# Patient Record
Sex: MALE | Race: ASIAN | NOT HISPANIC OR LATINO | Employment: OTHER | URBAN - METROPOLITAN AREA
[De-identification: names, ages, dates, MRNs, and addresses within clinical notes are randomized per-mention and may not be internally consistent; named-entity substitution may affect disease eponyms.]

---

## 2022-01-01 ENCOUNTER — APPOINTMENT (INPATIENT)
Dept: RADIOLOGY | Facility: HOSPITAL | Age: 87
DRG: 907 | End: 2022-01-01
Payer: MEDICARE

## 2022-01-01 ENCOUNTER — HOSPITAL ENCOUNTER (OUTPATIENT)
Dept: GASTROENTEROLOGY | Facility: HOSPITAL | Age: 87
Setting detail: OUTPATIENT SURGERY
Discharge: HOME | End: 2022-05-11
Attending: INTERNAL MEDICINE
Payer: MEDICARE

## 2022-01-01 ENCOUNTER — TELEPHONE (OUTPATIENT)
Dept: GASTROENTEROLOGY | Facility: CLINIC | Age: 87
End: 2022-01-01

## 2022-01-01 ENCOUNTER — APPOINTMENT (INPATIENT)
Dept: DIALYSIS | Facility: HOSPITAL | Age: 87
DRG: 907 | End: 2022-01-01
Payer: MEDICARE

## 2022-01-01 ENCOUNTER — APPOINTMENT (INPATIENT)
Dept: PERIOP | Facility: HOSPITAL | Age: 87
DRG: 907 | End: 2022-01-01
Payer: MEDICARE

## 2022-01-01 ENCOUNTER — APPOINTMENT (INPATIENT)
Dept: NON INVASIVE DIAGNOSTICS | Facility: HOSPITAL | Age: 87
DRG: 907 | End: 2022-01-01
Attending: RADIOLOGY
Payer: MEDICARE

## 2022-01-01 ENCOUNTER — APPOINTMENT (INPATIENT)
Dept: NON INVASIVE DIAGNOSTICS | Facility: HOSPITAL | Age: 87
DRG: 907 | End: 2022-01-01
Payer: MEDICARE

## 2022-01-01 ENCOUNTER — APPOINTMENT (OUTPATIENT)
Dept: LAB | Facility: CLINIC | Age: 87
End: 2022-01-01
Payer: MEDICARE

## 2022-01-01 ENCOUNTER — ANESTHESIA (INPATIENT)
Dept: PERIOP | Facility: HOSPITAL | Age: 87
DRG: 907 | End: 2022-01-01
Payer: MEDICARE

## 2022-01-01 ENCOUNTER — OFFICE VISIT (OUTPATIENT)
Dept: GASTROENTEROLOGY | Facility: CLINIC | Age: 87
End: 2022-01-01
Payer: MEDICARE

## 2022-01-01 ENCOUNTER — HOME HEALTH ADMISSION (OUTPATIENT)
Dept: HOME HEALTH SERVICES | Facility: HOME HEALTHCARE | Age: 87
End: 2022-01-01

## 2022-01-01 ENCOUNTER — APPOINTMENT (INPATIENT)
Dept: NON INVASIVE DIAGNOSTICS | Facility: HOSPITAL | Age: 87
DRG: 907 | End: 2022-01-01
Attending: ANESTHESIOLOGY
Payer: MEDICARE

## 2022-01-01 ENCOUNTER — APPOINTMENT (INPATIENT)
Dept: RADIOLOGY | Facility: HOSPITAL | Age: 87
DRG: 907 | End: 2022-01-01
Attending: RADIOLOGY
Payer: MEDICARE

## 2022-01-01 ENCOUNTER — HOSPITAL ENCOUNTER (OUTPATIENT)
Dept: CT IMAGING | Facility: HOSPITAL | Age: 87
Discharge: HOME | End: 2022-05-11
Payer: MEDICARE

## 2022-01-01 ENCOUNTER — HOSPITAL ENCOUNTER (INPATIENT)
Facility: HOSPITAL | Age: 87
LOS: 37 days | DRG: 907 | End: 2022-06-17
Attending: EMERGENCY MEDICINE | Admitting: INTERNAL MEDICINE
Payer: MEDICARE

## 2022-01-01 ENCOUNTER — ANESTHESIA EVENT (OUTPATIENT)
Dept: GASTROENTEROLOGY | Facility: HOSPITAL | Age: 87
End: 2022-01-01

## 2022-01-01 ENCOUNTER — ANESTHESIA EVENT (INPATIENT)
Dept: PERIOP | Facility: HOSPITAL | Age: 87
DRG: 907 | End: 2022-01-01
Payer: MEDICARE

## 2022-01-01 ENCOUNTER — OFFICE VISIT (OUTPATIENT)
Dept: FAMILY MEDICINE CLINIC | Facility: CLINIC | Age: 87
End: 2022-01-01
Payer: MEDICARE

## 2022-01-01 ENCOUNTER — ANESTHESIA (OUTPATIENT)
Dept: GASTROENTEROLOGY | Facility: HOSPITAL | Age: 87
End: 2022-01-01

## 2022-01-01 VITALS
HEIGHT: 64 IN | OXYGEN SATURATION: 98 % | SYSTOLIC BLOOD PRESSURE: 115 MMHG | WEIGHT: 176.81 LBS | RESPIRATION RATE: 29 BRPM | TEMPERATURE: 98.1 F | DIASTOLIC BLOOD PRESSURE: 57 MMHG | BODY MASS INDEX: 30.19 KG/M2 | HEART RATE: 78 BPM

## 2022-01-01 VITALS
WEIGHT: 135 LBS | HEART RATE: 89 BPM | HEIGHT: 64 IN | BODY MASS INDEX: 23.05 KG/M2 | SYSTOLIC BLOOD PRESSURE: 152 MMHG | DIASTOLIC BLOOD PRESSURE: 76 MMHG

## 2022-01-01 VITALS
SYSTOLIC BLOOD PRESSURE: 142 MMHG | RESPIRATION RATE: 16 BRPM | DIASTOLIC BLOOD PRESSURE: 70 MMHG | OXYGEN SATURATION: 96 % | HEIGHT: 64 IN | BODY MASS INDEX: 22.94 KG/M2 | TEMPERATURE: 97.9 F | HEART RATE: 77 BPM | WEIGHT: 134.38 LBS

## 2022-01-01 VITALS
SYSTOLIC BLOOD PRESSURE: 141 MMHG | HEIGHT: 64 IN | RESPIRATION RATE: 13 BRPM | DIASTOLIC BLOOD PRESSURE: 69 MMHG | HEART RATE: 73 BPM | BODY MASS INDEX: 23.37 KG/M2 | TEMPERATURE: 97.1 F | WEIGHT: 136.9 LBS | OXYGEN SATURATION: 100 %

## 2022-01-01 DIAGNOSIS — D89.89 AUTOIMMUNE DISORDER (HCC): ICD-10-CM

## 2022-01-01 DIAGNOSIS — R10.32 LEFT LOWER QUADRANT PAIN: Primary | ICD-10-CM

## 2022-01-01 DIAGNOSIS — K57.90 DIVERTICULOSIS: ICD-10-CM

## 2022-01-01 DIAGNOSIS — I10 HYPERTENSION, UNSPECIFIED TYPE: ICD-10-CM

## 2022-01-01 DIAGNOSIS — J96.01 ACUTE RESPIRATORY FAILURE WITH HYPOXIA (HCC): ICD-10-CM

## 2022-01-01 DIAGNOSIS — G92.8 TOXIC METABOLIC ENCEPHALOPATHY: Primary | ICD-10-CM

## 2022-01-01 DIAGNOSIS — N17.9 AKI (ACUTE KIDNEY INJURY) (HCC): ICD-10-CM

## 2022-01-01 DIAGNOSIS — E03.9 HYPOTHYROIDISM, UNSPECIFIED TYPE: ICD-10-CM

## 2022-01-01 DIAGNOSIS — Z76.89 ENCOUNTER TO ESTABLISH CARE WITH NEW DOCTOR: Primary | ICD-10-CM

## 2022-01-01 DIAGNOSIS — K56.1 COLONIC INTUSSUSCEPTION (HCC): ICD-10-CM

## 2022-01-01 DIAGNOSIS — R18.8 PELVIC FLUID COLLECTION: ICD-10-CM

## 2022-01-01 DIAGNOSIS — S32.019A L1 VERTEBRAL FRACTURE (HCC): ICD-10-CM

## 2022-01-01 DIAGNOSIS — Z80.0 FAMILY HISTORY OF STOMACH CANCER: ICD-10-CM

## 2022-01-01 DIAGNOSIS — M51.37 DEGENERATIVE DISC DISEASE AT L5-S1 LEVEL: ICD-10-CM

## 2022-01-01 DIAGNOSIS — R54 FRAIL ELDERLY: ICD-10-CM

## 2022-01-01 DIAGNOSIS — K63.1 BOWEL PERFORATION (HCC): ICD-10-CM

## 2022-01-01 DIAGNOSIS — J18.9 PNEUMONIA OF BOTH LUNGS DUE TO INFECTIOUS ORGANISM, UNSPECIFIED PART OF LUNG: ICD-10-CM

## 2022-01-01 DIAGNOSIS — I46.9 CARDIAC ARREST (HCC): ICD-10-CM

## 2022-01-01 DIAGNOSIS — M48.07 SPINAL STENOSIS OF LUMBOSACRAL REGION: ICD-10-CM

## 2022-01-01 DIAGNOSIS — R10.9 ABDOMINAL PAIN: ICD-10-CM

## 2022-01-01 DIAGNOSIS — D12.6 ADENOMATOUS POLYP OF COLON, UNSPECIFIED PART OF COLON: ICD-10-CM

## 2022-01-01 DIAGNOSIS — R35.1 NOCTURIA: ICD-10-CM

## 2022-01-01 DIAGNOSIS — Z80.0 FAMILY HISTORY OF COLON CANCER: ICD-10-CM

## 2022-01-01 DIAGNOSIS — D53.1 MEGALOBLASTIC ANEMIA: Primary | ICD-10-CM

## 2022-01-01 DIAGNOSIS — E55.9 VITAMIN D DEFICIENCY: ICD-10-CM

## 2022-01-01 DIAGNOSIS — Z92.29 COVID-19 VACCINE SERIES COMPLETED: ICD-10-CM

## 2022-01-01 DIAGNOSIS — R11.0 NAUSEA: ICD-10-CM

## 2022-01-01 DIAGNOSIS — R09.02 HYPOXIA: ICD-10-CM

## 2022-01-01 DIAGNOSIS — E87.6 HYPOKALEMIA: ICD-10-CM

## 2022-01-01 DIAGNOSIS — A41.9 SEVERE SEPSIS (HCC): ICD-10-CM

## 2022-01-01 DIAGNOSIS — R10.32 LEFT LOWER QUADRANT PAIN: ICD-10-CM

## 2022-01-01 DIAGNOSIS — R65.20 SEVERE SEPSIS (HCC): ICD-10-CM

## 2022-01-01 LAB
25(OH)D3 SERPL-MCNC: 31.4 NG/ML (ref 30–100)
2HR DELTA HS TROPONIN: -1 NG/L
2HR DELTA HS TROPONIN: -36 NG/L
2HR DELTA HS TROPONIN: 22 NG/L
4HR DELTA HS TROPONIN: -4 NG/L
4HR DELTA HS TROPONIN: 87 NG/L
ABO GROUP BLD BPU: NORMAL
ABO GROUP BLD: NORMAL
ACANTHOCYTES BLD QL SMEAR: PRESENT
ACTH PLAS-MCNC: 11.8 PG/ML (ref 7.2–63.3)
ALBUMIN SERPL BCP-MCNC: 1.5 G/DL (ref 3.5–5)
ALBUMIN SERPL BCP-MCNC: 1.6 G/DL (ref 3.5–5)
ALBUMIN SERPL BCP-MCNC: 1.8 G/DL (ref 3.5–5)
ALBUMIN SERPL BCP-MCNC: 1.9 G/DL (ref 3.5–5)
ALBUMIN SERPL BCP-MCNC: 2 G/DL (ref 3.5–5)
ALBUMIN SERPL BCP-MCNC: 2.2 G/DL (ref 3.5–5)
ALBUMIN SERPL BCP-MCNC: 2.5 G/DL (ref 3.5–5)
ALBUMIN SERPL BCP-MCNC: 2.7 G/DL (ref 3.5–5)
ALBUMIN SERPL BCP-MCNC: 2.9 G/DL (ref 3.5–5)
ALBUMIN SERPL BCP-MCNC: 3.2 G/DL (ref 3.5–5)
ALBUMIN SERPL BCP-MCNC: 3.7 G/DL (ref 3.5–5)
ALP SERPL-CCNC: 100 U/L (ref 46–116)
ALP SERPL-CCNC: 156 U/L (ref 46–116)
ALP SERPL-CCNC: 191 U/L (ref 46–116)
ALP SERPL-CCNC: 260 U/L (ref 46–116)
ALP SERPL-CCNC: 38 U/L (ref 46–116)
ALP SERPL-CCNC: 40 U/L (ref 46–116)
ALP SERPL-CCNC: 41 U/L (ref 46–116)
ALP SERPL-CCNC: 43 U/L (ref 46–116)
ALP SERPL-CCNC: 43 U/L (ref 46–116)
ALP SERPL-CCNC: 445 U/L (ref 46–116)
ALP SERPL-CCNC: 47 U/L (ref 46–116)
ALP SERPL-CCNC: 53 U/L (ref 46–116)
ALP SERPL-CCNC: 53 U/L (ref 46–116)
ALP SERPL-CCNC: 54 U/L (ref 46–116)
ALP SERPL-CCNC: 58 U/L (ref 46–116)
ALT SERPL W P-5'-P-CCNC: 11 U/L (ref 12–78)
ALT SERPL W P-5'-P-CCNC: 13 U/L (ref 12–78)
ALT SERPL W P-5'-P-CCNC: 13 U/L (ref 12–78)
ALT SERPL W P-5'-P-CCNC: 14 U/L (ref 12–78)
ALT SERPL W P-5'-P-CCNC: 15 U/L (ref 12–78)
ALT SERPL W P-5'-P-CCNC: 18 U/L (ref 12–78)
ALT SERPL W P-5'-P-CCNC: 19 U/L (ref 12–78)
ALT SERPL W P-5'-P-CCNC: 20 U/L (ref 12–78)
ALT SERPL W P-5'-P-CCNC: 22 U/L (ref 12–78)
ALT SERPL W P-5'-P-CCNC: 27 U/L (ref 12–78)
ANION GAP SERPL CALCULATED.3IONS-SCNC: 10 MMOL/L (ref 4–13)
ANION GAP SERPL CALCULATED.3IONS-SCNC: 11 MMOL/L (ref 4–13)
ANION GAP SERPL CALCULATED.3IONS-SCNC: 12 MMOL/L (ref 4–13)
ANION GAP SERPL CALCULATED.3IONS-SCNC: 13 MMOL/L (ref 4–13)
ANION GAP SERPL CALCULATED.3IONS-SCNC: 13 MMOL/L (ref 4–13)
ANION GAP SERPL CALCULATED.3IONS-SCNC: 14 MMOL/L (ref 4–13)
ANION GAP SERPL CALCULATED.3IONS-SCNC: 15 MMOL/L (ref 4–13)
ANION GAP SERPL CALCULATED.3IONS-SCNC: 16 MMOL/L (ref 4–13)
ANION GAP SERPL CALCULATED.3IONS-SCNC: 4 MMOL/L (ref 4–13)
ANION GAP SERPL CALCULATED.3IONS-SCNC: 5 MMOL/L (ref 4–13)
ANION GAP SERPL CALCULATED.3IONS-SCNC: 6 MMOL/L (ref 4–13)
ANION GAP SERPL CALCULATED.3IONS-SCNC: 7 MMOL/L (ref 4–13)
ANION GAP SERPL CALCULATED.3IONS-SCNC: 8 MMOL/L (ref 4–13)
ANION GAP SERPL CALCULATED.3IONS-SCNC: 9 MMOL/L (ref 4–13)
ANISOCYTOSIS BLD QL SMEAR: PRESENT
AORTIC ROOT: 3.5 CM
AORTIC ROOT: 3.7 CM
AORTIC ROOT: 4.3 CM
APICAL FOUR CHAMBER EJECTION FRACTION: 46 %
APICAL FOUR CHAMBER EJECTION FRACTION: 54 %
APICAL FOUR CHAMBER EJECTION FRACTION: 57 %
APPEARANCE FLD: CLEAR
APTT PPP: 102 SECONDS (ref 23–37)
APTT PPP: 105 SECONDS (ref 23–37)
APTT PPP: 112 SECONDS (ref 23–37)
APTT PPP: 143 SECONDS (ref 23–37)
APTT PPP: 201 SECONDS (ref 23–37)
APTT PPP: 210 SECONDS (ref 23–37)
APTT PPP: 48 SECONDS (ref 23–37)
APTT PPP: 53 SECONDS (ref 23–37)
APTT PPP: 61 SECONDS (ref 23–37)
APTT PPP: 74 SECONDS (ref 23–37)
APTT PPP: 85 SECONDS (ref 23–37)
APTT PPP: 93 SECONDS (ref 23–37)
APTT PPP: 93 SECONDS (ref 23–37)
APTT PPP: >210 SECONDS (ref 23–37)
APTT PPP: >210 SECONDS (ref 23–37)
ARTERIAL PATENCY WRIST A: NO
ARTERIAL PATENCY WRIST A: YES
AST SERPL W P-5'-P-CCNC: 12 U/L (ref 5–45)
AST SERPL W P-5'-P-CCNC: 12 U/L (ref 5–45)
AST SERPL W P-5'-P-CCNC: 15 U/L (ref 5–45)
AST SERPL W P-5'-P-CCNC: 16 U/L (ref 5–45)
AST SERPL W P-5'-P-CCNC: 18 U/L (ref 5–45)
AST SERPL W P-5'-P-CCNC: 19 U/L (ref 5–45)
AST SERPL W P-5'-P-CCNC: 19 U/L (ref 5–45)
AST SERPL W P-5'-P-CCNC: 20 U/L (ref 5–45)
AST SERPL W P-5'-P-CCNC: 26 U/L (ref 5–45)
AST SERPL W P-5'-P-CCNC: 29 U/L (ref 5–45)
AST SERPL W P-5'-P-CCNC: 29 U/L (ref 5–45)
AST SERPL W P-5'-P-CCNC: 31 U/L (ref 5–45)
AST SERPL W P-5'-P-CCNC: 55 U/L (ref 5–45)
ATRIAL RATE: 101 BPM
ATRIAL RATE: 102 BPM
ATRIAL RATE: 111 BPM
ATRIAL RATE: 73 BPM
ATRIAL RATE: 76 BPM
ATRIAL RATE: 80 BPM
ATRIAL RATE: 90 BPM
ATRIAL RATE: 90 BPM
ATRIAL RATE: 93 BPM
ATRIAL RATE: 96 BPM
ATRIAL RATE: 98 BPM
AV LVOT PEAK GRADIENT: 4 MMHG
AV LVOT PEAK GRADIENT: 5 MMHG
AV PEAK GRADIENT: 25 MMHG
AV PEAK GRADIENT: 27 MMHG
BACTERIA BLD CULT: NORMAL
BACTERIA SPEC ANAEROBE CULT: ABNORMAL
BACTERIA SPEC BFLD CULT: ABNORMAL
BACTERIA SPEC BFLD CULT: NO GROWTH
BACTERIA SPT RESP CULT: ABNORMAL
BACTERIA SPT RESP CULT: ABNORMAL
BACTERIA UR QL AUTO: ABNORMAL /HPF
BACTERIA WND AEROBE CULT: ABNORMAL
BASE EX.OXY STD BLDV CALC-SCNC: 69 % (ref 60–80)
BASE EX.OXY STD BLDV CALC-SCNC: 76.3 % (ref 60–80)
BASE EX.OXY STD BLDV CALC-SCNC: 81.5 % (ref 60–80)
BASE EXCESS BLDA CALC-SCNC: -0.7 MMOL/L
BASE EXCESS BLDA CALC-SCNC: -1 MMOL/L
BASE EXCESS BLDA CALC-SCNC: -1.4 MMOL/L
BASE EXCESS BLDA CALC-SCNC: -1.9 MMOL/L
BASE EXCESS BLDA CALC-SCNC: -2.4 MMOL/L
BASE EXCESS BLDA CALC-SCNC: -2.4 MMOL/L
BASE EXCESS BLDA CALC-SCNC: -3.1 MMOL/L
BASE EXCESS BLDA CALC-SCNC: -3.5 MMOL/L
BASE EXCESS BLDA CALC-SCNC: -4 MMOL/L
BASE EXCESS BLDA CALC-SCNC: -4.1 MMOL/L
BASE EXCESS BLDA CALC-SCNC: -4.8 MMOL/L
BASE EXCESS BLDA CALC-SCNC: -5 MMOL/L
BASE EXCESS BLDA CALC-SCNC: -5 MMOL/L
BASE EXCESS BLDA CALC-SCNC: -5.2 MMOL/L
BASE EXCESS BLDA CALC-SCNC: -6.1 MMOL/L
BASE EXCESS BLDA CALC-SCNC: -6.5 MMOL/L
BASE EXCESS BLDA CALC-SCNC: -6.7 MMOL/L
BASE EXCESS BLDA CALC-SCNC: -7.3 MMOL/L
BASE EXCESS BLDA CALC-SCNC: -8.9 MMOL/L
BASE EXCESS BLDA CALC-SCNC: 0.7 MMOL/L
BASE EXCESS BLDV CALC-SCNC: -10.3 MMOL/L
BASE EXCESS BLDV CALC-SCNC: -7.1 MMOL/L
BASE EXCESS BLDV CALC-SCNC: -9.4 MMOL/L
BASO STIPL BLD QL SMEAR: PRESENT
BASOPHILS # BLD AUTO: 0.05 THOUSANDS/ΜL (ref 0–0.1)
BASOPHILS # BLD AUTO: 0.1 THOUSANDS/ΜL (ref 0–0.1)
BASOPHILS # BLD AUTO: 0.16 THOUSANDS/ΜL (ref 0–0.1)
BASOPHILS # BLD MANUAL: 0 THOUSAND/UL (ref 0–0.1)
BASOPHILS # BLD MANUAL: 0.15 THOUSAND/UL (ref 0–0.1)
BASOPHILS # BLD MANUAL: 0.17 THOUSAND/UL (ref 0–0.1)
BASOPHILS NFR BLD AUTO: 1 % (ref 0–1)
BASOPHILS NFR MAR MANUAL: 0 % (ref 0–1)
BASOPHILS NFR MAR MANUAL: 1 % (ref 0–1)
BASOPHILS NFR MAR MANUAL: 1 % (ref 0–1)
BILIRUB DIRECT SERPL-MCNC: 0.5 MG/DL (ref 0–0.2)
BILIRUB DIRECT SERPL-MCNC: 1.02 MG/DL (ref 0–0.2)
BILIRUB DIRECT SERPL-MCNC: 1.12 MG/DL (ref 0–0.2)
BILIRUB DIRECT SERPL-MCNC: 1.19 MG/DL (ref 0–0.2)
BILIRUB SERPL-MCNC: 0.48 MG/DL (ref 0.2–1)
BILIRUB SERPL-MCNC: 0.63 MG/DL (ref 0.2–1)
BILIRUB SERPL-MCNC: 0.68 MG/DL (ref 0.2–1)
BILIRUB SERPL-MCNC: 0.71 MG/DL (ref 0.2–1)
BILIRUB SERPL-MCNC: 0.75 MG/DL (ref 0.2–1)
BILIRUB SERPL-MCNC: 0.88 MG/DL (ref 0.2–1)
BILIRUB SERPL-MCNC: 0.96 MG/DL (ref 0.2–1)
BILIRUB SERPL-MCNC: 1.05 MG/DL (ref 0.2–1)
BILIRUB SERPL-MCNC: 1.06 MG/DL (ref 0.2–1)
BILIRUB SERPL-MCNC: 1.11 MG/DL (ref 0.2–1)
BILIRUB SERPL-MCNC: 1.33 MG/DL (ref 0.2–1)
BILIRUB SERPL-MCNC: 1.55 MG/DL (ref 0.2–1)
BILIRUB SERPL-MCNC: 1.73 MG/DL (ref 0.2–1)
BILIRUB SERPL-MCNC: 1.77 MG/DL (ref 0.2–1)
BILIRUB SERPL-MCNC: 1.83 MG/DL (ref 0.2–1)
BILIRUB UR QL STRIP: NEGATIVE
BLASTS NFR BLD MANUAL: 1 %
BLD GP AB SCN SERPL QL: NEGATIVE
BLD SMEAR INTERP: NORMAL
BODY TEMPERATURE: 100.4 DEGREES FEHRENHEIT
BODY TEMPERATURE: 101.1 DEGREES FEHRENHEIT
BODY TEMPERATURE: 95.2 DEGREES FEHRENHEIT
BODY TEMPERATURE: 95.4 DEGREES FEHRENHEIT
BODY TEMPERATURE: 96.3 DEGREES FEHRENHEIT
BODY TEMPERATURE: 96.3 DEGREES FEHRENHEIT
BODY TEMPERATURE: 96.7 DEGREES FEHRENHEIT
BODY TEMPERATURE: 96.8 DEGREES FEHRENHEIT
BODY TEMPERATURE: 97 DEGREES FEHRENHEIT
BODY TEMPERATURE: 97.3 DEGREES FEHRENHEIT
BODY TEMPERATURE: 97.3 DEGREES FEHRENHEIT
BODY TEMPERATURE: 97.4 DEGREES FEHRENHEIT
BODY TEMPERATURE: 97.7 DEGREES FEHRENHEIT
BODY TEMPERATURE: 97.7 DEGREES FEHRENHEIT
BODY TEMPERATURE: 97.9 DEGREES FEHRENHEIT
BODY TEMPERATURE: 98.1 DEGREES FEHRENHEIT
BODY TEMPERATURE: 98.4 DEGREES FEHRENHEIT
BODY TEMPERATURE: 98.8 DEGREES FEHRENHEIT
BODY TEMPERATURE: 99 DEGREES FEHRENHEIT
BPU ID: NORMAL
BUN SERPL-MCNC: 105 MG/DL (ref 5–25)
BUN SERPL-MCNC: 106 MG/DL (ref 5–25)
BUN SERPL-MCNC: 106 MG/DL (ref 5–25)
BUN SERPL-MCNC: 107 MG/DL (ref 5–25)
BUN SERPL-MCNC: 107 MG/DL (ref 5–25)
BUN SERPL-MCNC: 12 MG/DL (ref 5–25)
BUN SERPL-MCNC: 13 MG/DL (ref 5–25)
BUN SERPL-MCNC: 14 MG/DL (ref 5–25)
BUN SERPL-MCNC: 15 MG/DL (ref 5–25)
BUN SERPL-MCNC: 15 MG/DL (ref 5–25)
BUN SERPL-MCNC: 16 MG/DL (ref 5–25)
BUN SERPL-MCNC: 17 MG/DL (ref 5–25)
BUN SERPL-MCNC: 18 MG/DL (ref 5–25)
BUN SERPL-MCNC: 19 MG/DL (ref 5–25)
BUN SERPL-MCNC: 20 MG/DL (ref 5–25)
BUN SERPL-MCNC: 21 MG/DL (ref 5–25)
BUN SERPL-MCNC: 22 MG/DL (ref 5–25)
BUN SERPL-MCNC: 22 MG/DL (ref 5–25)
BUN SERPL-MCNC: 23 MG/DL (ref 5–25)
BUN SERPL-MCNC: 24 MG/DL (ref 5–25)
BUN SERPL-MCNC: 24 MG/DL (ref 5–25)
BUN SERPL-MCNC: 25 MG/DL (ref 5–25)
BUN SERPL-MCNC: 25 MG/DL (ref 5–25)
BUN SERPL-MCNC: 26 MG/DL (ref 5–25)
BUN SERPL-MCNC: 26 MG/DL (ref 5–25)
BUN SERPL-MCNC: 27 MG/DL (ref 5–25)
BUN SERPL-MCNC: 28 MG/DL (ref 5–25)
BUN SERPL-MCNC: 29 MG/DL (ref 5–25)
BUN SERPL-MCNC: 29 MG/DL (ref 5–25)
BUN SERPL-MCNC: 30 MG/DL (ref 5–25)
BUN SERPL-MCNC: 31 MG/DL (ref 5–25)
BUN SERPL-MCNC: 33 MG/DL (ref 5–25)
BUN SERPL-MCNC: 33 MG/DL (ref 5–25)
BUN SERPL-MCNC: 34 MG/DL (ref 5–25)
BUN SERPL-MCNC: 36 MG/DL (ref 5–25)
BUN SERPL-MCNC: 37 MG/DL (ref 5–25)
BUN SERPL-MCNC: 38 MG/DL (ref 5–25)
BUN SERPL-MCNC: 38 MG/DL (ref 5–25)
BUN SERPL-MCNC: 39 MG/DL (ref 5–25)
BUN SERPL-MCNC: 41 MG/DL (ref 5–25)
BUN SERPL-MCNC: 42 MG/DL (ref 5–25)
BUN SERPL-MCNC: 43 MG/DL (ref 5–25)
BUN SERPL-MCNC: 45 MG/DL (ref 5–25)
BUN SERPL-MCNC: 48 MG/DL (ref 5–25)
BUN SERPL-MCNC: 50 MG/DL (ref 5–25)
BUN SERPL-MCNC: 53 MG/DL (ref 5–25)
BUN SERPL-MCNC: 54 MG/DL (ref 5–25)
BUN SERPL-MCNC: 57 MG/DL (ref 5–25)
BUN SERPL-MCNC: 62 MG/DL (ref 5–25)
BUN SERPL-MCNC: 66 MG/DL (ref 5–25)
BUN SERPL-MCNC: 69 MG/DL (ref 5–25)
BUN SERPL-MCNC: 69 MG/DL (ref 5–25)
BUN SERPL-MCNC: 73 MG/DL (ref 5–25)
BUN SERPL-MCNC: 81 MG/DL (ref 5–25)
BUN SERPL-MCNC: 87 MG/DL (ref 5–25)
BUN SERPL-MCNC: 92 MG/DL (ref 5–25)
BUN SERPL-MCNC: 93 MG/DL (ref 5–25)
BUN SERPL-MCNC: 99 MG/DL (ref 5–25)
CA-I BLD-SCNC: 1.01 MMOL/L (ref 1.12–1.32)
CA-I BLD-SCNC: 1.01 MMOL/L (ref 1.12–1.32)
CA-I BLD-SCNC: 1.03 MMOL/L (ref 1.12–1.32)
CA-I BLD-SCNC: 1.03 MMOL/L (ref 1.12–1.32)
CA-I BLD-SCNC: 1.06 MMOL/L (ref 1.12–1.32)
CA-I BLD-SCNC: 1.08 MMOL/L (ref 1.12–1.32)
CA-I BLD-SCNC: 1.1 MMOL/L (ref 1.12–1.32)
CA-I BLD-SCNC: 1.11 MMOL/L (ref 1.12–1.32)
CA-I BLD-SCNC: 1.12 MMOL/L (ref 1.12–1.32)
CA-I BLD-SCNC: 1.13 MMOL/L (ref 1.12–1.32)
CA-I BLD-SCNC: 1.14 MMOL/L (ref 1.12–1.32)
CA-I BLD-SCNC: 1.15 MMOL/L (ref 1.12–1.32)
CA-I BLD-SCNC: 1.16 MMOL/L (ref 1.12–1.32)
CA-I BLD-SCNC: 1.16 MMOL/L (ref 1.12–1.32)
CA-I BLD-SCNC: 1.17 MMOL/L (ref 1.12–1.32)
CA-I BLD-SCNC: 1.18 MMOL/L (ref 1.12–1.32)
CA-I BLD-SCNC: 1.19 MMOL/L (ref 1.12–1.32)
CA-I BLD-SCNC: 1.2 MMOL/L (ref 1.12–1.32)
CA-I BLD-SCNC: 1.21 MMOL/L (ref 1.12–1.32)
CA-I BLD-SCNC: 1.21 MMOL/L (ref 1.12–1.32)
CA-I BLD-SCNC: 1.22 MMOL/L (ref 1.12–1.32)
CALCIUM ALBUM COR SERPL-MCNC: 7.7 MG/DL (ref 8.3–10.1)
CALCIUM ALBUM COR SERPL-MCNC: 8 MG/DL (ref 8.3–10.1)
CALCIUM ALBUM COR SERPL-MCNC: 8.8 MG/DL (ref 8.3–10.1)
CALCIUM ALBUM COR SERPL-MCNC: 8.9 MG/DL (ref 8.3–10.1)
CALCIUM ALBUM COR SERPL-MCNC: 9 MG/DL (ref 8.3–10.1)
CALCIUM ALBUM COR SERPL-MCNC: 9.1 MG/DL (ref 8.3–10.1)
CALCIUM ALBUM COR SERPL-MCNC: 9.2 MG/DL (ref 8.3–10.1)
CALCIUM ALBUM COR SERPL-MCNC: 9.9 MG/DL (ref 8.3–10.1)
CALCIUM SERPL-MCNC: 5.7 MG/DL (ref 8.3–10.1)
CALCIUM SERPL-MCNC: 7 MG/DL (ref 8.3–10.1)
CALCIUM SERPL-MCNC: 7.1 MG/DL (ref 8.3–10.1)
CALCIUM SERPL-MCNC: 7.2 MG/DL (ref 8.3–10.1)
CALCIUM SERPL-MCNC: 7.3 MG/DL (ref 8.3–10.1)
CALCIUM SERPL-MCNC: 7.3 MG/DL (ref 8.3–10.1)
CALCIUM SERPL-MCNC: 7.4 MG/DL (ref 8.3–10.1)
CALCIUM SERPL-MCNC: 7.5 MG/DL (ref 8.3–10.1)
CALCIUM SERPL-MCNC: 7.6 MG/DL (ref 8.3–10.1)
CALCIUM SERPL-MCNC: 7.7 MG/DL (ref 8.3–10.1)
CALCIUM SERPL-MCNC: 7.8 MG/DL (ref 8.3–10.1)
CALCIUM SERPL-MCNC: 7.9 MG/DL (ref 8.3–10.1)
CALCIUM SERPL-MCNC: 8 MG/DL (ref 8.3–10.1)
CALCIUM SERPL-MCNC: 8.1 MG/DL (ref 8.3–10.1)
CALCIUM SERPL-MCNC: 8.2 MG/DL (ref 8.3–10.1)
CALCIUM SERPL-MCNC: 8.3 MG/DL (ref 8.3–10.1)
CALCIUM SERPL-MCNC: 8.4 MG/DL (ref 8.3–10.1)
CALCIUM SERPL-MCNC: 8.5 MG/DL (ref 8.3–10.1)
CALCIUM SERPL-MCNC: 8.6 MG/DL (ref 8.3–10.1)
CALCIUM SERPL-MCNC: 8.6 MG/DL (ref 8.3–10.1)
CALCIUM SERPL-MCNC: 8.7 MG/DL (ref 8.3–10.1)
CALCIUM SERPL-MCNC: 8.8 MG/DL (ref 8.3–10.1)
CALCIUM SERPL-MCNC: 8.8 MG/DL (ref 8.3–10.1)
CALCIUM SERPL-MCNC: 8.9 MG/DL (ref 8.3–10.1)
CALCIUM SERPL-MCNC: 9 MG/DL (ref 8.3–10.1)
CALCIUM SERPL-MCNC: 9.2 MG/DL (ref 8.3–10.1)
CARDIAC TROPONIN I PNL SERPL HS: 109 NG/L
CARDIAC TROPONIN I PNL SERPL HS: 174 NG/L
CARDIAC TROPONIN I PNL SERPL HS: 214 NG/L
CARDIAC TROPONIN I PNL SERPL HS: 250 NG/L
CARDIAC TROPONIN I PNL SERPL HS: 75 NG/L
CARDIAC TROPONIN I PNL SERPL HS: 78 NG/L
CARDIAC TROPONIN I PNL SERPL HS: 79 NG/L
CARDIAC TROPONIN I PNL SERPL HS: 87 NG/L
CHLORIDE SERPL-SCNC: 100 MMOL/L (ref 100–108)
CHLORIDE SERPL-SCNC: 100 MMOL/L (ref 100–108)
CHLORIDE SERPL-SCNC: 101 MMOL/L (ref 100–108)
CHLORIDE SERPL-SCNC: 101 MMOL/L (ref 100–108)
CHLORIDE SERPL-SCNC: 102 MMOL/L (ref 100–108)
CHLORIDE SERPL-SCNC: 103 MMOL/L (ref 100–108)
CHLORIDE SERPL-SCNC: 104 MMOL/L (ref 100–108)
CHLORIDE SERPL-SCNC: 105 MMOL/L (ref 100–108)
CHLORIDE SERPL-SCNC: 106 MMOL/L (ref 100–108)
CHLORIDE SERPL-SCNC: 107 MMOL/L (ref 100–108)
CHLORIDE SERPL-SCNC: 108 MMOL/L (ref 100–108)
CHLORIDE SERPL-SCNC: 108 MMOL/L (ref 100–108)
CHLORIDE SERPL-SCNC: 109 MMOL/L (ref 100–108)
CHLORIDE SERPL-SCNC: 110 MMOL/L (ref 100–108)
CHLORIDE SERPL-SCNC: 111 MMOL/L (ref 100–108)
CHLORIDE SERPL-SCNC: 112 MMOL/L (ref 100–108)
CHLORIDE SERPL-SCNC: 113 MMOL/L (ref 100–108)
CHLORIDE SERPL-SCNC: 114 MMOL/L (ref 100–108)
CHLORIDE SERPL-SCNC: 115 MMOL/L (ref 100–108)
CHLORIDE SERPL-SCNC: 116 MMOL/L (ref 100–108)
CHLORIDE SERPL-SCNC: 117 MMOL/L (ref 100–108)
CHLORIDE SERPL-SCNC: 118 MMOL/L (ref 100–108)
CHLORIDE SERPL-SCNC: 119 MMOL/L (ref 100–108)
CHLORIDE SERPL-SCNC: 94 MMOL/L (ref 100–108)
CHLORIDE SERPL-SCNC: 97 MMOL/L (ref 100–108)
CHLORIDE SERPL-SCNC: 97 MMOL/L (ref 100–108)
CHLORIDE SERPL-SCNC: 98 MMOL/L (ref 100–108)
CHLORIDE SERPL-SCNC: 99 MMOL/L (ref 100–108)
CHLORIDE SERPL-SCNC: 99 MMOL/L (ref 100–108)
CHOLEST SERPL-MCNC: 137 MG/DL
CLARITY UR: ABNORMAL
CO2 SERPL-SCNC: 17 MMOL/L (ref 21–32)
CO2 SERPL-SCNC: 18 MMOL/L (ref 21–32)
CO2 SERPL-SCNC: 19 MMOL/L (ref 21–32)
CO2 SERPL-SCNC: 20 MMOL/L (ref 21–32)
CO2 SERPL-SCNC: 20 MMOL/L (ref 21–32)
CO2 SERPL-SCNC: 21 MMOL/L (ref 21–32)
CO2 SERPL-SCNC: 22 MMOL/L (ref 21–32)
CO2 SERPL-SCNC: 23 MMOL/L (ref 21–32)
CO2 SERPL-SCNC: 24 MMOL/L (ref 21–32)
CO2 SERPL-SCNC: 25 MMOL/L (ref 21–32)
CO2 SERPL-SCNC: 26 MMOL/L (ref 21–32)
CO2 SERPL-SCNC: 27 MMOL/L (ref 21–32)
CO2 SERPL-SCNC: 28 MMOL/L (ref 21–32)
CO2 SERPL-SCNC: 29 MMOL/L (ref 21–32)
CO2 SERPL-SCNC: 29 MMOL/L (ref 21–32)
COLOR FLD: YELLOW
COLOR UR: YELLOW
CORTIS SERPL-MCNC: 17.9 UG/DL
CORTIS SERPL-MCNC: 53.6 UG/DL
CORTIS SERPL-MCNC: 63.6 UG/DL
CREAT SERPL-MCNC: 0.78 MG/DL (ref 0.6–1.3)
CREAT SERPL-MCNC: 0.78 MG/DL (ref 0.6–1.3)
CREAT SERPL-MCNC: 0.79 MG/DL (ref 0.6–1.3)
CREAT SERPL-MCNC: 0.8 MG/DL (ref 0.6–1.3)
CREAT SERPL-MCNC: 0.8 MG/DL (ref 0.6–1.3)
CREAT SERPL-MCNC: 0.83 MG/DL (ref 0.6–1.3)
CREAT SERPL-MCNC: 0.85 MG/DL (ref 0.6–1.3)
CREAT SERPL-MCNC: 0.86 MG/DL (ref 0.6–1.3)
CREAT SERPL-MCNC: 0.87 MG/DL (ref 0.6–1.3)
CREAT SERPL-MCNC: 0.88 MG/DL (ref 0.6–1.3)
CREAT SERPL-MCNC: 0.89 MG/DL (ref 0.6–1.3)
CREAT SERPL-MCNC: 0.9 MG/DL (ref 0.6–1.3)
CREAT SERPL-MCNC: 0.9 MG/DL (ref 0.6–1.3)
CREAT SERPL-MCNC: 0.91 MG/DL (ref 0.6–1.3)
CREAT SERPL-MCNC: 0.92 MG/DL (ref 0.6–1.3)
CREAT SERPL-MCNC: 0.93 MG/DL (ref 0.6–1.3)
CREAT SERPL-MCNC: 0.93 MG/DL (ref 0.6–1.3)
CREAT SERPL-MCNC: 0.94 MG/DL (ref 0.6–1.3)
CREAT SERPL-MCNC: 0.95 MG/DL (ref 0.6–1.3)
CREAT SERPL-MCNC: 0.96 MG/DL (ref 0.6–1.3)
CREAT SERPL-MCNC: 0.98 MG/DL (ref 0.6–1.3)
CREAT SERPL-MCNC: 0.99 MG/DL (ref 0.6–1.3)
CREAT SERPL-MCNC: 1.01 MG/DL (ref 0.6–1.3)
CREAT SERPL-MCNC: 1.02 MG/DL (ref 0.6–1.3)
CREAT SERPL-MCNC: 1.03 MG/DL (ref 0.6–1.3)
CREAT SERPL-MCNC: 1.03 MG/DL (ref 0.6–1.3)
CREAT SERPL-MCNC: 1.04 MG/DL (ref 0.6–1.3)
CREAT SERPL-MCNC: 1.05 MG/DL (ref 0.6–1.3)
CREAT SERPL-MCNC: 1.08 MG/DL (ref 0.6–1.3)
CREAT SERPL-MCNC: 1.09 MG/DL (ref 0.6–1.3)
CREAT SERPL-MCNC: 1.1 MG/DL (ref 0.6–1.3)
CREAT SERPL-MCNC: 1.12 MG/DL (ref 0.6–1.3)
CREAT SERPL-MCNC: 1.13 MG/DL (ref 0.6–1.3)
CREAT SERPL-MCNC: 1.13 MG/DL (ref 0.6–1.3)
CREAT SERPL-MCNC: 1.14 MG/DL (ref 0.6–1.3)
CREAT SERPL-MCNC: 1.15 MG/DL (ref 0.6–1.3)
CREAT SERPL-MCNC: 1.17 MG/DL (ref 0.6–1.3)
CREAT SERPL-MCNC: 1.18 MG/DL (ref 0.6–1.3)
CREAT SERPL-MCNC: 1.18 MG/DL (ref 0.6–1.3)
CREAT SERPL-MCNC: 1.19 MG/DL (ref 0.6–1.3)
CREAT SERPL-MCNC: 1.2 MG/DL (ref 0.6–1.3)
CREAT SERPL-MCNC: 1.26 MG/DL (ref 0.6–1.3)
CREAT SERPL-MCNC: 1.29 MG/DL (ref 0.6–1.3)
CREAT SERPL-MCNC: 1.33 MG/DL (ref 0.6–1.3)
CREAT SERPL-MCNC: 1.33 MG/DL (ref 0.6–1.3)
CREAT SERPL-MCNC: 1.35 MG/DL (ref 0.6–1.3)
CREAT SERPL-MCNC: 1.35 MG/DL (ref 0.6–1.3)
CREAT SERPL-MCNC: 1.36 MG/DL (ref 0.6–1.3)
CREAT SERPL-MCNC: 1.37 MG/DL (ref 0.6–1.3)
CREAT SERPL-MCNC: 1.4 MG/DL (ref 0.6–1.3)
CREAT SERPL-MCNC: 1.44 MG/DL (ref 0.6–1.3)
CREAT SERPL-MCNC: 1.47 MG/DL (ref 0.6–1.3)
CREAT SERPL-MCNC: 1.5 MG/DL (ref 0.6–1.3)
CREAT SERPL-MCNC: 1.51 MG/DL (ref 0.6–1.3)
CREAT SERPL-MCNC: 1.54 MG/DL (ref 0.6–1.3)
CREAT SERPL-MCNC: 1.56 MG/DL (ref 0.6–1.3)
CREAT SERPL-MCNC: 1.56 MG/DL (ref 0.6–1.3)
CREAT SERPL-MCNC: 1.57 MG/DL (ref 0.6–1.3)
CREAT SERPL-MCNC: 1.64 MG/DL (ref 0.6–1.3)
CREAT SERPL-MCNC: 1.65 MG/DL (ref 0.6–1.3)
CREAT SERPL-MCNC: 1.65 MG/DL (ref 0.6–1.3)
CREAT SERPL-MCNC: 1.72 MG/DL (ref 0.6–1.3)
CREAT SERPL-MCNC: 1.77 MG/DL (ref 0.6–1.3)
CREAT SERPL-MCNC: 1.84 MG/DL (ref 0.6–1.3)
CREAT SERPL-MCNC: 1.88 MG/DL (ref 0.6–1.3)
CREAT SERPL-MCNC: 1.99 MG/DL (ref 0.6–1.3)
CREAT SERPL-MCNC: 1.99 MG/DL (ref 0.6–1.3)
CREAT SERPL-MCNC: 2.1 MG/DL (ref 0.6–1.3)
CREAT SERPL-MCNC: 2.11 MG/DL (ref 0.6–1.3)
CREAT SERPL-MCNC: 2.22 MG/DL (ref 0.6–1.3)
CREAT SERPL-MCNC: 2.35 MG/DL (ref 0.6–1.3)
CREAT SERPL-MCNC: 2.41 MG/DL (ref 0.6–1.3)
CREAT SERPL-MCNC: 2.48 MG/DL (ref 0.6–1.3)
CREAT SERPL-MCNC: 2.55 MG/DL (ref 0.6–1.3)
CREAT SERPL-MCNC: 2.79 MG/DL (ref 0.6–1.3)
CREAT SERPL-MCNC: 2.93 MG/DL (ref 0.6–1.3)
CREAT SERPL-MCNC: 3 MG/DL (ref 0.6–1.3)
CREAT SERPL-MCNC: 3.07 MG/DL (ref 0.6–1.3)
CREAT SERPL-MCNC: 3.07 MG/DL (ref 0.6–1.3)
CROSSMATCH: NORMAL
CRP SERPL QL: 5.1 MG/L
DOP CALC LVOT AREA: 3.14 CM2
DOP CALC LVOT AREA: 3.14 CM2
DOP CALC LVOT DIAMETER: 2 CM
DOP CALC LVOT DIAMETER: 2 CM
E WAVE DECELERATION TIME: 308 MS
EOSINOPHIL # BLD AUTO: 0.16 THOUSAND/ΜL (ref 0–0.61)
EOSINOPHIL # BLD AUTO: 0.3 THOUSAND/ΜL (ref 0–0.61)
EOSINOPHIL # BLD AUTO: 0.41 THOUSAND/ΜL (ref 0–0.61)
EOSINOPHIL # BLD MANUAL: 0 THOUSAND/UL (ref 0–0.4)
EOSINOPHIL # BLD MANUAL: 0.3 THOUSAND/UL (ref 0–0.4)
EOSINOPHIL # BLD MANUAL: 0.31 THOUSAND/UL (ref 0–0.4)
EOSINOPHIL # BLD MANUAL: 0.34 THOUSAND/UL (ref 0–0.4)
EOSINOPHIL # BLD MANUAL: 0.34 THOUSAND/UL (ref 0–0.4)
EOSINOPHIL # BLD MANUAL: 0.46 THOUSAND/UL (ref 0–0.4)
EOSINOPHIL # BLD MANUAL: 0.47 THOUSAND/UL (ref 0–0.4)
EOSINOPHIL # BLD MANUAL: 0.65 THOUSAND/UL (ref 0–0.4)
EOSINOPHIL NFR BLD AUTO: 2 % (ref 0–6)
EOSINOPHIL NFR BLD AUTO: 2 % (ref 0–6)
EOSINOPHIL NFR BLD AUTO: 3 % (ref 0–6)
EOSINOPHIL NFR BLD MANUAL: 0 % (ref 0–6)
EOSINOPHIL NFR BLD MANUAL: 2 % (ref 0–6)
EOSINOPHIL NFR BLD MANUAL: 3 % (ref 0–6)
EOSINOPHIL NFR BLD MANUAL: 3 % (ref 0–6)
ERYTHROCYTE [DISTWIDTH] IN BLOOD BY AUTOMATED COUNT: 15.7 % (ref 11.6–15.1)
ERYTHROCYTE [DISTWIDTH] IN BLOOD BY AUTOMATED COUNT: 15.8 % (ref 11.6–15.1)
ERYTHROCYTE [DISTWIDTH] IN BLOOD BY AUTOMATED COUNT: 16.1 % (ref 11.6–15.1)
ERYTHROCYTE [DISTWIDTH] IN BLOOD BY AUTOMATED COUNT: 16.2 % (ref 11.6–15.1)
ERYTHROCYTE [DISTWIDTH] IN BLOOD BY AUTOMATED COUNT: 16.4 % (ref 11.6–15.1)
ERYTHROCYTE [DISTWIDTH] IN BLOOD BY AUTOMATED COUNT: 16.7 % (ref 11.6–15.1)
ERYTHROCYTE [DISTWIDTH] IN BLOOD BY AUTOMATED COUNT: 16.8 % (ref 11.6–15.1)
ERYTHROCYTE [DISTWIDTH] IN BLOOD BY AUTOMATED COUNT: 17 % (ref 11.6–15.1)
ERYTHROCYTE [DISTWIDTH] IN BLOOD BY AUTOMATED COUNT: 17.1 % (ref 11.6–15.1)
ERYTHROCYTE [DISTWIDTH] IN BLOOD BY AUTOMATED COUNT: 17.2 % (ref 11.6–15.1)
ERYTHROCYTE [DISTWIDTH] IN BLOOD BY AUTOMATED COUNT: 17.2 % (ref 11.6–15.1)
ERYTHROCYTE [DISTWIDTH] IN BLOOD BY AUTOMATED COUNT: 17.3 % (ref 11.6–15.1)
ERYTHROCYTE [DISTWIDTH] IN BLOOD BY AUTOMATED COUNT: 17.4 % (ref 11.6–15.1)
ERYTHROCYTE [DISTWIDTH] IN BLOOD BY AUTOMATED COUNT: 17.4 % (ref 11.6–15.1)
ERYTHROCYTE [DISTWIDTH] IN BLOOD BY AUTOMATED COUNT: 17.5 % (ref 11.6–15.1)
ERYTHROCYTE [DISTWIDTH] IN BLOOD BY AUTOMATED COUNT: 17.6 % (ref 11.6–15.1)
ERYTHROCYTE [DISTWIDTH] IN BLOOD BY AUTOMATED COUNT: 17.7 % (ref 11.6–15.1)
ERYTHROCYTE [DISTWIDTH] IN BLOOD BY AUTOMATED COUNT: 17.8 % (ref 11.6–15.1)
ERYTHROCYTE [DISTWIDTH] IN BLOOD BY AUTOMATED COUNT: 17.9 % (ref 11.6–15.1)
ERYTHROCYTE [DISTWIDTH] IN BLOOD BY AUTOMATED COUNT: 17.9 % (ref 11.6–15.1)
ERYTHROCYTE [DISTWIDTH] IN BLOOD BY AUTOMATED COUNT: 18 % (ref 11.6–15.1)
ERYTHROCYTE [DISTWIDTH] IN BLOOD BY AUTOMATED COUNT: 18.1 % (ref 11.6–15.1)
ERYTHROCYTE [DISTWIDTH] IN BLOOD BY AUTOMATED COUNT: 18.3 % (ref 11.6–15.1)
ERYTHROCYTE [DISTWIDTH] IN BLOOD BY AUTOMATED COUNT: 18.3 % (ref 11.6–15.1)
ERYTHROCYTE [DISTWIDTH] IN BLOOD BY AUTOMATED COUNT: 18.4 % (ref 11.6–15.1)
ERYTHROCYTE [DISTWIDTH] IN BLOOD BY AUTOMATED COUNT: 18.4 % (ref 11.6–15.1)
ERYTHROCYTE [DISTWIDTH] IN BLOOD BY AUTOMATED COUNT: 18.5 % (ref 11.6–15.1)
ERYTHROCYTE [DISTWIDTH] IN BLOOD BY AUTOMATED COUNT: 18.5 % (ref 11.6–15.1)
ERYTHROCYTE [DISTWIDTH] IN BLOOD BY AUTOMATED COUNT: 18.7 % (ref 11.6–15.1)
ERYTHROCYTE [DISTWIDTH] IN BLOOD BY AUTOMATED COUNT: 18.9 % (ref 11.6–15.1)
ERYTHROCYTE [DISTWIDTH] IN BLOOD BY AUTOMATED COUNT: 19.2 % (ref 11.6–15.1)
ERYTHROCYTE [DISTWIDTH] IN BLOOD BY AUTOMATED COUNT: 19.5 % (ref 11.6–15.1)
ERYTHROCYTE [DISTWIDTH] IN BLOOD BY AUTOMATED COUNT: 19.5 % (ref 11.6–15.1)
ERYTHROCYTE [DISTWIDTH] IN BLOOD BY AUTOMATED COUNT: 19.9 % (ref 11.6–15.1)
ERYTHROCYTE [DISTWIDTH] IN BLOOD BY AUTOMATED COUNT: 20.2 % (ref 11.6–15.1)
ERYTHROCYTE [DISTWIDTH] IN BLOOD BY AUTOMATED COUNT: 20.4 % (ref 11.6–15.1)
ERYTHROCYTE [DISTWIDTH] IN BLOOD BY AUTOMATED COUNT: 20.7 % (ref 11.6–15.1)
ERYTHROCYTE [DISTWIDTH] IN BLOOD BY AUTOMATED COUNT: 20.8 % (ref 11.6–15.1)
ERYTHROCYTE [SEDIMENTATION RATE] IN BLOOD: 15 MM/HOUR (ref 0–19)
EST. AVERAGE GLUCOSE BLD GHB EST-MCNC: 105 MG/DL
FIBRINOGEN PPP-MCNC: 146 MG/DL (ref 227–495)
FLUAV RNA RESP QL NAA+PROBE: NEGATIVE
FLUBV RNA RESP QL NAA+PROBE: NEGATIVE
FRACTIONAL SHORTENING: 27 % (ref 28–44)
FRACTIONAL SHORTENING: 28 % (ref 28–44)
FRACTIONAL SHORTENING: 40 (ref 28–44)
GFR SERPL CREATININE-BSD FRML MDRD: 16 ML/MIN/1.73SQ M
GFR SERPL CREATININE-BSD FRML MDRD: 16 ML/MIN/1.73SQ M
GFR SERPL CREATININE-BSD FRML MDRD: 17 ML/MIN/1.73SQ M
GFR SERPL CREATININE-BSD FRML MDRD: 17 ML/MIN/1.73SQ M
GFR SERPL CREATININE-BSD FRML MDRD: 18 ML/MIN/1.73SQ M
GFR SERPL CREATININE-BSD FRML MDRD: 21 ML/MIN/1.73SQ M
GFR SERPL CREATININE-BSD FRML MDRD: 21 ML/MIN/1.73SQ M
GFR SERPL CREATININE-BSD FRML MDRD: 22 ML/MIN/1.73SQ M
GFR SERPL CREATININE-BSD FRML MDRD: 23 ML/MIN/1.73SQ M
GFR SERPL CREATININE-BSD FRML MDRD: 24 ML/MIN/1.73SQ M
GFR SERPL CREATININE-BSD FRML MDRD: 26 ML/MIN/1.73SQ M
GFR SERPL CREATININE-BSD FRML MDRD: 26 ML/MIN/1.73SQ M
GFR SERPL CREATININE-BSD FRML MDRD: 28 ML/MIN/1.73SQ M
GFR SERPL CREATININE-BSD FRML MDRD: 28 ML/MIN/1.73SQ M
GFR SERPL CREATININE-BSD FRML MDRD: 30 ML/MIN/1.73SQ M
GFR SERPL CREATININE-BSD FRML MDRD: 31 ML/MIN/1.73SQ M
GFR SERPL CREATININE-BSD FRML MDRD: 32 ML/MIN/1.73SQ M
GFR SERPL CREATININE-BSD FRML MDRD: 34 ML/MIN/1.73SQ M
GFR SERPL CREATININE-BSD FRML MDRD: 35 ML/MIN/1.73SQ M
GFR SERPL CREATININE-BSD FRML MDRD: 35 ML/MIN/1.73SQ M
GFR SERPL CREATININE-BSD FRML MDRD: 36 ML/MIN/1.73SQ M
GFR SERPL CREATININE-BSD FRML MDRD: 37 ML/MIN/1.73SQ M
GFR SERPL CREATININE-BSD FRML MDRD: 38 ML/MIN/1.73SQ M
GFR SERPL CREATININE-BSD FRML MDRD: 39 ML/MIN/1.73SQ M
GFR SERPL CREATININE-BSD FRML MDRD: 40 ML/MIN/1.73SQ M
GFR SERPL CREATININE-BSD FRML MDRD: 41 ML/MIN/1.73SQ M
GFR SERPL CREATININE-BSD FRML MDRD: 42 ML/MIN/1.73SQ M
GFR SERPL CREATININE-BSD FRML MDRD: 43 ML/MIN/1.73SQ M
GFR SERPL CREATININE-BSD FRML MDRD: 44 ML/MIN/1.73SQ M
GFR SERPL CREATININE-BSD FRML MDRD: 45 ML/MIN/1.73SQ M
GFR SERPL CREATININE-BSD FRML MDRD: 46 ML/MIN/1.73SQ M
GFR SERPL CREATININE-BSD FRML MDRD: 46 ML/MIN/1.73SQ M
GFR SERPL CREATININE-BSD FRML MDRD: 48 ML/MIN/1.73SQ M
GFR SERPL CREATININE-BSD FRML MDRD: 49 ML/MIN/1.73SQ M
GFR SERPL CREATININE-BSD FRML MDRD: 52 ML/MIN/1.73SQ M
GFR SERPL CREATININE-BSD FRML MDRD: 53 ML/MIN/1.73SQ M
GFR SERPL CREATININE-BSD FRML MDRD: 54 ML/MIN/1.73SQ M
GFR SERPL CREATININE-BSD FRML MDRD: 55 ML/MIN/1.73SQ M
GFR SERPL CREATININE-BSD FRML MDRD: 55 ML/MIN/1.73SQ M
GFR SERPL CREATININE-BSD FRML MDRD: 56 ML/MIN/1.73SQ M
GFR SERPL CREATININE-BSD FRML MDRD: 56 ML/MIN/1.73SQ M
GFR SERPL CREATININE-BSD FRML MDRD: 57 ML/MIN/1.73SQ M
GFR SERPL CREATININE-BSD FRML MDRD: 58 ML/MIN/1.73SQ M
GFR SERPL CREATININE-BSD FRML MDRD: 59 ML/MIN/1.73SQ M
GFR SERPL CREATININE-BSD FRML MDRD: 59 ML/MIN/1.73SQ M
GFR SERPL CREATININE-BSD FRML MDRD: 61 ML/MIN/1.73SQ M
GFR SERPL CREATININE-BSD FRML MDRD: 62 ML/MIN/1.73SQ M
GFR SERPL CREATININE-BSD FRML MDRD: 63 ML/MIN/1.73SQ M
GFR SERPL CREATININE-BSD FRML MDRD: 64 ML/MIN/1.73SQ M
GFR SERPL CREATININE-BSD FRML MDRD: 66 ML/MIN/1.73SQ M
GFR SERPL CREATININE-BSD FRML MDRD: 67 ML/MIN/1.73SQ M
GFR SERPL CREATININE-BSD FRML MDRD: 68 ML/MIN/1.73SQ M
GFR SERPL CREATININE-BSD FRML MDRD: 69 ML/MIN/1.73SQ M
GFR SERPL CREATININE-BSD FRML MDRD: 70 ML/MIN/1.73SQ M
GFR SERPL CREATININE-BSD FRML MDRD: 71 ML/MIN/1.73SQ M
GFR SERPL CREATININE-BSD FRML MDRD: 71 ML/MIN/1.73SQ M
GFR SERPL CREATININE-BSD FRML MDRD: 72 ML/MIN/1.73SQ M
GFR SERPL CREATININE-BSD FRML MDRD: 73 ML/MIN/1.73SQ M
GFR SERPL CREATININE-BSD FRML MDRD: 74 ML/MIN/1.73SQ M
GFR SERPL CREATININE-BSD FRML MDRD: 75 ML/MIN/1.73SQ M
GFR SERPL CREATININE-BSD FRML MDRD: 76 ML/MIN/1.73SQ M
GFR SERPL CREATININE-BSD FRML MDRD: 76 ML/MIN/1.73SQ M
GFR SERPL CREATININE-BSD FRML MDRD: 78 ML/MIN/1.73SQ M
GFR SERPL CREATININE-BSD FRML MDRD: 79 ML/MIN/1.73SQ M
GIANT PLATELETS BLD QL SMEAR: PRESENT
GLUCOSE P FAST SERPL-MCNC: 101 MG/DL (ref 65–99)
GLUCOSE SERPL-MCNC: 100 MG/DL (ref 65–140)
GLUCOSE SERPL-MCNC: 101 MG/DL (ref 65–140)
GLUCOSE SERPL-MCNC: 102 MG/DL (ref 65–140)
GLUCOSE SERPL-MCNC: 102 MG/DL (ref 65–140)
GLUCOSE SERPL-MCNC: 103 MG/DL (ref 65–140)
GLUCOSE SERPL-MCNC: 104 MG/DL (ref 65–140)
GLUCOSE SERPL-MCNC: 105 MG/DL (ref 65–140)
GLUCOSE SERPL-MCNC: 106 MG/DL (ref 65–140)
GLUCOSE SERPL-MCNC: 106 MG/DL (ref 65–140)
GLUCOSE SERPL-MCNC: 107 MG/DL (ref 65–140)
GLUCOSE SERPL-MCNC: 107 MG/DL (ref 65–140)
GLUCOSE SERPL-MCNC: 108 MG/DL (ref 65–140)
GLUCOSE SERPL-MCNC: 108 MG/DL (ref 65–140)
GLUCOSE SERPL-MCNC: 109 MG/DL (ref 65–140)
GLUCOSE SERPL-MCNC: 110 MG/DL (ref 65–140)
GLUCOSE SERPL-MCNC: 111 MG/DL (ref 65–140)
GLUCOSE SERPL-MCNC: 112 MG/DL (ref 65–140)
GLUCOSE SERPL-MCNC: 113 MG/DL (ref 65–140)
GLUCOSE SERPL-MCNC: 114 MG/DL (ref 65–140)
GLUCOSE SERPL-MCNC: 115 MG/DL (ref 65–140)
GLUCOSE SERPL-MCNC: 115 MG/DL (ref 65–140)
GLUCOSE SERPL-MCNC: 116 MG/DL (ref 65–140)
GLUCOSE SERPL-MCNC: 116 MG/DL (ref 65–140)
GLUCOSE SERPL-MCNC: 117 MG/DL (ref 65–140)
GLUCOSE SERPL-MCNC: 118 MG/DL (ref 65–140)
GLUCOSE SERPL-MCNC: 120 MG/DL (ref 65–140)
GLUCOSE SERPL-MCNC: 123 MG/DL (ref 65–140)
GLUCOSE SERPL-MCNC: 125 MG/DL (ref 65–140)
GLUCOSE SERPL-MCNC: 128 MG/DL (ref 65–140)
GLUCOSE SERPL-MCNC: 130 MG/DL (ref 65–140)
GLUCOSE SERPL-MCNC: 131 MG/DL (ref 65–140)
GLUCOSE SERPL-MCNC: 132 MG/DL (ref 65–140)
GLUCOSE SERPL-MCNC: 134 MG/DL (ref 65–140)
GLUCOSE SERPL-MCNC: 134 MG/DL (ref 65–140)
GLUCOSE SERPL-MCNC: 135 MG/DL (ref 65–140)
GLUCOSE SERPL-MCNC: 136 MG/DL (ref 65–140)
GLUCOSE SERPL-MCNC: 137 MG/DL (ref 65–140)
GLUCOSE SERPL-MCNC: 137 MG/DL (ref 65–140)
GLUCOSE SERPL-MCNC: 138 MG/DL (ref 65–140)
GLUCOSE SERPL-MCNC: 139 MG/DL (ref 65–140)
GLUCOSE SERPL-MCNC: 141 MG/DL (ref 65–140)
GLUCOSE SERPL-MCNC: 142 MG/DL (ref 65–140)
GLUCOSE SERPL-MCNC: 142 MG/DL (ref 65–140)
GLUCOSE SERPL-MCNC: 143 MG/DL (ref 65–140)
GLUCOSE SERPL-MCNC: 144 MG/DL (ref 65–140)
GLUCOSE SERPL-MCNC: 145 MG/DL (ref 65–140)
GLUCOSE SERPL-MCNC: 146 MG/DL (ref 65–140)
GLUCOSE SERPL-MCNC: 146 MG/DL (ref 65–140)
GLUCOSE SERPL-MCNC: 147 MG/DL (ref 65–140)
GLUCOSE SERPL-MCNC: 148 MG/DL (ref 65–140)
GLUCOSE SERPL-MCNC: 148 MG/DL (ref 65–140)
GLUCOSE SERPL-MCNC: 149 MG/DL (ref 65–140)
GLUCOSE SERPL-MCNC: 149 MG/DL (ref 65–140)
GLUCOSE SERPL-MCNC: 150 MG/DL (ref 65–140)
GLUCOSE SERPL-MCNC: 151 MG/DL (ref 65–140)
GLUCOSE SERPL-MCNC: 152 MG/DL (ref 65–140)
GLUCOSE SERPL-MCNC: 152 MG/DL (ref 65–140)
GLUCOSE SERPL-MCNC: 154 MG/DL (ref 65–140)
GLUCOSE SERPL-MCNC: 155 MG/DL (ref 65–140)
GLUCOSE SERPL-MCNC: 156 MG/DL (ref 65–140)
GLUCOSE SERPL-MCNC: 156 MG/DL (ref 65–140)
GLUCOSE SERPL-MCNC: 157 MG/DL (ref 65–140)
GLUCOSE SERPL-MCNC: 158 MG/DL (ref 65–140)
GLUCOSE SERPL-MCNC: 160 MG/DL (ref 65–140)
GLUCOSE SERPL-MCNC: 160 MG/DL (ref 65–140)
GLUCOSE SERPL-MCNC: 161 MG/DL (ref 65–140)
GLUCOSE SERPL-MCNC: 162 MG/DL (ref 65–140)
GLUCOSE SERPL-MCNC: 163 MG/DL (ref 65–140)
GLUCOSE SERPL-MCNC: 163 MG/DL (ref 65–140)
GLUCOSE SERPL-MCNC: 164 MG/DL (ref 65–140)
GLUCOSE SERPL-MCNC: 165 MG/DL (ref 65–140)
GLUCOSE SERPL-MCNC: 165 MG/DL (ref 65–140)
GLUCOSE SERPL-MCNC: 166 MG/DL (ref 65–140)
GLUCOSE SERPL-MCNC: 167 MG/DL (ref 65–140)
GLUCOSE SERPL-MCNC: 167 MG/DL (ref 65–140)
GLUCOSE SERPL-MCNC: 168 MG/DL (ref 65–140)
GLUCOSE SERPL-MCNC: 169 MG/DL (ref 65–140)
GLUCOSE SERPL-MCNC: 170 MG/DL (ref 65–140)
GLUCOSE SERPL-MCNC: 171 MG/DL (ref 65–140)
GLUCOSE SERPL-MCNC: 171 MG/DL (ref 65–140)
GLUCOSE SERPL-MCNC: 172 MG/DL (ref 65–140)
GLUCOSE SERPL-MCNC: 174 MG/DL (ref 65–140)
GLUCOSE SERPL-MCNC: 176 MG/DL (ref 65–140)
GLUCOSE SERPL-MCNC: 176 MG/DL (ref 65–140)
GLUCOSE SERPL-MCNC: 177 MG/DL (ref 65–140)
GLUCOSE SERPL-MCNC: 177 MG/DL (ref 65–140)
GLUCOSE SERPL-MCNC: 179 MG/DL (ref 65–140)
GLUCOSE SERPL-MCNC: 180 MG/DL (ref 65–140)
GLUCOSE SERPL-MCNC: 181 MG/DL (ref 65–140)
GLUCOSE SERPL-MCNC: 183 MG/DL (ref 65–140)
GLUCOSE SERPL-MCNC: 184 MG/DL (ref 65–140)
GLUCOSE SERPL-MCNC: 185 MG/DL (ref 65–140)
GLUCOSE SERPL-MCNC: 185 MG/DL (ref 65–140)
GLUCOSE SERPL-MCNC: 188 MG/DL (ref 65–140)
GLUCOSE SERPL-MCNC: 188 MG/DL (ref 65–140)
GLUCOSE SERPL-MCNC: 191 MG/DL (ref 65–140)
GLUCOSE SERPL-MCNC: 191 MG/DL (ref 65–140)
GLUCOSE SERPL-MCNC: 192 MG/DL (ref 65–140)
GLUCOSE SERPL-MCNC: 193 MG/DL (ref 65–140)
GLUCOSE SERPL-MCNC: 194 MG/DL (ref 65–140)
GLUCOSE SERPL-MCNC: 195 MG/DL (ref 65–140)
GLUCOSE SERPL-MCNC: 198 MG/DL (ref 65–140)
GLUCOSE SERPL-MCNC: 202 MG/DL (ref 65–140)
GLUCOSE SERPL-MCNC: 204 MG/DL (ref 65–140)
GLUCOSE SERPL-MCNC: 206 MG/DL (ref 65–140)
GLUCOSE SERPL-MCNC: 209 MG/DL (ref 65–140)
GLUCOSE SERPL-MCNC: 211 MG/DL (ref 65–140)
GLUCOSE SERPL-MCNC: 212 MG/DL (ref 65–140)
GLUCOSE SERPL-MCNC: 213 MG/DL (ref 65–140)
GLUCOSE SERPL-MCNC: 215 MG/DL (ref 65–140)
GLUCOSE SERPL-MCNC: 217 MG/DL (ref 65–140)
GLUCOSE SERPL-MCNC: 223 MG/DL (ref 65–140)
GLUCOSE SERPL-MCNC: 224 MG/DL (ref 65–140)
GLUCOSE SERPL-MCNC: 227 MG/DL (ref 65–140)
GLUCOSE SERPL-MCNC: 232 MG/DL (ref 65–140)
GLUCOSE SERPL-MCNC: 233 MG/DL (ref 65–140)
GLUCOSE SERPL-MCNC: 234 MG/DL (ref 65–140)
GLUCOSE SERPL-MCNC: 240 MG/DL (ref 65–140)
GLUCOSE SERPL-MCNC: 246 MG/DL (ref 65–140)
GLUCOSE SERPL-MCNC: 249 MG/DL (ref 65–140)
GLUCOSE SERPL-MCNC: 250 MG/DL (ref 65–140)
GLUCOSE SERPL-MCNC: 255 MG/DL (ref 65–140)
GLUCOSE SERPL-MCNC: 264 MG/DL (ref 65–140)
GLUCOSE SERPL-MCNC: 276 MG/DL (ref 65–140)
GLUCOSE SERPL-MCNC: 281 MG/DL (ref 65–140)
GLUCOSE SERPL-MCNC: 287 MG/DL (ref 65–140)
GLUCOSE SERPL-MCNC: 308 MG/DL (ref 65–140)
GLUCOSE SERPL-MCNC: 326 MG/DL (ref 65–140)
GLUCOSE SERPL-MCNC: 339 MG/DL (ref 65–140)
GLUCOSE SERPL-MCNC: 368 MG/DL (ref 65–140)
GLUCOSE SERPL-MCNC: 41 MG/DL (ref 65–140)
GLUCOSE SERPL-MCNC: 50 MG/DL (ref 65–140)
GLUCOSE SERPL-MCNC: 62 MG/DL (ref 65–140)
GLUCOSE SERPL-MCNC: 64 MG/DL (ref 65–140)
GLUCOSE SERPL-MCNC: 71 MG/DL (ref 65–140)
GLUCOSE SERPL-MCNC: 74 MG/DL (ref 65–140)
GLUCOSE SERPL-MCNC: 77 MG/DL (ref 65–140)
GLUCOSE SERPL-MCNC: 79 MG/DL (ref 65–140)
GLUCOSE SERPL-MCNC: 82 MG/DL (ref 65–140)
GLUCOSE SERPL-MCNC: 84 MG/DL (ref 65–140)
GLUCOSE SERPL-MCNC: 85 MG/DL (ref 65–140)
GLUCOSE SERPL-MCNC: 87 MG/DL (ref 65–140)
GLUCOSE SERPL-MCNC: 88 MG/DL (ref 65–140)
GLUCOSE SERPL-MCNC: 89 MG/DL (ref 65–140)
GLUCOSE SERPL-MCNC: 90 MG/DL (ref 65–140)
GLUCOSE SERPL-MCNC: 93 MG/DL (ref 65–140)
GLUCOSE SERPL-MCNC: 94 MG/DL (ref 65–140)
GLUCOSE SERPL-MCNC: 96 MG/DL (ref 65–140)
GLUCOSE SERPL-MCNC: 96 MG/DL (ref 65–140)
GLUCOSE SERPL-MCNC: 97 MG/DL (ref 65–140)
GLUCOSE SERPL-MCNC: 98 MG/DL (ref 65–140)
GLUCOSE SERPL-MCNC: 98 MG/DL (ref 65–140)
GLUCOSE SERPL-MCNC: 99 MG/DL (ref 65–140)
GLUCOSE SERPL-MCNC: 99 MG/DL (ref 65–140)
GLUCOSE SERPL-MCNC: >500 MG/DL (ref 65–140)
GLUCOSE UR STRIP-MCNC: NEGATIVE MG/DL
GRAM STN SPEC: ABNORMAL
GRAM STN SPEC: NORMAL
HBA1C MFR BLD: 5.3 %
HBV CORE AB SER QL: NORMAL
HBV CORE IGM SER QL: NORMAL
HBV SURFACE AB SER-ACNC: 131.07 MIU/ML
HBV SURFACE AG SER QL: NORMAL
HCO3 BLDA-SCNC: 17.5 MMOL/L (ref 22–28)
HCO3 BLDA-SCNC: 17.9 MMOL/L (ref 22–28)
HCO3 BLDA-SCNC: 18.4 MMOL/L (ref 22–28)
HCO3 BLDA-SCNC: 18.6 MMOL/L (ref 22–28)
HCO3 BLDA-SCNC: 19.5 MMOL/L (ref 22–28)
HCO3 BLDA-SCNC: 19.8 MMOL/L (ref 22–28)
HCO3 BLDA-SCNC: 20.4 MMOL/L (ref 22–28)
HCO3 BLDA-SCNC: 21.8 MMOL/L (ref 22–28)
HCO3 BLDA-SCNC: 22.4 MMOL/L (ref 22–28)
HCO3 BLDA-SCNC: 22.6 MMOL/L (ref 22–28)
HCO3 BLDA-SCNC: 22.8 MMOL/L (ref 22–28)
HCO3 BLDA-SCNC: 23 MMOL/L (ref 22–28)
HCO3 BLDA-SCNC: 23.2 MMOL/L (ref 22–28)
HCO3 BLDA-SCNC: 23.2 MMOL/L (ref 22–28)
HCO3 BLDA-SCNC: 24 MMOL/L (ref 22–28)
HCO3 BLDA-SCNC: 24.4 MMOL/L (ref 22–28)
HCO3 BLDA-SCNC: 24.7 MMOL/L (ref 22–28)
HCO3 BLDA-SCNC: 25.7 MMOL/L (ref 22–28)
HCO3 BLDA-SCNC: 26.4 MMOL/L (ref 22–28)
HCO3 BLDA-SCNC: 27.3 MMOL/L (ref 22–28)
HCO3 BLDV-SCNC: 15.6 MMOL/L (ref 24–30)
HCO3 BLDV-SCNC: 15.7 MMOL/L (ref 24–30)
HCO3 BLDV-SCNC: 17.2 MMOL/L (ref 24–30)
HCT VFR BLD AUTO: 18.3 % (ref 36.5–49.3)
HCT VFR BLD AUTO: 19.6 % (ref 36.5–49.3)
HCT VFR BLD AUTO: 19.6 % (ref 36.5–49.3)
HCT VFR BLD AUTO: 20.4 % (ref 36.5–49.3)
HCT VFR BLD AUTO: 20.5 % (ref 36.5–49.3)
HCT VFR BLD AUTO: 20.5 % (ref 36.5–49.3)
HCT VFR BLD AUTO: 20.6 % (ref 36.5–49.3)
HCT VFR BLD AUTO: 20.7 % (ref 36.5–49.3)
HCT VFR BLD AUTO: 21 % (ref 36.5–49.3)
HCT VFR BLD AUTO: 21.6 % (ref 36.5–49.3)
HCT VFR BLD AUTO: 21.8 % (ref 36.5–49.3)
HCT VFR BLD AUTO: 21.9 % (ref 36.5–49.3)
HCT VFR BLD AUTO: 21.9 % (ref 36.5–49.3)
HCT VFR BLD AUTO: 22.2 % (ref 36.5–49.3)
HCT VFR BLD AUTO: 22.3 % (ref 36.5–49.3)
HCT VFR BLD AUTO: 22.5 % (ref 36.5–49.3)
HCT VFR BLD AUTO: 22.9 % (ref 36.5–49.3)
HCT VFR BLD AUTO: 23.2 % (ref 36.5–49.3)
HCT VFR BLD AUTO: 23.4 % (ref 36.5–49.3)
HCT VFR BLD AUTO: 23.6 % (ref 36.5–49.3)
HCT VFR BLD AUTO: 23.7 % (ref 36.5–49.3)
HCT VFR BLD AUTO: 24.1 % (ref 36.5–49.3)
HCT VFR BLD AUTO: 24.2 % (ref 36.5–49.3)
HCT VFR BLD AUTO: 24.2 % (ref 36.5–49.3)
HCT VFR BLD AUTO: 24.4 % (ref 36.5–49.3)
HCT VFR BLD AUTO: 24.5 % (ref 36.5–49.3)
HCT VFR BLD AUTO: 24.7 % (ref 36.5–49.3)
HCT VFR BLD AUTO: 24.7 % (ref 36.5–49.3)
HCT VFR BLD AUTO: 24.9 % (ref 36.5–49.3)
HCT VFR BLD AUTO: 25.2 % (ref 36.5–49.3)
HCT VFR BLD AUTO: 25.3 % (ref 36.5–49.3)
HCT VFR BLD AUTO: 25.4 % (ref 36.5–49.3)
HCT VFR BLD AUTO: 25.5 % (ref 36.5–49.3)
HCT VFR BLD AUTO: 25.8 % (ref 36.5–49.3)
HCT VFR BLD AUTO: 25.9 % (ref 36.5–49.3)
HCT VFR BLD AUTO: 25.9 % (ref 36.5–49.3)
HCT VFR BLD AUTO: 26.8 % (ref 36.5–49.3)
HCT VFR BLD AUTO: 27.7 % (ref 36.5–49.3)
HCT VFR BLD AUTO: 30.2 % (ref 36.5–49.3)
HCV AB SER QL: NORMAL
HDLC SERPL-MCNC: 92 MG/DL
HELMET CELLS BLD QL SMEAR: PRESENT
HELMET CELLS BLD QL SMEAR: PRESENT
HGB BLD-MCNC: 10 G/DL (ref 12–17)
HGB BLD-MCNC: 10.3 G/DL (ref 12–17)
HGB BLD-MCNC: 6.2 G/DL (ref 12–17)
HGB BLD-MCNC: 6.5 G/DL (ref 12–17)
HGB BLD-MCNC: 6.6 G/DL (ref 12–17)
HGB BLD-MCNC: 6.7 G/DL (ref 12–17)
HGB BLD-MCNC: 6.8 G/DL (ref 12–17)
HGB BLD-MCNC: 6.9 G/DL (ref 12–17)
HGB BLD-MCNC: 7 G/DL (ref 12–17)
HGB BLD-MCNC: 7.1 G/DL (ref 12–17)
HGB BLD-MCNC: 7.2 G/DL (ref 12–17)
HGB BLD-MCNC: 7.3 G/DL (ref 12–17)
HGB BLD-MCNC: 7.4 G/DL (ref 12–17)
HGB BLD-MCNC: 7.5 G/DL (ref 12–17)
HGB BLD-MCNC: 7.7 G/DL (ref 12–17)
HGB BLD-MCNC: 7.8 G/DL (ref 12–17)
HGB BLD-MCNC: 7.9 G/DL (ref 12–17)
HGB BLD-MCNC: 8 G/DL (ref 12–17)
HGB BLD-MCNC: 8.1 G/DL (ref 12–17)
HGB BLD-MCNC: 8.2 G/DL (ref 12–17)
HGB BLD-MCNC: 8.3 G/DL (ref 12–17)
HGB BLD-MCNC: 8.3 G/DL (ref 12–17)
HGB BLD-MCNC: 8.4 G/DL (ref 12–17)
HGB BLD-MCNC: 8.4 G/DL (ref 12–17)
HGB BLD-MCNC: 8.5 G/DL (ref 12–17)
HGB BLD-MCNC: 8.8 G/DL (ref 12–17)
HGB BLD-MCNC: 8.9 G/DL (ref 12–17)
HGB BLD-MCNC: 8.9 G/DL (ref 12–17)
HGB BLD-MCNC: 9.7 G/DL (ref 12–17)
HGB UR QL STRIP.AUTO: ABNORMAL
HISTIOCYTES NFR FLD: 30 %
HOROWITZ INDEX BLDA+IHG-RTO: 100 MM[HG]
HOROWITZ INDEX BLDA+IHG-RTO: 45 MM[HG]
HOROWITZ INDEX BLDA+IHG-RTO: 45 MM[HG]
HOROWITZ INDEX BLDA+IHG-RTO: 50 MM[HG]
HOROWITZ INDEX BLDA+IHG-RTO: 55 MM[HG]
HOROWITZ INDEX BLDA+IHG-RTO: 55 MM[HG]
HOROWITZ INDEX BLDA+IHG-RTO: 60 MM[HG]
HOROWITZ INDEX BLDA+IHG-RTO: 60 MM[HG]
HOROWITZ INDEX BLDA+IHG-RTO: 65 MM[HG]
HOROWITZ INDEX BLDA+IHG-RTO: 65 MM[HG]
HOROWITZ INDEX BLDA+IHG-RTO: 70 MM[HG]
HOROWITZ INDEX BLDA+IHG-RTO: 70 MM[HG]
HOROWITZ INDEX BLDA+IHG-RTO: 80 MM[HG]
HOROWITZ INDEX BLDA+IHG-RTO: 80 MM[HG]
HOROWITZ INDEX BLDA+IHG-RTO: 90 MM[HG]
HOROWITZ INDEX BLDA+IHG-RTO: 90 MM[HG]
HYPERCHROMIA BLD QL SMEAR: PRESENT
I-TIME: 0.5
I-TIME: 0.5
I-TIME: 0.6
I-TIME: 0.7
I-TIME: 0.92
IMM GRANULOCYTES # BLD AUTO: 0.04 THOUSAND/UL (ref 0–0.2)
IMM GRANULOCYTES # BLD AUTO: >0.5 THOUSAND/UL (ref 0–0.2)
IMM GRANULOCYTES # BLD AUTO: >0.5 THOUSAND/UL (ref 0–0.2)
IMM GRANULOCYTES NFR BLD AUTO: 1 % (ref 0–2)
IMM GRANULOCYTES NFR BLD AUTO: 20 % (ref 0–2)
IMM GRANULOCYTES NFR BLD AUTO: 6 % (ref 0–2)
INR PPP: 1.28 (ref 0.84–1.19)
INR PPP: 1.28 (ref 0.84–1.19)
INR PPP: 1.52 (ref 0.84–1.19)
INR PPP: 1.58 (ref 0.84–1.19)
INR PPP: 1.87 (ref 0.84–1.19)
INR PPP: 2.12 (ref 0.84–1.19)
INTERVENTRICULAR SEPTUM IN DIASTOLE (PARASTERNAL SHORT AXIS VIEW): 1.3 CM
INTERVENTRICULAR SEPTUM IN DIASTOLE (PARASTERNAL SHORT AXIS VIEW): 1.3 CM
INTERVENTRICULAR SEPTUM IN DIASTOLE (PARASTERNAL SHORT AXIS VIEW): 1.4 CM
INTERVENTRICULAR SEPTUM: 1.3 CM (ref 0.6–1.1)
INTERVENTRICULAR SEPTUM: 1.3 CM (ref 0.6–1.1)
INTERVENTRICULAR SEPTUM: 1.4 CM (ref 0.6–1.1)
KETONES UR STRIP-MCNC: NEGATIVE MG/DL
L PNEUMO1 AG UR QL IA.RAPID: NEGATIVE
LAAS-AP2: 18.5 CM2
LAAS-AP2: 18.6 CM2
LAAS-AP4: 11.5 CM2
LAAS-AP4: 19.7 CM2
LACTATE SERPL-SCNC: 0.8 MMOL/L (ref 0.5–2)
LACTATE SERPL-SCNC: 0.8 MMOL/L (ref 0.5–2)
LACTATE SERPL-SCNC: 0.9 MMOL/L (ref 0.5–2)
LACTATE SERPL-SCNC: 1.1 MMOL/L (ref 0.5–2)
LACTATE SERPL-SCNC: 1.6 MMOL/L (ref 0.5–2)
LACTATE SERPL-SCNC: 2.5 MMOL/L (ref 0.5–2)
LACTATE SERPL-SCNC: 3.5 MMOL/L (ref 0.5–2)
LACTATE SERPL-SCNC: 3.5 MMOL/L (ref 0.5–2)
LACTATE SERPL-SCNC: 3.6 MMOL/L (ref 0.5–2)
LDH FLD L TO P-CCNC: 81 U/L
LDH SERPL-CCNC: 232 U/L (ref 81–234)
LDH SERPL-CCNC: 240 U/L (ref 81–234)
LDLC SERPL CALC-MCNC: 30 MG/DL (ref 0–100)
LEFT ATRIUM AREA SYSTOLE SINGLE PLANE A4C: 16.6 CM2
LEFT ATRIUM AREA SYSTOLE SINGLE PLANE A4C: 19.7 CM2
LEFT ATRIUM SIZE: 3 CM
LEFT ATRIUM SIZE: 3.1 CM
LEFT ATRIUM SIZE: 4.1 CM
LEFT INTERNAL DIMENSION IN SYSTOLE: 2.2 CM (ref 2.1–4)
LEFT INTERNAL DIMENSION IN SYSTOLE: 2.4 CM (ref 2.1–4)
LEFT INTERNAL DIMENSION IN SYSTOLE: 2.6 CM (ref 2.1–4)
LEFT VENTRICULAR INTERNAL DIMENSION IN DIASTOLE: 3 CM (ref 3.5–6)
LEFT VENTRICULAR INTERNAL DIMENSION IN DIASTOLE: 3.6 CM (ref 3.5–6)
LEFT VENTRICULAR INTERNAL DIMENSION IN DIASTOLE: 4 CM (ref 3.5–6)
LEFT VENTRICULAR POSTERIOR WALL IN END DIASTOLE: 1.3 CM
LEFT VENTRICULAR POSTERIOR WALL IN END DIASTOLE: 1.3 CM
LEFT VENTRICULAR POSTERIOR WALL IN END DIASTOLE: 1.4 CM
LEFT VENTRICULAR STROKE VOLUME: 19 ML
LEFT VENTRICULAR STROKE VOLUME: 29 ML
LEFT VENTRICULAR STROKE VOLUME: 49 ML
LEUKOCYTE ESTERASE UR QL STRIP: ABNORMAL
LG PLATELETS BLD QL SMEAR: PRESENT
LVSV (TEICH): 19 ML
LVSV (TEICH): 29 ML
LVSV (TEICH): 49 ML
LYMPHOCYTES # BLD AUTO: 0.09 THOUSAND/UL (ref 0.6–4.47)
LYMPHOCYTES # BLD AUTO: 0.44 THOUSAND/UL (ref 0.6–4.47)
LYMPHOCYTES # BLD AUTO: 0.45 THOUSAND/UL (ref 0.6–4.47)
LYMPHOCYTES # BLD AUTO: 0.7 THOUSAND/UL (ref 0.6–4.47)
LYMPHOCYTES # BLD AUTO: 0.76 THOUSAND/UL (ref 0.6–4.47)
LYMPHOCYTES # BLD AUTO: 0.86 THOUSAND/UL (ref 0.6–4.47)
LYMPHOCYTES # BLD AUTO: 0.92 THOUSAND/UL (ref 0.6–4.47)
LYMPHOCYTES # BLD AUTO: 0.93 THOUSANDS/ΜL (ref 0.6–4.47)
LYMPHOCYTES # BLD AUTO: 1 % (ref 14–44)
LYMPHOCYTES # BLD AUTO: 1.07 THOUSAND/UL (ref 0.6–4.47)
LYMPHOCYTES # BLD AUTO: 1.08 THOUSAND/UL (ref 0.6–4.47)
LYMPHOCYTES # BLD AUTO: 1.38 THOUSANDS/ΜL (ref 0.6–4.47)
LYMPHOCYTES # BLD AUTO: 1.51 THOUSAND/UL (ref 0.6–4.47)
LYMPHOCYTES # BLD AUTO: 1.52 THOUSAND/UL (ref 0.6–4.47)
LYMPHOCYTES # BLD AUTO: 1.55 THOUSAND/UL (ref 0.6–4.47)
LYMPHOCYTES # BLD AUTO: 1.64 THOUSAND/UL (ref 0.6–4.47)
LYMPHOCYTES # BLD AUTO: 1.96 THOUSANDS/ΜL (ref 0.6–4.47)
LYMPHOCYTES # BLD AUTO: 10 % (ref 14–44)
LYMPHOCYTES # BLD AUTO: 17 % (ref 14–44)
LYMPHOCYTES # BLD AUTO: 2 % (ref 14–44)
LYMPHOCYTES # BLD AUTO: 4 % (ref 14–44)
LYMPHOCYTES # BLD AUTO: 4.01 THOUSAND/UL (ref 0.6–4.47)
LYMPHOCYTES # BLD AUTO: 5 % (ref 14–44)
LYMPHOCYTES # BLD AUTO: 6 % (ref 14–44)
LYMPHOCYTES # BLD AUTO: 6 % (ref 14–44)
LYMPHOCYTES # BLD AUTO: 7 % (ref 14–44)
LYMPHOCYTES # BLD AUTO: 9 % (ref 14–44)
LYMPHOCYTES NFR BLD AUTO: 10 % (ref 14–44)
LYMPHOCYTES NFR BLD AUTO: 18 %
LYMPHOCYTES NFR BLD AUTO: 24 % (ref 14–44)
LYMPHOCYTES NFR BLD AUTO: 5 % (ref 14–44)
MACROCYTES BLD QL AUTO: PRESENT
MAGNESIUM SERPL-MCNC: 1.3 MG/DL (ref 1.6–2.6)
MAGNESIUM SERPL-MCNC: 1.7 MG/DL (ref 1.6–2.6)
MAGNESIUM SERPL-MCNC: 1.7 MG/DL (ref 1.6–2.6)
MAGNESIUM SERPL-MCNC: 1.8 MG/DL (ref 1.6–2.6)
MAGNESIUM SERPL-MCNC: 1.9 MG/DL (ref 1.6–2.6)
MAGNESIUM SERPL-MCNC: 2 MG/DL (ref 1.6–2.6)
MAGNESIUM SERPL-MCNC: 2.1 MG/DL (ref 1.6–2.6)
MAGNESIUM SERPL-MCNC: 2.2 MG/DL (ref 1.6–2.6)
MAGNESIUM SERPL-MCNC: 2.3 MG/DL (ref 1.6–2.6)
MAGNESIUM SERPL-MCNC: 2.4 MG/DL (ref 1.6–2.6)
MAGNESIUM SERPL-MCNC: 2.4 MG/DL (ref 1.6–2.6)
MAGNESIUM SERPL-MCNC: 2.6 MG/DL (ref 1.6–2.6)
MAGNESIUM SERPL-MCNC: 2.7 MG/DL (ref 1.6–2.6)
MAGNESIUM SERPL-MCNC: 2.7 MG/DL (ref 1.6–2.6)
MAGNESIUM SERPL-MCNC: 2.8 MG/DL (ref 1.6–2.6)
MAGNESIUM SERPL-MCNC: 2.9 MG/DL (ref 1.6–2.6)
MCH RBC QN AUTO: 31.6 PG (ref 26.8–34.3)
MCH RBC QN AUTO: 31.7 PG (ref 26.8–34.3)
MCH RBC QN AUTO: 31.7 PG (ref 26.8–34.3)
MCH RBC QN AUTO: 31.8 PG (ref 26.8–34.3)
MCH RBC QN AUTO: 31.9 PG (ref 26.8–34.3)
MCH RBC QN AUTO: 31.9 PG (ref 26.8–34.3)
MCH RBC QN AUTO: 32 PG (ref 26.8–34.3)
MCH RBC QN AUTO: 32.3 PG (ref 26.8–34.3)
MCH RBC QN AUTO: 32.3 PG (ref 26.8–34.3)
MCH RBC QN AUTO: 32.4 PG (ref 26.8–34.3)
MCH RBC QN AUTO: 32.6 PG (ref 26.8–34.3)
MCH RBC QN AUTO: 32.6 PG (ref 26.8–34.3)
MCH RBC QN AUTO: 32.9 PG (ref 26.8–34.3)
MCH RBC QN AUTO: 33 PG (ref 26.8–34.3)
MCH RBC QN AUTO: 33 PG (ref 26.8–34.3)
MCH RBC QN AUTO: 33.1 PG (ref 26.8–34.3)
MCH RBC QN AUTO: 33.2 PG (ref 26.8–34.3)
MCH RBC QN AUTO: 33.2 PG (ref 26.8–34.3)
MCH RBC QN AUTO: 33.3 PG (ref 26.8–34.3)
MCH RBC QN AUTO: 33.7 PG (ref 26.8–34.3)
MCH RBC QN AUTO: 33.7 PG (ref 26.8–34.3)
MCH RBC QN AUTO: 34.1 PG (ref 26.8–34.3)
MCH RBC QN AUTO: 34.3 PG (ref 26.8–34.3)
MCH RBC QN AUTO: 34.4 PG (ref 26.8–34.3)
MCH RBC QN AUTO: 35 PG (ref 26.8–34.3)
MCH RBC QN AUTO: 35 PG (ref 26.8–34.3)
MCH RBC QN AUTO: 35.2 PG (ref 26.8–34.3)
MCH RBC QN AUTO: 35.3 PG (ref 26.8–34.3)
MCH RBC QN AUTO: 35.5 PG (ref 26.8–34.3)
MCH RBC QN AUTO: 35.7 PG (ref 26.8–34.3)
MCH RBC QN AUTO: 35.7 PG (ref 26.8–34.3)
MCH RBC QN AUTO: 35.9 PG (ref 26.8–34.3)
MCH RBC QN AUTO: 36 PG (ref 26.8–34.3)
MCHC RBC AUTO-ENTMCNC: 30.1 G/DL (ref 31.4–37.4)
MCHC RBC AUTO-ENTMCNC: 30.4 G/DL (ref 31.4–37.4)
MCHC RBC AUTO-ENTMCNC: 30.7 G/DL (ref 31.4–37.4)
MCHC RBC AUTO-ENTMCNC: 31 G/DL (ref 31.4–37.4)
MCHC RBC AUTO-ENTMCNC: 31.2 G/DL (ref 31.4–37.4)
MCHC RBC AUTO-ENTMCNC: 31.3 G/DL (ref 31.4–37.4)
MCHC RBC AUTO-ENTMCNC: 31.5 G/DL (ref 31.4–37.4)
MCHC RBC AUTO-ENTMCNC: 31.7 G/DL (ref 31.4–37.4)
MCHC RBC AUTO-ENTMCNC: 32.2 G/DL (ref 31.4–37.4)
MCHC RBC AUTO-ENTMCNC: 32.2 G/DL (ref 31.4–37.4)
MCHC RBC AUTO-ENTMCNC: 32.4 G/DL (ref 31.4–37.4)
MCHC RBC AUTO-ENTMCNC: 32.5 G/DL (ref 31.4–37.4)
MCHC RBC AUTO-ENTMCNC: 32.7 G/DL (ref 31.4–37.4)
MCHC RBC AUTO-ENTMCNC: 32.8 G/DL (ref 31.4–37.4)
MCHC RBC AUTO-ENTMCNC: 32.8 G/DL (ref 31.4–37.4)
MCHC RBC AUTO-ENTMCNC: 32.9 G/DL (ref 31.4–37.4)
MCHC RBC AUTO-ENTMCNC: 33.2 G/DL (ref 31.4–37.4)
MCHC RBC AUTO-ENTMCNC: 33.3 G/DL (ref 31.4–37.4)
MCHC RBC AUTO-ENTMCNC: 33.5 G/DL (ref 31.4–37.4)
MCHC RBC AUTO-ENTMCNC: 33.5 G/DL (ref 31.4–37.4)
MCHC RBC AUTO-ENTMCNC: 33.6 G/DL (ref 31.4–37.4)
MCHC RBC AUTO-ENTMCNC: 33.8 G/DL (ref 31.4–37.4)
MCHC RBC AUTO-ENTMCNC: 33.9 G/DL (ref 31.4–37.4)
MCHC RBC AUTO-ENTMCNC: 33.9 G/DL (ref 31.4–37.4)
MCHC RBC AUTO-ENTMCNC: 34.1 G/DL (ref 31.4–37.4)
MCHC RBC AUTO-ENTMCNC: 34.1 G/DL (ref 31.4–37.4)
MCHC RBC AUTO-ENTMCNC: 34.2 G/DL (ref 31.4–37.4)
MCHC RBC AUTO-ENTMCNC: 34.4 G/DL (ref 31.4–37.4)
MCHC RBC AUTO-ENTMCNC: 34.5 G/DL (ref 31.4–37.4)
MCHC RBC AUTO-ENTMCNC: 34.6 G/DL (ref 31.4–37.4)
MCHC RBC AUTO-ENTMCNC: 34.7 G/DL (ref 31.4–37.4)
MCHC RBC AUTO-ENTMCNC: 34.8 G/DL (ref 31.4–37.4)
MCHC RBC AUTO-ENTMCNC: 35.3 G/DL (ref 31.4–37.4)
MCHC RBC AUTO-ENTMCNC: 35.4 G/DL (ref 31.4–37.4)
MCHC RBC AUTO-ENTMCNC: 35.6 G/DL (ref 31.4–37.4)
MCHC RBC AUTO-ENTMCNC: 36.1 G/DL (ref 31.4–37.4)
MCV RBC AUTO: 100 FL (ref 82–98)
MCV RBC AUTO: 101 FL (ref 82–98)
MCV RBC AUTO: 102 FL (ref 82–98)
MCV RBC AUTO: 102 FL (ref 82–98)
MCV RBC AUTO: 103 FL (ref 82–98)
MCV RBC AUTO: 104 FL (ref 82–98)
MCV RBC AUTO: 105 FL (ref 82–98)
MCV RBC AUTO: 106 FL (ref 82–98)
MCV RBC AUTO: 106 FL (ref 82–98)
MCV RBC AUTO: 107 FL (ref 82–98)
MCV RBC AUTO: 108 FL (ref 82–98)
MCV RBC AUTO: 111 FL (ref 82–98)
MCV RBC AUTO: 92 FL (ref 82–98)
MCV RBC AUTO: 92 FL (ref 82–98)
MCV RBC AUTO: 94 FL (ref 82–98)
MCV RBC AUTO: 94 FL (ref 82–98)
MCV RBC AUTO: 95 FL (ref 82–98)
MCV RBC AUTO: 96 FL (ref 82–98)
MCV RBC AUTO: 97 FL (ref 82–98)
MCV RBC AUTO: 98 FL (ref 82–98)
MCV RBC AUTO: 98 FL (ref 82–98)
MCV RBC AUTO: 99 FL (ref 82–98)
METAMYELOCYTES NFR BLD MANUAL: 1 % (ref 0–1)
METAMYELOCYTES NFR BLD MANUAL: 2 % (ref 0–1)
METAMYELOCYTES NFR BLD MANUAL: 2 % (ref 0–1)
METAMYELOCYTES NFR BLD MANUAL: 3 % (ref 0–1)
METAMYELOCYTES NFR BLD MANUAL: 4 % (ref 0–1)
METAMYELOCYTES NFR BLD MANUAL: 5 % (ref 0–1)
METAMYELOCYTES NFR BLD MANUAL: 5 % (ref 0–1)
METAMYELOCYTES NFR BLD MANUAL: 6 % (ref 0–1)
METAMYELOCYTES NFR BLD MANUAL: 8 % (ref 0–1)
METAMYELOCYTES NFR BLD MANUAL: 9 % (ref 0–1)
MICROCYTES BLD QL AUTO: PRESENT
MONO+MESO NFR FLD MANUAL: 12 %
MONOCYTES # BLD AUTO: 0.09 THOUSAND/UL (ref 0–1.22)
MONOCYTES # BLD AUTO: 0.22 THOUSAND/UL (ref 0–1.22)
MONOCYTES # BLD AUTO: 0.34 THOUSAND/UL (ref 0–1.22)
MONOCYTES # BLD AUTO: 0.46 THOUSAND/UL (ref 0–1.22)
MONOCYTES # BLD AUTO: 0.46 THOUSAND/UL (ref 0–1.22)
MONOCYTES # BLD AUTO: 0.52 THOUSAND/UL (ref 0–1.22)
MONOCYTES # BLD AUTO: 0.53 THOUSAND/UL (ref 0–1.22)
MONOCYTES # BLD AUTO: 0.71 THOUSAND/ΜL (ref 0.17–1.22)
MONOCYTES # BLD AUTO: 0.91 THOUSAND/UL (ref 0–1.22)
MONOCYTES # BLD AUTO: 0.94 THOUSAND/UL (ref 0–1.22)
MONOCYTES # BLD AUTO: 1 THOUSAND/ΜL (ref 0.17–1.22)
MONOCYTES # BLD AUTO: 1.07 THOUSAND/UL (ref 0–1.22)
MONOCYTES # BLD AUTO: 1.13 THOUSAND/UL (ref 0–1.22)
MONOCYTES # BLD AUTO: 1.22 THOUSAND/ΜL (ref 0.17–1.22)
MONOCYTES # BLD AUTO: 1.29 THOUSAND/UL (ref 0–1.22)
MONOCYTES # BLD AUTO: 1.43 THOUSAND/UL (ref 0–1.22)
MONOCYTES # BLD AUTO: 1.51 THOUSAND/UL (ref 0–1.22)
MONOCYTES NFR BLD AUTO: 13 % (ref 4–12)
MONOCYTES NFR BLD AUTO: 24 %
MONOCYTES NFR BLD AUTO: 5 % (ref 4–12)
MONOCYTES NFR BLD AUTO: 7 % (ref 4–12)
MONOCYTES NFR BLD: 1 % (ref 4–12)
MONOCYTES NFR BLD: 10 % (ref 4–12)
MONOCYTES NFR BLD: 2 % (ref 4–12)
MONOCYTES NFR BLD: 2 % (ref 4–12)
MONOCYTES NFR BLD: 3 % (ref 4–12)
MONOCYTES NFR BLD: 5 % (ref 4–12)
MONOCYTES NFR BLD: 5 % (ref 4–12)
MONOCYTES NFR BLD: 6 % (ref 4–12)
MONOCYTES NFR BLD: 7 % (ref 4–12)
MONOCYTES NFR BLD: 7 % (ref 4–12)
MONOCYTES NFR BLD: 8 % (ref 4–12)
MRSA NOSE QL CULT: NORMAL
MRSA NOSE QL CULT: NORMAL
MV E'TISSUE VEL-LAT: 5 CM/S
MV E'TISSUE VEL-SEP: 14 CM/S
MV E'TISSUE VEL-SEP: 5 CM/S
MV E'TISSUE VEL-SEP: 5 CM/S
MV PEAK A VEL: 1.45 M/S
MV PEAK E VEL: 134 CM/S
MV STENOSIS PRESSURE HALF TIME: 89 MS
MV VALVE AREA P 1/2 METHOD: 2.47 CM2
MYELOCYTES NFR BLD MANUAL: 1 % (ref 0–1)
MYELOCYTES NFR BLD MANUAL: 1 % (ref 0–1)
MYELOCYTES NFR BLD MANUAL: 2 % (ref 0–1)
MYELOCYTES NFR BLD MANUAL: 3 % (ref 0–1)
MYELOCYTES NFR BLD MANUAL: 4 % (ref 0–1)
MYELOCYTES NFR BLD MANUAL: 8 % (ref 0–1)
MYELOCYTES NFR BLD MANUAL: 8 % (ref 0–1)
MYELOCYTES NFR BLD MANUAL: 9 % (ref 0–1)
NASAL CANNULA: 4
NEUTROPHILS # BLD AUTO: 12.08 THOUSANDS/ΜL (ref 1.85–7.62)
NEUTROPHILS # BLD AUTO: 14.71 THOUSANDS/ΜL (ref 1.85–7.62)
NEUTROPHILS # BLD AUTO: 3.33 THOUSANDS/ΜL (ref 1.85–7.62)
NEUTROPHILS # BLD MANUAL: 11.65 THOUSAND/UL (ref 1.85–7.62)
NEUTROPHILS # BLD MANUAL: 12.23 THOUSAND/UL (ref 1.85–7.62)
NEUTROPHILS # BLD MANUAL: 12.43 THOUSAND/UL (ref 1.85–7.62)
NEUTROPHILS # BLD MANUAL: 12.53 THOUSAND/UL (ref 1.85–7.62)
NEUTROPHILS # BLD MANUAL: 13.53 THOUSAND/UL (ref 1.85–7.62)
NEUTROPHILS # BLD MANUAL: 14.69 THOUSAND/UL (ref 1.85–7.62)
NEUTROPHILS # BLD MANUAL: 15.13 THOUSAND/UL (ref 1.85–7.62)
NEUTROPHILS # BLD MANUAL: 16.02 THOUSAND/UL (ref 1.85–7.62)
NEUTROPHILS # BLD MANUAL: 17.7 THOUSAND/UL (ref 1.85–7.62)
NEUTROPHILS # BLD MANUAL: 18.16 THOUSAND/UL (ref 1.85–7.62)
NEUTROPHILS # BLD MANUAL: 19.58 THOUSAND/UL (ref 1.85–7.62)
NEUTROPHILS # BLD MANUAL: 8.11 THOUSAND/UL (ref 1.85–7.62)
NEUTROPHILS # BLD MANUAL: 9.2 THOUSAND/UL (ref 1.85–7.62)
NEUTROPHILS # BLD MANUAL: 9.51 THOUSAND/UL (ref 1.85–7.62)
NEUTS BAND NFR BLD MANUAL: 10 % (ref 0–8)
NEUTS BAND NFR BLD MANUAL: 10 % (ref 0–8)
NEUTS BAND NFR BLD MANUAL: 11 % (ref 0–8)
NEUTS BAND NFR BLD MANUAL: 12 % (ref 0–8)
NEUTS BAND NFR BLD MANUAL: 13 % (ref 0–8)
NEUTS BAND NFR BLD MANUAL: 18 % (ref 0–8)
NEUTS BAND NFR BLD MANUAL: 20 % (ref 0–8)
NEUTS BAND NFR BLD MANUAL: 21 % (ref 0–8)
NEUTS BAND NFR BLD MANUAL: 22 % (ref 0–8)
NEUTS BAND NFR BLD MANUAL: 23 % (ref 0–8)
NEUTS BAND NFR BLD MANUAL: 24 % (ref 0–8)
NEUTS BAND NFR BLD MANUAL: 36 % (ref 0–8)
NEUTS BAND NFR BLD MANUAL: 50 % (ref 0–8)
NEUTS BAND NFR BLD MANUAL: 79 % (ref 0–8)
NEUTS SEG NFR BLD AUTO: 16 %
NEUTS SEG NFR BLD AUTO: 33 % (ref 43–75)
NEUTS SEG NFR BLD AUTO: 41 % (ref 43–75)
NEUTS SEG NFR BLD AUTO: 49 % (ref 43–75)
NEUTS SEG NFR BLD AUTO: 58 % (ref 43–75)
NEUTS SEG NFR BLD AUTO: 59 % (ref 43–75)
NEUTS SEG NFR BLD AUTO: 60 % (ref 43–75)
NEUTS SEG NFR BLD AUTO: 60 % (ref 43–75)
NEUTS SEG NFR BLD AUTO: 61 % (ref 43–75)
NEUTS SEG NFR BLD AUTO: 62 % (ref 43–75)
NEUTS SEG NFR BLD AUTO: 63 % (ref 43–75)
NEUTS SEG NFR BLD AUTO: 63 % (ref 43–75)
NEUTS SEG NFR BLD AUTO: 68 % (ref 43–75)
NEUTS SEG NFR BLD AUTO: 70 % (ref 43–75)
NEUTS SEG NFR BLD AUTO: 71 % (ref 43–75)
NEUTS SEG NFR BLD AUTO: 78 % (ref 43–75)
NEUTS SEG NFR BLD AUTO: 79 % (ref 43–75)
NEUTS SEG NFR BLD AUTO: 8 % (ref 43–75)
NITRITE UR QL STRIP: NEGATIVE
NON-SQ EPI CELLS URNS QL MICRO: ABNORMAL /HPF
NONHDLC SERPL-MCNC: 45 MG/DL
NRBC BLD AUTO-RTO: 0 /100 WBCS
NRBC BLD AUTO-RTO: 0 /100 WBCS
NRBC BLD AUTO-RTO: 16 /100 WBCS
NRBC BLD AUTO-RTO: 18 /100 WBC (ref 0–2)
NRBC BLD AUTO-RTO: 2 /100 WBC (ref 0–2)
NRBC BLD AUTO-RTO: 2 /100 WBCS
NRBC BLD AUTO-RTO: 21 /100 WBCS
NRBC BLD AUTO-RTO: 22 /100 WBC (ref 0–2)
NRBC BLD AUTO-RTO: 4 /100 WBC (ref 0–2)
NRBC BLD AUTO-RTO: 4 /100 WBC (ref 0–2)
NT-PROBNP SERPL-MCNC: ABNORMAL PG/ML
O2 CT BLDA-SCNC: 10.2 ML/DL (ref 16–23)
O2 CT BLDA-SCNC: 10.3 ML/DL (ref 16–23)
O2 CT BLDA-SCNC: 10.6 ML/DL (ref 16–23)
O2 CT BLDA-SCNC: 10.7 ML/DL (ref 16–23)
O2 CT BLDA-SCNC: 10.8 ML/DL (ref 16–23)
O2 CT BLDA-SCNC: 10.8 ML/DL (ref 16–23)
O2 CT BLDA-SCNC: 11 ML/DL (ref 16–23)
O2 CT BLDA-SCNC: 11.2 ML/DL (ref 16–23)
O2 CT BLDA-SCNC: 11.2 ML/DL (ref 16–23)
O2 CT BLDA-SCNC: 11.6 ML/DL (ref 16–23)
O2 CT BLDA-SCNC: 11.6 ML/DL (ref 16–23)
O2 CT BLDA-SCNC: 11.7 ML/DL (ref 16–23)
O2 CT BLDA-SCNC: 11.7 ML/DL (ref 16–23)
O2 CT BLDA-SCNC: 11.9 ML/DL (ref 16–23)
O2 CT BLDA-SCNC: 12 ML/DL (ref 16–23)
O2 CT BLDA-SCNC: 12.1 ML/DL (ref 16–23)
O2 CT BLDA-SCNC: 12.1 ML/DL (ref 16–23)
O2 CT BLDA-SCNC: 12.2 ML/DL (ref 16–23)
O2 CT BLDA-SCNC: 8.7 ML/DL (ref 16–23)
O2 CT BLDA-SCNC: 9.2 ML/DL (ref 16–23)
O2 CT BLDV-SCNC: 10.5 ML/DL
O2 CT BLDV-SCNC: 8 ML/DL
O2 CT BLDV-SCNC: 8.9 ML/DL
OTHER STN SPEC: ABNORMAL
OXYHGB MFR BLDA: 86.4 % (ref 94–97)
OXYHGB MFR BLDA: 88.5 % (ref 94–97)
OXYHGB MFR BLDA: 88.7 % (ref 94–97)
OXYHGB MFR BLDA: 88.8 % (ref 94–97)
OXYHGB MFR BLDA: 88.8 % (ref 94–97)
OXYHGB MFR BLDA: 88.9 % (ref 94–97)
OXYHGB MFR BLDA: 90.3 % (ref 94–97)
OXYHGB MFR BLDA: 90.9 % (ref 94–97)
OXYHGB MFR BLDA: 91.1 % (ref 94–97)
OXYHGB MFR BLDA: 91.1 % (ref 94–97)
OXYHGB MFR BLDA: 91.3 % (ref 94–97)
OXYHGB MFR BLDA: 92.6 % (ref 94–97)
OXYHGB MFR BLDA: 92.9 % (ref 94–97)
OXYHGB MFR BLDA: 92.9 % (ref 94–97)
OXYHGB MFR BLDA: 94.5 % (ref 94–97)
OXYHGB MFR BLDA: 95.8 % (ref 94–97)
OXYHGB MFR BLDA: 95.9 % (ref 94–97)
OXYHGB MFR BLDA: 96.6 % (ref 94–97)
OXYHGB MFR BLDA: 98.3 % (ref 94–97)
OXYHGB MFR BLDA: 98.5 % (ref 94–97)
P AXIS: -20 DEGREES
P AXIS: 13 DEGREES
P AXIS: 15 DEGREES
P AXIS: 16 DEGREES
P AXIS: 16 DEGREES
P AXIS: 18 DEGREES
P AXIS: 41 DEGREES
P AXIS: 44 DEGREES
PATHOLOGY REVIEW: YES
PCO2 BLDA: 25.4 MM HG (ref 36–44)
PCO2 BLDA: 28.5 MM HG (ref 36–44)
PCO2 BLDA: 32.4 MM HG (ref 36–44)
PCO2 BLDA: 34.4 MM HG (ref 36–44)
PCO2 BLDA: 35 MM HG (ref 36–44)
PCO2 BLDA: 37.4 MM HG (ref 36–44)
PCO2 BLDA: 39.4 MM HG (ref 36–44)
PCO2 BLDA: 43 MM HG (ref 36–44)
PCO2 BLDA: 43.3 MM HG (ref 36–44)
PCO2 BLDA: 45 MM HG (ref 36–44)
PCO2 BLDA: 48 MM HG (ref 36–44)
PCO2 BLDA: 48.7 MM HG (ref 36–44)
PCO2 BLDA: 49 MM HG (ref 36–44)
PCO2 BLDA: 49.1 MM HG (ref 36–44)
PCO2 BLDA: 50.6 MM HG (ref 36–44)
PCO2 BLDA: 52.5 MM HG (ref 36–44)
PCO2 BLDA: 54.4 MM HG (ref 36–44)
PCO2 BLDA: 62.5 MM HG (ref 36–44)
PCO2 BLDA: 76.7 MM HG (ref 36–44)
PCO2 BLDA: 88 MM HG (ref 36–44)
PCO2 BLDV: 29.8 MM HG (ref 42–50)
PCO2 BLDV: 30.9 MM HG (ref 42–50)
PCO2 BLDV: 35 MM HG (ref 42–50)
PCO2 TEMP ADJ BLDA: 26.5 MM HG (ref 36–44)
PCO2 TEMP ADJ BLDA: 28.4 MM HG (ref 36–44)
PCO2 TEMP ADJ BLDA: 31.1 MM HG (ref 36–44)
PCO2 TEMP ADJ BLDA: 35.9 MM HG (ref 36–44)
PCO2 TEMP ADJ BLDA: 36.3 MM HG (ref 36–44)
PCO2 TEMP ADJ BLDA: 36.6 MM HG (ref 36–44)
PCO2 TEMP ADJ BLDA: 37.7 MM HG (ref 36–44)
PCO2 TEMP ADJ BLDA: 39.6 MM HG (ref 36–44)
PCO2 TEMP ADJ BLDA: 40.9 MM HG (ref 36–44)
PCO2 TEMP ADJ BLDA: 41.6 MM HG (ref 36–44)
PCO2 TEMP ADJ BLDA: 45.3 MM HG (ref 36–44)
PCO2 TEMP ADJ BLDA: 46.8 MM HG (ref 36–44)
PCO2 TEMP ADJ BLDA: 47.5 MM HG (ref 36–44)
PCO2 TEMP ADJ BLDA: 49.1 MM HG (ref 36–44)
PCO2 TEMP ADJ BLDA: 49.9 MM HG (ref 36–44)
PCO2 TEMP ADJ BLDA: 50.3 MM HG (ref 36–44)
PCO2 TEMP ADJ BLDA: 52.8 MM HG (ref 36–44)
PCO2 TEMP ADJ BLDA: 61.1 MM HG (ref 36–44)
PCO2 TEMP ADJ BLDA: 73.4 MM HG (ref 36–44)
PCO2 TEMP ADJ BLDA: 86.1 MM HG (ref 36–44)
PEEP RESPIRATORY: 10 CM[H2O]
PEEP RESPIRATORY: 6 CM[H2O]
PEEP RESPIRATORY: 8 CM[H2O]
PF4 HEPARIN CMPLX AB SER-ACNC: 0.15 OD (ref 0–0.4)
PH BLD: 7.12 [PH] (ref 7.35–7.45)
PH BLD: 7.14 [PH] (ref 7.35–7.45)
PH BLD: 7.2 [PH] (ref 7.35–7.45)
PH BLD: 7.25 [PH] (ref 7.35–7.45)
PH BLD: 7.26 [PH] (ref 7.35–7.45)
PH BLD: 7.28 [PH] (ref 7.35–7.45)
PH BLD: 7.28 [PH] (ref 7.35–7.45)
PH BLD: 7.3 [PH] (ref 7.35–7.45)
PH BLD: 7.34 [PH] (ref 7.35–7.45)
PH BLD: 7.36 [PH] (ref 7.35–7.45)
PH BLD: 7.37 [PH] (ref 7.35–7.45)
PH BLD: 7.39 [PH] (ref 7.35–7.45)
PH BLD: 7.41 [PH] (ref 7.35–7.45)
PH BLD: 7.41 [PH] (ref 7.35–7.45)
PH BLD: 7.42 [PH] (ref 7.35–7.45)
PH BLD: 7.45 [PH] (ref 7.35–7.45)
PH BLDA: 7.11 [PH] (ref 7.35–7.45)
PH BLDA: 7.13 [PH] (ref 7.35–7.45)
PH BLDA: 7.2 [PH] (ref 7.35–7.45)
PH BLDA: 7.23 [PH] (ref 7.35–7.45)
PH BLDA: 7.24 [PH] (ref 7.35–7.45)
PH BLDA: 7.24 [PH] (ref 7.35–7.45)
PH BLDA: 7.25 [PH] (ref 7.35–7.45)
PH BLDA: 7.26 [PH] (ref 7.35–7.45)
PH BLDA: 7.27 [PH] (ref 7.35–7.45)
PH BLDA: 7.29 [PH] (ref 7.35–7.45)
PH BLDA: 7.32 [PH] (ref 7.35–7.45)
PH BLDA: 7.35 [PH] (ref 7.35–7.45)
PH BLDA: 7.37 [PH] (ref 7.35–7.45)
PH BLDA: 7.37 [PH] (ref 7.35–7.45)
PH BLDA: 7.39 [PH] (ref 7.35–7.45)
PH BLDA: 7.4 [PH] (ref 7.35–7.45)
PH BLDA: 7.4 [PH] (ref 7.35–7.45)
PH BLDA: 7.41 [PH] (ref 7.35–7.45)
PH BLDA: 7.44 [PH] (ref 7.35–7.45)
PH BLDA: 7.47 [PH] (ref 7.35–7.45)
PH BLDV: 7.27 [PH] (ref 7.3–7.4)
PH BLDV: 7.32 [PH] (ref 7.3–7.4)
PH BLDV: 7.38 [PH] (ref 7.3–7.4)
PH BODY FLUID: 7.7
PH UR STRIP.AUTO: 5 [PH]
PHOSPHATE SERPL-MCNC: 1.5 MG/DL (ref 2.3–4.1)
PHOSPHATE SERPL-MCNC: 1.6 MG/DL (ref 2.3–4.1)
PHOSPHATE SERPL-MCNC: 1.7 MG/DL (ref 2.3–4.1)
PHOSPHATE SERPL-MCNC: 1.7 MG/DL (ref 2.3–4.1)
PHOSPHATE SERPL-MCNC: 1.8 MG/DL (ref 2.3–4.1)
PHOSPHATE SERPL-MCNC: 1.9 MG/DL (ref 2.3–4.1)
PHOSPHATE SERPL-MCNC: 1.9 MG/DL (ref 2.3–4.1)
PHOSPHATE SERPL-MCNC: 2 MG/DL (ref 2.3–4.1)
PHOSPHATE SERPL-MCNC: 2.1 MG/DL (ref 2.3–4.1)
PHOSPHATE SERPL-MCNC: 2.3 MG/DL (ref 2.3–4.1)
PHOSPHATE SERPL-MCNC: 2.4 MG/DL (ref 2.3–4.1)
PHOSPHATE SERPL-MCNC: 2.5 MG/DL (ref 2.3–4.1)
PHOSPHATE SERPL-MCNC: 2.6 MG/DL (ref 2.3–4.1)
PHOSPHATE SERPL-MCNC: 2.7 MG/DL (ref 2.3–4.1)
PHOSPHATE SERPL-MCNC: 2.8 MG/DL (ref 2.3–4.1)
PHOSPHATE SERPL-MCNC: 2.9 MG/DL (ref 2.3–4.1)
PHOSPHATE SERPL-MCNC: 3 MG/DL (ref 2.3–4.1)
PHOSPHATE SERPL-MCNC: 3.1 MG/DL (ref 2.3–4.1)
PHOSPHATE SERPL-MCNC: 3.2 MG/DL (ref 2.3–4.1)
PHOSPHATE SERPL-MCNC: 3.2 MG/DL (ref 2.3–4.1)
PHOSPHATE SERPL-MCNC: 3.3 MG/DL (ref 2.3–4.1)
PHOSPHATE SERPL-MCNC: 3.4 MG/DL (ref 2.3–4.1)
PHOSPHATE SERPL-MCNC: 3.6 MG/DL (ref 2.3–4.1)
PHOSPHATE SERPL-MCNC: 3.7 MG/DL (ref 2.3–4.1)
PHOSPHATE SERPL-MCNC: 3.8 MG/DL (ref 2.3–4.1)
PHOSPHATE SERPL-MCNC: 3.9 MG/DL (ref 2.3–4.1)
PHOSPHATE SERPL-MCNC: 4.2 MG/DL (ref 2.3–4.1)
PHOSPHATE SERPL-MCNC: 4.5 MG/DL (ref 2.3–4.1)
PHOSPHATE SERPL-MCNC: 4.6 MG/DL (ref 2.3–4.1)
PHOSPHATE SERPL-MCNC: 5.2 MG/DL (ref 2.3–4.1)
PHOSPHATE SERPL-MCNC: 5.5 MG/DL (ref 2.3–4.1)
PHOSPHATE SERPL-MCNC: 5.7 MG/DL (ref 2.3–4.1)
PHOSPHATE SERPL-MCNC: 5.9 MG/DL (ref 2.3–4.1)
PHOSPHATE SERPL-MCNC: 6 MG/DL (ref 2.3–4.1)
PLATELET # BLD AUTO: 114 THOUSANDS/UL (ref 149–390)
PLATELET # BLD AUTO: 115 THOUSANDS/UL (ref 149–390)
PLATELET # BLD AUTO: 116 THOUSANDS/UL (ref 149–390)
PLATELET # BLD AUTO: 116 THOUSANDS/UL (ref 149–390)
PLATELET # BLD AUTO: 121 THOUSANDS/UL (ref 149–390)
PLATELET # BLD AUTO: 121 THOUSANDS/UL (ref 149–390)
PLATELET # BLD AUTO: 123 THOUSANDS/UL (ref 149–390)
PLATELET # BLD AUTO: 123 THOUSANDS/UL (ref 149–390)
PLATELET # BLD AUTO: 124 THOUSANDS/UL (ref 149–390)
PLATELET # BLD AUTO: 133 THOUSANDS/UL (ref 149–390)
PLATELET # BLD AUTO: 140 THOUSANDS/UL (ref 149–390)
PLATELET # BLD AUTO: 147 THOUSANDS/UL (ref 149–390)
PLATELET # BLD AUTO: 180 THOUSANDS/UL (ref 149–390)
PLATELET # BLD AUTO: 190 THOUSANDS/UL (ref 149–390)
PLATELET # BLD AUTO: 190 THOUSANDS/UL (ref 149–390)
PLATELET # BLD AUTO: 203 THOUSANDS/UL (ref 149–390)
PLATELET # BLD AUTO: 209 THOUSANDS/UL (ref 149–390)
PLATELET # BLD AUTO: 212 THOUSANDS/UL (ref 149–390)
PLATELET # BLD AUTO: 219 THOUSANDS/UL (ref 149–390)
PLATELET # BLD AUTO: 219 THOUSANDS/UL (ref 149–390)
PLATELET # BLD AUTO: 231 THOUSANDS/UL (ref 149–390)
PLATELET # BLD AUTO: 237 THOUSANDS/UL (ref 149–390)
PLATELET # BLD AUTO: 241 THOUSANDS/UL (ref 149–390)
PLATELET # BLD AUTO: 243 THOUSANDS/UL (ref 149–390)
PLATELET # BLD AUTO: 25 THOUSANDS/UL (ref 149–390)
PLATELET # BLD AUTO: 254 THOUSANDS/UL (ref 149–390)
PLATELET # BLD AUTO: 26 THOUSANDS/UL (ref 149–390)
PLATELET # BLD AUTO: 272 THOUSANDS/UL (ref 149–390)
PLATELET # BLD AUTO: 273 THOUSANDS/UL (ref 149–390)
PLATELET # BLD AUTO: 314 THOUSANDS/UL (ref 149–390)
PLATELET # BLD AUTO: 326 THOUSANDS/UL (ref 149–390)
PLATELET # BLD AUTO: 33 THOUSANDS/UL (ref 149–390)
PLATELET # BLD AUTO: 335 THOUSANDS/UL (ref 149–390)
PLATELET # BLD AUTO: 37 THOUSANDS/UL (ref 149–390)
PLATELET # BLD AUTO: 44 THOUSANDS/UL (ref 149–390)
PLATELET # BLD AUTO: 46 THOUSANDS/UL (ref 149–390)
PLATELET # BLD AUTO: 49 THOUSANDS/UL (ref 149–390)
PLATELET # BLD AUTO: 77 THOUSANDS/UL (ref 149–390)
PLATELET # BLD AUTO: 85 THOUSANDS/UL (ref 149–390)
PLATELET # BLD AUTO: 86 THOUSANDS/UL (ref 149–390)
PLATELET # BLD AUTO: 87 THOUSANDS/UL (ref 149–390)
PLATELET # BLD AUTO: 99 THOUSANDS/UL (ref 149–390)
PLATELET BLD QL SMEAR: ABNORMAL
PLATELET BLD QL SMEAR: ADEQUATE
PMV BLD AUTO: 10.1 FL (ref 8.9–12.7)
PMV BLD AUTO: 10.3 FL (ref 8.9–12.7)
PMV BLD AUTO: 10.4 FL (ref 8.9–12.7)
PMV BLD AUTO: 10.6 FL (ref 8.9–12.7)
PMV BLD AUTO: 10.7 FL (ref 8.9–12.7)
PMV BLD AUTO: 10.9 FL (ref 8.9–12.7)
PMV BLD AUTO: 11.2 FL (ref 8.9–12.7)
PMV BLD AUTO: 11.3 FL (ref 8.9–12.7)
PMV BLD AUTO: 11.3 FL (ref 8.9–12.7)
PMV BLD AUTO: 11.4 FL (ref 8.9–12.7)
PMV BLD AUTO: 11.4 FL (ref 8.9–12.7)
PMV BLD AUTO: 11.6 FL (ref 8.9–12.7)
PMV BLD AUTO: 11.8 FL (ref 8.9–12.7)
PMV BLD AUTO: 11.9 FL (ref 8.9–12.7)
PMV BLD AUTO: 12.1 FL (ref 8.9–12.7)
PMV BLD AUTO: 12.1 FL (ref 8.9–12.7)
PMV BLD AUTO: 12.2 FL (ref 8.9–12.7)
PMV BLD AUTO: 12.2 FL (ref 8.9–12.7)
PMV BLD AUTO: 12.3 FL (ref 8.9–12.7)
PMV BLD AUTO: 12.3 FL (ref 8.9–12.7)
PMV BLD AUTO: 12.5 FL (ref 8.9–12.7)
PMV BLD AUTO: 12.5 FL (ref 8.9–12.7)
PMV BLD AUTO: 12.6 FL (ref 8.9–12.7)
PMV BLD AUTO: 12.7 FL (ref 8.9–12.7)
PMV BLD AUTO: 12.7 FL (ref 8.9–12.7)
PMV BLD AUTO: 12.8 FL (ref 8.9–12.7)
PMV BLD AUTO: 12.8 FL (ref 8.9–12.7)
PMV BLD AUTO: 13 FL (ref 8.9–12.7)
PMV BLD AUTO: 13.1 FL (ref 8.9–12.7)
PMV BLD AUTO: 13.1 FL (ref 8.9–12.7)
PMV BLD AUTO: 13.2 FL (ref 8.9–12.7)
PMV BLD AUTO: 13.4 FL (ref 8.9–12.7)
PMV BLD AUTO: 13.5 FL (ref 8.9–12.7)
PMV BLD AUTO: 9.9 FL (ref 8.9–12.7)
PO2 BLD: 102.2 MM HG (ref 75–129)
PO2 BLD: 107.2 MM HG (ref 75–129)
PO2 BLD: 121.8 MM HG (ref 75–129)
PO2 BLD: 170.3 MM HG (ref 75–129)
PO2 BLD: 219.5 MM HG (ref 75–129)
PO2 BLD: 44.8 MM HG (ref 75–129)
PO2 BLD: 53.7 MM HG (ref 75–129)
PO2 BLD: 54.1 MM HG (ref 75–129)
PO2 BLD: 58.4 MM HG (ref 75–129)
PO2 BLD: 58.5 MM HG (ref 75–129)
PO2 BLD: 59.5 MM HG (ref 75–129)
PO2 BLD: 60.8 MM HG (ref 75–129)
PO2 BLD: 64.8 MM HG (ref 75–129)
PO2 BLD: 69.4 MM HG (ref 75–129)
PO2 BLD: 70.3 MM HG (ref 75–129)
PO2 BLD: 71.4 MM HG (ref 75–129)
PO2 BLD: 71.9 MM HG (ref 75–129)
PO2 BLD: 73.7 MM HG (ref 75–129)
PO2 BLD: 74.6 MM HG (ref 75–129)
PO2 BLD: 81.5 MM HG (ref 75–129)
PO2 BLDA: 101 MM HG (ref 75–129)
PO2 BLDA: 106.4 MM HG (ref 75–129)
PO2 BLDA: 126.1 MM HG (ref 75–129)
PO2 BLDA: 172 MM HG (ref 75–129)
PO2 BLDA: 220 MM HG (ref 75–129)
PO2 BLDA: 51.2 MM HG (ref 75–129)
PO2 BLDA: 54.6 MM HG (ref 75–129)
PO2 BLDA: 58.8 MM HG (ref 75–129)
PO2 BLDA: 61.3 MM HG (ref 75–129)
PO2 BLDA: 61.3 MM HG (ref 75–129)
PO2 BLDA: 64.2 MM HG (ref 75–129)
PO2 BLDA: 65.1 MM HG (ref 75–129)
PO2 BLDA: 69.3 MM HG (ref 75–129)
PO2 BLDA: 70.4 MM HG (ref 75–129)
PO2 BLDA: 73.7 MM HG (ref 75–129)
PO2 BLDA: 76.2 MM HG (ref 75–129)
PO2 BLDA: 76.3 MM HG (ref 75–129)
PO2 BLDA: 76.6 MM HG (ref 75–129)
PO2 BLDA: 76.8 MM HG (ref 75–129)
PO2 BLDA: 77.1 MM HG (ref 75–129)
PO2 BLDV: 40.7 MM HG (ref 35–45)
PO2 BLDV: 44.4 MM HG (ref 35–45)
PO2 BLDV: 48.8 MM HG (ref 35–45)
POIKILOCYTOSIS BLD QL SMEAR: PRESENT
POLYCHROMASIA BLD QL SMEAR: PRESENT
POTASSIUM SERPL-SCNC: 3 MMOL/L (ref 3.5–5.3)
POTASSIUM SERPL-SCNC: 3.3 MMOL/L (ref 3.5–5.3)
POTASSIUM SERPL-SCNC: 3.3 MMOL/L (ref 3.5–5.3)
POTASSIUM SERPL-SCNC: 3.4 MMOL/L (ref 3.5–5.3)
POTASSIUM SERPL-SCNC: 3.5 MMOL/L (ref 3.5–5.3)
POTASSIUM SERPL-SCNC: 3.6 MMOL/L (ref 3.5–5.3)
POTASSIUM SERPL-SCNC: 3.7 MMOL/L (ref 3.5–5.3)
POTASSIUM SERPL-SCNC: 3.8 MMOL/L (ref 3.5–5.3)
POTASSIUM SERPL-SCNC: 3.9 MMOL/L (ref 3.5–5.3)
POTASSIUM SERPL-SCNC: 4 MMOL/L (ref 3.5–5.3)
POTASSIUM SERPL-SCNC: 4.1 MMOL/L (ref 3.5–5.3)
POTASSIUM SERPL-SCNC: 4.2 MMOL/L (ref 3.5–5.3)
POTASSIUM SERPL-SCNC: 4.3 MMOL/L (ref 3.5–5.3)
POTASSIUM SERPL-SCNC: 4.4 MMOL/L (ref 3.5–5.3)
POTASSIUM SERPL-SCNC: 4.5 MMOL/L (ref 3.5–5.3)
POTASSIUM SERPL-SCNC: 4.6 MMOL/L (ref 3.5–5.3)
POTASSIUM SERPL-SCNC: 4.7 MMOL/L (ref 3.5–5.3)
POTASSIUM SERPL-SCNC: 4.8 MMOL/L (ref 3.5–5.3)
POTASSIUM SERPL-SCNC: 4.9 MMOL/L (ref 3.5–5.3)
POTASSIUM SERPL-SCNC: 4.9 MMOL/L (ref 3.5–5.3)
POTASSIUM SERPL-SCNC: 5 MMOL/L (ref 3.5–5.3)
POTASSIUM SERPL-SCNC: 5.1 MMOL/L (ref 3.5–5.3)
POTASSIUM SERPL-SCNC: 5.2 MMOL/L (ref 3.5–5.3)
POTASSIUM SERPL-SCNC: 5.2 MMOL/L (ref 3.5–5.3)
POTASSIUM SERPL-SCNC: 5.6 MMOL/L (ref 3.5–5.3)
POTASSIUM SERPL-SCNC: 5.6 MMOL/L (ref 3.5–5.3)
POTASSIUM SERPL-SCNC: 5.7 MMOL/L (ref 3.5–5.3)
POTASSIUM SERPL-SCNC: 6.1 MMOL/L (ref 3.5–5.3)
PR INTERVAL: 136 MS
PR INTERVAL: 192 MS
PR INTERVAL: 192 MS
PR INTERVAL: 196 MS
PR INTERVAL: 212 MS
PR INTERVAL: 220 MS
PR INTERVAL: 224 MS
PR INTERVAL: 224 MS
PRESSURE CONTROL: 18
PRESSURE CONTROL: 18
PRESSURE CONTROL: 24
PRESSURE CONTROL: 275
PRESSURE CONTROL: ABNORMAL
PRESSURE CONTROL: ABNORMAL
PROCALCITONIN SERPL-MCNC: 0.53 NG/ML
PROCALCITONIN SERPL-MCNC: 0.76 NG/ML
PROCALCITONIN SERPL-MCNC: 0.92 NG/ML
PROCALCITONIN SERPL-MCNC: 0.99 NG/ML
PROCALCITONIN SERPL-MCNC: 1.24 NG/ML
PROCALCITONIN SERPL-MCNC: 1.26 NG/ML
PROCALCITONIN SERPL-MCNC: 1.88 NG/ML
PROCALCITONIN SERPL-MCNC: 2.58 NG/ML
PROCALCITONIN SERPL-MCNC: 2.6 NG/ML
PROCALCITONIN SERPL-MCNC: 2.97 NG/ML
PROCALCITONIN SERPL-MCNC: 3.92 NG/ML
PROMYELOCYTES NFR BLD MANUAL: 1 % (ref 0–0)
PROMYELOCYTES NFR BLD MANUAL: 1 % (ref 0–0)
PROT FLD-MCNC: 2.4 G/DL
PROT SERPL-MCNC: 4.3 G/DL (ref 6.4–8.2)
PROT SERPL-MCNC: 4.8 G/DL (ref 6.4–8.2)
PROT SERPL-MCNC: 4.8 G/DL (ref 6.4–8.2)
PROT SERPL-MCNC: 5.1 G/DL (ref 6.4–8.2)
PROT SERPL-MCNC: 5.2 G/DL (ref 6.4–8.2)
PROT SERPL-MCNC: 5.2 G/DL (ref 6.4–8.2)
PROT SERPL-MCNC: 5.7 G/DL (ref 6.4–8.2)
PROT SERPL-MCNC: 5.8 G/DL (ref 6.4–8.2)
PROT SERPL-MCNC: 5.8 G/DL (ref 6.4–8.2)
PROT SERPL-MCNC: 5.9 G/DL (ref 6.4–8.2)
PROT SERPL-MCNC: 5.9 G/DL (ref 6.4–8.2)
PROT SERPL-MCNC: 6 G/DL (ref 6.4–8.2)
PROT SERPL-MCNC: 6.1 G/DL (ref 6.4–8.2)
PROT SERPL-MCNC: 6.3 G/DL (ref 6.4–8.2)
PROT SERPL-MCNC: 7.8 G/DL (ref 6.4–8.2)
PROT UR STRIP-MCNC: NEGATIVE MG/DL
PROTHROMBIN TIME: 15.7 SECONDS (ref 11.6–14.5)
PROTHROMBIN TIME: 15.7 SECONDS (ref 11.6–14.5)
PROTHROMBIN TIME: 17.9 SECONDS (ref 11.6–14.5)
PROTHROMBIN TIME: 18.4 SECONDS (ref 11.6–14.5)
PROTHROMBIN TIME: 21 SECONDS (ref 11.6–14.5)
PROTHROMBIN TIME: 23.1 SECONDS (ref 11.6–14.5)
PS CM H2O: 26
PS VENT FIO2: 50
PS VENT FIO2: 70
PS VENT PEEP: 6
PS VENT PEEP: 8
PSA FREE MFR SERPL: 30 %
PSA FREE SERPL-MCNC: 0.12 NG/ML
PSA SERPL-MCNC: 0.4 NG/ML (ref 0–4)
PV PEAK GRADIENT: 3 MMHG
QRS AXIS: -3 DEGREES
QRS AXIS: -5 DEGREES
QRS AXIS: -7 DEGREES
QRS AXIS: -8 DEGREES
QRS AXIS: 10 DEGREES
QRS AXIS: 12 DEGREES
QRS AXIS: 19 DEGREES
QRS AXIS: 3 DEGREES
QRS AXIS: 66 DEGREES
QRS AXIS: 72 DEGREES
QRS AXIS: 82 DEGREES
QRSD INTERVAL: 106 MS
QRSD INTERVAL: 108 MS
QRSD INTERVAL: 122 MS
QRSD INTERVAL: 134 MS
QRSD INTERVAL: 134 MS
QRSD INTERVAL: 136 MS
QRSD INTERVAL: 58 MS
QRSD INTERVAL: 68 MS
QRSD INTERVAL: 80 MS
QRSD INTERVAL: 94 MS
QRSD INTERVAL: 96 MS
QT INTERVAL: 334 MS
QT INTERVAL: 356 MS
QT INTERVAL: 370 MS
QT INTERVAL: 372 MS
QT INTERVAL: 378 MS
QT INTERVAL: 380 MS
QT INTERVAL: 384 MS
QT INTERVAL: 386 MS
QT INTERVAL: 414 MS
QT INTERVAL: 422 MS
QT INTERVAL: 447 MS
QTC INTERVAL: 434 MS
QTC INTERVAL: 449 MS
QTC INTERVAL: 452 MS
QTC INTERVAL: 464 MS
QTC INTERVAL: 469 MS
QTC INTERVAL: 474 MS
QTC INTERVAL: 479 MS
QTC INTERVAL: 486 MS
QTC INTERVAL: 492 MS
QTC INTERVAL: 493 MS
QTC INTERVAL: 514 MS
RBC # BLD AUTO: 1.75 MILLION/UL (ref 3.88–5.62)
RBC # BLD AUTO: 1.99 MILLION/UL (ref 3.88–5.62)
RBC # BLD AUTO: 2 MILLION/UL (ref 3.88–5.62)
RBC # BLD AUTO: 2.06 MILLION/UL (ref 3.88–5.62)
RBC # BLD AUTO: 2.08 MILLION/UL (ref 3.88–5.62)
RBC # BLD AUTO: 2.11 MILLION/UL (ref 3.88–5.62)
RBC # BLD AUTO: 2.13 MILLION/UL (ref 3.88–5.62)
RBC # BLD AUTO: 2.14 MILLION/UL (ref 3.88–5.62)
RBC # BLD AUTO: 2.17 MILLION/UL (ref 3.88–5.62)
RBC # BLD AUTO: 2.19 MILLION/UL (ref 3.88–5.62)
RBC # BLD AUTO: 2.22 MILLION/UL (ref 3.88–5.62)
RBC # BLD AUTO: 2.26 MILLION/UL (ref 3.88–5.62)
RBC # BLD AUTO: 2.3 MILLION/UL (ref 3.88–5.62)
RBC # BLD AUTO: 2.33 MILLION/UL (ref 3.88–5.62)
RBC # BLD AUTO: 2.33 MILLION/UL (ref 3.88–5.62)
RBC # BLD AUTO: 2.34 MILLION/UL (ref 3.88–5.62)
RBC # BLD AUTO: 2.35 MILLION/UL (ref 3.88–5.62)
RBC # BLD AUTO: 2.38 MILLION/UL (ref 3.88–5.62)
RBC # BLD AUTO: 2.38 MILLION/UL (ref 3.88–5.62)
RBC # BLD AUTO: 2.39 MILLION/UL (ref 3.88–5.62)
RBC # BLD AUTO: 2.4 MILLION/UL (ref 3.88–5.62)
RBC # BLD AUTO: 2.41 MILLION/UL (ref 3.88–5.62)
RBC # BLD AUTO: 2.41 MILLION/UL (ref 3.88–5.62)
RBC # BLD AUTO: 2.47 MILLION/UL (ref 3.88–5.62)
RBC # BLD AUTO: 2.47 MILLION/UL (ref 3.88–5.62)
RBC # BLD AUTO: 2.48 MILLION/UL (ref 3.88–5.62)
RBC # BLD AUTO: 2.48 MILLION/UL (ref 3.88–5.62)
RBC # BLD AUTO: 2.49 MILLION/UL (ref 3.88–5.62)
RBC # BLD AUTO: 2.53 MILLION/UL (ref 3.88–5.62)
RBC # BLD AUTO: 2.57 MILLION/UL (ref 3.88–5.62)
RBC # BLD AUTO: 2.57 MILLION/UL (ref 3.88–5.62)
RBC # BLD AUTO: 2.62 MILLION/UL (ref 3.88–5.62)
RBC # BLD AUTO: 2.63 MILLION/UL (ref 3.88–5.62)
RBC # BLD AUTO: 2.7 MILLION/UL (ref 3.88–5.62)
RBC # BLD AUTO: 2.78 MILLION/UL (ref 3.88–5.62)
RBC # BLD AUTO: 2.99 MILLION/UL (ref 3.88–5.62)
RBC #/AREA URNS AUTO: ABNORMAL /HPF
RBC MORPH BLD: NORMAL
RBC MORPH BLD: PRESENT
RH BLD: POSITIVE
RIGHT ATRIUM AREA SYSTOLE A4C: 10.5 CM2
RIGHT ATRIUM AREA SYSTOLE A4C: 10.6 CM2
RIGHT ATRIUM AREA SYSTOLE A4C: 15.2 CM2
RIGHT VENTRICLE ID DIMENSION: 2.9 CM
RIGHT VENTRICLE ID DIMENSION: 3.4 CM
RIGHT VENTRICLE ID DIMENSION: 3.5 CM
RSV RNA RESP QL NAA+PROBE: NEGATIVE
S PNEUM AG UR QL: NEGATIVE
SARS-COV-2 RNA RESP QL NAA+PROBE: NEGATIVE
SCHISTOCYTES BLD QL SMEAR: PRESENT
SITE: NORMAL
SL CV LEFT ATRIUM LENGTH A2C: 5.4 CM
SL CV LEFT ATRIUM LENGTH A2C: 5.7 CM
SL CV LV EF: 60
SL CV LV EF: 65
SL CV PED ECHO LEFT VENTRICLE DIASTOLIC VOLUME (MOD BIPLANE) 2D: 34 ML
SL CV PED ECHO LEFT VENTRICLE DIASTOLIC VOLUME (MOD BIPLANE) 2D: 53 ML
SL CV PED ECHO LEFT VENTRICLE DIASTOLIC VOLUME (MOD BIPLANE) 2D: 69 ML
SL CV PED ECHO LEFT VENTRICLE SYSTOLIC VOLUME (MOD BIPLANE) 2D: 15 ML
SL CV PED ECHO LEFT VENTRICLE SYSTOLIC VOLUME (MOD BIPLANE) 2D: 21 ML
SL CV PED ECHO LEFT VENTRICLE SYSTOLIC VOLUME (MOD BIPLANE) 2D: 24 ML
SODIUM SERPL-SCNC: 128 MMOL/L (ref 136–145)
SODIUM SERPL-SCNC: 129 MMOL/L (ref 136–145)
SODIUM SERPL-SCNC: 131 MMOL/L (ref 136–145)
SODIUM SERPL-SCNC: 133 MMOL/L (ref 136–145)
SODIUM SERPL-SCNC: 134 MMOL/L (ref 136–145)
SODIUM SERPL-SCNC: 135 MMOL/L (ref 136–145)
SODIUM SERPL-SCNC: 136 MMOL/L (ref 136–145)
SODIUM SERPL-SCNC: 137 MMOL/L (ref 136–145)
SODIUM SERPL-SCNC: 138 MMOL/L (ref 136–145)
SODIUM SERPL-SCNC: 139 MMOL/L (ref 136–145)
SODIUM SERPL-SCNC: 140 MMOL/L (ref 136–145)
SODIUM SERPL-SCNC: 141 MMOL/L (ref 136–145)
SODIUM SERPL-SCNC: 142 MMOL/L (ref 136–145)
SODIUM SERPL-SCNC: 142 MMOL/L (ref 136–145)
SODIUM SERPL-SCNC: 144 MMOL/L (ref 136–145)
SODIUM SERPL-SCNC: 146 MMOL/L (ref 136–145)
SODIUM SERPL-SCNC: 147 MMOL/L (ref 136–145)
SODIUM SERPL-SCNC: 148 MMOL/L (ref 136–145)
SODIUM SERPL-SCNC: 149 MMOL/L (ref 136–145)
SODIUM SERPL-SCNC: 150 MMOL/L (ref 136–145)
SP GR UR STRIP.AUTO: 1.01 (ref 1–1.03)
SPECIMEN EXPIRATION DATE: NORMAL
SPECIMEN SOURCE: ABNORMAL
SRA .2 IU/ML UFH SER-ACNC: <1 % (ref 0–20)
SRA 100IU/ML UFH SER-ACNC: <1 % (ref 0–20)
SRA UFH SER-IMP: NORMAL
T WAVE AXIS: -17 DEGREES
T WAVE AXIS: -24 DEGREES
T WAVE AXIS: -27 DEGREES
T WAVE AXIS: -53 DEGREES
T WAVE AXIS: 10 DEGREES
T WAVE AXIS: 116 DEGREES
T WAVE AXIS: 120 DEGREES
T WAVE AXIS: 13 DEGREES
T WAVE AXIS: 2 DEGREES
T WAVE AXIS: 21 DEGREES
T WAVE AXIS: 36 DEGREES
T4 FREE SERPL-MCNC: 0.56 NG/DL (ref 0.76–1.46)
TARGETS BLD QL SMEAR: PRESENT
TOTAL CELLS COUNTED SPEC: 100
TR MAX PG: 40 MMHG
TR MAX PG: 43 MMHG
TR MAX PG: 47 MMHG
TR PEAK VELOCITY: 3.2 M/S
TR PEAK VELOCITY: 3.3 M/S
TR PEAK VELOCITY: 3.4 M/S
TRICUSPID VALVE PEAK REGURGITATION VELOCITY: 3.15 M/S
TRICUSPID VALVE PEAK REGURGITATION VELOCITY: 3.27 M/S
TRICUSPID VALVE PEAK REGURGITATION VELOCITY: 3.43 M/S
TRIGL SERPL-MCNC: 145 MG/DL
TRIGL SERPL-MCNC: 54 MG/DL
TRIGL SERPL-MCNC: 73 MG/DL
TSH SERPL DL<=0.05 MIU/L-ACNC: 0.03 UIU/ML (ref 0.36–3.74)
TSH SERPL DL<=0.05 MIU/L-ACNC: 11.82 UIU/ML (ref 0.45–4.5)
UNIT DISPENSE STATUS: NORMAL
UNIT PRODUCT CODE: NORMAL
UNIT PRODUCT VOLUME: 350 ML
UNIT RH: NORMAL
UROBILINOGEN UR QL STRIP.AUTO: 0.2 E.U./DL
VANCOMYCIN TROUGH SERPL-MCNC: 11.8 UG/ML (ref 10–20)
VANCOMYCIN TROUGH SERPL-MCNC: 25 UG/ML (ref 10–20)
VARIANT LYMPHS # BLD AUTO: 2 %
VARIANT LYMPHS # BLD AUTO: 3 %
VENT - PS: ABNORMAL
VENT - PS: ABNORMAL
VENT AC: 18
VENT AC: 18
VENT AC: 20
VENT AC: 24
VENT AC: 26
VENT AC: 26
VENT- AC: AC
VENTRICULAR RATE: 101 BPM
VENTRICULAR RATE: 102 BPM
VENTRICULAR RATE: 110 BPM
VENTRICULAR RATE: 73 BPM
VENTRICULAR RATE: 76 BPM
VENTRICULAR RATE: 80 BPM
VENTRICULAR RATE: 90 BPM
VENTRICULAR RATE: 90 BPM
VENTRICULAR RATE: 93 BPM
VENTRICULAR RATE: 96 BPM
VENTRICULAR RATE: 98 BPM
VT SETTING VENT: 360 ML
WBC # BLD AUTO: 10.91 THOUSAND/UL (ref 4.31–10.16)
WBC # BLD AUTO: 11.06 THOUSAND/UL (ref 4.31–10.16)
WBC # BLD AUTO: 11.87 THOUSAND/UL (ref 4.31–10.16)
WBC # BLD AUTO: 12.13 THOUSAND/UL (ref 4.31–10.16)
WBC # BLD AUTO: 13.39 THOUSAND/UL (ref 4.31–10.16)
WBC # BLD AUTO: 13.94 THOUSAND/UL (ref 4.31–10.16)
WBC # BLD AUTO: 14.58 THOUSAND/UL (ref 4.31–10.16)
WBC # BLD AUTO: 14.81 THOUSAND/UL (ref 4.31–10.16)
WBC # BLD AUTO: 14.84 THOUSAND/UL (ref 4.31–10.16)
WBC # BLD AUTO: 14.89 THOUSAND/UL (ref 4.31–10.16)
WBC # BLD AUTO: 15.08 THOUSAND/UL (ref 4.31–10.16)
WBC # BLD AUTO: 15.24 THOUSAND/UL (ref 4.31–10.16)
WBC # BLD AUTO: 15.28 THOUSAND/UL (ref 4.31–10.16)
WBC # BLD AUTO: 15.34 THOUSAND/UL (ref 4.31–10.16)
WBC # BLD AUTO: 15.37 THOUSAND/UL (ref 4.31–10.16)
WBC # BLD AUTO: 15.57 THOUSAND/UL (ref 4.31–10.16)
WBC # BLD AUTO: 15.61 THOUSAND/UL (ref 4.31–10.16)
WBC # BLD AUTO: 15.64 THOUSAND/UL (ref 4.31–10.16)
WBC # BLD AUTO: 15.71 THOUSAND/UL (ref 4.31–10.16)
WBC # BLD AUTO: 16.74 THOUSAND/UL (ref 4.31–10.16)
WBC # BLD AUTO: 16.84 THOUSAND/UL (ref 4.31–10.16)
WBC # BLD AUTO: 16.88 THOUSAND/UL (ref 4.31–10.16)
WBC # BLD AUTO: 17.23 THOUSAND/UL (ref 4.31–10.16)
WBC # BLD AUTO: 17.28 THOUSAND/UL (ref 4.31–10.16)
WBC # BLD AUTO: 17.6 THOUSAND/UL (ref 4.31–10.16)
WBC # BLD AUTO: 17.92 THOUSAND/UL (ref 4.31–10.16)
WBC # BLD AUTO: 18.23 THOUSAND/UL (ref 4.31–10.16)
WBC # BLD AUTO: 18.3 THOUSAND/UL (ref 4.31–10.16)
WBC # BLD AUTO: 18.82 THOUSAND/UL (ref 4.31–10.16)
WBC # BLD AUTO: 18.87 THOUSAND/UL (ref 4.31–10.16)
WBC # BLD AUTO: 19.48 THOUSAND/UL (ref 4.31–10.16)
WBC # BLD AUTO: 20.46 THOUSAND/UL (ref 4.31–10.16)
WBC # BLD AUTO: 20.67 THOUSAND/UL (ref 4.31–10.16)
WBC # BLD AUTO: 21.01 THOUSAND/UL (ref 4.31–10.16)
WBC # BLD AUTO: 21.58 THOUSAND/UL (ref 4.31–10.16)
WBC # BLD AUTO: 21.74 THOUSAND/UL (ref 4.31–10.16)
WBC # BLD AUTO: 22.51 THOUSAND/UL (ref 4.31–10.16)
WBC # BLD AUTO: 23.05 THOUSAND/UL (ref 4.31–10.16)
WBC # BLD AUTO: 23.58 THOUSAND/UL (ref 4.31–10.16)
WBC # BLD AUTO: 5.31 THOUSAND/UL (ref 4.31–10.16)
WBC # BLD AUTO: 5.67 THOUSAND/UL (ref 4.31–10.16)
WBC # BLD AUTO: 9.32 THOUSAND/UL (ref 4.31–10.16)
WBC # FLD MANUAL: 28 /UL
WBC #/AREA URNS AUTO: ABNORMAL /HPF

## 2022-01-01 PROCEDURE — 87186 SC STD MICRODIL/AGAR DIL: CPT | Performed by: RADIOLOGY

## 2022-01-01 PROCEDURE — 71045 X-RAY EXAM CHEST 1 VIEW: CPT

## 2022-01-01 PROCEDURE — 82805 BLOOD GASES W/O2 SATURATION: CPT | Performed by: NURSE PRACTITIONER

## 2022-01-01 PROCEDURE — 97110 THERAPEUTIC EXERCISES: CPT

## 2022-01-01 PROCEDURE — 97605 NEG PRS WND THER DME<=50SQCM: CPT | Performed by: PHYSICIAN ASSISTANT

## 2022-01-01 PROCEDURE — 85018 HEMOGLOBIN: CPT

## 2022-01-01 PROCEDURE — 99233 SBSQ HOSP IP/OBS HIGH 50: CPT | Performed by: INTERNAL MEDICINE

## 2022-01-01 PROCEDURE — 36415 COLL VENOUS BLD VENIPUNCTURE: CPT | Performed by: EMERGENCY MEDICINE

## 2022-01-01 PROCEDURE — G1004 CDSM NDSC: HCPCS

## 2022-01-01 PROCEDURE — 94760 N-INVAS EAR/PLS OXIMETRY 1: CPT

## 2022-01-01 PROCEDURE — 99238 HOSP IP/OBS DSCHRG MGMT 30/<: CPT | Performed by: INTERNAL MEDICINE

## 2022-01-01 PROCEDURE — 80048 BASIC METABOLIC PNL TOTAL CA: CPT | Performed by: INTERNAL MEDICINE

## 2022-01-01 PROCEDURE — 94640 AIRWAY INHALATION TREATMENT: CPT

## 2022-01-01 PROCEDURE — 94664 DEMO&/EVAL PT USE INHALER: CPT

## 2022-01-01 PROCEDURE — 82948 REAGENT STRIP/BLOOD GLUCOSE: CPT

## 2022-01-01 PROCEDURE — 87340 HEPATITIS B SURFACE AG IA: CPT | Performed by: NURSE PRACTITIONER

## 2022-01-01 PROCEDURE — 86923 COMPATIBILITY TEST ELECTRIC: CPT

## 2022-01-01 PROCEDURE — 84478 ASSAY OF TRIGLYCERIDES: CPT | Performed by: NURSE PRACTITIONER

## 2022-01-01 PROCEDURE — 99024 POSTOP FOLLOW-UP VISIT: CPT | Performed by: SURGERY

## 2022-01-01 PROCEDURE — 99291 CRITICAL CARE FIRST HOUR: CPT | Performed by: INTERNAL MEDICINE

## 2022-01-01 PROCEDURE — 84100 ASSAY OF PHOSPHORUS: CPT | Performed by: STUDENT IN AN ORGANIZED HEALTH CARE EDUCATION/TRAINING PROGRAM

## 2022-01-01 PROCEDURE — 0D1L0Z4 BYPASS TRANSVERSE COLON TO CUTANEOUS, OPEN APPROACH: ICD-10-PCS | Performed by: SURGERY

## 2022-01-01 PROCEDURE — 80048 BASIC METABOLIC PNL TOTAL CA: CPT | Performed by: NURSE PRACTITIONER

## 2022-01-01 PROCEDURE — NC001 PR NO CHARGE: Performed by: NURSE PRACTITIONER

## 2022-01-01 PROCEDURE — 80048 BASIC METABOLIC PNL TOTAL CA: CPT | Performed by: STUDENT IN AN ORGANIZED HEALTH CARE EDUCATION/TRAINING PROGRAM

## 2022-01-01 PROCEDURE — 83735 ASSAY OF MAGNESIUM: CPT | Performed by: ANESTHESIOLOGY

## 2022-01-01 PROCEDURE — 99232 SBSQ HOSP IP/OBS MODERATE 35: CPT | Performed by: INTERNAL MEDICINE

## 2022-01-01 PROCEDURE — 83605 ASSAY OF LACTIC ACID: CPT | Performed by: NURSE PRACTITIONER

## 2022-01-01 PROCEDURE — 85027 COMPLETE CBC AUTOMATED: CPT

## 2022-01-01 PROCEDURE — C1725 CATH, TRANSLUMIN NON-LASER: HCPCS

## 2022-01-01 PROCEDURE — 83735 ASSAY OF MAGNESIUM: CPT | Performed by: SURGERY

## 2022-01-01 PROCEDURE — 94150 VITAL CAPACITY TEST: CPT

## 2022-01-01 PROCEDURE — 88305 TISSUE EXAM BY PATHOLOGIST: CPT | Performed by: PATHOLOGY

## 2022-01-01 PROCEDURE — 82330 ASSAY OF CALCIUM: CPT | Performed by: INTERNAL MEDICINE

## 2022-01-01 PROCEDURE — 84439 ASSAY OF FREE THYROXINE: CPT | Performed by: NURSE PRACTITIONER

## 2022-01-01 PROCEDURE — 94668 MNPJ CHEST WALL SBSQ: CPT

## 2022-01-01 PROCEDURE — 84484 ASSAY OF TROPONIN QUANT: CPT | Performed by: PHYSICIAN ASSISTANT

## 2022-01-01 PROCEDURE — 82533 TOTAL CORTISOL: CPT | Performed by: PHYSICIAN ASSISTANT

## 2022-01-01 PROCEDURE — 84145 PROCALCITONIN (PCT): CPT | Performed by: NURSE PRACTITIONER

## 2022-01-01 PROCEDURE — 90945 DIALYSIS ONE EVALUATION: CPT

## 2022-01-01 PROCEDURE — 83605 ASSAY OF LACTIC ACID: CPT | Performed by: PHYSICIAN ASSISTANT

## 2022-01-01 PROCEDURE — 84145 PROCALCITONIN (PCT): CPT

## 2022-01-01 PROCEDURE — 86901 BLOOD TYPING SEROLOGIC RH(D): CPT | Performed by: PHYSICIAN ASSISTANT

## 2022-01-01 PROCEDURE — 94003 VENT MGMT INPAT SUBQ DAY: CPT

## 2022-01-01 PROCEDURE — 83735 ASSAY OF MAGNESIUM: CPT | Performed by: INTERNAL MEDICINE

## 2022-01-01 PROCEDURE — 83735 ASSAY OF MAGNESIUM: CPT | Performed by: STUDENT IN AN ORGANIZED HEALTH CARE EDUCATION/TRAINING PROGRAM

## 2022-01-01 PROCEDURE — 84100 ASSAY OF PHOSPHORUS: CPT | Performed by: NURSE PRACTITIONER

## 2022-01-01 PROCEDURE — 90945 DIALYSIS ONE EVALUATION: CPT | Performed by: INTERNAL MEDICINE

## 2022-01-01 PROCEDURE — 86803 HEPATITIS C AB TEST: CPT | Performed by: NURSE PRACTITIONER

## 2022-01-01 PROCEDURE — 86850 RBC ANTIBODY SCREEN: CPT | Performed by: EMERGENCY MEDICINE

## 2022-01-01 PROCEDURE — 84153 ASSAY OF PSA TOTAL: CPT

## 2022-01-01 PROCEDURE — 83735 ASSAY OF MAGNESIUM: CPT | Performed by: NURSE PRACTITIONER

## 2022-01-01 PROCEDURE — 80048 BASIC METABOLIC PNL TOTAL CA: CPT

## 2022-01-01 PROCEDURE — 81001 URINALYSIS AUTO W/SCOPE: CPT | Performed by: STUDENT IN AN ORGANIZED HEALTH CARE EDUCATION/TRAINING PROGRAM

## 2022-01-01 PROCEDURE — 94669 MECHANICAL CHEST WALL OSCILL: CPT

## 2022-01-01 PROCEDURE — 99233 SBSQ HOSP IP/OBS HIGH 50: CPT | Performed by: STUDENT IN AN ORGANIZED HEALTH CARE EDUCATION/TRAINING PROGRAM

## 2022-01-01 PROCEDURE — 4A133B1 MONITORING OF ARTERIAL PRESSURE, PERIPHERAL, PERCUTANEOUS APPROACH: ICD-10-PCS | Performed by: INTERNAL MEDICINE

## 2022-01-01 PROCEDURE — 93308 TTE F-UP OR LMTD: CPT

## 2022-01-01 PROCEDURE — C9113 INJ PANTOPRAZOLE SODIUM, VIA: HCPCS | Performed by: NURSE PRACTITIONER

## 2022-01-01 PROCEDURE — 82330 ASSAY OF CALCIUM: CPT | Performed by: ANESTHESIOLOGY

## 2022-01-01 PROCEDURE — 85610 PROTHROMBIN TIME: CPT | Performed by: STUDENT IN AN ORGANIZED HEALTH CARE EDUCATION/TRAINING PROGRAM

## 2022-01-01 PROCEDURE — 84100 ASSAY OF PHOSPHORUS: CPT | Performed by: INTERNAL MEDICINE

## 2022-01-01 PROCEDURE — 80053 COMPREHEN METABOLIC PANEL: CPT | Performed by: PHYSICIAN ASSISTANT

## 2022-01-01 PROCEDURE — 85027 COMPLETE CBC AUTOMATED: CPT | Performed by: SURGERY

## 2022-01-01 PROCEDURE — 85025 COMPLETE CBC W/AUTO DIFF WBC: CPT

## 2022-01-01 PROCEDURE — 85027 COMPLETE CBC AUTOMATED: CPT | Performed by: NURSE PRACTITIONER

## 2022-01-01 PROCEDURE — 99024 POSTOP FOLLOW-UP VISIT: CPT | Performed by: PHYSICIAN ASSISTANT

## 2022-01-01 PROCEDURE — 99232 SBSQ HOSP IP/OBS MODERATE 35: CPT | Performed by: FAMILY MEDICINE

## 2022-01-01 PROCEDURE — 93325 DOPPLER ECHO COLOR FLOW MAPG: CPT | Performed by: INTERNAL MEDICINE

## 2022-01-01 PROCEDURE — 71250 CT THORAX DX C-: CPT

## 2022-01-01 PROCEDURE — 83615 LACTATE (LD) (LDH) ENZYME: CPT | Performed by: ANESTHESIOLOGY

## 2022-01-01 PROCEDURE — 85027 COMPLETE CBC AUTOMATED: CPT | Performed by: FAMILY MEDICINE

## 2022-01-01 PROCEDURE — 86140 C-REACTIVE PROTEIN: CPT

## 2022-01-01 PROCEDURE — 99285 EMERGENCY DEPT VISIT HI MDM: CPT

## 2022-01-01 PROCEDURE — 85730 THROMBOPLASTIN TIME PARTIAL: CPT | Performed by: ANESTHESIOLOGY

## 2022-01-01 PROCEDURE — 44146 PARTIAL REMOVAL OF COLON: CPT | Performed by: PHYSICIAN ASSISTANT

## 2022-01-01 PROCEDURE — 1123F ACP DISCUSS/DSCN MKR DOCD: CPT | Performed by: EMERGENCY MEDICINE

## 2022-01-01 PROCEDURE — 99024 POSTOP FOLLOW-UP VISIT: CPT | Performed by: SPECIALIST

## 2022-01-01 PROCEDURE — 93010 ELECTROCARDIOGRAM REPORT: CPT | Performed by: INTERNAL MEDICINE

## 2022-01-01 PROCEDURE — 87102 FUNGUS ISOLATION CULTURE: CPT | Performed by: ANESTHESIOLOGY

## 2022-01-01 PROCEDURE — 82805 BLOOD GASES W/O2 SATURATION: CPT | Performed by: STUDENT IN AN ORGANIZED HEALTH CARE EDUCATION/TRAINING PROGRAM

## 2022-01-01 PROCEDURE — 49406 IMAGE CATH FLUID PERI/RETRO: CPT | Performed by: RADIOLOGY

## 2022-01-01 PROCEDURE — 82330 ASSAY OF CALCIUM: CPT | Performed by: STUDENT IN AN ORGANIZED HEALTH CARE EDUCATION/TRAINING PROGRAM

## 2022-01-01 PROCEDURE — 83735 ASSAY OF MAGNESIUM: CPT

## 2022-01-01 PROCEDURE — 82805 BLOOD GASES W/O2 SATURATION: CPT | Performed by: PHYSICIAN ASSISTANT

## 2022-01-01 PROCEDURE — 84484 ASSAY OF TROPONIN QUANT: CPT | Performed by: FAMILY MEDICINE

## 2022-01-01 PROCEDURE — 02HV33Z INSERTION OF INFUSION DEVICE INTO SUPERIOR VENA CAVA, PERCUTANEOUS APPROACH: ICD-10-PCS | Performed by: INTERNAL MEDICINE

## 2022-01-01 PROCEDURE — 83735 ASSAY OF MAGNESIUM: CPT | Performed by: PHYSICIAN ASSISTANT

## 2022-01-01 PROCEDURE — 5A1955Z RESPIRATORY VENTILATION, GREATER THAN 96 CONSECUTIVE HOURS: ICD-10-PCS | Performed by: INTERNAL MEDICINE

## 2022-01-01 PROCEDURE — 93306 TTE W/DOPPLER COMPLETE: CPT | Performed by: INTERNAL MEDICINE

## 2022-01-01 PROCEDURE — 93005 ELECTROCARDIOGRAM TRACING: CPT

## 2022-01-01 PROCEDURE — 93321 DOPPLER ECHO F-UP/LMTD STD: CPT

## 2022-01-01 PROCEDURE — 74176 CT ABD & PELVIS W/O CONTRAST: CPT

## 2022-01-01 PROCEDURE — 86022 PLATELET ANTIBODIES: CPT | Performed by: NURSE PRACTITIONER

## 2022-01-01 PROCEDURE — 85007 BL SMEAR W/DIFF WBC COUNT: CPT | Performed by: NURSE PRACTITIONER

## 2022-01-01 PROCEDURE — 90945 DIALYSIS ONE EVALUATION: CPT | Performed by: STUDENT IN AN ORGANIZED HEALTH CARE EDUCATION/TRAINING PROGRAM

## 2022-01-01 PROCEDURE — 84145 PROCALCITONIN (PCT): CPT | Performed by: FAMILY MEDICINE

## 2022-01-01 PROCEDURE — 99232 SBSQ HOSP IP/OBS MODERATE 35: CPT | Performed by: PHYSICIAN ASSISTANT

## 2022-01-01 PROCEDURE — 31624 DX BRONCHOSCOPE/LAVAGE: CPT | Performed by: ANESTHESIOLOGY

## 2022-01-01 PROCEDURE — C9113 INJ PANTOPRAZOLE SODIUM, VIA: HCPCS

## 2022-01-01 PROCEDURE — 99232 SBSQ HOSP IP/OBS MODERATE 35: CPT | Performed by: STUDENT IN AN ORGANIZED HEALTH CARE EDUCATION/TRAINING PROGRAM

## 2022-01-01 PROCEDURE — P9040 RBC LEUKOREDUCED IRRADIATED: HCPCS

## 2022-01-01 PROCEDURE — 85610 PROTHROMBIN TIME: CPT | Performed by: NURSE PRACTITIONER

## 2022-01-01 PROCEDURE — 82330 ASSAY OF CALCIUM: CPT | Performed by: NURSE PRACTITIONER

## 2022-01-01 PROCEDURE — 86900 BLOOD TYPING SEROLOGIC ABO: CPT | Performed by: NURSE PRACTITIONER

## 2022-01-01 PROCEDURE — 88112 CYTOPATH CELL ENHANCE TECH: CPT | Performed by: PATHOLOGY

## 2022-01-01 PROCEDURE — 31500 INSERT EMERGENCY AIRWAY: CPT | Performed by: PHYSICIAN ASSISTANT

## 2022-01-01 PROCEDURE — 99291 CRITICAL CARE FIRST HOUR: CPT | Performed by: ANESTHESIOLOGY

## 2022-01-01 PROCEDURE — 85730 THROMBOPLASTIN TIME PARTIAL: CPT | Performed by: STUDENT IN AN ORGANIZED HEALTH CARE EDUCATION/TRAINING PROGRAM

## 2022-01-01 PROCEDURE — 86901 BLOOD TYPING SEROLOGIC RH(D): CPT | Performed by: EMERGENCY MEDICINE

## 2022-01-01 PROCEDURE — 0241U HB NFCT DS VIR RESP RNA 4 TRGT: CPT | Performed by: SURGERY

## 2022-01-01 PROCEDURE — 85027 COMPLETE CBC AUTOMATED: CPT | Performed by: PHYSICIAN ASSISTANT

## 2022-01-01 PROCEDURE — 36573 INSJ PICC RS&I 5 YR+: CPT

## 2022-01-01 PROCEDURE — NC001 PR NO CHARGE: Performed by: PHYSICIAN ASSISTANT

## 2022-01-01 PROCEDURE — 85014 HEMATOCRIT: CPT | Performed by: PHYSICIAN ASSISTANT

## 2022-01-01 PROCEDURE — 85652 RBC SED RATE AUTOMATED: CPT

## 2022-01-01 PROCEDURE — 87449 NOS EACH ORGANISM AG IA: CPT | Performed by: PHYSICIAN ASSISTANT

## 2022-01-01 PROCEDURE — 5A1D90Z PERFORMANCE OF URINARY FILTRATION, CONTINUOUS, GREATER THAN 18 HOURS PER DAY: ICD-10-PCS | Performed by: INTERNAL MEDICINE

## 2022-01-01 PROCEDURE — 85730 THROMBOPLASTIN TIME PARTIAL: CPT | Performed by: NURSE PRACTITIONER

## 2022-01-01 PROCEDURE — 83615 LACTATE (LD) (LDH) ENZYME: CPT

## 2022-01-01 PROCEDURE — 85018 HEMOGLOBIN: CPT | Performed by: NURSE PRACTITIONER

## 2022-01-01 PROCEDURE — 85007 BL SMEAR W/DIFF WBC COUNT: CPT | Performed by: PHYSICIAN ASSISTANT

## 2022-01-01 PROCEDURE — 85014 HEMATOCRIT: CPT

## 2022-01-01 PROCEDURE — 85007 BL SMEAR W/DIFF WBC COUNT: CPT | Performed by: FAMILY MEDICINE

## 2022-01-01 PROCEDURE — 87205 SMEAR GRAM STAIN: CPT

## 2022-01-01 PROCEDURE — 99291 CRITICAL CARE FIRST HOUR: CPT | Performed by: NURSE PRACTITIONER

## 2022-01-01 PROCEDURE — 85730 THROMBOPLASTIN TIME PARTIAL: CPT

## 2022-01-01 PROCEDURE — 85610 PROTHROMBIN TIME: CPT

## 2022-01-01 PROCEDURE — NC001 PR NO CHARGE: Performed by: INTERNAL MEDICINE

## 2022-01-01 PROCEDURE — 0DQN0ZZ REPAIR SIGMOID COLON, OPEN APPROACH: ICD-10-PCS | Performed by: SURGERY

## 2022-01-01 PROCEDURE — 36620 INSERTION CATHETER ARTERY: CPT | Performed by: PHYSICIAN ASSISTANT

## 2022-01-01 PROCEDURE — 87070 CULTURE OTHR SPECIMN AEROBIC: CPT | Performed by: RADIOLOGY

## 2022-01-01 PROCEDURE — 3E0336Z INTRODUCTION OF NUTRITIONAL SUBSTANCE INTO PERIPHERAL VEIN, PERCUTANEOUS APPROACH: ICD-10-PCS | Performed by: INTERNAL MEDICINE

## 2022-01-01 PROCEDURE — 86900 BLOOD TYPING SEROLOGIC ABO: CPT | Performed by: PHYSICIAN ASSISTANT

## 2022-01-01 PROCEDURE — 80048 BASIC METABOLIC PNL TOTAL CA: CPT | Performed by: PHYSICIAN ASSISTANT

## 2022-01-01 PROCEDURE — 36556 INSERT NON-TUNNEL CV CATH: CPT | Performed by: NURSE PRACTITIONER

## 2022-01-01 PROCEDURE — 80053 COMPREHEN METABOLIC PANEL: CPT | Performed by: NURSE PRACTITIONER

## 2022-01-01 PROCEDURE — 44146 PARTIAL REMOVAL OF COLON: CPT | Performed by: SURGERY

## 2022-01-01 PROCEDURE — 84100 ASSAY OF PHOSPHORUS: CPT | Performed by: ANESTHESIOLOGY

## 2022-01-01 PROCEDURE — 88342 IMHCHEM/IMCYTCHM 1ST ANTB: CPT | Performed by: PATHOLOGY

## 2022-01-01 PROCEDURE — 97535 SELF CARE MNGMENT TRAINING: CPT

## 2022-01-01 PROCEDURE — 80202 ASSAY OF VANCOMYCIN: CPT | Performed by: PHYSICIAN ASSISTANT

## 2022-01-01 PROCEDURE — 93306 TTE W/DOPPLER COMPLETE: CPT

## 2022-01-01 PROCEDURE — 87070 CULTURE OTHR SPECIMN AEROBIC: CPT | Performed by: SURGERY

## 2022-01-01 PROCEDURE — 0BH17EZ INSERTION OF ENDOTRACHEAL AIRWAY INTO TRACHEA, VIA NATURAL OR ARTIFICIAL OPENING: ICD-10-PCS | Performed by: INTERNAL MEDICINE

## 2022-01-01 PROCEDURE — 87070 CULTURE OTHR SPECIMN AEROBIC: CPT

## 2022-01-01 PROCEDURE — 83615 LACTATE (LD) (LDH) ENZYME: CPT | Performed by: NURSE PRACTITIONER

## 2022-01-01 PROCEDURE — 86850 RBC ANTIBODY SCREEN: CPT | Performed by: PHYSICIAN ASSISTANT

## 2022-01-01 PROCEDURE — 10030 IMG GID FLU COLL DRG SFT TIS: CPT

## 2022-01-01 PROCEDURE — 93325 DOPPLER ECHO COLOR FLOW MAPG: CPT

## 2022-01-01 PROCEDURE — 36415 COLL VENOUS BLD VENIPUNCTURE: CPT

## 2022-01-01 PROCEDURE — 80053 COMPREHEN METABOLIC PANEL: CPT | Performed by: FAMILY MEDICINE

## 2022-01-01 PROCEDURE — 85018 HEMOGLOBIN: CPT | Performed by: PHYSICIAN ASSISTANT

## 2022-01-01 PROCEDURE — 99223 1ST HOSP IP/OBS HIGH 75: CPT | Performed by: INTERNAL MEDICINE

## 2022-01-01 PROCEDURE — 92950 HEART/LUNG RESUSCITATION CPR: CPT

## 2022-01-01 PROCEDURE — 2W13X6Z COMPRESSION OF ABDOMINAL WALL USING PRESSURE DRESSING: ICD-10-PCS | Performed by: SPECIALIST

## 2022-01-01 PROCEDURE — 80048 BASIC METABOLIC PNL TOTAL CA: CPT | Performed by: ANESTHESIOLOGY

## 2022-01-01 PROCEDURE — 86900 BLOOD TYPING SEROLOGIC ABO: CPT | Performed by: EMERGENCY MEDICINE

## 2022-01-01 PROCEDURE — 87205 SMEAR GRAM STAIN: CPT | Performed by: ANESTHESIOLOGY

## 2022-01-01 PROCEDURE — 87205 SMEAR GRAM STAIN: CPT | Performed by: RADIOLOGY

## 2022-01-01 PROCEDURE — 85007 BL SMEAR W/DIFF WBC COUNT: CPT

## 2022-01-01 PROCEDURE — 84100 ASSAY OF PHOSPHORUS: CPT

## 2022-01-01 PROCEDURE — 84484 ASSAY OF TROPONIN QUANT: CPT | Performed by: NURSE PRACTITIONER

## 2022-01-01 PROCEDURE — 87075 CULTR BACTERIA EXCEPT BLOOD: CPT | Performed by: SURGERY

## 2022-01-01 PROCEDURE — 0DB80ZZ EXCISION OF SMALL INTESTINE, OPEN APPROACH: ICD-10-PCS | Performed by: SURGERY

## 2022-01-01 PROCEDURE — NC001 PR NO CHARGE: Performed by: RADIOLOGY

## 2022-01-01 PROCEDURE — 85027 COMPLETE CBC AUTOMATED: CPT | Performed by: INTERNAL MEDICINE

## 2022-01-01 PROCEDURE — 85610 PROTHROMBIN TIME: CPT | Performed by: RADIOLOGY

## 2022-01-01 PROCEDURE — 84478 ASSAY OF TRIGLYCERIDES: CPT | Performed by: PHYSICIAN ASSISTANT

## 2022-01-01 PROCEDURE — 97167 OT EVAL HIGH COMPLEX 60 MIN: CPT

## 2022-01-01 PROCEDURE — 0DH63UZ INSERTION OF FEEDING DEVICE INTO STOMACH, PERCUTANEOUS APPROACH: ICD-10-PCS | Performed by: RADIOLOGY

## 2022-01-01 PROCEDURE — P9016 RBC LEUKOCYTES REDUCED: HCPCS

## 2022-01-01 PROCEDURE — 99205 OFFICE O/P NEW HI 60 MIN: CPT | Performed by: INTERNAL MEDICINE

## 2022-01-01 PROCEDURE — 87070 CULTURE OTHR SPECIMN AEROBIC: CPT | Performed by: ANESTHESIOLOGY

## 2022-01-01 PROCEDURE — 31500 INSERT EMERGENCY AIRWAY: CPT

## 2022-01-01 PROCEDURE — 30233N1 TRANSFUSION OF NONAUTOLOGOUS RED BLOOD CELLS INTO PERIPHERAL VEIN, PERCUTANEOUS APPROACH: ICD-10-PCS | Performed by: INTERNAL MEDICINE

## 2022-01-01 PROCEDURE — 93308 TTE F-UP OR LMTD: CPT | Performed by: INTERNAL MEDICINE

## 2022-01-01 PROCEDURE — 74018 RADEX ABDOMEN 1 VIEW: CPT

## 2022-01-01 PROCEDURE — 0DJD8ZZ INSPECTION OF LOWER INTESTINAL TRACT, VIA NATURAL OR ARTIFICIAL OPENING ENDOSCOPIC: ICD-10-PCS | Performed by: SURGERY

## 2022-01-01 PROCEDURE — 0W9J70Z DRAINAGE OF PELVIC CAVITY WITH DRAINAGE DEVICE, VIA NATURAL OR ARTIFICIAL OPENING: ICD-10-PCS | Performed by: RADIOLOGY

## 2022-01-01 PROCEDURE — 80076 HEPATIC FUNCTION PANEL: CPT | Performed by: NURSE PRACTITIONER

## 2022-01-01 PROCEDURE — 87081 CULTURE SCREEN ONLY: CPT | Performed by: PHYSICIAN ASSISTANT

## 2022-01-01 PROCEDURE — C1769 GUIDE WIRE: HCPCS

## 2022-01-01 PROCEDURE — 86901 BLOOD TYPING SEROLOGIC RH(D): CPT | Performed by: NURSE PRACTITIONER

## 2022-01-01 PROCEDURE — 84154 ASSAY OF PSA FREE: CPT

## 2022-01-01 PROCEDURE — C1887 CATHETER, GUIDING: HCPCS

## 2022-01-01 PROCEDURE — 85027 COMPLETE CBC AUTOMATED: CPT | Performed by: STUDENT IN AN ORGANIZED HEALTH CARE EDUCATION/TRAINING PROGRAM

## 2022-01-01 PROCEDURE — 82330 ASSAY OF CALCIUM: CPT

## 2022-01-01 PROCEDURE — 86850 RBC ANTIBODY SCREEN: CPT | Performed by: NURSE PRACTITIONER

## 2022-01-01 PROCEDURE — 88307 TISSUE EXAM BY PATHOLOGIST: CPT | Performed by: PATHOLOGY

## 2022-01-01 PROCEDURE — 83880 ASSAY OF NATRIURETIC PEPTIDE: CPT | Performed by: FAMILY MEDICINE

## 2022-01-01 PROCEDURE — 36573 INSJ PICC RS&I 5 YR+: CPT | Performed by: RADIOLOGY

## 2022-01-01 PROCEDURE — 04HY32Z INSERTION OF MONITORING DEVICE INTO LOWER ARTERY, PERCUTANEOUS APPROACH: ICD-10-PCS | Performed by: INTERNAL MEDICINE

## 2022-01-01 PROCEDURE — 99204 OFFICE O/P NEW MOD 45 MIN: CPT | Performed by: INTERNAL MEDICINE

## 2022-01-01 PROCEDURE — 87185 SC STD ENZYME DETCJ PER NZM: CPT | Performed by: SURGERY

## 2022-01-01 PROCEDURE — 83735 ASSAY OF MAGNESIUM: CPT | Performed by: FAMILY MEDICINE

## 2022-01-01 PROCEDURE — 84100 ASSAY OF PHOSPHORUS: CPT | Performed by: PHYSICIAN ASSISTANT

## 2022-01-01 PROCEDURE — 94002 VENT MGMT INPAT INIT DAY: CPT

## 2022-01-01 PROCEDURE — 84100 ASSAY OF PHOSPHORUS: CPT | Performed by: SURGERY

## 2022-01-01 PROCEDURE — 85730 THROMBOPLASTIN TIME PARTIAL: CPT | Performed by: INTERNAL MEDICINE

## 2022-01-01 PROCEDURE — 83986 ASSAY PH BODY FLUID NOS: CPT | Performed by: ANESTHESIOLOGY

## 2022-01-01 PROCEDURE — 02HV33Z INSERTION OF INFUSION DEVICE INTO SUPERIOR VENA CAVA, PERCUTANEOUS APPROACH: ICD-10-PCS | Performed by: RADIOLOGY

## 2022-01-01 PROCEDURE — 43239 EGD BIOPSY SINGLE/MULTIPLE: CPT | Performed by: INTERNAL MEDICINE

## 2022-01-01 PROCEDURE — 5A12012 PERFORMANCE OF CARDIAC OUTPUT, SINGLE, MANUAL: ICD-10-PCS | Performed by: FAMILY MEDICINE

## 2022-01-01 PROCEDURE — 71275 CT ANGIOGRAPHY CHEST: CPT

## 2022-01-01 PROCEDURE — 0B948ZZ DRAINAGE OF RIGHT UPPER LOBE BRONCHUS, VIA NATURAL OR ARTIFICIAL OPENING ENDOSCOPIC: ICD-10-PCS | Performed by: ANESTHESIOLOGY

## 2022-01-01 PROCEDURE — 97530 THERAPEUTIC ACTIVITIES: CPT

## 2022-01-01 PROCEDURE — 03HY32Z INSERTION OF MONITORING DEVICE INTO UPPER ARTERY, PERCUTANEOUS APPROACH: ICD-10-PCS | Performed by: INTERNAL MEDICINE

## 2022-01-01 PROCEDURE — 89051 BODY FLUID CELL COUNT: CPT | Performed by: ANESTHESIOLOGY

## 2022-01-01 PROCEDURE — 45338 SIGMOIDOSCOPY W/TUMR REMOVE: CPT | Performed by: INTERNAL MEDICINE

## 2022-01-01 PROCEDURE — 36600 WITHDRAWAL OF ARTERIAL BLOOD: CPT

## 2022-01-01 PROCEDURE — 99232 SBSQ HOSP IP/OBS MODERATE 35: CPT | Performed by: NURSE PRACTITIONER

## 2022-01-01 PROCEDURE — 32555 ASPIRATE PLEURA W/ IMAGING: CPT

## 2022-01-01 PROCEDURE — NC001 PR NO CHARGE: Performed by: ANESTHESIOLOGY

## 2022-01-01 PROCEDURE — 84157 ASSAY OF PROTEIN OTHER: CPT | Performed by: ANESTHESIOLOGY

## 2022-01-01 PROCEDURE — 87040 BLOOD CULTURE FOR BACTERIA: CPT | Performed by: PHYSICIAN ASSISTANT

## 2022-01-01 PROCEDURE — 80053 COMPREHEN METABOLIC PANEL: CPT

## 2022-01-01 PROCEDURE — 99222 1ST HOSP IP/OBS MODERATE 55: CPT | Performed by: INTERNAL MEDICINE

## 2022-01-01 PROCEDURE — 99223 1ST HOSP IP/OBS HIGH 75: CPT | Performed by: STUDENT IN AN ORGANIZED HEALTH CARE EDUCATION/TRAINING PROGRAM

## 2022-01-01 PROCEDURE — 80061 LIPID PANEL: CPT

## 2022-01-01 PROCEDURE — 87077 CULTURE AEROBIC IDENTIFY: CPT | Performed by: SURGERY

## 2022-01-01 PROCEDURE — 99291 CRITICAL CARE FIRST HOUR: CPT | Performed by: STUDENT IN AN ORGANIZED HEALTH CARE EDUCATION/TRAINING PROGRAM

## 2022-01-01 PROCEDURE — 97163 PT EVAL HIGH COMPLEX 45 MIN: CPT | Performed by: PHYSICAL THERAPIST

## 2022-01-01 PROCEDURE — 2W03X6Z CHANGE PRESSURE DRESSING ON ABDOMINAL WALL: ICD-10-PCS | Performed by: SPECIALIST

## 2022-01-01 PROCEDURE — 80053 COMPREHEN METABOLIC PANEL: CPT | Performed by: INTERNAL MEDICINE

## 2022-01-01 PROCEDURE — 85025 COMPLETE CBC W/AUTO DIFF WBC: CPT | Performed by: NURSE PRACTITIONER

## 2022-01-01 PROCEDURE — 87205 SMEAR GRAM STAIN: CPT | Performed by: PHYSICIAN ASSISTANT

## 2022-01-01 PROCEDURE — 83880 ASSAY OF NATRIURETIC PEPTIDE: CPT | Performed by: PHYSICIAN ASSISTANT

## 2022-01-01 PROCEDURE — 49440 PLACE GASTROSTOMY TUBE PERC: CPT | Performed by: RADIOLOGY

## 2022-01-01 PROCEDURE — 4A133J1 MONITORING OF ARTERIAL PULSE, PERIPHERAL, PERCUTANEOUS APPROACH: ICD-10-PCS | Performed by: INTERNAL MEDICINE

## 2022-01-01 PROCEDURE — C1751 CATH, INF, PER/CENT/MIDLINE: HCPCS

## 2022-01-01 PROCEDURE — 87186 SC STD MICRODIL/AGAR DIL: CPT | Performed by: SURGERY

## 2022-01-01 PROCEDURE — 99221 1ST HOSP IP/OBS SF/LOW 40: CPT | Performed by: SURGERY

## 2022-01-01 PROCEDURE — 93321 DOPPLER ECHO F-UP/LMTD STD: CPT | Performed by: INTERNAL MEDICINE

## 2022-01-01 PROCEDURE — 86706 HEP B SURFACE ANTIBODY: CPT | Performed by: NURSE PRACTITIONER

## 2022-01-01 PROCEDURE — 99223 1ST HOSP IP/OBS HIGH 75: CPT | Performed by: FAMILY MEDICINE

## 2022-01-01 PROCEDURE — 86705 HEP B CORE ANTIBODY IGM: CPT | Performed by: NURSE PRACTITIONER

## 2022-01-01 PROCEDURE — 84443 ASSAY THYROID STIM HORMONE: CPT | Performed by: NURSE PRACTITIONER

## 2022-01-01 PROCEDURE — 87205 SMEAR GRAM STAIN: CPT | Performed by: SURGERY

## 2022-01-01 PROCEDURE — 0DBP0ZZ EXCISION OF RECTUM, OPEN APPROACH: ICD-10-PCS | Performed by: SURGERY

## 2022-01-01 PROCEDURE — 85384 FIBRINOGEN ACTIVITY: CPT | Performed by: NURSE PRACTITIONER

## 2022-01-01 PROCEDURE — 0D9670Z DRAINAGE OF STOMACH WITH DRAINAGE DEVICE, VIA NATURAL OR ARTIFICIAL OPENING: ICD-10-PCS | Performed by: RADIOLOGY

## 2022-01-01 PROCEDURE — 49440 PLACE GASTROSTOMY TUBE PERC: CPT

## 2022-01-01 PROCEDURE — 85730 THROMBOPLASTIN TIME PARTIAL: CPT | Performed by: PHYSICIAN ASSISTANT

## 2022-01-01 PROCEDURE — 85610 PROTHROMBIN TIME: CPT | Performed by: PHYSICIAN ASSISTANT

## 2022-01-01 PROCEDURE — 82306 VITAMIN D 25 HYDROXY: CPT

## 2022-01-01 PROCEDURE — 0DBN0ZZ EXCISION OF SIGMOID COLON, OPEN APPROACH: ICD-10-PCS | Performed by: SURGERY

## 2022-01-01 PROCEDURE — 02HV33Z INSERTION OF INFUSION DEVICE INTO SUPERIOR VENA CAVA, PERCUTANEOUS APPROACH: ICD-10-PCS | Performed by: STUDENT IN AN ORGANIZED HEALTH CARE EDUCATION/TRAINING PROGRAM

## 2022-01-01 PROCEDURE — 0W9B3ZZ DRAINAGE OF LEFT PLEURAL CAVITY, PERCUTANEOUS APPROACH: ICD-10-PCS | Performed by: RADIOLOGY

## 2022-01-01 PROCEDURE — 84443 ASSAY THYROID STIM HORMONE: CPT

## 2022-01-01 PROCEDURE — 82024 ASSAY OF ACTH: CPT | Performed by: PHYSICIAN ASSISTANT

## 2022-01-01 PROCEDURE — 87040 BLOOD CULTURE FOR BACTERIA: CPT

## 2022-01-01 PROCEDURE — 97140 MANUAL THERAPY 1/> REGIONS: CPT | Performed by: PHYSICAL THERAPIST

## 2022-01-01 PROCEDURE — 83036 HEMOGLOBIN GLYCOSYLATED A1C: CPT | Performed by: NURSE PRACTITIONER

## 2022-01-01 PROCEDURE — 32555 ASPIRATE PLEURA W/ IMAGING: CPT | Performed by: RADIOLOGY

## 2022-01-01 PROCEDURE — 99233 SBSQ HOSP IP/OBS HIGH 50: CPT | Performed by: FAMILY MEDICINE

## 2022-01-01 PROCEDURE — 80076 HEPATIC FUNCTION PANEL: CPT | Performed by: PHYSICIAN ASSISTANT

## 2022-01-01 PROCEDURE — 87076 CULTURE ANAEROBE IDENT EACH: CPT | Performed by: SURGERY

## 2022-01-01 PROCEDURE — 86704 HEP B CORE ANTIBODY TOTAL: CPT | Performed by: NURSE PRACTITIONER

## 2022-01-01 PROCEDURE — 99285 EMERGENCY DEPT VISIT HI MDM: CPT | Performed by: EMERGENCY MEDICINE

## 2022-01-01 PROCEDURE — C1729 CATH, DRAINAGE: HCPCS

## 2022-01-01 PROCEDURE — 2W03X6Z CHANGE PRESSURE DRESSING ON ABDOMINAL WALL: ICD-10-PCS | Performed by: SURGERY

## 2022-01-01 PROCEDURE — 43752 NASAL/OROGASTRIC W/TUBE PLMT: CPT

## 2022-01-01 PROCEDURE — 80076 HEPATIC FUNCTION PANEL: CPT

## 2022-01-01 PROCEDURE — 82542 COL CHROMOTOGRAPHY QUAL/QUAN: CPT | Performed by: ANESTHESIOLOGY

## 2022-01-01 RX ORDER — GABAPENTIN 250 MG/5ML
200 SOLUTION ORAL
Status: DISCONTINUED | OUTPATIENT
Start: 2022-01-01 | End: 2022-01-01

## 2022-01-01 RX ORDER — INSULIN LISPRO 100 [IU]/ML
2-12 INJECTION, SOLUTION INTRAVENOUS; SUBCUTANEOUS EVERY 6 HOURS SCHEDULED
Status: DISCONTINUED | OUTPATIENT
Start: 2022-01-01 | End: 2022-01-01

## 2022-01-01 RX ORDER — MAGNESIUM SULFATE 1 G/100ML
1 INJECTION INTRAVENOUS ONCE
Status: COMPLETED | OUTPATIENT
Start: 2022-01-01 | End: 2022-01-01

## 2022-01-01 RX ORDER — CALCIUM GLUCONATE 20 MG/ML
1 INJECTION, SOLUTION INTRAVENOUS ONCE
Status: COMPLETED | OUTPATIENT
Start: 2022-01-01 | End: 2022-01-01

## 2022-01-01 RX ORDER — MIDODRINE HYDROCHLORIDE 5 MG/1
5 TABLET ORAL
Status: DISCONTINUED | OUTPATIENT
Start: 2022-01-01 | End: 2022-01-01

## 2022-01-01 RX ORDER — POTASSIUM CHLORIDE 29.8 MG/ML
40 INJECTION INTRAVENOUS ONCE
Status: COMPLETED | OUTPATIENT
Start: 2022-01-01 | End: 2022-01-01

## 2022-01-01 RX ORDER — FUROSEMIDE 10 MG/ML
40 INJECTION INTRAMUSCULAR; INTRAVENOUS ONCE
Status: COMPLETED | OUTPATIENT
Start: 2022-01-01 | End: 2022-01-01

## 2022-01-01 RX ORDER — HYDROMORPHONE HCL/PF 1 MG/ML
0.5 SYRINGE (ML) INJECTION EVERY 4 HOURS PRN
Status: DISCONTINUED | OUTPATIENT
Start: 2022-01-01 | End: 2022-01-01

## 2022-01-01 RX ORDER — LEVOTHYROXINE SODIUM 0.1 MG/1
100 TABLET ORAL
Qty: 30 TABLET | Refills: 5 | Status: SHIPPED | OUTPATIENT
Start: 2022-01-01 | End: 2022-01-01

## 2022-01-01 RX ORDER — PROPOFOL 10 MG/ML
5-50 INJECTION, EMULSION INTRAVENOUS
Status: DISCONTINUED | OUTPATIENT
Start: 2022-01-01 | End: 2022-01-01

## 2022-01-01 RX ORDER — LEVOTHYROXINE SODIUM 112 UG/1
112 TABLET ORAL DAILY
COMMUNITY
End: 2022-01-01

## 2022-01-01 RX ORDER — OXYCODONE HCL 5 MG/5 ML
5 SOLUTION, ORAL ORAL EVERY 4 HOURS PRN
Status: DISCONTINUED | OUTPATIENT
Start: 2022-01-01 | End: 2022-01-01

## 2022-01-01 RX ORDER — QUETIAPINE FUMARATE 25 MG/1
25 TABLET, FILM COATED ORAL ONCE
Status: COMPLETED | OUTPATIENT
Start: 2022-01-01 | End: 2022-01-01

## 2022-01-01 RX ORDER — METOPROLOL TARTRATE 5 MG/5ML
5 INJECTION INTRAVENOUS EVERY 6 HOURS
Status: DISCONTINUED | OUTPATIENT
Start: 2022-01-01 | End: 2022-01-01

## 2022-01-01 RX ORDER — DIPHENHYDRAMINE HYDROCHLORIDE 50 MG/ML
25 INJECTION INTRAMUSCULAR; INTRAVENOUS EVERY 6 HOURS PRN
Status: DISCONTINUED | OUTPATIENT
Start: 2022-01-01 | End: 2022-01-01

## 2022-01-01 RX ORDER — SODIUM CHLORIDE 9 MG/ML
INJECTION, SOLUTION INTRAVENOUS CONTINUOUS PRN
Status: DISCONTINUED | OUTPATIENT
Start: 2022-01-01 | End: 2022-01-01

## 2022-01-01 RX ORDER — PANTOPRAZOLE SODIUM 40 MG/1
40 INJECTION, POWDER, FOR SOLUTION INTRAVENOUS
Status: DISCONTINUED | OUTPATIENT
Start: 2022-01-01 | End: 2022-01-01

## 2022-01-01 RX ORDER — CALCIUM GLUCONATE 20 MG/ML
2 INJECTION, SOLUTION INTRAVENOUS ONCE
Status: COMPLETED | OUTPATIENT
Start: 2022-01-01 | End: 2022-01-01

## 2022-01-01 RX ORDER — ALBUMIN, HUMAN INJ 5% 5 %
12.5 SOLUTION INTRAVENOUS ONCE
Status: COMPLETED | OUTPATIENT
Start: 2022-01-01 | End: 2022-01-01

## 2022-01-01 RX ORDER — DEXTROSE MONOHYDRATE 25 G/50ML
25 INJECTION, SOLUTION INTRAVENOUS ONCE
Status: DISCONTINUED | OUTPATIENT
Start: 2022-01-01 | End: 2022-01-01

## 2022-01-01 RX ORDER — OXYCODONE HYDROCHLORIDE 5 MG/1
7.5 TABLET ORAL EVERY 4 HOURS PRN
Status: DISCONTINUED | OUTPATIENT
Start: 2022-01-01 | End: 2022-01-01

## 2022-01-01 RX ORDER — POTASSIUM CHLORIDE 29.8 MG/ML
40 INJECTION INTRAVENOUS ONCE
Status: DISCONTINUED | OUTPATIENT
Start: 2022-01-01 | End: 2022-01-01

## 2022-01-01 RX ORDER — LEVOTHYROXINE SODIUM 112 UG/1
56 TABLET ORAL
Status: DISCONTINUED | OUTPATIENT
Start: 2022-01-01 | End: 2022-01-01

## 2022-01-01 RX ORDER — HYDRALAZINE HYDROCHLORIDE 20 MG/ML
5 INJECTION INTRAMUSCULAR; INTRAVENOUS EVERY 6 HOURS PRN
Status: DISCONTINUED | OUTPATIENT
Start: 2022-01-01 | End: 2022-01-01

## 2022-01-01 RX ORDER — FENTANYL CITRATE 50 UG/ML
INJECTION, SOLUTION INTRAMUSCULAR; INTRAVENOUS AS NEEDED
Status: DISCONTINUED | OUTPATIENT
Start: 2022-01-01 | End: 2022-01-01

## 2022-01-01 RX ORDER — CHLORHEXIDINE GLUCONATE 0.12 MG/ML
15 RINSE ORAL EVERY 12 HOURS SCHEDULED
Status: DISCONTINUED | OUTPATIENT
Start: 2022-01-01 | End: 2022-01-01

## 2022-01-01 RX ORDER — PROPOFOL 10 MG/ML
INJECTION, EMULSION INTRAVENOUS AS NEEDED
Status: DISCONTINUED | OUTPATIENT
Start: 2022-01-01 | End: 2022-01-01

## 2022-01-01 RX ORDER — DEXTROSE MONOHYDRATE 25 G/50ML
INJECTION, SOLUTION INTRAVENOUS
Status: COMPLETED
Start: 2022-01-01 | End: 2022-01-01

## 2022-01-01 RX ORDER — HEPARIN SODIUM 10000 [USP'U]/100ML
400 INJECTION, SOLUTION INTRAVENOUS CONTINUOUS
Status: DISCONTINUED | OUTPATIENT
Start: 2022-01-01 | End: 2022-01-01

## 2022-01-01 RX ORDER — OXYCODONE HYDROCHLORIDE 5 MG/1
5 TABLET ORAL EVERY 4 HOURS PRN
Status: DISCONTINUED | OUTPATIENT
Start: 2022-01-01 | End: 2022-01-01

## 2022-01-01 RX ORDER — DEXTROSE MONOHYDRATE 25 G/50ML
50 INJECTION, SOLUTION INTRAVENOUS ONCE
Status: COMPLETED | OUTPATIENT
Start: 2022-01-01 | End: 2022-01-01

## 2022-01-01 RX ORDER — FENTANYL CITRATE 50 UG/ML
50 INJECTION, SOLUTION INTRAMUSCULAR; INTRAVENOUS ONCE
Status: COMPLETED | OUTPATIENT
Start: 2022-01-01 | End: 2022-01-01

## 2022-01-01 RX ORDER — HEPARIN SODIUM 5000 [USP'U]/ML
5000 INJECTION, SOLUTION INTRAVENOUS; SUBCUTANEOUS EVERY 12 HOURS SCHEDULED
Status: DISCONTINUED | OUTPATIENT
Start: 2022-01-01 | End: 2022-01-01

## 2022-01-01 RX ORDER — POTASSIUM CHLORIDE 14.9 MG/ML
20 INJECTION INTRAVENOUS ONCE
Status: COMPLETED | OUTPATIENT
Start: 2022-01-01 | End: 2022-01-01

## 2022-01-01 RX ORDER — ROCURONIUM BROMIDE 10 MG/ML
INJECTION, SOLUTION INTRAVENOUS AS NEEDED
Status: DISCONTINUED | OUTPATIENT
Start: 2022-01-01 | End: 2022-01-01

## 2022-01-01 RX ORDER — ONDANSETRON 2 MG/ML
4 INJECTION INTRAMUSCULAR; INTRAVENOUS ONCE AS NEEDED
Status: DISCONTINUED | OUTPATIENT
Start: 2022-01-01 | End: 2022-01-01

## 2022-01-01 RX ORDER — MIDAZOLAM HYDROCHLORIDE 2 MG/2ML
1 INJECTION, SOLUTION INTRAMUSCULAR; INTRAVENOUS EVERY 2 HOUR PRN
Status: DISCONTINUED | OUTPATIENT
Start: 2022-01-01 | End: 2022-01-01 | Stop reason: HOSPADM

## 2022-01-01 RX ORDER — DOCUSATE SODIUM 100 MG/1
100 CAPSULE, LIQUID FILLED ORAL 2 TIMES DAILY
COMMUNITY
End: 2022-01-01

## 2022-01-01 RX ORDER — LIDOCAINE HYDROCHLORIDE 20 MG/ML
1 JELLY TOPICAL ONCE
Status: COMPLETED | OUTPATIENT
Start: 2022-01-01 | End: 2022-01-01

## 2022-01-01 RX ORDER — NOREPINEPHRINE BITARTRATE 1 MG/ML
INJECTION, SOLUTION INTRAVENOUS
Status: COMPLETED
Start: 2022-01-01 | End: 2022-01-01

## 2022-01-01 RX ORDER — HEPARIN SODIUM 5000 [USP'U]/ML
5000 INJECTION, SOLUTION INTRAVENOUS; SUBCUTANEOUS EVERY 8 HOURS SCHEDULED
Status: DISCONTINUED | OUTPATIENT
Start: 2022-01-01 | End: 2022-01-01

## 2022-01-01 RX ORDER — METOLAZONE 5 MG/1
10 TABLET ORAL ONCE
Status: COMPLETED | OUTPATIENT
Start: 2022-01-01 | End: 2022-01-01

## 2022-01-01 RX ORDER — SODIUM BICARBONATE 84 MG/ML
INJECTION, SOLUTION INTRAVENOUS CODE/TRAUMA/SEDATION MEDICATION
Status: COMPLETED | OUTPATIENT
Start: 2022-01-01 | End: 2022-01-01

## 2022-01-01 RX ORDER — MORPHINE SULFATE 15 MG/1
15 TABLET, FILM COATED, EXTENDED RELEASE ORAL EVERY 12 HOURS
COMMUNITY
End: 2022-01-01

## 2022-01-01 RX ORDER — ALBUMIN (HUMAN) 12.5 G/50ML
25 SOLUTION INTRAVENOUS ONCE
Status: COMPLETED | OUTPATIENT
Start: 2022-01-01 | End: 2022-01-01

## 2022-01-01 RX ORDER — METOLAZONE 5 MG/1
10 TABLET ORAL DAILY
Status: DISCONTINUED | OUTPATIENT
Start: 2022-01-01 | End: 2022-01-01

## 2022-01-01 RX ORDER — METOCLOPRAMIDE HYDROCHLORIDE 5 MG/ML
10 INJECTION INTRAMUSCULAR; INTRAVENOUS ONCE
Status: COMPLETED | OUTPATIENT
Start: 2022-01-01 | End: 2022-01-01

## 2022-01-01 RX ORDER — ACETAMINOPHEN 325 MG/1
650 TABLET ORAL EVERY 4 HOURS PRN
COMMUNITY
End: 2022-01-01

## 2022-01-01 RX ORDER — DEXTROSE AND SODIUM CHLORIDE 5; .9 G/100ML; G/100ML
85 INJECTION, SOLUTION INTRAVENOUS CONTINUOUS
Status: DISCONTINUED | OUTPATIENT
Start: 2022-01-01 | End: 2022-01-01

## 2022-01-01 RX ORDER — ALBUMIN (HUMAN) 12.5 G/50ML
25 SOLUTION INTRAVENOUS EVERY 8 HOURS
Status: DISCONTINUED | OUTPATIENT
Start: 2022-01-01 | End: 2022-01-01

## 2022-01-01 RX ORDER — HEPARIN SODIUM 10000 [USP'U]/100ML
3-30 INJECTION, SOLUTION INTRAVENOUS
Status: DISCONTINUED | OUTPATIENT
Start: 2022-01-01 | End: 2022-01-01

## 2022-01-01 RX ORDER — FUROSEMIDE 10 MG/ML
40 SYRINGE (ML) INJECTION CONTINUOUS
Status: DISCONTINUED | OUTPATIENT
Start: 2022-01-01 | End: 2022-01-01

## 2022-01-01 RX ORDER — HYDROMORPHONE HCL/PF 1 MG/ML
0.5 SYRINGE (ML) INJECTION EVERY 2 HOUR PRN
Status: DISCONTINUED | OUTPATIENT
Start: 2022-01-01 | End: 2022-01-01

## 2022-01-01 RX ORDER — IPRATROPIUM BROMIDE AND ALBUTEROL SULFATE 2.5; .5 MG/3ML; MG/3ML
3 SOLUTION RESPIRATORY (INHALATION)
Status: DISCONTINUED | OUTPATIENT
Start: 2022-01-01 | End: 2022-01-01

## 2022-01-01 RX ORDER — SODIUM CHLORIDE, SODIUM LACTATE, POTASSIUM CHLORIDE, CALCIUM CHLORIDE 600; 310; 30; 20 MG/100ML; MG/100ML; MG/100ML; MG/100ML
INJECTION, SOLUTION INTRAVENOUS CONTINUOUS PRN
Status: DISCONTINUED | OUTPATIENT
Start: 2022-01-01 | End: 2022-01-01

## 2022-01-01 RX ORDER — IPRATROPIUM BROMIDE AND ALBUTEROL SULFATE 2.5; .5 MG/3ML; MG/3ML
3 SOLUTION RESPIRATORY (INHALATION) EVERY 6 HOURS PRN
Status: DISCONTINUED | OUTPATIENT
Start: 2022-01-01 | End: 2022-01-01

## 2022-01-01 RX ORDER — ACETAMINOPHEN 160 MG/5ML
650 SUSPENSION, ORAL (FINAL DOSE FORM) ORAL EVERY 4 HOURS PRN
Status: DISCONTINUED | OUTPATIENT
Start: 2022-01-01 | End: 2022-01-01

## 2022-01-01 RX ORDER — CIPROFLOXACIN 2 MG/ML
400 INJECTION, SOLUTION INTRAVENOUS EVERY 12 HOURS
Status: DISCONTINUED | OUTPATIENT
Start: 2022-01-01 | End: 2022-01-01 | Stop reason: HOSPADM

## 2022-01-01 RX ORDER — LIDOCAINE 50 MG/G
1 PATCH TOPICAL DAILY
Status: DISCONTINUED | OUTPATIENT
Start: 2022-01-01 | End: 2022-01-01

## 2022-01-01 RX ORDER — MIDAZOLAM HYDROCHLORIDE 2 MG/2ML
2 INJECTION, SOLUTION INTRAMUSCULAR; INTRAVENOUS ONCE
Status: COMPLETED | OUTPATIENT
Start: 2022-01-01 | End: 2022-01-01

## 2022-01-01 RX ORDER — CEFEPIME HYDROCHLORIDE 2 G/50ML
2000 INJECTION, SOLUTION INTRAVENOUS EVERY 12 HOURS
Status: CANCELLED | OUTPATIENT
Start: 2022-01-01

## 2022-01-01 RX ORDER — MAGNESIUM HYDROXIDE 1200 MG/15ML
LIQUID ORAL AS NEEDED
Status: DISCONTINUED | OUTPATIENT
Start: 2022-01-01 | End: 2022-01-01 | Stop reason: HOSPADM

## 2022-01-01 RX ORDER — SODIUM BICARBONATE 650 MG/1
650 TABLET ORAL
Status: DISCONTINUED | OUTPATIENT
Start: 2022-01-01 | End: 2022-01-01

## 2022-01-01 RX ORDER — MIDODRINE HYDROCHLORIDE 5 MG/1
10 TABLET ORAL
Status: DISCONTINUED | OUTPATIENT
Start: 2022-01-01 | End: 2022-01-01

## 2022-01-01 RX ORDER — SUCCINYLCHOLINE/SOD CL,ISO/PF 100 MG/5ML
SYRINGE (ML) INTRAVENOUS AS NEEDED
Status: DISCONTINUED | OUTPATIENT
Start: 2022-01-01 | End: 2022-01-01

## 2022-01-01 RX ORDER — EPINEPHRINE 0.1 MG/ML
SYRINGE (ML) INJECTION CODE/TRAUMA/SEDATION MEDICATION
Status: COMPLETED | OUTPATIENT
Start: 2022-01-01 | End: 2022-01-01

## 2022-01-01 RX ORDER — ALBUMIN (HUMAN) 12.5 G/50ML
25 SOLUTION INTRAVENOUS EVERY 6 HOURS
Status: COMPLETED | OUTPATIENT
Start: 2022-01-01 | End: 2022-01-01

## 2022-01-01 RX ORDER — DEXMEDETOMIDINE HYDROCHLORIDE 4 UG/ML
.1-1.5 INJECTION, SOLUTION INTRAVENOUS
Status: DISCONTINUED | OUTPATIENT
Start: 2022-01-01 | End: 2022-01-01

## 2022-01-01 RX ORDER — ALBUMIN, HUMAN INJ 5% 5 %
SOLUTION INTRAVENOUS CONTINUOUS PRN
Status: DISCONTINUED | OUTPATIENT
Start: 2022-01-01 | End: 2022-01-01

## 2022-01-01 RX ORDER — POTASSIUM CHLORIDE 20MEQ/15ML
40 LIQUID (ML) ORAL ONCE
Status: COMPLETED | OUTPATIENT
Start: 2022-01-01 | End: 2022-01-01

## 2022-01-01 RX ORDER — OXYCODONE HYDROCHLORIDE AND ACETAMINOPHEN 5; 325 MG/1; MG/1
1 TABLET ORAL ONCE AS NEEDED
Status: DISCONTINUED | OUTPATIENT
Start: 2022-01-01 | End: 2022-01-01

## 2022-01-01 RX ORDER — FUROSEMIDE 10 MG/ML
20 INJECTION INTRAMUSCULAR; INTRAVENOUS ONCE
Status: COMPLETED | OUTPATIENT
Start: 2022-01-01 | End: 2022-01-01

## 2022-01-01 RX ORDER — DEXTROSE, SODIUM CHLORIDE, AND POTASSIUM CHLORIDE 5; .9; .15 G/100ML; G/100ML; G/100ML
85 INJECTION INTRAVENOUS CONTINUOUS
Status: DISCONTINUED | OUTPATIENT
Start: 2022-01-01 | End: 2022-01-01

## 2022-01-01 RX ORDER — HYDROMORPHONE HCL/PF 1 MG/ML
0.5 SYRINGE (ML) INJECTION
Status: DISCONTINUED | OUTPATIENT
Start: 2022-01-01 | End: 2022-01-01

## 2022-01-01 RX ORDER — HYDROMORPHONE HCL/PF 1 MG/ML
1 SYRINGE (ML) INJECTION ONCE
Status: COMPLETED | OUTPATIENT
Start: 2022-01-01 | End: 2022-01-01

## 2022-01-01 RX ORDER — ALBUMIN (HUMAN) 12.5 G/50ML
12.5 SOLUTION INTRAVENOUS ONCE
Status: COMPLETED | OUTPATIENT
Start: 2022-01-01 | End: 2022-01-01

## 2022-01-01 RX ORDER — BUMETANIDE 0.25 MG/ML
2 INJECTION, SOLUTION INTRAMUSCULAR; INTRAVENOUS ONCE
Status: COMPLETED | OUTPATIENT
Start: 2022-01-01 | End: 2022-01-01

## 2022-01-01 RX ORDER — SODIUM CHLORIDE, SODIUM LACTATE, POTASSIUM CHLORIDE, CALCIUM CHLORIDE 600; 310; 30; 20 MG/100ML; MG/100ML; MG/100ML; MG/100ML
100 INJECTION, SOLUTION INTRAVENOUS CONTINUOUS
Status: DISCONTINUED | OUTPATIENT
Start: 2022-01-01 | End: 2022-01-01

## 2022-01-01 RX ORDER — METRONIDAZOLE 500 MG/100ML
500 INJECTION, SOLUTION INTRAVENOUS EVERY 8 HOURS
Status: DISCONTINUED | OUTPATIENT
Start: 2022-01-01 | End: 2022-01-01

## 2022-01-01 RX ORDER — HEPARIN SODIUM 10000 [USP'U]/100ML
500 INJECTION, SOLUTION INTRAVENOUS CONTINUOUS
Status: DISCONTINUED | OUTPATIENT
Start: 2022-01-01 | End: 2022-01-01

## 2022-01-01 RX ORDER — MAGNESIUM SULFATE HEPTAHYDRATE 40 MG/ML
2 INJECTION, SOLUTION INTRAVENOUS ONCE
Status: COMPLETED | OUTPATIENT
Start: 2022-01-01 | End: 2022-01-01

## 2022-01-01 RX ORDER — ONDANSETRON 2 MG/ML
4 INJECTION INTRAMUSCULAR; INTRAVENOUS EVERY 6 HOURS PRN
Status: DISCONTINUED | OUTPATIENT
Start: 2022-01-01 | End: 2022-01-01 | Stop reason: HOSPADM

## 2022-01-01 RX ORDER — METOPROLOL TARTRATE 5 MG/5ML
2.5 INJECTION INTRAVENOUS EVERY 6 HOURS
Status: DISCONTINUED | OUTPATIENT
Start: 2022-01-01 | End: 2022-01-01

## 2022-01-01 RX ORDER — FUROSEMIDE 10 MG/ML
80 INJECTION INTRAMUSCULAR; INTRAVENOUS ONCE
Status: COMPLETED | OUTPATIENT
Start: 2022-01-01 | End: 2022-01-01

## 2022-01-01 RX ORDER — EPHEDRINE SULFATE 50 MG/ML
INJECTION INTRAVENOUS AS NEEDED
Status: DISCONTINUED | OUTPATIENT
Start: 2022-01-01 | End: 2022-01-01

## 2022-01-01 RX ORDER — LEVOTHYROXINE SODIUM 112 UG/1
112 TABLET ORAL
Status: DISCONTINUED | OUTPATIENT
Start: 2022-01-01 | End: 2022-01-01

## 2022-01-01 RX ORDER — LIDOCAINE WITH 8.4% SOD BICARB 0.9%(10ML)
SYRINGE (ML) INJECTION CODE/TRAUMA/SEDATION MEDICATION
Status: COMPLETED | OUTPATIENT
Start: 2022-01-01 | End: 2022-01-01

## 2022-01-01 RX ORDER — LIDOCAINE HYDROCHLORIDE 20 MG/ML
INJECTION, SOLUTION EPIDURAL; INFILTRATION; INTRACAUDAL; PERINEURAL AS NEEDED
Status: DISCONTINUED | OUTPATIENT
Start: 2022-01-01 | End: 2022-01-01

## 2022-01-01 RX ORDER — LIDOCAINE HYDROCHLORIDE 10 MG/ML
INJECTION, SOLUTION EPIDURAL; INFILTRATION; INTRACAUDAL; PERINEURAL AS NEEDED
Status: DISCONTINUED | OUTPATIENT
Start: 2022-01-01 | End: 2022-01-01

## 2022-01-01 RX ORDER — POLYETHYLENE GLYCOL 3350 17 G/17G
17 POWDER, FOR SOLUTION ORAL DAILY
Status: DISCONTINUED | OUTPATIENT
Start: 2022-01-01 | End: 2022-01-01

## 2022-01-01 RX ORDER — INSULIN LISPRO 100 [IU]/ML
1-6 INJECTION, SOLUTION INTRAVENOUS; SUBCUTANEOUS EVERY 6 HOURS SCHEDULED
Status: DISCONTINUED | OUTPATIENT
Start: 2022-01-01 | End: 2022-01-01

## 2022-01-01 RX ORDER — FUROSEMIDE 10 MG/ML
40 INJECTION INTRAMUSCULAR; INTRAVENOUS
Status: DISCONTINUED | OUTPATIENT
Start: 2022-01-01 | End: 2022-01-01

## 2022-01-01 RX ORDER — GLYCOPYRROLATE 0.2 MG/ML
0.1 INJECTION INTRAMUSCULAR; INTRAVENOUS EVERY 4 HOURS PRN
Status: DISCONTINUED | OUTPATIENT
Start: 2022-01-01 | End: 2022-01-01 | Stop reason: HOSPADM

## 2022-01-01 RX ORDER — LIDOCAINE 50 MG/G
2 PATCH TOPICAL DAILY
Status: DISCONTINUED | OUTPATIENT
Start: 2022-01-01 | End: 2022-01-01

## 2022-01-01 RX ORDER — ALBUMIN, HUMAN INJ 5% 5 %
25 SOLUTION INTRAVENOUS ONCE
Status: COMPLETED | OUTPATIENT
Start: 2022-01-01 | End: 2022-01-01

## 2022-01-01 RX ORDER — INSULIN LISPRO 100 [IU]/ML
1-5 INJECTION, SOLUTION INTRAVENOUS; SUBCUTANEOUS EVERY 6 HOURS SCHEDULED
Status: DISCONTINUED | OUTPATIENT
Start: 2022-01-01 | End: 2022-01-01

## 2022-01-01 RX ORDER — ACETYLCYSTEINE 200 MG/ML
3 SOLUTION ORAL; RESPIRATORY (INHALATION)
Status: DISCONTINUED | OUTPATIENT
Start: 2022-01-01 | End: 2022-01-01

## 2022-01-01 RX ORDER — HYDROMORPHONE HCL/PF 1 MG/ML
0.2 SYRINGE (ML) INJECTION
Status: DISCONTINUED | OUTPATIENT
Start: 2022-01-01 | End: 2022-01-01

## 2022-01-01 RX ORDER — VASOPRESSIN 20 U/ML
INJECTION PARENTERAL
Status: COMPLETED
Start: 2022-01-01 | End: 2022-01-01

## 2022-01-01 RX ORDER — INSULIN GLARGINE 100 [IU]/ML
5 INJECTION, SOLUTION SUBCUTANEOUS EVERY MORNING
Status: DISCONTINUED | OUTPATIENT
Start: 2022-01-01 | End: 2022-01-01

## 2022-01-01 RX ORDER — DEXTROSE, SODIUM CHLORIDE, AND POTASSIUM CHLORIDE 5; .45; .15 G/100ML; G/100ML; G/100ML
50 INJECTION INTRAVENOUS CONTINUOUS
Status: DISCONTINUED | OUTPATIENT
Start: 2022-01-01 | End: 2022-01-01

## 2022-01-01 RX ORDER — AMLODIPINE BESYLATE 5 MG/1
5 TABLET ORAL DAILY
COMMUNITY
End: 2022-01-01

## 2022-01-01 RX ORDER — FENTANYL CITRATE 50 UG/ML
1 INJECTION, SOLUTION INTRAMUSCULAR; INTRAVENOUS ONCE
Status: COMPLETED | OUTPATIENT
Start: 2022-01-01 | End: 2022-01-01

## 2022-01-01 RX ORDER — HEPARIN SODIUM 1000 [USP'U]/ML
6400 INJECTION, SOLUTION INTRAVENOUS; SUBCUTANEOUS
Status: DISCONTINUED | OUTPATIENT
Start: 2022-01-01 | End: 2022-01-01

## 2022-01-01 RX ORDER — CEFEPIME HYDROCHLORIDE 1 G/50ML
1000 INJECTION, SOLUTION INTRAVENOUS EVERY 12 HOURS
Status: COMPLETED | OUTPATIENT
Start: 2022-01-01 | End: 2022-01-01

## 2022-01-01 RX ORDER — DEXTROSE MONOHYDRATE 25 G/50ML
25 INJECTION, SOLUTION INTRAVENOUS ONCE
Status: COMPLETED | OUTPATIENT
Start: 2022-01-01 | End: 2022-01-01

## 2022-01-01 RX ORDER — ACETAMINOPHEN 160 MG/5ML
650 SUSPENSION, ORAL (FINAL DOSE FORM) ORAL EVERY 6 HOURS PRN
Status: DISCONTINUED | OUTPATIENT
Start: 2022-01-01 | End: 2022-01-01 | Stop reason: HOSPADM

## 2022-01-01 RX ORDER — ONDANSETRON 2 MG/ML
INJECTION INTRAMUSCULAR; INTRAVENOUS AS NEEDED
Status: DISCONTINUED | OUTPATIENT
Start: 2022-01-01 | End: 2022-01-01

## 2022-01-01 RX ORDER — CEFAZOLIN SODIUM 2 G/50ML
2000 SOLUTION INTRAVENOUS ONCE
Status: COMPLETED | OUTPATIENT
Start: 2022-01-01 | End: 2022-01-01

## 2022-01-01 RX ORDER — COSYNTROPIN 0.25 MG/ML
0.25 INJECTION, POWDER, FOR SOLUTION INTRAMUSCULAR; INTRAVENOUS ONCE
Status: COMPLETED | OUTPATIENT
Start: 2022-01-01 | End: 2022-01-01

## 2022-01-01 RX ORDER — GLYCOPYRROLATE 0.2 MG/ML
INJECTION INTRAMUSCULAR; INTRAVENOUS AS NEEDED
Status: DISCONTINUED | OUTPATIENT
Start: 2022-01-01 | End: 2022-01-01

## 2022-01-01 RX ORDER — BUMETANIDE 0.25 MG/ML
2 INJECTION INTRAMUSCULAR; INTRAVENOUS CONTINUOUS
Status: DISCONTINUED | OUTPATIENT
Start: 2022-01-01 | End: 2022-01-01

## 2022-01-01 RX ORDER — ACETAMINOPHEN 325 MG/1
650 TABLET ORAL EVERY 6 HOURS SCHEDULED
Status: DISCONTINUED | OUTPATIENT
Start: 2022-01-01 | End: 2022-01-01

## 2022-01-01 RX ORDER — HEPARIN SODIUM 1000 [USP'U]/ML
3200 INJECTION, SOLUTION INTRAVENOUS; SUBCUTANEOUS
Status: DISCONTINUED | OUTPATIENT
Start: 2022-01-01 | End: 2022-01-01

## 2022-01-01 RX ORDER — ARGATROBAN 1 MG/ML
.1-3 INJECTION INTRAVENOUS
Status: DISCONTINUED | OUTPATIENT
Start: 2022-01-01 | End: 2022-01-01

## 2022-01-01 RX ORDER — NOREPINEPHRINE BITARTRATE 1 MG/ML
INJECTION, SOLUTION INTRAVENOUS
Status: DISPENSED
Start: 2022-01-01 | End: 2022-01-01

## 2022-01-01 RX ORDER — OXYCODONE HCL 5 MG/5 ML
7.5 SOLUTION, ORAL ORAL EVERY 4 HOURS PRN
Status: DISCONTINUED | OUTPATIENT
Start: 2022-01-01 | End: 2022-01-01

## 2022-01-01 RX ORDER — FENTANYL CITRATE/PF 50 MCG/ML
12.5 SYRINGE (ML) INJECTION
Status: DISCONTINUED | OUTPATIENT
Start: 2022-01-01 | End: 2022-01-01 | Stop reason: HOSPADM

## 2022-01-01 RX ORDER — BUPIVACAINE HYDROCHLORIDE AND EPINEPHRINE 2.5; 5 MG/ML; UG/ML
INJECTION, SOLUTION EPIDURAL; INFILTRATION; INTRACAUDAL; PERINEURAL AS NEEDED
Status: DISCONTINUED | OUTPATIENT
Start: 2022-01-01 | End: 2022-01-01 | Stop reason: HOSPADM

## 2022-01-01 RX ORDER — VANCOMYCIN HYDROCHLORIDE 1 G/200ML
15 INJECTION, SOLUTION INTRAVENOUS EVERY 12 HOURS
Status: DISCONTINUED | OUTPATIENT
Start: 2022-01-01 | End: 2022-01-01

## 2022-01-01 RX ORDER — METOPROLOL TARTRATE 5 MG/5ML
5 INJECTION INTRAVENOUS ONCE
Status: COMPLETED | OUTPATIENT
Start: 2022-01-01 | End: 2022-01-01

## 2022-01-01 RX ORDER — BUMETANIDE 0.25 MG/ML
1 INJECTION, SOLUTION INTRAMUSCULAR; INTRAVENOUS ONCE
Status: COMPLETED | OUTPATIENT
Start: 2022-01-01 | End: 2022-01-01

## 2022-01-01 RX ORDER — LIDOCAINE 50 MG/G
2 PATCH TOPICAL DAILY
COMMUNITY
End: 2022-01-01

## 2022-01-01 RX ORDER — SODIUM CHLORIDE 9 MG/ML
100 INJECTION, SOLUTION INTRAVENOUS CONTINUOUS
Status: DISCONTINUED | OUTPATIENT
Start: 2022-01-01 | End: 2022-01-01

## 2022-01-01 RX ORDER — DEXTROSE, SODIUM CHLORIDE, AND POTASSIUM CHLORIDE 5; .45; .15 G/100ML; G/100ML; G/100ML
100 INJECTION INTRAVENOUS CONTINUOUS
Status: DISCONTINUED | OUTPATIENT
Start: 2022-01-01 | End: 2022-01-01

## 2022-01-01 RX ORDER — OXYCODONE HYDROCHLORIDE 5 MG/1
2.5 TABLET ORAL EVERY 4 HOURS PRN
Status: DISCONTINUED | OUTPATIENT
Start: 2022-01-01 | End: 2022-01-01

## 2022-01-01 RX ORDER — CEFTRIAXONE 1 G/50ML
1000 INJECTION, SOLUTION INTRAVENOUS EVERY 24 HOURS
Status: DISCONTINUED | OUTPATIENT
Start: 2022-01-01 | End: 2022-01-01

## 2022-01-01 RX ORDER — POTASSIUM CHLORIDE 14.9 MG/ML
20 INJECTION INTRAVENOUS ONCE
Status: DISCONTINUED | OUTPATIENT
Start: 2022-01-01 | End: 2022-01-01

## 2022-01-01 RX ORDER — FENTANYL CITRATE/PF 50 MCG/ML
25 SYRINGE (ML) INJECTION
Status: DISCONTINUED | OUTPATIENT
Start: 2022-01-01 | End: 2022-01-01

## 2022-01-01 RX ORDER — ACETAMINOPHEN 650 MG/1
650 SUPPOSITORY RECTAL EVERY 6 HOURS PRN
Status: DISCONTINUED | OUTPATIENT
Start: 2022-01-01 | End: 2022-01-01

## 2022-01-01 RX ADMIN — PIPERACILLIN AND TAZOBACTAM 3.38 G: 36; 4.5 INJECTION, POWDER, FOR SOLUTION INTRAVENOUS at 16:52

## 2022-01-01 RX ADMIN — FUROSEMIDE 40 MG: 10 INJECTION, SOLUTION INTRAMUSCULAR; INTRAVENOUS at 15:14

## 2022-01-01 RX ADMIN — METRONIDAZOLE 500 MG: 500 INJECTION, SOLUTION INTRAVENOUS at 15:43

## 2022-01-01 RX ADMIN — HYDROMORPHONE HYDROCHLORIDE 0.5 MG: 1 INJECTION, SOLUTION INTRAMUSCULAR; INTRAVENOUS; SUBCUTANEOUS at 18:22

## 2022-01-01 RX ADMIN — CHLORHEXIDINE GLUCONATE 0.12% ORAL RINSE 15 ML: 1.2 LIQUID ORAL at 08:25

## 2022-01-01 RX ADMIN — CEFEPIME HYDROCHLORIDE 2000 MG: 2 INJECTION, POWDER, FOR SOLUTION INTRAVENOUS at 23:58

## 2022-01-01 RX ADMIN — INSULIN LISPRO 2 UNITS: 100 INJECTION, SOLUTION INTRAVENOUS; SUBCUTANEOUS at 23:35

## 2022-01-01 RX ADMIN — HEPARIN SODIUM 5000 UNITS: 5000 INJECTION INTRAVENOUS; SUBCUTANEOUS at 08:43

## 2022-01-01 RX ADMIN — LIDOCAINE 5% 2 PATCH: 700 PATCH TOPICAL at 08:27

## 2022-01-01 RX ADMIN — PIPERACILLIN AND TAZOBACTAM 3.38 G: 36; 4.5 INJECTION, POWDER, FOR SOLUTION INTRAVENOUS at 10:15

## 2022-01-01 RX ADMIN — CHLORHEXIDINE GLUCONATE 0.12% ORAL RINSE 15 ML: 1.2 LIQUID ORAL at 08:10

## 2022-01-01 RX ADMIN — POLYETHYLENE GLYCOL 3350 17 G: 17 POWDER, FOR SOLUTION ORAL at 08:45

## 2022-01-01 RX ADMIN — DEXMEDETOMIDINE HYDROCHLORIDE 1 MCG/KG/HR: 400 INJECTION INTRAVENOUS at 18:06

## 2022-01-01 RX ADMIN — VANCOMYCIN HYDROCHLORIDE 1250 MG: 10 INJECTION, POWDER, LYOPHILIZED, FOR SOLUTION INTRAVENOUS at 10:18

## 2022-01-01 RX ADMIN — OXYCODONE HYDROCHLORIDE 7.5 MG: 5 TABLET ORAL at 01:19

## 2022-01-01 RX ADMIN — CALCIUM GLUCONATE 1 G: 20 INJECTION, SOLUTION INTRAVENOUS at 12:47

## 2022-01-01 RX ADMIN — ROCURONIUM BROMIDE 50 MG: 50 INJECTION, SOLUTION INTRAVENOUS at 18:02

## 2022-01-01 RX ADMIN — PANTOPRAZOLE SODIUM 40 MG: 40 INJECTION, POWDER, FOR SOLUTION INTRAVENOUS at 08:43

## 2022-01-01 RX ADMIN — PANTOPRAZOLE SODIUM 40 MG: 40 INJECTION, POWDER, FOR SOLUTION INTRAVENOUS at 08:37

## 2022-01-01 RX ADMIN — METRONIDAZOLE 500 MG: 500 INJECTION, SOLUTION INTRAVENOUS at 09:17

## 2022-01-01 RX ADMIN — CALCIUM GLUCONATE 1 G: 20 INJECTION, SOLUTION INTRAVENOUS at 13:16

## 2022-01-01 RX ADMIN — Medication 1 MG/HR: at 14:19

## 2022-01-01 RX ADMIN — PIPERACILLIN AND TAZOBACTAM 3.38 G: 36; 4.5 INJECTION, POWDER, FOR SOLUTION INTRAVENOUS at 10:21

## 2022-01-01 RX ADMIN — HEPARIN SODIUM 5000 UNITS: 5000 INJECTION INTRAVENOUS; SUBCUTANEOUS at 08:50

## 2022-01-01 RX ADMIN — LEVOTHYROXINE SODIUM 112 MCG: 112 TABLET ORAL at 05:26

## 2022-01-01 RX ADMIN — CALCIUM GLUCONATE 1 G: 20 INJECTION, SOLUTION INTRAVENOUS at 06:11

## 2022-01-01 RX ADMIN — PROPOFOL 50 MG: 10 INJECTION, EMULSION INTRAVENOUS at 14:02

## 2022-01-01 RX ADMIN — ALBUMIN (HUMAN) 12.5 G: 12.5 INJECTION, SOLUTION INTRAVENOUS at 06:11

## 2022-01-01 RX ADMIN — PROPOFOL 20 MG: 10 INJECTION, EMULSION INTRAVENOUS at 14:11

## 2022-01-01 RX ADMIN — POTASSIUM & SODIUM PHOSPHATES POWDER PACK 280-160-250 MG 1 PACKET: 280-160-250 PACK at 16:28

## 2022-01-01 RX ADMIN — HYDROCORTISONE SODIUM SUCCINATE 50 MG: 100 INJECTION, POWDER, FOR SOLUTION INTRAMUSCULAR; INTRAVENOUS at 05:30

## 2022-01-01 RX ADMIN — IPRATROPIUM BROMIDE AND ALBUTEROL SULFATE 3 ML: 2.5; .5 SOLUTION RESPIRATORY (INHALATION) at 15:07

## 2022-01-01 RX ADMIN — INSULIN LISPRO 6 UNITS: 100 INJECTION, SOLUTION INTRAVENOUS; SUBCUTANEOUS at 12:41

## 2022-01-01 RX ADMIN — PROPOFOL 20 MG: 10 INJECTION, EMULSION INTRAVENOUS at 14:22

## 2022-01-01 RX ADMIN — INSULIN LISPRO 1 UNITS: 100 INJECTION, SOLUTION INTRAVENOUS; SUBCUTANEOUS at 00:00

## 2022-01-01 RX ADMIN — METRONIDAZOLE 500 MG: 500 INJECTION, SOLUTION INTRAVENOUS at 00:11

## 2022-01-01 RX ADMIN — POLYETHYLENE GLYCOL 3350 17 G: 17 POWDER, FOR SOLUTION ORAL at 08:09

## 2022-01-01 RX ADMIN — HYDROMORPHONE HYDROCHLORIDE 0.5 MG: 1 INJECTION, SOLUTION INTRAMUSCULAR; INTRAVENOUS; SUBCUTANEOUS at 12:39

## 2022-01-01 RX ADMIN — METRONIDAZOLE 500 MG: 500 INJECTION, SOLUTION INTRAVENOUS at 15:18

## 2022-01-01 RX ADMIN — ALBUMIN (HUMAN) 25 G: 12.5 INJECTION, SOLUTION INTRAVENOUS at 22:40

## 2022-01-01 RX ADMIN — CEFEPIME HYDROCHLORIDE 1000 MG: 1 INJECTION, SOLUTION INTRAVENOUS at 23:18

## 2022-01-01 RX ADMIN — VASOPRESSIN 0.04 UNITS/MIN: 20 INJECTION INTRAVENOUS at 05:08

## 2022-01-01 RX ADMIN — PIPERACILLIN AND TAZOBACTAM 3.38 G: 36; 4.5 INJECTION, POWDER, FOR SOLUTION INTRAVENOUS at 16:22

## 2022-01-01 RX ADMIN — Medication 2 MG/HR: at 09:20

## 2022-01-01 RX ADMIN — HYDROMORPHONE HYDROCHLORIDE 0.5 MG: 1 INJECTION, SOLUTION INTRAMUSCULAR; INTRAVENOUS; SUBCUTANEOUS at 12:43

## 2022-01-01 RX ADMIN — Medication 2 MG/HR: at 00:19

## 2022-01-01 RX ADMIN — NOREPINEPHRINE BITARTRATE 30 MCG/MIN: 1 SOLUTION INTRAVENOUS at 22:13

## 2022-01-01 RX ADMIN — IPRATROPIUM BROMIDE AND ALBUTEROL SULFATE 3 ML: 2.5; .5 SOLUTION RESPIRATORY (INHALATION) at 01:16

## 2022-01-01 RX ADMIN — Medication 20000 ML: at 16:35

## 2022-01-01 RX ADMIN — Medication 20000 ML: at 09:05

## 2022-01-01 RX ADMIN — MIDODRINE HYDROCHLORIDE 10 MG: 5 TABLET ORAL at 06:11

## 2022-01-01 RX ADMIN — ALBUMIN (HUMAN) 25 G: 0.25 INJECTION, SOLUTION INTRAVENOUS at 19:45

## 2022-01-01 RX ADMIN — CALCIUM GLUCONATE 1 G: 20 INJECTION, SOLUTION INTRAVENOUS at 08:51

## 2022-01-01 RX ADMIN — CALCIUM GLUCONATE 1 G: 20 INJECTION, SOLUTION INTRAVENOUS at 22:59

## 2022-01-01 RX ADMIN — Medication 20000 ML: at 23:22

## 2022-01-01 RX ADMIN — Medication 20000 ML: at 13:40

## 2022-01-01 RX ADMIN — INSULIN GLARGINE 5 UNITS: 100 INJECTION, SOLUTION SUBCUTANEOUS at 10:24

## 2022-01-01 RX ADMIN — ALBUMIN (HUMAN) 25 G: 0.25 INJECTION, SOLUTION INTRAVENOUS at 00:44

## 2022-01-01 RX ADMIN — NOREPINEPHRINE BITARTRATE 7 MCG/MIN: 1 SOLUTION INTRAVENOUS at 09:59

## 2022-01-01 RX ADMIN — ALBUMIN (HUMAN) 25 G: 0.25 INJECTION, SOLUTION INTRAVENOUS at 09:54

## 2022-01-01 RX ADMIN — VASOPRESSIN 0.04 UNITS/MIN: 20 INJECTION INTRAVENOUS at 06:45

## 2022-01-01 RX ADMIN — LIDOCAINE 5% 2 PATCH: 700 PATCH TOPICAL at 08:25

## 2022-01-01 RX ADMIN — MIDODRINE HYDROCHLORIDE 10 MG: 5 TABLET ORAL at 06:12

## 2022-01-01 RX ADMIN — LIDOCAINE 5% 2 PATCH: 700 PATCH TOPICAL at 08:31

## 2022-01-01 RX ADMIN — Medication 20000 ML: at 17:07

## 2022-01-01 RX ADMIN — PROPOFOL 15 MCG/KG/MIN: 10 INJECTION, EMULSION INTRAVENOUS at 08:44

## 2022-01-01 RX ADMIN — Medication: at 21:51

## 2022-01-01 RX ADMIN — ACETYLCYSTEINE 600 MG: 200 SOLUTION ORAL; RESPIRATORY (INHALATION) at 23:04

## 2022-01-01 RX ADMIN — NOREPINEPHRINE BITARTRATE 22 MCG/MIN: 1 SOLUTION INTRAVENOUS at 03:48

## 2022-01-01 RX ADMIN — SODIUM PHOSPHATE, MONOBASIC, MONOHYDRATE 6 MMOL: 276; 142 INJECTION, SOLUTION INTRAVENOUS at 13:10

## 2022-01-01 RX ADMIN — HEPARIN SODIUM 5000 UNITS: 5000 INJECTION INTRAVENOUS; SUBCUTANEOUS at 08:40

## 2022-01-01 RX ADMIN — LEVOTHYROXINE SODIUM 112 MCG: 112 TABLET ORAL at 06:09

## 2022-01-01 RX ADMIN — VASOPRESSIN 0.04 UNITS/MIN: 20 INJECTION INTRAVENOUS at 09:22

## 2022-01-01 RX ADMIN — LEVOTHYROXINE SODIUM 112 MCG: 112 TABLET ORAL at 06:12

## 2022-01-01 RX ADMIN — DEXMEDETOMIDINE HYDROCHLORIDE 0.6 MCG/KG/HR: 400 INJECTION INTRAVENOUS at 05:49

## 2022-01-01 RX ADMIN — OXYCODONE HYDROCHLORIDE 5 MG: 5 TABLET ORAL at 19:55

## 2022-01-01 RX ADMIN — CHLORHEXIDINE GLUCONATE 0.12% ORAL RINSE 15 ML: 1.2 LIQUID ORAL at 07:49

## 2022-01-01 RX ADMIN — MIDODRINE HYDROCHLORIDE 10 MG: 5 TABLET ORAL at 11:23

## 2022-01-01 RX ADMIN — HYDROMORPHONE HYDROCHLORIDE 0.5 MG: 1 INJECTION, SOLUTION INTRAMUSCULAR; INTRAVENOUS; SUBCUTANEOUS at 13:59

## 2022-01-01 RX ADMIN — CALCIUM GLUCONATE 1 G: 20 INJECTION, SOLUTION INTRAVENOUS at 22:56

## 2022-01-01 RX ADMIN — DEXTROSE, SODIUM CHLORIDE, AND POTASSIUM CHLORIDE 85 ML/HR: 5; .9; .15 INJECTION INTRAVENOUS at 05:20

## 2022-01-01 RX ADMIN — CHLORHEXIDINE GLUCONATE 0.12% ORAL RINSE 15 ML: 1.2 LIQUID ORAL at 20:59

## 2022-01-01 RX ADMIN — PANTOPRAZOLE SODIUM 40 MG: 40 INJECTION, POWDER, FOR SOLUTION INTRAVENOUS at 08:10

## 2022-01-01 RX ADMIN — ACETYLCYSTEINE 600 MG: 200 SOLUTION ORAL; RESPIRATORY (INHALATION) at 15:07

## 2022-01-01 RX ADMIN — MIDODRINE HYDROCHLORIDE 10 MG: 5 TABLET ORAL at 16:55

## 2022-01-01 RX ADMIN — ACETYLCYSTEINE 600 MG: 200 SOLUTION ORAL; RESPIRATORY (INHALATION) at 07:39

## 2022-01-01 RX ADMIN — PANTOPRAZOLE SODIUM 40 MG: 40 INJECTION, POWDER, FOR SOLUTION INTRAVENOUS at 08:23

## 2022-01-01 RX ADMIN — Medication 2 MG/HR: at 00:14

## 2022-01-01 RX ADMIN — MIDODRINE HYDROCHLORIDE 5 MG: 5 TABLET ORAL at 06:09

## 2022-01-01 RX ADMIN — METOPROLOL TARTRATE 2.5 MG: 1 INJECTION, SOLUTION INTRAVENOUS at 16:22

## 2022-01-01 RX ADMIN — NOREPINEPHRINE BITARTRATE 1 MCG/MIN: 1 SOLUTION INTRAVENOUS at 22:34

## 2022-01-01 RX ADMIN — MIDAZOLAM 1 MG: 1 INJECTION INTRAMUSCULAR; INTRAVENOUS at 11:37

## 2022-01-01 RX ADMIN — PANTOPRAZOLE SODIUM 40 MG: 40 INJECTION, POWDER, FOR SOLUTION INTRAVENOUS at 08:35

## 2022-01-01 RX ADMIN — LEVOTHYROXINE SODIUM 112 MCG: 112 TABLET ORAL at 05:54

## 2022-01-01 RX ADMIN — LIDOCAINE 5% 2 PATCH: 700 PATCH TOPICAL at 09:45

## 2022-01-01 RX ADMIN — VASOPRESSIN 0.04 UNITS/MIN: 20 INJECTION INTRAVENOUS at 03:12

## 2022-01-01 RX ADMIN — SODIUM CHLORIDE 7 UNITS/HR: 9 INJECTION, SOLUTION INTRAVENOUS at 14:28

## 2022-01-01 RX ADMIN — AMIODARONE HYDROCHLORIDE 0.5 MG/MIN: 50 INJECTION, SOLUTION INTRAVENOUS at 18:50

## 2022-01-01 RX ADMIN — ACETAMINOPHEN 650 MG: 650 SUSPENSION ORAL at 23:40

## 2022-01-01 RX ADMIN — HEPARIN SODIUM 400 UNITS/HR: 10000 INJECTION, SOLUTION INTRAVENOUS at 01:59

## 2022-01-01 RX ADMIN — PIPERACILLIN AND TAZOBACTAM 3.38 G: 36; 4.5 INJECTION, POWDER, FOR SOLUTION INTRAVENOUS at 04:10

## 2022-01-01 RX ADMIN — POTASSIUM CHLORIDE 40 MEQ: 29.8 INJECTION, SOLUTION INTRAVENOUS at 13:19

## 2022-01-01 RX ADMIN — MORPHINE SULFATE 2 MG: 2 INJECTION, SOLUTION INTRAMUSCULAR; INTRAVENOUS at 15:41

## 2022-01-01 RX ADMIN — CIPROFLOXACIN 400 MG: 2 INJECTION, SOLUTION INTRAVENOUS at 15:17

## 2022-01-01 RX ADMIN — Medication 20000 ML: at 07:30

## 2022-01-01 RX ADMIN — HYDROMORPHONE HYDROCHLORIDE 1 MG: 1 INJECTION, SOLUTION INTRAMUSCULAR; INTRAVENOUS; SUBCUTANEOUS at 17:22

## 2022-01-01 RX ADMIN — DEXTROSE AND SODIUM CHLORIDE 85 ML/HR: 5; .9 INJECTION, SOLUTION INTRAVENOUS at 04:43

## 2022-01-01 RX ADMIN — SODIUM BICARBONATE 50 MEQ: 84 INJECTION, SOLUTION INTRAVENOUS at 15:20

## 2022-01-01 RX ADMIN — CHLORHEXIDINE GLUCONATE 0.12% ORAL RINSE 15 ML: 1.2 LIQUID ORAL at 21:55

## 2022-01-01 RX ADMIN — CHLORHEXIDINE GLUCONATE 0.12% ORAL RINSE 15 ML: 1.2 LIQUID ORAL at 08:21

## 2022-01-01 RX ADMIN — VASOPRESSIN 0.04 UNITS/MIN: 20 INJECTION INTRAVENOUS at 14:37

## 2022-01-01 RX ADMIN — INSULIN LISPRO 1 UNITS: 100 INJECTION, SOLUTION INTRAVENOUS; SUBCUTANEOUS at 23:29

## 2022-01-01 RX ADMIN — INSULIN LISPRO 4 UNITS: 100 INJECTION, SOLUTION INTRAVENOUS; SUBCUTANEOUS at 17:59

## 2022-01-01 RX ADMIN — DEXMEDETOMIDINE HYDROCHLORIDE 0.4 MCG/KG/HR: 400 INJECTION INTRAVENOUS at 04:49

## 2022-01-01 RX ADMIN — HEPARIN SODIUM 400 UNITS/HR: 10000 INJECTION, SOLUTION INTRAVENOUS at 18:00

## 2022-01-01 RX ADMIN — METRONIDAZOLE 500 MG: 500 INJECTION, SOLUTION INTRAVENOUS at 23:52

## 2022-01-01 RX ADMIN — CALCIUM GLUCONATE 1 G: 20 INJECTION, SOLUTION INTRAVENOUS at 06:51

## 2022-01-01 RX ADMIN — PANTOPRAZOLE SODIUM 40 MG: 40 INJECTION, POWDER, FOR SOLUTION INTRAVENOUS at 09:44

## 2022-01-01 RX ADMIN — HYDROCORTISONE SODIUM SUCCINATE 50 MG: 100 INJECTION, POWDER, FOR SOLUTION INTRAMUSCULAR; INTRAVENOUS at 20:39

## 2022-01-01 RX ADMIN — HEPARIN SODIUM 9 UNITS/KG/HR: 10000 INJECTION, SOLUTION INTRAVENOUS at 14:34

## 2022-01-01 RX ADMIN — LIDOCAINE 5% 2 PATCH: 700 PATCH TOPICAL at 08:44

## 2022-01-01 RX ADMIN — SODIUM PHOSPHATE, MONOBASIC, MONOHYDRATE 6 MMOL: 276; 142 INJECTION, SOLUTION INTRAVENOUS at 19:41

## 2022-01-01 RX ADMIN — PANTOPRAZOLE SODIUM 40 MG: 40 INJECTION, POWDER, FOR SOLUTION INTRAVENOUS at 08:25

## 2022-01-01 RX ADMIN — POTASSIUM CHLORIDE 20 MEQ: 200 INJECTION, SOLUTION INTRAVENOUS at 15:58

## 2022-01-01 RX ADMIN — NOREPINEPHRINE BITARTRATE 1 MCG/MIN: 1 SOLUTION INTRAVENOUS at 02:11

## 2022-01-01 RX ADMIN — SODIUM PHOSPHATE, MONOBASIC, MONOHYDRATE 6 MMOL: 276; 142 INJECTION, SOLUTION INTRAVENOUS at 07:18

## 2022-01-01 RX ADMIN — GABAPENTIN 200 MG: 250 SUSPENSION ORAL at 21:55

## 2022-01-01 RX ADMIN — ACETYLCYSTEINE 600 MG: 200 SOLUTION ORAL; RESPIRATORY (INHALATION) at 16:21

## 2022-01-01 RX ADMIN — POTASSIUM & SODIUM PHOSPHATES POWDER PACK 280-160-250 MG 1 PACKET: 280-160-250 PACK at 08:43

## 2022-01-01 RX ADMIN — HYDROCORTISONE SODIUM SUCCINATE 50 MG: 100 INJECTION, POWDER, FOR SOLUTION INTRAMUSCULAR; INTRAVENOUS at 06:08

## 2022-01-01 RX ADMIN — MIDODRINE HYDROCHLORIDE 10 MG: 5 TABLET ORAL at 11:54

## 2022-01-01 RX ADMIN — MIDODRINE HYDROCHLORIDE 10 MG: 5 TABLET ORAL at 12:13

## 2022-01-01 RX ADMIN — CHLORHEXIDINE GLUCONATE 0.12% ORAL RINSE 15 ML: 1.2 LIQUID ORAL at 21:26

## 2022-01-01 RX ADMIN — OXYCODONE HYDROCHLORIDE 5 MG: 5 TABLET ORAL at 12:13

## 2022-01-01 RX ADMIN — QUETIAPINE FUMARATE 25 MG: 25 TABLET ORAL at 23:12

## 2022-01-01 RX ADMIN — HYDROMORPHONE HYDROCHLORIDE 1 MG: 1 INJECTION, SOLUTION INTRAMUSCULAR; INTRAVENOUS; SUBCUTANEOUS at 01:15

## 2022-01-01 RX ADMIN — HYDROMORPHONE HYDROCHLORIDE 0.5 MG: 1 INJECTION, SOLUTION INTRAMUSCULAR; INTRAVENOUS; SUBCUTANEOUS at 22:27

## 2022-01-01 RX ADMIN — INSULIN LISPRO 1 UNITS: 100 INJECTION, SOLUTION INTRAVENOUS; SUBCUTANEOUS at 12:05

## 2022-01-01 RX ADMIN — MAGNESIUM SULFATE HEPTAHYDRATE 1 G: 1 INJECTION, SOLUTION INTRAVENOUS at 06:34

## 2022-01-01 RX ADMIN — MAGNESIUM SULFATE HEPTAHYDRATE 2 G: 40 INJECTION, SOLUTION INTRAVENOUS at 17:50

## 2022-01-01 RX ADMIN — IPRATROPIUM BROMIDE AND ALBUTEROL SULFATE 3 ML: 2.5; .5 SOLUTION RESPIRATORY (INHALATION) at 23:26

## 2022-01-01 RX ADMIN — AMIODARONE HYDROCHLORIDE 1 MG/MIN: 50 INJECTION, SOLUTION INTRAVENOUS at 20:11

## 2022-01-01 RX ADMIN — CEFEPIME HYDROCHLORIDE 2000 MG: 2 INJECTION, POWDER, FOR SOLUTION INTRAVENOUS at 11:08

## 2022-01-01 RX ADMIN — CHLORHEXIDINE GLUCONATE 0.12% ORAL RINSE 15 ML: 1.2 LIQUID ORAL at 20:22

## 2022-01-01 RX ADMIN — METRONIDAZOLE 500 MG: 500 INJECTION, SOLUTION INTRAVENOUS at 15:30

## 2022-01-01 RX ADMIN — MIDODRINE HYDROCHLORIDE 10 MG: 5 TABLET ORAL at 05:22

## 2022-01-01 RX ADMIN — Medication 2 MG/HR: at 11:04

## 2022-01-01 RX ADMIN — DEXMEDETOMIDINE HYDROCHLORIDE 0.2 MCG/KG/HR: 400 INJECTION INTRAVENOUS at 09:47

## 2022-01-01 RX ADMIN — LEVOTHYROXINE SODIUM 112 MCG: 112 TABLET ORAL at 05:22

## 2022-01-01 RX ADMIN — DEXMEDETOMIDINE HYDROCHLORIDE 0.4 MCG/KG/HR: 400 INJECTION INTRAVENOUS at 04:10

## 2022-01-01 RX ADMIN — METRONIDAZOLE 500 MG: 500 INJECTION, SOLUTION INTRAVENOUS at 08:11

## 2022-01-01 RX ADMIN — MIDODRINE HYDROCHLORIDE 10 MG: 5 TABLET ORAL at 16:28

## 2022-01-01 RX ADMIN — DEXMEDETOMIDINE HYDROCHLORIDE 0.8 MCG/KG/HR: 400 INJECTION INTRAVENOUS at 23:13

## 2022-01-01 RX ADMIN — GABAPENTIN 200 MG: 250 SUSPENSION ORAL at 21:07

## 2022-01-01 RX ADMIN — MORPHINE SULFATE 2 MG: 2 INJECTION, SOLUTION INTRAMUSCULAR; INTRAVENOUS at 08:12

## 2022-01-01 RX ADMIN — HEPARIN SODIUM 5000 UNITS: 5000 INJECTION INTRAVENOUS; SUBCUTANEOUS at 21:38

## 2022-01-01 RX ADMIN — IPRATROPIUM BROMIDE AND ALBUTEROL SULFATE 3 ML: 2.5; .5 SOLUTION RESPIRATORY (INHALATION) at 16:44

## 2022-01-01 RX ADMIN — IPRATROPIUM BROMIDE AND ALBUTEROL SULFATE 3 ML: 2.5; .5 SOLUTION RESPIRATORY (INHALATION) at 23:14

## 2022-01-01 RX ADMIN — LEVOTHYROXINE SODIUM 112 MCG: 112 TABLET ORAL at 05:42

## 2022-01-01 RX ADMIN — VASOPRESSIN 0.04 UNITS/MIN: 20 INJECTION INTRAVENOUS at 11:39

## 2022-01-01 RX ADMIN — METOPROLOL TARTRATE 2.5 MG: 1 INJECTION, SOLUTION INTRAVENOUS at 22:10

## 2022-01-01 RX ADMIN — LIDOCAINE 5% 2 PATCH: 700 PATCH TOPICAL at 08:55

## 2022-01-01 RX ADMIN — FUROSEMIDE 20 MG: 10 INJECTION, SOLUTION INTRAVENOUS at 09:51

## 2022-01-01 RX ADMIN — IPRATROPIUM BROMIDE AND ALBUTEROL SULFATE 3 ML: 2.5; .5 SOLUTION RESPIRATORY (INHALATION) at 07:26

## 2022-01-01 RX ADMIN — INSULIN HUMAN 10 UNITS: 100 INJECTION, SOLUTION PARENTERAL at 06:48

## 2022-01-01 RX ADMIN — HYDROMORPHONE HYDROCHLORIDE 0.5 MG: 1 INJECTION, SOLUTION INTRAMUSCULAR; INTRAVENOUS; SUBCUTANEOUS at 10:47

## 2022-01-01 RX ADMIN — INSULIN LISPRO 1 UNITS: 100 INJECTION, SOLUTION INTRAVENOUS; SUBCUTANEOUS at 23:56

## 2022-01-01 RX ADMIN — POTASSIUM PHOSPHATE, MONOBASIC AND POTASSIUM PHOSPHATE, DIBASIC 12 MMOL: 224; 236 INJECTION, SOLUTION, CONCENTRATE INTRAVENOUS at 08:39

## 2022-01-01 RX ADMIN — IPRATROPIUM BROMIDE AND ALBUTEROL SULFATE 3 ML: 2.5; .5 SOLUTION RESPIRATORY (INHALATION) at 21:06

## 2022-01-01 RX ADMIN — HYDROMORPHONE HYDROCHLORIDE 0.5 MG: 1 INJECTION, SOLUTION INTRAMUSCULAR; INTRAVENOUS; SUBCUTANEOUS at 13:31

## 2022-01-01 RX ADMIN — INSULIN LISPRO 1 UNITS: 100 INJECTION, SOLUTION INTRAVENOUS; SUBCUTANEOUS at 19:00

## 2022-01-01 RX ADMIN — HYDROCORTISONE SODIUM SUCCINATE 50 MG: 100 INJECTION, POWDER, FOR SOLUTION INTRAMUSCULAR; INTRAVENOUS at 00:08

## 2022-01-01 RX ADMIN — OXYCODONE HYDROCHLORIDE 7.5 MG: 5 TABLET ORAL at 08:04

## 2022-01-01 RX ADMIN — PANTOPRAZOLE SODIUM 40 MG: 40 INJECTION, POWDER, FOR SOLUTION INTRAVENOUS at 08:36

## 2022-01-01 RX ADMIN — CALCIUM GLUCONATE 1 G: 20 INJECTION, SOLUTION INTRAVENOUS at 12:43

## 2022-01-01 RX ADMIN — CEFEPIME HYDROCHLORIDE 2000 MG: 2 INJECTION, POWDER, FOR SOLUTION INTRAVENOUS at 10:47

## 2022-01-01 RX ADMIN — DEXMEDETOMIDINE HYDROCHLORIDE 1.2 MCG/KG/HR: 400 INJECTION INTRAVENOUS at 17:35

## 2022-01-01 RX ADMIN — LEVOTHYROXINE SODIUM 112 MCG: 112 TABLET ORAL at 06:17

## 2022-01-01 RX ADMIN — ACETAMINOPHEN 650 MG: 325 TABLET, FILM COATED ORAL at 12:16

## 2022-01-01 RX ADMIN — LEVOTHYROXINE SODIUM 112 MCG: 112 TABLET ORAL at 06:23

## 2022-01-01 RX ADMIN — HYDROMORPHONE HYDROCHLORIDE 0.5 MG: 1 INJECTION, SOLUTION INTRAMUSCULAR; INTRAVENOUS; SUBCUTANEOUS at 14:00

## 2022-01-01 RX ADMIN — ACETAMINOPHEN 650 MG: 650 SUSPENSION ORAL at 16:26

## 2022-01-01 RX ADMIN — NOREPINEPHRINE BITARTRATE 4 MCG/MIN: 1 SOLUTION INTRAVENOUS at 01:15

## 2022-01-01 RX ADMIN — DEXMEDETOMIDINE HYDROCHLORIDE 0.2 MCG/KG/HR: 400 INJECTION INTRAVENOUS at 05:46

## 2022-01-01 RX ADMIN — MORPHINE SULFATE 2 MG: 2 INJECTION, SOLUTION INTRAMUSCULAR; INTRAVENOUS at 04:53

## 2022-01-01 RX ADMIN — VASOPRESSIN 0.04 UNITS/MIN: 20 INJECTION INTRAVENOUS at 15:48

## 2022-01-01 RX ADMIN — CEFEPIME HYDROCHLORIDE 1000 MG: 1 INJECTION, SOLUTION INTRAVENOUS at 11:35

## 2022-01-01 RX ADMIN — DEXMEDETOMIDINE HYDROCHLORIDE 0.6 MCG/KG/HR: 400 INJECTION INTRAVENOUS at 23:56

## 2022-01-01 RX ADMIN — VANCOMYCIN HYDROCHLORIDE 1250 MG: 10 INJECTION, POWDER, LYOPHILIZED, FOR SOLUTION INTRAVENOUS at 10:32

## 2022-01-01 RX ADMIN — ALBUMIN (HUMAN) 25 G: 0.25 INJECTION, SOLUTION INTRAVENOUS at 20:14

## 2022-01-01 RX ADMIN — INSULIN LISPRO 1 UNITS: 100 INJECTION, SOLUTION INTRAVENOUS; SUBCUTANEOUS at 18:38

## 2022-01-01 RX ADMIN — ACETYLCYSTEINE 600 MG: 200 SOLUTION ORAL; RESPIRATORY (INHALATION) at 07:25

## 2022-01-01 RX ADMIN — DEXMEDETOMIDINE HYDROCHLORIDE 1 MCG/KG/HR: 400 INJECTION INTRAVENOUS at 02:27

## 2022-01-01 RX ADMIN — Medication 20000 ML: at 16:00

## 2022-01-01 RX ADMIN — VASOPRESSIN 0.04 UNITS/MIN: 20 INJECTION INTRAVENOUS at 04:13

## 2022-01-01 RX ADMIN — IPRATROPIUM BROMIDE AND ALBUTEROL SULFATE 3 ML: 2.5; .5 SOLUTION RESPIRATORY (INHALATION) at 13:50

## 2022-01-01 RX ADMIN — HYDROMORPHONE HYDROCHLORIDE 0.5 MG: 1 INJECTION, SOLUTION INTRAMUSCULAR; INTRAVENOUS; SUBCUTANEOUS at 12:16

## 2022-01-01 RX ADMIN — HYDROMORPHONE HYDROCHLORIDE 0.5 MG: 1 INJECTION, SOLUTION INTRAMUSCULAR; INTRAVENOUS; SUBCUTANEOUS at 18:38

## 2022-01-01 RX ADMIN — MORPHINE SULFATE 2 MG: 2 INJECTION, SOLUTION INTRAMUSCULAR; INTRAVENOUS at 20:39

## 2022-01-01 RX ADMIN — MAGNESIUM SULFATE HEPTAHYDRATE 2 G: 40 INJECTION, SOLUTION INTRAVENOUS at 09:17

## 2022-01-01 RX ADMIN — INSULIN LISPRO 8 UNITS: 100 INJECTION, SOLUTION INTRAVENOUS; SUBCUTANEOUS at 05:39

## 2022-01-01 RX ADMIN — FENTANYL CITRATE 50 MCG: 50 INJECTION, SOLUTION INTRAMUSCULAR; INTRAVENOUS at 20:31

## 2022-01-01 RX ADMIN — Medication 20000 ML: at 18:02

## 2022-01-01 RX ADMIN — FUROSEMIDE 80 MG: 10 INJECTION, SOLUTION INTRAMUSCULAR; INTRAVENOUS at 23:28

## 2022-01-01 RX ADMIN — CALCIUM GLUCONATE 1 G: 20 INJECTION, SOLUTION INTRAVENOUS at 03:55

## 2022-01-01 RX ADMIN — INSULIN LISPRO 1 UNITS: 100 INJECTION, SOLUTION INTRAVENOUS; SUBCUTANEOUS at 11:33

## 2022-01-01 RX ADMIN — SODIUM PHOSPHATE, MONOBASIC, MONOHYDRATE 12 MMOL: 276; 142 INJECTION, SOLUTION INTRAVENOUS at 00:48

## 2022-01-01 RX ADMIN — HYDROCORTISONE SODIUM SUCCINATE 50 MG: 100 INJECTION, POWDER, FOR SOLUTION INTRAMUSCULAR; INTRAVENOUS at 17:26

## 2022-01-01 RX ADMIN — FENTANYL CITRATE 12.5 MCG: 50 INJECTION, SOLUTION INTRAMUSCULAR; INTRAVENOUS at 16:09

## 2022-01-01 RX ADMIN — CALCIUM GLUCONATE 1 G: 20 INJECTION, SOLUTION INTRAVENOUS at 18:48

## 2022-01-01 RX ADMIN — PIPERACILLIN AND TAZOBACTAM 3.38 G: 36; 4.5 INJECTION, POWDER, FOR SOLUTION INTRAVENOUS at 04:08

## 2022-01-01 RX ADMIN — IPRATROPIUM BROMIDE AND ALBUTEROL SULFATE 3 ML: 2.5; .5 SOLUTION RESPIRATORY (INHALATION) at 07:38

## 2022-01-01 RX ADMIN — SODIUM BICARBONATE 75 ML/HR: 84 INJECTION, SOLUTION INTRAVENOUS at 14:14

## 2022-01-01 RX ADMIN — CHLORHEXIDINE GLUCONATE 0.12% ORAL RINSE 15 ML: 1.2 LIQUID ORAL at 21:07

## 2022-01-01 RX ADMIN — SODIUM PHOSPHATE, MONOBASIC, MONOHYDRATE 9 MMOL: 276; 142 INJECTION, SOLUTION INTRAVENOUS at 00:11

## 2022-01-01 RX ADMIN — DEXMEDETOMIDINE HYDROCHLORIDE 1.2 MCG/KG/HR: 400 INJECTION INTRAVENOUS at 02:48

## 2022-01-01 RX ADMIN — DEXMEDETOMIDINE HYDROCHLORIDE 1.2 MCG/KG/HR: 400 INJECTION INTRAVENOUS at 22:10

## 2022-01-01 RX ADMIN — NOREPINEPHRINE BITARTRATE 4 MCG/MIN: 1 SOLUTION INTRAVENOUS at 23:27

## 2022-01-01 RX ADMIN — NOREPINEPHRINE BITARTRATE 8 MCG/MIN: 1 SOLUTION INTRAVENOUS at 05:41

## 2022-01-01 RX ADMIN — HEPARIN SODIUM 5000 UNITS: 5000 INJECTION INTRAVENOUS; SUBCUTANEOUS at 21:26

## 2022-01-01 RX ADMIN — ACETAMINOPHEN 650 MG: 325 TABLET, FILM COATED ORAL at 18:17

## 2022-01-01 RX ADMIN — FUROSEMIDE 40 MG: 10 INJECTION, SOLUTION INTRAMUSCULAR; INTRAVENOUS at 15:20

## 2022-01-01 RX ADMIN — IPRATROPIUM BROMIDE AND ALBUTEROL SULFATE 3 ML: 2.5; .5 SOLUTION RESPIRATORY (INHALATION) at 19:21

## 2022-01-01 RX ADMIN — METRONIDAZOLE 500 MG: 500 INJECTION, SOLUTION INTRAVENOUS at 00:31

## 2022-01-01 RX ADMIN — HYDROMORPHONE HYDROCHLORIDE 0.5 MG: 1 INJECTION, SOLUTION INTRAMUSCULAR; INTRAVENOUS; SUBCUTANEOUS at 22:41

## 2022-01-01 RX ADMIN — HYDROMORPHONE HYDROCHLORIDE 0.5 MG: 1 INJECTION, SOLUTION INTRAMUSCULAR; INTRAVENOUS; SUBCUTANEOUS at 03:09

## 2022-01-01 RX ADMIN — CEFEPIME HYDROCHLORIDE 2000 MG: 2 INJECTION, POWDER, FOR SOLUTION INTRAVENOUS at 22:40

## 2022-01-01 RX ADMIN — EPINEPHRINE 10 MCG/MIN: 1 INJECTION INTRAMUSCULAR; INTRAVENOUS; SUBCUTANEOUS at 16:28

## 2022-01-01 RX ADMIN — PANTOPRAZOLE SODIUM 40 MG: 40 INJECTION, POWDER, FOR SOLUTION INTRAVENOUS at 08:05

## 2022-01-01 RX ADMIN — CALCIUM GLUCONATE 1 G: 20 INJECTION, SOLUTION INTRAVENOUS at 18:34

## 2022-01-01 RX ADMIN — LIDOCAINE 5% 2 PATCH: 700 PATCH TOPICAL at 13:00

## 2022-01-01 RX ADMIN — LEVOTHYROXINE SODIUM 112 MCG: 112 TABLET ORAL at 06:11

## 2022-01-01 RX ADMIN — CHLORHEXIDINE GLUCONATE 0.12% ORAL RINSE 15 ML: 1.2 LIQUID ORAL at 20:51

## 2022-01-01 RX ADMIN — VASOPRESSIN 0.04 UNITS/MIN: 20 INJECTION INTRAVENOUS at 23:55

## 2022-01-01 RX ADMIN — INSULIN LISPRO 1 UNITS: 100 INJECTION, SOLUTION INTRAVENOUS; SUBCUTANEOUS at 00:19

## 2022-01-01 RX ADMIN — ACETAMINOPHEN 650 MG: 325 TABLET, FILM COATED ORAL at 17:26

## 2022-01-01 RX ADMIN — ONDANSETRON 4 MG: 2 INJECTION INTRAMUSCULAR; INTRAVENOUS at 21:38

## 2022-01-01 RX ADMIN — MIDODRINE HYDROCHLORIDE 10 MG: 5 TABLET ORAL at 10:57

## 2022-01-01 RX ADMIN — INSULIN LISPRO 1 UNITS: 100 INJECTION, SOLUTION INTRAVENOUS; SUBCUTANEOUS at 05:55

## 2022-01-01 RX ADMIN — SODIUM PHOSPHATE, MONOBASIC, MONOHYDRATE 6 MMOL: 276; 142 INJECTION, SOLUTION INTRAVENOUS at 19:46

## 2022-01-01 RX ADMIN — MORPHINE SULFATE 2 MG: 2 INJECTION, SOLUTION INTRAMUSCULAR; INTRAVENOUS at 17:39

## 2022-01-01 RX ADMIN — VASOPRESSIN 0.04 UNITS/MIN: 20 INJECTION PARENTERAL at 00:30

## 2022-01-01 RX ADMIN — NOREPINEPHRINE BITARTRATE 8 MCG/MIN: 1 SOLUTION INTRAVENOUS at 00:14

## 2022-01-01 RX ADMIN — Medication 20000 ML: at 00:53

## 2022-01-01 RX ADMIN — IPRATROPIUM BROMIDE AND ALBUTEROL SULFATE 3 ML: 2.5; .5 SOLUTION RESPIRATORY (INHALATION) at 07:27

## 2022-01-01 RX ADMIN — MORPHINE SULFATE 2 MG: 2 INJECTION, SOLUTION INTRAMUSCULAR; INTRAVENOUS at 03:27

## 2022-01-01 RX ADMIN — GABAPENTIN 200 MG: 250 SUSPENSION ORAL at 21:04

## 2022-01-01 RX ADMIN — OXYCODONE HYDROCHLORIDE 5 MG: 5 TABLET ORAL at 16:44

## 2022-01-01 RX ADMIN — OXYCODONE HYDROCHLORIDE 5 MG: 5 SOLUTION ORAL at 12:58

## 2022-01-01 RX ADMIN — DEXMEDETOMIDINE HYDROCHLORIDE 1 MCG/KG/HR: 400 INJECTION INTRAVENOUS at 05:23

## 2022-01-01 RX ADMIN — MAGNESIUM SULFATE HEPTAHYDRATE 1 G: 1 INJECTION, SOLUTION INTRAVENOUS at 07:24

## 2022-01-01 RX ADMIN — CALCIUM GLUCONATE 1 G: 20 INJECTION, SOLUTION INTRAVENOUS at 00:53

## 2022-01-01 RX ADMIN — GABAPENTIN 200 MG: 250 SUSPENSION ORAL at 21:19

## 2022-01-01 RX ADMIN — NOREPINEPHRINE BITARTRATE 2 MCG/MIN: 1 SOLUTION INTRAVENOUS at 09:35

## 2022-01-01 RX ADMIN — Medication 100 MG: at 18:00

## 2022-01-01 RX ADMIN — IPRATROPIUM BROMIDE AND ALBUTEROL SULFATE 3 ML: 2.5; .5 SOLUTION RESPIRATORY (INHALATION) at 00:10

## 2022-01-01 RX ADMIN — MAGNESIUM SULFATE HEPTAHYDRATE 1 G: 1 INJECTION, SOLUTION INTRAVENOUS at 19:21

## 2022-01-01 RX ADMIN — CHLORHEXIDINE GLUCONATE 0.12% ORAL RINSE 15 ML: 1.2 LIQUID ORAL at 20:31

## 2022-01-01 RX ADMIN — IPRATROPIUM BROMIDE AND ALBUTEROL SULFATE 3 ML: 2.5; .5 SOLUTION RESPIRATORY (INHALATION) at 02:30

## 2022-01-01 RX ADMIN — PIPERACILLIN AND TAZOBACTAM 3.38 G: 36; 4.5 INJECTION, POWDER, FOR SOLUTION INTRAVENOUS at 04:16

## 2022-01-01 RX ADMIN — ACETYLCYSTEINE 600 MG: 200 SOLUTION ORAL; RESPIRATORY (INHALATION) at 07:28

## 2022-01-01 RX ADMIN — LIDOCAINE 5% 2 PATCH: 700 PATCH TOPICAL at 08:23

## 2022-01-01 RX ADMIN — METRONIDAZOLE 500 MG: 500 INJECTION, SOLUTION INTRAVENOUS at 16:21

## 2022-01-01 RX ADMIN — MAGNESIUM SULFATE HEPTAHYDRATE 1 G: 1 INJECTION, SOLUTION INTRAVENOUS at 02:09

## 2022-01-01 RX ADMIN — HEPARIN SODIUM 5000 UNITS: 5000 INJECTION INTRAVENOUS; SUBCUTANEOUS at 20:31

## 2022-01-01 RX ADMIN — FUROSEMIDE 40 MG: 10 INJECTION, SOLUTION INTRAMUSCULAR; INTRAVENOUS at 18:57

## 2022-01-01 RX ADMIN — ACETYLCYSTEINE 600 MG: 200 SOLUTION ORAL; RESPIRATORY (INHALATION) at 23:21

## 2022-01-01 RX ADMIN — ACETAMINOPHEN 650 MG: 325 TABLET, FILM COATED ORAL at 05:15

## 2022-01-01 RX ADMIN — HYDROMORPHONE HYDROCHLORIDE 0.5 MG: 1 INJECTION, SOLUTION INTRAMUSCULAR; INTRAVENOUS; SUBCUTANEOUS at 09:13

## 2022-01-01 RX ADMIN — METRONIDAZOLE 500 MG: 500 INJECTION, SOLUTION INTRAVENOUS at 23:34

## 2022-01-01 RX ADMIN — EPINEPHRINE 1 MG: 0.1 INJECTION, SOLUTION ENDOTRACHEAL; INTRACARDIAC; INTRAVENOUS at 15:18

## 2022-01-01 RX ADMIN — CHLORHEXIDINE GLUCONATE 0.12% ORAL RINSE 15 ML: 1.2 LIQUID ORAL at 22:22

## 2022-01-01 RX ADMIN — VASOPRESSIN 0.04 UNITS/MIN: 20 INJECTION PARENTERAL at 16:42

## 2022-01-01 RX ADMIN — POTASSIUM CHLORIDE 40 MEQ: 20 SOLUTION ORAL at 08:20

## 2022-01-01 RX ADMIN — IPRATROPIUM BROMIDE AND ALBUTEROL SULFATE 3 ML: 2.5; .5 SOLUTION RESPIRATORY (INHALATION) at 07:28

## 2022-01-01 RX ADMIN — IPRATROPIUM BROMIDE AND ALBUTEROL SULFATE 3 ML: 2.5; .5 SOLUTION RESPIRATORY (INHALATION) at 07:57

## 2022-01-01 RX ADMIN — NOREPINEPHRINE BITARTRATE 1 MCG/MIN: 1 SOLUTION INTRAVENOUS at 01:23

## 2022-01-01 RX ADMIN — IPRATROPIUM BROMIDE AND ALBUTEROL SULFATE 3 ML: 2.5; .5 SOLUTION RESPIRATORY (INHALATION) at 16:20

## 2022-01-01 RX ADMIN — SODIUM CHLORIDE: 0.9 INJECTION, SOLUTION INTRAVENOUS at 17:51

## 2022-01-01 RX ADMIN — CHLORHEXIDINE GLUCONATE 0.12% ORAL RINSE 15 ML: 1.2 LIQUID ORAL at 08:11

## 2022-01-01 RX ADMIN — IPRATROPIUM BROMIDE AND ALBUTEROL SULFATE 3 ML: 2.5; .5 SOLUTION RESPIRATORY (INHALATION) at 07:08

## 2022-01-01 RX ADMIN — IPRATROPIUM BROMIDE AND ALBUTEROL SULFATE 3 ML: 2.5; .5 SOLUTION RESPIRATORY (INHALATION) at 19:30

## 2022-01-01 RX ADMIN — FENTANYL CITRATE 50 MCG: 50 INJECTION, SOLUTION INTRAMUSCULAR; INTRAVENOUS at 18:00

## 2022-01-01 RX ADMIN — HYDROMORPHONE HYDROCHLORIDE 0.5 MG: 1 INJECTION, SOLUTION INTRAMUSCULAR; INTRAVENOUS; SUBCUTANEOUS at 23:43

## 2022-01-01 RX ADMIN — POTASSIUM & SODIUM PHOSPHATES POWDER PACK 280-160-250 MG 1 PACKET: 280-160-250 PACK at 15:35

## 2022-01-01 RX ADMIN — CEFEPIME HYDROCHLORIDE 1000 MG: 1 INJECTION, SOLUTION INTRAVENOUS at 10:55

## 2022-01-01 RX ADMIN — DEXMEDETOMIDINE HYDROCHLORIDE 0.6 MCG/KG/HR: 400 INJECTION INTRAVENOUS at 20:22

## 2022-01-01 RX ADMIN — CHLORHEXIDINE GLUCONATE 0.12% ORAL RINSE 15 ML: 1.2 LIQUID ORAL at 08:28

## 2022-01-01 RX ADMIN — Medication 20000 ML: at 14:05

## 2022-01-01 RX ADMIN — ARGATROBAN 0.2 MCG/KG/MIN: 50 INJECTION INTRAVENOUS at 13:34

## 2022-01-01 RX ADMIN — DEXMEDETOMIDINE HYDROCHLORIDE 0.8 MCG/KG/HR: 400 INJECTION INTRAVENOUS at 12:58

## 2022-01-01 RX ADMIN — GABAPENTIN 200 MG: 250 SUSPENSION ORAL at 21:18

## 2022-01-01 RX ADMIN — Medication 20 MG/HR: at 07:18

## 2022-01-01 RX ADMIN — Medication 20000 ML: at 12:38

## 2022-01-01 RX ADMIN — Medication 2 MG/HR: at 04:27

## 2022-01-01 RX ADMIN — Medication 20000 ML: at 17:26

## 2022-01-01 RX ADMIN — Medication 20000 ML: at 21:15

## 2022-01-01 RX ADMIN — ACETYLCYSTEINE 600 MG: 200 SOLUTION ORAL; RESPIRATORY (INHALATION) at 15:00

## 2022-01-01 RX ADMIN — HYDROMORPHONE HYDROCHLORIDE 0.2 MG: 1 INJECTION, SOLUTION INTRAMUSCULAR; INTRAVENOUS; SUBCUTANEOUS at 09:10

## 2022-01-01 RX ADMIN — IPRATROPIUM BROMIDE AND ALBUTEROL SULFATE 3 ML: 2.5; .5 SOLUTION RESPIRATORY (INHALATION) at 17:12

## 2022-01-01 RX ADMIN — POTASSIUM CHLORIDE 40 MEQ: 29.8 INJECTION, SOLUTION INTRAVENOUS at 19:24

## 2022-01-01 RX ADMIN — Medication 2 MG/HR: at 17:30

## 2022-01-01 RX ADMIN — Medication 20000 ML: at 04:22

## 2022-01-01 RX ADMIN — METOPROLOL TARTRATE 2.5 MG: 1 INJECTION, SOLUTION INTRAVENOUS at 09:53

## 2022-01-01 RX ADMIN — INSULIN LISPRO 1 UNITS: 100 INJECTION, SOLUTION INTRAVENOUS; SUBCUTANEOUS at 11:51

## 2022-01-01 RX ADMIN — SODIUM PHOSPHATE, MONOBASIC, MONOHYDRATE 12 MMOL: 276; 142 INJECTION, SOLUTION INTRAVENOUS at 01:10

## 2022-01-01 RX ADMIN — DEXTROSE AND SODIUM CHLORIDE 100 ML/HR: 5; .9 INJECTION, SOLUTION INTRAVENOUS at 10:43

## 2022-01-01 RX ADMIN — VASOPRESSIN 0.04 UNITS/MIN: 20 INJECTION INTRAVENOUS at 20:05

## 2022-01-01 RX ADMIN — POTASSIUM CHLORIDE 20 MEQ: 14.9 INJECTION, SOLUTION INTRAVENOUS at 09:10

## 2022-01-01 RX ADMIN — HYDROMORPHONE HYDROCHLORIDE 0.5 MG: 1 INJECTION, SOLUTION INTRAMUSCULAR; INTRAVENOUS; SUBCUTANEOUS at 08:52

## 2022-01-01 RX ADMIN — METRONIDAZOLE 500 MG: 500 INJECTION, SOLUTION INTRAVENOUS at 08:50

## 2022-01-01 RX ADMIN — CEFEPIME HYDROCHLORIDE 2000 MG: 2 INJECTION, POWDER, FOR SOLUTION INTRAVENOUS at 11:43

## 2022-01-01 RX ADMIN — PIPERACILLIN AND TAZOBACTAM 3.38 G: 36; 4.5 INJECTION, POWDER, FOR SOLUTION INTRAVENOUS at 09:22

## 2022-01-01 RX ADMIN — VASOPRESSIN 0.04 UNITS/MIN: 20 INJECTION INTRAVENOUS at 18:27

## 2022-01-01 RX ADMIN — MIDODRINE HYDROCHLORIDE 10 MG: 5 TABLET ORAL at 16:52

## 2022-01-01 RX ADMIN — Medication 20000 ML: at 22:09

## 2022-01-01 RX ADMIN — INSULIN LISPRO 1 UNITS: 100 INJECTION, SOLUTION INTRAVENOUS; SUBCUTANEOUS at 23:41

## 2022-01-01 RX ADMIN — INSULIN LISPRO 4 UNITS: 100 INJECTION, SOLUTION INTRAVENOUS; SUBCUTANEOUS at 00:10

## 2022-01-01 RX ADMIN — HYDROMORPHONE HYDROCHLORIDE 0.5 MG: 1 INJECTION, SOLUTION INTRAMUSCULAR; INTRAVENOUS; SUBCUTANEOUS at 15:20

## 2022-01-01 RX ADMIN — CHLORHEXIDINE GLUCONATE 0.12% ORAL RINSE 15 ML: 1.2 LIQUID ORAL at 08:09

## 2022-01-01 RX ADMIN — VASOPRESSIN 0.04 UNITS/MIN: 20 INJECTION INTRAVENOUS at 18:00

## 2022-01-01 RX ADMIN — IOHEXOL 50 ML: 350 INJECTION, SOLUTION INTRAVENOUS at 16:30

## 2022-01-01 RX ADMIN — INSULIN LISPRO 2 UNITS: 100 INJECTION, SOLUTION INTRAVENOUS; SUBCUTANEOUS at 06:25

## 2022-01-01 RX ADMIN — OXYCODONE HYDROCHLORIDE 5 MG: 5 SOLUTION ORAL at 17:09

## 2022-01-01 RX ADMIN — MAGNESIUM SULFATE HEPTAHYDRATE 1 G: 1 INJECTION, SOLUTION INTRAVENOUS at 06:43

## 2022-01-01 RX ADMIN — PANTOPRAZOLE SODIUM 40 MG: 40 INJECTION, POWDER, FOR SOLUTION INTRAVENOUS at 09:27

## 2022-01-01 RX ADMIN — METOPROLOL TARTRATE 2.5 MG: 1 INJECTION, SOLUTION INTRAVENOUS at 03:52

## 2022-01-01 RX ADMIN — HYDROMORPHONE HYDROCHLORIDE 0.5 MG: 1 INJECTION, SOLUTION INTRAMUSCULAR; INTRAVENOUS; SUBCUTANEOUS at 21:28

## 2022-01-01 RX ADMIN — METRONIDAZOLE 500 MG: 500 INJECTION, SOLUTION INTRAVENOUS at 01:00

## 2022-01-01 RX ADMIN — Medication 20000 ML: at 05:57

## 2022-01-01 RX ADMIN — CEFEPIME HYDROCHLORIDE 2000 MG: 2 INJECTION, POWDER, FOR SOLUTION INTRAVENOUS at 12:17

## 2022-01-01 RX ADMIN — METRONIDAZOLE 500 MG: 500 INJECTION, SOLUTION INTRAVENOUS at 08:17

## 2022-01-01 RX ADMIN — CALCIUM GLUCONATE 1 G: 20 INJECTION, SOLUTION INTRAVENOUS at 18:36

## 2022-01-01 RX ADMIN — CALCIUM GLUCONATE 1 G: 20 INJECTION, SOLUTION INTRAVENOUS at 00:45

## 2022-01-01 RX ADMIN — INSULIN LISPRO 2 UNITS: 100 INJECTION, SOLUTION INTRAVENOUS; SUBCUTANEOUS at 00:24

## 2022-01-01 RX ADMIN — PIPERACILLIN AND TAZOBACTAM 3.38 G: 36; 4.5 INJECTION, POWDER, FOR SOLUTION INTRAVENOUS at 10:00

## 2022-01-01 RX ADMIN — CHLORHEXIDINE GLUCONATE 0.12% ORAL RINSE 15 ML: 1.2 LIQUID ORAL at 20:24

## 2022-01-01 RX ADMIN — HEPARIN SODIUM 400 UNITS/HR: 10000 INJECTION, SOLUTION INTRAVENOUS at 14:00

## 2022-01-01 RX ADMIN — Medication: at 23:03

## 2022-01-01 RX ADMIN — NOREPINEPHRINE BITARTRATE 8 MCG/MIN: 1 SOLUTION INTRAVENOUS at 07:02

## 2022-01-01 RX ADMIN — MIDODRINE HYDROCHLORIDE 10 MG: 5 TABLET ORAL at 10:59

## 2022-01-01 RX ADMIN — HEPARIN SODIUM 400 UNITS/HR: 10000 INJECTION, SOLUTION INTRAVENOUS at 19:06

## 2022-01-01 RX ADMIN — ACETAMINOPHEN 650 MG: 325 TABLET, FILM COATED ORAL at 12:07

## 2022-01-01 RX ADMIN — MORPHINE SULFATE 2 MG: 2 INJECTION, SOLUTION INTRAMUSCULAR; INTRAVENOUS at 00:07

## 2022-01-01 RX ADMIN — PIPERACILLIN AND TAZOBACTAM 3.38 G: 36; 4.5 INJECTION, POWDER, FOR SOLUTION INTRAVENOUS at 21:58

## 2022-01-01 RX ADMIN — CHLORHEXIDINE GLUCONATE 0.12% ORAL RINSE 15 ML: 1.2 LIQUID ORAL at 08:03

## 2022-01-01 RX ADMIN — POLYETHYLENE GLYCOL 3350 17 G: 17 POWDER, FOR SOLUTION ORAL at 08:10

## 2022-01-01 RX ADMIN — PROPOFOL 20 MG: 10 INJECTION, EMULSION INTRAVENOUS at 14:19

## 2022-01-01 RX ADMIN — DEXMEDETOMIDINE HYDROCHLORIDE 0.8 MCG/KG/HR: 400 INJECTION INTRAVENOUS at 20:09

## 2022-01-01 RX ADMIN — Medication 20000 ML: at 04:20

## 2022-01-01 RX ADMIN — SODIUM PHOSPHATE, MONOBASIC, MONOHYDRATE 9 MMOL: 276; 142 INJECTION, SOLUTION INTRAVENOUS at 18:49

## 2022-01-01 RX ADMIN — INSULIN GLARGINE 5 UNITS: 100 INJECTION, SOLUTION SUBCUTANEOUS at 08:10

## 2022-01-01 RX ADMIN — METOPROLOL TARTRATE 2.5 MG: 1 INJECTION, SOLUTION INTRAVENOUS at 09:26

## 2022-01-01 RX ADMIN — VASOPRESSIN 0.04 UNITS/MIN: 20 INJECTION INTRAVENOUS at 15:44

## 2022-01-01 RX ADMIN — INSULIN LISPRO 1 UNITS: 100 INJECTION, SOLUTION INTRAVENOUS; SUBCUTANEOUS at 17:49

## 2022-01-01 RX ADMIN — VASOPRESSIN 0.04 UNITS/MIN: 20 INJECTION INTRAVENOUS at 01:37

## 2022-01-01 RX ADMIN — CALCIUM GLUCONATE 1 G: 20 INJECTION, SOLUTION INTRAVENOUS at 15:19

## 2022-01-01 RX ADMIN — PANTOPRAZOLE SODIUM 40 MG: 40 INJECTION, POWDER, FOR SOLUTION INTRAVENOUS at 09:47

## 2022-01-01 RX ADMIN — DEXTROSE MONOHYDRATE 50 ML: 25 INJECTION, SOLUTION INTRAVENOUS at 20:52

## 2022-01-01 RX ADMIN — ACETYLCYSTEINE 600 MG: 200 SOLUTION ORAL; RESPIRATORY (INHALATION) at 23:24

## 2022-01-01 RX ADMIN — SODIUM PHOSPHATE, MONOBASIC, MONOHYDRATE 12 MMOL: 276; 142 INJECTION, SOLUTION INTRAVENOUS at 00:51

## 2022-01-01 RX ADMIN — CHLORHEXIDINE GLUCONATE 0.12% ORAL RINSE 15 ML: 1.2 LIQUID ORAL at 09:00

## 2022-01-01 RX ADMIN — SODIUM BICARBONATE 650 MG TABLET 650 MG: at 13:07

## 2022-01-01 RX ADMIN — MORPHINE SULFATE 2 MG: 2 INJECTION, SOLUTION INTRAMUSCULAR; INTRAVENOUS at 20:30

## 2022-01-01 RX ADMIN — Medication 10 ML: at 15:44

## 2022-01-01 RX ADMIN — ACETAMINOPHEN 650 MG: 650 SUSPENSION ORAL at 15:12

## 2022-01-01 RX ADMIN — MAGNESIUM SULFATE HEPTAHYDRATE 1 G: 1 INJECTION, SOLUTION INTRAVENOUS at 07:26

## 2022-01-01 RX ADMIN — LEVOTHYROXINE SODIUM 56 MCG: 112 TABLET ORAL at 05:24

## 2022-01-01 RX ADMIN — DEXTROSE MONOHYDRATE 25 ML: 25 INJECTION, SOLUTION INTRAVENOUS at 06:49

## 2022-01-01 RX ADMIN — FUROSEMIDE 20 MG: 10 INJECTION, SOLUTION INTRAMUSCULAR; INTRAVENOUS at 11:25

## 2022-01-01 RX ADMIN — VASOPRESSIN: 20 INJECTION INTRAVENOUS at 07:29

## 2022-01-01 RX ADMIN — INSULIN LISPRO 1 UNITS: 100 INJECTION, SOLUTION INTRAVENOUS; SUBCUTANEOUS at 06:12

## 2022-01-01 RX ADMIN — MORPHINE SULFATE 2 MG: 2 INJECTION, SOLUTION INTRAMUSCULAR; INTRAVENOUS at 15:19

## 2022-01-01 RX ADMIN — VASOPRESSIN 0.04 UNITS/MIN: 20 INJECTION INTRAVENOUS at 18:41

## 2022-01-01 RX ADMIN — HYDROMORPHONE HYDROCHLORIDE 0.5 MG: 1 INJECTION, SOLUTION INTRAMUSCULAR; INTRAVENOUS; SUBCUTANEOUS at 05:00

## 2022-01-01 RX ADMIN — ALBUMIN (HUMAN) 25 G: 0.25 INJECTION, SOLUTION INTRAVENOUS at 01:53

## 2022-01-01 RX ADMIN — VASOPRESSIN 0.04 UNITS/MIN: 20 INJECTION INTRAVENOUS at 06:35

## 2022-01-01 RX ADMIN — FENTANYL CITRATE 12.5 MCG: 50 INJECTION, SOLUTION INTRAMUSCULAR; INTRAVENOUS at 15:16

## 2022-01-01 RX ADMIN — NOREPINEPHRINE BITARTRATE 10 MCG/MIN: 1 SOLUTION INTRAVENOUS at 00:46

## 2022-01-01 RX ADMIN — OXYCODONE HYDROCHLORIDE 5 MG: 5 SOLUTION ORAL at 01:25

## 2022-01-01 RX ADMIN — INSULIN LISPRO 4 UNITS: 100 INJECTION, SOLUTION INTRAVENOUS; SUBCUTANEOUS at 06:11

## 2022-01-01 RX ADMIN — CHLORHEXIDINE GLUCONATE 0.12% ORAL RINSE 15 ML: 1.2 LIQUID ORAL at 09:27

## 2022-01-01 RX ADMIN — PROPOFOL 40 MG: 10 INJECTION, EMULSION INTRAVENOUS at 14:05

## 2022-01-01 RX ADMIN — METRONIDAZOLE 500 MG: 500 INJECTION, SOLUTION INTRAVENOUS at 08:22

## 2022-01-01 RX ADMIN — HEPARIN SODIUM 5000 UNITS: 5000 INJECTION INTRAVENOUS; SUBCUTANEOUS at 20:49

## 2022-01-01 RX ADMIN — HEPARIN SODIUM 5000 UNITS: 5000 INJECTION INTRAVENOUS; SUBCUTANEOUS at 05:55

## 2022-01-01 RX ADMIN — HEPARIN SODIUM 400 UNITS/HR: 10000 INJECTION, SOLUTION INTRAVENOUS at 12:50

## 2022-01-01 RX ADMIN — DEXMEDETOMIDINE HYDROCHLORIDE 0.6 MCG/KG/HR: 400 INJECTION INTRAVENOUS at 15:10

## 2022-01-01 RX ADMIN — POTASSIUM CHLORIDE 20 MEQ: 14.9 INJECTION, SOLUTION INTRAVENOUS at 20:04

## 2022-01-01 RX ADMIN — Medication 2 MG/HR: at 11:17

## 2022-01-01 RX ADMIN — IPRATROPIUM BROMIDE AND ALBUTEROL SULFATE 3 ML: 2.5; .5 SOLUTION RESPIRATORY (INHALATION) at 07:23

## 2022-01-01 RX ADMIN — LEVOTHYROXINE SODIUM 56 MCG: 112 TABLET ORAL at 05:00

## 2022-01-01 RX ADMIN — Medication 20000 ML: at 06:45

## 2022-01-01 RX ADMIN — POTASSIUM & SODIUM PHOSPHATES POWDER PACK 280-160-250 MG 1 PACKET: 280-160-250 PACK at 15:41

## 2022-01-01 RX ADMIN — HYDROMORPHONE HYDROCHLORIDE 0.5 MG: 1 INJECTION, SOLUTION INTRAMUSCULAR; INTRAVENOUS; SUBCUTANEOUS at 07:42

## 2022-01-01 RX ADMIN — HEPARIN SODIUM 5000 UNITS: 5000 INJECTION INTRAVENOUS; SUBCUTANEOUS at 08:12

## 2022-01-01 RX ADMIN — SODIUM BICARBONATE 50 MEQ: 84 INJECTION, SOLUTION INTRAVENOUS at 14:46

## 2022-01-01 RX ADMIN — CALCIUM GLUCONATE 1 G: 20 INJECTION, SOLUTION INTRAVENOUS at 00:33

## 2022-01-01 RX ADMIN — LIDOCAINE 5% 2 PATCH: 700 PATCH TOPICAL at 09:36

## 2022-01-01 RX ADMIN — ACETYLCYSTEINE 600 MG: 200 SOLUTION ORAL; RESPIRATORY (INHALATION) at 23:17

## 2022-01-01 RX ADMIN — Medication 20000 ML: at 08:30

## 2022-01-01 RX ADMIN — MIDODRINE HYDROCHLORIDE 10 MG: 5 TABLET ORAL at 05:43

## 2022-01-01 RX ADMIN — POTASSIUM CHLORIDE 40 MEQ: 29.8 INJECTION, SOLUTION INTRAVENOUS at 00:40

## 2022-01-01 RX ADMIN — HEPARIN SODIUM 5000 UNITS: 5000 INJECTION INTRAVENOUS; SUBCUTANEOUS at 21:04

## 2022-01-01 RX ADMIN — ALBUMIN (HUMAN) 25 G: 0.25 INJECTION, SOLUTION INTRAVENOUS at 08:16

## 2022-01-01 RX ADMIN — POLYETHYLENE GLYCOL 3350 17 G: 17 POWDER, FOR SOLUTION ORAL at 08:25

## 2022-01-01 RX ADMIN — LEVOTHYROXINE SODIUM 112 MCG: 112 TABLET ORAL at 05:41

## 2022-01-01 RX ADMIN — LEVOTHYROXINE SODIUM 56 MCG: 112 TABLET ORAL at 05:56

## 2022-01-01 RX ADMIN — INSULIN LISPRO 2 UNITS: 100 INJECTION, SOLUTION INTRAVENOUS; SUBCUTANEOUS at 05:28

## 2022-01-01 RX ADMIN — Medication 20000 ML: at 18:53

## 2022-01-01 RX ADMIN — INSULIN LISPRO 1 UNITS: 100 INJECTION, SOLUTION INTRAVENOUS; SUBCUTANEOUS at 05:56

## 2022-01-01 RX ADMIN — GLYCOPYRROLATE 0.1 MG: 0.2 INJECTION, SOLUTION INTRAMUSCULAR; INTRAVENOUS at 14:02

## 2022-01-01 RX ADMIN — GLUCAGON HYDROCHLORIDE 1 MG: KIT at 15:31

## 2022-01-01 RX ADMIN — ACETYLCYSTEINE 600 MG: 200 SOLUTION ORAL; RESPIRATORY (INHALATION) at 08:06

## 2022-01-01 RX ADMIN — CALCIUM GLUCONATE 1 G: 20 INJECTION, SOLUTION INTRAVENOUS at 23:09

## 2022-01-01 RX ADMIN — AMIODARONE HYDROCHLORIDE 1 MG/MIN: 50 INJECTION, SOLUTION INTRAVENOUS at 17:28

## 2022-01-01 RX ADMIN — NOREPINEPHRINE BITARTRATE 30 MCG/MIN: 1 INJECTION, SOLUTION, CONCENTRATE INTRAVENOUS at 22:44

## 2022-01-01 RX ADMIN — Medication 20000 ML: at 07:59

## 2022-01-01 RX ADMIN — CALCIUM GLUCONATE 1 G: 20 INJECTION, SOLUTION INTRAVENOUS at 09:03

## 2022-01-01 RX ADMIN — CHLORHEXIDINE GLUCONATE 0.12% ORAL RINSE 15 ML: 1.2 LIQUID ORAL at 08:24

## 2022-01-01 RX ADMIN — CEFTRIAXONE 1000 MG: 1 INJECTION, SOLUTION INTRAVENOUS at 04:53

## 2022-01-01 RX ADMIN — CHLORHEXIDINE GLUCONATE 0.12% ORAL RINSE 15 ML: 1.2 LIQUID ORAL at 21:25

## 2022-01-01 RX ADMIN — IPRATROPIUM BROMIDE AND ALBUTEROL SULFATE 3 ML: 2.5; .5 SOLUTION RESPIRATORY (INHALATION) at 08:22

## 2022-01-01 RX ADMIN — Medication 20000 ML: at 11:15

## 2022-01-01 RX ADMIN — MORPHINE SULFATE 2 MG: 2 INJECTION, SOLUTION INTRAMUSCULAR; INTRAVENOUS at 13:17

## 2022-01-01 RX ADMIN — SODIUM PHOSPHATE, MONOBASIC, MONOHYDRATE 9 MMOL: 276; 142 INJECTION, SOLUTION INTRAVENOUS at 08:19

## 2022-01-01 RX ADMIN — MAGNESIUM SULFATE HEPTAHYDRATE 1 G: 1 INJECTION, SOLUTION INTRAVENOUS at 12:48

## 2022-01-01 RX ADMIN — ACETYLCYSTEINE 600 MG: 200 SOLUTION ORAL; RESPIRATORY (INHALATION) at 23:05

## 2022-01-01 RX ADMIN — CHLORHEXIDINE GLUCONATE 0.12% ORAL RINSE 15 ML: 1.2 LIQUID ORAL at 20:09

## 2022-01-01 RX ADMIN — HYDROMORPHONE HYDROCHLORIDE 0.5 MG: 1 INJECTION, SOLUTION INTRAMUSCULAR; INTRAVENOUS; SUBCUTANEOUS at 12:19

## 2022-01-01 RX ADMIN — METRONIDAZOLE 500 MG: 500 INJECTION, SOLUTION INTRAVENOUS at 23:30

## 2022-01-01 RX ADMIN — POTASSIUM CHLORIDE 20 MEQ: 200 INJECTION, SOLUTION INTRAVENOUS at 17:30

## 2022-01-01 RX ADMIN — DEXTROSE, SODIUM CHLORIDE, AND POTASSIUM CHLORIDE 50 ML/HR: 5; .45; .15 INJECTION INTRAVENOUS at 06:25

## 2022-01-01 RX ADMIN — LIDOCAINE 5% 1 PATCH: 700 PATCH TOPICAL at 08:23

## 2022-01-01 RX ADMIN — MIDODRINE HYDROCHLORIDE 10 MG: 5 TABLET ORAL at 06:17

## 2022-01-01 RX ADMIN — Medication 20000 ML: at 05:06

## 2022-01-01 RX ADMIN — MIDODRINE HYDROCHLORIDE 10 MG: 5 TABLET ORAL at 05:26

## 2022-01-01 RX ADMIN — ALBUMIN (HUMAN) 25 G: 12.5 INJECTION, SOLUTION INTRAVENOUS at 14:28

## 2022-01-01 RX ADMIN — PANTOPRAZOLE SODIUM 40 MG: 40 INJECTION, POWDER, FOR SOLUTION INTRAVENOUS at 08:09

## 2022-01-01 RX ADMIN — CEFEPIME HYDROCHLORIDE 2000 MG: 2 INJECTION, POWDER, FOR SOLUTION INTRAVENOUS at 11:53

## 2022-01-01 RX ADMIN — INSULIN HUMAN 10 UNITS: 100 INJECTION, SOLUTION PARENTERAL at 20:52

## 2022-01-01 RX ADMIN — MAGNESIUM SULFATE HEPTAHYDRATE 1 G: 1 INJECTION, SOLUTION INTRAVENOUS at 12:44

## 2022-01-01 RX ADMIN — ALBUMIN (HUMAN) 25 G: 0.25 INJECTION, SOLUTION INTRAVENOUS at 22:23

## 2022-01-01 RX ADMIN — VASOPRESSIN 0.04 UNITS/MIN: 20 INJECTION INTRAVENOUS at 09:13

## 2022-01-01 RX ADMIN — DEXTROSE AND SODIUM CHLORIDE 85 ML/HR: 5; .9 INJECTION, SOLUTION INTRAVENOUS at 15:12

## 2022-01-01 RX ADMIN — METRONIDAZOLE 500 MG: 500 INJECTION, SOLUTION INTRAVENOUS at 08:41

## 2022-01-01 RX ADMIN — HEPARIN SODIUM 500 UNITS/HR: 10000 INJECTION, SOLUTION INTRAVENOUS at 23:30

## 2022-01-01 RX ADMIN — INSULIN LISPRO 1 UNITS: 100 INJECTION, SOLUTION INTRAVENOUS; SUBCUTANEOUS at 05:21

## 2022-01-01 RX ADMIN — OXYCODONE HYDROCHLORIDE 5 MG: 5 SOLUTION ORAL at 19:20

## 2022-01-01 RX ADMIN — VASOPRESSIN 0.04 UNITS/MIN: 20 INJECTION INTRAVENOUS at 20:26

## 2022-01-01 RX ADMIN — NOREPINEPHRINE BITARTRATE 4 MG: 1 INJECTION, SOLUTION, CONCENTRATE INTRAVENOUS at 16:34

## 2022-01-01 RX ADMIN — IPRATROPIUM BROMIDE AND ALBUTEROL SULFATE 3 ML: 2.5; .5 SOLUTION RESPIRATORY (INHALATION) at 19:39

## 2022-01-01 RX ADMIN — POLYETHYLENE GLYCOL 3350 17 G: 17 POWDER, FOR SOLUTION ORAL at 08:38

## 2022-01-01 RX ADMIN — Medication 20000 ML: at 17:40

## 2022-01-01 RX ADMIN — METRONIDAZOLE 500 MG: 500 INJECTION, SOLUTION INTRAVENOUS at 00:45

## 2022-01-01 RX ADMIN — FUROSEMIDE 40 MG: 10 INJECTION, SOLUTION INTRAMUSCULAR; INTRAVENOUS at 09:44

## 2022-01-01 RX ADMIN — GABAPENTIN 200 MG: 250 SUSPENSION ORAL at 21:10

## 2022-01-01 RX ADMIN — HYDROMORPHONE HYDROCHLORIDE 0.5 MG: 1 INJECTION, SOLUTION INTRAMUSCULAR; INTRAVENOUS; SUBCUTANEOUS at 17:56

## 2022-01-01 RX ADMIN — PANTOPRAZOLE SODIUM 40 MG: 40 INJECTION, POWDER, FOR SOLUTION INTRAVENOUS at 11:34

## 2022-01-01 RX ADMIN — PIPERACILLIN AND TAZOBACTAM 3.38 G: 36; 4.5 INJECTION, POWDER, FOR SOLUTION INTRAVENOUS at 16:09

## 2022-01-01 RX ADMIN — Medication 1 MG/HR: at 15:17

## 2022-01-01 RX ADMIN — Medication 10 ML: at 15:29

## 2022-01-01 RX ADMIN — INSULIN LISPRO 1 UNITS: 100 INJECTION, SOLUTION INTRAVENOUS; SUBCUTANEOUS at 18:00

## 2022-01-01 RX ADMIN — INSULIN LISPRO 1 UNITS: 100 INJECTION, SOLUTION INTRAVENOUS; SUBCUTANEOUS at 23:39

## 2022-01-01 RX ADMIN — MORPHINE SULFATE 2 MG: 2 INJECTION, SOLUTION INTRAMUSCULAR; INTRAVENOUS at 13:52

## 2022-01-01 RX ADMIN — PANTOPRAZOLE SODIUM 40 MG: 40 INJECTION, POWDER, FOR SOLUTION INTRAVENOUS at 08:03

## 2022-01-01 RX ADMIN — VANCOMYCIN HYDROCHLORIDE 750 MG: 750 INJECTION, SOLUTION INTRAVENOUS at 16:28

## 2022-01-01 RX ADMIN — LIDOCAINE 5% 2 PATCH: 700 PATCH TOPICAL at 09:05

## 2022-01-01 RX ADMIN — POTASSIUM PHOSPHATE, MONOBASIC AND POTASSIUM PHOSPHATE, DIBASIC 12 MMOL: 224; 236 INJECTION, SOLUTION, CONCENTRATE INTRAVENOUS at 09:27

## 2022-01-01 RX ADMIN — POLYETHYLENE GLYCOL 3350 17 G: 17 POWDER, FOR SOLUTION ORAL at 08:51

## 2022-01-01 RX ADMIN — DEXMEDETOMIDINE HYDROCHLORIDE 1 MCG/KG/HR: 400 INJECTION INTRAVENOUS at 20:58

## 2022-01-01 RX ADMIN — OXYCODONE HYDROCHLORIDE 7.5 MG: 5 TABLET ORAL at 18:00

## 2022-01-01 RX ADMIN — ALBUMIN (HUMAN) 25 G: 0.25 INJECTION, SOLUTION INTRAVENOUS at 04:49

## 2022-01-01 RX ADMIN — PHENYLEPHRINE HYDROCHLORIDE 20 MCG/MIN: 10 INJECTION INTRAVENOUS at 18:02

## 2022-01-01 RX ADMIN — Medication 20000 ML: at 01:57

## 2022-01-01 RX ADMIN — VANCOMYCIN HYDROCHLORIDE 750 MG: 750 INJECTION, SOLUTION INTRAVENOUS at 15:42

## 2022-01-01 RX ADMIN — IPRATROPIUM BROMIDE AND ALBUTEROL SULFATE 3 ML: 2.5; .5 SOLUTION RESPIRATORY (INHALATION) at 15:21

## 2022-01-01 RX ADMIN — VANCOMYCIN HYDROCHLORIDE 750 MG: 750 INJECTION, SOLUTION INTRAVENOUS at 17:08

## 2022-01-01 RX ADMIN — INSULIN LISPRO 8 UNITS: 100 INJECTION, SOLUTION INTRAVENOUS; SUBCUTANEOUS at 11:36

## 2022-01-01 RX ADMIN — Medication: at 21:57

## 2022-01-01 RX ADMIN — CEFAZOLIN SODIUM 2000 MG: 2 SOLUTION INTRAVENOUS at 18:01

## 2022-01-01 RX ADMIN — Medication 20000 ML: at 17:57

## 2022-01-01 RX ADMIN — MIDODRINE HYDROCHLORIDE 10 MG: 5 TABLET ORAL at 06:16

## 2022-01-01 RX ADMIN — HYDROMORPHONE HYDROCHLORIDE 0.5 MG: 1 INJECTION, SOLUTION INTRAMUSCULAR; INTRAVENOUS; SUBCUTANEOUS at 01:07

## 2022-01-01 RX ADMIN — LIDOCAINE 5% 2 PATCH: 700 PATCH TOPICAL at 08:04

## 2022-01-01 RX ADMIN — HEPARIN SODIUM 5000 UNITS: 5000 INJECTION INTRAVENOUS; SUBCUTANEOUS at 08:21

## 2022-01-01 RX ADMIN — ACETYLCYSTEINE 600 MG: 200 SOLUTION ORAL; RESPIRATORY (INHALATION) at 07:41

## 2022-01-01 RX ADMIN — IPRATROPIUM BROMIDE AND ALBUTEROL SULFATE 3 ML: 2.5; .5 SOLUTION RESPIRATORY (INHALATION) at 23:21

## 2022-01-01 RX ADMIN — CALCIUM GLUCONATE 1 G: 20 INJECTION, SOLUTION INTRAVENOUS at 09:21

## 2022-01-01 RX ADMIN — FUROSEMIDE 40 MG: 10 INJECTION, SOLUTION INTRAMUSCULAR; INTRAVENOUS at 21:05

## 2022-01-01 RX ADMIN — Medication 20 MG/HR: at 02:11

## 2022-01-01 RX ADMIN — VASOPRESSIN 0.04 UNITS/MIN: 20 INJECTION INTRAVENOUS at 02:28

## 2022-01-01 RX ADMIN — POTASSIUM PHOSPHATE, MONOBASIC AND POTASSIUM PHOSPHATE, DIBASIC 12 MMOL: 224; 236 INJECTION, SOLUTION, CONCENTRATE INTRAVENOUS at 11:43

## 2022-01-01 RX ADMIN — HYDROMORPHONE HYDROCHLORIDE 0.5 MG: 1 INJECTION, SOLUTION INTRAMUSCULAR; INTRAVENOUS; SUBCUTANEOUS at 16:50

## 2022-01-01 RX ADMIN — PANTOPRAZOLE SODIUM 40 MG: 40 INJECTION, POWDER, FOR SOLUTION INTRAVENOUS at 08:22

## 2022-01-01 RX ADMIN — MIDODRINE HYDROCHLORIDE 10 MG: 5 TABLET ORAL at 11:24

## 2022-01-01 RX ADMIN — PIPERACILLIN AND TAZOBACTAM 3.38 G: 36; 4.5 INJECTION, POWDER, FOR SOLUTION INTRAVENOUS at 04:07

## 2022-01-01 RX ADMIN — LIDOCAINE 5% 2 PATCH: 700 PATCH TOPICAL at 08:24

## 2022-01-01 RX ADMIN — MIDODRINE HYDROCHLORIDE 10 MG: 5 TABLET ORAL at 16:18

## 2022-01-01 RX ADMIN — SODIUM PHOSPHATE, MONOBASIC, MONOHYDRATE 9 MMOL: 276; 142 INJECTION, SOLUTION INTRAVENOUS at 06:49

## 2022-01-01 RX ADMIN — SODIUM PHOSPHATE, MONOBASIC, MONOHYDRATE 6 MMOL: 276; 142 INJECTION, SOLUTION INTRAVENOUS at 07:23

## 2022-01-01 RX ADMIN — GABAPENTIN 200 MG: 250 SUSPENSION ORAL at 21:33

## 2022-01-01 RX ADMIN — LEVOTHYROXINE SODIUM 112 MCG: 112 TABLET ORAL at 05:10

## 2022-01-01 RX ADMIN — HEPARIN SODIUM 5000 UNITS: 5000 INJECTION INTRAVENOUS; SUBCUTANEOUS at 21:28

## 2022-01-01 RX ADMIN — IPRATROPIUM BROMIDE AND ALBUTEROL SULFATE 3 ML: 2.5; .5 SOLUTION RESPIRATORY (INHALATION) at 07:25

## 2022-01-01 RX ADMIN — LEVOTHYROXINE SODIUM 112 MCG: 112 TABLET ORAL at 05:08

## 2022-01-01 RX ADMIN — DEXMEDETOMIDINE HYDROCHLORIDE 0.4 MCG/KG/HR: 400 INJECTION INTRAVENOUS at 21:48

## 2022-01-01 RX ADMIN — Medication 20000 ML: at 12:00

## 2022-01-01 RX ADMIN — NOREPINEPHRINE BITARTRATE 1 MCG/MIN: 1 SOLUTION INTRAVENOUS at 22:31

## 2022-01-01 RX ADMIN — CALCIUM GLUCONATE 1 G: 20 INJECTION, SOLUTION INTRAVENOUS at 07:23

## 2022-01-01 RX ADMIN — INSULIN LISPRO 1 UNITS: 100 INJECTION, SOLUTION INTRAVENOUS; SUBCUTANEOUS at 05:59

## 2022-01-01 RX ADMIN — MORPHINE SULFATE 2 MG: 2 INJECTION, SOLUTION INTRAMUSCULAR; INTRAVENOUS at 09:16

## 2022-01-01 RX ADMIN — FENTANYL CITRATE 12.5 MCG: 50 INJECTION, SOLUTION INTRAMUSCULAR; INTRAVENOUS at 15:26

## 2022-01-01 RX ADMIN — METRONIDAZOLE 500 MG: 500 INJECTION, SOLUTION INTRAVENOUS at 15:53

## 2022-01-01 RX ADMIN — DEXMEDETOMIDINE HYDROCHLORIDE 0.8 MCG/KG/HR: 400 INJECTION INTRAVENOUS at 06:09

## 2022-01-01 RX ADMIN — DEXMEDETOMIDINE HYDROCHLORIDE 1 MCG/KG/HR: 400 INJECTION INTRAVENOUS at 18:41

## 2022-01-01 RX ADMIN — POLYETHYLENE GLYCOL 3350 17 G: 17 POWDER, FOR SOLUTION ORAL at 08:05

## 2022-01-01 RX ADMIN — CALCIUM GLUCONATE 1 G: 20 INJECTION, SOLUTION INTRAVENOUS at 19:19

## 2022-01-01 RX ADMIN — PIPERACILLIN AND TAZOBACTAM 3.38 G: 36; 4.5 INJECTION, POWDER, FOR SOLUTION INTRAVENOUS at 21:18

## 2022-01-01 RX ADMIN — HYDROMORPHONE HYDROCHLORIDE 0.5 MG: 1 INJECTION, SOLUTION INTRAMUSCULAR; INTRAVENOUS; SUBCUTANEOUS at 21:58

## 2022-01-01 RX ADMIN — CALCIUM GLUCONATE 1 G: 20 INJECTION, SOLUTION INTRAVENOUS at 06:48

## 2022-01-01 RX ADMIN — CALCIUM GLUCONATE 1 G: 20 INJECTION, SOLUTION INTRAVENOUS at 18:45

## 2022-01-01 RX ADMIN — CEFEPIME HYDROCHLORIDE 2000 MG: 2 INJECTION, POWDER, FOR SOLUTION INTRAVENOUS at 11:26

## 2022-01-01 RX ADMIN — METOLAZONE 10 MG: 5 TABLET ORAL at 23:30

## 2022-01-01 RX ADMIN — VASOPRESSIN 0.04 UNITS/MIN: 20 INJECTION INTRAVENOUS at 23:19

## 2022-01-01 RX ADMIN — Medication: at 21:50

## 2022-01-01 RX ADMIN — PIPERACILLIN AND TAZOBACTAM 3.38 G: 36; 4.5 INJECTION, POWDER, FOR SOLUTION INTRAVENOUS at 04:11

## 2022-01-01 RX ADMIN — CHLORHEXIDINE GLUCONATE 0.12% ORAL RINSE 15 ML: 1.2 LIQUID ORAL at 08:00

## 2022-01-01 RX ADMIN — MIDODRINE HYDROCHLORIDE 10 MG: 5 TABLET ORAL at 15:41

## 2022-01-01 RX ADMIN — Medication 20000 ML: at 09:30

## 2022-01-01 RX ADMIN — COSYNTROPIN 0.25 MG: 0.25 INJECTION, POWDER, LYOPHILIZED, FOR SOLUTION INTRAMUSCULAR; INTRAVENOUS at 07:45

## 2022-01-01 RX ADMIN — IPRATROPIUM BROMIDE AND ALBUTEROL SULFATE 3 ML: 2.5; .5 SOLUTION RESPIRATORY (INHALATION) at 23:03

## 2022-01-01 RX ADMIN — DEXMEDETOMIDINE HYDROCHLORIDE 1 MCG/KG/HR: 400 INJECTION INTRAVENOUS at 07:42

## 2022-01-01 RX ADMIN — MIDODRINE HYDROCHLORIDE 10 MG: 5 TABLET ORAL at 05:54

## 2022-01-01 RX ADMIN — CHLORHEXIDINE GLUCONATE 0.12% ORAL RINSE 15 ML: 1.2 LIQUID ORAL at 21:12

## 2022-01-01 RX ADMIN — Medication 20000 ML: at 05:07

## 2022-01-01 RX ADMIN — VASOPRESSIN 0.04 UNITS/MIN: 20 INJECTION INTRAVENOUS at 01:02

## 2022-01-01 RX ADMIN — MAGNESIUM SULFATE HEPTAHYDRATE 1 G: 1 INJECTION, SOLUTION INTRAVENOUS at 01:33

## 2022-01-01 RX ADMIN — IPRATROPIUM BROMIDE AND ALBUTEROL SULFATE 3 ML: 2.5; .5 SOLUTION RESPIRATORY (INHALATION) at 22:50

## 2022-01-01 RX ADMIN — HYDROMORPHONE HYDROCHLORIDE 0.5 MG: 1 INJECTION, SOLUTION INTRAMUSCULAR; INTRAVENOUS; SUBCUTANEOUS at 17:27

## 2022-01-01 RX ADMIN — CEFEPIME HYDROCHLORIDE 2000 MG: 2 INJECTION, POWDER, FOR SOLUTION INTRAVENOUS at 23:07

## 2022-01-01 RX ADMIN — INSULIN LISPRO 1 UNITS: 100 INJECTION, SOLUTION INTRAVENOUS; SUBCUTANEOUS at 00:11

## 2022-01-01 RX ADMIN — HYDROMORPHONE HYDROCHLORIDE 0.5 MG: 1 INJECTION, SOLUTION INTRAMUSCULAR; INTRAVENOUS; SUBCUTANEOUS at 04:51

## 2022-01-01 RX ADMIN — POTASSIUM CHLORIDE 20 MEQ: 14.9 INJECTION, SOLUTION INTRAVENOUS at 11:42

## 2022-01-01 RX ADMIN — DEXMEDETOMIDINE HYDROCHLORIDE 1 MCG/KG/HR: 400 INJECTION INTRAVENOUS at 09:00

## 2022-01-01 RX ADMIN — PIPERACILLIN AND TAZOBACTAM 3.38 G: 36; 4.5 INJECTION, POWDER, FOR SOLUTION INTRAVENOUS at 21:54

## 2022-01-01 RX ADMIN — CHLORHEXIDINE GLUCONATE 0.12% ORAL RINSE 15 ML: 1.2 LIQUID ORAL at 21:19

## 2022-01-01 RX ADMIN — METRONIDAZOLE 500 MG: 500 INJECTION, SOLUTION INTRAVENOUS at 08:43

## 2022-01-01 RX ADMIN — HYDROMORPHONE HYDROCHLORIDE 0.5 MG: 1 INJECTION, SOLUTION INTRAMUSCULAR; INTRAVENOUS; SUBCUTANEOUS at 13:39

## 2022-01-01 RX ADMIN — LEVOTHYROXINE SODIUM 112 MCG: 112 TABLET ORAL at 05:43

## 2022-01-01 RX ADMIN — IPRATROPIUM BROMIDE AND ALBUTEROL SULFATE 3 ML: 2.5; .5 SOLUTION RESPIRATORY (INHALATION) at 13:44

## 2022-01-01 RX ADMIN — LIDOCAINE 5% 2 PATCH: 700 PATCH TOPICAL at 08:10

## 2022-01-01 RX ADMIN — CEFTRIAXONE 1000 MG: 1 INJECTION, SOLUTION INTRAVENOUS at 04:41

## 2022-01-01 RX ADMIN — MIDODRINE HYDROCHLORIDE 10 MG: 5 TABLET ORAL at 15:17

## 2022-01-01 RX ADMIN — SODIUM CHLORIDE, SODIUM LACTATE, POTASSIUM CHLORIDE, AND CALCIUM CHLORIDE 500 ML: .6; .31; .03; .02 INJECTION, SOLUTION INTRAVENOUS at 10:30

## 2022-01-01 RX ADMIN — ACETYLCYSTEINE 600 MG: 200 SOLUTION ORAL; RESPIRATORY (INHALATION) at 23:20

## 2022-01-01 RX ADMIN — LIDOCAINE 5% 2 PATCH: 700 PATCH TOPICAL at 09:44

## 2022-01-01 RX ADMIN — CHLORHEXIDINE GLUCONATE 0.12% ORAL RINSE 15 ML: 1.2 LIQUID ORAL at 08:38

## 2022-01-01 RX ADMIN — VASOPRESSIN 0.03 UNITS/MIN: 20 INJECTION INTRAVENOUS at 11:22

## 2022-01-01 RX ADMIN — SODIUM CHLORIDE, SODIUM LACTATE, POTASSIUM CHLORIDE, AND CALCIUM CHLORIDE 500 ML: .6; .31; .03; .02 INJECTION, SOLUTION INTRAVENOUS at 22:22

## 2022-01-01 RX ADMIN — MIDODRINE HYDROCHLORIDE 10 MG: 5 TABLET ORAL at 11:06

## 2022-01-01 RX ADMIN — MAGNESIUM SULFATE HEPTAHYDRATE 1 G: 1 INJECTION, SOLUTION INTRAVENOUS at 08:26

## 2022-01-01 RX ADMIN — VASOPRESSIN 0.04 UNITS/MIN: 20 INJECTION INTRAVENOUS at 01:24

## 2022-01-01 RX ADMIN — INSULIN HUMAN 10 UNITS: 100 INJECTION, SOLUTION PARENTERAL at 08:56

## 2022-01-01 RX ADMIN — FUROSEMIDE 40 MG: 10 INJECTION, SOLUTION INTRAMUSCULAR; INTRAVENOUS at 09:46

## 2022-01-01 RX ADMIN — ACETYLCYSTEINE 600 MG: 200 SOLUTION ORAL; RESPIRATORY (INHALATION) at 16:24

## 2022-01-01 RX ADMIN — POLYETHYLENE GLYCOL 3350 17 G: 17 POWDER, FOR SOLUTION ORAL at 09:47

## 2022-01-01 RX ADMIN — DEXMEDETOMIDINE HYDROCHLORIDE 0.4 MCG/KG/HR: 400 INJECTION INTRAVENOUS at 14:35

## 2022-01-01 RX ADMIN — HYDROMORPHONE HYDROCHLORIDE 0.5 MG: 1 INJECTION, SOLUTION INTRAMUSCULAR; INTRAVENOUS; SUBCUTANEOUS at 21:15

## 2022-01-01 RX ADMIN — DEXTROSE 150 MG: 50 INJECTION, SOLUTION INTRAVENOUS at 17:15

## 2022-01-01 RX ADMIN — ACETAMINOPHEN 650 MG: 325 TABLET, FILM COATED ORAL at 00:33

## 2022-01-01 RX ADMIN — DEXTROSE, SODIUM CHLORIDE, AND POTASSIUM CHLORIDE 50 ML/HR: 5; .45; .15 INJECTION INTRAVENOUS at 09:12

## 2022-01-01 RX ADMIN — EPINEPHRINE 10 MCG/MIN: 1 INJECTION INTRAMUSCULAR; INTRAVENOUS; SUBCUTANEOUS at 21:10

## 2022-01-01 RX ADMIN — INSULIN LISPRO 4 UNITS: 100 INJECTION, SOLUTION INTRAVENOUS; SUBCUTANEOUS at 11:47

## 2022-01-01 RX ADMIN — HYDROMORPHONE HYDROCHLORIDE 0.5 MG: 1 INJECTION, SOLUTION INTRAMUSCULAR; INTRAVENOUS; SUBCUTANEOUS at 22:08

## 2022-01-01 RX ADMIN — LEVOTHYROXINE SODIUM 112 MCG: 112 TABLET ORAL at 05:15

## 2022-01-01 RX ADMIN — ALBUMIN (HUMAN) 25 G: 0.25 INJECTION, SOLUTION INTRAVENOUS at 20:54

## 2022-01-01 RX ADMIN — DEXMEDETOMIDINE HYDROCHLORIDE 0.8 MCG/KG/HR: 400 INJECTION INTRAVENOUS at 06:34

## 2022-01-01 RX ADMIN — HYDROMORPHONE HYDROCHLORIDE 0.5 MG: 1 INJECTION, SOLUTION INTRAMUSCULAR; INTRAVENOUS; SUBCUTANEOUS at 07:37

## 2022-01-01 RX ADMIN — HEPARIN SODIUM 5000 UNITS: 5000 INJECTION INTRAVENOUS; SUBCUTANEOUS at 22:00

## 2022-01-01 RX ADMIN — HEPARIN SODIUM 5000 UNITS: 5000 INJECTION INTRAVENOUS; SUBCUTANEOUS at 20:38

## 2022-01-01 RX ADMIN — INSULIN LISPRO 2 UNITS: 100 INJECTION, SOLUTION INTRAVENOUS; SUBCUTANEOUS at 13:15

## 2022-01-01 RX ADMIN — CHLORHEXIDINE GLUCONATE 0.12% ORAL RINSE 15 ML: 1.2 LIQUID ORAL at 08:43

## 2022-01-01 RX ADMIN — PIPERACILLIN AND TAZOBACTAM 3.38 G: 36; 4.5 INJECTION, POWDER, FOR SOLUTION INTRAVENOUS at 11:36

## 2022-01-01 RX ADMIN — METOLAZONE 10 MG: 5 TABLET ORAL at 23:50

## 2022-01-01 RX ADMIN — ACETYLCYSTEINE 600 MG: 200 SOLUTION ORAL; RESPIRATORY (INHALATION) at 07:17

## 2022-01-01 RX ADMIN — ACETYLCYSTEINE 600 MG: 200 SOLUTION ORAL; RESPIRATORY (INHALATION) at 23:03

## 2022-01-01 RX ADMIN — CHLORHEXIDINE GLUCONATE 0.12% ORAL RINSE 15 ML: 1.2 LIQUID ORAL at 08:05

## 2022-01-01 RX ADMIN — NOREPINEPHRINE BITARTRATE 5 MCG/MIN: 1 SOLUTION INTRAVENOUS at 07:18

## 2022-01-01 RX ADMIN — MORPHINE SULFATE 2 MG: 2 INJECTION, SOLUTION INTRAMUSCULAR; INTRAVENOUS at 17:36

## 2022-01-01 RX ADMIN — METRONIDAZOLE 500 MG: 500 INJECTION, SOLUTION INTRAVENOUS at 08:23

## 2022-01-01 RX ADMIN — Medication 2 MG/HR: at 17:11

## 2022-01-01 RX ADMIN — PIPERACILLIN AND TAZOBACTAM 3.38 G: 36; 4.5 INJECTION, POWDER, FOR SOLUTION INTRAVENOUS at 11:39

## 2022-01-01 RX ADMIN — POTASSIUM PHOSPHATE, MONOBASIC AND POTASSIUM PHOSPHATE, DIBASIC 6 MMOL: 224; 236 INJECTION, SOLUTION, CONCENTRATE INTRAVENOUS at 10:10

## 2022-01-01 RX ADMIN — SODIUM BICARBONATE 650 MG TABLET 650 MG: at 17:48

## 2022-01-01 RX ADMIN — OXYCODONE HYDROCHLORIDE 5 MG: 5 TABLET ORAL at 12:24

## 2022-01-01 RX ADMIN — DEXMEDETOMIDINE HYDROCHLORIDE 1 MCG/KG/HR: 400 INJECTION INTRAVENOUS at 23:55

## 2022-01-01 RX ADMIN — SODIUM CHLORIDE, SODIUM LACTATE, POTASSIUM CHLORIDE, AND CALCIUM CHLORIDE: .6; .31; .03; .02 INJECTION, SOLUTION INTRAVENOUS at 20:44

## 2022-01-01 RX ADMIN — METRONIDAZOLE 500 MG: 500 INJECTION, SOLUTION INTRAVENOUS at 00:02

## 2022-01-01 RX ADMIN — POTASSIUM PHOSPHATE, MONOBASIC AND POTASSIUM PHOSPHATE, DIBASIC 12 MMOL: 224; 236 INJECTION, SOLUTION, CONCENTRATE INTRAVENOUS at 09:10

## 2022-01-01 RX ADMIN — METRONIDAZOLE 500 MG: 500 INJECTION, SOLUTION INTRAVENOUS at 08:01

## 2022-01-01 RX ADMIN — OXYCODONE HYDROCHLORIDE 5 MG: 5 TABLET ORAL at 01:28

## 2022-01-01 RX ADMIN — CALCIUM GLUCONATE 1 G: 20 INJECTION, SOLUTION INTRAVENOUS at 12:54

## 2022-01-01 RX ADMIN — PROPOFOL 30 MG: 10 INJECTION, EMULSION INTRAVENOUS at 14:14

## 2022-01-01 RX ADMIN — NOREPINEPHRINE BITARTRATE 2 MCG/MIN: 1 INJECTION INTRAVENOUS at 05:52

## 2022-01-01 RX ADMIN — DEXMEDETOMIDINE HYDROCHLORIDE 1 MCG/KG/HR: 400 INJECTION INTRAVENOUS at 12:33

## 2022-01-01 RX ADMIN — IPRATROPIUM BROMIDE AND ALBUTEROL SULFATE 3 ML: 2.5; .5 SOLUTION RESPIRATORY (INHALATION) at 23:16

## 2022-01-01 RX ADMIN — METRONIDAZOLE 500 MG: 500 INJECTION, SOLUTION INTRAVENOUS at 15:35

## 2022-01-01 RX ADMIN — HEPARIN SODIUM 5000 UNITS: 5000 INJECTION INTRAVENOUS; SUBCUTANEOUS at 08:35

## 2022-01-01 RX ADMIN — LEVOTHYROXINE SODIUM 112 MCG: 112 TABLET ORAL at 06:13

## 2022-01-01 RX ADMIN — IOHEXOL 70 ML: 350 INJECTION, SOLUTION INTRAVENOUS at 11:55

## 2022-01-01 RX ADMIN — HYDROMORPHONE HYDROCHLORIDE 0.5 MG: 1 INJECTION, SOLUTION INTRAMUSCULAR; INTRAVENOUS; SUBCUTANEOUS at 16:39

## 2022-01-01 RX ADMIN — LEVOTHYROXINE SODIUM 112 MCG: 112 TABLET ORAL at 05:49

## 2022-01-01 RX ADMIN — GABAPENTIN 200 MG: 250 SUSPENSION ORAL at 21:12

## 2022-01-01 RX ADMIN — INSULIN LISPRO 1 UNITS: 100 INJECTION, SOLUTION INTRAVENOUS; SUBCUTANEOUS at 12:00

## 2022-01-01 RX ADMIN — SODIUM PHOSPHATE, MONOBASIC, MONOHYDRATE 6 MMOL: 276; 142 INJECTION, SOLUTION INTRAVENOUS at 07:11

## 2022-01-01 RX ADMIN — MIDODRINE HYDROCHLORIDE 5 MG: 5 TABLET ORAL at 06:00

## 2022-01-01 RX ADMIN — MORPHINE SULFATE 2 MG: 2 INJECTION, SOLUTION INTRAMUSCULAR; INTRAVENOUS at 14:54

## 2022-01-01 RX ADMIN — IPRATROPIUM BROMIDE AND ALBUTEROL SULFATE 3 ML: 2.5; .5 SOLUTION RESPIRATORY (INHALATION) at 07:37

## 2022-01-01 RX ADMIN — LIDOCAINE HYDROCHLORIDE 1 APPLICATION: 20 JELLY TOPICAL at 13:07

## 2022-01-01 RX ADMIN — HYDROMORPHONE HYDROCHLORIDE 0.5 MG: 1 INJECTION, SOLUTION INTRAMUSCULAR; INTRAVENOUS; SUBCUTANEOUS at 18:55

## 2022-01-01 RX ADMIN — HEPARIN SODIUM 400 UNITS/HR: 10000 INJECTION, SOLUTION INTRAVENOUS at 15:25

## 2022-01-01 RX ADMIN — MORPHINE SULFATE 2 MG: 2 INJECTION, SOLUTION INTRAMUSCULAR; INTRAVENOUS at 13:08

## 2022-01-01 RX ADMIN — HEPARIN SODIUM 5000 UNITS: 5000 INJECTION INTRAVENOUS; SUBCUTANEOUS at 10:09

## 2022-01-01 RX ADMIN — Medication 2 MG/HR: at 22:53

## 2022-01-01 RX ADMIN — Medication 2 MG/HR: at 08:01

## 2022-01-01 RX ADMIN — Medication 20000 ML: at 18:49

## 2022-01-01 RX ADMIN — DEXMEDETOMIDINE HYDROCHLORIDE 1 MCG/KG/HR: 400 INJECTION INTRAVENOUS at 16:00

## 2022-01-01 RX ADMIN — DEXMEDETOMIDINE HYDROCHLORIDE 0.4 MCG/KG/HR: 400 INJECTION INTRAVENOUS at 00:28

## 2022-01-01 RX ADMIN — MAGNESIUM SULFATE HEPTAHYDRATE 1 G: 1 INJECTION, SOLUTION INTRAVENOUS at 23:34

## 2022-01-01 RX ADMIN — IPRATROPIUM BROMIDE AND ALBUTEROL SULFATE 3 ML: 2.5; .5 SOLUTION RESPIRATORY (INHALATION) at 02:37

## 2022-01-01 RX ADMIN — PIPERACILLIN AND TAZOBACTAM 3.38 G: 36; 4.5 INJECTION, POWDER, FOR SOLUTION INTRAVENOUS at 21:25

## 2022-01-01 RX ADMIN — ACETAMINOPHEN 649.6 MG: 650 SUSPENSION ORAL at 20:49

## 2022-01-01 RX ADMIN — CEFEPIME HYDROCHLORIDE 2000 MG: 2 INJECTION, POWDER, FOR SOLUTION INTRAVENOUS at 11:29

## 2022-01-01 RX ADMIN — HYDROMORPHONE HYDROCHLORIDE 0.5 MG: 1 INJECTION, SOLUTION INTRAMUSCULAR; INTRAVENOUS; SUBCUTANEOUS at 01:05

## 2022-01-01 RX ADMIN — ACETAMINOPHEN 650 MG: 325 TABLET, FILM COATED ORAL at 05:30

## 2022-01-01 RX ADMIN — SODIUM PHOSPHATE, MONOBASIC, MONOHYDRATE 12 MMOL: 276; 142 INJECTION, SOLUTION INTRAVENOUS at 00:37

## 2022-01-01 RX ADMIN — METRONIDAZOLE 500 MG: 500 INJECTION, SOLUTION INTRAVENOUS at 08:13

## 2022-01-01 RX ADMIN — MIDAZOLAM 2 MG: 1 INJECTION INTRAMUSCULAR; INTRAVENOUS at 15:58

## 2022-01-01 RX ADMIN — SODIUM CHLORIDE, SODIUM LACTATE, POTASSIUM CHLORIDE, AND CALCIUM CHLORIDE 100 ML/HR: .6; .31; .03; .02 INJECTION, SOLUTION INTRAVENOUS at 23:52

## 2022-01-01 RX ADMIN — PROPOFOL 40 MG: 10 INJECTION, EMULSION INTRAVENOUS at 14:08

## 2022-01-01 RX ADMIN — OXYCODONE HYDROCHLORIDE 5 MG: 5 SOLUTION ORAL at 02:19

## 2022-01-01 RX ADMIN — CEFEPIME HYDROCHLORIDE 2000 MG: 2 INJECTION, POWDER, FOR SOLUTION INTRAVENOUS at 00:07

## 2022-01-01 RX ADMIN — VANCOMYCIN HYDROCHLORIDE 750 MG: 750 INJECTION, SOLUTION INTRAVENOUS at 04:11

## 2022-01-01 RX ADMIN — DEXMEDETOMIDINE HYDROCHLORIDE 1 MCG/KG/HR: 400 INJECTION INTRAVENOUS at 16:53

## 2022-01-01 RX ADMIN — HYDROMORPHONE HYDROCHLORIDE 0.5 MG: 1 INJECTION, SOLUTION INTRAMUSCULAR; INTRAVENOUS; SUBCUTANEOUS at 01:13

## 2022-01-01 RX ADMIN — INSULIN LISPRO 1 UNITS: 100 INJECTION, SOLUTION INTRAVENOUS; SUBCUTANEOUS at 17:15

## 2022-01-01 RX ADMIN — INSULIN LISPRO 2 UNITS: 100 INJECTION, SOLUTION INTRAVENOUS; SUBCUTANEOUS at 11:30

## 2022-01-01 RX ADMIN — ACETAMINOPHEN 650 MG: 325 TABLET, FILM COATED ORAL at 00:08

## 2022-01-01 RX ADMIN — NOREPINEPHRINE BITARTRATE 22 MCG/MIN: 1 SOLUTION INTRAVENOUS at 15:00

## 2022-01-01 RX ADMIN — PROPOFOL 30 MCG/KG/MIN: 10 INJECTION, EMULSION INTRAVENOUS at 15:16

## 2022-01-01 RX ADMIN — NOREPINEPHRINE BITARTRATE 10 MCG/MIN: 1 INJECTION, SOLUTION, CONCENTRATE INTRAVENOUS at 08:27

## 2022-01-01 RX ADMIN — CEFEPIME HYDROCHLORIDE 2000 MG: 2 INJECTION, POWDER, FOR SOLUTION INTRAVENOUS at 11:18

## 2022-01-01 RX ADMIN — CEFEPIME HYDROCHLORIDE 2000 MG: 2 INJECTION, POWDER, FOR SOLUTION INTRAVENOUS at 00:00

## 2022-01-01 RX ADMIN — CHLORHEXIDINE GLUCONATE 0.12% ORAL RINSE 15 ML: 1.2 LIQUID ORAL at 08:27

## 2022-01-01 RX ADMIN — PROPOFOL 100 MG: 10 INJECTION, EMULSION INTRAVENOUS at 18:00

## 2022-01-01 RX ADMIN — MIDODRINE HYDROCHLORIDE 10 MG: 5 TABLET ORAL at 06:13

## 2022-01-01 RX ADMIN — PIPERACILLIN AND TAZOBACTAM 3.38 G: 36; 4.5 INJECTION, POWDER, FOR SOLUTION INTRAVENOUS at 04:02

## 2022-01-01 RX ADMIN — POTASSIUM CHLORIDE 40 MEQ: 29.8 INJECTION, SOLUTION INTRAVENOUS at 06:52

## 2022-01-01 RX ADMIN — LIDOCAINE 5% 2 PATCH: 700 PATCH TOPICAL at 08:26

## 2022-01-01 RX ADMIN — NOREPINEPHRINE BITARTRATE 8 MCG/MIN: 1 SOLUTION INTRAVENOUS at 20:43

## 2022-01-01 RX ADMIN — PIPERACILLIN AND TAZOBACTAM 3.38 G: 36; 4.5 INJECTION, POWDER, FOR SOLUTION INTRAVENOUS at 15:52

## 2022-01-01 RX ADMIN — OXYCODONE HYDROCHLORIDE 5 MG: 5 SOLUTION ORAL at 14:23

## 2022-01-01 RX ADMIN — OXYCODONE HYDROCHLORIDE 7.5 MG: 5 TABLET ORAL at 00:23

## 2022-01-01 RX ADMIN — INSULIN LISPRO 1 UNITS: 100 INJECTION, SOLUTION INTRAVENOUS; SUBCUTANEOUS at 11:20

## 2022-01-01 RX ADMIN — NOREPINEPHRINE BITARTRATE 30 MCG/MIN: 1 INJECTION, SOLUTION, CONCENTRATE INTRAVENOUS at 18:11

## 2022-01-01 RX ADMIN — IPRATROPIUM BROMIDE AND ALBUTEROL SULFATE 3 ML: 2.5; .5 SOLUTION RESPIRATORY (INHALATION) at 07:35

## 2022-01-01 RX ADMIN — POTASSIUM PHOSPHATE, MONOBASIC AND POTASSIUM PHOSPHATE, DIBASIC 21 MMOL: 224; 236 INJECTION, SOLUTION, CONCENTRATE INTRAVENOUS at 20:43

## 2022-01-01 RX ADMIN — IPRATROPIUM BROMIDE AND ALBUTEROL SULFATE 3 ML: 2.5; .5 SOLUTION RESPIRATORY (INHALATION) at 13:05

## 2022-01-01 RX ADMIN — HEPARIN SODIUM 5000 UNITS: 5000 INJECTION INTRAVENOUS; SUBCUTANEOUS at 05:07

## 2022-01-01 RX ADMIN — CALCIUM GLUCONATE 1 G: 20 INJECTION, SOLUTION INTRAVENOUS at 00:50

## 2022-01-01 RX ADMIN — POLYETHYLENE GLYCOL 3350 17 G: 17 POWDER, FOR SOLUTION ORAL at 08:23

## 2022-01-01 RX ADMIN — HYDROMORPHONE HYDROCHLORIDE 0.5 MG: 1 INJECTION, SOLUTION INTRAMUSCULAR; INTRAVENOUS; SUBCUTANEOUS at 18:27

## 2022-01-01 RX ADMIN — SODIUM PHOSPHATE, MONOBASIC, MONOHYDRATE 9 MMOL: 276; 142 INJECTION, SOLUTION INTRAVENOUS at 13:27

## 2022-01-01 RX ADMIN — HYDROMORPHONE HYDROCHLORIDE 0.5 MG: 1 INJECTION, SOLUTION INTRAMUSCULAR; INTRAVENOUS; SUBCUTANEOUS at 01:43

## 2022-01-01 RX ADMIN — FUROSEMIDE 20 MG: 10 INJECTION, SOLUTION INTRAMUSCULAR; INTRAVENOUS at 12:10

## 2022-01-01 RX ADMIN — DEXMEDETOMIDINE HYDROCHLORIDE 1 MCG/KG/HR: 400 INJECTION INTRAVENOUS at 13:22

## 2022-01-01 RX ADMIN — HEPARIN SODIUM 5000 UNITS: 5000 INJECTION INTRAVENOUS; SUBCUTANEOUS at 14:38

## 2022-01-01 RX ADMIN — Medication 0.3 MG/HR: at 10:26

## 2022-01-01 RX ADMIN — EPINEPHRINE 1 MG: 0.1 INJECTION, SOLUTION ENDOTRACHEAL; INTRACARDIAC; INTRAVENOUS at 14:48

## 2022-01-01 RX ADMIN — INSULIN LISPRO 1 UNITS: 100 INJECTION, SOLUTION INTRAVENOUS; SUBCUTANEOUS at 00:29

## 2022-01-01 RX ADMIN — EPINEPHRINE 1 MG: 0.1 INJECTION, SOLUTION ENDOTRACHEAL; INTRACARDIAC; INTRAVENOUS at 14:45

## 2022-01-01 RX ADMIN — ACETYLCYSTEINE 600 MG: 200 SOLUTION ORAL; RESPIRATORY (INHALATION) at 00:11

## 2022-01-01 RX ADMIN — PANTOPRAZOLE SODIUM 40 MG: 40 INJECTION, POWDER, FOR SOLUTION INTRAVENOUS at 08:11

## 2022-01-01 RX ADMIN — ALBUMIN (HUMAN) 25 G: 0.25 INJECTION, SOLUTION INTRAVENOUS at 16:27

## 2022-01-01 RX ADMIN — IPRATROPIUM BROMIDE AND ALBUTEROL SULFATE 3 ML: 2.5; .5 SOLUTION RESPIRATORY (INHALATION) at 23:20

## 2022-01-01 RX ADMIN — POTASSIUM CHLORIDE 40 MEQ: 29.8 INJECTION, SOLUTION INTRAVENOUS at 18:36

## 2022-01-01 RX ADMIN — ALBUMIN (HUMAN) 25 G: 0.25 INJECTION, SOLUTION INTRAVENOUS at 08:26

## 2022-01-01 RX ADMIN — ACETAMINOPHEN 650 MG: 325 TABLET, FILM COATED ORAL at 11:19

## 2022-01-01 RX ADMIN — MIDODRINE HYDROCHLORIDE 5 MG: 5 TABLET ORAL at 17:15

## 2022-01-01 RX ADMIN — MAGNESIUM SULFATE HEPTAHYDRATE 1 G: 1 INJECTION, SOLUTION INTRAVENOUS at 01:16

## 2022-01-01 RX ADMIN — IOHEXOL 20 ML: 350 INJECTION, SOLUTION INTRAVENOUS at 17:23

## 2022-01-01 RX ADMIN — LIDOCAINE 5% 2 PATCH: 700 PATCH TOPICAL at 08:09

## 2022-01-01 RX ADMIN — Medication: at 23:06

## 2022-01-01 RX ADMIN — VASOPRESSIN 0.04 UNITS/MIN: 20 INJECTION INTRAVENOUS at 00:12

## 2022-01-01 RX ADMIN — DEXMEDETOMIDINE HYDROCHLORIDE 1 MCG/KG/HR: 400 INJECTION INTRAVENOUS at 10:59

## 2022-01-01 RX ADMIN — VASOPRESSIN 0.04 UNITS/MIN: 20 INJECTION INTRAVENOUS at 06:06

## 2022-01-01 RX ADMIN — HYDROMORPHONE HYDROCHLORIDE 0.5 MG: 1 INJECTION, SOLUTION INTRAMUSCULAR; INTRAVENOUS; SUBCUTANEOUS at 00:11

## 2022-01-01 RX ADMIN — METRONIDAZOLE 500 MG: 500 INJECTION, SOLUTION INTRAVENOUS at 23:41

## 2022-01-01 RX ADMIN — OXYCODONE HYDROCHLORIDE 5 MG: 5 TABLET ORAL at 06:20

## 2022-01-01 RX ADMIN — CHLORHEXIDINE GLUCONATE 0.12% ORAL RINSE 15 ML: 1.2 LIQUID ORAL at 09:47

## 2022-01-01 RX ADMIN — PANTOPRAZOLE SODIUM 40 MG: 40 INJECTION, POWDER, FOR SOLUTION INTRAVENOUS at 08:28

## 2022-01-01 RX ADMIN — LEVOTHYROXINE SODIUM 112 MCG: 112 TABLET ORAL at 05:07

## 2022-01-01 RX ADMIN — CALCIUM GLUCONATE 2 G: 20 INJECTION, SOLUTION INTRAVENOUS at 00:29

## 2022-01-01 RX ADMIN — MAGNESIUM SULFATE HEPTAHYDRATE 1 G: 1 INJECTION, SOLUTION INTRAVENOUS at 00:19

## 2022-01-01 RX ADMIN — ALBUMIN (HUMAN) 12.5 G: 12.5 INJECTION, SOLUTION INTRAVENOUS at 16:49

## 2022-01-01 RX ADMIN — SODIUM CHLORIDE 3 UNITS/HR: 9 INJECTION, SOLUTION INTRAVENOUS at 10:46

## 2022-01-01 RX ADMIN — Medication 20000 ML: at 02:45

## 2022-01-01 RX ADMIN — Medication 10 ML: at 12:06

## 2022-01-01 RX ADMIN — CALCIUM GLUCONATE 1 G: 20 INJECTION, SOLUTION INTRAVENOUS at 06:10

## 2022-01-01 RX ADMIN — DEXMEDETOMIDINE HYDROCHLORIDE 1 MCG/KG/HR: 400 INJECTION INTRAVENOUS at 08:01

## 2022-01-01 RX ADMIN — CHLORHEXIDINE GLUCONATE 0.12% ORAL RINSE 15 ML: 1.2 LIQUID ORAL at 08:37

## 2022-01-01 RX ADMIN — NOREPINEPHRINE BITARTRATE 6 MCG/MIN: 1 SOLUTION INTRAVENOUS at 04:04

## 2022-01-01 RX ADMIN — MORPHINE SULFATE 2 MG: 2 INJECTION, SOLUTION INTRAMUSCULAR; INTRAVENOUS at 11:48

## 2022-01-01 RX ADMIN — ACETAMINOPHEN 650 MG: 325 TABLET, FILM COATED ORAL at 06:08

## 2022-01-01 RX ADMIN — CHLORHEXIDINE GLUCONATE 0.12% ORAL RINSE 15 ML: 1.2 LIQUID ORAL at 08:44

## 2022-01-01 RX ADMIN — IPRATROPIUM BROMIDE AND ALBUTEROL SULFATE 3 ML: 2.5; .5 SOLUTION RESPIRATORY (INHALATION) at 23:24

## 2022-01-01 RX ADMIN — IOHEXOL 50 ML: 240 INJECTION, SOLUTION INTRATHECAL; INTRAVASCULAR; INTRAVENOUS; ORAL at 11:00

## 2022-01-01 RX ADMIN — INSULIN LISPRO 3 UNITS: 100 INJECTION, SOLUTION INTRAVENOUS; SUBCUTANEOUS at 18:12

## 2022-01-01 RX ADMIN — OXYCODONE HYDROCHLORIDE 5 MG: 5 SOLUTION ORAL at 13:20

## 2022-01-01 RX ADMIN — CALCIUM GLUCONATE 1 G: 20 INJECTION, SOLUTION INTRAVENOUS at 06:34

## 2022-01-01 RX ADMIN — HEPARIN SODIUM 5000 UNITS: 5000 INJECTION INTRAVENOUS; SUBCUTANEOUS at 21:52

## 2022-01-01 RX ADMIN — FUROSEMIDE 40 MG: 10 INJECTION, SOLUTION INTRAMUSCULAR; INTRAVENOUS at 11:16

## 2022-01-01 RX ADMIN — CHLORHEXIDINE GLUCONATE 0.12% ORAL RINSE 15 ML: 1.2 LIQUID ORAL at 21:15

## 2022-01-01 RX ADMIN — ALBUMIN (HUMAN) 12.5 G: 0.25 INJECTION, SOLUTION INTRAVENOUS at 19:54

## 2022-01-01 RX ADMIN — ALBUMIN (HUMAN): 12.5 INJECTION, SOLUTION INTRAVENOUS at 18:41

## 2022-01-01 RX ADMIN — OXYCODONE HYDROCHLORIDE 5 MG: 5 SOLUTION ORAL at 21:49

## 2022-01-01 RX ADMIN — SODIUM CHLORIDE 100 ML/HR: 0.9 INJECTION, SOLUTION INTRAVENOUS at 09:17

## 2022-01-01 RX ADMIN — MORPHINE SULFATE 2 MG: 2 INJECTION, SOLUTION INTRAMUSCULAR; INTRAVENOUS at 08:17

## 2022-01-01 RX ADMIN — POTASSIUM & SODIUM PHOSPHATES POWDER PACK 280-160-250 MG 1 PACKET: 280-160-250 PACK at 08:37

## 2022-01-01 RX ADMIN — HEPARIN SODIUM 5000 UNITS: 5000 INJECTION INTRAVENOUS; SUBCUTANEOUS at 17:51

## 2022-01-01 RX ADMIN — OXYCODONE HYDROCHLORIDE 5 MG: 5 TABLET ORAL at 12:34

## 2022-01-01 RX ADMIN — NOREPINEPHRINE BITARTRATE 6 MCG/MIN: 1 SOLUTION INTRAVENOUS at 10:54

## 2022-01-01 RX ADMIN — INSULIN LISPRO 3 UNITS: 100 INJECTION, SOLUTION INTRAVENOUS; SUBCUTANEOUS at 11:37

## 2022-01-01 RX ADMIN — CALCIUM GLUCONATE 2 G: 20 INJECTION, SOLUTION INTRAVENOUS at 00:40

## 2022-01-01 RX ADMIN — BUMETANIDE 1 MG: 0.25 INJECTION, SOLUTION INTRAMUSCULAR; INTRAVENOUS at 20:13

## 2022-01-01 RX ADMIN — CALCIUM GLUCONATE 1 G: 20 INJECTION, SOLUTION INTRAVENOUS at 13:00

## 2022-01-01 RX ADMIN — SODIUM PHOSPHATE, MONOBASIC, MONOHYDRATE 6 MMOL: 276; 142 INJECTION, SOLUTION INTRAVENOUS at 19:35

## 2022-01-01 RX ADMIN — LEVOTHYROXINE SODIUM 112 MCG: 112 TABLET ORAL at 05:32

## 2022-01-01 RX ADMIN — NOREPINEPHRINE BITARTRATE 5 MCG/MIN: 1 SOLUTION INTRAVENOUS at 09:13

## 2022-01-01 RX ADMIN — PANTOPRAZOLE SODIUM 40 MG: 40 INJECTION, POWDER, FOR SOLUTION INTRAVENOUS at 08:27

## 2022-01-01 RX ADMIN — GABAPENTIN 200 MG: 250 SUSPENSION ORAL at 21:29

## 2022-01-01 RX ADMIN — CHLORHEXIDINE GLUCONATE 0.12% ORAL RINSE 15 ML: 1.2 LIQUID ORAL at 21:00

## 2022-01-01 RX ADMIN — Medication 2 MG/HR: at 03:38

## 2022-01-01 RX ADMIN — IPRATROPIUM BROMIDE AND ALBUTEROL SULFATE 3 ML: 2.5; .5 SOLUTION RESPIRATORY (INHALATION) at 07:41

## 2022-01-01 RX ADMIN — DEXMEDETOMIDINE HYDROCHLORIDE 1 MCG/KG/HR: 400 INJECTION INTRAVENOUS at 00:12

## 2022-01-01 RX ADMIN — HYDROMORPHONE HYDROCHLORIDE 0.5 MG: 1 INJECTION, SOLUTION INTRAMUSCULAR; INTRAVENOUS; SUBCUTANEOUS at 06:21

## 2022-01-01 RX ADMIN — CHLORHEXIDINE GLUCONATE 0.12% ORAL RINSE 15 ML: 1.2 LIQUID ORAL at 21:04

## 2022-01-01 RX ADMIN — MORPHINE SULFATE 2 MG: 2 INJECTION, SOLUTION INTRAMUSCULAR; INTRAVENOUS at 15:20

## 2022-01-01 RX ADMIN — IPRATROPIUM BROMIDE AND ALBUTEROL SULFATE 3 ML: 2.5; .5 SOLUTION RESPIRATORY (INHALATION) at 13:48

## 2022-01-01 RX ADMIN — CALCIUM GLUCONATE 1 G: 20 INJECTION, SOLUTION INTRAVENOUS at 14:15

## 2022-01-01 RX ADMIN — ACETYLCYSTEINE 600 MG: 200 SOLUTION ORAL; RESPIRATORY (INHALATION) at 16:12

## 2022-01-01 RX ADMIN — NOREPINEPHRINE BITARTRATE 8 MCG/MIN: 1 SOLUTION INTRAVENOUS at 03:11

## 2022-01-01 RX ADMIN — METOPROLOL TARTRATE 5 MG: 1 INJECTION, SOLUTION INTRAVENOUS at 22:32

## 2022-01-01 RX ADMIN — HYDROCORTISONE SODIUM SUCCINATE 50 MG: 100 INJECTION, POWDER, FOR SOLUTION INTRAMUSCULAR; INTRAVENOUS at 00:34

## 2022-01-01 RX ADMIN — DEXTROSE MONOHYDRATE 50 ML: 25 INJECTION, SOLUTION INTRAVENOUS at 17:12

## 2022-01-01 RX ADMIN — ACETYLCYSTEINE 600 MG: 200 SOLUTION ORAL; RESPIRATORY (INHALATION) at 14:24

## 2022-01-01 RX ADMIN — VASOPRESSIN 0.04 UNITS/MIN: 20 INJECTION INTRAVENOUS at 16:25

## 2022-01-01 RX ADMIN — CHLORHEXIDINE GLUCONATE 0.12% ORAL RINSE 15 ML: 1.2 LIQUID ORAL at 08:45

## 2022-01-01 RX ADMIN — NOREPINEPHRINE BITARTRATE 2.67 MCG/MIN: 1 INJECTION INTRAVENOUS at 14:53

## 2022-01-01 RX ADMIN — CEFEPIME HYDROCHLORIDE 2000 MG: 2 INJECTION, POWDER, FOR SOLUTION INTRAVENOUS at 12:16

## 2022-01-01 RX ADMIN — PIPERACILLIN AND TAZOBACTAM 3.38 G: 36; 4.5 INJECTION, POWDER, FOR SOLUTION INTRAVENOUS at 21:33

## 2022-01-01 RX ADMIN — ALBUMIN (HUMAN) 25 G: 0.25 INJECTION, SOLUTION INTRAVENOUS at 13:11

## 2022-01-01 RX ADMIN — DEXMEDETOMIDINE HYDROCHLORIDE 0.8 MCG/KG/HR: 400 INJECTION INTRAVENOUS at 16:14

## 2022-01-01 RX ADMIN — INSULIN GLARGINE 5 UNITS: 100 INJECTION, SOLUTION SUBCUTANEOUS at 08:29

## 2022-01-01 RX ADMIN — IPRATROPIUM BROMIDE AND ALBUTEROL SULFATE 3 ML: 2.5; .5 SOLUTION RESPIRATORY (INHALATION) at 02:59

## 2022-01-01 RX ADMIN — HYDROMORPHONE HYDROCHLORIDE 0.5 MG: 1 INJECTION, SOLUTION INTRAMUSCULAR; INTRAVENOUS; SUBCUTANEOUS at 03:57

## 2022-01-01 RX ADMIN — CHLORHEXIDINE GLUCONATE 0.12% ORAL RINSE 15 ML: 1.2 LIQUID ORAL at 21:32

## 2022-01-01 RX ADMIN — OXYCODONE HYDROCHLORIDE 5 MG: 5 TABLET ORAL at 17:06

## 2022-01-01 RX ADMIN — SODIUM PHOSPHATE, MONOBASIC, MONOHYDRATE 12 MMOL: 276; 142 INJECTION, SOLUTION INTRAVENOUS at 02:12

## 2022-01-01 RX ADMIN — METRONIDAZOLE 500 MG: 500 INJECTION, SOLUTION INTRAVENOUS at 16:37

## 2022-01-01 RX ADMIN — Medication 0.3 MG/HR: at 16:17

## 2022-01-01 RX ADMIN — CALCIUM GLUCONATE 1 G: 20 INJECTION, SOLUTION INTRAVENOUS at 16:09

## 2022-01-01 RX ADMIN — IPRATROPIUM BROMIDE AND ALBUTEROL SULFATE 3 ML: 2.5; .5 SOLUTION RESPIRATORY (INHALATION) at 15:09

## 2022-01-01 RX ADMIN — CALCIUM GLUCONATE 1 G: 20 INJECTION, SOLUTION INTRAVENOUS at 13:27

## 2022-01-01 RX ADMIN — MAGNESIUM SULFATE HEPTAHYDRATE 1 G: 1 INJECTION, SOLUTION INTRAVENOUS at 19:23

## 2022-01-01 RX ADMIN — VANCOMYCIN HYDROCHLORIDE 750 MG: 750 INJECTION, SOLUTION INTRAVENOUS at 04:01

## 2022-01-01 RX ADMIN — HYDROMORPHONE HYDROCHLORIDE 0.5 MG: 1 INJECTION, SOLUTION INTRAMUSCULAR; INTRAVENOUS; SUBCUTANEOUS at 07:34

## 2022-01-01 RX ADMIN — PANTOPRAZOLE SODIUM 40 MG: 40 INJECTION, POWDER, FOR SOLUTION INTRAVENOUS at 08:45

## 2022-01-01 RX ADMIN — DEXMEDETOMIDINE HYDROCHLORIDE 0.8 MCG/KG/HR: 400 INJECTION INTRAVENOUS at 09:54

## 2022-01-01 RX ADMIN — IPRATROPIUM BROMIDE AND ALBUTEROL SULFATE 3 ML: 2.5; .5 SOLUTION RESPIRATORY (INHALATION) at 15:44

## 2022-01-01 RX ADMIN — CALCIUM GLUCONATE 1 G: 20 INJECTION, SOLUTION INTRAVENOUS at 17:15

## 2022-01-01 RX ADMIN — INSULIN LISPRO 2 UNITS: 100 INJECTION, SOLUTION INTRAVENOUS; SUBCUTANEOUS at 05:43

## 2022-01-01 RX ADMIN — METRONIDAZOLE 500 MG: 500 INJECTION, SOLUTION INTRAVENOUS at 08:36

## 2022-01-01 RX ADMIN — MIDODRINE HYDROCHLORIDE 5 MG: 5 TABLET ORAL at 11:26

## 2022-01-01 RX ADMIN — CALCIUM GLUCONATE 2 G: 20 INJECTION, SOLUTION INTRAVENOUS at 18:40

## 2022-01-01 RX ADMIN — MORPHINE SULFATE 2 MG: 2 INJECTION, SOLUTION INTRAMUSCULAR; INTRAVENOUS at 03:38

## 2022-01-01 RX ADMIN — INSULIN LISPRO 1 UNITS: 100 INJECTION, SOLUTION INTRAVENOUS; SUBCUTANEOUS at 00:12

## 2022-01-01 RX ADMIN — ACETYLCYSTEINE 600 MG: 200 SOLUTION ORAL; RESPIRATORY (INHALATION) at 07:20

## 2022-01-01 RX ADMIN — POTASSIUM & SODIUM PHOSPHATES POWDER PACK 280-160-250 MG 1 PACKET: 280-160-250 PACK at 00:19

## 2022-01-01 RX ADMIN — Medication 1 MG/HR: at 04:06

## 2022-01-01 RX ADMIN — CALCIUM GLUCONATE 1 G: 20 INJECTION, SOLUTION INTRAVENOUS at 08:19

## 2022-01-01 RX ADMIN — METRONIDAZOLE 500 MG: 500 INJECTION, SOLUTION INTRAVENOUS at 23:45

## 2022-01-01 RX ADMIN — DEXMEDETOMIDINE HYDROCHLORIDE 0.8 MCG/KG/HR: 400 INJECTION INTRAVENOUS at 12:21

## 2022-01-01 RX ADMIN — HEPARIN SODIUM 18 UNITS/KG/HR: 10000 INJECTION, SOLUTION INTRAVENOUS at 13:00

## 2022-01-01 RX ADMIN — GABAPENTIN 200 MG: 250 SUSPENSION ORAL at 21:34

## 2022-01-01 RX ADMIN — DEXTROSE MONOHYDRATE 50 ML: 500 INJECTION PARENTERAL at 12:03

## 2022-01-01 RX ADMIN — INSULIN LISPRO 2 UNITS: 100 INJECTION, SOLUTION INTRAVENOUS; SUBCUTANEOUS at 06:17

## 2022-01-01 RX ADMIN — HYDROMORPHONE HYDROCHLORIDE 0.5 MG: 1 INJECTION, SOLUTION INTRAMUSCULAR; INTRAVENOUS; SUBCUTANEOUS at 05:10

## 2022-01-01 RX ADMIN — CALCIUM GLUCONATE 1 G: 20 INJECTION, SOLUTION INTRAVENOUS at 07:21

## 2022-01-01 RX ADMIN — CALCIUM GLUCONATE 2 G: 20 INJECTION, SOLUTION INTRAVENOUS at 01:06

## 2022-01-01 RX ADMIN — ALBUMIN (HUMAN) 25 G: 12.5 INJECTION, SOLUTION INTRAVENOUS at 18:29

## 2022-01-01 RX ADMIN — MIDODRINE HYDROCHLORIDE 10 MG: 5 TABLET ORAL at 16:36

## 2022-01-01 RX ADMIN — ALBUMIN (HUMAN) 25 G: 0.25 INJECTION, SOLUTION INTRAVENOUS at 16:15

## 2022-01-01 RX ADMIN — LIDOCAINE 5% 1 PATCH: 700 PATCH TOPICAL at 08:36

## 2022-01-01 RX ADMIN — ALBUMIN (HUMAN) 25 G: 0.25 INJECTION, SOLUTION INTRAVENOUS at 10:20

## 2022-01-01 RX ADMIN — IPRATROPIUM BROMIDE AND ALBUTEROL SULFATE 3 ML: 2.5; .5 SOLUTION RESPIRATORY (INHALATION) at 23:04

## 2022-01-01 RX ADMIN — PANTOPRAZOLE SODIUM 40 MG: 40 INJECTION, POWDER, FOR SOLUTION INTRAVENOUS at 08:50

## 2022-01-01 RX ADMIN — CEFEPIME HYDROCHLORIDE 1000 MG: 1 INJECTION, SOLUTION INTRAVENOUS at 22:15

## 2022-01-01 RX ADMIN — MIDODRINE HYDROCHLORIDE 5 MG: 5 TABLET ORAL at 20:14

## 2022-01-01 RX ADMIN — PANTOPRAZOLE SODIUM 40 MG: 40 INJECTION, POWDER, FOR SOLUTION INTRAVENOUS at 08:26

## 2022-01-01 RX ADMIN — ACETYLCYSTEINE 600 MG: 200 SOLUTION ORAL; RESPIRATORY (INHALATION) at 15:45

## 2022-01-01 RX ADMIN — MORPHINE SULFATE 2 MG: 2 INJECTION, SOLUTION INTRAMUSCULAR; INTRAVENOUS at 20:48

## 2022-01-01 RX ADMIN — HEPARIN SODIUM 5000 UNITS: 5000 INJECTION INTRAVENOUS; SUBCUTANEOUS at 08:17

## 2022-01-01 RX ADMIN — ACETYLCYSTEINE 600 MG: 200 SOLUTION ORAL; RESPIRATORY (INHALATION) at 07:37

## 2022-01-01 RX ADMIN — Medication: at 22:40

## 2022-01-01 RX ADMIN — PROPOFOL 5 MCG/KG/MIN: 10 INJECTION, EMULSION INTRAVENOUS at 22:02

## 2022-01-01 RX ADMIN — IPRATROPIUM BROMIDE AND ALBUTEROL SULFATE 3 ML: 2.5; .5 SOLUTION RESPIRATORY (INHALATION) at 23:05

## 2022-01-01 RX ADMIN — CALCIUM GLUCONATE 1 G: 20 INJECTION, SOLUTION INTRAVENOUS at 14:38

## 2022-01-01 RX ADMIN — CEFEPIME HYDROCHLORIDE 2000 MG: 2 INJECTION, POWDER, FOR SOLUTION INTRAVENOUS at 11:20

## 2022-01-01 RX ADMIN — VANCOMYCIN HYDROCHLORIDE 1250 MG: 10 INJECTION, POWDER, LYOPHILIZED, FOR SOLUTION INTRAVENOUS at 16:27

## 2022-01-01 RX ADMIN — DEXMEDETOMIDINE HYDROCHLORIDE 1.4 MCG/KG/HR: 400 INJECTION INTRAVENOUS at 02:46

## 2022-01-01 RX ADMIN — ALBUMIN (HUMAN) 25 G: 0.25 INJECTION, SOLUTION INTRAVENOUS at 01:03

## 2022-01-01 RX ADMIN — PIPERACILLIN AND TAZOBACTAM 3.38 G: 36; 4.5 INJECTION, POWDER, FOR SOLUTION INTRAVENOUS at 16:25

## 2022-01-01 RX ADMIN — PANTOPRAZOLE SODIUM 40 MG: 40 INJECTION, POWDER, FOR SOLUTION INTRAVENOUS at 08:39

## 2022-01-01 RX ADMIN — IPRATROPIUM BROMIDE AND ALBUTEROL SULFATE 3 ML: 2.5; .5 SOLUTION RESPIRATORY (INHALATION) at 07:54

## 2022-01-01 RX ADMIN — CHLORHEXIDINE GLUCONATE 0.12% ORAL RINSE 15 ML: 1.2 LIQUID ORAL at 20:14

## 2022-01-01 RX ADMIN — IPRATROPIUM BROMIDE AND ALBUTEROL SULFATE 3 ML: 2.5; .5 SOLUTION RESPIRATORY (INHALATION) at 14:24

## 2022-01-01 RX ADMIN — PIPERACILLIN AND TAZOBACTAM 3.38 G: 36; 4.5 INJECTION, POWDER, FOR SOLUTION INTRAVENOUS at 09:04

## 2022-01-01 RX ADMIN — ACETAMINOPHEN 650 MG: 650 SUSPENSION ORAL at 15:04

## 2022-01-01 RX ADMIN — DEXMEDETOMIDINE HYDROCHLORIDE 1.2 MCG/KG/HR: 400 INJECTION INTRAVENOUS at 12:57

## 2022-01-01 RX ADMIN — DEXTROSE 150 MG: 50 INJECTION, SOLUTION INTRAVENOUS at 19:46

## 2022-01-01 RX ADMIN — DEXTROSE, SODIUM CHLORIDE, AND POTASSIUM CHLORIDE 85 ML/HR: 5; .9; .15 INJECTION INTRAVENOUS at 10:29

## 2022-01-01 RX ADMIN — HEPARIN SODIUM 5000 UNITS: 5000 INJECTION INTRAVENOUS; SUBCUTANEOUS at 21:15

## 2022-01-01 RX ADMIN — ACETAMINOPHEN 650 MG: 650 SUSPENSION ORAL at 08:57

## 2022-01-01 RX ADMIN — ALBUMIN (HUMAN): 12.5 INJECTION, SOLUTION INTRAVENOUS at 20:08

## 2022-01-01 RX ADMIN — LIDOCAINE 5% 2 PATCH: 700 PATCH TOPICAL at 08:00

## 2022-01-01 RX ADMIN — ACETYLCYSTEINE 600 MG: 200 SOLUTION ORAL; RESPIRATORY (INHALATION) at 07:08

## 2022-01-01 RX ADMIN — POTASSIUM CHLORIDE 40 MEQ: 20 SOLUTION ORAL at 17:05

## 2022-01-01 RX ADMIN — POTASSIUM & SODIUM PHOSPHATES POWDER PACK 280-160-250 MG 1 PACKET: 280-160-250 PACK at 08:45

## 2022-01-01 RX ADMIN — ACETAMINOPHEN 650 MG: 650 SUSPENSION ORAL at 02:23

## 2022-01-01 RX ADMIN — VANCOMYCIN HYDROCHLORIDE 1250 MG: 10 INJECTION, POWDER, LYOPHILIZED, FOR SOLUTION INTRAVENOUS at 16:09

## 2022-01-01 RX ADMIN — LEVOTHYROXINE SODIUM 112 MCG: 112 TABLET ORAL at 11:45

## 2022-01-01 RX ADMIN — HEPARIN SODIUM 5000 UNITS: 5000 INJECTION INTRAVENOUS; SUBCUTANEOUS at 22:43

## 2022-01-01 RX ADMIN — LIDOCAINE 5% 2 PATCH: 700 PATCH TOPICAL at 08:11

## 2022-01-01 RX ADMIN — VASOPRESSIN 0.04 UNITS/MIN: 20 INJECTION INTRAVENOUS at 09:31

## 2022-01-01 RX ADMIN — ALBUMIN (HUMAN) 25 G: 0.25 INJECTION, SOLUTION INTRAVENOUS at 16:06

## 2022-01-01 RX ADMIN — DEXTROSE, SODIUM CHLORIDE, AND POTASSIUM CHLORIDE 85 ML/HR: 5; .45; .15 INJECTION INTRAVENOUS at 08:35

## 2022-01-01 RX ADMIN — ACETYLCYSTEINE 600 MG: 200 SOLUTION ORAL; RESPIRATORY (INHALATION) at 23:08

## 2022-01-01 RX ADMIN — DEXTROSE MONOHYDRATE 25 ML: 25 INJECTION, SOLUTION INTRAVENOUS at 08:51

## 2022-01-01 RX ADMIN — CALCIUM GLUCONATE 1 G: 20 INJECTION, SOLUTION INTRAVENOUS at 07:18

## 2022-01-01 RX ADMIN — LIDOCAINE HYDROCHLORIDE 80 MG: 10 INJECTION, SOLUTION EPIDURAL; INFILTRATION; INTRACAUDAL; PERINEURAL at 14:02

## 2022-01-01 RX ADMIN — ACETAMINOPHEN 650 MG: 325 TABLET, FILM COATED ORAL at 11:45

## 2022-01-01 RX ADMIN — Medication 20000 ML: at 13:11

## 2022-01-01 RX ADMIN — CALCIUM GLUCONATE 1 G: 20 INJECTION, SOLUTION INTRAVENOUS at 19:53

## 2022-01-01 RX ADMIN — METRONIDAZOLE 500 MG: 500 INJECTION, SOLUTION INTRAVENOUS at 15:04

## 2022-01-01 RX ADMIN — SUGAMMADEX 200 MG: 100 INJECTION, SOLUTION INTRAVENOUS at 21:38

## 2022-01-01 RX ADMIN — Medication 40 MG/HR: at 20:42

## 2022-01-01 RX ADMIN — CALCIUM GLUCONATE 1 G: 20 INJECTION, SOLUTION INTRAVENOUS at 23:56

## 2022-01-01 RX ADMIN — ALBUMIN (HUMAN) 25 G: 0.25 INJECTION, SOLUTION INTRAVENOUS at 20:36

## 2022-01-01 RX ADMIN — METOPROLOL TARTRATE 2.5 MG: 1 INJECTION, SOLUTION INTRAVENOUS at 10:02

## 2022-01-01 RX ADMIN — VANCOMYCIN HYDROCHLORIDE 1250 MG: 10 INJECTION, POWDER, LYOPHILIZED, FOR SOLUTION INTRAVENOUS at 09:53

## 2022-01-01 RX ADMIN — CALCIUM GLUCONATE 1 G: 20 INJECTION, SOLUTION INTRAVENOUS at 23:58

## 2022-01-01 RX ADMIN — HEPARIN SODIUM 400 UNITS/HR: 10000 INJECTION, SOLUTION INTRAVENOUS at 11:35

## 2022-01-01 RX ADMIN — MIDODRINE HYDROCHLORIDE 10 MG: 5 TABLET ORAL at 15:45

## 2022-01-01 RX ADMIN — INSULIN LISPRO 1 UNITS: 100 INJECTION, SOLUTION INTRAVENOUS; SUBCUTANEOUS at 05:38

## 2022-01-01 RX ADMIN — Medication 20000 ML: at 08:00

## 2022-01-01 RX ADMIN — MIDODRINE HYDROCHLORIDE 10 MG: 5 TABLET ORAL at 05:08

## 2022-01-01 RX ADMIN — METRONIDAZOLE 500 MG: 500 INJECTION, SOLUTION INTRAVENOUS at 08:12

## 2022-01-01 RX ADMIN — MAGNESIUM SULFATE HEPTAHYDRATE 1 G: 1 INJECTION, SOLUTION INTRAVENOUS at 16:45

## 2022-01-01 RX ADMIN — OXYCODONE HYDROCHLORIDE 5 MG: 5 SOLUTION ORAL at 08:49

## 2022-01-01 RX ADMIN — LEVOTHYROXINE SODIUM 112 MCG: 112 TABLET ORAL at 05:50

## 2022-01-01 RX ADMIN — VASOPRESSIN 0.04 UNITS/MIN: 20 INJECTION INTRAVENOUS at 01:18

## 2022-01-01 RX ADMIN — MIDODRINE HYDROCHLORIDE 10 MG: 5 TABLET ORAL at 15:35

## 2022-01-01 RX ADMIN — Medication 20000 ML: at 04:18

## 2022-01-01 RX ADMIN — ACETYLCYSTEINE 600 MG: 200 SOLUTION ORAL; RESPIRATORY (INHALATION) at 17:13

## 2022-01-01 RX ADMIN — IPRATROPIUM BROMIDE AND ALBUTEROL SULFATE 3 ML: 2.5; .5 SOLUTION RESPIRATORY (INHALATION) at 08:06

## 2022-01-01 RX ADMIN — CEFTRIAXONE 1000 MG: 1 INJECTION, SOLUTION INTRAVENOUS at 04:19

## 2022-01-01 RX ADMIN — HEPARIN SODIUM 5000 UNITS: 5000 INJECTION INTRAVENOUS; SUBCUTANEOUS at 20:14

## 2022-01-01 RX ADMIN — VASOPRESSIN 0.04 UNITS/MIN: 20 INJECTION INTRAVENOUS at 13:04

## 2022-01-01 RX ADMIN — POLYETHYLENE GLYCOL 3350 17 G: 17 POWDER, FOR SOLUTION ORAL at 09:44

## 2022-01-01 RX ADMIN — METRONIDAZOLE 500 MG: 500 INJECTION, SOLUTION INTRAVENOUS at 08:37

## 2022-01-01 RX ADMIN — HYDROCORTISONE SODIUM SUCCINATE 50 MG: 100 INJECTION, POWDER, FOR SOLUTION INTRAMUSCULAR; INTRAVENOUS at 11:20

## 2022-01-01 RX ADMIN — ALBUMIN (HUMAN) 25 G: 0.25 INJECTION, SOLUTION INTRAVENOUS at 14:42

## 2022-01-01 RX ADMIN — PANTOPRAZOLE SODIUM 40 MG: 40 INJECTION, POWDER, FOR SOLUTION INTRAVENOUS at 08:21

## 2022-01-01 RX ADMIN — INSULIN LISPRO 1 UNITS: 100 INJECTION, SOLUTION INTRAVENOUS; SUBCUTANEOUS at 12:30

## 2022-01-01 RX ADMIN — BUMETANIDE 2 MG: 0.25 INJECTION, SOLUTION INTRAMUSCULAR; INTRAVENOUS at 00:17

## 2022-01-01 RX ADMIN — EPHEDRINE SULFATE 10 MG: 50 INJECTION, SOLUTION INTRAVENOUS at 20:07

## 2022-01-01 RX ADMIN — INSULIN LISPRO 1 UNITS: 100 INJECTION, SOLUTION INTRAVENOUS; SUBCUTANEOUS at 06:13

## 2022-01-01 RX ADMIN — SODIUM CHLORIDE 4 UNITS/HR: 9 INJECTION, SOLUTION INTRAVENOUS at 06:29

## 2022-01-01 RX ADMIN — LEVOTHYROXINE SODIUM 56 MCG: 112 TABLET ORAL at 05:20

## 2022-01-01 RX ADMIN — Medication: at 21:25

## 2022-01-01 RX ADMIN — HYDROMORPHONE HYDROCHLORIDE 0.5 MG: 1 INJECTION, SOLUTION INTRAMUSCULAR; INTRAVENOUS; SUBCUTANEOUS at 19:03

## 2022-01-01 RX ADMIN — HYDROMORPHONE HYDROCHLORIDE 0.5 MG: 1 INJECTION, SOLUTION INTRAMUSCULAR; INTRAVENOUS; SUBCUTANEOUS at 12:52

## 2022-01-01 RX ADMIN — CHLORHEXIDINE GLUCONATE 0.12% ORAL RINSE 15 ML: 1.2 LIQUID ORAL at 21:30

## 2022-01-01 RX ADMIN — IPRATROPIUM BROMIDE AND ALBUTEROL SULFATE 3 ML: 2.5; .5 SOLUTION RESPIRATORY (INHALATION) at 16:23

## 2022-01-01 RX ADMIN — PIPERACILLIN AND TAZOBACTAM 3.38 G: 36; 4.5 INJECTION, POWDER, FOR SOLUTION INTRAVENOUS at 15:53

## 2022-01-01 RX ADMIN — MIDODRINE HYDROCHLORIDE 10 MG: 5 TABLET ORAL at 11:59

## 2022-01-01 RX ADMIN — INSULIN GLARGINE 5 UNITS: 100 INJECTION, SOLUTION SUBCUTANEOUS at 08:03

## 2022-01-01 RX ADMIN — DEXMEDETOMIDINE HYDROCHLORIDE 1 MCG/KG/HR: 400 INJECTION INTRAVENOUS at 04:10

## 2022-01-01 RX ADMIN — VASOPRESSIN 0.04 UNITS/MIN: 20 INJECTION INTRAVENOUS at 09:36

## 2022-01-01 RX ADMIN — CEFEPIME HYDROCHLORIDE 2000 MG: 2 INJECTION, POWDER, FOR SOLUTION INTRAVENOUS at 23:20

## 2022-01-01 RX ADMIN — Medication 1 MG/HR: at 02:48

## 2022-01-01 RX ADMIN — CHLORHEXIDINE GLUCONATE 0.12% ORAL RINSE 15 ML: 1.2 LIQUID ORAL at 21:13

## 2022-01-01 RX ADMIN — INSULIN LISPRO 1 UNITS: 100 INJECTION, SOLUTION INTRAVENOUS; SUBCUTANEOUS at 23:19

## 2022-01-01 RX ADMIN — DEXMEDETOMIDINE HYDROCHLORIDE 1.2 MCG/KG/HR: 400 INJECTION INTRAVENOUS at 06:50

## 2022-01-01 RX ADMIN — ACETYLCYSTEINE 600 MG: 200 SOLUTION ORAL; RESPIRATORY (INHALATION) at 23:14

## 2022-01-01 RX ADMIN — CALCIUM GLUCONATE 1 G: 20 INJECTION, SOLUTION INTRAVENOUS at 01:27

## 2022-01-01 RX ADMIN — CALCIUM GLUCONATE 1 G: 20 INJECTION, SOLUTION INTRAVENOUS at 15:35

## 2022-01-01 RX ADMIN — LEVOTHYROXINE SODIUM 112 MCG: 112 TABLET ORAL at 05:55

## 2022-01-01 RX ADMIN — METRONIDAZOLE 500 MG: 500 INJECTION, SOLUTION INTRAVENOUS at 15:14

## 2022-01-01 RX ADMIN — ALBUMIN (HUMAN) 25 G: 0.25 INJECTION, SOLUTION INTRAVENOUS at 09:13

## 2022-01-01 RX ADMIN — POLYETHYLENE GLYCOL 3350 17 G: 17 POWDER, FOR SOLUTION ORAL at 08:37

## 2022-01-01 RX ADMIN — MAGNESIUM SULFATE HEPTAHYDRATE 1 G: 1 INJECTION, SOLUTION INTRAVENOUS at 19:34

## 2022-01-01 RX ADMIN — POTASSIUM & SODIUM PHOSPHATES POWDER PACK 280-160-250 MG 1 PACKET: 280-160-250 PACK at 23:12

## 2022-01-01 RX ADMIN — CEFEPIME HYDROCHLORIDE 2000 MG: 2 INJECTION, POWDER, FOR SOLUTION INTRAVENOUS at 22:42

## 2022-01-01 RX ADMIN — IPRATROPIUM BROMIDE AND ALBUTEROL SULFATE 3 ML: 2.5; .5 SOLUTION RESPIRATORY (INHALATION) at 07:19

## 2022-01-01 RX ADMIN — MORPHINE SULFATE 2 MG: 2 INJECTION, SOLUTION INTRAMUSCULAR; INTRAVENOUS at 23:00

## 2022-01-01 RX ADMIN — MAGNESIUM SULFATE HEPTAHYDRATE 1 G: 1 INJECTION, SOLUTION INTRAVENOUS at 09:06

## 2022-01-01 RX ADMIN — NOREPINEPHRINE BITARTRATE 8 MCG/MIN: 1 SOLUTION INTRAVENOUS at 18:00

## 2022-01-01 RX ADMIN — SODIUM PHOSPHATE, MONOBASIC, MONOHYDRATE 6 MMOL: 276; 142 INJECTION, SOLUTION INTRAVENOUS at 13:14

## 2022-01-01 RX ADMIN — LIDOCAINE 5% 1 PATCH: 700 PATCH TOPICAL at 11:43

## 2022-01-01 RX ADMIN — ALBUMIN (HUMAN) 12.5 G: 12.5 INJECTION, SOLUTION INTRAVENOUS at 01:37

## 2022-01-01 RX ADMIN — IPRATROPIUM BROMIDE AND ALBUTEROL SULFATE 3 ML: 2.5; .5 SOLUTION RESPIRATORY (INHALATION) at 16:12

## 2022-01-01 RX ADMIN — MAGNESIUM SULFATE HEPTAHYDRATE 1 G: 1 INJECTION, SOLUTION INTRAVENOUS at 19:26

## 2022-01-01 RX ADMIN — IPRATROPIUM BROMIDE AND ALBUTEROL SULFATE 3 ML: 2.5; .5 SOLUTION RESPIRATORY (INHALATION) at 13:30

## 2022-01-01 RX ADMIN — SODIUM PHOSPHATE, MONOBASIC, MONOHYDRATE 9 MMOL: 276; 142 INJECTION, SOLUTION INTRAVENOUS at 15:18

## 2022-01-01 RX ADMIN — OXYCODONE HYDROCHLORIDE 5 MG: 5 SOLUTION ORAL at 02:23

## 2022-01-01 RX ADMIN — Medication 20000 ML: at 22:40

## 2022-01-01 RX ADMIN — CEFEPIME HYDROCHLORIDE 2000 MG: 2 INJECTION, POWDER, FOR SOLUTION INTRAVENOUS at 12:14

## 2022-01-01 RX ADMIN — ACETYLCYSTEINE 600 MG: 200 SOLUTION ORAL; RESPIRATORY (INHALATION) at 15:09

## 2022-01-01 RX ADMIN — MIDODRINE HYDROCHLORIDE 10 MG: 5 TABLET ORAL at 17:07

## 2022-01-01 RX ADMIN — HYDROMORPHONE HYDROCHLORIDE 0.5 MG: 1 INJECTION, SOLUTION INTRAMUSCULAR; INTRAVENOUS; SUBCUTANEOUS at 21:26

## 2022-01-01 RX ADMIN — CHLORHEXIDINE GLUCONATE 0.12% ORAL RINSE 15 ML: 1.2 LIQUID ORAL at 09:44

## 2022-01-01 RX ADMIN — METRONIDAZOLE 500 MG: 500 INJECTION, SOLUTION INTRAVENOUS at 15:12

## 2022-01-01 RX ADMIN — Medication 20000 ML: at 19:12

## 2022-01-01 RX ADMIN — GABAPENTIN 200 MG: 250 SUSPENSION ORAL at 22:26

## 2022-01-01 RX ADMIN — ACETAMINOPHEN 650 MG: 650 SUSPENSION ORAL at 20:39

## 2022-01-01 RX ADMIN — VANCOMYCIN HYDROCHLORIDE 1250 MG: 10 INJECTION, POWDER, LYOPHILIZED, FOR SOLUTION INTRAVENOUS at 15:10

## 2022-01-01 RX ADMIN — IPRATROPIUM BROMIDE AND ALBUTEROL SULFATE 3 ML: 2.5; .5 SOLUTION RESPIRATORY (INHALATION) at 19:08

## 2022-01-01 RX ADMIN — LEVOTHYROXINE SODIUM 112 MCG: 112 TABLET ORAL at 05:30

## 2022-01-01 RX ADMIN — POLYETHYLENE GLYCOL 3350 17 G: 17 POWDER, FOR SOLUTION ORAL at 09:27

## 2022-01-01 RX ADMIN — OXYCODONE HYDROCHLORIDE 5 MG: 5 TABLET ORAL at 05:15

## 2022-01-01 RX ADMIN — Medication 20000 ML: at 14:40

## 2022-01-01 RX ADMIN — IPRATROPIUM BROMIDE AND ALBUTEROL SULFATE 3 ML: 2.5; .5 SOLUTION RESPIRATORY (INHALATION) at 07:17

## 2022-01-01 RX ADMIN — INSULIN LISPRO 1 UNITS: 100 INJECTION, SOLUTION INTRAVENOUS; SUBCUTANEOUS at 23:34

## 2022-01-01 RX ADMIN — PANTOPRAZOLE SODIUM 40 MG: 40 INJECTION, POWDER, FOR SOLUTION INTRAVENOUS at 08:00

## 2022-01-01 RX ADMIN — METRONIDAZOLE 500 MG: 500 INJECTION, SOLUTION INTRAVENOUS at 15:20

## 2022-01-01 RX ADMIN — NOREPINEPHRINE BITARTRATE 1 MCG/MIN: 1 SOLUTION INTRAVENOUS at 22:58

## 2022-01-01 RX ADMIN — POTASSIUM CHLORIDE 20 MEQ: 14.9 INJECTION, SOLUTION INTRAVENOUS at 10:20

## 2022-01-01 RX ADMIN — Medication 20000 ML: at 08:24

## 2022-01-01 RX ADMIN — VASOPRESSIN 0.04 UNITS/MIN: 20 INJECTION INTRAVENOUS at 11:21

## 2022-01-01 RX ADMIN — Medication 20000 ML: at 23:39

## 2022-01-01 RX ADMIN — POLYETHYLENE GLYCOL 3350 17 G: 17 POWDER, FOR SOLUTION ORAL at 08:43

## 2022-01-01 RX ADMIN — CHLORHEXIDINE GLUCONATE 0.12% ORAL RINSE 15 ML: 1.2 LIQUID ORAL at 21:36

## 2022-01-01 RX ADMIN — SODIUM CHLORIDE 1000 ML: 0.9 INJECTION, SOLUTION INTRAVENOUS at 14:53

## 2022-01-01 RX ADMIN — LIDOCAINE HYDROCHLORIDE 100 MG: 20 INJECTION, SOLUTION EPIDURAL; INFILTRATION; INTRACAUDAL; PERINEURAL at 18:00

## 2022-01-01 RX ADMIN — CALCIUM GLUCONATE 1 G: 20 INJECTION, SOLUTION INTRAVENOUS at 19:23

## 2022-01-01 RX ADMIN — CEFEPIME HYDROCHLORIDE 2000 MG: 2 INJECTION, POWDER, FOR SOLUTION INTRAVENOUS at 11:49

## 2022-01-01 RX ADMIN — IPRATROPIUM BROMIDE AND ALBUTEROL SULFATE 3 ML: 2.5; .5 SOLUTION RESPIRATORY (INHALATION) at 23:08

## 2022-01-01 RX ADMIN — METRONIDAZOLE 500 MG: 500 INJECTION, SOLUTION INTRAVENOUS at 23:59

## 2022-01-01 RX ADMIN — POTASSIUM PHOSPHATE, MONOBASIC AND POTASSIUM PHOSPHATE, DIBASIC 21 MMOL: 224; 236 INJECTION, SOLUTION, CONCENTRATE INTRAVENOUS at 09:45

## 2022-01-01 RX ADMIN — DEXMEDETOMIDINE HYDROCHLORIDE 1 MCG/KG/HR: 400 INJECTION INTRAVENOUS at 21:50

## 2022-01-01 RX ADMIN — CHLORHEXIDINE GLUCONATE 0.12% ORAL RINSE 15 ML: 1.2 LIQUID ORAL at 08:23

## 2022-01-01 RX ADMIN — HYDROMORPHONE HYDROCHLORIDE 0.5 MG: 1 INJECTION, SOLUTION INTRAMUSCULAR; INTRAVENOUS; SUBCUTANEOUS at 18:31

## 2022-01-01 RX ADMIN — DEXMEDETOMIDINE HYDROCHLORIDE 0.4 MCG/KG/HR: 400 INJECTION INTRAVENOUS at 13:56

## 2022-01-01 RX ADMIN — PROPOFOL 20 MG: 10 INJECTION, EMULSION INTRAVENOUS at 14:16

## 2022-01-01 RX ADMIN — METRONIDAZOLE 500 MG: 500 INJECTION, SOLUTION INTRAVENOUS at 00:50

## 2022-01-01 RX ADMIN — CHLORHEXIDINE GLUCONATE 0.12% ORAL RINSE 15 ML: 1.2 LIQUID ORAL at 20:30

## 2022-01-01 RX ADMIN — Medication: at 22:56

## 2022-01-01 RX ADMIN — LEVOTHYROXINE SODIUM 112 MCG: 112 TABLET ORAL at 06:08

## 2022-01-01 RX ADMIN — SODIUM CHLORIDE, SODIUM LACTATE, POTASSIUM CHLORIDE, AND CALCIUM CHLORIDE: .6; .31; .03; .02 INJECTION, SOLUTION INTRAVENOUS at 13:38

## 2022-01-01 RX ADMIN — ACETYLCYSTEINE 600 MG: 200 SOLUTION ORAL; RESPIRATORY (INHALATION) at 08:23

## 2022-01-01 RX ADMIN — CHLORHEXIDINE GLUCONATE 0.12% ORAL RINSE 15 ML: 1.2 LIQUID ORAL at 21:10

## 2022-01-01 RX ADMIN — CHLORHEXIDINE GLUCONATE 0.12% ORAL RINSE 15 ML: 1.2 LIQUID ORAL at 08:26

## 2022-01-01 RX ADMIN — ACETYLCYSTEINE 600 MG: 200 SOLUTION ORAL; RESPIRATORY (INHALATION) at 07:26

## 2022-01-01 RX ADMIN — IPRATROPIUM BROMIDE AND ALBUTEROL SULFATE 3 ML: 2.5; .5 SOLUTION RESPIRATORY (INHALATION) at 03:03

## 2022-01-01 RX ADMIN — CHLORHEXIDINE GLUCONATE 0.12% ORAL RINSE 15 ML: 1.2 LIQUID ORAL at 20:58

## 2022-01-01 RX ADMIN — METRONIDAZOLE 500 MG: 500 INJECTION, SOLUTION INTRAVENOUS at 16:00

## 2022-01-01 RX ADMIN — POLYETHYLENE GLYCOL 3350 17 G: 17 POWDER, FOR SOLUTION ORAL at 17:37

## 2022-01-01 RX ADMIN — MAGNESIUM SULFATE HEPTAHYDRATE 1 G: 1 INJECTION, SOLUTION INTRAVENOUS at 13:16

## 2022-01-01 RX ADMIN — SODIUM PHOSPHATE, MONOBASIC, MONOHYDRATE 6 MMOL: 276; 142 INJECTION, SOLUTION INTRAVENOUS at 12:59

## 2022-01-01 RX ADMIN — HEPARIN SODIUM 5000 UNITS: 5000 INJECTION INTRAVENOUS; SUBCUTANEOUS at 08:22

## 2022-01-01 RX ADMIN — MORPHINE SULFATE 2 MG: 2 INJECTION, SOLUTION INTRAMUSCULAR; INTRAVENOUS at 23:36

## 2022-01-01 RX ADMIN — METRONIDAZOLE 500 MG: 500 INJECTION, SOLUTION INTRAVENOUS at 00:58

## 2022-01-01 RX ADMIN — METRONIDAZOLE 500 MG: 500 INJECTION, SOLUTION INTRAVENOUS at 16:08

## 2022-01-01 RX ADMIN — VASOPRESSIN 0.04 UNITS/MIN: 20 INJECTION INTRAVENOUS at 18:25

## 2022-01-01 RX ADMIN — CEFEPIME HYDROCHLORIDE 2000 MG: 2 INJECTION, POWDER, FOR SOLUTION INTRAVENOUS at 22:43

## 2022-01-01 RX ADMIN — HEPARIN SODIUM 5000 UNITS: 5000 INJECTION INTRAVENOUS; SUBCUTANEOUS at 09:24

## 2022-01-01 RX ADMIN — INSULIN LISPRO 1 UNITS: 100 INJECTION, SOLUTION INTRAVENOUS; SUBCUTANEOUS at 18:15

## 2022-01-01 RX ADMIN — NOREPINEPHRINE BITARTRATE 5 MCG/MIN: 1 SOLUTION INTRAVENOUS at 02:46

## 2022-01-01 RX ADMIN — LIDOCAINE 5% 2 PATCH: 700 PATCH TOPICAL at 09:27

## 2022-01-01 RX ADMIN — POTASSIUM CHLORIDE 20 MEQ: 14.9 INJECTION, SOLUTION INTRAVENOUS at 22:22

## 2022-01-01 RX ADMIN — ACETYLCYSTEINE 600 MG: 200 SOLUTION ORAL; RESPIRATORY (INHALATION) at 23:27

## 2022-01-01 RX ADMIN — NOREPINEPHRINE BITARTRATE 1 MCG/MIN: 1 SOLUTION INTRAVENOUS at 05:08

## 2022-01-01 RX ADMIN — SODIUM BICARBONATE 100 ML/HR: 84 INJECTION, SOLUTION INTRAVENOUS at 01:00

## 2022-01-01 RX ADMIN — INSULIN LISPRO 4 UNITS: 100 INJECTION, SOLUTION INTRAVENOUS; SUBCUTANEOUS at 17:22

## 2022-01-01 RX ADMIN — PIPERACILLIN AND TAZOBACTAM 3.38 G: 36; 4.5 INJECTION, POWDER, FOR SOLUTION INTRAVENOUS at 21:36

## 2022-01-01 RX ADMIN — FENTANYL CITRATE 50 MCG: 50 INJECTION, SOLUTION INTRAMUSCULAR; INTRAVENOUS at 14:41

## 2022-01-01 RX ADMIN — OXYCODONE HYDROCHLORIDE 5 MG: 5 SOLUTION ORAL at 18:04

## 2022-01-01 RX ADMIN — FENTANYL CITRATE 50 MCG: 50 INJECTION, SOLUTION INTRAMUSCULAR; INTRAVENOUS at 14:58

## 2022-01-01 RX ADMIN — DEXTROSE MONOHYDRATE 50 ML: 25 INJECTION, SOLUTION INTRAVENOUS at 11:42

## 2022-01-01 RX ADMIN — NOREPINEPHRINE BITARTRATE 22 MCG/MIN: 1 SOLUTION INTRAVENOUS at 09:25

## 2022-01-01 RX ADMIN — INSULIN LISPRO 1 UNITS: 100 INJECTION, SOLUTION INTRAVENOUS; SUBCUTANEOUS at 15:19

## 2022-01-01 RX ADMIN — POTASSIUM CHLORIDE 40 MEQ: 29.8 INJECTION, SOLUTION INTRAVENOUS at 04:00

## 2022-01-01 RX ADMIN — ALBUMIN (HUMAN) 25 G: 0.25 INJECTION, SOLUTION INTRAVENOUS at 08:03

## 2022-01-01 RX ADMIN — MAGNESIUM SULFATE HEPTAHYDRATE 2 G: 40 INJECTION, SOLUTION INTRAVENOUS at 15:39

## 2022-01-01 RX ADMIN — CHLORHEXIDINE GLUCONATE 0.12% ORAL RINSE 15 ML: 1.2 LIQUID ORAL at 20:05

## 2022-01-01 RX ADMIN — NOREPINEPHRINE BITARTRATE 30 MCG/MIN: 1 SOLUTION INTRAVENOUS at 12:30

## 2022-01-01 RX ADMIN — CALCIUM GLUCONATE 1 G: 20 INJECTION, SOLUTION INTRAVENOUS at 00:49

## 2022-01-01 RX ADMIN — OXYCODONE HYDROCHLORIDE 5 MG: 5 SOLUTION ORAL at 17:20

## 2022-01-01 RX ADMIN — CALCIUM GLUCONATE 1 G: 20 INJECTION, SOLUTION INTRAVENOUS at 06:43

## 2022-01-01 RX ADMIN — POLYETHYLENE GLYCOL 3350 17 G: 17 POWDER, FOR SOLUTION ORAL at 08:27

## 2022-01-01 RX ADMIN — CALCIUM GLUCONATE 1 G: 20 INJECTION, SOLUTION INTRAVENOUS at 00:11

## 2022-01-01 RX ADMIN — VASOPRESSIN 0.04 UNITS/MIN: 20 INJECTION INTRAVENOUS at 18:15

## 2022-01-01 RX ADMIN — INSULIN LISPRO 1 UNITS: 100 INJECTION, SOLUTION INTRAVENOUS; SUBCUTANEOUS at 18:19

## 2022-01-01 RX ADMIN — MORPHINE SULFATE 2 MG: 2 INJECTION, SOLUTION INTRAMUSCULAR; INTRAVENOUS at 04:18

## 2022-01-01 RX ADMIN — OXYCODONE HYDROCHLORIDE 5 MG: 5 SOLUTION ORAL at 22:53

## 2022-01-01 RX ADMIN — MAGNESIUM SULFATE HEPTAHYDRATE 1 G: 1 INJECTION, SOLUTION INTRAVENOUS at 22:48

## 2022-01-01 RX ADMIN — MAGNESIUM SULFATE HEPTAHYDRATE 1 G: 1 INJECTION, SOLUTION INTRAVENOUS at 19:11

## 2022-01-01 RX ADMIN — METOCLOPRAMIDE HYDROCHLORIDE 10 MG: 5 INJECTION INTRAMUSCULAR; INTRAVENOUS at 17:12

## 2022-01-01 RX ADMIN — MIDODRINE HYDROCHLORIDE 10 MG: 5 TABLET ORAL at 11:11

## 2022-01-01 RX ADMIN — MORPHINE SULFATE 2 MG: 2 INJECTION, SOLUTION INTRAMUSCULAR; INTRAVENOUS at 17:46

## 2022-01-01 RX ADMIN — MIDODRINE HYDROCHLORIDE 10 MG: 5 TABLET ORAL at 11:32

## 2022-01-01 RX ADMIN — POTASSIUM CHLORIDE 40 MEQ: 29.8 INJECTION, SOLUTION INTRAVENOUS at 06:47

## 2022-01-01 RX ADMIN — POTASSIUM CHLORIDE 20 MEQ: 14.9 INJECTION, SOLUTION INTRAVENOUS at 08:12

## 2022-01-14 PROBLEM — I06.0 RHEUMATIC AORTIC STENOSIS: Status: ACTIVE | Noted: 2022-01-01

## 2022-01-14 PROBLEM — F11.20 CONTINUOUS OPIOID DEPENDENCE (HCC): Status: RESOLVED | Noted: 2022-01-01 | Resolved: 2022-01-01

## 2022-01-14 PROBLEM — F11.20 CONTINUOUS OPIOID DEPENDENCE (HCC): Status: ACTIVE | Noted: 2022-01-01

## 2022-01-14 PROBLEM — R54 FRAIL ELDERLY: Status: ACTIVE | Noted: 2022-01-01

## 2022-01-14 PROBLEM — M48.00 SPINAL STENOSIS: Status: ACTIVE | Noted: 2022-01-01

## 2022-01-14 PROBLEM — I10 HYPERTENSION: Status: ACTIVE | Noted: 2022-01-01

## 2022-01-14 PROBLEM — M51.37 DEGENERATIVE DISC DISEASE AT L5-S1 LEVEL: Status: ACTIVE | Noted: 2022-01-01

## 2022-01-14 NOTE — ASSESSMENT & PLAN NOTE
Reviewed MRI, pt has severe multilevel DDD and stenosis-he would like a referral to Pain Mgmt-might consider injections but not surgery

## 2022-01-14 NOTE — PROGRESS NOTES
Assessment/Plan: Nabila Coronado has a hx of advanced age,hypothyroidism, HTN, chronic back pain due to DDD and spinal stenosis-just moved here-used to be a general/trauma surgeon-he wanted to get established today, would also like referral to pain management and wants comprehensive labwork         Problem List Items Addressed This Visit        Cardiovascular and Mediastinum    Hypertension     Continue amlodipine and valsartan-hctz         Relevant Medications    amLODIPine (NORVASC) 5 mg tablet    Other Relevant Orders    Lipid panel    TSH, 3rd generation    CBC and differential    Comprehensive metabolic panel       Musculoskeletal and Integument    Degenerative disc disease at L5-S1 level     Reviewed MRI, pt has severe multilevel DDD and stenosis-he would like a referral to Pain Mgmt-might consider injections but not surgery         Relevant Orders    Ambulatory Referral to Pain Management       Other    Spinal stenosis    Relevant Orders    Ambulatory Referral to Pain Management    Frail elderly      Other Visit Diagnoses     Encounter to establish care with new doctor    -  Primary    Autoimmune disorder ([facility] [address] )        Relevant Orders    Sedimentation rate, automated    C-reactive protein    Vitamin D deficiency        Relevant Orders    Vitamin D 25 hydroxy    BMI 23 0-23 9, adult        COVID-19 vaccine series completed                Subjective:      Patient ID: Yaima Holden is a 80 y o  male  Fady here to get established-he is 80, with a hx of HTN, severe LS DDD and spinal stenosis, and chronic back pain, ambulatory dysfunction, hypothyroidism, rheumatic AS, frailty-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-he and his wife, who is 80, just moved up here to a small house so that they could be closer to their sons-one in [place] and one 10 minutes away    Wanted to get established with PCP and wanted labwork to be done-brought a book of medical records with them which I went through in a cursory fashion      The following portions of the patient's history were reviewed and updated as appropriate:   Past Medical History:  He has a past medical history of Aortic valve calcification, Aortic valve stenosis, AR (aortic regurgitation), Atelectasis, Autoimmune disease ([address] ), Bilateral pleural effusion, Bilateral senile cataracts, CAD (coronary artery disease), CAD (coronary artery disease), Central spinal stenosis, Changes in vascular appearance of retina, Chronic back pain, Compression fracture of L1 lumbar vertebra ([address] ) (06/02/2010), Compression fracture of T10 vertebra (HCC), Compression fracture of T12 vertebra (HCC), Compression fracture of T9 vertebra ([address] ), Dextroscoliosis of lumbar spine, Diverticulosis, Dr[place], Dry eye syndrome, Former smoker, Gastritis, GERD (gastroesophageal reflux disease), Giant cell arteritis ([address] ), Hiatal hernia, History of shingles, HTN (hypertension), Internal carotid artery stenosis, left, Internal carotid artery stenosis, right, Left lower lobe pneumonia, Lesion of upper eyelid, Lordosis of lumbar region, Osteopenia, Osteoporosis, Pleuritic pain, Radiculopathy, Raynaud's disease, Renal cyst, Scleroderma ([address] ), and Tortuous aorta ([facility] [address] )  ,  _______________________________________________________________________  Medical Problems:  does not have any pertinent problems on file ,  _______________________________________________________________________  Past Surgical History:   has a past surgical history that includes Vertebroplasty; DXA procedure(historical) (07/24/2019); EGD (04/24/2009); and Colonoscopy w/ biopsies (04/24/2009)  ,  _______________________________________________________________________  Family History:  family history includes No Known Problems in his father and mother ,  _______________________________________________________________________  Social History:   reports that he has quit smoking  His smoking use included cigarettes and pipe   He has never used smokeless tobacco  He reports current alcohol use  No history on file for drug use ,  _______________________________________________________________________  Allergies:  has No Known Allergies     _______________________________________________________________________  Current Outpatient Medications   Medication Sig Dispense Refill    acetaminophen (TYLENOL) 325 mg tablet Take 650 mg by mouth every 4 (four) hours as needed for mild pain      amLODIPine (NORVASC) 5 mg tablet Take 5 mg by mouth daily      docusate sodium (COLACE) 100 mg capsule Take 100 mg by mouth 2 (two) times a day      levothyroxine 112 mcg tablet Take 112 mcg by mouth daily      lidocaine (Lidoderm) 5 % Apply 2 patches topically daily       No current facility-administered medications for this visit      _______________________________________________________________________  Review of Systems   Constitutional: Negative for fatigue  Respiratory: Negative for cough and shortness of breath  Cardiovascular: Negative for chest pain and leg swelling  Musculoskeletal: Positive for back pain and gait problem  Objective:  Vitals:    01/14/22 1059   BP: 142/70   BP Location: Left arm   Patient Position: Sitting   Cuff Size: Adult   Pulse: 77   Resp: 16   Temp: 97 9 °F (36 6 °C)   TempSrc: Temporal   SpO2: 96%   Weight: 61 kg (134 lb 6 oz)   Height: 5' 4" (1 626 m)     Body mass index is 23 07 kg/m²  Physical Exam  Constitutional:       Appearance: He is ill-appearing  HENT:      Head: Normocephalic and atraumatic  Right Ear: External ear normal       Left Ear: External ear normal       Nose: Nose normal       Mouth/Throat:      Mouth: Mucous membranes are moist    Eyes:      Extraocular Movements: Extraocular movements intact  Cardiovascular:      Rate and Rhythm: Regular rhythm  Heart sounds: Murmur heard  Pulmonary:      Effort: Pulmonary effort is normal       Breath sounds: Normal breath sounds  Musculoskeletal:      Cervical back: Normal range of motion  Comments: Some mild lower extremity atrophy   Neurological:      Mental Status: He is alert and oriented to person, place, and time  Mental status is at baseline  Psychiatric:         Thought Content:  Thought content normal

## 2022-03-07 NOTE — TELEPHONE ENCOUNTER
Received order from Dr Ellen Panda to call and schedule NP consult for nausea and decreased appetite  Left message for patient  Will call again in 1 wk if he doesn't return call

## 2022-03-25 NOTE — PROGRESS NOTES
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 and moved to [facility] [place]  He has a strong family history of brother with stomach cancer and another family member with colon cancer so he is concerned about malignancy, he had a CT scan abdomen and pelvis 2 months ago which came back normal   Last EGD and colonoscopy was from 2009  He has a history of chronic heartburn and take over-the-counter antacid  He denies any dysphagia, no odynophagia, no melena or rectal bleeding  - Ambulatory Referral to Gastroenterology  - EGD; Future    2  Left lower quadrant pain  History of chronic left lower quadrant abdominal pain, colonoscopy report was reviewed from 2009 which shows evidence of left-sided diverticulosis, as per patient he had a colonic intussusception in the past and he able to manually push colon back but CT scan did not show any evidence of intussusception or no evidence of diverticulitis  Will plan for colonoscopy, risk and benefit of the procedure was discussed, patient will stay on clear liquid diet day before the procedure and he will drink low volume bowel prep in split dosing  - Colonoscopy; Future    3  Colonic intussusception ([address] )  - Colonoscopy; Future    4  Diverticulosis  Continue with high-fiber diet  - Colonoscopy; Future    5  Adenomatous polyp of colon, unspecified part of colon  - Colonoscopy; Future    6  Family history of stomach cancer    - EGD; Future    7  Family history of colon cancer    - Colonoscopy;  Future    ______________________________________________________________________    HPI:  80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 and left lower quadrant abdominal pain  Patient has abdominal pain for long duration and had a CT scan 2 months ago which came back normal   His brother and 1 of further family member was diagnosed with stomach cancer and also has a strong family history of colon cancer so he is concerned about malignancy  As per patient CT scan did not show any evidence of metastatic disease or any tumor  He had EGD and colonoscopy in 2009 which both result was reviewed, there is evidence of colon polyp, diverticulosis, history of GERD with gastritis  He take over-the-counter antacid and stool softener  His bowel movements are regular, he denies any weight loss, his appetite is good  He denies any fever or chills  As per patient he had a colonic intussusception in the past and he able to manually push it back  He also has multiple back surgery including kyphoplasty, currently use lidocaine patch, history of chronic back pain  He denies any chronic NSAID use    REVIEW OF SYSTEMS:    CONSTITUTIONAL: Denies any fever, chills, rigors, and weight loss  HEENT: No earache or tinnitus  Denies hearing loss or visual disturbances  CARDIOVASCULAR: No chest pain or palpitations  RESPIRATORY: Denies any cough, hemoptysis, shortness of breath or dyspnea on exertion  GASTROINTESTINAL: As noted in the History of Present Illness  GENITOURINARY: No problems with urination  Denies any hematuria or dysuria  NEUROLOGIC: No dizziness or vertigo, denies headaches  MUSCULOSKELETAL: Denies any muscle or joint pain  SKIN: Denies skin rashes or itching  ENDOCRINE: Denies excessive thirst  Denies intolerance to heat or cold  PSYCHOSOCIAL: Denies depression or anxiety  Denies any recent memory loss         Historical Information   Past Medical History:   Diagnosis Date    Aortic valve calcification     Aortic valve stenosis     AR (aortic regurgitation)     Atelectasis     LT BASILAR PASSIVE SEGMENTAL    Autoimmune disease (HCC)     Bilateral pleural effusion     Bilateral senile cataracts     CAD (coronary artery disease)     CAD (coronary artery disease)     Central spinal stenosis     Changes in vascular appearance of retina     Chronic back pain     Colon polyp     Compression fracture of L1 lumbar vertebra (HCC) 06/02/2010    Compression fracture of T10 vertebra (HCC)     Compression fracture of T12 vertebra (HCC)     Compression fracture of T9 vertebra (HCC)     Dextroscoliosis of lumbar spine     Diverticulosis     Drusen     Dry eye syndrome     Former smoker     Gastritis     GERD (gastroesophageal reflux disease)     Giant cell arteritis (HCC)     Hiatal hernia     History of shingles     HTN (hypertension)     Internal carotid artery stenosis, left     Internal carotid artery stenosis, right     Left lower lobe pneumonia     Lesion of upper eyelid     Lordosis of lumbar region     Osteopenia     SEVERE    Osteoporosis     Pleuritic pain     Radiculopathy     B/L LOWER EXTREMITY    Raynaud's disease     Renal cyst     B/L    Scleroderma ([address] )     Tortuous aorta (HCC)      Past Surgical History:   Procedure Laterality Date    COLONOSCOPY      COLONOSCOPY W/ BIOPSIES  04/24/2009    DXA PROCEDURE (HISTORICAL)  07/24/2019    EGD  04/24/2009    w/ bx    UPPER GASTROINTESTINAL ENDOSCOPY      VERTEBROPLASTY      T9, T10, T12 & L1     Social History   Social History     Substance and Sexual Activity   Alcohol Use Yes     Social History     Substance and Sexual Activity   Drug Use Not on file     Social History     Tobacco Use   Smoking Status Former Smoker    Types: Cigarettes, Pipe   Smokeless Tobacco Never Used   Tobacco Comment    QUIT 21 YEARS AGO     Family History   Problem Relation Age of Onset    No Known Problems Mother     No Known Problems Father     Colon cancer Sister     Colon cancer Brother        Meds/Allergies       Current Outpatient Medications:     acetaminophen (TYLENOL) 325 mg tablet    amLODIPine (NORVASC) 5 mg tablet    docusate sodium (COLACE) 100 mg capsule    dextran 70-hypromellose (GENTEAL TEARS) 0 1-0 3 % ophthalmic solution    levothyroxine (Euthyrox) 100 mcg tablet    levothyroxine 112 mcg tablet    lidocaine (Lidoderm) 5 %    No Known Allergies        Objective     Blood pressure 152/76, pulse 89, height 5' 4" (1 626 m), weight 61 2 kg (135 lb)  Body mass index is 23 17 kg/m²  PHYSICAL EXAM:      General Appearance:   Alert, cooperative, no distress   HEENT:   Normocephalic, atraumatic, anicteric      Neck:  Supple, symmetrical, trachea midline   Lungs:   Clear to auscultation bilaterally; no rales, rhonchi or wheezing; respirations unlabored    Heart[de-identified]   Regular rate and rhythm; no murmur, rub, or gallop  Abdomen:   Soft, non-tender, non-distended; normal bowel sounds; no masses, no organomegaly    Genitalia:   Deferred    Rectal:   Deferred    Extremities:  No cyanosis, clubbing or edema    Pulses:  2+ and symmetric    Skin:  No jaundice, rashes, or lesions    Lymph nodes:  No palpable cervical lymphadenopathy        Lab Results:   No visits with results within 1 Day(s) from this visit     Latest known visit with results is:   Appointment on 01/15/2022   Component Date Value    Cholesterol 01/15/2022 137     Triglycerides 01/15/2022 73     HDL, Direct 01/15/2022 92     LDL Calculated 01/15/2022 30     Non-HDL-Chol (CHOL-HDL) 01/15/2022 45     TSH 3RD GENERATON 01/15/2022 0 029*    WBC 01/15/2022 5 67     RBC 01/15/2022 2 99*    Hemoglobin 01/15/2022 10 3*    Hematocrit 01/15/2022 30 2*    MCV 01/15/2022 101*    MCH 01/15/2022 34 4*    MCHC 01/15/2022 34 1     RDW 01/15/2022 17 1*    MPV 01/15/2022 10 6     Platelets 81/75/3948 237     nRBC 01/15/2022 0     Neutrophils Relative 01/15/2022 58     Immat GRANS % 01/15/2022 1     Lymphocytes Relative 01/15/2022 24     Monocytes Relative 01/15/2022 13*    Eosinophils Relative 01/15/2022 3     Basophils Relative 01/15/2022 1     Neutrophils Absolute 01/15/2022 3 33     Immature Grans Absolute 01/15/2022 0 04     Lymphocytes Absolute 01/15/2022 1 38     Monocytes Absolute 01/15/2022 0 71     Eosinophils Absolute 01/15/2022 0 16     Basophils Absolute 01/15/2022 0 05     Sodium 01/15/2022 136     Potassium 01/15/2022 4 3     Chloride 01/15/2022 100     CO2 01/15/2022 27     ANION GAP 01/15/2022 9     BUN 01/15/2022 15     Creatinine 01/15/2022 0 79     Glucose, Fasting 01/15/2022 101*    Calcium 01/15/2022 9 2     AST 01/15/2022 19     ALT 01/15/2022 15     Alkaline Phosphatase 01/15/2022 100     Total Protein 01/15/2022 7 8     Albumin 01/15/2022 3 7     Total Bilirubin 01/15/2022 0 88     eGFR 01/15/2022 79     Sed Rate 01/15/2022 15     CRP 01/15/2022 5 1*    Vit D, 25-Hydroxy 01/15/2022 31 4     Prostate Specific Antige* 01/15/2022 0 4     PSA, Free 01/15/2022 0 12     PSA, Free Pct 01/15/2022 30 0          Radiology Results:   No results found

## 2022-05-04 NOTE — TELEPHONE ENCOUNTER
Called and lmom asking pt to call back to confirm his procedure appt and to make sure he has all his prep instructions  I did speak to his DIL  She will be driving him  If he calls back please make sure that he has his prep instructions  Thank you

## 2022-05-11 PROBLEM — K63.1 PERFORATED SIGMOID COLON (HCC): Status: ACTIVE | Noted: 2022-01-01

## 2022-05-11 NOTE — SEDATION DOCUMENTATION
Procedure unable to complete, possible perforation of the bowel  Charge nurse advised to page surgeon  Dr Kodak Allison called and spoke to Dr Chon Brewster    CT to be ordered in [facility] post gi

## 2022-05-11 NOTE — ED PROVIDER NOTES
History  Chief Complaint   Patient presents with    Abdominal Pain     Pt has llq abd pain, a pt of DR LORENZO [facility]     59-year-old male with past history of CAD, radiculopathy, osteoporosis, spinal stenosis, hypertension, presents to the ED from [facility] for surgical admission for bowel perforation  Earlier today patient had a routine colonoscopy  At the time some abnormalities were seen and a polyp was removed  Patient was noted to have a tear along his intestine  Subsequent CT scan showed pneumoperitoneum  Patient was seen and evaluated by general surgeon, Dr Jeff Knott at [facility]   At this time patient arrived to our hospital for operative repair and admission to general surgery service  In the ED patient complains of some abdominal pain and nausea  Patient is also complaining of bladder distention and requested a Farnsworth placement  History provided by:  Patient  Abdominal Pain  Associated symptoms: nausea    Associated symptoms: no chest pain, no cough, no diarrhea, no dysuria, no fatigue, no fever, no shortness of breath, no sore throat and no vomiting        Prior to Admission Medications   Prescriptions Last Dose Informant Patient Reported? Taking?   acetaminophen (TYLENOL) 325 mg tablet  Self Yes No   Sig: Take 650 mg by mouth every 4 (four) hours as needed for mild pain   amLODIPine (NORVASC) 5 mg tablet  Self Yes No   Sig: Take 5 mg by mouth daily   dextran 70-hypromellose (GENTEAL TEARS) 0 1-0 3 % ophthalmic solution  Self Yes No   Si-3x daily     docusate sodium (COLACE) 100 mg capsule  Self Yes No   Sig: Take 100 mg by mouth 2 (two) times a day   levothyroxine (Euthyrox) 100 mcg tablet  Self No No   Sig: Take 1 tablet (100 mcg total) by mouth daily in the early morning   levothyroxine 112 mcg tablet  Self Yes No   Sig: Take 112 mcg by mouth daily   lidocaine (LIDODERM) 5 %  Self Yes No   Sig: Apply 2 patches topically daily      Facility-Administered Medications: None       Past Medical History:   Diagnosis Date    Aortic valve calcification     Aortic valve stenosis     AR (aortic regurgitation)     Atelectasis     LT BASILAR PASSIVE SEGMENTAL    Autoimmune disease (HCC)     Bilateral pleural effusion     Bilateral senile cataracts     CAD (coronary artery disease)     CAD (coronary artery disease)     Central spinal stenosis     Changes in vascular appearance of retina     Chronic back pain     Colon polyp     Compression fracture of L1 lumbar vertebra (HCC) 06/02/2010    Compression fracture of T10 vertebra (HCC)     Compression fracture of T12 vertebra (HCC)     Compression fracture of T9 vertebra (HCC)     Dextroscoliosis of lumbar spine     Diverticulosis     Drusen     Dry eye syndrome     Former smoker     Gastritis     GERD (gastroesophageal reflux disease)     Giant cell arteritis (HCC)     Hiatal hernia     History of shingles     HTN (hypertension)     Internal carotid artery stenosis, left     Internal carotid artery stenosis, right     Left lower lobe pneumonia     Lesion of upper eyelid     Lordosis of lumbar region     Osteopenia     SEVERE    Osteoporosis     Pleuritic pain     Radiculopathy     B/L LOWER EXTREMITY    Raynaud's disease     Renal cyst     B/L    Scleroderma ([address] )     Tortuous aorta (HCC)        Past Surgical History:   Procedure Laterality Date    COLONOSCOPY      COLONOSCOPY W/ BIOPSIES  04/24/2009    DXA PROCEDURE (HISTORICAL)  07/24/2019    EGD  04/24/2009    w/ bx    EGD AND COLONOSCOPY  05/11/2022    UPPER GASTROINTESTINAL ENDOSCOPY      VERTEBROPLASTY      T9, T10, T12 & L1       Family History   Problem Relation Age of Onset    No Known Problems Mother     No Known Problems Father     Colon cancer Sister     Colon cancer Brother      I have reviewed and agree with the history as documented      E-Cigarette/Vaping    E-Cigarette Use Never User      E-Cigarette/Vaping Substances    Nicotine No     THC No     CBD No     Flavoring No     Other No     Unknown No      Social History     Tobacco Use    Smoking status: Former Smoker     Types: Cigarettes, Pipe    Smokeless tobacco: Never Used    Tobacco comment: QUIT 20 YEARS AGO   Vaping Use    Vaping Use: Never used   Substance Use Topics    Alcohol use: Yes       Review of Systems   Constitutional: Negative for activity change, fatigue and fever  HENT: Negative for congestion, ear discharge and sore throat  Eyes: Negative for pain and redness  Respiratory: Negative for cough, chest tightness, shortness of breath and wheezing  Cardiovascular: Negative for chest pain  Gastrointestinal: Positive for abdominal pain and nausea  Negative for diarrhea and vomiting  Endocrine: Negative for cold intolerance  Genitourinary: Negative for dysuria and urgency  Musculoskeletal: Negative for arthralgias and back pain  Neurological: Negative for dizziness, weakness and headaches  Psychiatric/Behavioral: Negative for agitation and behavioral problems  Physical Exam  Physical Exam  Vitals and nursing note reviewed  Constitutional:       Appearance: He is well-developed  HENT:      Head: Normocephalic and atraumatic  Nose: Nose normal    Eyes:      Conjunctiva/sclera: Conjunctivae normal    Cardiovascular:      Rate and Rhythm: Normal rate and regular rhythm  Heart sounds: Normal heart sounds  Pulmonary:      Effort: Pulmonary effort is normal       Breath sounds: Normal breath sounds  Abdominal:      General: Bowel sounds are normal  There is no distension  Palpations: Abdomen is soft  Tenderness: There is generalized abdominal tenderness  Comments: Mild distension noted with hypoactive bowel sounds  Generalized tenderness noted to palpation of abdomen  Musculoskeletal:         General: Normal range of motion  Cervical back: Normal range of motion and neck supple  Skin:     General: Skin is warm  Neurological:      General: No focal deficit present  Mental Status: He is alert and oriented to person, place, and time  Psychiatric:         Mood and Affect: Mood normal          Behavior: Behavior normal          Thought Content: Thought content normal          Judgment: Judgment normal          Vital Signs  ED Triage Vitals [05/11/22 1649]   Temperature Pulse Respirations Blood Pressure SpO2   (!) 97 3 °F (36 3 °C) 83 15 144/67 94 %      Temp Source Heart Rate Source Patient Position - Orthostatic VS BP Location FiO2 (%)   Tympanic Monitor Lying Right arm --      Pain Score       --           Vitals:    05/11/22 1649   BP: 144/67   Pulse: 83   Patient Position - Orthostatic VS: Lying         Visual Acuity      ED Medications  Medications   HYDROmorphone (DILAUDID) injection 1 mg (has no administration in time range)   fentanyl citrate (PF) (FOR EMS ONLY) 100 mcg/2 mL injection 100 mcg (0 mcg Does not apply Given to EMS 5/11/22 1710)   metoclopramide (REGLAN) injection 10 mg (10 mg Intravenous Given 5/11/22 1712)       Diagnostic Studies  Results Reviewed     None                 No orders to display              Procedures  Procedures         ED Course  ED Course as of 05/11/22 1714   Wed May 11, 2022   1650 Spoke with generageneral surgeon, Dr Marcy Otero, who recommends inpatient admission to general surgery team   He is currently arranging or team for operative repair of bowel perforation  1709 Patient bladder is requesting a Farnsworth catheter placement                                               MDM  Number of Diagnoses or Management Options  Abdominal pain: new and requires workup  Bowel perforation [facility]): new and requires workup  Diagnosis management comments: Give pain medication as needed, antiemetics  Insert Farnsworth catheter per patient's request       Amount and/or Complexity of Data Reviewed  Tests in the medicine section of CPT®: ordered and reviewed  Review and summarize past medical records: yes  Independent visualization of images, tracings, or specimens: yes    Risk of Complications, Morbidity, and/or Mortality  General comments: Patient presented to the ED from [facility] for bowel perforation  Patient was seen by general surgeon, Dr Michelle Found  At this time patient will be admitted to general surgery service for operative repair of his bowel perforation  Patient and family agrees with admission plans  Patient Progress  Patient progress: stable      Disposition  Final diagnoses: Bowel perforation ([address] )   Abdominal pain     Time reflects when diagnosis was documented in both MDM as applicable and the Disposition within this note     Time User Action Codes Description Comment    5/11/2022  5:02 PM Alejandra Mueller Add [K63 1] Bowel perforation ([address] )     5/11/2022  5:03 PM Alejandra Mueller Add [R10 9] Abdominal pain       ED Disposition     ED Disposition   Admit    Condition   Stable    Date/Time   Wed May 11, 2022  5:03 PM    Comment   Case was discussed with Dr Michelle Found and the patient's admission status was agreed to be Admission Status: inpatient status to the service of Dr Michelle Found  Follow-up Information    None         Patient's Medications   Discharge Prescriptions    No medications on file       No discharge procedures on file      PDMP Review     None          ED Provider  Electronically Signed by           Shayla Ghotra DO  05/11/22 4169

## 2022-05-11 NOTE — ANESTHESIA PREPROCEDURE EVALUATION
Procedure:  COLONOSCOPY  EGD    History of shingles    Scleroderma ([address] )    Changes in vascular appearance of retina    Autoimmune disease ([address] )    Raynaud's disease    Left lower lobe pneumonia    Pleuritic pain    Chronic back pain    Bilateral pleural effusion    Osteopenia SEVERE   Atelectasis LT BASILAR PASSIVE SEGMENTAL   Hiatal hernia    Diverticulosis    Renal cyst B/L   CAD (coronary artery disease)    Tortuous aorta (HCC)    Radiculopathy B/L LOWER EXTREMITY   Lordosis of lumbar region    Dextroscoliosis of lumbar spine    Osteoporosis    Central spinal stenosis    HTN (hypertension)    Internal carotid artery stenosis, right    Internal carotid artery stenosis, left    CAD (coronary artery disease)    Aortic valve calcification    AR (aortic regurgitation)    Aortic valve stenosis    Gastritis    Drusen    Giant cell arteritis (HCC)    Dry eye syndrome    Bilateral senile cataracts    Lesion of upper eyelid    GERD (gastroesophageal reflux disease)    Compression fracture of L1 lumbar vertebra (HCC)    Compression fracture of T9 vertebra (HCC)    Compression fracture of T10 vertebra (HCC)    Compression fracture of T12 vertebra (HCC)    Former smoker    Colon polyp        Mild AS from TTE 2019    Relevant Problems   CARDIO   (+) Hypertension   (+) Rheumatic aortic stenosis      MUSCULOSKELETAL   (+) Degenerative disc disease at L5-S1 level        Physical Exam    Airway    Mallampati score: III  TM Distance: >3 FB  Neck ROM: full     Dental   upper dentures and lower dentures,     Cardiovascular  Rate: normal,     Pulmonary  Breath sounds clear to auscultation,     Other Findings        Anesthesia Plan  ASA Score- 3     Anesthesia Type- IV sedation with anesthesia with ASA Monitors  Additional Monitors:   Airway Plan:           Plan Factors-    Chart reviewed  Patient summary reviewed  Induction- intravenous      Postoperative Plan-     Informed Consent- Anesthetic plan and risks discussed with patient  I personally reviewed this patient with the CRNA  Discussed and agreed on the Anesthesia Plan with the CRNA  Rasta Briseno

## 2022-05-11 NOTE — ANESTHESIA PREPROCEDURE EVALUATION
Procedure:  LAPAROTOMY EXPLORATORY W/ BOWEL RESECTION (N/A Abdomen)  COLOSTOMY END (N/A Abdomen)    Relevant Problems   CARDIO   (+) Hypertension   (+) Rheumatic aortic stenosis      MUSCULOSKELETAL   (+) Degenerative disc disease at L5-S1 level        Physical Exam    Airway    Mallampati score: II  TM Distance: >3 FB  Neck ROM: full     Dental   lower dentures and upper dentures,     Cardiovascular  Murmur, Cardiovascular exam normal    Pulmonary  Pulmonary exam normal     Other Findings     Patient has mild AS as per prior note  No EKG or ECHO in epic  Anesthesia Plan  ASA Score- 3 Emergent    Anesthesia Type- general with ASA Monitors  Additional Monitors:   Airway Plan: ETT  Plan Factors-Exercise tolerance (METS): >4 METS  Chart reviewed  Imaging results reviewed  Existing labs reviewed  Patient summary reviewed  Patient is not a current smoker  Induction- intravenous and rapid sequence induction  Postoperative Plan- Plan for postoperative opioid use  Informed Consent- Anesthetic plan and risks discussed with patient  I personally reviewed this patient with the CRNA  Discussed and agreed on the Anesthesia Plan with the CRNA  Maureen Madrigal

## 2022-05-11 NOTE — H&P
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 today  He had an EGD which was unremarkable  Patient has history of chronic anemia, history of colon polyps, intermittent left lower quadrant abdominal pain and a possible diagnosis of intermittent intussusception  During the colonoscopy difficulty was encountered by the gastroenterologist, the adult scope was changed to a pediatric scope and during traversal of the sigmoid colon there was a kink and the peritoneal cavity was visualized  A colon polyp had been removed prior to this  Procedure was then terminated  CT scan of the abdomen was performed which showed pneumoperitoneum  Emergency surgery consultation was obtained  Upon examination patient had obvious signs of peritonitis  Patient denied any past history of abdominal surgeries  He has intermittent left-sided abdominal pain with alternating constipation and diarrhea  Interestingly the CT scan also shows that he has gallstones      Patient denies any urinary complaints, anorexia weight loss    Review of Systems   Hypertension  Mild aortic valve stenosis  Kyphoplasty    Historical Information   Past Medical History:   Diagnosis Date    Aortic valve calcification     Aortic valve stenosis     AR (aortic regurgitation)     Atelectasis     LT BASILAR PASSIVE SEGMENTAL    Autoimmune disease (HCC)     Bilateral pleural effusion     Bilateral senile cataracts     CAD (coronary artery disease)     CAD (coronary artery disease)     Central spinal stenosis     Changes in vascular appearance of retina     Chronic back pain     Colon polyp     Compression fracture of L1 lumbar vertebra ([facility] [address] ) 06/02/2010    Compression fracture of T10 vertebra ([facility] [address] )     Compression fracture of T12 vertebra (HCC)     Compression fracture of T9 vertebra (HCC)     Dextroscoliosis of lumbar spine     Diverticulosis     Drusen     Dry eye syndrome     Former smoker     Gastritis     GERD (gastroesophageal reflux disease)     Giant cell arteritis (HCC)     Hiatal hernia     History of shingles     HTN (hypertension)     Internal carotid artery stenosis, left     Internal carotid artery stenosis, right     Left lower lobe pneumonia     Lesion of upper eyelid     Lordosis of lumbar region     Osteopenia     SEVERE    Osteoporosis     Pleuritic pain     Radiculopathy     B/L LOWER EXTREMITY    Raynaud's disease     Renal cyst     B/L    Scleroderma (HCC)     Tortuous aorta (HCC)      Past Surgical History:   Procedure Laterality Date    COLONOSCOPY      COLONOSCOPY W/ BIOPSIES  04/24/2009    DXA PROCEDURE (HISTORICAL)  07/24/2019    EGD  04/24/2009    w/ bx    EGD AND COLONOSCOPY  05/11/2022    UPPER GASTROINTESTINAL ENDOSCOPY      VERTEBROPLASTY      T9, T10, T12 & L1     Social History   Social History     Substance and Sexual Activity   Alcohol Use Yes     Social History     Substance and Sexual Activity   Drug Use Not on file     Social History     Tobacco Use   Smoking Status Former Smoker    Types: Cigarettes, Pipe   Smokeless Tobacco Never Used   Tobacco Comment    QUIT 20 YEARS AGO     Family History: Brother had stomach cancer    Meds/Allergies   all medications and allergies reviewed  No Known Allergies    Objective   First Vitals:   Blood Pressure: 144/67 (05/11/22 1649)  Pulse: 83 (05/11/22 1649)  Temperature: (!) 97 3 °F (36 3 °C) (05/11/22 1649)  Temp Source: Tympanic (05/11/22 1649)  Respirations: 15 (05/11/22 1649)  SpO2: 94 % (05/11/22 1649)    Current Vitals:   Blood Pressure: 144/67 (05/11/22 1649)  Pulse: 83 (05/11/22 1649)  Temperature: (!) 97 3 °F (36 3 °C) (05/11/22 1649)  Temp Source: Tympanic (05/11/22 1649)  Respirations: 15 (05/11/22 1649)  SpO2: 94 % (05/11/22 1649)    No intake or output data in the 24 hours ending 05/11/22 1723    Invasive Devices  Report    Peripheral Intravenous Line  Duration           Peripheral IV 05/11/22 Distal;Left;Ventral (anterior) Forearm <1 day    Peripheral IV 05/11/22 Right;Ventral (anterior) Forearm <1 day                Physical Exam   Patient had stable hemodynamics and but he was in pain  There was no icterus  Patient had mild pallor  There was no cervical lymphadenopathy  Heart sounds were normal with a ejection systolic murmur grade 3/6 consistent with aortic stenosis  Chest was clear to auscultation    Abdominal exam reveals no operative scars  The abdomen was distended  Bowel sounds are absent  Patient had generalized tenderness and guarding    Extremity exam was normal          Lab Results: I have personally reviewed pertinent lab results  Imaging: I have personally reviewed pertinent reports    EGD    Result Date: 5/11/2022  Narrative: [facility] [address] [facility] [address] [id number] Jhonraisa Hartley 92 OF SERVICE: 5/11/22 PHYSICIAN(S): Attending: Elbert Christensen MD Fellow: No Staff Documented Procedure  :  EGD with biopsies INDICATION: Nausea, Family history of stomach cancer POST-OP DIAGNOSIS: See the impression below  PREPROCEDURE: Informed consent was obtained for the procedure, including sedation  Risks of perforation, hemorrhage, adverse drug reaction and aspiration were discussed  The patient was placed in the left lateral decubitus position  Patient was explained about the risks and benefits of the procedure  Risks including but not limited to bleeding, infection, and perforation were explained in detail  Also explained about less than 100% sensitivity with the exam and other alternatives   DETAILS OF PROCEDURE: Patient was taken to the procedure room where a time out was performed to confirm correct patient and correct procedure  The patient underwent monitored anesthesia care, which was administered by an anesthesia professional  The patient's blood pressure, heart rate, level of consciousness, respirations and oxygen were monitored throughout the procedure  The scope was advanced to the second part of the duodenum  Retroflexion was performed in the fundus  Prior to the procedure, the patient's H  Pylori status was unknown  The patient experienced no blood loss  The procedure was not difficult  The patient tolerated the procedure well  There were no apparent complications  ANESTHESIA INFORMATION: ASA: III Anesthesia Type: IV Sedation with Anesthesia MEDICATIONS: No administrations occurring from 1356 to 1417 on 05/11/22 FINDINGS: Moderate, localized erythematous mucosa in the antrum; performed cold forceps biopsy to rule out H  pylori The upper third of the esophagus, middle third of the esophagus, lower third of the esophagus, GE junction, duodenal bulb, 1st part of the duodenum and 2nd part of the duodenum appeared normal  Performed random biopsy using biopsy forceps to rule out Cabrera's esophagus  SPECIMENS: ID Type Source Tests Collected by Time Destination 1 : antrum Tissue Stomach TISSUE EXAM Kamini Gonzalez MD 5/11/2022  2:04 PM  2 : lower Tissue Esophagus TISSUE EXAM Kamini Gonzalez MD 5/11/2022  2:05 PM  3 : sigmoid Tissue Polyp, Colorectal TISSUE EXAM Kamini Gonzalez MD 5/11/2022  2:14 PM      Impression: 1  Mild erythema in the antrum 2  Normal esophagus, normal GE junction and normal duodenum RECOMMENDATION: Await pathology results Follow up with me in clinic Anti-reflux diet   Kamini Gonzalez MD     Colonoscopy    Result Date: 5/11/2022  Narrative: [facility] [address] [facility] [phone] Summer Hartley 92 OF SERVICE: 5/11/22 PHYSICIAN(S): Attending: Kamini Gonzalez MD Fellow: No Staff Documented Procedure :   Incomplete colonoscopy INDICATION: Diverticulosis, Left lower quadrant pain, Colonic intussusception ([address] ), Family history of colon cancer, Adenomatous polyp of colon, unspecified part of colon POST-OP DIAGNOSIS: See the impression below  HISTORY: Prior colonoscopy: 10 years ago  BOWEL PREPARATION: Miralax/Dulcolax PREPROCEDURE: Informed consent was obtained for the procedure, including sedation  Risks including but not limited to bleeding, infection, perforation, adverse drug reaction and aspiration were explained in detail  Also explained about less than 100% sensitivity with the exam and other alternatives  The patient was placed in the left lateral decubitus position  DETAILS OF PROCEDURE: Patient was taken to the procedure room where a time out was performed to confirm correct patient and correct procedure  The patient underwent monitored anesthesia care, which was administered by an anesthesia professional  The patient's blood pressure, heart rate, level of consciousness, oxygen and respirations were monitored throughout the procedure  A digital rectal exam was performed  The scope was introduced through the anus and advanced to the sigmoid colon  Retroflexion was performed in the rectum  The quality of bowel preparation was evaluated using the [facility] Bowel Preparation Scale with scores of: right colon = not assessed, transverse colon = not assessed, left colon = 1  The total BBPS score was 1  Bowel prep was not adequate  The patient experienced no blood loss  The procedure was not difficult  The patient tolerated the procedure well  There were no apparent complications  ANESTHESIA INFORMATION: ASA: III Anesthesia Type: IV Sedation with Anesthesia MEDICATIONS: No administrations occurring from 1356 to 1425 on 05/11/22 FINDINGS: Jh Lente kink in the sigmoid colon, it was very difficult to pass the scope, sigmoid polyp which was removed with cold snare polypectomy    Extensive diverticulosis, ultra thin scope was used,  but I see the peritoneum, procedure was aborted  EVENTS: Procedure Events Event Event Time ENDO SCOPE OUT TIME 5/11/2022  2:24 PM SPECIMENS: ID Type Source Tests Collected by Time Destination 1 : antrum Tissue Stomach TISSUE EXAM Moraima Ortiz MD 5/11/2022  2:04 PM  2 : lower Tissue Esophagus TISSUE EXAM Moraima Ortiz MD 5/11/2022  2:05 PM  3 : sigmoid Tissue Polyp, Colorectal TISSUE EXAM Moraima Ortiz MD 5/11/2022  2:14 PM  EQUIPMENT: Colonoscope -[id number] Colonoscope -[id number]     Impression: 1  Sharp kink in the sigmoid colon with stricture, scope was unable to pass  Ultra thin scope was use, small sigmoid polyp removed  2  Possible perforation in sigmoid colon RECOMMENDATION: Stat CT scan abdomen and pelvis Surgical consult, discuss case with Dr Darlene Guillen MD     CT abdomen pelvis wo contrast    Result Date: 5/11/2022  Narrative: CT ABDOMEN AND PELVIS WITHOUT IV CONTRAST INDICATION:   Bowel obstruction suspected possible bowel perofration  COMPARISON:  None  TECHNIQUE:  CT examination of the abdomen and pelvis was performed without intravenous contrast  This examination was performed without intravenous contrast in the context of the critical nationwide [place] shortage  Axial, sagittal, and coronal 2D reformatted images were created from the source data and submitted for interpretation  Radiation dose length product (DLP) for this visit:  350 2 mGy-cm   This examination, like all CT scans performed in the [facility], was performed utilizing techniques to minimize radiation dose exposure, including the use of iterative reconstruction and automated exposure control  Enteric contrast was NOT administered  FINDINGS: ABDOMEN LOWER CHEST:  No clinically significant abnormality identified in the visualized lower chest  LIVER/BILIARY TREE:  Unremarkable  GALLBLADDER:  There are tiny punctate gallstone(s) within the gallbladder  SPLEEN:  Unremarkable   PANCREAS: Unremarkable  ADRENAL GLANDS:  Unremarkable  KIDNEYS/URETERS:  Small right lower pole circumscribed subcentimeter renal hypodensity is present, too small to accurately characterize, and statistically most likely benign finding  According to recent literature (Radiology 2019) no further workup of this finding is recommended  STOMACH AND BOWEL:  There is colonic diverticulosis without evidence of acute diverticulitis  APPENDIX:  No findings to suggest appendicitis  ABDOMINOPELVIC CAVITY: Moderate pneumoperitoneum  This can be seen anterior to the liver and deep to the anterior abdominal wall  No ascites  No enlarged adenopathy  VESSELS:  Atherosclerotic changes are present  No evidence of aneurysm  PELVIS REPRODUCTIVE ORGANS:  The prostate is enlarged  URINARY BLADDER:  Unremarkable  ABDOMINAL WALL/INGUINAL REGIONS:  Unremarkable  OSSEOUS STRUCTURES:  Bones are demineralized  Multilevel vertebroplasties and compression fractures of indeterminate age  Impression: Moderate pneumoperitoneum in keeping with bowel perforation  I personally discussed this study with DAVID Cao on 5/11/2022 at 3:42 PM  Workstation performed: [id number]     EKG, Pathology, and Other Studies: I have personally reviewed pertinent films in PACS  Assessment/Plan     Assessment:  Perforated sigmoid colon with peritonitis    Plan:  Emergency laparotomy, possible sigmoid colectomy, possible colostomy    Risks include bleeding, infection, risk of blood transfusion, wound complications, need for colostomy, ureteric injury, anastomotic dehiscence, cardiopulmonary complications  Code Status: No Order  Advance Directive and Living Will:      Power of :    POLST:      Counseling / Coordination of Care  Total floor / unit time spent today 90 minutes  Greater than 50% of total time was spent with the patient and / or family counseling and / or coordination of care    A description of the counseling / coordination of care: Evaluation of patient in the [facility], arrangement of transportation, evaluation in the emergency room at [facility], preoperative care

## 2022-05-11 NOTE — NURSING NOTE
1448 pt received from GI room as per report pt has severe pain in abdomen dr Celeste Urbano saw pt in post op  V/s stable and pt awake and alert  Dr Celeste Urbano ordered stat CT scan  Took pt down to do CT scan and pt back to post op area  Family remains at bed side  monitoiring continue

## 2022-05-11 NOTE — H&P
History and Physical - [facility]  Debby Tamayo 80 y o  male MRN: [id number]                  HPI: Debby Tamayo is a 80y o  year old male who presents for abdominal colon, change in bowel habit, history of sigmoid intussusception      REVIEW OF SYSTEMS: Per the HPI, and otherwise unremarkable      Historical Information   Past Medical History:   Diagnosis Date    Aortic valve calcification     Aortic valve stenosis     AR (aortic regurgitation)     Atelectasis     LT BASILAR PASSIVE SEGMENTAL    Autoimmune disease (HCC)     Bilateral pleural effusion     Bilateral senile cataracts     CAD (coronary artery disease)     CAD (coronary artery disease)     Central spinal stenosis     Changes in vascular appearance of retina     Chronic back pain     Colon polyp     Compression fracture of L1 lumbar vertebra (HCC) 06/02/2010    Compression fracture of T10 vertebra (HCC)     Compression fracture of T12 vertebra (HCC)     Compression fracture of T9 vertebra (HCC)     Dextroscoliosis of lumbar spine     Diverticulosis     Drusen     Dry eye syndrome     Former smoker     Gastritis     GERD (gastroesophageal reflux disease)     Giant cell arteritis (HCC)     Hiatal hernia     History of shingles     HTN (hypertension)     Internal carotid artery stenosis, left     Internal carotid artery stenosis, right     Left lower lobe pneumonia     Lesion of upper eyelid     Lordosis of lumbar region     Osteopenia     SEVERE    Osteoporosis     Pleuritic pain     Radiculopathy     B/L LOWER EXTREMITY    Raynaud's disease     Renal cyst     B/L    Scleroderma ([address] )     Tortuous aorta (HCC)      Past Surgical History:   Procedure Laterality Date    COLONOSCOPY      COLONOSCOPY W/ BIOPSIES  04/24/2009    DXA PROCEDURE (HISTORICAL)  07/24/2019    EGD  04/24/2009    w/ bx    UPPER GASTROINTESTINAL ENDOSCOPY      VERTEBROPLASTY      T9, T10, T12 & L1     Social History Social History     Substance and Sexual Activity   Alcohol Use Yes     Social History     Substance and Sexual Activity   Drug Use Not on file     Social History     Tobacco Use   Smoking Status Former Smoker    Types: Cigarettes, Pipe   Smokeless Tobacco Never Used   Tobacco Comment    QUIT 21 YEARS AGO     Family History   Problem Relation Age of Onset    No Known Problems Mother     No Known Problems Father     Colon cancer Sister     Colon cancer Brother        Meds/Allergies     (Not in a hospital admission)      No Known Allergies    Objective     /62   Pulse 86   Temp 97 5 °F (36 4 °C) (Temporal)   Resp 18   Ht 5' 4" (1 626 m)   Wt 62 1 kg (136 lb 14 4 oz)   SpO2 100%   BMI 23 50 kg/m²       PHYSICAL EXAM    Gen: NAD  CV: RRR  CHEST: Clear  ABD: soft, NT/ND  EXT: no edema      ASSESSMENT/PLAN:  This is a 80y o  year old male here for EGD and colonoscopy, and he is stable and optimized for his procedure

## 2022-05-11 NOTE — NURSING NOTE
At this time [facility] transport critical care team has arrived in [place] for pt transport to [facility]  Izaiah James has called verbal report via phone to [facility] ED RN receiving pt at this time  Bedside report given to [facility] and transport medic by this RN at time of arrival  ALL NECESSARY TRANSPORT DOCUMENTS GIVEN TO TRANSPORT TEAM, TRANSPORT TEAM VERBALIZED ALL NECESSARY DOCUMENTS ARE IN THEIR POSSESSION AT TIME OF TRANSPORT  Pt wife has secured all patient belongings at time of transport and NO belongings of patient was left in [place]

## 2022-05-11 NOTE — NURSING NOTE
Dr Trey Arreola was in and ordered some blood work done by nurse sabrina  EKG done  Report given to [facility] ER nurse [id number] [facility]  pt transported via stretcher to [facility] via stretcher in stable condition  before transfer medicated as per order

## 2022-05-11 NOTE — CONSULTS
Patient seen as an emergency consultation in the short procedure unit  Patient had undergone an upper GI endoscopy and colonoscopy today  Patient has history of anemia, change in bowel habits and intermittent left lower quadrant abdominal pain  He is known to have sigmoid diverticulosis but he has never had diverticulitis  Recently he has been told that the CT scan has shown intussusception  Patient was undergoing colonoscopy and it was found that his sigmoid colon was difficult to negotiate  The adult scope was changed to a pediatric scope however after manipulation it was noted that there was a perforation in the colon as the peritoneal cavity was visualized  A stat CT scan was obtained which shows free air  Emergency surgery consultation was obtained  Upon arrival I examined the patient and found that he had signs of peritonitis  We are transferring the patient to [facility] for urgent a emergent laparotomy this evening  A detailed H&P will be done upon arrival at [facility]

## 2022-05-11 NOTE — ANESTHESIA POSTPROCEDURE EVALUATION
Post-Op Assessment Note    CV Status:  Stable  Pain Score: 0    Pain management: adequate     Mental Status:  Alert and awake   Hydration Status:  Euvolemic   PONV Controlled:  Controlled   Airway Patency:  Patent      Post Op Vitals Reviewed: Yes      Staff: CRNA, Anesthesiologist         No complications documented      BP   134/78   Temp   97 4   Pulse  77   Resp   18   SpO2   99

## 2022-05-11 NOTE — NURSING NOTE
Pt medicated for pain twice with fentanyl 12 5 mcg as per order  As per dr Denis Guzmán cipro 400 mg and flagyl  500mg iv given as per order  As per pt pain is 9/10 now  dr Faye Anguiano is in to see pt as per consultation  As per MD will transfer pt to [facility] for surgery for kink in colon

## 2022-05-11 NOTE — DISCHARGE INSTRUCTIONS
Upper Endoscopy and Colonoscopy   WHAT YOU NEED TO KNOW:   An upper endoscopy is also called an upper gastrointestinal (GI) endoscopy, or an esophagogastroduodenoscopy (EGD)  It is a procedure to examine the inside of your esophagus, stomach, and duodenum (first part of the small intestine) with a scope  You may feel bloated, gassy, or have some abdominal discomfort after your procedure  Your throat may be sore for 24 to 36 hours  You may burp or pass gas from air that is still inside your body  A colonoscopy is a procedure to examine the inside of your colon (intestine) with a scope  Polyps or tissue growths may have been removed during your colonoscopy  It is normal to feel bloated and to have some abdominal discomfort  You should be passing gas  If you have hemorrhoids or you had polyps removed, you may have a small amount of bleeding  DISCHARGE INSTRUCTIONS:   Seek care immediately if:   You have sudden, severe abdominal pain  You have problems swallowing  You have a large amount of black, sticky bowel movements or blood in your bowel movements  You have sudden trouble breathing  You feel weak, lightheaded, or faint or your heart beats faster than normal for you  Contact your healthcare provider if:   You have a fever and chills  You have nausea or are vomiting  Your abdomen is bloated or feels full and hard  You have abdominal pain  You have a large amount of black, sticky bowel movements or blood in your bowel movements  You have not had a bowel movement for 3 days after your procedure  You have rash or hives  You have questions or concerns about your procedure  Activity:   ·       Do not lift, strain, or run for 24 hours after your procedure  ·       Rest after your procedure  You have been given medicine to relax you  Do not drive or make important decisions until the day after your procedure   Return to your normal activity as directed  ·       Relieve gas and discomfort from bloating by lying on your right side with a heating pad on your abdomen  You may need to take short walks to help the gas move out  Eat small meals until bloating is relieved  Follow up with your healthcare provider as directed: Write down your questions so you remember to ask them during your visits  If you take a blood thinner, please review the specific instructions from your endoscopist about when you should resume it  These can be found in the Recommendation and Your Medication list sections of this After Visit Summary

## 2022-05-12 NOTE — PLAN OF CARE
Problem: MOBILITY - ADULT  Goal: Maintain or return to baseline ADL function  Description: INTERVENTIONS:  -  Assess patient's ability to carry out ADLs; assess patient's baseline for ADL function and identify physical deficits which impact ability to perform ADLs (bathing, care of mouth/teeth, toileting, grooming, dressing, etc )  - Assess/evaluate cause of self-care deficits   - Assess range of motion  - Assess patient's mobility; develop plan if impaired  - Assess patient's need for assistive devices and provide as appropriate  - Encourage maximum independence but intervene and supervise when necessary  - Involve family in performance of ADLs  - Assess for home care needs following discharge   - Consider OT consult to assist with ADL evaluation and planning for discharge  - Provide patient education as appropriate  Outcome: Progressing  Goal: Maintains/Returns to pre admission functional level  Description: INTERVENTIONS:  - Perform BMAT or MOVE assessment daily    - Set and communicate daily mobility goal to care team and patient/family/caregiver     - Collaborate with rehabilitation services on mobility goals if consulted  - Ambulate patient 3 times a day  - Out of bed to chair 3 times a day   - Out of bed for meals 3 times a day  - Out of bed for toileting  - Record patient progress and toleration of activity level   Outcome: Progressing     Problem: Potential for Falls  Goal: Patient will remain free of falls  Description: INTERVENTIONS:  - Educate patient/family on patient safety including physical limitations  - Instruct patient to call for assistance with activity   - Consult OT/PT to assist with strengthening/mobility   - Keep Call bell within reach  - Keep bed low and locked with side rails adjusted as appropriate  - Keep care items and personal belongings within reach  - Initiate and maintain comfort rounds  - Make Fall Risk Sign visible to staff  - Offer Toileting every 2 Hours, in advance of need  - Initiate/Maintain bed alarm  - Obtain necessary fall risk management equipment  - Apply yellow socks and bracelet for high fall risk patients  - Consider moving patient to room near nurses station  Outcome: Progressing     Problem: PAIN - ADULT  Goal: Verbalizes/displays adequate comfort level or baseline comfort level  Description: Interventions:  - Encourage patient to monitor pain and request assistance  - Assess pain using appropriate pain scale  - Administer analgesics based on type and severity of pain and evaluate response  - Implement non-pharmacological measures as appropriate and evaluate response  - Consider cultural and social influences on pain and pain management  - Notify physician/advanced practitioner if interventions unsuccessful or patient reports new pain  Outcome: Progressing     Problem: GASTROINTESTINAL - ADULT  Goal: Minimal or absence of nausea and/or vomiting  Description: INTERVENTIONS:  - Administer IV fluids if ordered to ensure adequate hydration  - Maintain NPO status until nausea and vomiting are resolved  - Nasogastric tube if ordered  - Administer ordered antiemetic medications as needed  - Provide nonpharmacologic comfort measures as appropriate  - Advance diet as tolerated, if ordered  - Consider nutrition services referral to assist patient with adequate nutrition and appropriate food choices  Outcome: Progressing  Goal: Establish and maintain optimal ostomy function  Description: INTERVENTIONS:  - Assess bowel function  - Encourage oral fluids to ensure adequate hydration  - Administer IV fluids if ordered to ensure adequate hydration   - Administer ordered medications as needed  - Encourage mobilization and activity  - Nutrition services referral to assist patient with appropriate food choices  - Assess stoma site  - Consider wound care consult   Outcome: Progressing     Problem: SKIN/TISSUE INTEGRITY - ADULT  Goal: Incision(s), wounds(s) or drain site(s) healing without S/S of infection  Description: INTERVENTIONS  - Assess and document dressing, incision, wound bed, drain sites and surrounding tissue  - Provide patient and family education  - Perform skin care/dressing changes PRN  Outcome: Progressing

## 2022-05-12 NOTE — PLAN OF CARE
Problem: MOBILITY - ADULT  Goal: Maintain or return to baseline ADL function  Description: INTERVENTIONS:  -  Assess patient's ability to carry out ADLs; assess patient's baseline for ADL function and identify physical deficits which impact ability to perform ADLs (bathing, care of mouth/teeth, toileting, grooming, dressing, etc )  - Assess/evaluate cause of self-care deficits   - Assess range of motion  - Assess patient's mobility; develop plan if impaired  - Assess patient's need for assistive devices and provide as appropriate  - Encourage maximum independence but intervene and supervise when necessary  - Involve family in performance of ADLs  - Assess for home care needs following discharge   - Consider OT consult to assist with ADL evaluation and planning for discharge  - Provide patient education as appropriate  Outcome: Progressing  Goal: Maintains/Returns to pre admission functional level  Description: INTERVENTIONS:  - Perform BMAT or MOVE assessment daily    - Set and communicate daily mobility goal to care team and patient/family/caregiver  - Collaborate with rehabilitation services on mobility goals if consulted  - Perform Range of Motion  times a day  - Reposition patient every  hours    - Dangle patient  times a day  - Stand patient  times a day  - Ambulate patient  times a day  - Out of bed to chair  times a day   - Out of bed for meals  times a day  - Out of bed for toileting  - Record patient progress and toleration of activity level   Outcome: Progressing     Problem: Potential for Falls  Goal: Patient will remain free of falls  Description: INTERVENTIONS:  - Educate patient/family on patient safety including physical limitations  - Instruct patient to call for assistance with activity   - Consult OT/PT to assist with strengthening/mobility   - Keep Call bell within reach  - Keep bed low and locked with side rails adjusted as appropriate  - Keep care items and personal belongings within reach  - Initiate and maintain comfort rounds  - Make Fall Risk Sign visible to staff  - Offer Toileting every  Hours, in advance of need  - Initiate/Maintain alarm  - Obtain necessary fall risk management equipment:   - Apply yellow socks and bracelet for high fall risk patients  - Consider moving patient to room near nurses station  Outcome: Progressing     Problem: PAIN - ADULT  Goal: Verbalizes/displays adequate comfort level or baseline comfort level  Description: Interventions:  - Encourage patient to monitor pain and request assistance  - Assess pain using appropriate pain scale  - Administer analgesics based on type and severity of pain and evaluate response  - Implement non-pharmacological measures as appropriate and evaluate response  - Consider cultural and social influences on pain and pain management  - Notify physician/advanced practitioner if interventions unsuccessful or patient reports new pain  Outcome: Progressing     Problem: GASTROINTESTINAL - ADULT  Goal: Minimal or absence of nausea and/or vomiting  Description: INTERVENTIONS:  - Administer IV fluids if ordered to ensure adequate hydration  - Maintain NPO status until nausea and vomiting are resolved  - Nasogastric tube if ordered  - Administer ordered antiemetic medications as needed  - Provide nonpharmacologic comfort measures as appropriate  - Advance diet as tolerated, if ordered  - Consider nutrition services referral to assist patient with adequate nutrition and appropriate food choices  Outcome: Progressing  Goal: Establish and maintain optimal ostomy function  Description: INTERVENTIONS:  - Assess bowel function  - Encourage oral fluids to ensure adequate hydration  - Administer IV fluids if ordered to ensure adequate hydration   - Administer ordered medications as needed  - Encourage mobilization and activity  - Nutrition services referral to assist patient with appropriate food choices  - Assess stoma site  - Consider wound care consult   Outcome: Progressing     Problem: SKIN/TISSUE INTEGRITY - ADULT  Goal: Incision(s), wounds(s) or drain site(s) healing without S/S of infection  Description: INTERVENTIONS  - Assess and document dressing, incision, wound bed, drain sites and surrounding tissue  - Provide patient and family education  - Perform skin care/dressing changes every   Outcome: Progressing

## 2022-05-12 NOTE — PROGRESS NOTES
Progress Note - [address] G[facility]  Lizette Panchal 80 y o  male MRN: [id number]    Unit/Bed#:  Encounter: [id number]      Subjective:   Patient seen and examined at the bedside, he is status post surgery yesterday, status post exploratory laparotomy  with low anterior resection, loop colostomy  His complaining pain at the surgical site, no shortness of breath, no fever or chills    Objective:     Vitals: Blood pressure 141/69, pulse 73, temperature (!) 97 1 °F (36 2 °C), temperature source Temporal, resp  rate 13, height 5' 4" (1 626 m), weight 62 1 kg (136 lb 14 4 oz), SpO2 100 %  ,Body mass index is 23 5 kg/m²  No intake or output data in the 24 hours ending 05/12/22 1521    Physical Exam: Physical Exam  Constitutional:       Appearance: Normal appearance  HENT:      Head: Normocephalic and atraumatic  Nose: Nose normal       Mouth/Throat:      Mouth: Mucous membranes are moist       Pharynx: Oropharynx is clear  Eyes:      Extraocular Movements: Extraocular movements intact  Conjunctiva/sclera: Conjunctivae normal       Pupils: Pupils are equal, round, and reactive to light  Cardiovascular:      Rate and Rhythm: Normal rate and regular rhythm  Pulses: Normal pulses  Heart sounds: Normal heart sounds  Pulmonary:      Effort: Pulmonary effort is normal       Breath sounds: Normal breath sounds  Abdominal:      General: There is distension  Tenderness: There is abdominal tenderness  Comments: Surgical scar with colostomy   Musculoskeletal:      Cervical back: Normal range of motion  Skin:     General: Skin is warm  Neurological:      General: No focal deficit present  Mental Status: He is alert     Psychiatric:         Mood and Affect: Mood normal          Invasive Devices  Report    Peripheral Intravenous Line  Duration           Peripheral IV 05/11/22 Right;Ventral (anterior) Forearm 1 day    Peripheral IV 05/11/22 Distal;Left;Ventral (anterior) Forearm <1 day          Drain  Duration           Colostomy LLQ 1 day    NG/OG/Enteral Tube Nasogastric Left nare 1 day    Urethral Catheter <1 day                              Current Facility-Administered Medications:     ciprofloxacin (CIPRO) IVPB (premix in 5% dextrose) 400 mg 200 mL, 400 mg, Intravenous, Q12H, Ion Gerardo MD, Last Rate: 200 mL/hr at 05/11/22 1517, 400 mg at 05/11/22 1517    fentaNYL (SUBLIMAZE) injection 12 5 mcg, 12 5 mcg, Intravenous, Q5 Min PRN, Carolyn Chaidez MD, 12 5 mcg at 05/11/22 1609  No current outpatient medications on file      Facility-Administered Medications Ordered in Other Encounters:     acetaminophen (TYLENOL) oral suspension 650 mg, 650 mg, Oral, Q4H PRN, Ivette Mejia PA-C, 650 mg at 05/12/22 1504    cefTRIAXone (ROCEPHIN) IVPB (premix in dextrose) 1,000 mg 50 mL, 1,000 mg, Intravenous, Q24H, Ivette Mejia PA-C, Last Rate: 100 mL/hr at 05/12/22 0453, 1,000 mg at 05/12/22 0453    dextrose 5 % and sodium chloride 0 9 % infusion, 100 mL/hr, Intravenous, Continuous, Anthony Clifford MD, Last Rate: 100 mL/hr at 05/12/22 1043, 100 mL/hr at 05/12/22 1043    diphenhydrAMINE (BENADRYL) injection 25 mg, 25 mg, Intravenous, Q6H PRN, Ivette Mejia PA-C    heparin (porcine) subcutaneous injection 5,000 Units, 5,000 Units, Subcutaneous, Q12H [facility], Anthony Clifford MD    hydrALAZINE (APRESOLINE) injection 5 mg, 5 mg, Intravenous, Q6H PRN, Ivette Mejia PA-C    HYDROmorphone (DILAUDID) injection 0 2 mg, 0 2 mg, Intravenous, Q3H PRN, Ivette Mejia PA-C    levothyroxine tablet 112 mcg, 112 mcg, Oral, Early Morning, Ivette Mejia PA-C, 112 mcg at 05/12/22 0507    metroNIDAZOLE (FLAGYL) IVPB (premix) 500 mg 100 mL, 500 mg, Intravenous, Q8H, Angel Benoit PA-C, Last Rate: 200 mL/hr at 05/12/22 1504, 500 mg at 05/12/22 1504    morphine injection 2 mg, 2 mg, Intravenous, Q2H PRN, Gina Adler MD, 2 mg at 05/12/22 1352    naloxone (NARCAN) 0 04 mg/mL syringe 0 04 mg, 0 04 mg, Intravenous, Q1MIN PRN, Aure Ballard PA-C    ondansetron [facility] injection 4 mg, 4 mg, Intravenous, Q6H PRN, Aure Ballard PA-C    Lab, Imaging and other studies:    Recent Results (from the past 24 hour(s))   CBC    Collection Time: 05/11/22  3:55 PM   Result Value Ref Range    WBC 5 31 4 31 - 10 16 Thousand/uL    RBC 2 78 (L) 3 88 - 5 62 Million/uL    Hemoglobin 10 0 (L) 12 0 - 17 0 g/dL    Hematocrit 27 7 (L) 36 5 - 49 3 %     (H) 82 - 98 fL    MCH 36 0 (H) 26 8 - 34 3 pg    MCHC 36 1 31 4 - 37 4 g/dL    RDW 15 7 (H) 11 6 - 15 1 %    Platelets 175 277 - 280 Thousands/uL    MPV 10 1 8 9 - 12 7 fL   COVID/FLU/RSV    Collection Time: 05/11/22  3:55 PM    Specimen: Nose; Nares   Result Value Ref Range    SARS-CoV-2 Negative Negative    INFLUENZA A PCR Negative Negative    INFLUENZA B PCR Negative Negative    RSV PCR Negative Negative   ECG 12 lead    Collection Time: 05/11/22  4:01 PM   Result Value Ref Range    Ventricular Rate 73 BPM    Atrial Rate 73 BPM    OH Interval 212 ms    QRSD Interval 122 ms    QT Interval 447 ms    QTC Interval 493 ms    P Axis -20 degrees    QRS Axis 19 degrees    T Wave Axis 21 degrees   Type and screen    Collection Time: 05/11/22  5:00 PM   Result Value Ref Range    ABO Grouping AB     Rh Factor Positive     Antibody Screen Negative     Specimen Expiration Date 20220514    Anaerobic culture and Gram stain    Collection Time: 05/11/22  6:38 PM    Specimen: Wound   Result Value Ref Range    Anaerobic Culture Culture results to follow      Wound culture and Gram stain    Collection Time: 05/11/22  6:38 PM    Specimen: Wound   Result Value Ref Range    Wound Culture No growth     Gram Stain Result No Polys or Bacteria seen    Basic metabolic panel    Collection Time: 05/11/22  6:51 PM   Result Value Ref Range    Sodium 128 (L) 136 - 145 mmol/L    Potassium 3 5 3 5 - 5 3 mmol/L    Chloride 94 (L) 100 - 108 mmol/L    CO2 26 21 - 32 mmol/L    ANION GAP 8 4 - 13 mmol/L    BUN 12 5 - 25 mg/dL    Creatinine 0 78 0 60 - 1 30 mg/dL    Glucose 109 65 - 140 mg/dL    Calcium 7 8 (L) 8 3 - 10 1 mg/dL    eGFR 78 ml/min/1 73sq m   Fingerstick Glucose (POCT)    Collection Time: 05/11/22 10:17 PM   Result Value Ref Range    POC Glucose 98 65 - 140 mg/dl   Comprehensive metabolic panel    Collection Time: 05/12/22  5:07 AM   Result Value Ref Range    Sodium 129 (L) 136 - 145 mmol/L    Potassium 3 5 3 5 - 5 3 mmol/L    Chloride 97 (L) 100 - 108 mmol/L    CO2 24 21 - 32 mmol/L    ANION GAP 8 4 - 13 mmol/L    BUN 15 5 - 25 mg/dL    Creatinine 0 88 0 60 - 1 30 mg/dL    Glucose 107 65 - 140 mg/dL    Calcium 7 0 (L) 8 3 - 10 1 mg/dL    Corrected Calcium 8 0 (L) 8 3 - 10 1 mg/dL    AST 15 5 - 45 U/L    ALT 15 12 - 78 U/L    Alkaline Phosphatase 38 (L) 46 - 116 U/L    Total Protein 5 2 (L) 6 4 - 8 2 g/dL    Albumin 2 7 (L) 3 5 - 5 0 g/dL    Total Bilirubin 1 55 (H) 0 20 - 1 00 mg/dL    eGFR 75 ml/min/1 73sq m   Phosphorus    Collection Time: 05/12/22  5:07 AM   Result Value Ref Range    Phosphorus 3 0 2 3 - 4 1 mg/dL   Magnesium    Collection Time: 05/12/22  5:07 AM   Result Value Ref Range    Magnesium 1 3 (L) 1 6 - 2 6 mg/dL   CBC and differential    Collection Time: 05/12/22  5:07 AM   Result Value Ref Range    WBC 9 32 4 31 - 10 16 Thousand/uL    RBC 2 49 (L) 3 88 - 5 62 Million/uL    Hemoglobin 8 9 (L) 12 0 - 17 0 g/dL    Hematocrit 25 2 (L) 36 5 - 49 3 %     (H) 82 - 98 fL    MCH 35 7 (H) 26 8 - 34 3 pg    MCHC 35 3 31 4 - 37 4 g/dL    RDW 15 8 (H) 11 6 - 15 1 %    MPV 9 9 8 9 - 12 7 fL    Platelets 400 589 - 642 Thousands/uL   Manual Differential(PHLEBS Do Not Order)    Collection Time: 05/12/22  5:07 AM   Result Value Ref Range    Segmented % 8 (L) 43 - 75 %    Bands % 79 (H) 0 - 8 %    Lymphocytes % 1 (L) 14 - 44 %    Monocytes % 1 (L) 4 - 12 %    Eosinophils, % 0 0 - 6 %    Basophils % 0 0 - 1 %    Metamyelocytes% 8 (H) 0 - 1 %    Myelocytes % 3 (H) 0 - 1 %    Absolute Neutrophils 8 11 (H) 1 85 - 7 62 Thousand/uL    Lymphocytes Absolute 0 09 (L) 0 60 - 4 47 Thousand/uL    Monocytes Absolute 0 09 0 00 - 1 22 Thousand/uL    Eosinophils Absolute 0 00 0 00 - 0 40 Thousand/uL    Basophils Absolute 0 00 0 00 - 0 10 Thousand/uL    Total Counted      RBC Morphology Present     Anisocytosis Present     Hypochromia Present     Platelet Estimate Adequate Adequate   Fingerstick Glucose (POCT)    Collection Time: 05/12/22  7:45 AM   Result Value Ref Range    POC Glucose 96 65 - 140 mg/dl           Assessment:  [de-identified] year male with history of chronic lower abdominal pain, change in bowel habit, history of sigmoid intussusception, history of diverticulitis in the past came for outpatient EGD and colonoscopy, EGD was normal but colonoscopy shows sharp kink in the sigmoid colon with extensive diverticulosis, pediatric colonoscopy was  Pass with extreme difficulty which lead to tear in sigmoid colon  Procedure was aborted and stat CT scan was done which confirm pneumoperitoneum  Patient was transferred to surgical service and emergency exploratory laparotomy with diverting loop colostomy was done by Dr José Fox yesterday  Postop patient is doing well, he has some abdominal pain which is tolerable, no fever, no sign of sepsis or infection, NG tube draining bile    Plan:  Continue with postop care as per surgical team   I discussed in detail about endoscopy and colonoscopy finding and complication during the procedure  Care of plan was discussed with son at the bedside

## 2022-05-12 NOTE — PROGRESS NOTES
Progress Note - General Surgery   Fady Hudson 80 y o  male MRN: [id number]  Unit/Bed#: [address] [address] Encounter: [id number]    Assessment:  POD#1 s/p exploratory laparotomy, low anterior resection, protective loop transverse colostomy and segmental small bowel resection secondary to perforation of the rectosigmoid junction after colonoscopy on 5/11      Plan:  · Continue Farnsworth catheter  · Discontinue the PCA  · Rocephin/Flagyl day 2  · Continue NG tube, NPO, IV fluid hydration  · Switch to normal saline  · IV magnesium repletion  · Follow-up body fluid cultures   · Wet to dry dressing on midline incision  · AM labs: cbc, bmp, mag, phos  · PT/OT consults tomorrow      Subjective/Objective     Subjective:  Patient states his pain is mostly under control and doesn't feel he needs the PCA  He denies any difficulty breathing or chest pain  Objective:   UOP 0 6  No NG tube output overnight     Blood pressure (!) 108/48, pulse 88, temperature 100 1 °F (37 8 °C), resp  rate 18, height 5' 4" (1 626 m), weight 62 kg (136 lb 11 oz), SpO2 99 %  ,Body mass index is 23 46 kg/m²        Intake/Output Summary (Last 24 hours) at 5/12/2022 0753  Last data filed at 5/12/2022 0601  Gross per 24 hour   Intake 2800 ml   Output 1035 ml   Net 1765 ml       Invasive Devices  Report    Peripheral Intravenous Line  Duration           Peripheral IV 05/11/22 Distal;Left;Ventral (anterior) Forearm <1 day    Peripheral IV 05/11/22 Right;Ventral (anterior) Forearm <1 day          Drain  Duration           Colostomy LLQ 1 day    NG/OG/Enteral Tube Nasogastric Left nare 1 day    Urethral Catheter <1 day                Physical Exam: BP (!) 108/48 (BP Location: Left arm)   Pulse 88   Temp 100 1 °F (37 8 °C)   Resp 18   Ht 5' 4" (1 626 m)   Wt 62 kg (136 lb 11 oz)   SpO2 99%   BMI 23 46 kg/m²   General appearance: alert and oriented, in no acute distress  Head: Normocephalic, without obvious abnormality, atraumatic  Lungs: clear to auscultation bilaterally  Heart: regular rate and rhythm with holosystolic murmur  Abdomen: stoma is pink and budded, abdomen is soft, appropriate tenderness to palpation, NG tube in place, wet-to-dry dressing on midline incision  Extremities: extremities normal, warm and well-perfused; no cyanosis, clubbing, or edema  Skin: Skin color, texture, turgor normal  No rashes or lesions    Lab, Imaging and other studies:  I have personally reviewed pertinent lab results    , CBC:   Lab Results   Component Value Date    WBC 9 32 05/12/2022    HGB 8 9 (L) 05/12/2022    HCT 25 2 (L) 05/12/2022     (H) 05/12/2022     05/12/2022    MCH 35 7 (H) 05/12/2022    MCHC 35 3 05/12/2022    RDW 15 8 (H) 05/12/2022    MPV 9 9 05/12/2022   , CMP:   Lab Results   Component Value Date    SODIUM 129 (L) 05/12/2022    K 3 5 05/12/2022    CL 97 (L) 05/12/2022    CO2 24 05/12/2022    BUN 15 05/12/2022    CREATININE 0 88 05/12/2022    CALCIUM 7 0 (L) 05/12/2022    AST 15 05/12/2022    ALT 15 05/12/2022    ALKPHOS 38 (L) 05/12/2022    EGFR 75 05/12/2022     VTE Pharmacologic Prophylaxis: Heparin SQ  VTE Mechanical Prophylaxis: sequential compression device

## 2022-05-12 NOTE — OP NOTE
OPERATIVE REPORT  PATIENT NAME: Alejo Strickland    :  2/15/1931  MRN: [id number]  Pt Location: [place] OR ROOM 03    SURGERY DATE: 2022    Surgeon(s) and Role:     * Claudeen Adie, MD - Primary     * Erin Madden PA-C - Assisting    Preop Diagnosis:  Bowel perforation ([facility] [address] ) [K63 1]   Perforated sigmoid colon status post colonoscopy    Post-Op Diagnosis Codes: * Bowel perforation (HCC) [K63 1]  Perforated sigmoid colon status post colonoscopy    Procedure(s) (LRB):  LAPAROTOMY EXPLORATORY LOW ANTERIOR RESECTION W/LOOP COLOSTOMY (N/A)  NON IMAGING INTRAOP COLONOSCOPY (N/A)   Exploratory laparotomy, low anterior resection, protective loop transverse colostomy, flexible sigmoidoscopy, segmental small bowel resection  Specimen(s):  ID Type Source Tests Collected by Time Destination   1 : Portion of Sigmoid Colon Tissue Large Intestine, Sigmoid Colon TISSUE EXAM Claudeen Adie, MD 2022 1905    A : Peritoneal fluid Wound Wound ANAEROBIC CULTURE AND GRAM STAIN, WOUND CULTURE Claudeen Adie, MD 2022 1838        Estimated Blood Loss:   200 mL    Drains:  Urethral Catheter (Active)   Amt returned on insertion(mL) 400 mL 22 1731   Number of days: 0       Anesthesia Type:   General endotracheal anesthesia and local anesthesia used is 30 mils of 0 25% Marcaine with 1 in 200,000 epinephrine    Operative Indications: Bowel perforation ([facility] [address] ) [K63 1]  This is a 80-year-old male who was undergoing colonoscopy at the [facility] for chronic anemia, change in bowel habits, history of colon polyps, intermittent left lower quadrant abdominal pain and intussusception read on CT scan  The endoscopist had difficulty in negotiating his sigmoid colon, he changed from a adult to a pediatric scope  During manipulation, perforation of the colon occurred  A CT scan performed revealed pneumoperitoneum and clinically patient had signs of peritonitis      Operative Findings:  Patient was found to have a large longitudinal tear at the recto-sigmoid junction  This measured at least 5 cm in size  There was sigmoid diverticulosis  The peritoneal cavity had small soft pellets of stool  There was no established peritonitis  Complications:   Longitudinal enterotomy in the small bowel occurred during removal of laparotomy pads from the abdominal cavity  Procedure and Technique:  Patient was brought to the operating room suite  He was identified by me  He was laid supine on the operating table  General endotracheal anesthesia was given  Nasogastric tube and Farnsworth catheter were placed  Patient was given preoperative doses of subcutaneous heparin, cefazolin and metronidazole  Patient had been given Levaquin at the [facility]  Patient was then placed in a lithotomy position on the [facility]  The abdominal wall was cleaned with ChloraPrep and the rectal area was cleaned with Betadine  Patient was draped  Local infiltration anesthesia was given for a subumbilical longitudinal incision  Skin and subcutaneous tissues were divided  The fascia was opened in the line of the incision  The peritoneal cavity was entered  A self-retaining Emma retractor was placed  The small bowel was packed into the upper abdomen  The sigmoid colon was then mobilized by taking the lateral peritoneal attachments  After mobilization of the sigmoid colon and retraction in a superior direction the longitudinal tear in the anterior wall of the rectum was identified  We then elected to do a low anterior resection and removed the sigmoid colon because of the presence of diverticulosis and the history of intussusception  This was done by creating a window in the mesentery of the proximal sigmoid colon where it was divided with a MAYTE stapler  The meso- sigmoid and the mesorectum were then taken down with the help of EnSeal   Once we reached the peritoneal floor we had passed beyond the rectal perforation  Using a a contoured stapler the rectal stump was divided distal to the rectal tear  The specimen was then sent for pathological examination  We then had to do minimal mobilization on the proximal sigmoid colon in order to bring it close to the rectal stump  Once we had decided to perform the anastomosis we used anal dilators  We were only able to dilate the colon with a size 25 anal dilator  We chose a size 25 EEA stapler  The end sigmoid colon was then freshened by application of the pursestring clamp  Excess sigmoid colon beyond the pursestring clamp was sacrificed  We then placed the anvil of the EEA stapler and tightened the pursestring suture  The EEA shaft was then passed through the rectum and brought out through the anterior rectal wall just distal to the staple line  An end-to-end anastomosis was performed  A flexible sigmoidoscopy was then done  Air leak was noted at the anterior staple line through a small defect  This was repaired with interrupted 2-0 PDS sutures  When then repeated the flexible sigmoidoscopy and found no air leak  We then elected to perform a loop transverse colostomy to protect the anastomosis  This was done in view of the patient's advanced age and the poor strength of his tissues  The transverse colon was fairly mobile  We made a transverse skin incision in the left upper quadrant dividing the skin, subcutaneous fat and the fascia  The rectus muscle fibers were split, the posterior rectus fascia and peritoneum were opened and the peritoneal cavity was entered  A loop of transverse colon was easily delivered and a window was created in the mesocolon  A red rubber catheter was passed to create a bridge under the loop colostomy  This was sutured to the skin with 3-0 nylon sutures  We used a separate closing table  We then closed the midline incision  This was done in a single layer using continuous #1 PDS sutures    After completion of the upper 2/3 of the fascial closure we pulled the laparotomy pad which was under the incision to protect the small bowel  As this stage a loop of the small bowel got pulled out with the lap pad and was noted to be completely torn  We had to sacrifice this segment of the small bowel and we performed a side-to-side small bowel anastomosis using a MAYTE 55 stapler  The side enterotomies were closed with a TA stapler and the staple line was oversewed with 3-0 silk  We then completed the fascial closure  The wound was then irrigated and Marcaine was injected for postoperative pain relief  The skin incision was left open as this is a class 4 wound  We then matured the loop transverse colostomy  This was done by making a colotomy along the radial axis of the colon and then the colostomy was matured approximating the colonic wall to the subcuticular tissues using interrupted 3-0 Vicryl  Dressing was applied on the midline wound and the colostomy appliance was placed  Patient was then extubated and returned to the recovery room in a stable condition  I was present for the entire procedure  No qualified surgical resident was available  The services of a physician assistant were required for complexity of the case      Patient Disposition:  PACU       SIGNATURE:  Carlitos Brock MD  DATE: May 11, 2022  TIME: 9:46 PM

## 2022-05-13 NOTE — PLAN OF CARE
Problem: PHYSICAL THERAPY ADULT  Goal: Performs mobility at highest level of function for planned discharge setting  See evaluation for individualized goals  Description: Treatment/Interventions: ADL retraining, Functional transfer training, LE strengthening/ROM, Therapeutic exercise, Endurance training, Patient/family training, Bed mobility, Gait training, Spoke to nursing          See flowsheet documentation for full assessment, interventions and recommendations  Note: Prognosis: Good  Problem List: Decreased strength, Decreased range of motion, Decreased endurance, Impaired balance, Decreased mobility, Pain  Assessment: Patient seen for Physical Therapy evaluation  Patient admitted with Bowel perforation ([facility] [address] )  Comorbidities affecting patient's physical performance include: chronic pain  Personal factors affecting patient at time of initial evaluation include: stairs to enter home, inability to ambulate household distances, inability to navigate community distances, limited home support, inability to perform caregiver support/tasks, inability to perform ADLS and inability to perform IADLS   Prior to admission, patient was independent with functional mobility with [facility], requiring assist for ADLS, requiring assist for IADLS, ambulating household distance, ambulating community distances and retired  Please find objective findings from Physical Therapy assessment regarding body systems outlined above with impairments and limitations including weakness, decreased ROM, impaired balance, decreased endurance, impaired coordination, pain, decreased activity tolerance, decreased functional mobility tolerance and fall risk  The Barthel Index was used as a functional outcome tool presenting with a score of Barthel Index Score: 30 today indicating marked limitations of functional mobility and ADLS    Patient's clinical presentation is currently unstable/unpredictable as seen in patient's presentation of changing level of pain, increased fall risk, new onset of impairment of functional mobility, decreased endurance and new onset of weakness  Pt would benefit from continued Physical Therapy treatment to address deficits as defined above and maximize level of functional mobility  As demonstrated by objective findings, the assigned level of complexity for this evaluation is high  The patient's AM-PAC Basic Mobility Inpatient Short Form Raw Score is 13  A Raw score of less than or equal to 16 suggests the patient may benefit from discharge to post-acute rehabilitation services  Please also refer to the recommendation of the Physical Therapist for safe discharge planning  PT Discharge Recommendation: Post acute rehabilitation services          See flowsheet documentation for full assessment

## 2022-05-13 NOTE — PHYSICAL THERAPY NOTE
PT EVALUATION     Time In: 1430  Time Out: 1448       05/13/22 1430   PT Last Visit   PT Visit Date 05/13/22   Note Type   Note type Evaluation   Pain Assessment   Pain Assessment Tool 0-10   Pain Score 6   Pain Location/Orientation Location: Back;Orientation: Right  (SI Joint)   Hospital Pain Intervention(s) Emotional support; Ambulation/increased activity;Repositioned   Restrictions/Precautions   Other Precautions Chair Alarm; Bed Alarm;Multiple lines; Fall Risk;Pain   Home Living   Type of [address] One level; Able to live on main level with bedroom/bathroom  (1 CANDIE)   Bathroom Shower/Tub Walk-in shower   Bathroom Toilet Standard   Bathroom Equipment Grab bars in shower; Shower chair   Bathroom Accessibility Accessible   Home Equipment Walker;Cane   Additional Comments Pt wife at home with dementia, aide comes to home to aid with wife  Prior Function   Level of [place] Needs assistance with IADLs   Lives With Spouse   Receives Help From Family;Home health   ADL Assistance Needs assistance   IADLs Needs assistance   Falls in the last 6 months 0   Vocational Retired   [employer]   Additional Pertinent History Pt admitted for pneumoperitoneum, complication from colonoscopy  Prior to admission, pt living at home with wife  Pt wife has dementia and receives home care  Family/Caregiver Present Yes  (Son present, aiding with history)   Cognition   Overall Cognitive Status WFL   Arousal/Participation Alert   Orientation Level Oriented X4   Memory Within functional limits   Following Commands Follows all commands and directions without difficulty   Subjective   Subjective "I just laid down"   RLE Assessment   RLE Assessment WFL  (3+/5 all myotomes)   LLE Assessment   LLE Assessment WFL  (3+/5 all myotomes)   Wound 05/11/22 Abdomen N/A   Date First Assessed/Time First Assessed: 05/11/22 2206   Location: Abdomen  Wound Location Orientation: N/A  Wound Description (Comments): WET TO DRY DRESSING    Incision's 1st Dressing: SPONGE GAUZE 4 X 8 12 PLY STRL LF   Dressing Status Clean;Dry; Intact   Wound 05/13/22  Heel Left   Date First Assessed/Time First Assessed: 05/13/22 1359   Primary Wound Type: (c)   Location: Heel  Wound Location Orientation: Left  Wound Description (Comments): blanchable erythema 5/13/22   Dressing Status Clean;Dry; Intact   Wound 05/13/22  Heel Right   Date First Assessed/Time First Assessed: 05/13/22 1359   Primary Wound Type: (c)   Location: Heel  Wound Location Orientation: Right  Wound Description (Comments): blanchable erythema 5/13/22   Dressing Status Clean;Dry; Intact   Bed Mobility   Supine to Sit 3  Moderate assistance   Additional items Assist x 1; Increased time required;LE management;HOB elevated; Bedrails   Sit to Supine 3  Moderate assistance   Additional items Assist x 1;Bedrails; Increased time required;LE management   Transfers   Sit to Stand Unable to assess   Stand to Sit Unable to assess   Additional Comments Unable to assess additional transfers, pt reporting lightheaded upon seated position  Assess as able next session   Ambulation/Elevation   Gait Assistance Not tested   Ambulation/Elevation Additional Comments Unable to assess additional transfers, pt reporting lightheaded upon seated position  Assess as able next session   Balance   Static Sitting Fair   Dynamic Sitting Fair   Activity Tolerance   Activity Tolerance Treatment limited secondary to medical complications (Comment)  (Pt reporting lightheaded  ARLEN Vega coming in to change battery of HR monitor)   Nurse Made Aware ARLEN Vega   Assessment   Prognosis Good   Problem List Decreased strength;Decreased range of motion;Decreased endurance; Impaired balance;Decreased mobility;Pain   Assessment Patient seen for Physical Therapy evaluation  Patient admitted with Bowel perforation ([facility] [address] )  Comorbidities affecting patient's physical performance include: chronic pain    Personal factors affecting patient at time of initial evaluation include: stairs to enter home, inability to ambulate household distances, inability to navigate community distances, limited home support, inability to perform caregiver support/tasks, inability to perform ADLS and inability to perform IADLS   Prior to admission, patient was independent with functional mobility with [facility], requiring assist for ADLS, requiring assist for IADLS, ambulating household distance, ambulating community distances and retired  Please find objective findings from Physical Therapy assessment regarding body systems outlined above with impairments and limitations including weakness, decreased ROM, impaired balance, decreased endurance, impaired coordination, pain, decreased activity tolerance, decreased functional mobility tolerance and fall risk  The Barthel Index was used as a functional outcome tool presenting with a score of Barthel Index Score: 30 today indicating marked limitations of functional mobility and ADLS  Patient's clinical presentation is currently unstable/unpredictable as seen in patient's presentation of changing level of pain, increased fall risk, new onset of impairment of functional mobility, decreased endurance and new onset of weakness  Pt would benefit from continued Physical Therapy treatment to address deficits as defined above and maximize level of functional mobility  As demonstrated by objective findings, the assigned level of complexity for this evaluation is high  The patient's -[facility] Basic Mobility Inpatient Short Form Raw Score is 13  A Raw score of less than or equal to 16 suggests the patient may benefit from discharge to post-acute rehabilitation services  Please also refer to the recommendation of the Physical Therapist for safe discharge planning     Goals   STG Expiration Date 05/20/22   Short Term Goal #1 Pt will improve MMT by 1/2 grade   Short Term Goal #2 Pt will be supervision for sit <> stand transfers   LTG Expiration Date 05/27/22   Long Term Goal #1 Pt will be able to ambulate 50 feet x 1 with supervision in order to negotiate home   Long Term Goal #2 Pt will be supervision with supine <> sit transfers to get out of bed at home   Plan   Treatment/Interventions ADL retraining;Functional transfer training;LE strengthening/ROM; Therapeutic exercise; Endurance training;Patient/family training;Bed mobility;Gait training;Spoke to nursing   PT Frequency   (5x/wk)   Recommendation   PT Discharge Recommendation Post acute rehabilitation services   AM-PAC Basic Mobility Inpatient   Turning in Bed Without Bedrails 3   Lying on Back to Sitting on Edge of Flat Bed 2   Moving Bed to Chair 2   Standing Up From Chair 2   Walk in Room 2   Climb 3-5 Stairs 2   Basic Mobility Inpatient Raw Score 13   Basic Mobility Standardized Score 33 99   Highest Level Of Mobility   -[facility] Goal 4: Move to chair/commode   -HL Achieved 3: Sit at edge of bed   Barthel Index   Feeding 0   Bathing 0   Grooming Score 5   Dressing Score 5   Bladder Score 0   Bowels Score 10   Toilet Use Score 5   Transfers (Bed/Chair) Score 5   Mobility (Level Surface) Score 0   Stairs Score 0   Barthel Index Score 30   End of Consult   Patient Position at End of Consult Supine;Bed/Chair alarm activated; All needs within reach   [address]  Mervin Faytz PT, DPT [id number]

## 2022-05-13 NOTE — CASE MANAGEMENT
Case Management Assessment & Discharge Planning Note    Patient name Jaylin Unger  Location [address] [place] [address] [place] [address] MRN [id number]  : 2/15/1931 Date 2022       Current Admission Date: 2022  Current Admission Diagnosis:Bowel perforation [facility])   Patient Active Problem List    Diagnosis Date Noted    Bowel perforation ([address] ) 2022    Hypertension 2022    Degenerative disc disease at L5-S1 level 2022    Spinal stenosis 2022    Frail elderly 2022    Rheumatic aortic stenosis 2022      LOS (days): 2  Geometric Mean LOS (GMLOS) (days): 10 30  Days to GMLOS:8 3     OBJECTIVE:    Risk of Unplanned Readmission Score: 12 55     Current admission status: Inpatient    Preferred Pharmacy:   [place], [address] [address]  Phone: [phone] Fax: [phone]    [id number], [address] [place] [address],  [facility] [address]  [facility] [address]  Phone: [phone] Fax: [phone]    Primary Care Provider: Davide Londono MD    Primary Insurance: MEDICARE  [facility]    ASSESSMENT:  Rubi Cortes Proxies    There are no active Health Care Proxies on file  Readmission Root Cause  30 Day Readmission: No    Patient Information  Admitted from[de-identified] Home  Mental Status: Alert  During Assessment patient was accompanied by: Not accompanied during assessment  Assessment information provided by[de-identified] Patient, Son  Primary Caregiver: Self  Support Systems: Self, Son, Family members, Spouse/significant other, Home care staff  [place] of Residence: [address] [place] [address] do you live in?: [place] entry access options   Select all that apply : Stairs  Number of steps to enter home : 1  Type of Current Residence: 2 story home  Upon entering residence, is there a bedroom on the main floor (no further steps)?: Yes  Upon entering residence, is there a bathroom on the main floor (no further steps)?: Yes  In the last 12 months, was there a time when you were not able to pay the mortgage or rent on time?: No  In the last 12 months, how many places have you lived?: 1  In the last 12 months, was there a time when you did not have a steady place to sleep or slept in a shelter (including now)?: No  Homeless/housing insecurity resource given?: N/A  Living Arrangements: Lives w/ Spouse/significant other (wife has dementia, has home health aide support)    Activities of Daily Living Prior to Admission  Functional Status: Independent  Completes ADLs independently?: Yes  Ambulates independently?: Yes  Does patient use assisted devices?: Yes  Assisted Devices (DME) used: Straight Cane  Does patient currently own DME?: Yes  What DME does the patient currently own?: Straight Dian Knuckles, Wheelchair, Bedside Commode (some DME from wife)  Does patient have a history of Outpatient Therapy (PT/OT)?: No  Does the patient have a history of Short-Term Rehab?: No  Does patient have a history of HHC?: No  Does patient currently have [facility]?: No    Patient Information Continued  Income Source: Pension/group home  Does patient have [facility]?: [facility]  Within the past 12 months, you worried that your food would run out before you got the money to buy more : Never true  Within the past 12 months, the food you bought just didn't last and you didn't have money to get more : Never true  Food insecurity resource given?: N/A  Does patient receive dialysis treatments?: No  Does patient have a history of substance abuse?: No  Does patient have a history of Mental Health Diagnosis?: No    Means of Transportation  Means of Transport to Appts[de-identified] Family transport  In the past 12 months, has lack of transportation kept you from medical appointments or from getting medications?: No  In the past 12 months, has lack of transportation kept you from meetings, work, or from getting things needed for daily living?: No  Was application for public transport provided?: N/A      DISCHARGE DETAILS:    Discharge planning discussed with[de-identified] Patient and son, Maryanne Parikh of Choice: Yes  Comments - Freedom of Choice: SW met briefly with pt and spoke with son over phone to assess needs and discuss plans  At this time pt's son is anticipating discharge home with home care services due to new colostomy  Son was open to referral process being started  Son chose to have referral made to [address]  SW offered ongoing assistance  SW will follow to monitor progress and assist with planning as needed    CM contacted family/caregiver?: Yes  Were Treatment Team discharge recommendations reviewed with patient/caregiver?: Yes  Did patient/caregiver verbalize understanding of patient care needs?: Yes  Were patient/caregiver advised of the risks associated with not following Treatment Team discharge recommendations?: Yes    Contacts  Patient Contacts: Som Greene  Relationship to Patient[de-identified] Family (son)  Contact Method: Phone  Phone Number: [phone]  Reason/Outcome: Continuity of Care, Discharge [address]         Is the patient interested in [address] at discharge?: Yes  [address] requested[de-identified] Nursing, Physical Therapy, Occupational [address] [place] [address] Ave Name[de-identified] [facility] Provider[de-identified] PCP (Dr Yudy Henson MD)  Home Health Services Needed[de-identified] Evaluate Functional Status and Safety, Gait/ADL Training, Post-Op Care and Assessment, Strengthening/Theraputic Exercises to Improve Function, Wound/Ostomy Care  Homebound Criteria Met[de-identified] Requires the Assistance of Another Person for Safe Ambulation or to Leave the Home, Uses an Assist Device (i e  cane, walker, etc)  Supporting Clincal Findings[de-identified] Limited Endurance, Fatigues Easliy in [employer]    DME Referral Provided  Referral made for DME?: No    Other Referral/Resources/Interventions Provided:  Interventions: [address]  Referral Comments: Referral made to [address] [place] [address]    Treatment Team Recommendation:  (pending)  Discharge Destination Plan[de-identified] Home with [place] at Discharge : Family

## 2022-05-13 NOTE — QUICK NOTE
Next time ostomy bag is removed from stoma, please be sure to measure size with stoma ruler and record size for ostomy nurse

## 2022-05-13 NOTE — PLAN OF CARE
Problem: MOBILITY - ADULT  Goal: Maintain or return to baseline ADL function  Description: INTERVENTIONS:  -  Assess patient's ability to carry out ADLs; assess patient's baseline for ADL function and identify physical deficits which impact ability to perform ADLs (bathing, care of mouth/teeth, toileting, grooming, dressing, etc )  - Assess/evaluate cause of self-care deficits   - Assess range of motion  - Assess patient's mobility; develop plan if impaired  - Assess patient's need for assistive devices and provide as appropriate  - Encourage maximum independence but intervene and supervise when necessary  - Involve family in performance of ADLs  - Assess for home care needs following discharge   - Consider OT consult to assist with ADL evaluation and planning for discharge  - Provide patient education as appropriate  Outcome: Progressing  Goal: Maintains/Returns to pre admission functional level  Description: INTERVENTIONS:  - Perform BMAT or MOVE assessment daily    - Set and communicate daily mobility goal to care team and patient/family/caregiver  - Collaborate with rehabilitation services on mobility goals if consulted  - Perform Range of Motion x times a day  - Reposition patient every x hours    - Dangle patient x times a day  - Stand patient x times a day  - Ambulate patient x times a day  - Out of bed to chair x times a day   - Out of bed for meals x times a day  - Out of bed for toileting  - Record patient progress and toleration of activity level   Outcome: Progressing     Problem: Potential for Falls  Goal: Patient will remain free of falls  Description: INTERVENTIONS:  - Educate patient/family on patient safety including physical limitations  - Instruct patient to call for assistance with activity   - Consult OT/PT to assist with strengthening/mobility   - Keep Call bell within reach  - Keep bed low and locked with side rails adjusted as appropriate  - Keep care items and personal belongings within reach  - Initiate and maintain comfort rounds  - Make Fall Risk Sign visible to staff  - Offer Toileting every x Hours, in advance of need  - Initiate/Maintain xalarm  - Obtain necessary fall risk management equipment: x  - Apply yellow socks and bracelet for high fall risk patients  - Consider moving patient to room near nurses station  Outcome: Progressing     Problem: PAIN - ADULT  Goal: Verbalizes/displays adequate comfort level or baseline comfort level  Description: Interventions:  - Encourage patient to monitor pain and request assistance  - Assess pain using appropriate pain scale  - Administer analgesics based on type and severity of pain and evaluate response  - Implement non-pharmacological measures as appropriate and evaluate response  - Consider cultural and social influences on pain and pain management  - Notify physician/advanced practitioner if interventions unsuccessful or patient reports new pain  Outcome: Progressing     Problem: GASTROINTESTINAL - ADULT  Goal: Minimal or absence of nausea and/or vomiting  Description: INTERVENTIONS:  - Administer IV fluids if ordered to ensure adequate hydration  - Maintain NPO status until nausea and vomiting are resolved  - Nasogastric tube if ordered  - Administer ordered antiemetic medications as needed  - Provide nonpharmacologic comfort measures as appropriate  - Advance diet as tolerated, if ordered  - Consider nutrition services referral to assist patient with adequate nutrition and appropriate food choices  Outcome: Progressing  Goal: Establish and maintain optimal ostomy function  Description: INTERVENTIONS:  - Assess bowel function  - Encourage oral fluids to ensure adequate hydration  - Administer IV fluids if ordered to ensure adequate hydration   - Administer ordered medications as needed  - Encourage mobilization and activity  - Nutrition services referral to assist patient with appropriate food choices  - Assess stoma site  - Consider wound care consult   Outcome: Progressing     Problem: SKIN/TISSUE INTEGRITY - ADULT  Goal: Incision(s), wounds(s) or drain site(s) healing without S/S of infection  Description: INTERVENTIONS  - Assess and document dressing, incision, wound bed, drain sites and surrounding tissue  - Provide patient and family education  - Perform skin care/dressing changes every x  Outcome: Progressing

## 2022-05-13 NOTE — WOUND OSTOMY CARE
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  He has a history of Raynaud's disease, scleroderma, colon polyps, chronic anemia, gall stones and HTN  The patient was lethargic after receiving pain medication and was oriented to person and place with periods of confusion  He is totally dependent upon nursing staff for all of his care and is repositioned in bed with assist of 2 persons  The patient's son was present for ostomy teaching  The patient is a retired general surgeon and states that he understands about how to take care of his colostomy due to years of performing ostomy surgeries and treating patients with colostomies  The patient's son said that he lives close to his father and can be a back-up person if necessary although they are both receptive to having a visiting nurse for ostomy care and further teaching  Information reviewed with patient and his son regarding emptying of ostomy pouch, measuring ostomy with each appliance change then cutting bag to size, cleansing parastomal site without soaps or bath wipes (only water & plain wipes)  Ostomy teaching guide and ostomy supplies left in patient's room  Will attempt to see patient on Monday to continue teaching and to assess for more feedback  Plan:   Skin care Plan:  1-Cleanse sacro-buttocks with soap and water  Apply Allevyn foam  Danyel with T for treatment  Change every two days and PRN  check skin q-shift  2-Turn/reposition q2h or when medically stable for pressure re-distribution on skin   3-Float heels on a pillow to offload pressure  If unable to do this, please apply bilateral heel protectors to be worn at all times in bed    4-Moisturize skin daily with skin nourishing cream  5-Ehob cushion in chair when out of bed  6-Apply bilateral heel foam dressings or use Hydraguard to bilateral heels BID and PRN  7-Ostomy supplies to be ordered before patient is discharged  8-Please only use warm water and plain wipes not soap or bath wipes to cleanse parastomal site  This would prevent the appliance from adhering correctly and may sensitize the patient to possible allergens and skin irritation  Wound 05/13/22 Sacrum (Active)   Wound Image  Intact sacral area 05/13/22 1401       Wound 05/13/22  Heel Left (Active)   Wound Image   05/13/22 1416   Dressing Status Clean;Dry; Intact 05/13/22 1430    Left heel with blanchable erythema   Wound 05/13/22  Heel Right (Active)   Wound Image   05/13/22 1414   Dressing Status Clean;Dry; Intact (right heel with blanchable erythema) 05/13/22 1430     Discussed assessment findings, and plan of care/recommendations with Aj Covert  Wound/ostomy care will follow along with patient throughout admission, please call or tiger text with questions and concerns    Recommendations written as orders    Darline Mercer RN, BSN, [facility]

## 2022-05-13 NOTE — PROGRESS NOTES
Progress Note - General Surgery   Fady Hudson 80 y o  male MRN: [id number]  Unit/Bed#: [address] [address] Encounter: [id number]    Assessment:  POD#2 s/p exploratory laparotomy, low anterior resection, protective loop transverse colostomy and segmental small bowel resection secondary to perforation of the rectosigmoid junction after colonoscopy on 5/11  WBC count 9 > 17 6  Tmax 101 2 yesterday afternoon, resolved with tylenol      Plan:  · Continue Farnsworth catheter  · Rocephin/Flagyl day 3  · Continue NG tube, NPO, IV fluid hydration  · IV Kphos repletion  · Follow-up body fluid cultures   · Wet to dry dressing on midline incision was changed today  · AM labs: cbc, bmp  · PT/OT consults placed  · Reviewed incentive spirometry with patient  · Consult for ostomy nurse education placed      Subjective/Objective     Subjective:  Patient states he would like a laryngeal suction for his oral secretions  His pain is slightly better than yesterday  He would like to keep the Farnsworth because he hasnt been out of bed yet  Objective:   UOP 0 5  1 liter NC oxygen  NG tube output 170cc bilious   He could reach 500 on IS  Blood pressure 115/58, pulse 99, temperature 99 9 °F (37 7 °C), resp  rate 18, height 5' 4" (1 626 m), weight 62 kg (136 lb 11 oz), SpO2 95 %  ,Body mass index is 23 46 kg/m²        Intake/Output Summary (Last 24 hours) at 5/13/2022 0941  Last data filed at 5/13/2022 6200  Gross per 24 hour   Intake --   Output 845 ml   Net -845 ml       Invasive Devices  Report    Peripheral Intravenous Line  Duration           Peripheral IV 05/11/22 Distal;Left;Ventral (anterior) Forearm 1 day    Peripheral IV 05/11/22 Right;Ventral (anterior) Forearm 1 day          Drain  Duration           Colostomy LLQ 2 days    NG/OG/Enteral Tube Nasogastric Left nare 2 days    Urethral Catheter 1 day                Physical Exam: /58   Pulse 99   Temp 99 9 °F (37 7 °C)   Resp 18   Ht 5' 4" (1 626 m)   Wt 62 kg (136 lb 11 oz) SpO2 95%   BMI 23 46 kg/m²   General appearance: alert and oriented, in no acute distress  Head: Normocephalic, without obvious abnormality, atraumatic  Lungs: clear to auscultation bilaterally  Heart: regular rate and rhythm with holosystolic murmur  Abdomen: stoma is pink and budded, abdomen is soft, appropriate tenderness to palpation, NG tube in place, wet-to-dry dressing on midline incision, hypoactive bowel sounds, skin/subQ of midline incision was left open and is clean without any erythema or drainage   Extremities: extremities normal, warm and well-perfused; no cyanosis, clubbing, or edema  Skin: Skin color, texture, turgor normal  No rashes or lesions    Lab, Imaging and other studies:  I have personally reviewed pertinent lab results    , CBC:   Lab Results   Component Value Date    WBC 17 60 (H) 05/13/2022    HGB 8 9 (L) 05/13/2022    HCT 25 8 (L) 05/13/2022     (H) 05/13/2022     05/13/2022    MCH 36 0 (H) 05/13/2022    MCHC 34 5 05/13/2022    RDW 16 2 (H) 05/13/2022    MPV 10 3 05/13/2022   , CMP:   Lab Results   Component Value Date    SODIUM 134 (L) 05/13/2022    K 3 6 05/13/2022     05/13/2022    CO2 22 05/13/2022    BUN 19 05/13/2022    CREATININE 1 04 05/13/2022    CALCIUM 7 4 (L) 05/13/2022    EGFR 62 05/13/2022     VTE Pharmacologic Prophylaxis: Heparin SQ  VTE Mechanical Prophylaxis: sequential compression device

## 2022-05-13 NOTE — DISCHARGE INSTR - OTHER ORDERS
Skin care Plan:    1-Cleanse sacro-buttocks with soap and water  Apply Allevyn foam  Danyel with T for treatment  Change every two days and PRN  check skin q-shift  2-Turn/reposition q2h with foam wedges/pillows (including the use of micro-turns) or when medically stable for pressure re-distribution on skin  3-Bilateral heel protectors to be worn at all times in bed  4-Moisturize skin daily with skin nourishing cream  5-Ehob cushion in chair when out of bed  Limit sitting for 1-2 hours at a time to prevent excessive pressure to sacral area then offload back in bed for at least 1 hour  6-Apply bilateral heel foam dressings or use Hydraguard to bilateral heels BID and PRN  7-Ostomy supplies to be ordered before patient is discharged  8-Please only use warm water and plain wipes not soap or bath wipes to cleanse parastomal site  The use of bath wipes and soap would prevent the appliance from adhering correctly and may sensitize the patient to possible allergens and skin irritation  If open/excoriated areas remain to parastomal site, use crusting technique: dust open/excoriated areas lightly with stoma powder, blot with alcohol-free skin prep then repeat procedure x 3  Apply stoma ring then stoma appliance

## 2022-05-14 NOTE — PLAN OF CARE
Problem: MOBILITY - ADULT  Goal: Maintain or return to baseline ADL function  Description: INTERVENTIONS:  -  Assess patient's ability to carry out ADLs; assess patient's baseline for ADL function and identify physical deficits which impact ability to perform ADLs (bathing, care of mouth/teeth, toileting, grooming, dressing, etc )  - Assess/evaluate cause of self-care deficits   - Assess range of motion  - Assess patient's mobility; develop plan if impaired  - Assess patient's need for assistive devices and provide as appropriate  - Encourage maximum independence but intervene and supervise when necessary  - Involve family in performance of ADLs  - Assess for home care needs following discharge   - Consider OT consult to assist with ADL evaluation and planning for discharge  - Provide patient education as appropriate  Outcome: Progressing  Goal: Maintains/Returns to pre admission functional level  Description: INTERVENTIONS:  - Perform BMAT or MOVE assessment daily    - Set and communicate daily mobility goal to care team and patient/family/caregiver  - Collaborate with rehabilitation services on mobility goals if consulted  - Perform Range of Motion 2 times a day  - Reposition patient every 2 hours    - Dangle patient 2 times a day  - Stand patient 2 times a day  - Ambulate patient 2 times a day  - Out of bed to chair 2 times a day   - Out of bed for meals 2 times a day  - Out of bed for toileting  - Record patient progress and toleration of activity level   Outcome: Progressing     Problem: Potential for Falls  Goal: Patient will remain free of falls  Description: INTERVENTIONS:  - Educate patient/family on patient safety including physical limitations  - Instruct patient to call for assistance with activity   - Consult OT/PT to assist with strengthening/mobility   - Keep Call bell within reach  - Keep bed low and locked with side rails adjusted as appropriate  - Keep care items and personal belongings within reach  - Initiate and maintain comfort rounds  - Make Fall Risk Sign visible to staff  - Offer Toileting every 2 Hours, in advance of need  - Initiate/Maintain bed alarm  - Obtain necessary fall risk management equipment: call bell  - Apply yellow socks and bracelet for high fall risk patients  - Consider moving patient to room near nurses station  Outcome: Progressing     Problem: PAIN - ADULT  Goal: Verbalizes/displays adequate comfort level or baseline comfort level  Description: Interventions:  - Encourage patient to monitor pain and request assistance  - Assess pain using appropriate pain scale  - Administer analgesics based on type and severity of pain and evaluate response  - Implement non-pharmacological measures as appropriate and evaluate response  - Consider cultural and social influences on pain and pain management  - Notify physician/advanced practitioner if interventions unsuccessful or patient reports new pain  Outcome: Progressing     Problem: GASTROINTESTINAL - ADULT  Goal: Minimal or absence of nausea and/or vomiting  Description: INTERVENTIONS:  - Administer IV fluids if ordered to ensure adequate hydration  - Maintain NPO status until nausea and vomiting are resolved  - Nasogastric tube if ordered  - Administer ordered antiemetic medications as needed  - Provide nonpharmacologic comfort measures as appropriate  - Advance diet as tolerated, if ordered  - Consider nutrition services referral to assist patient with adequate nutrition and appropriate food choices  Outcome: Progressing  Goal: Establish and maintain optimal ostomy function  Description: INTERVENTIONS:  - Assess bowel function  - Encourage oral fluids to ensure adequate hydration  - Administer IV fluids if ordered to ensure adequate hydration   - Administer ordered medications as needed  - Encourage mobilization and activity  - Nutrition services referral to assist patient with appropriate food choices  - Assess stoma site  - Consider wound care consult   Outcome: Progressing     Problem: SKIN/TISSUE INTEGRITY - ADULT  Goal: Incision(s), wounds(s) or drain site(s) healing without S/S of infection  Description: INTERVENTIONS  - Assess and document dressing, incision, wound bed, drain sites and surrounding tissue  - Provide patient and family education  - Perform skin care/dressing changes daily  Outcome: Progressing     Problem: Prexisting or High Potential for Compromised Skin Integrity  Goal: Skin integrity is maintained or improved  Description: INTERVENTIONS:  - Identify patients at risk for skin breakdown  - Assess and monitor skin integrity  - Assess and monitor nutrition and hydration status  - Monitor labs   - Assess for incontinence   - Turn and reposition patient  - Assist with mobility/ambulation  - Relieve pressure over bony prominences  - Avoid friction and shearing  - Provide appropriate hygiene as needed including keeping skin clean and dry  - Evaluate need for skin moisturizer/barrier cream  - Collaborate with interdisciplinary team   - Patient/family teaching  - Consider wound care consult   Outcome: Progressing

## 2022-05-14 NOTE — PROGRESS NOTES
Progress Note - General Surgery   Fady Hudson 80 y o  male MRN: [id number]  Unit/Bed#: [address] [address] Encounter: [id number]    Assessment:  POD#3 s/p exploratory laparotomy, low anterior resection, protective loop transverse colostomy and segmental small bowel resection secondary to perforation rectosigmoid junction after colonoscopy  Tmax 101 2 tachy through the night 121  Patient requiring 3L nc  XYN:75 89 (17 60)  Appropriate urinary output  NGT output: 150 mL  Stool: 25 mL    K:3 4  Wound culture showing: Pseudomonas aeruginosa  Plan: At  Potassium repletion  Continue NGT, NPO, IVF  Discontinue Rocephin  Start cefepime  Continue Flagyl  PRN analgesics  OOB and ambulate  Incentive spirometry  Wet to dry dressing midline abdominal incision  Continue Farnsworth catheter  Aggressive pulmonary toilet  Wean NC as tolerated  Subjective/Objective   Chief Complaint:" I am okay  My back is sore in this bed "     Subjective: Patient was seen and examined bedside  Patient reports no acute changes through the night  Patient reports sciatica back pain secondary to hospital bed  Denies any nausea,vomiting, chest pain or difficulty breathing  Objective:     Blood pressure 108/60, pulse (!) 121, temperature 100 4 °F (38 °C), temperature source Oral, resp  rate 18, height 5' 4" (1 626 m), weight 62 kg (136 lb 11 oz), SpO2 92 %  ,Body mass index is 23 46 kg/m²        Intake/Output Summary (Last 24 hours) at 5/14/2022 0808  Last data filed at 5/14/2022 0630  Gross per 24 hour   Intake 100 ml   Output 1475 ml   Net -1375 ml       Invasive Devices  Report      Peripheral Intravenous Line  Duration             Peripheral IV 05/11/22 Distal;Left;Ventral (anterior) Forearm 2 days    Peripheral IV 05/11/22 Right;Ventral (anterior) Forearm 2 days              Drain  Duration             Colostomy LLQ 3 days    NG/OG/Enteral Tube Nasogastric Left nare 3 days    Urethral Catheter 2 days Physical Exam: /63   Pulse 94   Temp 100 4 °F (38 °C) (Oral)   Resp 18   Ht 5' 4" (1 626 m)   Wt 62 kg (136 lb 11 oz)   SpO2 98%   BMI 23 46 kg/m²   General appearance: alert and oriented, in no acute distress  Lungs: clear to auscultation bilaterally  Heart:  regular rate and rhythm  Abdomen:  soft, +BS, abdominal incision wet to dry dressing c/d/i  Loop colostomy good pink color  Skin: Skin color, texture, turgor normal  No rashes or lesions    Lab, Imaging and other studies:  I have personally reviewed pertinent lab results    , CBC:   Lab Results   Component Value Date    WBC 14 84 (H) 05/14/2022    HGB 7 7 (L) 05/14/2022    HCT 22 5 (L) 05/14/2022     (H) 05/14/2022     05/14/2022    MCH 36 0 (H) 05/14/2022    MCHC 34 2 05/14/2022    RDW 16 2 (H) 05/14/2022    MPV 10 6 05/14/2022    NRBC 0 05/14/2022   , CMP:   Lab Results   Component Value Date    SODIUM 136 05/14/2022    K 3 4 (L) 05/14/2022     05/14/2022    CO2 23 05/14/2022    BUN 16 05/14/2022    CREATININE 0 86 05/14/2022    CALCIUM 7 4 (L) 05/14/2022    EGFR 75 05/14/2022     VTE Pharmacologic Prophylaxis: Heparin  VTE Mechanical Prophylaxis: sequential compression device

## 2022-05-14 NOTE — OCCUPATIONAL THERAPY NOTE
Occupational Therapy Evaluation/Treatment       05/14/22 1030   Note Type   Note type Evaluation   Restrictions/Precautions   Other Precautions Chair Alarm; Bed Alarm;O2;Fall Risk;Pain;Multiple lines  (NGT, quigley catheter, colostomy)   Pain Assessment   Pain Assessment Tool 0-10   Pain Score 9   Pain Location/Orientation Location: Back; Location: Abdomen  (R UE)   Hospital Pain Intervention(s) Medication (See MAR); Repositioned  (Nurse Robb Medina, made aware of pt pain and was premedicated during session )   Home Living   Type of [address] [place] [address] One level; Able to live on main level with bedroom/bathroom   Bathroom Shower/Tub Walk-in shower   Bathroom Toilet Standard   Bathroom Equipment Grab bars in shower; Shower chair   [address] Walker;Cane;Grab bars   Additional Comments Patient presented with abdominal colon change in bowel habit with h/o sigmoid  Intussusception  Admitted with Pneumoperitoneum, complication from colonoscopy  S/p exploratory laparotomy  Prior Function   Level of [place] Other (Comment)  (Pt lives with wife who has dementia, pt's wife has home health aide to assist with care)   Lives With Spouse   Receives Help From Family;Home health  (Son)   ADL Assistance Needs assistance   IADLs Needs assistance   Vocational Retired   Comments Patient provided limited history and information  Reported pain and discomfort during interview  Information taken from chart/notes     Subjective   Subjective No good time for therapy, I'm in pain   ADL   Eating Assistance 4  Minimal Assistance  (NGT, min A  (simulated) has pain to R UE)   Eating Deficit   (NGT)   Grooming Assistance 4  Minimal Assistance  (limited by  R UE and back pain)   UB Bathing Assistance 2  Maximal Assistance   LB Bathing Assistance 1  Total Assistance   UB Dressing Assistance 3  Moderate Assistance   LB Dressing Assistance 2  Maximal Assistance   Toileting Assistance  1  Total Assistance Toileting Deficit   (on quigley catheter and colostomy)   Bed Mobility   Rolling R 3  Moderate assistance   Additional items Assist x 1   Rolling L 2  Maximal assistance   Additional items Assist x 1   Additional items Increased time required;LE management;Verbal cues   Additional Comments   (max A of 2 to reposition in bed)   Transfers   Sit to Stand Unable to assess   Stand to Sit Unable to assess   Stand pivot Unable to assess   Functional Mobility   Additional Comments Pt reported pain and discomfort during interview and reported increased level of pain with movement and activity  Activity Tolerance   Activity Tolerance Patient limited by pain; Patient limited by fatigue;Treatment limited secondary to medical complications (Comment)   Medical Staff Made Aware   (RN aware and pt  medicated  for pain mgt prior to session)   RUE Assessment   RUE Assessment WFL  (with pain to R UE 8/10)   LUE Assessment   LUE Assessment WFL   Cognition   Overall Cognitive Status WFL   Arousal/Participation Alert; Responsive   Attention Within functional limits   Orientation Level Oriented to person;Oriented to place;Oriented to time   Memory Within functional limits   Following Commands Follows one step commands without difficulty   Assessment   Limitation Decreased ADL status; Decreased UE ROM; Decreased UE strength;Decreased endurance;Decreased self-care trans;Decreased high-level ADLs   Prognosis Good   Assessment Patient evaluated by Occupational Therapy  Patient admitted with Bowel perforation ([facility] [address] )  The patients occupational profile, medical and therapy history includes a extensive additional review of physical, cognitive, or psychosocial history related to current functional performance  Comorbidities affecting functional mobility and ADLS include: Aortic valve stenosis,  Atelectasis, Bilateral pleural effusion,  CAD, Spinal stenosis, chronic back pain, compression fractures L1, T10, T12, T9, Shingles, HTN     Prior to admission, patient was requiring assist for functional mobility with walker or cane, requiring assist for ADLS, requiring assist for IADLS, living with spouse in one story home with 1 steps to enter, ambulating household distance and retired  The evaluation identifies the following performance deficits: weakness, decreased ROM, impaired balance, decreased endurance, decreased coordination, increased fall risk, decreased ADLS, decreased IADLS, pain, decreased activity tolerance and decreased strength, that result in activity limitations and/or participation restrictions  This evaluation requires clinical decision making of high complexity, because the patient presents with comorbidites that affect occupational performance and required significant modification of tasks or assistance with consideration of multiple treatment options  The Barthel Index was used as a functional outcome tool presenting with a score of Barthel Index Score: 10, indicating marked limitations of functional mobility and ADLS  The patient's raw score on the -PAC Daily Activity inpatient short form is 11, standardized score is 29 04, less than 39 4  Patients at this level are likely to benefit from DC to post-acute rehabilitation services  Please refer to the recommendation of the Occupational Therapist for safe DC planning  Patient will benefit from skilled Occupational Therapy services to address above deficits and facilitate a safe return to prior level of function  Goals   Patient Goals Be without pain  (Move with no pain)   STG Time Frame   (1-7 days)   Short Term Goal  Goals established to promote Patient Goals:  (Move with no pain):  Patient will increase standing tolerance to 5 minutes during ADL task to decrease assistance level and decrease fall risk; Patient will increase bed mobility to min assist in preparation for ADLS and transfers;  Patient will increase functional mobility to and from bathroom with rolling walker with mod assist to increase performance with ADLS and to use a toilet; Patient will tolerate 5 minutes of UE ROM/strengthening to increase general activity tolerance and performance in ADLS/IADLS; Patient will improve functional activity tolerance to 5 minutes of sustained functional tasks to increase participation in basic self-care and decrease assistance level;  Patient will be able to to verbalize understanding and perform energy conservation/proper body mechanics during ADLS and functional mobility at least 75% of the time with minimal cueing to decrease signs of fatigue and increase stamina to return to prior level of function; Patient will increase static and static/dynamic sitting balance to fair- to improve the ability to sit at edge of bed or on a chair for ADLS;  Patient will increase static standing balance to fair- to improve postural stability and decrease fall risk during standing ADLS and transfers  LTG Time Frame   (8-14 days)   Long Term Goal Patient will increase standing tolerance to 10 minutes during ADL task to decrease assistance level and decrease fall risk; Patient will increase bed mobility to supervision in preparation for ADLS and transfers;  Patient will increase functional mobility to and from bathroom with rolling walker with min assist to increase performance with ADLS and to use a toilet; Patient will tolerate 10 minutes of UE ROM/strengthening to increase general activity tolerance and performance in ADLS/IADLS; Patient will improve functional activity tolerance to 10 minutes of sustained functional tasks to increase participation in basic self-care and decrease assistance level;  Patient will be able to to verbalize understanding and perform energy conservation/proper body mechanics during ADLS and functional mobility at least 90% of the time with no cueing to decrease signs of fatigue and increase stamina to return to prior level of function; Patient will increase static/dynamic sitting balance to fair to improve the ability to sit at edge of bed or on a chair for ADLS;  Patient will increase static standing balance to fair to improve postural stability and decrease fall risk during standing ADLS and transfers  Pt will score >/= 15/24 on AM-PAC Daily Activity Inpatient scale to promote safe independence with ADLs and functional mobility; Pt will score >/= 50/100 on Barthel Index in order to decrease caregiver assistance needed and increase ability to perform ADLs and functional mobility  Functional Transfer Goals   Pt Will Perform All Functional Transfers   (STG minimal Assist, LTG Supervision)   ADL Goals   Pt Will Perform Eating   (STG Supervision, LTG independent)   Pt Will Perform Grooming   (STG Supervision, LTG independent)   Pt Will Perform Bathing   (STG minimal Assist, LTG Supervision)   Pt Will Perform UE Dressing   (STG Supervision, LTG independent)   Pt Will Perform LE Dressing   (STG minimal Assist, LTG Supervision)   Pt Will Perform Toileting   (STG minimal Assist, LTG Supervision)   Plan   Treatment Interventions ADL retraining;Functional transfer training;UE strengthening/ROM; Endurance training;Patient/family training;Continued evaluation; Energy conservation; Activityengagement; Compensatory technique education   Goal Expiration Date 05/28/22   OT Frequency 3-5x/wk   Additional Treatment Session   Start Time 1020   End Time 1030   Treatment Assessment S: " No good time for therapy, I have pain" O: During first attempt to see pt, pt refused  ARLEN Wang)  made aware and pt was given pain medication  On second attempt to see pt, pt refused again but  eventually agreed with much encouragement, pt still refused to sit at EOB and get up and reported increased pain and discomfort  On evaluation , patient with limited AROM to R UE with increased pain during movement, required max A to roll/turn in bed and reposition   Educated on importance of movement, repositioning, skin inspection, and proper positioning to reduce risk for debilitation and skin breakdown  A: Patient with limited carryover of education as patient is easily distracted and irritated throughout session  Patient declining increased mobility or ADL performance due to pain  Will follow up for continued OT treatment as patient is agreeable  At end of session patient supine in bed with all needs met  Continue OT per POC   P: STR   Recommendation   OT Discharge Recommendation Post acute rehabilitation services   AM-PAC Daily Activity Inpatient   Lower Body Dressing 1   Bathing 1   Toileting 1   Upper Body Dressing 2   Grooming 3   Eating 3   Daily Activity Raw Score 11   Daily Activity Standardized Score (Calc for Raw Score >=11) 29 04   AM-PAC Applied Cognition Inpatient   Following a Speech/Presentation 4   Understanding Ordinary Conversation 4   Taking Medications 3   Remembering Where Things Are Placed or Put Away 4   Remembering List of 4-5 Errands 3   Taking Care of Complicated Tasks 3   Applied Cognition Raw Score 21   Applied Cognition Standardized Score 44 3   Barthel Index   Feeding 5  (NGT (simulated))   Bathing 0   Grooming Score 0   Dressing Score 5   Bladder Score 0   Bowels Score 0  (colostomy)   Toilet Use Score 0   Transfers (Bed/Chair) Score 0   Mobility (Level Surface) Score 0   Stairs Score 0   Barthel Index Score 10   Licensure   [place] License Number  Catherine Lopez MS, OTR/L [id number]

## 2022-05-15 PROBLEM — R09.02 HYPOXIA: Status: ACTIVE | Noted: 2022-01-01

## 2022-05-15 PROBLEM — G93.40 ENCEPHALOPATHY: Status: ACTIVE | Noted: 2022-01-01

## 2022-05-15 NOTE — ASSESSMENT & PLAN NOTE
Patient noted to be hypoxic this morning with increased shortness of breath  CTA of the chest was performed which showed no evidence of pulmonary embolism but did show bilateral upper lobe ground-glass opacities compatible with pneumonia and/or pulmonary edema in addition to small bilateral pleural effusions  Patient received 20 mg of IV Lasix this morning  Patient still with evidence of crackles in the bilateral lower lung field  Will give another 40 mg of IV Lasix therapy at this time  Will also order as needed nebulizer therapy  Continue with supplemental oxygen therapy and wean as tolerated  Will obtain a 2D echocardiogram for further evaluation  Monitor intake/output and daily weights  Will also trend troponins and monitor on telemetry  Procalcitonin level and repeat BMP ordered for the morning

## 2022-05-15 NOTE — CONSULTS
[address]  Progress Note - Clement Oanh Becca 2/15/1931, 80 y o  male MRN: [id number]  Unit/Bed#: [address] [place] [address] Encounter: [id number]  Primary Care Provider: Frances Kumar MD   Date and time admitted to hospital: 5/11/2022  4:48 PM    Hypoxia  Assessment & Plan  Patient noted to be hypoxic this morning with increased shortness of breath  CTA of the chest was performed which showed no evidence of pulmonary embolism but did show bilateral upper lobe ground-glass opacities compatible with pneumonia and/or pulmonary edema in addition to small bilateral pleural effusions  Patient received 20 mg of IV Lasix this morning  Patient still with evidence of crackles in the bilateral lower lung field  Will give another 40 mg of IV Lasix therapy at this time  Continue with supplemental oxygen therapy and wean as tolerated  Will obtain a 2D echocardiogram for further evaluation  Monitor intake/output and daily weights  Will also trend troponins and monitor on telemetry  Procalcitonin level and repeat BMP ordered for the morning  Encephalopathy  Assessment & Plan  Acute encephalopathy likely multifactorial     * Bowel perforation [facility])  Assessment & Plan  Patient is currently postop day 4 for exploratory laparotomy, low anterior resection, protective loop transverse colostomy and segmental small bowel resection secondary to perforation the rectosigmoid junction after colonoscopy  Management per surgery  VTE Prophylaxis: VTE Score: 7 heparin prophylaxis  History of Present Illness:  Alejo Strickland is a 80 y o  male postop day 4  Status post exploratory laparotomy, low anterior resection, protective loop transverse colostomy and segmental small bowel resection secondary to perforation of the rectosigmoid junction after colonoscopy  This morning patient was noted to be more confused and hypoxic    CTA of the chest was performed which showed no evidence of pulmonary embolism but did show bilateral upper lobe ground-glass opacities compatible with pneumonia and/or pulmonary edema in addition to small bilateral pleural effusions  Medical consult was placed secondary to hypoxia and confusion  At this time patient does report feeling better than he did this morning at  Patient is still confused however his mentation is gradually improving per the nurse  Patient currently denies any chest pain, abdominal pain, lightheadedness, dizziness, palpitations, fevers, chills, change in urinary habits, change in bowel habits, recent travel or any known sick contacts  Review of Systems:  Fourteen point review of systems obtained and reviewed and is negative except as outlined above in the HPI      Past Medical and Surgical History:   Past Medical History:   Diagnosis Date    Aortic valve calcification     Aortic valve stenosis     AR (aortic regurgitation)     Atelectasis     LT BASILAR PASSIVE SEGMENTAL    Autoimmune disease (HCC)     Bilateral pleural effusion     Bilateral senile cataracts     CAD (coronary artery disease)     CAD (coronary artery disease)     Central spinal stenosis     Changes in vascular appearance of retina     Chronic back pain     Colon polyp     Compression fracture of L1 lumbar vertebra (HCC) 06/02/2010    Compression fracture of T10 vertebra (HCC)     Compression fracture of T12 vertebra (HCC)     Compression fracture of T9 vertebra (HCC)     Dextroscoliosis of lumbar spine     Diverticulosis     Drusen     Dry eye syndrome     Former smoker     Gastritis     GERD (gastroesophageal reflux disease)     Giant cell arteritis (HCC)     Hiatal hernia     History of shingles     HTN (hypertension)     Internal carotid artery stenosis, left     Internal carotid artery stenosis, right     Left lower lobe pneumonia     Lesion of upper eyelid     Lordosis of lumbar region     Osteopenia     SEVERE    Osteoporosis     Pleuritic pain     Radiculopathy B/L LOWER EXTREMITY    Raynaud's disease     Renal cyst     B/L    Scleroderma ([address] )     Tortuous aorta (HCC)        Past Surgical History:   Procedure Laterality Date    COLONOSCOPY      COLONOSCOPY N/A 5/11/2022    Procedure: NON IMAGING INTRAOP COLONOSCOPY;  Surgeon: Molly López MD;  Location: [address] [place] [address];  Service: General    COLONOSCOPY W/ BIOPSIES  04/24/2009    DXA PROCEDURE (HISTORICAL)  07/24/2019    EGD  04/24/2009    w/ bx    EGD AND COLONOSCOPY  05/11/2022    EXPLORATORY LAPAROTOMY W/ BOWEL RESECTION N/A 5/11/2022    Procedure: LAPAROTOMY EXPLORATORY LOW ANTERIOR RESECTION W/LOOP COLOSTOMY;  Surgeon: Molly López MD;  Location: [address] [place] [address];  Service: General    UPPER GASTROINTESTINAL ENDOSCOPY      VERTEBROPLASTY      T9, T10, T12 & L1       Meds/Allergies:  Reviewed  Allergies: No Known Allergies    Social History:  Marital Status: /Civil Union  Substance Use History:   Social History     Substance and Sexual Activity   Alcohol Use Yes    Comment: socially     Social History     Tobacco Use   Smoking Status Former Smoker    Types: Cigarettes, Pipe   Smokeless Tobacco Never Used   Tobacco Comment    QUIT 20 YEARS AGO     Social History     Substance and Sexual Activity   Drug Use Never       Family History:  Family History   Problem Relation Age of Onset    No Known Problems Mother     No Known Problems Father     Colon cancer Sister     Colon cancer Brother        Physical Exam:   Vitals:   Blood Pressure: 150/81 (05/15/22 1500)  Pulse: 104 (05/15/22 1500)  Temperature: 99 7 °F (37 6 °C) (05/15/22 1500)  Temp Source: Axillary (05/15/22 1500)  Respirations: 20 (05/15/22 1500)  Height: 5' 4" (162 6 cm) (05/11/22 2300)  Weight - Scale: 62 kg (136 lb 11 oz) (05/11/22 2300)  SpO2: 94 % (05/15/22 1500)    Physical Exam  HENT:      Head: Normocephalic        Mouth/Throat:      Mouth: Mucous membranes are moist    Eyes:      Extraocular Movements: Extraocular movements intact  Cardiovascular:      Rate and Rhythm: Tachycardia present  Pulmonary:      Effort: Pulmonary effort is normal       Comments: Crackles in the bilateral lower lung fields  Abdominal:      Palpations: Abdomen is soft  Comments: Surgical site clean, dry and intact  Skin:     General: Skin is warm  Neurological:      Mental Status: He is alert  Comments: Confused  Psychiatric:         Mood and Affect: Mood normal          Behavior: Behavior normal         Additional Data:   Lab Results:    Results from last 7 days   Lab Units 05/15/22  0506 05/14/22  0455 05/13/22  0745   WBC Thousand/uL 14 89*   < > 17 60*   HEMOGLOBIN g/dL 7 8*   < > 8 9*   HEMATOCRIT % 23 2*   < > 25 8*   PLATELETS Thousands/uL 209   < > 190   BANDS PCT %  --   --  50*   LYMPHO PCT %  --   --  4*   MONO PCT %  --   --  3*   EOS PCT %  --   --  0    < > = values in this interval not displayed  Results from last 7 days   Lab Units 05/15/22  0506   SODIUM mmol/L 140   POTASSIUM mmol/L 3 9   CHLORIDE mmol/L 109*   CO2 mmol/L 23   BUN mg/dL 18   CREATININE mg/dL 0 86   ANION GAP mmol/L 8   CALCIUM mg/dL 7 7*   ALBUMIN g/dL 2 5*   TOTAL BILIRUBIN mg/dL 0 63   ALK PHOS U/L 58   ALT U/L 15   AST U/L 20   GLUCOSE RANDOM mg/dL 138             No results found for: HGBA1C  Results from last 7 days   Lab Units 05/12/22  1611 05/12/22  0745 05/11/22  2217 05/11/22  1520   POC GLUCOSE mg/dl 128 96 98 118     Imaging:  Reviewed  CTA chest pe study   Final Result by Hetal Griffith MD (05/15 1229)      No pulmonary embolus  Bilateral upper lobe groundglass opacities compatible with pneumonia and/or pulmonary edema  Small bilateral pleural effusions  Workstation performed: [id number]         XR chest portable   Final Result by Melvi Nash MD (05/15 1023)      Pulmonary edema and small bilateral pleural effusions                    Workstation performed: [id number]           ** Please Note: This note may have been constructed using a voice recognition system   **

## 2022-05-15 NOTE — ASSESSMENT & PLAN NOTE
Patient is currently postop day 4 for exploratory laparotomy, low anterior resection, protective loop transverse colostomy and segmental small bowel resection secondary to perforation the rectosigmoid junction after colonoscopy  Management per surgery

## 2022-05-15 NOTE — PLAN OF CARE
Problem: MOBILITY - ADULT  Goal: Maintain or return to baseline ADL function  Description: INTERVENTIONS:  -  Assess patient's ability to carry out ADLs; assess patient's baseline for ADL function and identify physical deficits which impact ability to perform ADLs (bathing, care of mouth/teeth, toileting, grooming, dressing, etc )  - Assess/evaluate cause of self-care deficits   - Assess range of motion  - Assess patient's mobility; develop plan if impaired  - Assess patient's need for assistive devices and provide as appropriate  - Encourage maximum independence but intervene and supervise when necessary  - Involve family in performance of ADLs  - Assess for home care needs following discharge   - Consider OT consult to assist with ADL evaluation and planning for discharge  - Provide patient education as appropriate  Outcome: Progressing  Goal: Maintains/Returns to pre admission functional level  Description: INTERVENTIONS:  - Perform BMAT or MOVE assessment daily    - Set and communicate daily mobility goal to care team and patient/family/caregiver  - Collaborate with rehabilitation services on mobility goals if consulted  - Perform Range of Motion 4times a day  - Reposition patient every 2 hours    - Dangle patient 4 times a day  - Stand patient 2 times a day  - Ambulate patient 2 times a day  - Out of bed to chair 2 times a day   - Out of bed for meals 2  Problem: Potential for Falls  Goal: Patient will remain free of falls  Description: INTERVENTIONS:  - Educate patient/family on patient safety including physical limitations  - Instruct patient to call for assistance with activity   - Consult OT/PT to assist with strengthening/mobility   - Keep Call bell within reach  - Keep bed low and locked with side rails adjusted as appropriate  - Keep care items and personal belongings within reach  - Initiate and maintain comfort rounds  - Make Fall Risk Sign visible to staff  - Offer Toileting every 2 Hours, in advance of need  - Initiate/Maintain bed alarm  - Obtain necessary fall risk management equipment:   Problem: PAIN - ADULT  Goal: Verbalizes/displays adequate comfort level or baseline comfort level  Description: Interventions:  - Encourage patient to monitor pain and request assistance  - Assess pain using appropriate pain scale  - Administer analgesics based on type and severity of pain and evaluate response  - Implement non-pharmacological measures as appropriate and evaluate response  - Consider cultural and social influences on pain and pain management  - Notify physician/advanced practitioner if interventions unsuccessful or patient reports new pain  Outcome: Progressing     Problem: GASTROINTESTINAL - ADULT  Goal: Minimal or absence of nausea and/or vomiting  Description: INTERVENTIONS:  - Administer IV fluids if ordered to ensure adequate hydration  - Maintain NPO status until nausea and vomiting are resolved  - Nasogastric tube if ordered  - Administer ordered antiemetic medications as needed  - Provide nonpharmacologic comfort measures as appropriate  - Advance diet as tolerated, if ordered  - Consider nutrition services referral to assist patient with adequate nutrition and appropriate food choices  Outcome: Progressing  Goal: Establish and maintain optimal ostomy function  Description: INTERVENTIONS:  - Assess bowel function  - Encourage oral fluids to ensure adequate hydration  - Administer IV fluids if ordered to ensure adequate hydration   - Administer ordered medications as needed  - Encourage mobilization and activity  - Nutrition services referral to assist patient with appropriate food choices  - Assess stoma site  - Consider wound care consult   Outcome: Progressing     Problem: SKIN/TISSUE INTEGRITY - ADULT  Goal: Incision(s), wounds(s) or drain site(s) healing without S/S of infection  Description: INTERVENTIONS  - Assess and document dressing, incision, wound bed, drain sites and surrounding tissue  - Provide patient and family education  - Perform skin care/dressing changes every PRN  Outcome: Progressing     Problem: Prexisting or High Potential for Compromised Skin Integrity  Goal: Skin integrity is maintained or improved  Description: INTERVENTIONS:  - Identify patients at risk for skin breakdown  - Assess and monitor skin integrity  - Assess and monitor nutrition and hydration status  - Monitor labs   - Assess for incontinence   - Turn and reposition patient  - Assist with mobility/ambulation  - Relieve pressure over bony prominences  - Avoid friction and shearing  - Provide appropriate hygiene as needed including keeping skin clean and dry  - Evaluate need for skin moisturizer/barrier cream  - Collaborate with interdisciplinary team   - Patient/family teaching  - Consider wound care consult   Outcome: Progressing     - Apply yellow socks and bracelet for high fall risk patients  - Consider moving patient to room near nurses station  Outcome: Progressing    times a day  - Out of bed for toileting  - Record patient progress and toleration of activity level   Outcome: Progressing

## 2022-05-15 NOTE — PLAN OF CARE
Problem: MOBILITY - ADULT  Goal: Maintain or return to baseline ADL function  Description: INTERVENTIONS:  -  Assess patient's ability to carry out ADLs; assess patient's baseline for ADL function and identify physical deficits which impact ability to perform ADLs (bathing, care of mouth/teeth, toileting, grooming, dressing, etc )  - Assess/evaluate cause of self-care deficits   - Assess range of motion  - Assess patient's mobility; develop plan if impaired  - Assess patient's need for assistive devices and provide as appropriate  - Encourage maximum independence but intervene and supervise when necessary  - Involve family in performance of ADLs  - Assess for home care needs following discharge   - Consider OT consult to assist with ADL evaluation and planning for discharge  - Provide patient education as appropriate  Outcome: Progressing  Goal: Maintains/Returns to pre admission functional level  Description: INTERVENTIONS:  - Perform BMAT or MOVE assessment daily    - Set and communicate daily mobility goal to care team and patient/family/caregiver  - Collaborate with rehabilitation services on mobility goals if consulted  - Perform Range of Motion 3 times a day  - Reposition patient every 2 hours    - Dangle patient 1 times a day  - Stand patient 1 times a day  - Ambulate patient 1 times a day  - Out of bed to chair 0 times a day   - Out of bed for meals 0 times a day  - Out of bed for toileting  - Record patient progress and toleration of activity level   Outcome: Progressing     Problem: Potential for Falls  Goal: Patient will remain free of falls  Description: INTERVENTIONS:  - Educate patient/family on patient safety including physical limitations  - Instruct patient to call for assistance with activity   - Consult OT/PT to assist with strengthening/mobility   - Keep Call bell within reach  - Keep bed low and locked with side rails adjusted as appropriate  - Keep care items and personal belongings within reach  - Initiate and maintain comfort rounds  - Make Fall Risk Sign visible to staff  - Offer Toileting every 2 Hours, in advance of need  - Initiate/Maintain bedalarm  - Obtain necessary fall risk management equipment: moved near nurses station  - Apply yellow socks and bracelet for high fall risk patients  - Consider moving patient to room near nurses station  Outcome: Progressing     Problem: PAIN - ADULT  Goal: Verbalizes/displays adequate comfort level or baseline comfort level  Description: Interventions:  - Encourage patient to monitor pain and request assistance  - Assess pain using appropriate pain scale  - Administer analgesics based on type and severity of pain and evaluate response  - Implement non-pharmacological measures as appropriate and evaluate response  - Consider cultural and social influences on pain and pain management  - Notify physician/advanced practitioner if interventions unsuccessful or patient reports new pain  Outcome: Progressing     Problem: GASTROINTESTINAL - ADULT  Goal: Minimal or absence of nausea and/or vomiting  Description: INTERVENTIONS:  - Administer IV fluids if ordered to ensure adequate hydration  - Maintain NPO status until nausea and vomiting are resolved  - Nasogastric tube if ordered  - Administer ordered antiemetic medications as needed  - Provide nonpharmacologic comfort measures as appropriate  - Advance diet as tolerated, if ordered  - Consider nutrition services referral to assist patient with adequate nutrition and appropriate food choices  Outcome: Progressing  Goal: Establish and maintain optimal ostomy function  Description: INTERVENTIONS:  - Assess bowel function  - Encourage oral fluids to ensure adequate hydration  - Administer IV fluids if ordered to ensure adequate hydration   - Administer ordered medications as needed  - Encourage mobilization and activity  - Nutrition services referral to assist patient with appropriate food choices  - Assess stoma site  - Consider wound care consult   Outcome: Progressing     Problem: SKIN/TISSUE INTEGRITY - ADULT  Goal: Incision(s), wounds(s) or drain site(s) healing without S/S of infection  Description: INTERVENTIONS  - Assess and document dressing, incision, wound bed, drain sites and surrounding tissue  - Provide patient and family education  - Perform skin care/dressing changes every 12hr and prn  Outcome: Progressing     Problem: Prexisting or High Potential for Compromised Skin Integrity  Goal: Skin integrity is maintained or improved  Description: INTERVENTIONS:  - Identify patients at risk for skin breakdown  - Assess and monitor skin integrity  - Assess and monitor nutrition and hydration status  - Monitor labs   - Assess for incontinence   - Turn and reposition patient  - Assist with mobility/ambulation  - Relieve pressure over bony prominences  - Avoid friction and shearing  - Provide appropriate hygiene as needed including keeping skin clean and dry  - Evaluate need for skin moisturizer/barrier cream  - Collaborate with interdisciplinary team   - Patient/family teaching  - Consider wound care consult   Outcome: Progressing

## 2022-05-15 NOTE — PROGRESS NOTES
Progress Note - General Surgery   Fady Hudson 80 y o  male MRN: [id number]  Unit/Bed#: [address] [place] [address] Encounter: [id number]    Assessment:  POD#4 s/p exploratory laparotomy, low anterior resection, protective loop transverse colostomy and segmental small bowel resection secondary to perforation rectosigmoid junction after colonoscopy  Per nursing staff patient removed NGT this morning displaced NC  Patient hypoxic with increasing oxygen demand  ABG showing respiratory alkalosis  CXR suggestive of fluid overload  ECG showing right bundle branch new compared to previous ECG  Patient requiring 4L nc stating at 93%  WBC: 14 89 (14 84)  Appropriate urinary output  NGT output: 120 mL  Stool: 15 mL  Wound culture showing: Pseudomonas aeruginosa  Plan:   Continuous observation  Insert quigley catheter  BNP pending  IV Lasix 20 mg once  Decrease IV fluids 50 mL/hr     I&O  CTA PE study  Aggressive pulmonary toilet  DVT px heprin  Consult internal medicine medical managment    Subjective/Objective   Chief Complaint:     Subjective: Patient was seen and examined bedside  Patient reports he is feeling short of breath  Denies any nausea, vomiting, abdominal pain, chest pain  Patient found per nursing staff with NGT dislodged and low oxygen   Objective:     Blood pressure 125/60, pulse (!) 107, temperature 98 °F (36 7 °C), resp  rate 18, height 5' 4" (1 626 m), weight 62 kg (136 lb 11 oz), SpO2 92 %  ,Body mass index is 23 46 kg/m²        Intake/Output Summary (Last 24 hours) at 5/15/2022 0743  Last data filed at 5/15/2022 0521  Gross per 24 hour   Intake 1355 ml   Output 1285 ml   Net 70 ml       Invasive Devices  Report    Peripheral Intravenous Line  Duration           Peripheral IV 05/11/22 Right;Ventral (anterior) Forearm 3 days          Drain  Duration           Colostomy LLQ 4 days    NG/OG/Enteral Tube Nasogastric Left nare 4 days                Physical Exam: /60   Pulse (!) 107 Temp 98 °F (36 7 °C)   Resp 18   Ht 5' 4" (1 626 m)   Wt 62 kg (136 lb 11 oz)   SpO2 92%   BMI 23 46 kg/m²   General appearance: alert and oriented, in no acute distress  Lungs: clear to auscultation bilaterally  Heart:  regular rate and rhythm  Abdomen:  soft, +BS, abdominal incision wet to dry dressing c/d/i  Loop colostomy stoma good pink color  Skin: Skin color, texture, turgor normal  No rashes or lesions    Lab, Imaging and other studies:  I have personally reviewed pertinent lab results    , CBC:   Lab Results   Component Value Date    WBC 14 89 (H) 05/15/2022    HGB 7 8 (L) 05/15/2022    HCT 23 2 (L) 05/15/2022     (H) 05/15/2022     05/15/2022    MCH 35 9 (H) 05/15/2022    MCHC 33 6 05/15/2022    RDW 16 7 (H) 05/15/2022    MPV 10 7 05/15/2022   , CMP:   Lab Results   Component Value Date    SODIUM 140 05/15/2022    K 3 9 05/15/2022     (H) 05/15/2022    CO2 23 05/15/2022    BUN 18 05/15/2022    CREATININE 0 86 05/15/2022    CALCIUM 7 7 (L) 05/15/2022    AST 20 05/15/2022    ALT 15 05/15/2022    ALKPHOS 58 05/15/2022    EGFR 75 05/15/2022     VTE Pharmacologic Prophylaxis: Heparin  VTE Mechanical Prophylaxis: sequential compression device

## 2022-05-16 PROBLEM — E87.6 HYPOKALEMIA: Status: ACTIVE | Noted: 2022-01-01

## 2022-05-16 NOTE — PLAN OF CARE
Problem: MOBILITY - ADULT  Goal: Maintain or return to baseline ADL function  Description: INTERVENTIONS:  -  Assess patient's ability to carry out ADLs; assess patient's baseline for ADL function and identify physical deficits which impact ability to perform ADLs (bathing, care of mouth/teeth, toileting, grooming, dressing, etc )  - Assess/evaluate cause of self-care deficits   - Assess range of motion  - Assess patient's mobility; develop plan if impaired  - Assess patient's need for assistive devices and provide as appropriate  - Encourage maximum independence but intervene and supervise when necessary  - Involve family in performance of ADLs  - Assess for home care needs following discharge   - Consider OT consult to assist with ADL evaluation and planning for discharge  - Provide patient education as appropriate  Outcome: Progressing  Goal: Maintains/Returns to pre admission functional level  Description: INTERVENTIONS:  - Perform BMAT or MOVE assessment daily    - Set and communicate daily mobility goal to care team and patient/family/caregiver  - Collaborate with rehabilitation services on mobility goals if consulted  - Perform Range of Motion 3 times a day  - Reposition patient every 2 hours    - Dangle patient 4 times a day  - Stand patient 2 times a day  - Ambulate patient 2 times a day  - Out of bed to chair 2 times a day   - Out of bed for meals 2  Problem: Potential for Falls  Goal: Patient will remain free of falls  Description: INTERVENTIONS:  - Educate patient/family on patient safety including physical limitations  - Instruct patient to call for assistance with activity   - Consult OT/PT to assist with strengthening/mobility   - Keep Call bell within reach  - Keep bed low and locked with side rails adjusted as appropriate  - Keep care items and personal belongings within reach  - Initiate and maintain comfort rounds  - Make Fall Risk Sign visible to staff  - Offer Toileting every 2 Hours, in advance of need  - Initiate/Maintain bed alarm  - Obtain necessary fall risk management equipment: continued observation   Problem: PAIN - ADULT  Goal: Verbalizes/displays adequate comfort level or baseline comfort level  Description: Interventions:  - Encourage patient to monitor pain and request assistance  - Assess pain using appropriate pain scale  - Administer analgesics based on type and severity of pain and evaluate response  - Implement non-pharmacological measures as appropriate and evaluate response  - Consider cultural and social influences on pain and pain management  - Notify physician/advanced practitioner if interventions unsuccessful or patient reports new pain  Outcome: Progressing     Problem: GASTROINTESTINAL - ADULT  Goal: Minimal or absence of nausea and/or vomiting  Description: INTERVENTIONS:  - Administer IV fluids if ordered to ensure adequate hydration  - Maintain NPO status until nausea and vomiting are resolved  - Nasogastric tube if ordered  - Administer ordered antiemetic medications as needed  - Provide nonpharmacologic comfort measures as appropriate  - Advance diet as tolerated, if ordered  - Consider nutrition services referral to assist patient with adequate nutrition and appropriate food choices  Outcome: Progressing  Goal: Establish and maintain optimal ostomy function  Description: INTERVENTIONS:  - Assess bowel function  - Encourage oral fluids to ensure adequate hydration  - Administer IV fluids if ordered to ensure adequate hydration   - Administer ordered medications as needed  - Encourage mobilization and activity  - Nutrition services referral to assist patient with appropriate food choices  - Assess stoma site  - Consider wound care consult   Outcome: Progressing     Problem: SKIN/TISSUE INTEGRITY - ADULT  Goal: Incision(s), wounds(s) or drain site(s) healing without S/S of infection  Description: INTERVENTIONS  - Assess and document dressing, incision, wound bed, drain sites and surrounding tissue  - Provide patient and family education  - Perform skin care/dressing changes every BID  Outcome: Progressing     Problem: Prexisting or High Potential for Compromised Skin Integrity  Goal: Skin integrity is maintained or improved  Description: INTERVENTIONS:  - Identify patients at risk for skin breakdown  - Assess and monitor skin integrity  - Assess and monitor nutrition and hydration status  - Monitor labs   - Assess for incontinence   - Turn and reposition patient  - Assist with mobility/ambulation  - Relieve pressure over bony prominences  - Avoid friction and shearing  - Provide appropriate hygiene as needed including keeping skin clean and dry  - Evaluate need for skin moisturizer/barrier cream  - Collaborate with interdisciplinary team   - Patient/family teaching  - Consider wound care consult   Outcome: Progressing     Problem: Nutrition/Hydration-ADULT  Goal: Nutrient/Hydration intake appropriate for improving, restoring or maintaining nutritional needs  Description: Monitor and assess patient's nutrition/hydration status for malnutrition  Collaborate with interdisciplinary team and initiate plan and interventions as ordered  Monitor patient's weight and dietary intake as ordered or per policy  Utilize nutrition screening tool and intervene as necessary  Determine patient's food preferences and provide high-protein, high-caloric foods as appropriate       INTERVENTIONS:  - Monitor oral intake, urinary output, labs, and treatment plans  - Assess nutrition and hydration status and recommend course of action  - Evaluate amount of meals eaten  - Assist patient with eating if necessary   - Allow adequate time for meals  - Recommend/ encourage appropriate diets, oral nutritional supplements, and vitamin/mineral supplements  - Order, calculate, and assess calorie counts as needed  - Recommend, monitor, and adjust tube feedings and TPN/PPN based on assessed needs  - Assess need for intravenous fluids  - Provide specific nutrition/hydration education as appropriate  - Include patient/family/caregiver in decisions related to nutrition  Outcome: Progressing     - Apply yellow socks and bracelet for high fall risk patients  - Consider moving patient to room near nurses station  Outcome: Progressing    times a day  - Out of bed for toileting  - Record patient progress and toleration of activity level   Outcome: Progressing

## 2022-05-16 NOTE — PROGRESS NOTES
Progress Note - General Surgery   Fady Hudson 80 y o  male MRN: [id number]  Unit/Bed#: [address] [place] [address] Encounter: [id number]    Assessment:  POD#5 s/p exploratory laparotomy, low anterior resection, protective loop transverse colostomy and segmental small bowel resection secondary to perforation rectosigmoid junction after colonoscopy  Hypoxia: requiring 4L simple mask stating at 92%  CTA chest: b/l upper lobe ground glass opacities and/or pulmonary edema, small bilateral pleural effusions, negative for PE  Troponin elevated yesterday  Tmax: 100 7F Tachy 106 bpm  WBC:15 64 (14 89)   Appropriate urinary output  Ostomy:50 cc  Troponin: 79 >78>75    Plan:   Continue Farn[place] catheter  Continue wet to dry midline incision  Remove loop colostomy bridge POD#7  PRN analgesics  IV antibiotics Cefepime/Flagyl  Potassium and Phosphorus repletion  Continue telemetry  Incentive spirometry  PT/OT  2D echocardiogram today  Ween oxygen as tolerated  Consult nutrition for TPN  Consult IR for Picc line  Patient Is POD#5 and will need TPN  Internal medicine input appreciated  Diuresis per internal medicine  Subjective/Objective     Subjective: Patient was seen and examined bedside  Patient reports breathing is similar to yesterday  Denies abdominal pain  Patient reports he feels as if he will have a bowel movement today  Denies any nausea/vomiting, chest        Objective:     Blood pressure 131/72, pulse 103, temperature (!) 96 8 °F (36 °C), temperature source Axillary, resp  rate 19, height 5' 4" (1 626 m), weight 63 2 kg (139 lb 5 3 oz), SpO2 (!) 89 %  ,Body mass index is 23 92 kg/m²        Intake/Output Summary (Last 24 hours) at 5/16/2022 0822  Last data filed at 5/16/2022 0509  Gross per 24 hour   Intake 250 ml   Output 3185 ml   Net -2935 ml       Invasive Devices  Report    Peripheral Intravenous Line  Duration           Peripheral IV 05/15/22 Distal;Right;Upper;Ventral (anterior) Antecubital <1 day    Peripheral IV 05/15/22 Left;Ventral (anterior) Wrist <1 day          Drain  Duration           Colostomy LLQ 5 days    Urethral Catheter Non-latex 16 Fr  <1 day                Physical Exam: /72 (BP Location: Right arm)   Pulse 103   Temp (!) 96 8 °F (36 °C) (Axillary)   Resp 19   Ht 5' 4" (1 626 m)   Wt 63 2 kg (139 lb 5 3 oz)   SpO2 (!) 89%   BMI 23 92 kg/m²   General appearance: alert and oriented, in no acute distress  Lungs: clear to auscultation bilaterally  Heart:  regular rate and rhythm  Abdomen:  soft, +BS, abdominal incision wet to dry dressing c/d/i  Loop colostomy stoma good pink color  Skin: Skin color, texture, turgor normal  No rashes or lesions    Lab, Imaging and other studies:  I have personally reviewed pertinent lab results    , CBC:   Lab Results   Component Value Date    WBC 15 64 (H) 05/16/2022    HGB 8 2 (L) 05/16/2022    HCT 23 7 (L) 05/16/2022     (H) 05/16/2022     05/16/2022    MCH 35 7 (H) 05/16/2022    MCHC 34 6 05/16/2022    RDW 16 1 (H) 05/16/2022    MPV 10 4 05/16/2022   , CMP:   Lab Results   Component Value Date    SODIUM 139 05/16/2022    K 3 3 (L) 05/16/2022     05/16/2022    CO2 27 05/16/2022    BUN 20 05/16/2022    CREATININE 0 93 05/16/2022    CALCIUM 7 6 (L) 05/16/2022    AST 16 05/16/2022    ALT 14 05/16/2022    ALKPHOS 53 05/16/2022    EGFR 71 05/16/2022     VTE Pharmacologic Prophylaxis: Heparin  VTE Mechanical Prophylaxis: sequential compression device

## 2022-05-16 NOTE — WOUND OSTOMY CARE
The patient was seen today and as per Dio Menon, the patient became extremely confused over the weekend and was pulling his lines and ostomy appliance  She stated that his ostomy appliance was changed at least twice because of this since his exploratory laparotomy LAR with transverse loop colostomy on 5/11/22   The stoma is about 1 cm above the skin and measures 3"  It is unclear whether the patient will return home directly or if he will be discharged to a subacute facility as per his son  This wound/ostomy RN will order ostomy supplies for the patient and as per his son, they will be sent to the patient's home address

## 2022-05-16 NOTE — PHYSICAL THERAPY NOTE
PT cancelation   05/16/22 1140   PT Last Visit   PT Visit Date 05/16/22   Note Type   Note Type Cancelled Session   Cancel Reasons Other  (Pt requesting pain meds prior to PT; RN notified ; will follow)   Licensure   [place] License Number  Samson Guzmán PT  [id number]

## 2022-05-16 NOTE — PLAN OF CARE
Problem: MOBILITY - ADULT  Goal: Maintain or return to baseline ADL function  Description: INTERVENTIONS:  -  Assess patient's ability to carry out ADLs; assess patient's baseline for ADL function and identify physical deficits which impact ability to perform ADLs (bathing, care of mouth/teeth, toileting, grooming, dressing, etc )  - Assess/evaluate cause of self-care deficits   - Assess range of motion  - Assess patient's mobility; develop plan if impaired  - Assess patient's need for assistive devices and provide as appropriate  - Encourage maximum independence but intervene and supervise when necessary  - Involve family in performance of ADLs  - Assess for home care needs following discharge   - Consider OT consult to assist with ADL evaluation and planning for discharge  - Provide patient education as appropriate  Outcome: Progressing  Goal: Maintains/Returns to pre admission functional level  Description: INTERVENTIONS:  - Perform BMAT or MOVE assessment daily    - Set and communicate daily mobility goal to care team and patient/family/caregiver  - Collaborate with rehabilitation services on mobility goals if consulted  - Perform Range of Motion x times a day  - Reposition patient every x hours    - Dangle patient x times a day  - Stand patient x times a day  - Ambulate patient x times a day  - Out of bed to chair x times a day   - Out of bed for meals x times a day  - Out of bed for toileting  - Record patient progress and toleration of activity level   Outcome: Progressing     Problem: Potential for Falls  Goal: Patient will remain free of falls  Description: INTERVENTIONS:  - Educate patient/family on patient safety including physical limitations  - Instruct patient to call for assistance with activity   - Consult OT/PT to assist with strengthening/mobility   - Keep Call bell within reach  - Keep bed low and locked with side rails adjusted as appropriate  - Keep care items and personal belongings within reach  - Initiate and maintain comfort rounds  - Make Fall Risk Sign visible to staff  - Offer Toileting every x Hours, in advance of need  - Initiate/Maintain x alarm  - Obtain necessary fall risk management equipment: x  - Apply yellow socks and bracelet for high fall risk patients  - Consider moving patient to room near nurses station  Outcome: Progressing     Problem: PAIN - ADULT  Goal: Verbalizes/displays adequate comfort level or baseline comfort level  Description: Interventions:  - Encourage patient to monitor pain and request assistance  - Assess pain using appropriate pain scale  - Administer analgesics based on type and severity of pain and evaluate response  - Implement non-pharmacological measures as appropriate and evaluate response  - Consider cultural and social influences on pain and pain management  - Notify physician/advanced practitioner if interventions unsuccessful or patient reports new pain  Outcome: Progressing     Problem: GASTROINTESTINAL - ADULT  Goal: Minimal or absence of nausea and/or vomiting  Description: INTERVENTIONS:  - Administer IV fluids if ordered to ensure adequate hydration  - Maintain NPO status until nausea and vomiting are resolved  - Nasogastric tube if ordered  - Administer ordered antiemetic medications as needed  - Provide nonpharmacologic comfort measures as appropriate  - Advance diet as tolerated, if ordered  - Consider nutrition services referral to assist patient with adequate nutrition and appropriate food choices  Outcome: Progressing  Goal: Establish and maintain optimal ostomy function  Description: INTERVENTIONS:  - Assess bowel function  - Encourage oral fluids to ensure adequate hydration  - Administer IV fluids if ordered to ensure adequate hydration   - Administer ordered medications as needed  - Encourage mobilization and activity  - Nutrition services referral to assist patient with appropriate food choices  - Assess stoma site  - Consider wound care consult   Outcome: Progressing     Problem: SKIN/TISSUE INTEGRITY - ADULT  Goal: Incision(s), wounds(s) or drain site(s) healing without S/S of infection  Description: INTERVENTIONS  - Assess and document dressing, incision, wound bed, drain sites and surrounding tissue  - Provide patient and family education  - Perform skin care/dressing changes every x  Outcome: Progressing     Problem: Prexisting or High Potential for Compromised Skin Integrity  Goal: Skin integrity is maintained or improved  Description: INTERVENTIONS:  - Identify patients at risk for skin breakdown  - Assess and monitor skin integrity  - Assess and monitor nutrition and hydration status  - Monitor labs   - Assess for incontinence   - Turn and reposition patient  - Assist with mobility/ambulation  - Relieve pressure over bony prominences  - Avoid friction and shearing  - Provide appropriate hygiene as needed including keeping skin clean and dry  - Evaluate need for skin moisturizer/barrier cream  - Collaborate with interdisciplinary team   - Patient/family teaching  - Consider wound care consult   Outcome: Progressing     Problem: Nutrition/Hydration-ADULT  Goal: Nutrient/Hydration intake appropriate for improving, restoring or maintaining nutritional needs  Description: Monitor and assess patient's nutrition/hydration status for malnutrition  Collaborate with interdisciplinary team and initiate plan and interventions as ordered  Monitor patient's weight and dietary intake as ordered or per policy  Utilize nutrition screening tool and intervene as necessary  Determine patient's food preferences and provide high-protein, high-caloric foods as appropriate       INTERVENTIONS:  - Monitor oral intake, urinary output, labs, and treatment plans  - Assess nutrition and hydration status and recommend course of action  - Evaluate amount of meals eaten  - Assist patient with eating if necessary   - Allow adequate time for meals  - Recommend/ encourage appropriate diets, oral nutritional supplements, and vitamin/mineral supplements  - Order, calculate, and assess calorie counts as needed  - Recommend, monitor, and adjust tube feedings and TPN/PPN based on assessed needs  - Assess need for intravenous fluids  - Provide specific nutrition/hydration education as appropriate  - Include patient/family/caregiver in decisions related to nutrition  Outcome: Progressing

## 2022-05-16 NOTE — ASSESSMENT & PLAN NOTE
Patient noted to be hypoxic this morning with increased shortness of breath  CTA of the chest was performed which showed no evidence of pulmonary embolism but did show bilateral upper lobe ground-glass opacities compatible with pneumonia and/or pulmonary edema in addition to small bilateral pleural effusions  Patient received 20 mg of IV Lasix this morning  Patient still with evidence of crackles in the bilateral lower lung field  Will give another 40 mg of IV Lasix therapy at this time  Will also order as needed nebulizer therapy  Continue with supplemental oxygen therapy and wean as tolerated  Will obtain a 2D echocardiogram for further evaluation  Monitor intake/output and daily weights  Will also trend troponins and monitor on telemetry  Procalcitonin level and repeat BMP ordered for the morning  5/16: 2D echocardiogram is still pending  Continue with supplemental oxygen therapy as needed and wean as tolerated  Continue to monitor intake/output and daily weights  Elevated troponin likely secondary to demand ischemia  Procalcitonin level 2 60  Continue with IV Cefepime and Flagyl therapy  NT-BNP 22380 this morning  Will give another dose of Lasix 40mg IV  Will continue to closely monitor

## 2022-05-16 NOTE — CASE MANAGEMENT
Case Management Progress Note    Patient name Jetty Libman  Location [facility] [id number] MRN [id number]  : 2/15/1931 Date 2022       LOS (days): 5  Geometric Mean LOS (GMLOS) (days): 10 30  Days to GMLOS:5 4        OBJECTIVE:        Current admission status: Inpatient  Preferred Pharmacy:   [id number], [address] [address]  Phone: [phone] Fax: [phone]    [id number] [place], [address]  [place] [address] [facility] [address]  Phone: [phone] Fax: [phone]    Primary Care Provider: Nicole Laughlin MD    Primary Insurance: MEDICARE  Secondary Insurance: [employer]    PROGRESS NOTE:      PICC being placed for TPN  Pt is still NPO, surgery team still making recommendations

## 2022-05-16 NOTE — ASSESSMENT & PLAN NOTE
Patient is currently postop day 5 for exploratory laparotomy, low anterior resection, protective loop transverse colostomy and segmental small bowel resection secondary to perforation the rectosigmoid junction after colonoscopy  Patient to be started on TPN  Management per surgery

## 2022-05-16 NOTE — PROGRESS NOTES
Tami 45  Progress Note - Shirley Hudson 2/15/1931, 80 y o  male MRN: [id number]  Unit/Bed#: [address] [place] [address] Encounter: [id number]  Primary Care Provider: Tomas Cervantes MD   Date and time admitted to hospital: 5/11/2022  4:48 PM    Hypoxia  Assessment & Plan  Patient noted to be hypoxic this morning with increased shortness of breath  CTA of the chest was performed which showed no evidence of pulmonary embolism but did show bilateral upper lobe ground-glass opacities compatible with pneumonia and/or pulmonary edema in addition to small bilateral pleural effusions  Patient received 20 mg of IV Lasix this morning  Patient still with evidence of crackles in the bilateral lower lung field  Will give another 40 mg of IV Lasix therapy at this time  Will also order as needed nebulizer therapy  Continue with supplemental oxygen therapy and wean as tolerated  Will obtain a 2D echocardiogram for further evaluation  Monitor intake/output and daily weights  Will also trend troponins and monitor on telemetry  Procalcitonin level and repeat BMP ordered for the morning  5/16: 2D echocardiogram is still pending  Continue with supplemental oxygen therapy as needed and wean as tolerated  Continue to monitor intake/output and daily weights  Elevated troponin likely secondary to demand ischemia  Procalcitonin level 2 60  Continue with IV Cefepime and Flagyl therapy  NT-BNP 07461 this morning  Will give another dose of Lasix 40mg IV  Will continue to closely monitor  Hypokalemia  Assessment & Plan  Potassium will be repleted  Encephalopathy  Assessment & Plan  Acute encephalopathy likely multifactorial     * Bowel perforation [facility])  Assessment & Plan  Patient is currently postop day 5 for exploratory laparotomy, low anterior resection, protective loop transverse colostomy and segmental small bowel resection secondary to perforation the rectosigmoid junction after colonoscopy    Patient to be started on TPN  Management per surgery  VTE Pharmacologic Prophylaxis: VTE Score: 7 heparin prophylaxis  Code Status: Level 1 - Full Code    Subjective:   Patient seen and examined at bedside  No acute events overnight  Patient still on supplemental oxygen via mask saturating 92% on 4 L  Objective:     Vitals:   Temp (24hrs), Av 8 °F (37 1 °C), Min:96 8 °F (36 °C), Max:100 7 °F (38 2 °C)    Temp:  [96 8 °F (36 °C)-100 7 °F (38 2 °C)] 98 9 °F (37 2 °C)  HR:  [100-106] 106  Resp:  [18-20] 19  BP: (120-150)/(57-81) 120/57  SpO2:  [89 %-95 %] 94 %  Body mass index is 23 86 kg/m²  Input and Output Summary (last 24 hours): Intake/Output Summary (Last 24 hours) at 2022 1443  Last data filed at 2022 1117  Gross per 24 hour   Intake 300 ml   Output 2305 ml   Net -2005 ml       Physical Exam:   Physical Exam  HENT:      Head: Normocephalic  Mouth/Throat:      Mouth: Mucous membranes are moist    Eyes:      Extraocular Movements: Extraocular movements intact  Cardiovascular:      Rate and Rhythm: Tachycardia present  Pulmonary:      Effort: Pulmonary effort is normal       Comments: Crackles in the bases  Abdominal:      Palpations: Abdomen is soft  Tenderness: There is no abdominal tenderness  Skin:     General: Skin is warm  Neurological:      Mental Status: He is alert  Psychiatric:         Mood and Affect: Mood normal          Behavior: Behavior normal         Additional Data:     Labs:  Results from last 7 days   Lab Units 22  0507 22  0455 22  0745   WBC Thousand/uL 15 64*   < > 17 60*   HEMOGLOBIN g/dL 8 2*   < > 8 9*   HEMATOCRIT % 23 7*   < > 25 8*   PLATELETS Thousands/uL 219   < > 190   BANDS PCT %  --   --  50*   LYMPHO PCT %  --   --  4*   MONO PCT %  --   --  3*   EOS PCT %  --   --  0    < > = values in this interval not displayed       Results from last 7 days   Lab Units 22  0507   SODIUM mmol/L 139   POTASSIUM mmol/L 3 3* CHLORIDE mmol/L 107   CO2 mmol/L 27   BUN mg/dL 20   CREATININE mg/dL 0 93   ANION GAP mmol/L 5   CALCIUM mg/dL 7 6*   ALBUMIN g/dL 2 5*   TOTAL BILIRUBIN mg/dL 0 75   ALK PHOS U/L 53   ALT U/L 14   AST U/L 16   GLUCOSE RANDOM mg/dL 151*         Results from last 7 days   Lab Units 05/12/22  1611 05/12/22  0745 05/11/22  2217 05/11/22  1520   POC GLUCOSE mg/dl 128 96 98 118         Results from last 7 days   Lab Units 05/16/22  0507   PROCALCITONIN ng/ml 2 60*       Lines/Drains:  Invasive Devices  Report    Peripheral Intravenous Line  Duration           Peripheral IV 05/15/22 Distal;Right;Upper;Ventral (anterior) Antecubital 1 day    Peripheral IV 05/15/22 Left;Ventral (anterior) Wrist 1 day    Peripheral IV 05/16/22 Left Antecubital <1 day          Drain  Duration           Colostomy LLQ 5 days    Urethral Catheter Non-latex 16 Fr  1 day                       Telemetry:  Telemetry Orders (From admission, onward)             48 Hour Telemetry Monitoring  Continuous x 48 hours        References:    Telemetry Guidelines   Question:  Reason for 48 Hour Telemetry  Answer:  Acute MI, chest pain - R/O MI, or unstable angina                        Imaging:  Reviewed      Recent Cultures (last 7 days):   Results from last 7 days   Lab Units 05/11/22  1838   GRAM STAIN RESULT  No Polys or Bacteria seen   WOUND CULTURE  1+ Growth of Pseudomonas aeruginosa*       Last 24 Hours Medication List:   Current Facility-Administered Medications   Medication Dose Route Frequency Provider Last Rate    acetaminophen  650 mg Oral Q4H PRN CARLTON Torres-MARIA LUISA      cefepime  2,000 mg Intravenous Q12H Sharlene Radha, PA-C Stopped (05/16/22 1117)    dextrose 5 % and sodium chloride 0 45 % with KCl 20 mEq/L  50 mL/hr Intravenous Continuous Sharlene Pore, PA-C 50 mL/hr (05/16/22 0912)    diphenhydrAMINE  25 mg Intravenous Q6H PRN Tavares Domingo PA-MARIA LUISA      furosemide  40 mg Intravenous Once Malcolm Jones MD      heparin (porcine)  5,000 Units Subcutaneous Q12H [address] Anthony Moreno MD      HYDROmorphone  0 2 mg Intravenous Q3H PRN Deidra Pedraza PA-C      ipratropium-albuterol  3 mL Nebulization Q6H PRN Fern Telles MD      ipratropium-albuterol  3 mL Nebulization Q4H Christiano Luong PA-C      levothyroxine  56 mcg Oral Early Morning Ruma Quigley      metroNIDAZOLE  500 mg Intravenous 74060 Double R ZULAY Garcia Stopped (05/16/22 6020)    morphine injection  2 mg Intravenous Q2H PRN Anthony Moreno MD      naloxone  0 04 mg Intravenous Q1MIN PRN Deidra Pedraza PA-C      ondansetron  4 mg Intravenous Q6H PRN Deidra Pedraza PA-C          Today, Patient Was Seen By: Fern Telles MD    **Please Note: This note may have been constructed using a voice recognition system  **

## 2022-05-17 PROBLEM — G92.8 TOXIC METABOLIC ENCEPHALOPATHY: Status: ACTIVE | Noted: 2022-01-01

## 2022-05-17 PROBLEM — A41.9 SEVERE SEPSIS (HCC): Status: ACTIVE | Noted: 2022-01-01

## 2022-05-17 PROBLEM — J69.0 ASPIRATION PNEUMONIA (HCC): Status: ACTIVE | Noted: 2022-01-01

## 2022-05-17 PROBLEM — R65.20 SEVERE SEPSIS (HCC): Status: ACTIVE | Noted: 2022-01-01

## 2022-05-17 PROBLEM — I06.0 RHEUMATIC AORTIC STENOSIS: Chronic | Status: ACTIVE | Noted: 2022-01-01

## 2022-05-17 PROBLEM — K56.7 ILEUS (HCC): Status: ACTIVE | Noted: 2022-01-01

## 2022-05-17 PROBLEM — E87.6 HYPOKALEMIA: Status: RESOLVED | Noted: 2022-01-01 | Resolved: 2022-01-01

## 2022-05-17 PROBLEM — I06.0 RHEUMATIC AORTIC STENOSIS: Chronic | Status: RESOLVED | Noted: 2022-01-01 | Resolved: 2022-01-01

## 2022-05-17 PROBLEM — R73.9 HYPERGLYCEMIA: Status: ACTIVE | Noted: 2022-01-01

## 2022-05-17 PROBLEM — I50.9 CHF (CONGESTIVE HEART FAILURE) (HCC): Status: ACTIVE | Noted: 2022-01-01

## 2022-05-17 PROBLEM — J96.01 ACUTE RESPIRATORY FAILURE WITH HYPOXIA (HCC): Status: ACTIVE | Noted: 2022-01-01

## 2022-05-17 NOTE — QUICK NOTE
Pt has had to be redirected a few times so far for tugging at IV tubes and starting to pull at colosopy bag on belly and pulling at the blankets and pillows  Pt has been very restless and uncomfortable   Pt was repositioned

## 2022-05-17 NOTE — ASSESSMENT & PLAN NOTE
Wt Readings from Last 3 Encounters:   05/16/22 63 kg (139 lb)   05/11/22 62 1 kg (136 lb 14 4 oz)   03/25/22 61 2 kg (135 lb)     · 5/16: Echo-EF 65%, mild TR, mild MR  · Vascular congestion evident on CXR  · Received 40mg IV Lasix, consider re-dosing today   · Monitor I&O  · Daily weights

## 2022-05-17 NOTE — CONSULTS
[facility] 2/15/1931, 80 y o  male MRN: [id number]  Unit/Bed#: ICU 04 Encounter: [id number]  Primary Care Provider: Washington Victoria MD   Date and time admitted to hospital: 5/11/2022  4:48 PM    Consults    * Bowel perforation [facility])  Assessment & Plan  · outpatient EGD and colonoscopy at [facility] on  5/11 for w/u of chronic anemia, history of colon polyps, intermittent LLQ abdominal pain and possible intermittent intussusception  · EGD was unremarklable, however colonoscopy was technically difficult and required changed from adult scope to pediatric scope, but during traversal of the sigmoid colon there was a kink and patient sustained a perforation  · Procedure was aborted and patient was transferred to [facility] where immediate surgical consultation was obtained  Patient was reported to have obvious signs of peritonitis on exam and CT ab/pelvis demonstrated pneumoperitoneum     · Urgently to the OR with Dr Alessandra Rutledge for ex-lap, low anterior resection, protection loop transverse colostomy, flex sigmoidoscopy, and segmental small bowel resection  · POD #6  · Now with post-op ileus, plan as below   · Appreciate surgery team input     Acute respiratory failure with hypoxia ([address] )  Assessment & Plan  · Increasing O2 requirements the last 24 hours, was on 4L then required midflow today increased to 15L  · Titrate for O2 sat 90% or greater  · CXR with b/l pulmonary vascular congestion and worsening b/l upper lobe infiltrates R > L,  · Concern for aspiration pneumonia as patient has developed post-op ileus with abdominal distention and has been encephalopathic, abx plan as below  · Concern for pulmonary edema, 40mg IV Lasix ordered   · Atrovent/xopenex/mucomyst nebs q8h  · PRN NT suctioning, patient with weak moist cough  · Encourage coughing and deep breathing, IS q1h while awake     Ileus ([address] )  Assessment & Plan  · developed post-op ileus and has abdominal distention, and nausea/vomiting today  NGT placement was attempted and initially placed but with termination on imaging in the distal esophagus, therefore advancement was attempted, however when it was advanced it coiled and the tip came out patient's mouth  This was attempted several times and the same complication arose   During procedure patient became hypoxic on 15L facemask with several episodes of desaturations in the 70s, therefore procedure aborted  · Surgery to re attempt NGT once patient's oxygenation stable   · May need PICC line insertion and TPN initiation if unable to tolerate tube feedings after gastric decompression     Severe sepsis [facility])  Assessment & Plan  · As evidenced by tachycardia, tachypnea, and bandemia  · Source aspiration pneumonia vs intra-abdominal infection  · procal 2 60, continue to trend   · Trend temps and WBC   · Cefepime day #4, flagyl day #7, vanco day #1  · Blood cultures not obtained and abx therapy has already been in process  · Wound culture from OR 5/11 pseudomonas sensitive to cefepime   · Check lactic   · CT chest/ab/pelvis to be done when patient stable from respiratory standpoint     Aspiration pneumonia ([facility] )  Assessment & Plan  · CXR with b/l pulmonary vascular congestion and worsening b/l upper lobe infiltrates R > L,  · Concern for aspiration pneumonia as patient has developed post-op ileus with abdominal distention and has been encephalopathic  · Cefepime day #4, flagyl day #7, vanco day #1  · Strep pneumo and legionella negative  · MRSA nasal surveillance pending  · Sputum culture pending     CHF (congestive heart failure) ([facility] )  Assessment & Plan  Wt Readings from Last 3 Encounters:   05/16/22 63 kg (139 lb)   05/11/22 62 1 kg (136 lb 14 4 oz)   03/25/22 61 2 kg (135 lb)     · 5/16: Echo-EF 65%, mild TR, mild MR  · Vascular congestion evident on CXR  · 40mg IV Lasix now   · Monitor I&O  · Daily weights         Hyperglycemia  Assessment & Plan  · No hx of diabetes  · Stop dextrose containing fluids   · Check hgb A1c     Toxic metabolic encephalopathy  Assessment & Plan  · Likely in setting of acute illness  · No focal deficits  · 1:1 monitoring as patient was attempting to self d/c lines/tubes   · Neuro checks q4h  · Delirium precautions; regulate sleep/wake cycle, environmental controls, daily CAM ICU     -------------------------------------------------------------------------------------------------------------  Chief Complaint: abdominal pain    History of Present Illness   HX and PE limited by: encephalopathy   Emi Cook is a 80year old male with PMH of HTN, mild AS, kyphoplasty, scleroderma, CAD, spinal stenosis, chronic back pain, giant cell arteritis, diverticulosis, former smoker (quit 20 years ago), hiatal hernia, raynauds, tortuous aorta, b/l renal cysts who was having outpatient EGD and colonoscopy at [facility] on  5/11 for w/u of chronic anemia, history of colon polyps, intermittent LLQ abdominal pain and possible intermittent intussusception  EGD was unremarklable, however colonoscopy was technically difficult and required changed from adult scope to pediatric scope, but during traversal of the sigmoid colon there was a kink and patient sustained a perforation  Procedure was aborted and patient was transferred to [facility] where immediate surgical consultation was obtained  Patient was reported to have obvious signs of peritonitis on exam and CT ab/pelvis demonstrated pneumoperitoneum  He went urgently to the OR with Dr Mireille Cleveland for ex-lap, low anterior resection, protection loop transverse colostomy, flex sigmoidoscopy, and segmental small bowel resection  He was admitted to the general medicine floor post-op  He is now POD #6 and critical care has been asked to evaluate patient d/t increasing O2 requirements, increased WOB, and difficulty managing secretions  He also has developed post-op ileus and has abdominal distention, and nausea/vomiting today   NGT placement was attempted and initially placed but with termination on imaging in the distal esophagus, therefore advancement was attempted, however when it was advanced it coiled and the tip came out patient's mouth  This was attempted several times and the same complication arose  During procedure patient became hypoxic on 15L facemask with several episodes of desaturations in the 70s, therefore procedure was aborted  Patient transferred to ICU on 15L midlfow with plans for possible high flow if needed  [place] at bedside and updated on plan of care  History obtained from chart review and son   -------------------------------------------------------------------------------------------------------------  Dispo: Transfer to Stepdown Level 1     Code Status: Level 1 - Full Code  --------------------------------------------------------------------------------------------------------------  Review of Systems   Gastrointestinal: Positive for abdominal distention and abdominal pain  All other systems reviewed and are negative  A 12-point, complete review of systems was reviewed and negative except as stated above     Physical Exam  Constitutional:       General: He is awake  Appearance: He is ill-appearing  HENT:      Head: Normocephalic and atraumatic  Eyes:      General: Lids are normal       Extraocular Movements: Extraocular movements intact  Conjunctiva/sclera: Conjunctivae normal    Neck:      Trachea: Trachea normal    Cardiovascular:      Rate and Rhythm: Normal rate and regular rhythm  Pulses: Normal pulses  Radial pulses are 2+ on the right side and 2+ on the left side  Dorsalis pedis pulses are 2+ on the right side and 2+ on the left side  Heart sounds: Normal heart sounds, S1 normal and S2 normal    Pulmonary:      Effort: Tachypnea and accessory muscle usage present  Breath sounds: Transmitted upper airway sounds present   Examination of the left-upper field reveals wheezing  Decreased breath sounds, wheezing and rhonchi present  Abdominal:      General: Bowel sounds are decreased  There is distension  Tenderness: There is generalized abdominal tenderness  Musculoskeletal:      Cervical back: Full passive range of motion without pain, normal range of motion and neck supple  Comments: Normal ROM, normal strength   Skin:     General: Skin is warm and dry  Capillary Refill: Capillary refill takes less than 2 seconds  Neurological:      General: No focal deficit present  Mental Status: He is alert  He is disoriented  GCS: GCS eye subscore is 4  GCS verbal subscore is 5  GCS motor subscore is 6  Psychiatric:         Attention and Perception: Attention normal          Mood and Affect: Mood and affect normal          Speech: Speech normal          Behavior: Behavior normal  Behavior is cooperative  Thought Content: Thought content normal          Cognition and Memory: He exhibits impaired recent memory  Judgment: Judgment is impulsive and inappropriate        --------------------------------------------------------------------------------------------------------------  Vitals:   Vitals:    05/17/22 0724 05/17/22 1509 05/17/22 1549 05/17/22 1741   BP:       BP Location:       Pulse:   64    Resp:   18    Temp:   (!) 96 8 °F (36 °C)    TempSrc:   Axillary    SpO2: 96% 99% 96% 97%   Weight:       Height:         Temp  Min: 96 8 °F (36 °C)  Max: 101 2 °F (38 4 °C)  IBW (Ideal Body Weight): 59 2 kg  Height: 5' 4" (162 6 cm)  Body mass index is 23 86 kg/m²      Laboratory and Diagnostics:  Results from last 7 days   Lab Units 05/17/22  0630 05/16/22  0507 05/15/22  0506 05/14/22  0455 05/13/22  0745 05/12/22  0507 05/11/22  1555   WBC Thousand/uL 15 37* 15 64* 14 89* 14 84* 17 60* 9 32 5 31   HEMOGLOBIN g/dL 8 2* 8 2* 7 8* 7 7* 8 9* 8 9* 10 0*   HEMATOCRIT % 24 2* 23 7* 23 2* 22 5* 25 8* 25 2* 27 7*   PLATELETS Thousands/uL 254 219 209 190 190 180 203   BANDS PCT % 10*  --   --   --  50* 79*  --    MONO PCT % 3*  --   --   --  3* 1*  --      Results from last 7 days   Lab Units 05/17/22  0630 05/16/22  0507 05/15/22  0506 05/14/22  0455 05/13/22  0745 05/12/22  0507 05/11/22  1851   SODIUM mmol/L 142 139 140 136 134* 129* 128*   POTASSIUM mmol/L 3 8 3 3* 3 9 3 4* 3 6 3 5 3 5   CHLORIDE mmol/L 111* 107 109* 106 102 97* 94*   CO2 mmol/L 25 27 23 23 22 24 26   ANION GAP mmol/L 6 5 8 7 10 8 8   BUN mg/dL 28* 20 18 16 19 15 12   CREATININE mg/dL 0 93 0 93 0 86 0 86 1 04 0 88 0 78   CALCIUM mg/dL 7 4* 7 6* 7 7* 7 4* 7 4* 7 0* 7 8*   GLUCOSE RANDOM mg/dL 171* 151* 138 141* 125 107 109   ALT U/L 11* 14 15  --   --  15  --    AST U/L 12 16 20  --   --  15  --    ALK PHOS U/L 47 53 58  --   --  38*  --    ALBUMIN g/dL 2 2* 2 5* 2 5*  --   --  2 7*  --    TOTAL BILIRUBIN mg/dL 0 68 0 75 0 63  --   --  1 55*  --      Results from last 7 days   Lab Units 05/17/22  0630 05/16/22  0507 05/15/22  0506 05/14/22  0455 05/13/22  0745 05/12/22  0507   MAGNESIUM mg/dL 1 9 2 0 2 2 2 3 2 2 1 3*   PHOSPHORUS mg/dL 1 6* 1 6* 1 6* 1 8* 2 4 3 0                   ABG:  Results from last 7 days   Lab Units 05/15/22  0852   PH ART  7 373   PCO2 ART mm Hg 32 4*   PO2 ART mm Hg 73 7*   HCO3 ART mmol/L 18 4*   BASE EXC ART mmol/L -6 1   ABG SOURCE  Radial, Right     VBG:  Results from last 7 days   Lab Units 05/15/22  0852   ABG SOURCE  Radial, Right     Results from last 7 days   Lab Units 05/16/22  0507   PROCALCITONIN ng/ml 2 60*       Micro:  Results from last 7 days   Lab Units 05/17/22  0937 05/17/22  0923 05/11/22  1838   SPUTUM CULTURE  Test not performed  Suggest repeat specimen  --   --    GRAM STAIN RESULT  >10 squamous epithelial cells/lpf, indicating orophayngeal contamination  *  1+ Polys*  2+ Budding Yeast with Pseudomycelia*  Rare Gram positive rods*  --  No Polys or Bacteria seen   WOUND CULTURE   --   --  1+ Growth of Pseudomonas aeruginosa*   LEGIONELLA URINARY ANTIGEN   --  Negative  --    STREP PNEUMONIAE ANTIGEN, URINE   --  Negative  --          Historical Information   Past Medical History:   Diagnosis Date    Aortic valve calcification     Aortic valve stenosis     AR (aortic regurgitation)     Atelectasis     LT BASILAR PASSIVE SEGMENTAL    Autoimmune disease (HCC)     Bilateral pleural effusion     Bilateral senile cataracts     CAD (coronary artery disease)     CAD (coronary artery disease)     Central spinal stenosis     Changes in vascular appearance of retina     Chronic back pain     Colon polyp     Compression fracture of L1 lumbar vertebra (HCC) 06/02/2010    Compression fracture of T10 vertebra (HCC)     Compression fracture of T12 vertebra (HCC)     Compression fracture of T9 vertebra (HCC)     Dextroscoliosis of lumbar spine     Diverticulosis     Drusen     Dry eye syndrome     Former smoker     Gastritis     GERD (gastroesophageal reflux disease)     Giant cell arteritis (HCC)     Hiatal hernia     History of shingles     HTN (hypertension)     Internal carotid artery stenosis, left     Internal carotid artery stenosis, right     Left lower lobe pneumonia     Lesion of upper eyelid     Lordosis of lumbar region     Osteopenia     SEVERE    Osteoporosis     Pleuritic pain     Radiculopathy     B/L LOWER EXTREMITY    Raynaud's disease     Renal cyst     B/L    Scleroderma ([address] )     Tortuous aorta (HCC)      Past Surgical History:   Procedure Laterality Date    COLONOSCOPY      COLONOSCOPY N/A 5/11/2022    Procedure: NON IMAGING INTRAOP COLONOSCOPY;  Surgeon: Bernard Parker MD;  Location: [address] [place] [address];  Service: General    COLONOSCOPY W/ BIOPSIES  04/24/2009    DXA PROCEDURE (HISTORICAL)  07/24/2019    EGD  04/24/2009    w/ bx    EGD AND COLONOSCOPY  05/11/2022    EXPLORATORY LAPAROTOMY W/ BOWEL RESECTION N/A 5/11/2022    Procedure: LAPAROTOMY EXPLORATORY LOW ANTERIOR RESECTION W/LOOP COLOSTOMY; Surgeon: Saúl Perez MD;  Location: [address] [place] [address];  Service: General    UPPER GASTROINTESTINAL ENDOSCOPY      VERTEBROPLASTY      T9, T10, T12 & L1     Social History   Social History     Substance and Sexual Activity   Alcohol Use Yes    Comment: socially     Social History     Substance and Sexual Activity   Drug Use Never     Social History     Tobacco Use   Smoking Status Former Smoker    Types: Cigarettes, Pipe   Smokeless Tobacco Never Used   Tobacco Comment    QUIT 20 YEARS AGO     Exercise History: ambulatory   Family History:   Family History   Problem Relation Age of Onset    No Known Problems Mother     No Known Problems Father     Colon cancer Sister     Colon cancer Brother      I have reviewed this patient's family history and commented on sigificant items within the HPI      Medications:  Current Facility-Administered Medications   Medication Dose Route Frequency    acetaminophen (TYLENOL) rectal suppository 650 mg  650 mg Rectal Q6H PRN    acetylcysteine (MUCOMYST) 200 mg/mL inhalation solution 600 mg  3 mL Nebulization Q8H    cefepime (MAXIPIME) 2 g/50 mL dextrose IVPB  2,000 mg Intravenous Q12H    heparin (porcine) subcutaneous injection 5,000 Units  5,000 Units Subcutaneous Q12H [address]    HYDROmorphone (DILAUDID) injection 0 5 mg  0 5 mg Intravenous Q3H PRN    iohexol (OMNIPAQUE) 240 MG/ML solution 50 mL  50 mL Oral Once in imaging    ipratropium-albuterol (DUO-NEB) 0 5-2 5 mg/3 mL inhalation solution 3 mL  3 mL Nebulization Q6H PRN    ipratropium-albuterol (DUO-NEB) 0 5-2 5 mg/3 mL inhalation solution 3 mL  3 mL Nebulization Q8H    magnesium sulfate 2 g/50 mL IVPB (premix) 2 g  2 g Intravenous Once    metroNIDAZOLE (FLAGYL) IVPB (premix) 500 mg 100 mL  500 mg Intravenous Q8H    ondansetron (ZOFRAN) injection 4 mg  4 mg Intravenous Q6H PRN    potassium phosphates 21 mmol in sodium chloride 0 9 % 250 mL infusion  21 mmol Intravenous Once    vancomycin (VANCOCIN) 1,250 mg in sodium chloride 0 9 % 250 mL IVPB  20 mg/kg Intravenous Q24H     Home medications:  Prior to Admission Medications   Prescriptions Last Dose Informant Patient Reported? Taking?   acetaminophen (TYLENOL) 325 mg tablet  Self Yes No   Sig: Take 650 mg by mouth every 4 (four) hours as needed for mild pain   amLODIPine (NORVASC) 5 mg tablet 5/10/2022 at Unknown time Self Yes Yes   Sig: Take 5 mg by mouth daily   dextran 70-hypromellose (GENTEAL TEARS) 0 1-0 3 % ophthalmic solution 5/10/2022 at Unknown time Self Yes Yes   Si-3x daily  docusate sodium (COLACE) 100 mg capsule Past Week at Unknown time Self Yes Yes   Sig: Take 100 mg by mouth 2 (two) times a day   levothyroxine (Euthyrox) 100 mcg tablet 5/10/2022 at Unknown time Self No Yes   Sig: Take 1 tablet (100 mcg total) by mouth daily in the early morning   levothyroxine 112 mcg tablet 5/10/2022 at Unknown time Self Yes Yes   Sig: Take 112 mcg by mouth daily   lidocaine (LIDODERM) 5 %  Self Yes No   Sig: Apply 2 patches topically daily      Facility-Administered Medications: None     Allergies:  No Known Allergies  ------------------------------------------------------------------------------------------------------------  Advance Directive and Living Will:      Power of :    POLST:    ------------------------------------------------------------------------------------------------------------  Care Time Delivered:   No Critical Care time spent       ISELA Kenney        Portions of the record may have been created with voice recognition software  Occasional wrong word or "sound a like" substitutions may have occurred due to the inherent limitations of voice recognition software    Read the chart carefully and recognize, using context, where substitutions have occurred

## 2022-05-17 NOTE — PROGRESS NOTES
[address]  66   Progress Note - Ivory Hudson 2/15/1931, 80 y o  male MRN: [id number]  Unit/Bed#: 2 [place] [address] Encounter: [id number]  Primary Care Provider: Prosper Tello MD   Date and time admitted to hospital: 5/11/2022  4:48 PM    Hypoxia  Assessment & Plan  Patient noted to be hypoxic this morning with increased shortness of breath  CTA of the chest was performed which showed no evidence of pulmonary embolism but did show bilateral upper lobe ground-glass opacities compatible with pneumonia and/or pulmonary edema in addition to small bilateral pleural effusions  Patient received 20 mg of IV Lasix this morning  Patient still with evidence of crackles in the bilateral lower lung field  Will give another 40 mg of IV Lasix therapy at this time  Will also order as needed nebulizer therapy  Continue with supplemental oxygen therapy and wean as tolerated  Will obtain a 2D echocardiogram for further evaluation  Monitor intake/output and daily weights  Will also trend troponins and monitor on telemetry  Procalcitonin level and repeat BMP ordered for the morning  5/16: 2D echocardiogram is still pending  Continue with supplemental oxygen therapy as needed and wean as tolerated  Continue to monitor intake/output and daily weights  Elevated troponin likely secondary to demand ischemia  Procalcitonin level 2 60  Continue with IV Cefepime and Flagyl therapy  NT-BNP 01332 this morning  Will give another dose of Lasix 40mg IV  Will continue to closely monitor  5/17:  Patient was evaluated by pulmonology today  Consideration to be made for pneumonia versus ARDS versus increased bilateral effusions with compressive independent atelectasis  Patient is a currently on supplemental oxygen with 12 L at 50%  Repeat CT imaging to be done today  Hypokalemia  Assessment & Plan  Potassium will be repleted      Encephalopathy  Assessment & Plan  Acute encephalopathy likely multifactorial     * Bowel perforation [facility])  Assessment & Plan  Patient is currently postop day 6 for exploratory laparotomy, low anterior resection, protective loop transverse colostomy and segmental small bowel resection secondary to perforation the rectosigmoid junction after colonoscopy  TPN per surgery  Management per surgery  VTE Pharmacologic Prophylaxis: VTE Score: 7 heparin prophylaxis  Code Status: Level 1 - Full Code    Subjective:   Patient seen and examined at bedside  No acute events overnight  Patient still on supplemental oxygen via mask saturating 92% on 4 L  Objective:     Vitals:   Temp (24hrs), Av 2 °F (37 3 °C), Min:98 6 °F (37 °C), Max:99 7 °F (37 6 °C)    Temp:  [98 6 °F (37 °C)-99 7 °F (37 6 °C)] 99 3 °F (37 4 °C)  HR:  [] 114  Resp:  [17-20] 18  BP: (114-124)/(57-68) 121/68  SpO2:  [88 %-97 %] 96 %  Body mass index is 23 86 kg/m²  Input and Output Summary (last 24 hours): Intake/Output Summary (Last 24 hours) at 2022 1335  Last data filed at 2022 1901  Gross per 24 hour   Intake 100 ml   Output 350 ml   Net -250 ml       Physical Exam:   Physical Exam  HENT:      Head: Normocephalic  Mouth/Throat:      Mouth: Mucous membranes are moist    Eyes:      Extraocular Movements: Extraocular movements intact  Cardiovascular:      Rate and Rhythm: Tachycardia present  Pulmonary:      Effort: Pulmonary effort is normal       Breath sounds: Wheezing and rhonchi present  Abdominal:      General: There is distension  Palpations: Abdomen is soft  Skin:     General: Skin is warm  Neurological:      Mental Status: He is alert  Comments: Confused     Psychiatric:         Mood and Affect: Mood normal         Additional Data:     Labs:  Results from last 7 days   Lab Units 22  0630   WBC Thousand/uL 15 37*   HEMOGLOBIN g/dL 8 2*   HEMATOCRIT % 24 2*   PLATELETS Thousands/uL 254   BANDS PCT % 10*   LYMPHO PCT % 6*   MONO PCT % 3*   EOS PCT % 0     Results from last 7 days   Lab Units 05/17/22  0630   SODIUM mmol/L 142   POTASSIUM mmol/L 3 8   CHLORIDE mmol/L 111*   CO2 mmol/L 25   BUN mg/dL 28*   CREATININE mg/dL 0 93   ANION GAP mmol/L 6   CALCIUM mg/dL 7 4*   ALBUMIN g/dL 2 2*   TOTAL BILIRUBIN mg/dL 0 68   ALK PHOS U/L 47   ALT U/L 11*   AST U/L 12   GLUCOSE RANDOM mg/dL 171*         Results from last 7 days   Lab Units 05/12/22  1611 05/12/22  0745 05/11/22  2217 05/11/22  1520   POC GLUCOSE mg/dl 128 96 98 118         Results from last 7 days   Lab Units 05/16/22  0507   PROCALCITONIN ng/ml 2 60*       Lines/Drains:  Invasive Devices  Report    Peripheral Intravenous Line  Duration           Peripheral IV 05/15/22 Left;Ventral (anterior) Wrist 2 days    Peripheral IV 05/16/22 Left Antecubital 1 day          Drain  Duration           Colostomy LLQ 6 days    Urethral Catheter Non-latex 16 Fr  2 days                       Telemetry:  Telemetry Orders (From admission, onward)             48 Hour Telemetry Monitoring  Continuous x 48 hours        References:    Telemetry Guidelines   Question:  Reason for 48 Hour Telemetry  Answer:  Acute MI, chest pain - R/O MI, or unstable angina                        Imaging:  Reviewed      Recent Cultures (last 7 days):   Results from last 7 days   Lab Units 05/17/22  0923 05/11/22  1838   GRAM STAIN RESULT   --  No Polys or Bacteria seen   WOUND CULTURE   --  1+ Growth of Pseudomonas aeruginosa*   LEGIONELLA URINARY ANTIGEN  Negative  --        Last 24 Hours Medication List:   Current Facility-Administered Medications   Medication Dose Route Frequency Provider Last Rate    acetaminophen  650 mg Oral Q4H PRN Shamar Rojas PA-C      acetylcysteine  3 mL Nebulization Q8H Joey Anderson PA-C      cefepime  2,000 mg Intravenous Q12H Renetta Vuong PA-C 2,000 mg (05/17/22 1216)    dextrose 5 % and sodium chloride 0 45 % with KCl 20 mEq/L  50 mL/hr Intravenous Continuous Elveria Snnaif, PA-C 50 mL/hr (05/17/22 6285)   [facility] diphenhydrAMINE  25 mg Intravenous Q6H PRN Gia Espinoza PA-C      heparin (porcine)  5,000 Units Subcutaneous Q12H [address] Anthony Osuna MD      HYDROmorphone  0 2 mg Intravenous Q3H PRN Gia Espinoza PA-C      iohexol  50 mL Oral Once in imaging Mary QuezadaZULAY      ipratropium-albuterol  3 mL Nebulization Q6H PRN Lisseth Garnica MD      ipratropium-albuterol  3 mL Nebulization Q8H Maria Del Carmen Dickson PA-C      levothyroxine  56 mcg Oral Early Morning Gia Espinoza PA-C      metroNIDAZOLE  500 mg Intravenous Q8H Gia Espinoza PA-C 500 mg (05/17/22 8289)    morphine injection  2 mg Intravenous Q2H PRN Anthony Osuna MD      naloxone  0 04 mg Intravenous Q1MIN PRN Gia Espinoza PA-C      ondansetron  4 mg Intravenous Q6H PRN Gia Espinoza PA-C      vancomycin  20 mg/kg Intravenous Q24H Maria Del Carmen Dickson PA-C 1,250 mg (05/17/22 1032)        Today, Patient Was Seen By: Lisseth Garnica MD    **Please Note: This note may have been constructed using a voice recognition system  **

## 2022-05-17 NOTE — ASSESSMENT & PLAN NOTE
Wt Readings from Last 3 Encounters:   05/16/22 63 kg (139 lb)   05/11/22 62 1 kg (136 lb 14 4 oz)   03/25/22 61 2 kg (135 lb)     · 5/16: Echo-EF 65%, mild TR, mild MR  · Vascular congestion evident on CXR  · 40mg IV Lasix now   · Monitor I&O  · Daily weights

## 2022-05-17 NOTE — ASSESSMENT & PLAN NOTE
· outpatient EGD and colonoscopy at [facility] on  5/11 for w/u of chronic anemia, history of colon polyps, intermittent LLQ abdominal pain and possible intermittent intussusception  · EGD unremarklable, colonoscopy technically difficult and required changed from adult scope to pediatric scope, but during traversal of the sigmoid colon there was a kink and patient sustained a perforation  · Procedure was aborted and patient was transferred to [facility] where immediate surgical consultation was obtained  Patient was reported to have obvious signs of peritonitis on exam and CT ab/pelvis demonstrated pneumoperitoneum     · Urgently to the OR with Dr Tricia Mcdowell for ex-lap, low anterior resection, protection loop transverse colostomy, flex sigmoidoscopy, and segmental small bowel resection  · POD #7  · CT without leak or abscess  · Now with post-op ileus, minimal ostomy output, plan as below   · Appreciate surgery team input

## 2022-05-17 NOTE — ASSESSMENT & PLAN NOTE
· outpatient EGD and colonoscopy at [facility] on  5/11 for w/u of chronic anemia, history of colon polyps, intermittent LLQ abdominal pain and possible intermittent intussusception  · EGD was unremarklable, however colonoscopy was technically difficult and required changed from adult scope to pediatric scope, but during traversal of the sigmoid colon there was a kink and patient sustained a perforation  · Procedure was aborted and patient was transferred to [facility] where immediate surgical consultation was obtained  Patient was reported to have obvious signs of peritonitis on exam and CT ab/pelvis demonstrated pneumoperitoneum     · Urgently to the OR with Dr Kyree Griffith for ex-lap, low anterior resection, protection loop transverse colostomy, flex sigmoidoscopy, and segmental small bowel resection  · POD #6  · Now with post-op ileus, plan as below   · Appreciate surgery team input

## 2022-05-17 NOTE — PROGRESS NOTES
Progress Note - General Surgery   Fady Hudson 80 y o  male MRN: [id number]  Unit/Bed#: 2 [place] [address] Encounter: [id number]    Assessment:  POD#6 s/p exploratory laparotomy, low anterior resection, protective loop transverse colostomy and segmental small bowel resection secondary to perforation rectosigmoid junction after colonoscopy      -Abdomen XR: Diffusely distended, dilated small bowel loops with small amount of persistent air extending to the proximal colon    Acute hypoxemic respiratory failure: Patient oxygen demand increasing over night  CTA chest 05/15: b/l upper lobe ground glass opacities and/or pulmonary edema, small bilateral pleural effusions, negative for PE  2D echo showing EF 65%      Tachy: 114-141  Afebrile  WBC: 15 37 (15 64)  Urine output: 550  Ostomy:20 cc  Plan:   Obtain CT chest abdomen pelvis  Place NGT to low continuous suction  Continue continuous observation  Con   Continue Farnsworth catheter  Continue wet to dry midline incision  Remove loop colostomy bridge POD#7  PRN analgesics  IV antibiotics Cefepime/Flagyl  Continue telemetry  Incentive spirometry  PT/OT  Ween oxygen as tolerated  Patient will go for picc line today  TPN ordered  Consult pulmonology    Internal medicine input appreciated  Diuresis per internal medicine  Subjective/Objective     Subjective: Patient was seen and examined bedside  Patient reports breathing is similar to yesterday  Denies abdominal pain  Patient reports he feels as if he will have a bowel movement today  Denies any nausea/vomiting, chest, pain  Objective:     Blood pressure 121/68, pulse (!) 114, temperature 99 3 °F (37 4 °C), resp  rate 18, height 5' 4" (1 626 m), weight 63 kg (139 lb), SpO2 96 %  ,Body mass index is 23 86 kg/m²        Intake/Output Summary (Last 24 hours) at 5/17/2022 0903  Last data filed at 5/16/2022 1901  Gross per 24 hour   Intake 150 ml   Output 550 ml   Net -400 ml       Invasive Devices  Report    Peripheral Intravenous Line  Duration           Peripheral IV 05/15/22 Distal;Right;Upper;Ventral (anterior) Antecubital 1 day    Peripheral IV 05/15/22 Left;Ventral (anterior) Wrist 1 day    Peripheral IV 05/16/22 Left Antecubital <1 day          Drain  Duration           Colostomy LLQ 6 days    Urethral Catheter Non-latex 16 Fr  1 day                Physical Exam: /68   Pulse (!) 114   Temp 99 3 °F (37 4 °C)   Resp 18   Ht 5' 4" (1 626 m)   Wt 63 kg (139 lb)   SpO2 96%   BMI 23 86 kg/m²   General appearance: Intermittent confusion  Lungs: wheezes and rhonchi   Heart:  regular rate and rhythm  Abdomen: Distended, non-tender, midline incision with wet to dry dressing, + BS  Skin: Skin color, texture, turgor normal  No rashes or lesions    Lab, Imaging and other studies:  I have personally reviewed pertinent lab results    , CBC:   Lab Results   Component Value Date    WBC 15 37 (H) 05/17/2022    HGB 8 2 (L) 05/17/2022    HCT 24 2 (L) 05/17/2022     (H) 05/17/2022     05/17/2022    MCH 35 2 (H) 05/17/2022    MCHC 33 9 05/17/2022    RDW 16 8 (H) 05/17/2022    MPV 10 6 05/17/2022   , CMP:   Lab Results   Component Value Date    SODIUM 142 05/17/2022    K 3 8 05/17/2022     (H) 05/17/2022    CO2 25 05/17/2022    BUN 28 (H) 05/17/2022    CREATININE 0 93 05/17/2022    CALCIUM 7 4 (L) 05/17/2022    AST 12 05/17/2022    ALT 11 (L) 05/17/2022    ALKPHOS 47 05/17/2022    EGFR 71 05/17/2022     VTE Pharmacologic Prophylaxis: Heparin  VTE Mechanical Prophylaxis: sequential compression device

## 2022-05-17 NOTE — PROGRESS NOTES
Vancomycin Assessment    Fady Moreland is a 80 y o  male who is currently receiving vancomycin 1000 mg q12h (15 mg/kg) for Pneumonia     Relevant clinical data and objective history reviewed:  Creatinine   Date Value Ref Range Status   05/17/2022 0 93 0 60 - 1 30 mg/dL Final     Comment:     Standardized to IDMS reference method   05/16/2022 0 93 0 60 - 1 30 mg/dL Final     Comment:     Standardized to IDMS reference method   05/15/2022 0 86 0 60 - 1 30 mg/dL Final     Comment:     Standardized to IDMS reference method     /68   Pulse (!) 114   Temp 99 3 °F (37 4 °C)   Resp 18   Ht 5' 4" (1 626 m)   Wt 63 kg (139 lb)   SpO2 96%   BMI 23 86 kg/m²   I/O last 3 completed shifts: In: 400 [IV [id number]]  Out: 2370 [Urine:2350; Stool:20]  Lab Results   Component Value Date/Time    BUN 28 (H) 05/17/2022 06:30 AM    WBC 15 37 (H) 05/17/2022 06:30 AM    HGB 8 2 (L) 05/17/2022 06:30 AM    HCT 24 2 (L) 05/17/2022 06:30 AM     (H) 05/17/2022 06:30 AM     05/17/2022 06:30 AM     Temp Readings from Last 3 Encounters:   05/17/22 99 3 °F (37 4 °C)   05/11/22 (!) 97 1 °F (36 2 °C) (Temporal)   01/14/22 97 9 °F (36 6 °C) (Temporal)     Vancomycin Days of Therapy: 1    Assessment/Plan  The patient is currently on vancomycin utilizing scheduled dosing based on actual body weight  Baseline risks associated with therapy include: pre-existing renal impairment, concomitant nephrotoxic medications, and advanced age  The patient is currently receiving 1000 mg q12h (15 mg/kg) and after clinical evaluation will be changed to 1250 mg IV q24h (20mg/kg)   Pharmacy will also follow closely for s/sx of nephrotoxicity, infusion reactions, and appropriateness of therapy  BMP and CBC will be ordered per protocol  Plan for trough as patient approaches steady state, prior to the 4th  dose at approximately 09:30 on 5/20/2022    Due to infection severity, will target a trough of 15-20 (appropriate for most indications)   Pharmacy will continue to follow the patients culture results and clinical progress daily      Quirino Kemp, Pharmacist

## 2022-05-17 NOTE — CASE MANAGEMENT
Case Management Discharge Planning Note    Patient name Lizette Panchal  Location 2 [address] [address] MRN [id number]  : 2/15/1931 Date 2022       Current Admission Date: 2022  Current Admission Diagnosis:Bowel perforation [facility])   Patient Active Problem List    Diagnosis Date Noted    Hypokalemia 2022    Hypoxia 05/15/2022    Encephalopathy 05/15/2022    Bowel perforation ([address] ) 2022    Hypertension 2022    Degenerative disc disease at L5-S1 level 2022    Spinal stenosis 2022    Frail elderly 2022    Rheumatic aortic stenosis 2022      LOS (days): 6  Geometric Mean LOS (GMLOS) (days): 10 30  Days to GMLOS:4 5     OBJECTIVE:  Risk of Unplanned Readmission Score: 18 25         Current admission status: Inpatient   Preferred Pharmacy:   [place] [address]  Phone: [phone] Fax: [phone]    [id number] [address],  [facility] [address]  [place] [address]  Phone: [phone] Fax: [phone]    Primary Care Provider: Geraldo Hayward MD    Primary Insurance: MEDICARE  Secondary Insurance: [facility]    DISCHARGE DETAILS:    Discharge planning discussed with[de-identified] patient and Son, Marilyn Cousins of Choice: Yes     CM contacted family/caregiver?: Yes  Were Treatment Team discharge recommendations reviewed with patient/caregiver?: Yes  Did patient/caregiver verbalize understanding of patient care needs?: Yes  Were patient/caregiver advised of the risks associated with not following Treatment Team discharge recommendations?: Yes    Contacts  Patient Contacts: Marnell Dress  Relationship to Patient[de-identified] Family  Contact Method: Phone  Phone Number: [phone]  Reason/Outcome: Continuity of Care, Discharge Planning    Treatment Team Recommendation: Short Term Rehab  Cm spoke with son regarding treatment teams recommendations    Options presented and discussed at length with him STR vs C  Referrals sent to both dispositions and sent to son's email for review  Son is going to speak with family about dispo planning and will get back to CM regarding decision  Cm will follow

## 2022-05-17 NOTE — ASSESSMENT & PLAN NOTE
· Likely in setting of acute illness  · No focal deficits  · 1:1 monitoring as patient was attempting to self d/c lines/tubes   · Neuro checks q4h  · Delirium precautions; regulate sleep/wake cycle, environmental controls, daily CAM ICU

## 2022-05-17 NOTE — PLAN OF CARE
Problem: MOBILITY - ADULT  Goal: Maintain or return to baseline ADL function  Description: INTERVENTIONS:  -  Assess patient's ability to carry out ADLs; assess patient's baseline for ADL function and identify physical deficits which impact ability to perform ADLs (bathing, care of mouth/teeth, toileting, grooming, dressing, etc )  - Assess/evaluate cause of self-care deficits   - Assess range of motion  - Assess patient's mobility; develop plan if impaired  - Assess patient's need for assistive devices and provide as appropriate  - Encourage maximum independence but intervene and supervise when necessary  - Involve family in performance of ADLs  - Assess for home care needs following discharge   - Consider OT consult to assist with ADL evaluation and planning for discharge  - Provide patient education as appropriate  Outcome: Progressing  Goal: Maintains/Returns to pre admission functional level  Description: INTERVENTIONS:  - Perform BMAT or MOVE assessment daily    - Set and communicate daily mobility goal to care team and patient/family/caregiver  - Collaborate with rehabilitation services on mobility goals if consulted  - Perform Range of Motion 3 times a day  - Reposition patient every 2 hours    - Dangle patient 3 times a day  - Stand patient 3 times a day  - Ambulate patient 3 times a day  - Out of bed to chair 3 times a day   - Out of bed for meals 3  Problem: Potential for Falls  Goal: Patient will remain free of falls  Description: INTERVENTIONS:  - Educate patient/family on patient safety including physical limitations  - Instruct patient to call for assistance with activity   - Consult OT/PT to assist with strengthening/mobility   - Keep Call bell within reach  - Keep bed low and locked with side rails adjusted as appropriate  - Keep care items and personal belongings within reach  - Initiate and maintain comfort rounds  - Make Fall Risk Sign visible to staff  - Offer Toileting every 2 Hours, in advance of need  - Initiate/Maintain bed alarm  - Obtain necessary fall risk management equipment:   Problem: PAIN - ADULT  Goal: Verbalizes/displays adequate comfort level or baseline comfort level  Description: Interventions:  - Encourage patient to monitor pain and request assistance  - Assess pain using appropriate pain scale  - Administer analgesics based on type and severity of pain and evaluate response  - Implement non-pharmacological measures as appropriate and evaluate response  - Consider cultural and social influences on pain and pain management  - Notify physician/advanced practitioner if interventions unsuccessful or patient reports new pain  Outcome: Progressing     Problem: GASTROINTESTINAL - ADULT  Goal: Minimal or absence of nausea and/or vomiting  Description: INTERVENTIONS:  - Administer IV fluids if ordered to ensure adequate hydration  - Maintain NPO status until nausea and vomiting are resolved  - Nasogastric tube if ordered  - Administer ordered antiemetic medications as needed  - Provide nonpharmacologic comfort measures as appropriate  - Advance diet as tolerated, if ordered  - Consider nutrition services referral to assist patient with adequate nutrition and appropriate food choices  Outcome: Progressing  Goal: Establish and maintain optimal ostomy function  Description: INTERVENTIONS:  - Assess bowel function  - Encourage oral fluids to ensure adequate hydration  - Administer IV fluids if ordered to ensure adequate hydration   - Administer ordered medications as needed  - Encourage mobilization and activity  - Nutrition services referral to assist patient with appropriate food choices  - Assess stoma site  - Consider wound care consult   Outcome: Progressing     - Apply yellow socks and bracelet for high fall risk patients  - Consider moving patient to room near nurses station  Outcome: Progressing    times a day  - Out of bed for toileting  - Record patient progress and toleration of activity level   Outcome: Progressing

## 2022-05-17 NOTE — ASSESSMENT & PLAN NOTE
Patient is s/p exploratory laparotomy, low anterior resection, protective loop transverse colostomy and segmental small bowel resection secondary to perforation at rectosigmoid junction after colonoscopy on 05/11  · Patient had outpatient EGD and colonoscopy at a stent on 05/11  Patient sustained a perforation during colonoscopy patient was transferred here for further management  · Postop ileus - dilated small bowel on CT scan and KUB   · TPN per surgery  Management per surgery

## 2022-05-17 NOTE — ASSESSMENT & PLAN NOTE
· Increasing O2 requirements the last 24 hours, was on 4L then required midflow today increased to 15L  · Titrate for O2 sat 90% or greater  · CXR with b/l pulmonary vascular congestion and worsening b/l upper lobe infiltrates R > L,  · Concern for aspiration pneumonia as patient has developed post-op ileus with abdominal distention and has been encephalopathic, abx plan as below  · Concern for pulmonary edema, 40mg IV Lasix ordered   · Atrovent/xopenex/mucomyst nebs q8h  · PRN NT suctioning, patient with weak moist cough  · Encourage coughing and deep breathing, IS q1h while awake

## 2022-05-17 NOTE — ASSESSMENT & PLAN NOTE
· CXR with b/l pulmonary vascular congestion and worsening b/l upper lobe infiltrates R > L,  · Concern for aspiration pneumonia as patient has developed post-op ileus with abdominal distention and has been encephalopathic  · Cefepime day #4, flagyl day #7, vanco day #1  · Strep pneumo and legionella negative  · MRSA nasal surveillance pending  · Sputum culture pending

## 2022-05-17 NOTE — ASSESSMENT & PLAN NOTE
· CXR with b/l pulmonary vascular congestion and worsening b/l upper lobe infiltrates R > L, multifocal consolidation on CT chest   · Concern for aspiration pneumonia as patient has developed post-op ileus with abdominal distention and has been encephalopathic  · Cefepime day #5, flagyl day #8, vanco day #2  · Strep pneumo and legionella negative  · MRSA nasal surveillance pending  · Sputum culture unable to be run

## 2022-05-17 NOTE — ASSESSMENT & PLAN NOTE
· Increasing O2 requirements the last 48 hours, was on 4L then required midflow increased to 15L  · Concern for aspiration with increasing abdominal distension secondary to ileus   · CXR with b/l pulmonary vascular congestion and worsening b/l upper lobe infiltrates R > L,  · 5/17 CT chest with worsening patchy mixed groundglass and consolidative change bilaterally, most prominent in the bilateral upper lobes concerning for multifocal PNA and/or pulmonary edema  Bilateral lower lobe compressive atelectasis, Moderate left and small right pleural effusions, increased since the prior study    · Received 40 mg IV lasix with some improvement - consider repeat dosing today   · Atrovent/xopenex/mucomyst nebs q8h  · Continue cefepime/vancomycin/flagyl   · Trend procal   · PRN NT suctioning, patient with weak moist cough  · Encourage coughing and deep breathing, IS q1h while awake   · Down to 5 L MF from 15 L, continue to wean for spo2 >92%

## 2022-05-17 NOTE — PLAN OF CARE
Problem: MOBILITY - ADULT  Goal: Maintain or return to baseline ADL function  Description: INTERVENTIONS:  -  Assess patient's ability to carry out ADLs; assess patient's baseline for ADL function and identify physical deficits which impact ability to perform ADLs (bathing, care of mouth/teeth, toileting, grooming, dressing, etc )  - Assess/evaluate cause of self-care deficits   - Assess range of motion  - Assess patient's mobility; develop plan if impaired  - Assess patient's need for assistive devices and provide as appropriate  - Encourage maximum independence but intervene and supervise when necessary  - Involve family in performance of ADLs  - Assess for home care needs following discharge   - Consider OT consult to assist with ADL evaluation and planning for discharge  - Provide patient education as appropriate  Outcome: Progressing  Goal: Maintains/Returns to pre admission functional level  Description: INTERVENTIONS:  - Perform BMAT or MOVE assessment daily    - Set and communicate daily mobility goal to care team and patient/family/caregiver     - Out of bed for toileting  - Record patient progress and toleration of activity level   Outcome: Progressing     Problem: Potential for Falls  Goal: Patient will remain free of falls  Description: INTERVENTIONS:  - Educate patient/family on patient safety including physical limitations  - Instruct patient to call for assistance with activity   - Consult OT/PT to assist with strengthening/mobility   - Keep Call bell within reach  - Keep bed low and locked with side rails adjusted as appropriate  - Keep care items and personal belongings within reach  - Initiate and maintain comfort rounds  - Apply yellow socks and bracelet for high fall risk patients  - Consider moving patient to room near nurses station  Outcome: Progressing     Problem: PAIN - ADULT  Goal: Verbalizes/displays adequate comfort level or baseline comfort level  Description: Interventions:  - Encourage patient to monitor pain and request assistance  - Assess pain using appropriate pain scale  - Administer analgesics based on type and severity of pain and evaluate response  - Implement non-pharmacological measures as appropriate and evaluate response  - Consider cultural and social influences on pain and pain management  - Notify physician/advanced practitioner if interventions unsuccessful or patient reports new pain  Outcome: Progressing     Problem: GASTROINTESTINAL - ADULT  Goal: Minimal or absence of nausea and/or vomiting  Description: INTERVENTIONS:  - Administer IV fluids if ordered to ensure adequate hydration  - Maintain NPO status until nausea and vomiting are resolved  - Nasogastric tube if ordered  - Administer ordered antiemetic medications as needed  - Provide nonpharmacologic comfort measures as appropriate  - Advance diet as tolerated, if ordered  - Consider nutrition services referral to assist patient with adequate nutrition and appropriate food choices  Outcome: Progressing  Goal: Establish and maintain optimal ostomy function  Description: INTERVENTIONS:  - Assess bowel function  - Encourage oral fluids to ensure adequate hydration  - Administer IV fluids if ordered to ensure adequate hydration   - Administer ordered medications as needed  - Encourage mobilization and activity  - Nutrition services referral to assist patient with appropriate food choices  - Assess stoma site  - Consider wound care consult   Outcome: Progressing     Problem: SKIN/TISSUE INTEGRITY - ADULT  Goal: Incision(s), wounds(s) or drain site(s) healing without S/S of infection  Description: INTERVENTIONS  - Assess and document dressing, incision, wound bed, drain sites and surrounding tissue  Outcome: Progressing     Problem: Prexisting or High Potential for Compromised Skin Integrity  Goal: Skin integrity is maintained or improved  Description: INTERVENTIONS:  - Identify patients at risk for skin breakdown  - Assess and monitor skin integrity  - Assess and monitor nutrition and hydration status  - Monitor labs   - Assess for incontinence   - Turn and reposition patient  - Assist with mobility/ambulation  - Relieve pressure over bony prominences  - Avoid friction and shearing  - Provide appropriate hygiene as needed including keeping skin clean and dry  - Evaluate need for skin moisturizer/barrier cream  - Collaborate with interdisciplinary team   - Patient/family teaching  - Consider wound care consult   Outcome: Progressing     Problem: Nutrition/Hydration-ADULT  Goal: Nutrient/Hydration intake appropriate for improving, restoring or maintaining nutritional needs  Description: Monitor and assess patient's nutrition/hydration status for malnutrition  Collaborate with interdisciplinary team and initiate plan and interventions as ordered  Monitor patient's weight and dietary intake as ordered or per policy  Utilize nutrition screening tool and intervene as necessary  Determine patient's food preferences and provide high-protein, high-caloric foods as appropriate       INTERVENTIONS:  - Monitor oral intake, urinary output, labs, and treatment plans  - Assess nutrition and hydration status and recommend course of action  - Evaluate amount of meals eaten  - Assist patient with eating if necessary   - Allow adequate time for meals  - Recommend/ encourage appropriate diets, oral nutritional supplements, and vitamin/mineral supplements  - Order, calculate, and assess calorie counts as needed  - Recommend, monitor, and adjust tube feedings and TPN/PPN based on assessed needs  - Assess need for intravenous fluids  - Provide specific nutrition/hydration education as appropriate  - Include patient/family/caregiver in decisions related to nutrition  Outcome: Progressing

## 2022-05-17 NOTE — ASSESSMENT & PLAN NOTE
· As evidenced by tachycardia, tachypnea, and bandemia  · Source aspiration pneumonia vs intra-abdominal infection  · procal 2 60, continue to trend   · Trend temps and WBC   · Cefepime day #5, flagyl day #8, vanco day #2  · Blood cultures not obtained and abx therapy has already been in process  · Wound culture from OR 5/11 pseudomonas sensitive to cefepime   · CT chest/ab/pelvis with concern of multifocal PNA, no visualized intraabdominal abscess or anastomotic leak

## 2022-05-17 NOTE — CONSULTS
Recommend standard bag for 24-48 hours then change to 850 mL 30% dextrose, 600 mL 15% amino acids, 200 mL 20% lipids for a total volume of 1650 mL  This will provide 1627 kcals, 255 grams CHO, 90 grams protein and 40 grams lipid  Glucose infusion rate will be 2 81 mg/kg/min and lipid load will be 0 64 grams/kg  Recommend triglyceride levels weekly while on TPN

## 2022-05-17 NOTE — QUICK NOTE
Took approximately 10 minutes to fill out paperwork regarding colostomy/ileostomy supplies  Discussed with wound care nursing based on their recommendations

## 2022-05-17 NOTE — ASSESSMENT & PLAN NOTE
· As evidenced by tachycardia, tachypnea, and bandemia  · Source aspiration pneumonia vs intra-abdominal infection  · procal 2 60, continue to trend   · Trend temps and WBC   · Cefepime day #4, flagyl day #7, vanco day #1  · Blood cultures not obtained and abx therapy has already been in process  · Check lactic   · CT chest/ab/pelvis to be done when patient stable from respiratory standpoint

## 2022-05-17 NOTE — ASSESSMENT & PLAN NOTE
· Developed post-op ileus with abdominal distention, and nausea/vomiting  · NGT placed with difficulty, on imaging terminated in distal esophagus, attempted to be advanced twice without significant drainage despite distended stomach on imaging   · 5/17 CT - Multiple dilated small bowel loops without a clear transition point  The finding may reflect ileus, however developing small bowel obstruction cannot be excluded  Follow-up is recommended    · AM KUB ordered to eval contrast follow through and NGT positioning   · Minimal to no ostomy output - monitor   · May need PICC line insertion and TPN initiation if unable to tolerate tube feedings after gastric decompression   · If going to IR consider image guided NGT repositioning   · Getting frequent IV opioids for abdominal pain can be contributing to ileus, attempt to improve multimodal pain regimen if able to tolerate any PO

## 2022-05-17 NOTE — ASSESSMENT & PLAN NOTE
Patient noted to be hypoxic with increased shortness of breath while on the floors  · CTA of the chest negative for pulmonary embolism but did show bilateral upper lobe ground-glass opacities compatible with pneumonia and/or pulmonary edema in addition to small bilateral pleural effusions  · He received IV Lasix   · Was requiring up to 15 L of oxygen per ICU note but currently on 5 L  · Per ID patient with multifocal pneumonitis and less likely pneumonia    Patient maintaining target saturations on mechanical ventilation with volume control settings; PEEP 60 mmHg, FiO2 60%, RR 20

## 2022-05-17 NOTE — ASSESSMENT & PLAN NOTE
· developed post-op ileus and has abdominal distention, and nausea/vomiting today  NGT placement was attempted and initially placed but with termination on imaging in the distal esophagus, therefore advancement was attempted, however when it was advanced it coiled and the tip came out patient's mouth  This was attempted several times and the same complication arose   During procedure patient became hypoxic on 15L facemask with several episodes of desaturations in the 70s, therefore procedure aborted  · Surgery to re attempt NGT once patient's oxygenation stable   · May need PICC line insertion and TPN initiation if unable to tolerate tube feedings after gastric decompression

## 2022-05-17 NOTE — CONSULTS
Consultation - Pulmonary Medicine  Jenn Davis 80 y o  male MRN: [id number]  Unit/Bed#: [address] [place] [address] Encounter: [id number]  Code Status: Level 1 - Full Code    Assessment/Plan:    Yelena Lopez is a 80 y o     Past Medical History:   Diagnosis Date    Aortic valve calcification     Aortic valve stenosis     AR (aortic regurgitation)     Atelectasis     LT BASILAR PASSIVE SEGMENTAL    Autoimmune disease (HCC)     Bilateral pleural effusion     Bilateral senile cataracts     CAD (coronary artery disease)     CAD (coronary artery disease)     Central spinal stenosis     Changes in vascular appearance of retina     Chronic back pain     Colon polyp     Compression fracture of L1 lumbar vertebra (HCC) 06/02/2010    Compression fracture of T10 vertebra (HCC)     Compression fracture of T12 vertebra (HCC)     Compression fracture of T9 vertebra (HCC)     Dextroscoliosis of lumbar spine     Diverticulosis     Drusen     Dry eye syndrome     Former smoker     Gastritis     GERD (gastroesophageal reflux disease)     Giant cell arteritis (HCC)     Hiatal hernia     History of shingles     HTN (hypertension)     Internal carotid artery stenosis, left     Internal carotid artery stenosis, right     Left lower lobe pneumonia     Lesion of upper eyelid     Lordosis of lumbar region     Osteopenia     SEVERE    Osteoporosis     Pleuritic pain     Radiculopathy     B/L LOWER EXTREMITY    Raynaud's disease     Renal cyst     B/L    Scleroderma (HCC)     Tortuous aorta (HCC)        Bilateral Pneumonia with abnormal chest CT imaging  -negative for PE  -currently on cefepime and Flagyl abx therapy , added vancomycin for MRSA coverage and MRSA nares screen  -obtain urine strep and legionella antigens   -obtain sputum culture for speciation and sensitivity via nasotracheal suction  -trend procalcitonin levels  Results from last 7 days   Lab Units 05/16/22  0507   PROCALCITONIN ng/ml 2 60*     -Mucomyst and DuoNeb for mucokinesis  -obtain follow-up chest x-ray in consideration for pneumonia versus ARDS in the differential  Bilateral effusions and compressive atelectasis:  -likely due to on chronic losses and fluid shifts  -prior fusions were small without likelihood for significant impact to respiratory status  -if effusions have increased in size could consider thoracentesis  Acute Hypoxemic Respiratory Failure  -consideration for pneumonia verses ARDS, verses increased bilateral effusions with compressive and dependent atelectasis  -maintain 02 sat 90  -Currently on 12 L 02 at 50% on air entrainment mask  -continue with oxygen monitoring  -currently weaned to 10 L and 45%  -added incentive spirometry and oscillator therapy  -foregoing high-frequency chest percussive therapy due to ileus  Alteration mental status:  -toxic metabolic induced due to sepsis  -possible superimposed hospital delirium  Sigmoid colon perforation with pneumoperitoneum status post ex lap loop colostomy small-bowel resection on 05/11:  -postoperative day 6    -discussed with surgical team in regard antibiotic coverage  -consideration for addition of antibiotic spectrum coverage  -is pending CT abdomen and pelvis  -patient is pending IR consult for PICC line for TPN  Acute blood loss anemia:  -secondary to colonic perforation  -stable  Results from last 7 days   Lab Units 05/17/22  0630 05/16/22  0507 05/15/22  0506 05/14/22  0455 05/13/22  0745 05/12/22  0507   WBC Thousand/uL 15 37* 15 64* 14 89*   < > 17 60* 9 32   HEMOGLOBIN g/dL 8 2* 8 2* 7 8*   < > 8 9* 8 9*   HEMATOCRIT % 24 2* 23 7* 23 2*   < > 25 8* 25 2*   PLATELETS Thousands/uL 254 219 209   < > 190 180   MONO PCT % 3*  --   --   --  3* 1*    < > = values in this interval not displayed           Thank you for this consultation; we will be happy to follow with you     ______________________________________________________________________      History of Present Illness   Physician Requesting Consult: Claudeen Adie, *  Reason for Consult :  Respiratory failure    HX and PE limited by: Altered Mental Status    HPI:  Alejo Strickland is a 80 y o  male  has a past medical history of Aortic valve calcification, Aortic valve stenosis, AR (aortic regurgitation), Atelectasis, Autoimmune disease ([address] ), Bilateral pleural effusion, Bilateral senile cataracts, CAD (coronary artery disease), CAD (coronary artery disease), Central spinal stenosis, Changes in vascular appearance of retina, Chronic back pain, Colon polyp, Compression fracture of L1 lumbar vertebra ([address] ) (06/02/2010), Compression fracture of T10 vertebra (HCC), Compression fracture of T12 vertebra (HCC), Compression fracture of T9 vertebra ([address] ), Dextroscoliosis of lumbar spine, Diverticulosis, Drusen, Dry eye syndrome, Former smoker, Gastritis, GERD (gastroesophageal reflux disease), Giant cell arteritis ([address] ), Hiatal hernia, History of shingles, HTN (hypertension), Internal carotid artery stenosis, left, Internal carotid artery stenosis, right, Left lower lobe pneumonia, Lesion of upper eyelid, Lordosis of lumbar region, Osteopenia, Osteoporosis, Pleuritic pain, Radiculopathy, Raynaud's disease, Renal cyst, Scleroderma ([address] ), and Tortuous aorta ([address] )  who presents with  a chief complaint of abdominal pain  79 y/o M w/ PMHX recent bowel habit changes, CAD, osteoporosis, spinal stenosis, HTN/HLD POD # 6 s/p loop transverse colostomy and SBR s/p perforated rectosigmoid colon  Pt underwent EGD/Colonoscopy on 5/11 for historical bowel habits change and recurrent LLQ abdominal pain and preceding CT imaging noted by prior consultants to have findings c/w intussusception   Recent history of anemia  Surgical mention of difficulty negotiating sigmoid colon and subsequent colonoscopy CT abd / pelvis w/ findings of moderate pneumoperitoneum and findings c/w colonic perforation    Pt was transferred to [facility] from [facility] for urgent surgical intervention and underwent exlap w/ low anterior resection w/ loop colostomy and segmental SBR  Pt is currently POD # 6 and w/ progressive respiratory failure now requiring 12 L w/ increases from 4 L NC  Pulmonary consultation placed for respiratory failure in consideration of etiology  Patient did have a CTA chest abdomen pelvis performed on the 15th with dependent effusions which were small and with some bilateral compressive atelectasis which was mild  Did have biapical hazy ground-glass opacities consistent with inflammatory/edematous process in consideration for developing pulmonary edema versus aspirative inflammation  This CT was negative for PE  The patient has been having waxing and waning mental status changes  He is currently on one-to-one for pulling at medical devices, attempting to get out of bed, etc   Pertinent hospital notes reviewed  Pertinent labs reviewed  Pertinent imaging reviewed  Collaborative discussion with Surgical Team regarding assessment and plan of possible pneumonia versus ARDS    Subsequent plan at this time is for further investigation with chest x-ray and CT of the abdomen and pelvis    Key image thumbnails:  CTA PE study 5/15 (-) for PE   B/L upper lobe pulmonary infiltrates w/ w/ hazy diffuse opacification    B/L pleural effusions w/ compressive atelectasis         Review of Systems   Unable to perform ROS: Mental status change       Past Medical/Surgical History  Past Medical History:   Diagnosis Date    Aortic valve calcification     Aortic valve stenosis     AR (aortic regurgitation)     Atelectasis     LT BASILAR PASSIVE SEGMENTAL    Autoimmune disease (HCC)     Bilateral pleural effusion     Bilateral senile cataracts     CAD (coronary artery disease)     CAD (coronary artery disease)     Central spinal stenosis     Changes in vascular appearance of retina     Chronic back pain     Colon polyp     Compression fracture of L1 lumbar vertebra ([facility] [address] ) 06/02/2010    Compression fracture of T10 vertebra (HCC)     Compression fracture of T12 vertebra (HCC)     Compression fracture of T9 vertebra (HCC)     Dextroscoliosis of lumbar spine     Diverticulosis     Drusen     Dry eye syndrome     Former smoker     Gastritis     GERD (gastroesophageal reflux disease)     Giant cell arteritis (HCC)     Hiatal hernia     History of shingles     HTN (hypertension)     Internal carotid artery stenosis, left     Internal carotid artery stenosis, right     Left lower lobe pneumonia     Lesion of upper eyelid     Lordosis of lumbar region     Osteopenia     SEVERE    Osteoporosis     Pleuritic pain     Radiculopathy     B/L LOWER EXTREMITY    Raynaud's disease     Renal cyst     B/L    [address]a ([facility] [address] )     Tortuous aorta (HCC)      Past Surgical History:   Procedure Laterality Date    COLONOSCOPY      COLONOSCOPY N/A 5/11/2022    Procedure: NON IMAGING INTRAOP COLONOSCOPY;  Surgeon: Nahed López MD;  Location: [address] [place] [address];  Service: General    COLONOSCOPY W/ BIOPSIES  04/24/2009    DXA PROCEDURE (HISTORICAL)  07/24/2019    EGD  04/24/2009    w/ bx    EGD AND COLONOSCOPY  05/11/2022    EXPLORATORY LAPAROTOMY W/ BOWEL RESECTION N/A 5/11/2022    Procedure: LAPAROTOMY EXPLORATORY LOW ANTERIOR RESECTION W/LOOP COLOSTOMY;  Surgeon: Nahed López MD;  Location: [facility];  Service: General    UPPER GASTROINTESTINAL ENDOSCOPY      VERTEBROPLASTY      T9, T10, T12 & L1       Social History  Social History     Substance and Sexual Activity   Alcohol Use Yes    Comment: socially     Social History     Substance and Sexual Activity   Drug Use Never     Social History     Tobacco Use   Smoking Status Former Smoker    Types: Cigarettes, Pipe   Smokeless Tobacco Never Used   Tobacco Comment    QUIT 21 YEARS AGO       Family History  Family History   Problem Relation Age of Onset    No Known Problems Mother     No Known Problems Father  Colon cancer Sister     Colon cancer Brother        Allergies  No Known Allergies    Home Meds:   Medications Prior to Admission   Medication Sig Dispense Refill Last Dose    amLODIPine (NORVASC) 5 mg tablet Take 5 mg by mouth daily   5/10/2022 at Unknown time    dextran 70-hypromellose (GENTEAL TEARS) 0 1-0 3 % ophthalmic solution 2-3x daily     5/10/2022 at Unknown time    docusate sodium (COLACE) 100 mg capsule Take 100 mg by mouth 2 (two) times a day   Past Week at Unknown time    levothyroxine (Euthyrox) 100 mcg tablet Take 1 tablet (100 mcg total) by mouth daily in the early morning 30 tablet 5 5/10/2022 at Unknown time    levothyroxine 112 mcg tablet Take 112 mcg by mouth daily   5/10/2022 at Unknown time    acetaminophen (TYLENOL) 325 mg tablet Take 650 mg by mouth every 4 (four) hours as needed for mild pain       lidocaine (LIDODERM) 5 % Apply 2 patches topically daily          Current Meds:   Scheduled Meds:  Current Facility-Administered Medications   Medication Dose Route Frequency Provider Last Rate    acetaminophen  650 mg Oral Q4H PRN Laura Soriano PA-C      cefepime  2,000 mg Intravenous Q12H Malon Mode, PA-C 2,000 mg (05/16/22 2242)    dextrose 5 % and sodium chloride 0 45 % with KCl 20 mEq/L  50 mL/hr Intravenous Continuous Malon Mode, PA-C 50 mL/hr (05/17/22 3608)    diphenhydrAMINE  25 mg Intravenous Q6H PRN Laura Soriano PA-C      heparin (porcine)  5,000 Units Subcutaneous Q12H [facility] Anthony Kinney MD      HYDROmorphone  0 2 mg Intravenous Q3H PRN Laura Soriano PA-C      ipratropium-albuterol  3 mL Nebulization Q6H PRN Ulis Brunner, MD      ipratropium-albuterol  3 mL Nebulization Q4H Malon ModeZULAY      levothyroxine  56 mcg Oral Early Morning Laura Soriano PA-C      metroNIDAZOLE  500 mg Intravenous Q8H Laura Soriano PA-C 500 mg (05/17/22 6816)    morphine injection  2 mg Intravenous Q2H PRN Anthony Kinney MD      naloxone 0 04 mg Intravenous Q1MIN PRN Aure ZULAY Ballard      ondansetron  4 mg Intravenous Q6H PRN Aure Ballard PA-C       PRN Meds:acetaminophen, 650 mg, Q4H PRN  diphenhydrAMINE, 25 mg, Q6H PRN  HYDROmorphone, 0 2 mg, Q3H PRN  ipratropium-albuterol, 3 mL, Q6H PRN  morphine injection, 2 mg, Q2H PRN  naloxone, 0 04 mg, Q1MIN PRN  ondansetron, 4 mg, Q6H PRN      IV Infusions:   dextrose 5 % and sodium chloride 0 45 % with KCl 20 mEq/L, 50 mL/hr, Last Rate: 50 mL/hr (22 0625)        ____________________________________________________________________    Objective   Vitals:   Vitals:    22 0305 22 0356 22 0704 22 0724   BP:  114/66 121/68    BP Location:       Pulse: 98 (!) 141 (!) 114    Resp: 20 18     Temp:  99 4 °F (37 4 °C) 99 3 °F (37 4 °C)    TempSrc:       SpO2: 93% 95% 96% 96%   Weight:       Height:         Weight (last 2 days)     Date/Time Weight    22 0947 63 (139)    22 0539 63 2 (139 33)    05/15/22 1834 68 (150)        Temp (24hrs), Av 1 °F (37 3 °C), Min:98 6 °F (37 °C), Max:99 7 °F (37 6 °C)  Current: Temperature: 99 3 °F (37 4 °C)        Invasive/non-invasive ventilation settings   Respiratory  Report   Lab Data (Last 4 hours)    None         O2/Vent Data (Last 4 hours)    None                Nutrition:        Diet Orders   (From admission, onward)             Start     Ordered    22 1032  Diet NPO  Diet effective now        Comments: Patient can have ice chips ad alex   References:    Nutrtion Support Algorithm Enteral vs  Parenteral   Question Answer Comment   Diet Type NPO    RD to adjust diet per protocol?  Yes        22 1033    22 0906  Room Service  Once        Question:  Type of Service  Answer:  Room Service - Appropriate with Assistance    22                Ins/Outs:   I/O       05/15 0701  05/16 0700 05/16 0701  05/17 07 07 07    I V  (mL/kg)       IV Piggyback 250 150     Total Intake(mL/kg) 250 (4) 150 (2 4)     Urine (mL/kg/hr) 3185 (2 1) 550 (0 4)     Emesis/NG output       Stool  20     Total Output 3185 570     Net -2469 -237                  Lines/Drains:  Invasive Devices  Report    Peripheral Intravenous Line  Duration           Peripheral IV 05/15/22 Distal;Right;Upper;Ventral (anterior) Antecubital 1 day    Peripheral IV 05/15/22 Left;Ventral (anterior) Wrist 1 day    Peripheral IV 05/16/22 Left Antecubital <1 day          Drain  Duration           Colostomy LLQ 6 days    Urethral Catheter Non-latex 16 Fr  1 day              ___________________________________________________________________    Physical Exam  Constitutional:       Appearance: He is well-developed  HENT:      Head: Normocephalic and atraumatic  Eyes:      Conjunctiva/sclera: Conjunctivae normal       Pupils: Pupils are equal, round, and reactive to light  Cardiovascular:      Rate and Rhythm: Normal rate and regular rhythm  Heart sounds: Normal heart sounds  No murmur heard  No friction rub  No gallop  Pulmonary:      Effort: Pulmonary effort is normal  No tachypnea, accessory muscle usage or respiratory distress  Breath sounds: Examination of the right-upper field reveals wheezing  Examination of the left-upper field reveals wheezing  Examination of the right-middle field reveals wheezing  Examination of the left-middle field reveals wheezing  Examination of the right-lower field reveals decreased breath sounds, wheezing and rhonchi  Examination of the left-lower field reveals decreased breath sounds, wheezing and rhonchi  Decreased breath sounds, wheezing and rhonchi present  No rales  Comments: 12 L and 50% on air entrainment mask at 96% resting supine in bed    Tolerates wean to 10 L and 45% and maintains 93%  Chest:      Chest wall: No tenderness  Abdominal:      General: Bowel sounds are normal  There is distension  Palpations: Abdomen is soft     Musculoskeletal:         General: Normal range of motion  Cervical back: Normal range of motion and neck supple  Right lower leg: No edema  Left lower leg: No edema  Comments: Bilateral multi Podus boots     Skin:     General: Skin is warm and dry  Neurological:      Mental Status: He is alert  He is confused        Comments: Intermittently responds appropriately with alternating periods of confusion and impulsiveness       ___________________________________________________________________    Laboratory and Diagnostics:  Results from last 7 days   Lab Units 05/17/22  0630 05/16/22  0507 05/15/22  0506 05/14/22  0455 05/13/22  0745 05/12/22  0507 05/11/22  1555   WBC Thousand/uL 15 37* 15 64* 14 89* 14 84* 17 60* 9 32 5 31   HEMOGLOBIN g/dL 8 2* 8 2* 7 8* 7 7* 8 9* 8 9* 10 0*   HEMATOCRIT % 24 2* 23 7* 23 2* 22 5* 25 8* 25 2* 27 7*   PLATELETS Thousands/uL 254 219 209 190 190 180 203   BANDS PCT % 10*  --   --   --  50* 79*  --    MONO PCT % 3*  --   --   --  3* 1*  --      Results from last 7 days   Lab Units 05/17/22  0630 05/16/22  0507 05/15/22  0506 05/14/22  0455 05/13/22  0745 05/12/22  0507 05/11/22  1851   SODIUM mmol/L 142 139 140 136 134* 129* 128*   POTASSIUM mmol/L 3 8 3 3* 3 9 3 4* 3 6 3 5 3 5   CHLORIDE mmol/L 111* 107 109* 106 102 97* 94*   CO2 mmol/L 25 27 23 23 22 24 26   ANION GAP mmol/L 6 5 8 7 10 8 8   BUN mg/dL 28* 20 18 16 19 15 12   CREATININE mg/dL 0 93 0 93 0 86 0 86 1 04 0 88 0 78   CALCIUM mg/dL 7 4* 7 6* 7 7* 7 4* 7 4* 7 0* 7 8*   GLUCOSE RANDOM mg/dL 171* 151* 138 141* 125 107 109   ALT U/L 11* 14 15  --   --  15  --    AST U/L 12 16 20  --   --  15  --    ALK PHOS U/L 47 53 58  --   --  38*  --    ALBUMIN g/dL 2 2* 2 5* 2 5*  --   --  2 7*  --    TOTAL BILIRUBIN mg/dL 0 68 0 75 0 63  --   --  1 55*  --      Results from last 7 days   Lab Units 05/17/22  0630 05/16/22  0507 05/15/22  0506 05/14/22  0455 05/13/22  0745 05/12/22  0507   MAGNESIUM mg/dL 1 9 2 0 2 2 2 3 2 2 1 3*   PHOSPHORUS mg/dL 1 6* 1 6* 1 6* 1 8* 2 4 3 0                   ABG:  Results from last 7 days   Lab Units 05/15/22  0852   PH ART  7 373   PCO2 ART mm Hg 32 4*   PO2 ART mm Hg 73 7*   HCO3 ART mmol/L 18 4*   BASE EXC ART mmol/L -6 1   ABG SOURCE  Radial, Right     VBG:  Results from last 7 days   Lab Units 05/15/22  0852   ABG SOURCE  Radial, Right     Results from last 7 days   Lab Units 05/16/22  0507   PROCALCITONIN ng/ml 2 60*       Micro  Results from last 7 days   Lab Units 05/11/22  1838   GRAM STAIN RESULT  No Polys or Bacteria seen   WOUND CULTURE  1+ Growth of Pseudomonas aeruginosa*       Imaging:   XR abdomen 1 view kub   Final Result by Elba Doe MD (05/16 1606)      Diffusely distended, dilated small bowel loops with small amount of persistent air extending to the proximal colon  Findings may suggest diffuse ileus or partial obstruction  Workstation performed: [id number]         CTA chest pe study   Final Result by Minor Zhang MD (05/15 1229)      No pulmonary embolus  Bilateral upper lobe groundglass opacities compatible with pneumonia and/or pulmonary edema  Small bilateral pleural effusions  Workstation performed: [id number]         XR chest portable   Final Result by Bernard Munoz MD (05/15 1023)      Pulmonary edema and small bilateral pleural effusions                    Workstation performed: [id number]         IR PICC line placement double lumen    (Results Pending)         Micro:   Lab Results   Component Value Date    WOUNDCULT 1+ Growth of Pseudomonas aeruginosa (A) 05/11/2022        ____________________________________________________________________

## 2022-05-18 NOTE — CONSULTS
Consultation - Infectious Disease   Madhu Jewell 80 y o  male MRN: [id number]  Unit/Bed#: ICU 04 Encounter: [id number]      IMPRESSION & RECOMMENDATIONS:   1  SIRS vs Severe Sepsis, evolving on admission  Evidenced by tachycardia, tachypnea, bandemia and encephalopathy  Suspect possible aspiration in setting of significant retained secretions, acute respiratory failure requiring supplemental oxygenation and with new NG tube for postop ileus management   -continues IV vancomycin, cefepime and Flagyl at present   -follow-up cultures and adjust antibiotics as needed  -monitor temperature and hemodynamics  -serial exam  -recheck CBC and BMP in a m   -additional supportive measures per critical care team as below  2    s/p Bowel perforation s/p 5/11/22 exploratory laparotomy, low anterior resection, protective loop transverse colostomy and segmental small bowel resection secondary to perforation rectosigmoid junction after colonoscopy  OR cultures grew Pseudomonas aeruginosa and Bacteroides fragilis  Now being managed with NGT/NPO status for post op ileus  -antibiotics as above  -monitor temperature and hemodynamics  -serial exam  -close surgical follow-up  -recheck CBC and BMP      3  Acute hypoxic respiratory failure with hypoxia  Suspicious for aspiration pneumonitis versus pneumonia   05/18/2022 CT chest with worsening patchy ground-glass and consolidative changes primarily in the bilateral upper lobes  Patient clinically weaning from 15 L mid flow 05/17/2022 to 4 L today  5/16/22 Procalcitonin level  2 60 > 5/18 1 24  -continue antibiotics as above  -secretion and pulmonary toilet management per critical care team   -monitor respiratory symptoms  -monitor oxygen requirements  -follow up sputum cx  -trend procalcitonin level     4   Aspiration pneumonitis versus pneumonia in setting of #3   05/18/2022 CT chest with worsening patchy ground-glass and consolidative changes primarily in the bilateral upper lobes  Patient clinically weaning from 15 L mid flow 05/17/2022 to 4 L today  5/16/22 Procalcitonin level  2 60 > 5/18 1 24  -continue antibiotics as above  -secretion and pulmonary toilet management per critical care team   -monitor respiratory symptoms  -monitor oxygen requirements  -follow up sputum cx  -trend procalcitonin level    Antibiotics:  Cefepime D5/Flagyl D8/Vancomycin IV D2    I have discussed the above management plan in detail with patient, RN, and the primary service    Extensive review of the medical records in epic including review of the notes, radiographs, and laboratory results     HISTORY OF PRESENT ILLNESS:  Reason for Consult: Severe Sepsis    HPI: Alyce Fitzgerald is a 80y o  year old male with chronic anemia, history of colon polyps, intermittent left lower quadrant abdominal pain and a possible diagnosis of intermittent intussusception who presented to [address] [place] [address] 5/11/22 with pneumoperitoneum  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 at [facility] 5/11/22  He had an EGD which was unremarkable  During the colonoscopy, difficulty was encountered by the gastroenterologist, the adult scope was changed to a pediatric scope and during traversal of the sigmoid colon there was a kink and the peritoneal cavity was visualized  A colon polyp had been removed prior to this  Procedure was then terminated  CT scan of the abdomen was performed which showed pneumoperitoneum  Emergency surgery consultation was obtained  Patient is now POD#7 s/p exploratory laparotomy, low anterior resection, proximal protective loop transverse colostomy and segmental small bowel resection  OR cx grew Pseudomona and Bacteroides fragilis   Patient is on Cefepime D5/Flagyl D8/and Vancomycin IV D2  Patient developed Acute respiratory failure with concerns for multifocal pneumonia and ileus clinically with tachycardia, tachypnea, bandemia developing 5/17/22 and CT imaging showing progressive infiltrates especially in the upper lobes and suspicious ileus on 05/18/2022  Infectious Disease consultation is now being requested regarding evaluation and management of severe sepsis  Patient is sluggish on exam   He is currently on one-to-one observation as he was pulling at lines yesterday evening but for PCT today he has been pretty sluggish and not combative  Patient has had some suctioned blood-tinged sputum and has nasogastric tube to suction  No reported shortness of breath, increased cough, nausea, vomiting, any stool output yet out of the ostomy and no dysuria with Farnsworth catheter in place  Sputum culture was obtained which is pending  REVIEW OF SYSTEMS:  A complete review of systems is negative other than that noted in the HPI      PAST MEDICAL HISTORY:  Past Medical History:   Diagnosis Date    Aortic valve calcification     Aortic valve stenosis     AR (aortic regurgitation)     Atelectasis     LT BASILAR PASSIVE SEGMENTAL    Autoimmune disease (HCC)     Bilateral pleural effusion     Bilateral senile cataracts     CAD (coronary artery disease)     CAD (coronary artery disease)     Central spinal stenosis     Changes in vascular appearance of retina     Chronic back pain     Colon polyp     Compression fracture of L1 lumbar vertebra (HCC) 06/02/2010    Compression fracture of T10 vertebra (HCC)     Compression fracture of T12 vertebra (HCC)     Compression fracture of T9 vertebra (HCC)     Dextroscoliosis of lumbar spine     Diverticulosis     Drusen     Dry eye syndrome     Former smoker     Gastritis     GERD (gastroesophageal reflux disease)     Giant cell arteritis (HCC)     Hiatal hernia     History of shingles     HTN (hypertension)     Internal carotid artery stenosis, left     Internal carotid artery stenosis, right     Left lower lobe pneumonia     Lesion of upper eyelid     Lordosis of lumbar region     Osteopenia     SEVERE    Osteoporosis     Pleuritic pain     Radiculopathy     B/L LOWER EXTREMITY    Raynaud's disease     Renal cyst     B/L    Scleroderma (HCC)     Tortuous aorta (HCC)      Past Surgical History:   Procedure Laterality Date    COLONOSCOPY      COLONOSCOPY N/A 2022    Procedure: NON IMAGING INTRAOP COLONOSCOPY;  Surgeon: Za Granda MD;  Location: [address] [place] [address];  Service: General    COLONOSCOPY W/ BIOPSIES  2009    DXA PROCEDURE (HISTORICAL)  2019    EGD  2009    w/ bx    EGD AND COLONOSCOPY  2022    EXPLORATORY LAPAROTOMY W/ BOWEL RESECTION N/A 2022    Procedure: LAPAROTOMY EXPLORATORY LOW ANTERIOR RESECTION W/LOOP COLOSTOMY;  Surgeon: Za Granda MD;  Location: [address] [place] [address];  Service: General    UPPER GASTROINTESTINAL ENDOSCOPY      VERTEBROPLASTY      T9, T10, T12 & L1       FAMILY HISTORY:  Non-contributory    SOCIAL HISTORY:  Social History   Social History     Substance and Sexual Activity   Alcohol Use Yes    Comment: socially     Social History     Substance and Sexual Activity   Drug Use Never     Social History     Tobacco Use   Smoking Status Former Smoker    Types: Cigarettes, Pipe   Smokeless Tobacco Never Used   Tobacco Comment    QUIT 20 YEARS AGO       ALLERGIES:  No Known Allergies    MEDICATIONS:  All current active medications have been reviewed  PHYSICAL EXAM:  Temp:  [96 8 °F (36 °C)-98 1 °F (36 7 °C)] 97 7 °F (36 5 °C)  HR:  [] 96  Resp:  [17-28] 22  BP: ()/(53-80) 97/80  SpO2:  [89 %-100 %] 96 %  Temp (24hrs), Av 7 °F (36 5 °C), Min:96 8 °F (36 °C), Max:98 1 °F (36 7 °C)  Current: Temperature: 97 7 °F (36 5 °C)    Intake/Output Summary (Last 24 hours) at 2022 1334  Last data filed at 2022 1217  Gross per 24 hour   Intake 1850 ml   Output 1825 ml   Net 25 ml       General Appearance:  80year-old elderly male, appearing sluggish, propped in ICU bed, NGT in place, resting without acute resp distress on 4L NCO2      Head: Normocephalic, without obvious abnormality, atraumatic   Eyes:  Conjunctiva pink and sclera anicteric, both eyes   Nose: Nares normal, mucosa normal, no drainage   Throat: Oropharynx moist without lesions   Neck: Supple, symmetrical, no adenopathy, no tenderness/mass/nodules   Back:   Symmetric, no curvature, ROM normal, no CVA tenderness   Lungs:   Scattered coarse upper airway secretions to auscultation bilaterally, respirations unlabored at rest on 4L NCO2, + scant blood tinged sputum in suction  canister   Chest Wall:  No tenderness or deformity   Heart:  RRR; no murmur, rub or gallop   Abdomen:   Soft, no focal tenderness, midline abdominal incision with packing dressing intact, no strike through drainage or surrounding erythema, LLQ colostomy with pink stoma, scant stool in bag, NGT to suction with mild bilious output in canister, decreased bowel sounds    Extremities: No cyanosis, clubbing or edema   Skin: No rashes or lesions  No draining wounds noted  : Farnsworth with clear, yellow urine in bag, no SPT   Lymph nodes: Cervical, supraclavicular nodes normal   Neurologic: Sluggish but arousable to name and exam, moves extremities freely and symmetric  On 1 to 1 observation to prevent line pulling       LABS, IMAGING, & OTHER STUDIES:  Lab Results:  I have personally reviewed pertinent labs    Results from last 7 days   Lab Units 05/18/22 0446 05/17/22  0630 05/16/22  0507   WBC Thousand/uL 15 24* 15 37* 15 64*   HEMOGLOBIN g/dL 8 3* 8 2* 8 2*   PLATELETS Thousands/uL 272 254 219     Results from last 7 days   Lab Units 05/18/22  0446 05/17/22  0630 05/16/22  0507   SODIUM mmol/L 141 142 139   POTASSIUM mmol/L 4 0 3 8 3 3*   CHLORIDE mmol/L 111* 111* 107   CO2 mmol/L 24 25 27   BUN mg/dL 37* 28* 20   CREATININE mg/dL 0 96 0 93 0 93   EGFR ml/min/1 73sq m 68 71 71   CALCIUM mg/dL 7 6* 7 4* 7 6*   AST U/L 12 12 16   ALT U/L 13 11* 14   ALK PHOS U/L 43* 47 53     Results from last 7 days   Lab Units 05/17/22  0937 05/17/22  0923 05/11/22  1838   SPUTUM CULTURE  Test not performed  Suggest repeat specimen  --   --    GRAM STAIN RESULT  >10 squamous epithelial cells/lpf, indicating orophayngeal contamination  *  1+ Polys*  2+ Budding Yeast with Pseudomycelia*  Rare Gram positive rods*  --  No Polys or Bacteria seen   WOUND CULTURE   --   --  1+ Growth of Pseudomonas aeruginosa*   LEGIONELLA URINARY ANTIGEN   --  Negative  --      Results from last 7 days   Lab Units 05/18/22  0446 05/16/22  0507   PROCALCITONIN ng/ml 1 24* 2 60*                   Imaging Studies:     5/18/2022 CT chest, abd, pelvis with worsening patchy ground-glass and consolidative changes primarily in the bilateral upper lobes and multiple dilated small-bowel loops suggestive of postop ileus  Other Studies:   I have personally reviewed pertinent reports

## 2022-05-18 NOTE — CASE MANAGEMENT
Case Management Discharge Planning Note    Patient name Emi Cook  Location ICU 04/ICU 04 MRN [id number]  : 2/15/1931 Date 2022       Current Admission Date: 2022  Current Admission Diagnosis:Bowel perforation [facility])   Patient Active Problem List    Diagnosis Date Noted    Ileus ([facility] ) 2022    Aspiration pneumonia ([facility] ) 2022    Severe sepsis ([facility] ) 2022    CHF (congestive heart failure) ([facility] ) 2022    Hyperglycemia 2022    Acute respiratory failure with hypoxia ([facility] ) 05/15/2022    Toxic metabolic encephalopathy     [facility]    Hypertension 2022    Degenerative disc disease at L5-S1 level 2022    Spinal stenosis 2022    Frail elderly 2022      LOS (days): 7  Geometric Mean LOS (GMLOS) (days): 10 30  Days to GMLOS:3 6     OBJECTIVE:  Risk of Unplanned Readmission Score: 17 35         Current admission status: Inpatient   Preferred Pharmacy:   [id number], [address] [address]  Phone: [phone] Fax: [phone]    [id number], [address] [place] [address]  Phone: [phone] Fax: [phone]    Primary Care Provider: Denys Chavez MD    Primary Insurance: MEDICARE  Secondary Insurance: [facility]    DISCHARGE DETAILS:    Discharge planning discussed with[de-identified] Son Tamera Schwab contacted family/caregiver?: Yes  Were Treatment Team discharge recommendations reviewed with patient/caregiver?: Yes  Did patient/caregiver verbalize understanding of patient care needs?: Yes  Were patient/caregiver advised of the risks associated with not following Treatment Team discharge recommendations?: Yes    Contacts  Patient Contacts: Hanna Millard  Relationship to Patient[de-identified] Family  Contact Method: Phone  Phone Number: [phone]  Reason/Outcome: Continuity of Care, Discharge Planning, Emergency Contact      Pt transferred to ICU due to increasing O2 requirements  SW contacted pt's son Rae Bhakta to introduce role and provide contact information  Rae Bhakta confirmed he received the email from [facility] with [address] list  Rae Bhakta stated that family is still deciding on best discharge plan for patient pending his progress and needs closer to discharge  Rae Bhakta has no questions or concerns for SW at this time  SW will continue to follow

## 2022-05-18 NOTE — DISCHARGE INSTRUCTIONS
Peripherally Inserted Central Catheter     WHAT YOU NEED TO KNOW:   A PICC is an IV placed into a large blood vessel near your heart  It is usually inserted through a blood vessel in your arm  Your PICC may have multiple ports  Ports are tubes where you can inject medicine  A PICC can stay in place for several weeks or months  You may need a PICC to get nutrition, medicine, or fluids  Blood samples can be removed from your PICC and sent to the lab for tests  DISCHARGE INSTRUCTIONS:    [address] [facility], [place], operated by [facility] patients,    Contact Interventional Radiology at [phone] PATIENTS: Contact Interventional Radiology at [phone]   [facility] PATIENTS: Contact Interventional Radiology at [phone] if:  Blood soaks through your bandage  Your arm or leg feels warm, tender, and painful  It may look swollen and red  You have trouble moving your arm  Your catheter falls out  You have a fever or swelling, redness, pain, or pus where the catheter was inserted  Persistent nausea or vomiting  You cannot flush your catheter, or you feel pain when you flush your catheter  You see a hole or crack in the tubing of your catheter  You see fluid leaking from the insertion site  You run out of supplies to care for your catheter  You have questions or concerns about your condition or care  How to Care for Your G Tube     AMBULATORY CARE:   A  gastrostomy (G) tube is a plastic tube that is put into your stomach through your skin  G tubes are most often used to give you food or liquids if you are not able to eat or drink  Medical formula, medicines, and water can be given through a G tube  After your procedure you may resume your regular diet  Start with clear liquids and advance as tolerated  Small sips of flat soda will help with nausea  Your tube can be used immediately      Contact Interventional Radiology at [phone] [place] PATIENTS: Contact Interventional Radiology at [phone]) Valbaltazar Cabrera PATIENTS: Contact Interventional Radiology at [phone]) if any of the following occur: You have severe abdominal pain  You have persistent nausea or vomiting  Blood or tube feeding fluid leaks from the G tube site  Your G tube is shorter than it was when it was put in  Your G tube comes out  You have discomfort or pain around your PEG tube site  The skin around your G tube is red, swollen, or draining pus  Your T tacks do not fall off  T tacks should fall off within four weeks of the peg tube placement  How to use your G tube: Your healthcare provider will tell you when and how often to use your PEG tube for feedings  How to care for the skin around your G tube:   Do not remove the T tacks they will fall off in 3 weeks time, they hold your G tube in place when you first get it  Leave clean bandages over the tube area for the first 24 hours after the tube is put in  You may not need to use bandages after 24 hours if the skin around the tube looks dry  Ask when you can shower or bathe  Routine skin care:    Clean the skin around your tube 1 to 2 times each day  Ask your healthcare provider what you should use to clean your skin  Check for redness and swelling in the area where the tube goes into your body  Check for fluid draining from your stoma (the hole where the tube was put in)  Keep the skin around your G tube dry  This will help prevent skin irritation and infection

## 2022-05-18 NOTE — PROGRESS NOTES
Vancomycin IV Pharmacy-to-Dose Consultation    Edward Harper is a 80 y o  male who is currently receiving Vancomycin IV with management by the Pharmacy Consult service  Assessment/Plan:  The patient was reviewed  Renal function is stable and no signs or symptoms of nephrotoxicity and/or infusion reactions were documented in the chart  Based on todays assessment, continue current vancomycin (day # 2) dosing of 1250 mg IV q24h, with a plan for trough to be drawn at 09:30 on 5/20/2022  We will continue to follow the patients culture results and clinical progress daily      Homer Doe, Pharmacist

## 2022-05-18 NOTE — PROGRESS NOTES
Progress Note - General Surgery   Mindi Stokes 80 y o  male MRN: [id number]  Unit/Bed#: ICU 04 Encounter: [id number]    Assessment:  POD#7 s/p exploratory laparotomy, low anterior resection, protective loop transverse colostomy and segmental small bowel resection secondary to perforation rectosigmoid junction after colonoscopy  Ileus: post operative ileus  Patient with abdominal pain and distension  NGT placed yesterday  Acute hypoxemic respiratory failure: Patient transferred to ICU yesterday secondary to increasing oxygen demand  Patient is now stating at 94% on 4 L     Severe sepsis: tachycardia, tachypnea and bandemia  Suspected secondary to aspiration PNA  CHF: EF 65%, close I&O  CT a/p: Worsening patchy mixed groundglass and consolidative change bilaterally compatible with multifocal pneumonia and/or pulmonary edema  Multiple dilated small bowel loops without a clear transition point  No leak observed  Plan:  Continue symptomatic and supportive care per critical care team    Continue IV antibiotics: cefepime, flagyl and vanco    Continue NGT  IR will advance NGT under fluro  Plan for Picc line today  Will order TPN  Continue wet to dry dressing midline  Plan to remove loop colostomy bridge today  Subjective/Objective   Chief Complaint:" I am okay "    Subjective: Patient was seen and examined bedside  Patient reports abdominal pain and shortness of breath  Denies nausea or vomiting  Patient     Objective:     Blood pressure 122/60, pulse 88, temperature 97 6 °F (36 4 °C), temperature source Temporal, resp  rate 18, height 5' 4" (1 626 m), weight 66 1 kg (145 lb 11 6 oz), SpO2 94 %  ,Body mass index is 25 01 kg/m²        Intake/Output Summary (Last 24 hours) at 5/18/2022 0925  Last data filed at 5/18/2022 0801  Gross per 24 hour   Intake 1550 ml   Output 900 ml   Net 650 ml       Invasive Devices  Report    Peripheral Intravenous Line  Duration           Peripheral IV 05/15/22 Left;Ventral (anterior) Wrist 2 days    Peripheral IV 05/16/22 Left Antecubital 2 days          Drain  Duration           Colostomy LLQ 7 days    Urethral Catheter Non-latex 16 Fr  2 days    NG/OG/Enteral Tube Nasogastric Right nare <1 day                Physical Exam: /64 (BP Location: Left arm)   Pulse 91   Temp 97 6 °F (36 4 °C) (Temporal)   Resp 17   Ht 5' 4" (1 626 m)   Wt 66 1 kg (145 lb 11 6 oz)   SpO2 97%   BMI 25 01 kg/m²   General appearance: alert  Lungs: diminished breath sounds and rhonchi  Heart: Rate and rhythm  Abdomen: Distended, generalized tenderness, colostomy stoma pink in color, no output noted in ostomy bag  Lab, Imaging and other studies:  I have personally reviewed pertinent lab results    , CBC:   Lab Results   Component Value Date    WBC 15 24 (H) 05/18/2022    HGB 8 3 (L) 05/18/2022    HCT 24 1 (L) 05/18/2022     (H) 05/18/2022     05/18/2022    MCH 35 5 (H) 05/18/2022    MCHC 34 4 05/18/2022    RDW 17 0 (H) 05/18/2022    MPV 10 9 05/18/2022   , CMP:   Lab Results   Component Value Date    SODIUM 141 05/18/2022    K 4 0 05/18/2022     (H) 05/18/2022    CO2 24 05/18/2022    BUN 37 (H) 05/18/2022    CREATININE 0 96 05/18/2022    CALCIUM 7 6 (L) 05/18/2022    AST 12 05/18/2022    ALT 13 05/18/2022    ALKPHOS 43 (L) 05/18/2022    EGFR 68 05/18/2022     VTE Pharmacologic Prophylaxis: Heparin  VTE Mechanical Prophylaxis: sequential compression device

## 2022-05-18 NOTE — PHYSICAL THERAPY NOTE
05/18/22 1020   Note Type   Note Type Cancelled Session   Cancel Reasons Medical status  (Pt very lethargic, unable to participate with PT)   Licensure   [place] License Number  Ana Fabry PT [id number]

## 2022-05-18 NOTE — PROCEDURES
PICC Line Insertion    Date/Time: 5/18/2022 5:27 PM  Performed by: Camden Pruitt MD  Authorized by: Kandice Anguiano MD     Patient location:  IR  Consent:     Consent obtained:  Written    Consent given by:  Healthcare agent    Risks discussed:  Arterial puncture and bleeding    Alternatives discussed:  No treatment and delayed treatment  Universal protocol:     Procedure explained and questions answered to patient or proxy's satisfaction: yes      Relevant documents present and verified: yes      Test results available and properly labeled: yes      Radiology Images displayed and confirmed  If images not available, report reviewed: yes      Required blood products, implants, devices, and special equipment available: yes      Site/side marked: yes      Immediately prior to procedure, a time out was called: yes      Patient identity confirmed:  Verbally with patient, arm band and provided demographic data  Pre-procedure details:     Hand hygiene: Hand hygiene performed prior to insertion      Sterile barrier technique: All elements of maximal sterile technique followed      Skin preparation:  ChloraPrep    Skin preparation agent: Skin preparation agent completely dried prior to procedure    Indications:     PICC line indications: total parenteral nutrition    Anesthesia (see MAR for exact dosages):      Anesthesia method:  Local infiltration    Local anesthetic:  Lidocaine 1% w/o epi  Procedure details:     Location:  Basilic    Vessel type: vein      Laterality:  Right    Approach: percutaneous technique used      Patient position:  Flat    Procedural supplies:  Double lumen    Catheter size:  5 Fr    Landmarks identified: yes      Ultrasound guidance: yes      Ultrasound image availability:  Images available in PACS    Sterile ultrasound techniques: Sterile gel and sterile probe covers were used      Number of attempts:  1    Successful placement: yes      Total catheter length (cm):  41    Arm circumference: 25  Post-procedure details:     Post-procedure:  Dressing applied and securement device placed    Assessment:  Blood return through all ports and placement verified by x-ray    Post-procedure complications: none      Patient tolerance of procedure:   Tolerated well, no immediate complications    Observer: Yes      Observer name:  Viviann Nageotte, RN

## 2022-05-18 NOTE — PROGRESS NOTES
Tami 45  Progress Note - Melo Hudson 2/15/1931, 80 y o  male MRN: [id number]  Unit/Bed#: ICU 04 Encounter: [id number]  Primary Care Provider: Krystina Delcid MD   Date and time admitted to hospital: 5/11/2022  4:48 PM    * Bowel perforation [facility])  Assessment & Plan  · outpatient EGD and colonoscopy at [facility] on  5/11 for w/u of chronic anemia, history of colon polyps, intermittent LLQ abdominal pain and possible intermittent intussusception  · EGD unremarklable, colonoscopy technically difficult and required changed from adult scope to pediatric scope, but during traversal of the sigmoid colon there was a kink and patient sustained a perforation  · Procedure was aborted and patient was transferred to [facility] where immediate surgical consultation was obtained  Patient was reported to have obvious signs of peritonitis on exam and CT ab/pelvis demonstrated pneumoperitoneum  · Urgently to the OR with Dr Maddy Burton for ex-lap, low anterior resection, protection loop transverse colostomy, flex sigmoidoscopy, and segmental small bowel resection  · POD #7  · CT without leak or abscess  · Now with post-op ileus, minimal ostomy output, plan as below   · Appreciate surgery team input     Ileus [facility])  Assessment & Plan  · Developed post-op ileus with abdominal distention, and nausea/vomiting  · NGT placed with difficulty, on imaging terminated in distal esophagus, attempted to be advanced twice without significant drainage despite distended stomach on imaging   · 5/17 CT - Multiple dilated small bowel loops without a clear transition point  The finding may reflect ileus, however developing small bowel obstruction cannot be excluded  Follow-up is recommended    · AM KUB ordered to eval contrast follow through and NGT positioning   · Minimal to no ostomy output - monitor   · May need PICC line insertion and TPN initiation if unable to tolerate tube feedings after gastric decompression   · If going to IR consider image guided NGT repositioning   · Getting frequent IV opioids for abdominal pain can be contributing to ileus, attempt to improve multimodal pain regimen if able to tolerate any PO    Acute respiratory failure with hypoxia (HCC)  Assessment & Plan  · Increasing O2 requirements the last 48 hours, was on 4L then required midflow increased to 15L  · Concern for aspiration with increasing abdominal distension secondary to ileus   · CXR with b/l pulmonary vascular congestion and worsening b/l upper lobe infiltrates R > L,  · 5/17 CT chest with worsening patchy mixed groundglass and consolidative change bilaterally, most prominent in the bilateral upper lobes concerning for multifocal PNA and/or pulmonary edema  Bilateral lower lobe compressive atelectasis, Moderate left and small right pleural effusions, increased since the prior study    · Received 40 mg IV lasix with some improvement - consider repeat dosing today   · Atrovent/xopenex/mucomyst nebs q8h  · Continue cefepime/vancomycin/flagyl   · Trend procal   · PRN NT suctioning, patient with weak moist cough  · Encourage coughing and deep breathing, IS q1h while awake   · Down to 5 L MF from 15 L, continue to wean for spo2 >92%    Severe sepsis (HCC)  Assessment & Plan  · As evidenced by tachycardia, tachypnea, and bandemia  · Source aspiration pneumonia vs intra-abdominal infection  · procal 2 60, continue to trend   · Trend temps and WBC   · Cefepime day #5, flagyl day #8, vanco day #2  · Blood cultures not obtained and abx therapy has already been in process  · Wound culture from OR 5/11 pseudomonas sensitive to cefepime   · CT chest/ab/pelvis with concern of multifocal PNA, no visualized intraabdominal abscess or anastomotic leak     Aspiration pneumonia (HCC)  Assessment & Plan  · CXR with b/l pulmonary vascular congestion and worsening b/l upper lobe infiltrates R > L, multifocal consolidation on CT chest   · Concern for aspiration pneumonia as patient has developed post-op ileus with abdominal distention and has been encephalopathic  · Cefepime day #5, flagyl day #8, vanco day #2  · Strep pneumo and legionella negative  · MRSA nasal surveillance pending  · Sputum culture unable to be run     CHF (congestive heart failure) ([facility] [address] )  Assessment & Plan  Wt Readings from Last 3 Encounters:   05/16/22 63 kg (139 lb)   05/11/22 62 1 kg (136 lb 14 4 oz)   03/25/22 61 2 kg (135 lb)     · 5/16: Echo-EF 65%, mild TR, mild MR  · Vascular congestion evident on CXR  · Received 40mg IV Lasix, consider re-dosing today   · Monitor I&O  · Daily weights         Hyperglycemia  Assessment & Plan  · No hx of diabetes  · Stop dextrose containing fluids   · Check hgb A1c     Toxic metabolic encephalopathy  Assessment & Plan  · Likely in setting of acute illness  · No focal deficits  · 1:1 monitoring as patient was attempting to self d/c lines/tubes   · Neuro checks q4h  · Delirium precautions; regulate sleep/wake cycle, environmental controls, daily CAM ICU     ----------------------------------------------------------------------------------------  HPI/24hr events: 79 yo POD 7 ex-la, low anterior resection, proximal protective loop transverse colostomy and segmental small bowel resection  Decompensation over past 24-48 hours with abdominal distension and post-op ileus and acute hypoxic respiratory failure on midflow  Upgraded to SD unit yesterday  Difficult NGT placement yesterday with tip terminating in distal esophagus, advanced overnight without drainage of gastric contents  CT revealing multifocal GGO, BL effusions and multiple small bowel loops without clear transition point reflecting ileus but does not exclude developing SBO  Received 40 mg IV lasix yesterday, hypoxia has improved throughout the night weaned down to 5 L NC from 15 L       Patient appropriate for transfer out of the ICU today?: No  Disposition: Continue Stepdown Level 1 level of care   Code Status: Level 1 - Full Code  ---------------------------------------------------------------------------------------  SUBJECTIVE  Complaining of abdominal pain, improved with doses of pain medication   Reports coughing but unable to bring anything up     Review of Systems   Constitutional: Positive for fatigue  Respiratory: Positive for cough  Negative for shortness of breath  Cardiovascular: Negative for chest pain, palpitations and leg swelling  Gastrointestinal: Positive for abdominal distention and abdominal pain  Negative for nausea and vomiting  Genitourinary: Negative  Skin: Negative  Neurological: Negative  Review of systems was reviewed and negative unless stated above in HPI/24-hour events   ---------------------------------------------------------------------------------------  OBJECTIVE    Vitals   Vitals:    22 0300 22 0400 22 0500 22 0513   BP: 114/57 121/56 125/60    Pulse: 87 86 93    Resp: 21 18 (!) 28    Temp:  97 7 °F (36 5 °C)     TempSrc:  Temporal     SpO2: 97% 95% 92%    Weight:    66 1 kg (145 lb 11 6 oz)   Height:         Temp (24hrs), Av °F (36 7 °C), Min:96 8 °F (36 °C), Max:99 3 °F (37 4 °C)  Current: Temperature: 97 7 °F (36 5 °C)          Respiratory:  SpO2: SpO2: 92 %, SpO2 Device: O2 Device: Mid flow nasal cannula  Nasal Cannula O2 Flow Rate (L/min): 5 L/min    Invasive/non-invasive ventilation settings   Respiratory  Report   Lab Data (Last 4 hours)    None         O2/Vent Data (Last 4 hours)    None                Physical Exam  Vitals reviewed  Constitutional:       General: He is awake  Appearance: He is ill-appearing  He is not toxic-appearing or diaphoretic  Interventions: Nasal cannula in place  Eyes:      Pupils: Pupils are equal, round, and reactive to light  Cardiovascular:      Rate and Rhythm: Normal rate and regular rhythm  Heart sounds: Normal heart sounds     Pulmonary:      Effort: No tachypnea, accessory muscle usage or respiratory distress  Breath sounds: Decreased breath sounds and rhonchi (scattered) present  Abdominal:      General: There is distension  Palpations: Abdomen is soft  Tenderness: There is abdominal tenderness (generalized)  Comments: Midline incision with dressing in place clean and dry   Ostomy pink, ostomy bag with scant amount of liquid   Musculoskeletal:      Right lower leg: No edema  Left lower leg: No edema  Skin:     General: Skin is warm and dry  Neurological:      General: No focal deficit present  Mental Status: He is alert               Laboratory and Diagnostics:  Results from last 7 days   Lab Units 05/18/22 0446 05/17/22  0630 05/16/22  0507 05/15/22  0506 05/14/22  0455 05/13/22  0745 05/12/22  0507   WBC Thousand/uL 15 24* 15 37* 15 64* 14 89* 14 84* 17 60* 9 32   HEMOGLOBIN g/dL 8 3* 8 2* 8 2* 7 8* 7 7* 8 9* 8 9*   HEMATOCRIT % 24 1* 24 2* 23 7* 23 2* 22 5* 25 8* 25 2*   PLATELETS Thousands/uL 272 254 219 209 190 190 180   BANDS PCT %  --  10*  --   --   --  50* 79*   MONO PCT %  --  3*  --   --   --  3* 1*     Results from last 7 days   Lab Units 05/18/22 0446 05/17/22  0630 05/16/22  0507 05/15/22  0506 05/14/22  0455 05/13/22  0745 05/12/22  0507   SODIUM mmol/L 141 142 139 140 136 134* 129*   POTASSIUM mmol/L 4 0 3 8 3 3* 3 9 3 4* 3 6 3 5   CHLORIDE mmol/L 111* 111* 107 109* 106 102 97*   CO2 mmol/L 24 25 27 23 23 22 24   ANION GAP mmol/L 6 6 5 8 7 10 8   BUN mg/dL 37* 28* 20 18 16 19 15   CREATININE mg/dL 0 96 0 93 0 93 0 86 0 86 1 04 0 88   CALCIUM mg/dL 7 6* 7 4* 7 6* 7 7* 7 4* 7 4* 7 0*   GLUCOSE RANDOM mg/dL 128 171* 151* 138 141* 125 107   ALT U/L 13 11* 14 15  --   --  15   AST U/L 12 12 16 20  --   --  15   ALK PHOS U/L 43* 47 53 58  --   --  38*   ALBUMIN g/dL 2 2* 2 2* 2 5* 2 5*  --   --  2 7*   TOTAL BILIRUBIN mg/dL 0 71 0 68 0 75 0 63  --   --  1 55*     Results from last 7 days   Lab Units 05/18/22  0446 05/17/22  0630 05/16/22  0507 05/15/22  0506 05/14/22  0455 05/13/22  0745 05/12/22  0507   MAGNESIUM mg/dL 2 6 1 9 2 0 2 2 2 3 2 2 1 3*   PHOSPHORUS mg/dL 2 6 1 6* 1 6* 1 6* 1 8* 2 4 3 0               Results from last 7 days   Lab Units 05/17/22 2003   LACTIC ACID mmol/L 1 1     ABG:  Results from last 7 days   Lab Units 05/15/22  0852   PH ART  7 373   PCO2 ART mm Hg 32 4*   PO2 ART mm Hg 73 7*   HCO3 ART mmol/L 18 4*   BASE EXC ART mmol/L -6 1   ABG SOURCE  Radial, Right     VBG:  Results from last 7 days   Lab Units 05/15/22  0852   ABG SOURCE  Radial, Right     Results from last 7 days   Lab Units 05/18/22  0446 05/16/22  0507   PROCALCITONIN ng/ml 1 24* 2 60*       Micro  Results from last 7 days   Lab Units 05/17/22  0937 05/17/22  0923 05/11/22  1838   SPUTUM CULTURE  Test not performed  Suggest repeat specimen  --   --    GRAM STAIN RESULT  >10 squamous epithelial cells/lpf, indicating orophayngeal contamination  *  1+ Polys*  2+ Budding Yeast with Pseudomycelia*  Rare Gram positive rods*  --  No Polys or Bacteria seen   WOUND CULTURE   --   --  1+ Growth of Pseudomonas aeruginosa*   LEGIONELLA URINARY ANTIGEN   --  Negative  --    STREP PNEUMONIAE ANTIGEN, URINE   --  Negative  --        EKG: NSR on telemetry   Imaging: I have personally reviewed pertinent films in PACS with a Radiologist     Intake and Output  I/O       05/16 0701 05/17 0700 05/17 0701 05/18 0700    I V  (mL/kg)  100 (1 5)    NG/GT  800    IV Piggyback 150 450    Total Intake(mL/kg) 150 (2 4) 1350 (20 4)    Urine (mL/kg/hr) 550 (0 4) 650 (0 4)    Emesis/NG output  250    Stool 20     Total Output 570 900    Net -420 +450                Height and Weights   Height: 5' 4" (162 6 cm)  IBW (Ideal Body Weight): 59 2 kg  Body mass index is 25 01 kg/m²    Weight (last 2 days)     Date/Time Weight    05/18/22 0513 66 1 (145 72)    05/16/22 0947 63 (139)    05/16/22 0539 63 2 (139 33)            Nutrition       Diet Orders   (From admission, onward)             Start     Ordered    05/12/22 1032  Diet NPO  Diet effective now        Comments: Patient can have ice chips ad alex   References:    Nutrtion Support Algorithm Enteral vs  Parenteral   Question Answer Comment   Diet Type NPO    RD to adjust diet per protocol?  Yes        05/12/22 1033    05/12/22 0906  Room Service  Once        Question:  Type of Service  Answer:  Room Service - Appropriate with Assistance    05/12/22 0905                  Active Medications  Scheduled Meds:  Current Facility-Administered Medications   Medication Dose Route Frequency Provider Last Rate    acetaminophen  650 mg Rectal Q6H PRN Karlyne Royalty V, CRNP      acetylcysteine  3 mL Nebulization Q8H Cherri Spironello V, CRNP      cefepime  2,000 mg Intravenous Q12H Cherri Spironello V, CRNP Stopped (05/18/22 0030)    heparin (porcine)  5,000 Units Subcutaneous Q12H [address] Cherri Spironello V, CRNP      HYDROmorphone  0 5 mg Intravenous Q3H PRN Cherri Spironello V, CRNP      iohexol  50 mL Oral Once in imaging Karlyne Royalty V, CRNP      ipratropium-albuterol  3 mL Nebulization Q6H PRN Cherri Spironello V, CRNP      ipratropium-albuterol  3 mL Nebulization Q8H Cherri Spironello V, CRNP      metroNIDAZOLE  500 mg Intravenous Q8H Cherri Spironello V, CRNP 500 mg (05/18/22 0045)    ondansetron  4 mg Intravenous Q6H PRN Cherri Spironello V, CRNP      vancomycin  20 mg/kg Intravenous Q24H Cherri Spironello V, CRNP 1,250 mg (05/17/22 1032)     Continuous Infusions:     PRN Meds:   acetaminophen, 650 mg, Q6H PRN  HYDROmorphone, 0 5 mg, Q3H PRN  iohexol, 50 mL, Once in imaging  ipratropium-albuterol, 3 mL, Q6H PRN  ondansetron, 4 mg, Q6H PRN        Invasive Devices Review  Invasive Devices  Report    Peripheral Intravenous Line  Duration           Peripheral IV 05/15/22 Left;Ventral (anterior) Wrist 2 days    Peripheral IV 05/16/22 Left Antecubital 1 day          Drain  Duration Colostomy LLQ 7 days    Urethral Catheter Non-latex 16 Fr  2 days    NG/OG/Enteral Tube Nasogastric Right nare <1 day                Rationale for remaining devices: [place] - accurate I/O in critically ill   ---------------------------------------------------------------------------------------  Advance Directive and Living Will:      Power of :    POLST:    ---------------------------------------------------------------------------------------  Care Time Delivered:   No Critical Care time spent       Yeimy Gurrola PA-C      Portions of the record may have been created with voice recognition software  Occasional wrong word or "sound a like" substitutions may have occurred due to the inherent limitations of voice recognition software    Read the chart carefully and recognize, using context, where substitutions have occurred

## 2022-05-18 NOTE — SEDATION DOCUMENTATION
Pt tolerated PICC placement  Right basilic  Vitals stable  ICU requested NG tube placement  Left nare NG tube placed and verified by Xray  Pt transported to ICU   Report given to receiving nurse

## 2022-05-18 NOTE — BRIEF OP NOTE (RAD/CATH)
INTERVENTIONAL RADIOLOGY PROCEDURE NOTE    Date: 5/18/2022    Procedure: IR PICC PLACEMENT DOUBLE LUMEN   NG tube placement    Preoperative diagnosis:   1  Bowel perforation (HCC)    2  Abdominal pain    3  Hypoxia    4  Pneumonia of both lungs due to infectious organism, unspecified part of lung    5  Severe sepsis (HCC)       Postoperative diagnosis: Same  Surgeon: Sharmaine Dutta MD     Assistant: None  No qualified resident was available  Blood loss: minimal    Specimens: none    Findings: Successful NG tube placement with it's tip in the stomach  May be used immediately  Successful PICC line placement  May be used immediately  Complications: None immediate      Anesthesia: local

## 2022-05-19 NOTE — ASSESSMENT & PLAN NOTE
Wt Readings from Last 3 Encounters:   05/19/22 66 4 kg (146 lb 6 2 oz)   05/11/22 62 1 kg (136 lb 14 4 oz)   03/25/22 61 2 kg (135 lb)     · 5/16: Echo-EF 65%, mild TR, mild MR  · Vascular congestion evident on CXR  · Received 40mg IV Lasix, consider re-dosing today   · Monitor I&O  · Daily weights

## 2022-05-19 NOTE — ASSESSMENT & PLAN NOTE
· CXR with b/l pulmonary vascular congestion and worsening b/l upper lobe infiltrates R > L, multifocal consolidation on CT chest   · Concern for aspiration pneumonia as patient has developed post-op ileus with abdominal distention and has been encephalopathic  · Strep pneumo and legionella negative  · MRSA nasal surveillance negative  · Sputum culture unable to be run   · Continue plan as outlined

## 2022-05-19 NOTE — ASSESSMENT & PLAN NOTE
· Developed post-op ileus with abdominal distention, and nausea/vomiting  · NGT placed with difficulty, on imaging terminated in distal esophagus, attempted to be advanced twice without significant drainage despite distended stomach on imaging   · 5/17 CT - Multiple dilated small bowel loops without a clear transition point  The finding may reflect ileus, however developing small bowel obstruction cannot be excluded  Follow-up is recommended    · AM KUB without contrast progressing to colon   · Minimal to no ostomy output - monitor   · NGT replaced by IR on 5/18 - continue to suction, about 500 cc output/24hrs   · PICC line placed, TPN started overnight   · Getting frequent IV opioids for abdominal pain can be contributing to ileus, attempt to improve multimodal pain regimen if able to tolerate any PO

## 2022-05-19 NOTE — PROGRESS NOTES
Progress Note - Infectious Disease   Fady Hudson 80 y o  male MRN: [id number]  Unit/Bed#: ICU 04 Encounter: [id number]      Impression/Plan:  1  SIRS vs Severe Sepsis, evolving on admission  Evidenced by tachycardia, tachypnea, bandemia and encephalopathy  Suspect possible aspiration in setting of significant retained secretions, acute respiratory failure requiring supplemental oxygenation and with new NG tube for postop ileus management  MRSA screen negative  WBC down trending, 13 9 K  -discontinue IV vancomycin  -continue cefepime and Flagyl as below  -follow-up blood cultures   -monitor temperature and hemodynamics  -serial exam  -recheck CBC and BMP in a m   -additional supportive measures per critical care team as below      2    Peritonitis s/p Bowel perforation  s/p 5/11/22 exploratory laparotomy, low anterior resection, protective loop transverse colostomy and segmental small bowel resection secondary to perforation rectosigmoid junction after colonoscopy  OR cultures grew Pseudomonas aeruginosa and Bacteroides fragilis  Now being managed with NGT/NPO status for post op ileus  -antibiotics as above  -monitor temperature and hemodynamics  -serial exam  -close surgical follow-up  -recheck CBC and BMP   -plan to complete 2 weeks antibiotics total through 5/27/22     3   Acute hypoxic respiratory failure with hypoxia  Suspicious for aspiration pneumonitis versus pneumonia   05/18/2022 CT chest with worsening patchy ground-glass and consolidative changes primarily in the bilateral upper lobes  Patient clinically weaning from 15 L mid flow 05/17/2022 to 5 L today  5/16/22 Procalcitonin level  2 60 > 5/19 0 99  -continue antibiotics as above  -secretion and pulmonary toilet management per critical care team   -monitor respiratory symptoms  -monitor oxygen requirements  -follow up sputum cx  -trend procalcitonin level     4   Aspiration pneumonitis versus pneumonia in setting of #3   05/18/2022 CT chest with worsening patchy ground-glass and consolidative changes primarily in the bilateral upper lobes  Patient clinically weaning from 15 L mid flow 2022 to 5 L today  22 Procalcitonin level  2 60 >  1 24  22 sputum specimen oral pharyngeal contamination   -continue antibiotics as above  -secretion and pulmonary toilet management per critical care team   -monitor respiratory symptoms  -monitor oxygen requirements     5  CKD 3  CrCl 33%  -renal dose adjust antibiotics as needed  -volume management  -monitor creatinine    Antibiotics:  Cefepime D6/Flagyl D9     Above impression and plan discussed in detail with patient, son at bedside, PCT, RN, and critical care team     Subjective:  Patient has no fever, chills, sweats; no nausea, vomiting, diarrhea; no increased cough, shortness of breath; + post op abd pain  No new symptoms  Appears to be tolerating IV antibiotics    Objective:  Vitals:  Temp:  [97 4 °F (36 3 °C)-98 °F (36 7 °C)] 97 7 °F (36 5 °C)  HR:  [] 90  Resp:  [15-27] 24  BP: ()/(44-73) 139/67  SpO2:  [91 %-98 %] 97 %  Temp (24hrs), Av 6 °F (36 4 °C), Min:97 4 °F (36 3 °C), Max:98 °F (36 7 °C)  Current: Temperature: 97 7 °F (36 5 °C)    Physical Exam:   General Appearance:  80year-old acute on chronically debilitated male, sluggish, propped fairly comfortably in recliner, arousable to exam, no acute distress statting 97% on 5L NCO2  Throat: Oropharynx moist without lesions  Lungs:   Scattered upper airway secretions decrease with clearing cough to auscultation bilaterally; no wheezes, rhonchi or rales; respirations unlabored rest   Heart:  RRR; no murmur   Abdomen:   Soft, non-tender, non-distended, midline abdominal incision with packed dressing intact, no strike through drainage or surrounding erythema, LLQ colostomy with pink stoma, scant stool in bag, nasogastric tube to suction with bilious output in tubing, decreased bowel sounds       Extremities: No clubbing, cyanosis or edema   : [place] with clear, yellow urine in bag, no SPT   Skin: No new rashes or lesions  New right arm PICC site nontender  Labs, Imaging, & Other studies:   All pertinent labs and imaging studies were personally reviewed  Results from last 7 days   Lab Units 05/19/22  0517 05/18/22  0446 05/17/22  0630   WBC Thousand/uL 13 94* 15 24* 15 37*   HEMOGLOBIN g/dL 8 4* 8 3* 8 2*   PLATELETS Thousands/uL 314 272 254     Results from last 7 days   Lab Units 05/19/22  0517 05/18/22  0446 05/17/22  0630 05/16/22  0507   SODIUM mmol/L 144 141 142 139   POTASSIUM mmol/L 3 7 4 0 3 8 3 3*   CHLORIDE mmol/L 114* 111* 111* 107   CO2 mmol/L 25 24 25 27   BUN mg/dL 50* 37* 28* 20   CREATININE mg/dL 1 20 0 96 0 93 0 93   EGFR ml/min/1 73sq m 52 68 71 71   CALCIUM mg/dL 7 7* 7 6* 7 4* 7 6*   AST U/L  --  12 12 16   ALT U/L  --  13 11* 14   ALK PHOS U/L  --  43* 47 53     Results from last 7 days   Lab Units 05/18/22  1601 05/18/22  1559 05/17/22  0937 05/17/22  0923   BLOOD CULTURE  Received in Microbiology Lab  Culture in Progress  Received in Microbiology Lab  Culture in Progress  --   --    SPUTUM CULTURE   --   --  Test not performed  Suggest repeat specimen  --    GRAM STAIN RESULT   --   --  >10 squamous epithelial cells/lpf, indicating orophayngeal contamination  *  1+ Polys*  2+ Budding Yeast with Pseudomycelia*  Rare Gram positive rods*  --    MRSA CULTURE ONLY   --   --  No Methicillin Resistant Staphlyococcus aureus (MRSA) isolated  --    LEGIONELLA URINARY ANTIGEN   --   --   --  Negative     Results from last 7 days   Lab Units 05/19/22 0517 05/18/22  0446 05/16/22  0507   PROCALCITONIN ng/ml 0 99* 1 24* 2 60*

## 2022-05-19 NOTE — PHYSICAL THERAPY NOTE
PT TREATMENT     05/19/22 4596   Note Type   Note Type Treatment   Pain Assessment   Pain Assessment Tool Boudreaux-Baker FACES   Boudreaux-Baker FACES Pain Rating 6  (back and neck as per patient)   Restrictions/Precautions   Other Precautions Chair Alarm; Bed Alarm;Multiple lines; Fall Risk;Pain;O2  (in ICU with NG tube)   General   Chart Reviewed Yes   Family/Caregiver Present Yes  (son and 1:1 staff member)   Cognition   Arousal/Participation Cooperative   Subjective   Subjective patient states having pain "all over" and wanting meds, nurse made aware   Bed Mobility   Additional Comments patient sitting in bedside chair upon arrival by therapist   Transfers   Sit to Stand Unable to assess   Additional Comments patient with pain and fatigue at this time, standing deferred   Balance   Static Sitting Fair -   Dynamic Sitting Poor +   Activity Tolerance   Activity Tolerance Patient limited by fatigue;Patient limited by pain;Treatment limited secondary to medical complications (Comment)  (in ICU, multiple lines and NG tube)   Nurse Made Aware yes   Exercises   Hamstring Stretch Sitting;5 reps;PROM   Quad Sets Sitting;10 reps   Heelslides Sitting;10 reps   Glute Sets Sitting;5 reps   Hip Flexion Sitting;5 reps   Hip Abduction Sitting;10 reps   Knee AROM Short Arc Quad Sitting;10 reps   Knee AROM Long Arc Quad Sitting;10 reps   Ankle Pumps Sitting;10 reps   Balance training  sitting balance activity completed on chair edge   Assessment   Assessment patient with increased alertness and participation and will benefit from continued PT with progression to stand and gait next session  The patient's AM-PAC Basic Mobility Inpatient Short Form Raw Score is 9  Plan   Treatment/Interventions ADL retraining;Functional transfer training;LE strengthening/ROM; Elevations; Therapeutic exercise; Endurance training;Patient/family training;Equipment eval/education; Bed mobility;Gait training   Recommendation   PT Discharge Recommendation Post acute rehabilitation services   AM-PAC Basic Mobility Inpatient   Turning in Bed Without Bedrails 3   Lying on Back to Sitting on Edge of Flat Bed 2   Moving Bed to Chair 1   Standing Up From Chair 1   Walk in Room 1   Climb 3-5 Stairs 1   Basic Mobility Inpatient Raw Score 9   Turning Head Towards Sound 3   Follow Simple Instructions 3   Low Function Basic Mobility Raw Score 15   Low Function Basic Mobility Standardized Score 23 9   Highest Level Of Mobility   JH-HLM Goal 3: Sit at edge of bed   JH-HLM Achieved 4: Move to chair/commode   Education   Patient Reinforcement needed; Explanation/teachback used   [employer] License Number  Paola Martinez  [id number]

## 2022-05-19 NOTE — WOUND OSTOMY CARE
Progress Note - Wound   Fady Hudson 80 y o  male MRN: [id number]  Unit/Bed#: ICU 04 Encounter: [id number]      Assessment:   The patient was seen today to check patency of ostomy appliance to left quadrant  The ostomy has minimal sanguinous drainage and the appliance is intact  The patient was out of bed sleeping in reclining chair with 1:1 sitter in place  Plan:   The patient will be followed up for wound and ostomy care throughout admission and supplies to be ordered before patient is discharged        Alen Snyder RN, BSN, [employer]

## 2022-05-19 NOTE — PROGRESS NOTES
[address]  66   Progress Note - Porfirio Hudson 2/15/1931, 80 y o  male MRN: [id number]  Unit/Bed#: ICU 04 Encounter: [id number]  Primary Care Provider: Amina Zayas MD   Date and time admitted to hospital: 5/11/2022  4:48 PM    * Bowel perforation [facility])  Assessment & Plan  · Outpatient EGD and colonoscopy at [facility] on 5/11 for w/u of chronic anemia, history of colon polyps, intermittent LLQ abdominal pain and possible intermittent intussusception  · EGD unremarklable, colonoscopy technically difficult and required changed from adult scope to pediatric scope, but during traversal of the sigmoid colon there was a kink and patient sustained a perforation  · Procedure was aborted and patient was transferred to [facility] where immediate surgical consultation was obtained  Patient was reported to have obvious signs of peritonitis on exam and CT ab/pelvis demonstrated pneumoperitoneum  · Urgently to the OR with Dr Gopal Marquis for ex-lap, low anterior resection, protection loop transverse colostomy, flex sigmoidoscopy, and segmental small bowel resection  · POD #8  · CT without leak or abscess  · Now with post-op ileus, minimal ostomy output, plan as below   · Appreciate surgery team input     Ileus [facility])  Assessment & Plan  · Developed post-op ileus with abdominal distention, and nausea/vomiting  · NGT placed with difficulty, on imaging terminated in distal esophagus, attempted to be advanced twice without significant drainage despite distended stomach on imaging   · 5/17 CT - Multiple dilated small bowel loops without a clear transition point  The finding may reflect ileus, however developing small bowel obstruction cannot be excluded  Follow-up is recommended    · AM KUB without contrast progressing to colon   · Minimal to no ostomy output - monitor   · NGT replaced by IR on 5/18 - continue to suction, about 500 cc output/24hrs   · PICC line placed, TPN started overnight   · Getting frequent IV opioids for abdominal pain can be contributing to ileus, attempt to improve multimodal pain regimen if able to tolerate any PO    Acute respiratory failure with hypoxia (HCC)  Assessment & Plan  · Increasing O2 requirements on the general/medical floor  · Concern for aspiration with increasing abdominal distension secondary to ileus   · CXR with b/l pulmonary vascular congestion and worsening b/l upper lobe infiltrates R > L,  · 5/17 CT chest with worsening patchy mixed groundglass and consolidative change bilaterally, most prominent in the bilateral upper lobes concerning for multifocal PNA and/or pulmonary edema  Bilateral lower lobe compressive atelectasis, Moderate left and small right pleural effusions, increased since the prior study    · Receivied lasix with improvement in resp status - redose today   · Atrovent/xopenex/mucomyst nebs q8h  · Continue flagyl, day# 9/ cefepime, day# 6 /vancomycin, day# 3   · Procal trending down 1 2 > 0 9  · PRN NT suctioning, patient with weak moist cough  · Encourage coughing and deep breathing, IS q1h while awake, flutter valve  · Down to 6 L MF from 15 L, continue to wean for spo2 >92%    Severe sepsis (HCC)  Assessment & Plan  · As evidenced by tachycardia, tachypnea, and bandemia  · Source aspiration pneumonia vs intra-abdominal infection  · Procal trending down   · Trend temps and WBC   · Wound culture from OR 5/11 pseudomonas sensitive to cefepime   · CT chest/ab/pelvis with concern of multifocal PNA, no visualized intraabdominal abscess or anastomotic leak   · Cefepime day #6, flagyl day #9, vanco day #3  · Infectious disease consulted and following     Aspiration pneumonia ([facility] [address] )  Assessment & Plan  · CXR with b/l pulmonary vascular congestion and worsening b/l upper lobe infiltrates R > L, multifocal consolidation on CT chest   · Concern for aspiration pneumonia as patient has developed post-op ileus with abdominal distention and has been encephalopathic  · Strep pneumo and legionella negative  · MRSA nasal surveillance negative  · Sputum culture unable to be run   · Continue plan as outlined     CHF (congestive heart failure) ([facility])  Assessment & Plan  Wt Readings from Last 3 Encounters:   22 66 4 kg (146 lb 6 2 oz)   22 62 1 kg (136 lb 14 4 oz)   22 61 2 kg (135 lb)     · : Echo-EF 65%, mild TR, mild MR  · Vascular congestion evident on CXR  · Received 40mg IV Lasix, consider re-dosing today   · Monitor I&O  · Daily weights         Hyperglycemia  Assessment & Plan  · No hx of diabetes  · Now on TPN  · Start patient on accu checks Q 6 hr + SSI    Toxic metabolic encephalopathy  Assessment & Plan  · Likely in setting of acute illness  · No focal deficits  · 1:1 monitoring as patient was attempting to self d/c lines/tubes   · Neuro checks q4h  · Delirium precautions; regulate sleep/wake cycle, environmental controls, daily CAM ICU       ----------------------------------------------------------------------------------------  HPI/24hr events: Patient underwent PICC line placement and NGT placement by IR  He was started on TPN overnight per surgery       Patient appropriate for transfer out of the ICU today?: No  Disposition: Continue Stepdown Level 1 level of care   Code Status: Level 1 - Full Code  ---------------------------------------------------------------------------------------  SUBJECTIVE  "I hurt, my neck hurts and my stomach hurts"    Review of Systems  Review of systems was reviewed and negative unless stated above in HPI/24-hour events   ---------------------------------------------------------------------------------------  OBJECTIVE    Vitals   Vitals:    22 0600 22 0800 22 0807 22 0820   BP: 107/54 100/61  100/61   BP Location:  Left arm     Pulse: 89 85     Resp: (!)      Temp:  97 5 °F (36 4 °C)     TempSrc:  Temporal     SpO2: 92% 94% 94%    Weight:       Height:         Temp (24hrs), Av 6 °F (36 4 °C), Min:97 4 °F (36 3 °C), Max:98 °F (36 7 °C)  Current: Temperature: 97 5 °F (36 4 °C)          Respiratory:  SpO2: SpO2: 94 %, SpO2 Activity: SpO2 Activity: At Rest  Nasal Cannula O2 Flow Rate (L/min): 5 L/min    Invasive/non-invasive ventilation settings   Respiratory  Report   Lab Data (Last 4 hours)    None         O2/Vent Data (Last 4 hours)    None                Physical Exam  Vitals reviewed  Constitutional:       General: He is sleeping  He is not in acute distress  Appearance: He is ill-appearing  Interventions: Nasal cannula in place  HENT:      Head: Normocephalic  Nose:      Comments: NGT in place     Mouth/Throat:      Mouth: Mucous membranes are moist       Pharynx: Oropharynx is clear  Eyes:      Extraocular Movements: Extraocular movements intact  Conjunctiva/sclera: Conjunctivae normal       Pupils: Pupils are equal, round, and reactive to light  Cardiovascular:      Rate and Rhythm: Normal rate and regular rhythm  Pulses: Normal pulses  Pulmonary:      Effort: No respiratory distress  Breath sounds: Rhonchi and rales present  No wheezing  Abdominal:      General: Bowel sounds are absent  There is distension  Tenderness: There is abdominal tenderness  Comments: Ostomy with minimum output    Genitourinary:     Comments: [place] present  Musculoskeletal:      Cervical back: Normal range of motion and neck supple  Right lower leg: Edema present  Left lower leg: Edema present  Skin:     General: Skin is warm and dry  Neurological:      Mental Status: He is oriented to person, place, and time and easily aroused     Psychiatric:         Mood and Affect: Mood normal          Behavior: Behavior normal         Laboratory and Diagnostics:  Results from last 7 days   Lab Units 05/19/22  0517 05/18/22  0446 05/17/22  0630 05/16/22  0507 05/15/22  0506 05/14/22  0455 05/13/22  0745   WBC Thousand/uL 13 94* 15 24* 15 37* 15 64* 14 89* 14 84* 17 60* HEMOGLOBIN g/dL 8 4* 8 3* 8 2* 8 2* 7 8* 7 7* 8 9*   HEMATOCRIT % 23 7* 24 1* 24 2* 23 7* 23 2* 22 5* 25 8*   PLATELETS Thousands/uL 314 272 254 219 209 190 190   BANDS PCT %  --   --  10*  --   --   --  50*   MONO PCT %  --   --  3*  --   --   --  3*     Results from last 7 days   Lab Units 05/19/22 0517 05/18/22 0446 05/17/22  0630 05/16/22  0507 05/15/22  0506 05/14/22 0455 05/13/22  0745   SODIUM mmol/L 144 141 142 139 140 136 134*   POTASSIUM mmol/L 3 7 4 0 3 8 3 3* 3 9 3 4* 3 6   CHLORIDE mmol/L 114* 111* 111* 107 109* 106 102   CO2 mmol/L 25 24 25 27 23 23 22   ANION GAP mmol/L 5 6 6 5 8 7 10   BUN mg/dL 50* 37* 28* 20 18 16 19   CREATININE mg/dL 1 20 0 96 0 93 0 93 0 86 0 86 1 04   CALCIUM mg/dL 7 7* 7 6* 7 4* 7 6* 7 7* 7 4* 7 4*   GLUCOSE RANDOM mg/dL 180* 128 171* 151* 138 141* 125   ALT U/L  --  13 11* 14 15  --   --    AST U/L  --  12 12 16 20  --   --    ALK PHOS U/L  --  43* 47 53 58  --   --    ALBUMIN g/dL  --  2 2* 2 2* 2 5* 2 5*  --   --    TOTAL BILIRUBIN mg/dL  --  0 71 0 68 0 75 0 63  --   --      Results from last 7 days   Lab Units 05/19/22 0517 05/18/22 0446 05/17/22  0630 05/16/22  0507 05/15/22  0506 05/14/22  0455 05/13/22  0745   MAGNESIUM mg/dL 2 8* 2 6 1 9 2 0 2 2 2 3 2 2   PHOSPHORUS mg/dL  --  2 6 1 6* 1 6* 1 6* 1 8* 2 4               Results from last 7 days   Lab Units 05/17/22 2003   LACTIC ACID mmol/L 1 1     ABG:  Results from last 7 days   Lab Units 05/15/22  0852   PH ART  7 373   PCO2 ART mm Hg 32 4*   PO2 ART mm Hg 73 7*   HCO3 ART mmol/L 18 4*   BASE EXC ART mmol/L -6 1   ABG SOURCE  Radial, Right     VBG:  Results from last 7 days   Lab Units 05/15/22  0852   ABG SOURCE  Radial, Right     Results from last 7 days   Lab Units 05/19/22  0517 05/18/22  0446 05/16/22  0507   PROCALCITONIN ng/ml 0 99* 1 24* 2 60*       Micro  Results from last 7 days   Lab Units 05/18/22  1601 05/18/22  1559 05/17/22  0937 05/17/22  0923   BLOOD CULTURE  Received in Microbiology Lab  Culture in Progress  Received in Microbiology Lab  Culture in Progress  --   --    SPUTUM CULTURE   --   --  Test not performed  Suggest repeat specimen  --    GRAM STAIN RESULT   --   --  >10 squamous epithelial cells/lpf, indicating orophayngeal contamination  *  1+ Polys*  2+ Budding Yeast with Pseudomycelia*  Rare Gram positive rods*  --    MRSA CULTURE ONLY   --   --  No Methicillin Resistant Staphlyococcus aureus (MRSA) isolated  --    LEGIONELLA URINARY ANTIGEN   --   --   --  Negative   STREP PNEUMONIAE ANTIGEN, URINE   --   --   --  Negative       EKG: NSR, rate of 90  Imaging: I have personally reviewed pertinent reports  Intake and Output  I/O       05/17 0701 05/18 0700 05/18 0701 05/19 0700 05/19 0701 05/20 0700    I V  (mL/kg) 200 (3) 200 (3)     NG/ 30     IV Piggyback 450 650 200    TPN  287     Total Intake(mL/kg) 1450 (21 9) 1167 (17 6) 200 (3)    Urine (mL/kg/hr) 650 (0 4) 1525 (1)     Emesis/NG output 250 550     Stool  85     Total Output 900 2160     Net +550 -993 +200                 Height and Weights   Height: 5' 4" (162 6 cm)  IBW (Ideal Body Weight): 59 2 kg  Body mass index is 25 13 kg/m²  Weight (last 2 days)     Date/Time Weight    05/19/22 0553 66 4 (146 39)    05/18/22 0513 66 1 (145 72)            Nutrition       Diet Orders   (From admission, onward)             Start     Ordered    05/12/22 1032  Diet NPO  Diet effective now        Comments: Patient can have ice chips ad alex   References:    Nutrtion Support Algorithm Enteral vs  Parenteral   Question Answer Comment   Diet Type NPO    RD to adjust diet per protocol?  Yes        05/12/22 1033    05/12/22 0906  Room Service  Once        Question:  Type of Service  Answer:  Room Service - Appropriate with Assistance    05/12/22 0905                  Active Medications  Scheduled Meds:  Current Facility-Administered Medications   Medication Dose Route Frequency Provider Last Rate    acetaminophen  650 mg Rectal Q6H PRN Misty Bent, CRNP      acetylcysteine  3 mL Nebulization Q8H Cherri Spironello V, CRNP      Adult TPN (STANDARD BASE/STANDARD ELECTROLYTE)   Intravenous Continuous TPN Ricardo Mena PA-C 41 6 mL/hr at 05/19/22 0725    cefepime  2,000 mg Intravenous Q12H Cherri Spironello V, CRNP 2,000 mg (05/18/22 2307)    heparin (porcine)  5,000 Units Subcutaneous Q12H [facility] Cherri Spironello V, CRNP      HYDROmorphone  0 5 mg Intravenous Q3H PRN Cherri Spironello V, CRNP      insulin lispro  1-5 Units Subcutaneous Q6H [facility] Omar Castillo, MACARIONP      iohexol  50 mL Oral Once in imaging Misty Bent V, CRNP      ipratropium-albuterol  3 mL Nebulization Q6H PRN Cherri Spironello V, CRNP      ipratropium-albuterol  3 mL Nebulization Q8H Cherri Spironello V, CRNP      metroNIDAZOLE  500 mg Intravenous Q8H Cherri Spironello V, CRNP 500 mg (05/19/22 0837)    ondansetron  4 mg Intravenous Q6H PRN Cherri Spironello V, CRNP      pantoprazole  40 mg Intravenous Q24H [facility] Omar Castillo, CRNP      vancomycin  20 mg/kg Intravenous Q24H Cherri Spironello V, CRNP Stopped (05/18/22 1155)     Continuous Infusions:  Adult TPN (STANDARD BASE/STANDARD ELECTROLYTE), , Last Rate: 41 6 mL/hr at 05/19/22 0725      PRN Meds:   acetaminophen, 650 mg, Q6H PRN  HYDROmorphone, 0 5 mg, Q3H PRN  iohexol, 50 mL, Once in imaging  ipratropium-albuterol, 3 mL, Q6H PRN  ondansetron, 4 mg, Q6H PRN        Invasive Devices Review  Invasive Devices  Report    Peripherally Inserted Central Catheter Line  Duration           PICC Line 09/02/25 Right Basilic <1 day          Peripheral Intravenous Line  Duration           Peripheral IV 05/18/22 Upper;Right Arm <1 day          Drain  Duration           Colostomy LLQ 8 days    Urethral Catheter Non-latex 16 Fr  3 days    NG/OG/Enteral Tube Nasogastric Left nare <1 day                Rationale for remaining devices: medically necessary  ---------------------------------------------------------------------------------------  Advance Directive and Living Will:      Power of :    POLST:    ---------------------------------------------------------------------------------------  Care Time Delivered:   No Critical Care time spent       [facility]      Portions of the record may have been created with voice recognition software  Occasional wrong word or "sound a like" substitutions may have occurred due to the inherent limitations of voice recognition software    Read the chart carefully and recognize, using context, where substitutions have occurred

## 2022-05-19 NOTE — ASSESSMENT & PLAN NOTE
· As evidenced by tachycardia, tachypnea, and bandemia  · Source aspiration pneumonia vs intra-abdominal infection  · Procal trending down   · Trend temps and WBC   · Wound culture from OR 5/11 pseudomonas sensitive to cefepime   · CT chest/ab/pelvis with concern of multifocal PNA, no visualized intraabdominal abscess or anastomotic leak   · Cefepime day #6, flagyl day #9, vanco day #3  · Infectious disease consulted and following

## 2022-05-19 NOTE — PROGRESS NOTES
Progress Note - General Surgery   Piter Lomeli 80 y o  male MRN: [id number]  Unit/Bed#: ICU 04 Encounter: [id number]    Assessment:  · POD#8 s/p exploratory laparotomy, low anterior resection, protective loop transverse colostomy and segmental small bowel resection secondary to perforation rectosigmoid junction after colonoscopy  · Postoperative ileus -- dilated small bowel on CT a/p from 5/17, minimal liquid output from ostomy  · Acute hypoxemic respiratory failure -- Patient transferred to ICU secondary to increasing oxygen demand  Patient currently on 5 L nasal cannula  · Aspiration pneumonitis vs pneumonia -- decreasing oxygen requirements, CT chest read as groundglass and consolidative changes bilaterally, leukocytosis and procalcitonin improving   · Severe sepsis -- tachycardia, tachypnea and bandemia  Suspected secondary to aspiration PNA  · CHF -- EF 65% on most recent ECHO        Plan: We will continue NGT replaced by IR on 5/18  Continue wet to dry dressing for midline incision  Appreciate ICU care for patient  Receiving IV lasix again today  Continue to wean off supplemental oxygen  Appreciate nutrition input, TPN reordered  ID following;  Cefepime day 6, flagyl day 9, Vancomycin day 3  Will follow up with ostomy nurse to confirm home ostomy supplies ordered      Subjective/Objective     Subjective: Patient denies any acute events overnight  He states he would like to see his surgeon  He is somewhat confused, he states he had a CT scan this morning which is not true  Objective:   NG 550cc dark bile  Ostomy 85cc brown liquid  Blood pressure 100/61, pulse 85, temperature 97 5 °F (36 4 °C), temperature source Temporal, resp  rate 22, height 5' 4" (1 626 m), weight 66 4 kg (146 lb 6 2 oz), SpO2 94 %  ,Body mass index is 25 13 kg/m²        Intake/Output Summary (Last 24 hours) at 5/19/2022 0940  Last data filed at 5/19/2022 0837  Gross per 24 hour   Intake 1267 04 ml   Output 2110 ml   Net -842 96 ml Invasive Devices  Report    Peripherally Inserted Central Catheter Line  Duration           PICC Line 79/30/08 Right Basilic <1 day          Peripheral Intravenous Line  Duration           Peripheral IV 05/18/22 Upper;Right Arm 1 day          Drain  Duration           Colostomy LLQ 8 days    Urethral Catheter Non-latex 16 Fr  3 days    NG/OG/Enteral Tube Nasogastric Left nare <1 day                Physical Exam: /61   Pulse 85   Temp 97 5 °F (36 4 °C) (Temporal)   Resp 22   Ht 5' 4" (1 626 m)   Wt 66 4 kg (146 lb 6 2 oz)   SpO2 94%   BMI 25 13 kg/m²   General appearance: alert upon arousal, some confusion  Lungs: diminished breath sounds throughout, with rhonchi in right lower lobe  Heart: Rate and rhythm are both regular  Abdomen: mild distention, some tenderness remains around the incision, colostomy stoma pink in color, brown liquid in ostomy bag, incision has some fibrinous slough at the base but over 80% is pink healthy tissue  Ostomy bridge removed  Lab, Imaging and other studies:  I have personally reviewed pertinent lab results    , CBC:   Lab Results   Component Value Date    WBC 13 94 (H) 05/19/2022    HGB 8 4 (L) 05/19/2022    HCT 23 7 (L) 05/19/2022    MCV 99 (H) 05/19/2022     05/19/2022    MCH 35 0 (H) 05/19/2022    MCHC 35 4 05/19/2022    RDW 16 4 (H) 05/19/2022    MPV 11 2 05/19/2022   , CMP:   Lab Results   Component Value Date    SODIUM 144 05/19/2022    K 3 7 05/19/2022     (H) 05/19/2022    CO2 25 05/19/2022    BUN 50 (H) 05/19/2022    CREATININE 1 20 05/19/2022    CALCIUM 7 7 (L) 05/19/2022    EGFR 52 05/19/2022     VTE Pharmacologic Prophylaxis: Heparin SQ  VTE Mechanical Prophylaxis: sequential compression device

## 2022-05-19 NOTE — PROGRESS NOTES
Vancomycin IV Pharmacy-to-Dose Consultation    Heloise Duane is a 80 y o  male who is currently receiving Vancomycin IV with management by the Pharmacy Consult service  Assessment/Plan:  The patient was reviewed  Renal function is stable and no signs or symptoms of nephrotoxicity and/or infusion reactions were documented in the chart  Based on todays assessment, continue current vancomycin (day # 3) dosing of 1250 mg IV q24h, with a plan for trough to be drawn at 0930 on 5/20/22  We will continue to follow the patients culture results and clinical progress daily      Luis Armando Carey, Pharmacist

## 2022-05-19 NOTE — ASSESSMENT & PLAN NOTE
· Increasing O2 requirements on the general/medical floor  · Concern for aspiration with increasing abdominal distension secondary to ileus   · CXR with b/l pulmonary vascular congestion and worsening b/l upper lobe infiltrates R > L,  · 5/17 CT chest with worsening patchy mixed groundglass and consolidative change bilaterally, most prominent in the bilateral upper lobes concerning for multifocal PNA and/or pulmonary edema  Bilateral lower lobe compressive atelectasis, Moderate left and small right pleural effusions, increased since the prior study    · Receivied lasix with improvement in resp status - redose today   · Atrovent/xopenex/mucomyst nebs q8h  · Continue flagyl, day# 9/ cefepime, day# 6 /vancomycin, day# 3   · Procal trending down 1 2 > 0 9  · PRN NT suctioning, patient with weak moist cough  · Encourage coughing and deep breathing, IS q1h while awake, flutter valve  · Down to 6 L MF from 15 L, continue to wean for spo2 >92%

## 2022-05-19 NOTE — ASSESSMENT & PLAN NOTE
· Outpatient EGD and colonoscopy at [facility] on 5/11 for w/u of chronic anemia, history of colon polyps, intermittent LLQ abdominal pain and possible intermittent intussusception  · EGD unremarklable, colonoscopy technically difficult and required changed from adult scope to pediatric scope, but during traversal of the sigmoid colon there was a kink and patient sustained a perforation  · Procedure was aborted and patient was transferred to [facility] where immediate surgical consultation was obtained  Patient was reported to have obvious signs of peritonitis on exam and CT ab/pelvis demonstrated pneumoperitoneum     · Urgently to the OR with Dr Elida Madrigal for ex-lap, low anterior resection, protection loop transverse colostomy, flex sigmoidoscopy, and segmental small bowel resection  · POD #8  · CT without leak or abscess  · Now with post-op ileus, minimal ostomy output, plan as below   · Appreciate surgery team input

## 2022-05-20 NOTE — WOUND OSTOMY CARE
Progress Note - Wound   Fady Hudson 80 y o  male MRN: [id number]  Unit/Bed#: ICU 10 Encounter: [id number]    Assessment: This is a one week follow-up visit for this 80year old male patient admitted on 5/11/22 with bowel perforation and pneumoperitoneum s/p colonoscopy and transverse loop colostomy done on 5/11/22  He has a history of Raynaud's disease, scleroderma, colon polyps, chronic anemia, gall stones and HTN  He remains totally dependent upon nursing staff for all of his care and is repositioned in bed with assist of 2 persons  The patient was dozing on and off and was oriented to person and place with periods of confusion  He alternates with periods of lucidity where he can describe in depth specific aspects of his ostomy surgery and similar surgeries which he has done in the past      Skin care Plan:  1-Cleanse sacro-buttocks with soap and water  Apply Allevyn foam  Danyel with T for treatment  Change every two days and PRN  check skin q-shift  2-Turn/reposition q2h with foam wedges/pillows or when medically stable for pressure re-distribution on skin   3-Float heels on a pillow to offload pressure  If unable to do this, please apply bilateral heel protectors to be worn at all times in bed  4-Moisturize skin daily with skin nourishing cream  5-Ehob cushion in chair when out of bed  Limit sitting for 1-2 hours at a time to prevent excessive pressure to sacral area then offload back in bed for at least 1 hour  6-Apply bilateral heel foam dressings or use Hydraguard to bilateral heels BID and PRN  7-Ostomy supplies to be ordered before patient is discharged  8-Please only use warm water and plain wipes not soap or bath wipes to cleanse parastomal site  This would prevent the appliance from adhering correctly and may sensitize the patient to possible allergens and skin irritation       Assessment Findings:  1  The sacral area has blanchable erythema and is intact  Orders for prevention in place    2  Bilateral heels have stage 1 pressure injuries (non-blanchable erythema) with mottling noted (due to Raynaud's disease)  Orders for prevention in place  3  Left sided ostomy appliance intact and draining small amounts of bloody drainage  Orders written for ostomy care  Wound 05/13/22 Sacrum (Active)   Wound Image   05/20/22 1032   Wound Description Blanchable erythema 05/20/22 1044   Alisa-wound Assessment Intact;hyperpigmented 05/19/22 1044   Drainage Amount None 05/20/22 1044   Dressing Foam, Silicon (eg  Allevyn, etc); Calazime paste (small amt) 05/20/22 1044   Dressing Status Clean;Dry; Intact 05/20/22 1044       Wound 05/13/22  Heel Left (Active)   Wound Image   05/20/22 1044   Wound Description Non-blanchable erythema (mottling due to Raynaud's) 05/20/22 1044   Pressure Injury Stage Stage 1 05/20/22 1044   Alisa-wound Assessment Intact 05/20/22 1044   Drainage Amount None 05/20/22 1044   Dressing Moisture barrier (Hydraguard) 05/20/22 1044   Dressing Status Intact 05/20/22 1044       Wound 05/13/22  Heel Right (Active)   Wound Image   05/20/22 1044   Wound Description Non-blanchable erythema (mottling due to Raynaud's) 05/20/22 1044   Alisa-wound Assessment Intact 05/20/22 1044   Drainage Amount None 05/20/22 1044   Dressing Moisture barrier (Hydraguard) 05/20/22 1044   Dressing Status Intact 05/20/22 1044     Discussed assessment findings, and plan of care/recommendations with [facility] RN  Wound care will follow along with patient throughout admission, please call or tiger text with questions and concerns  Recommendations written as orders    Taras Kimbrough RN, BSN, [employer]

## 2022-05-20 NOTE — ASSESSMENT & PLAN NOTE
· Outpatient EGD and colonoscopy at [facility] on 5/11 for w/u of chronic anemia, history of colon polyps, intermittent LLQ abdominal pain and possible intermittent intussusception  · EGD unremarklable, colonoscopy technically difficult and required changed from adult scope to pediatric scope, but during traversal of the sigmoid colon there was a kink and patient sustained a perforation  · Procedure was aborted and patient was transferred to [facility] where immediate surgical consultation was obtained  Patient was reported to have obvious signs of peritonitis on exam and CT ab/pelvis demonstrated pneumoperitoneum     · Urgently to the OR with Dr Elida Madrigal for ex-lap, low anterior resection, protection loop transverse colostomy, flex sigmoidoscopy, and segmental small bowel resection  · POD #9  · CT without leak or abscess  · Now with post-op ileus, minimal ostomy output, plan as below   · Appreciate surgery team input

## 2022-05-20 NOTE — PROGRESS NOTES
Progress Note - Infectious Disease   Fady Hudson 80 y o  male MRN: [id number]  Unit/Bed#: ICU 10 Encounter: [id number]      Impression/Plan:  1  SIRS vs Severe Sepsis, evolving on admission   Evidenced by tachycardia, tachypnea, bandemia and encephalopathy   Suspect possible aspiration in setting of significant retained secretions, acute respiratory failure requiring supplemental oxygenation and with new NG tube for postop ileus management  MRSA screen negative  WBC count 18K  -continue cefepime and Flagyl as below  -follow-up final blood cultures   -monitor temperature and hemodynamics  -serial exam  -monitor CBC and BMP   -additional supportive measures per critical care team as below      2    Peritonitis s/p Bowel perforation  s/p 5/11/22 exploratory laparotomy, low anterior resection, protective loop transverse colostomy and segmental small bowel resection secondary to perforation rectosigmoid junction after colonoscopy   OR cultures grew Pseudomonas aeruginosa and Bacteroides fragilis  Now being managed with NGT/NPO status for post op ileus on TPN  -antibiotics as above  -monitor temperature and hemodynamics  -serial exam  -close surgical follow-up  -recheck CBC and BMP   -plan to complete 2 weeks antibiotics total through 5/27/22  -TPN per surgical team     3   Acute hypoxic respiratory failure with hypoxia   Suspicious for aspiration pneumonitis over pneumonia   05/18/2022 CT chest with worsening patchy ground-glass and consolidative changes primarily in the bilateral upper lobes   Patient clinically weaning from 15 L mid flow 05/17/2022 to 5 L today   5/16/22 Procalcitonin level  2 60 > 5/20 0 76  5/17/22 Sputum cx with mixed oropharyngeal contaminants  -continue antibiotics for #2 as above  -secretion and pulmonary toilet management per critical care team   -monitor respiratory symptoms  -monitor oxygen requirements     4  Aspiration pneumonitis versus pneumonia in setting of #3   05/18/2022 CT chest with worsening patchy ground-glass and consolidative changes primarily in the bilateral upper lobes   Patient clinically weaning from 15 L mid flow 2022 to 5 L today  22 Procalcitonin level  2 60 >  76  22 sputum specimen oral pharyngeal contamination   -continue antibiotics as above  -secretion and pulmonary toilet management per critical care team   -monitor respiratory symptoms  -monitor oxygen requirements     5  CKD 3  CrCl 33%  -renal dose adjust antibiotics as needed  -volume management  -monitor creatinine     Antibiotics:  Cefepime D7/Flagyl D10     Above impression and plan discussed in detail with patient, RN, and critical care team   We will see patient again 22  Please call ID on call physician in meantime if questions      Subjective:  Patient has no fever, chills, sweats; no nausea, vomiting, diarrhea; no increased cough, shortness of breath; + post op abd pain  No new symptoms  Appears to be tolerating IV antibiotics    Objective:  Vitals:  Temp:  [97 6 °F (36 4 °C)-98 3 °F (36 8 °C)] 97 6 °F (36 4 °C)  HR:  [] 92  Resp:  [22-35] 35  BP: (108-149)/(52-93) 128/62  SpO2:  [87 %-99 %] 95 %  Temp (24hrs), Av 8 °F (36 6 °C), Min:97 6 °F (36 4 °C), Max:98 3 °F (36 8 °C)  Current: Temperature: 97 6 °F (36 4 °C)    Physical Exam:   General Appearance:  80year-old acute on chronically debilitated male, appearing sluggish/fatigued, propped in bed with head of bed elevated about 60°, statting 95% on 5L NCO2   Throat: Oropharynx moist without lesions   NGT to suction with mild dark bilious output in canister   Lungs:   Scattered coarse upper airway secretion retention to auscultation bilaterally; + some work of breathing at rest, + clearing cough on deeper inspiration   Heart:  RRR; no murmur   Abdomen:   Soft, no focal palpable tenderness at midline abdominal incision site with packed dressing intact, no strike through drainage or spreading erythema, left lower quadrant colostomy with some brown stool in bag, diminished bowel sounds  Extremities: No clubbing, cyanosis or edema, venodynes in place   : Condom cath to [place] with clear, yellow urine in bag, no SPT   Skin: No new rashes or lesions  Right arm PICC site nontender and TPN running       Labs, Imaging, & Other studies:   All pertinent labs and imaging studies were personally reviewed  Results from last 7 days   Lab Units 05/20/22  0546 05/19/22  0517 05/18/22  0446   WBC Thousand/uL 18 87* 13 94* 15 24*   HEMOGLOBIN g/dL 8 5* 8 4* 8 3*   PLATELETS Thousands/uL 335 314 272     Results from last 7 days   Lab Units 05/20/22  0546 05/19/22  0517 05/18/22  0446 05/17/22  0630 05/16/22  0507   SODIUM mmol/L 148* 144 141 142 139   POTASSIUM mmol/L 3 3* 3 7 4 0 3 8 3 3*   CHLORIDE mmol/L 117* 114* 111* 111* 107   CO2 mmol/L 26 25 24 25 27   BUN mg/dL 48* 50* 37* 28* 20   CREATININE mg/dL 1 14 1 20 0 96 0 93 0 93   EGFR ml/min/1 73sq m 55 52 68 71 71   CALCIUM mg/dL 7 4* 7 7* 7 6* 7 4* 7 6*   AST U/L  --   --  12 12 16   ALT U/L  --   --  13 11* 14   ALK PHOS U/L  --   --  43* 47 53     Results from last 7 days   Lab Units 05/18/22  1601 05/18/22  1559 05/17/22  0937 05/17/22  0923   BLOOD CULTURE  No Growth at 24 hrs  No Growth at 24 hrs   --   --    SPUTUM CULTURE   --   --  Test not performed  Suggest repeat specimen  --    GRAM STAIN RESULT   --   --  >10 squamous epithelial cells/lpf, indicating orophayngeal contamination  *  1+ Polys*  2+ Budding Yeast with Pseudomycelia*  Rare Gram positive rods*  --    MRSA CULTURE ONLY   --   --  No Methicillin Resistant Staphlyococcus aureus (MRSA) isolated  --    LEGIONELLA URINARY ANTIGEN   --   --   --  Negative     Results from last 7 days   Lab Units 05/20/22  0546 05/19/22  0517 05/18/22  0446 05/16/22  0507   PROCALCITONIN ng/ml 0 76* 0 99* 1 24* 2 60*

## 2022-05-20 NOTE — ASSESSMENT & PLAN NOTE
· Developed post-op ileus with abdominal distention, and nausea/vomiting  · NGT placed with difficulty, on imaging terminated in distal esophagus, attempted to be advanced twice without significant drainage despite distended stomach on imaging   · 5/17 CT - Multiple dilated small bowel loops without a clear transition point  The finding may reflect ileus, however developing small bowel obstruction cannot be excluded  Follow-up is recommended    · AM KUB without contrast progressing to colon   · Minimal to no ostomy output - monitor   · NGT replaced by IR on 5/18 - continue to suction, without significant output   · PICC line in place for TPN   · Getting frequent IV opioids for abdominal pain can be contributing to ileus, attempt to improve multimodal pain regimen - consider Toradol

## 2022-05-20 NOTE — RESPIRATORY THERAPY NOTE
Vest treatment ordered on patient  He has an open wound on abdomen with a fresh colostomy  Will hold treatment at this time

## 2022-05-20 NOTE — ASSESSMENT & PLAN NOTE
· As evidenced by tachycardia, tachypnea, and bandemia  · Source aspiration pneumonia vs intra-abdominal infection  · Procal trending down   · Trend temps and WBC   · Wound culture from OR 5/11 pseudomonas sensitive to cefepime   · CT chest/ab/pelvis with concern of multifocal PNA, no visualized intraabdominal abscess or anastomotic leak   · Cefepime day #7, flagyl day #10  · Vancomycin d/c per infectious disease   · Infectious disease consulted and following

## 2022-05-20 NOTE — PROGRESS NOTES
Progress Note - General Surgery   Kathrine Knapp 80 y o  male MRN: [id number]  Unit/Bed#: ICU 04 Encounter: [id number]    Assessment:  · POD#9 s/p exploratory laparotomy, low anterior resection, protective loop transverse colostomy and segmental small bowel resection secondary to perforation rectosigmoid junction after colonoscopy  · Postoperative ileus -- dilated small bowel on CT a/p from 5/17 and KUB on 5/18, minimal semi solid output in ostomy bag  · Acute hypoxemic respiratory failure --  Patient currently on 5 L nasal cannula, pulmonary toilet, flutter valve and IS  · Aspiration pneumonia -- decreasing oxygen requirements, CT chest concerning for multifocal pneumonia, procalcitonin improving, WBC count 15 2 > 13 9 > 18 8   · Severe sepsis -- tachycardia, tachypnea and bandemia  Suspected secondary to aspiration PNA  · CHF -- EF 65% on most recent ECHO    · Electrolyte abnormalities -- hypernatremia, hypomagnesemia, hypophosphatemia, and hyperglycemia without any history of diabetes       Plan:  Clamp trial NG tube into tomorrow  KUB tomorrow morning   Continue wet to dry dressing for midline incision, consider adding Acticoat    Potassium, Phos, and Mag repletion   TPN reordered, will discuss electrolyte adjustments with nutrition  Continue to wean off supplemental oxygen as able  Completed 3 days of vancomycin   ID following;  Cefepime day 7, flagyl day 10  Ostomy nurseLauren, following up with son and in process of ordering home supplies  Discussed with critical care team      Subjective/Objective     Subjective: Patient states he's asking for narcotics because of the terrible pressure in his abdomen, he denies sharp severe pain  Objective:   NG 250cc dark bile  Ostomy 60cc semi solid stool  UOP 1 1  WBC count 15 2 > 13 9 > 18 8   Blood pressure 129/64, pulse 91, temperature 97 6 °F (36 4 °C), temperature source Temporal, resp   rate (!) 24, height 5' 4" (1 626 m), weight 64 1 kg (141 lb 5 oz), SpO2 92 % ,Body mass index is 24 26 kg/m²  Intake/Output Summary (Last 24 hours) at 5/20/2022 6581  Last data filed at 5/20/2022 0600  Gross per 24 hour   Intake 2285 51 ml   Output 1900 ml   Net 385 51 ml       Invasive Devices  Report    Peripherally Inserted Central Catheter Line  Duration           PICC Line 51/26/24 Right Basilic 1 day          Peripheral Intravenous Line  Duration           Peripheral IV 05/18/22 Upper;Right Arm 1 day          Drain  Duration           Colostomy LLQ 9 days    NG/OG/Enteral Tube Nasogastric Left nare 1 day    External Urinary Catheter Medium <1 day                Physical Exam: /64   Pulse 91   Temp 97 6 °F (36 4 °C) (Temporal)   Resp (!) 24   Ht 5' 4" (1 626 m)   Wt 64 1 kg (141 lb 5 oz)   SpO2 92%   BMI 24 26 kg/m²   General appearance: alert upon arousal, speech is clear  Lungs: diminished breath sounds throughout, with rhonchi in right lower lobe and left lobes  Heart: normal rate and rhythm  Abdomen: mild distention, some tenderness remains around the incision, colostomy stoma pink in color, brown liquid in ostomy bag, incision has some fibrinous slough at the base but over 80% is pink healthy tissue  Ostomy bridge removed  Extremities: no edema, prophylactic foam border dressings in place     Lab, Imaging and other studies:  I have personally reviewed pertinent lab results    , CBC:   Lab Results   Component Value Date    WBC 18 87 (H) 05/20/2022    HGB 8 5 (L) 05/20/2022    HCT 25 5 (L) 05/20/2022     (H) 05/20/2022     05/20/2022    MCH 35 3 (H) 05/20/2022    MCHC 33 3 05/20/2022    RDW 17 4 (H) 05/20/2022    MPV 11 3 05/20/2022   , CMP:   Lab Results   Component Value Date    SODIUM 148 (H) 05/20/2022    K 3 3 (L) 05/20/2022     (H) 05/20/2022    CO2 26 05/20/2022    BUN 48 (H) 05/20/2022    CREATININE 1 14 05/20/2022    CALCIUM 7 4 (L) 05/20/2022    EGFR 55 05/20/2022     VTE Pharmacologic Prophylaxis: Heparin SQ  VTE Mechanical Prophylaxis: sequential compression device

## 2022-05-20 NOTE — OCCUPATIONAL THERAPY NOTE
OT TREATMENT       05/20/22 1403   Note Type   Note Type Treatment   Restrictions/Precautions   Other Precautions Chair Alarm; Bed Alarm; Fall Risk;Multiple lines;Pain;Cognitive   General   Family/Caregiver Present patients son   Pain Assessment   Pain Assessment Tool Boudreaux-Baker FACES   Boudreaux-Baker FACES Pain Rating 6   Pain Location/Orientation Location: Abdomen;Orientation: Bilateral;Location: Shoulder; Location: Back   Bed Mobility   Rolling R 3  Moderate assistance   Rolling L 2  Maximal assistance   Supine to Sit 2  Maximal assistance   Additional items   (supine to long sit, unable to get fully upright due to back pain)   Transfers   Additional Comments per nurse pt attempting to pull out NG tube today   ROM- Right Upper Extremities   R Shoulder AROM; Flexion   R Elbow AROM;Elbow flexion;Elbow extension   R Hand AROM; Thumb;Ring finger;Little finger; Long finger; Index finger   R Weight/Reps/Sets 10 times each supine   ROM - Left Upper Extremities    L Shoulder AROM; Flexion   L Elbow AROM;Elbow flexion;Elbow extension   L Hand AROM; Thumb; Index finger;Ring finger;Little finger; Long finger   L Weight/Reps/Sets 10 times each supine   Cognition   Overall Cognitive Status Impaired   Arousal/Participation Cooperative   Attention Attends with cues to redirect   Orientation Level Oriented to person;Oriented to place;Oriented to situation;Oriented to time   Following Commands Follows one step commands with increased time or repetition   Comments pt uncomfortable and restless   Assessment   Assessment Patient seen for OT treatment  Patient requires mod/max assist for bed mobility  Patient with NG tube and multiple lines in ICU  Patient trying to pull at NG tube upon arrival to room, pt redirected, nurse made aware  Patient is cooperative for therapy but fatigued  Patient will benefit from continued OT services to maximize functional performance with ADLS  The patient's raw score on the AM-PAC Daily Activity inpatient short form is 11, standardized score is 29 04, less than 39 4  Patients at this level are likely to benefit from DC to post-acute rehabilitation services  Please refer to the recommendation of the Occupational Therapist for safe DC planning  Plan   Treatment Interventions ADL retraining;Functional transfer training;UE strengthening/ROM; Endurance training;Patient/family training;Equipment evaluation/education; Activityengagement; Compensatory technique education   OT Frequency 3-5x/wk   Recommendation   OT Discharge Recommendation Post acute rehabilitation services   AM-PAC Daily Activity Inpatient   Lower Body Dressing 1   Bathing 1   Toileting 1   Upper Body Dressing 2   Grooming 3   Eating 3   Daily Activity Raw Score 11   Daily Activity Standardized Score (Calc for Raw Score >=11) 29 04   AM-PAC Applied Cognition Inpatient   Following a Speech/Presentation 3   Understanding Ordinary Conversation 4   Taking Medications 3   Remembering Where Things Are Placed or Put Away 2   Remembering List of 4-5 Errands 2   Taking Care of Complicated Tasks 2   Applied Cognition Raw Score 16   Applied Cognition Standardized Score 42 56   Licensure   [place] License Number  Uday Mccollum [facility] [place] [address] OTR/L [id number]

## 2022-05-20 NOTE — ASSESSMENT & PLAN NOTE
· Increasing O2 requirements on the general/medical floor  · Concern for aspiration with increasing abdominal distension secondary to ileus   · CXR with b/l pulmonary vascular congestion and worsening b/l upper lobe infiltrates R > L,  · 5/17 CT chest with worsening patchy mixed groundglass and consolidative change bilaterally, most prominent in the bilateral upper lobes concerning for multifocal PNA and/or pulmonary edema  Bilateral lower lobe compressive atelectasis, Moderate left and small right pleural effusions, increased since the prior study    · Receivied lasix with improvement in resp status - redose today   · Atrovent/xopenex/mucomyst nebs q8h  · Continue flagyl, day# 10/ cefepime, day# 7   · Vancomycin d/c per infectious disease   · Procal trending down  · PRN NT suctioning, patient with weak moist cough  · Encourage coughing and deep breathing, IS q1h while awake, flutter valve  · Down to 5 L MF from 15 L, continue to wean for spo2 >92%

## 2022-05-20 NOTE — PROGRESS NOTES
Tami 45  Progress Note - Gorge Hudson 2/15/1931, 80 y o  male MRN: [id number]  Unit/Bed#: ICU 04 Encounter: [id number]  Primary Care Provider: Jeff Bishop MD   Date and time admitted to hospital: 5/11/2022  4:48 PM    * Bowel perforation [facility])  Assessment & Plan  · Outpatient EGD and colonoscopy at [facility] on 5/11 for w/u of chronic anemia, history of colon polyps, intermittent LLQ abdominal pain and possible intermittent intussusception  · EGD unremarklable, colonoscopy technically difficult and required changed from adult scope to pediatric scope, but during traversal of the sigmoid colon there was a kink and patient sustained a perforation  · Procedure was aborted and patient was transferred to [facility] where immediate surgical consultation was obtained  Patient was reported to have obvious signs of peritonitis on exam and CT ab/pelvis demonstrated pneumoperitoneum  · Urgently to the OR with Dr Jordan Elise for ex-lap, low anterior resection, protection loop transverse colostomy, flex sigmoidoscopy, and segmental small bowel resection  · POD #9  · CT without leak or abscess  · Now with post-op ileus, minimal ostomy output, plan as below   · Appreciate surgery team input     Ileus [facility])  Assessment & Plan  · Developed post-op ileus with abdominal distention, and nausea/vomiting  · NGT placed with difficulty, on imaging terminated in distal esophagus, attempted to be advanced twice without significant drainage despite distended stomach on imaging   · 5/17 CT - Multiple dilated small bowel loops without a clear transition point  The finding may reflect ileus, however developing small bowel obstruction cannot be excluded  Follow-up is recommended    · AM KUB without contrast progressing to colon   · Minimal to no ostomy output - monitor   · NGT replaced by IR on 5/18 - continue to suction, without significant output   · PICC line in place for TPN   · Getting frequent IV opioids for abdominal pain can be contributing to ileus, attempt to improve multimodal pain regimen - consider Toradol     Acute respiratory failure with hypoxia (HCC)  Assessment & Plan  · Increasing O2 requirements on the general/medical floor  · Concern for aspiration with increasing abdominal distension secondary to ileus   · CXR with b/l pulmonary vascular congestion and worsening b/l upper lobe infiltrates R > L,  · 5/17 CT chest with worsening patchy mixed groundglass and consolidative change bilaterally, most prominent in the bilateral upper lobes concerning for multifocal PNA and/or pulmonary edema  Bilateral lower lobe compressive atelectasis, Moderate left and small right pleural effusions, increased since the prior study    · Receivied lasix with improvement in resp status - redose today   · Atrovent/xopenex/mucomyst nebs q8h  · Continue flagyl, day# 10/ cefepime, day# 7   · Vancomycin d/c per infectious disease   · Procal trending down  · PRN NT suctioning, patient with weak moist cough  · Encourage coughing and deep breathing, IS q1h while awake, flutter valve  · Down to 5 L MF from 15 L, continue to wean for spo2 >92%    Severe sepsis (HCC)  Assessment & Plan  · As evidenced by tachycardia, tachypnea, and bandemia  · Source aspiration pneumonia vs intra-abdominal infection  · Procal trending down   · Trend temps and WBC   · Wound culture from OR 5/11 pseudomonas sensitive to cefepime   · CT chest/ab/pelvis with concern of multifocal PNA, no visualized intraabdominal abscess or anastomotic leak   · Cefepime day #7, flagyl day #10  · Vancomycin d/c per infectious disease   · Infectious disease consulted and following     Aspiration pneumonia ([facility] [address] )  Assessment & Plan  · CXR with b/l pulmonary vascular congestion and worsening b/l upper lobe infiltrates R > L, multifocal consolidation on CT chest   · Concern for aspiration pneumonia as patient has developed post-op ileus with abdominal distention and has been encephalopathic  · Strep pneumo and legionella negative  · MRSA nasal surveillance negative  · Sputum culture unable to be run   · Continue plan as outlined above     CHF (congestive heart failure) ([facility])  Assessment & Plan  Wt Readings from Last 3 Encounters:   05/19/22 66 4 kg (146 lb 6 2 oz)   05/11/22 62 1 kg (136 lb 14 4 oz)   03/25/22 61 2 kg (135 lb)     · 5/16: Echo-EF 65%, mild TR, mild MR  · Vascular congestion evident on CXR  · Has been receiving 40mg IV Lasix, consider re-dosing today   · Monitor I&O  · Daily weights         Hyperglycemia  Assessment & Plan  · No hx of diabetes  · Now on TPN with hyperglycemia   · Start patient on accu checks Q 6 hr + SSI    Toxic metabolic encephalopathy  Assessment & Plan  · Likely in setting of acute illness  · No focal deficits  · 1:1 monitoring as patient was attempting to self d/c lines/tubes   · Neuro checks q4h  · Delirium precautions; regulate sleep/wake cycle, environmental controls, daily CAM ICU     ----------------------------------------------------------------------------------------  HPI/24hr events: Remains on 5 L NC  No acute events overnight  Patient appropriate for transfer out of the ICU today?: No  Disposition: Continue Stepdown Level 1 level of care   Code Status: Level 1 - Full Code  ---------------------------------------------------------------------------------------  SUBJECTIVE  "I'm ready for my pain-killer"    Review of Systems   Respiratory: Positive for cough  Negative for shortness of breath  Gastrointestinal: Positive for abdominal distention and abdominal pain       Review of systems was reviewed and negative unless stated above in HPI/24-hour events   ---------------------------------------------------------------------------------------  OBJECTIVE    Vitals   Vitals:    05/19/22 2315 05/20/22 0000 05/20/22 0200 05/20/22 0400   BP:  116/59 141/67 149/93   BP Location:       Pulse:  96 92 101   Resp:  (!) 28 (!) 30 (!) 29   Temp: 97 7 °F (36 5 °C)  97 7 °F (36 5 °C)   TempSrc:  Temporal  Temporal   SpO2: 93% 91% 93% 91%   Weight:       Height:         Temp (24hrs), Av 8 °F (36 6 °C), Min:97 5 °F (36 4 °C), Max:98 3 °F (36 8 °C)  Current: Temperature: 97 7 °F (36 5 °C)          Respiratory:  SpO2: SpO2: 91 %, SpO2 Device: O2 Device: Nasal cannula  Nasal Cannula O2 Flow Rate (L/min): 5 L/min    Invasive/non-invasive ventilation settings   Respiratory  Report   Lab Data (Last 4 hours)    None         O2/Vent Data (Last 4 hours)    None                Physical Exam  Vitals reviewed  Constitutional:       General: He is awake  He is not in acute distress  Appearance: He is ill-appearing  He is not toxic-appearing or diaphoretic  Interventions: Nasal cannula in place  Eyes:      Pupils: Pupils are equal, round, and reactive to light  Cardiovascular:      Rate and Rhythm: Normal rate and regular rhythm  Heart sounds: Normal heart sounds  Pulmonary:      Effort: No tachypnea, accessory muscle usage or respiratory distress  Breath sounds: Decreased breath sounds present  No wheezing, rhonchi or rales  Comments: Wet upper airway secretions   Abdominal:      General: There is distension  Palpations: Abdomen is soft  Tenderness: There is abdominal tenderness in the left lower quadrant  Comments: NGT in place with bilious drainage   Ostomy pink, small amount of brown liquid in bag    Musculoskeletal:      Right lower leg: Edema present  Left lower leg: Edema present  Skin:     General: Skin is warm and dry  Neurological:      General: No focal deficit present  Mental Status: He is alert               Laboratory and Diagnostics:  Results from last 7 days   Lab Units 22  0517 22  0446 22  0630 22  0507 05/15/22  0506 22  0455 22  0745   WBC Thousand/uL 13 94* 15 24* 15 37* 15 64* 14 89* 14 84* 17 60*   HEMOGLOBIN g/dL 8 4* 8 3* 8 2* 8 2* 7 8* 7 7* 8 9* HEMATOCRIT % 23 7* 24 1* 24 2* 23 7* 23 2* 22 5* 25 8*   PLATELETS Thousands/uL 314 272 254 219 209 190 190   BANDS PCT %  --   --  10*  --   --   --  50*   MONO PCT %  --   --  3*  --   --   --  3*     Results from last 7 days   Lab Units 05/19/22 0517 05/18/22 0446 05/17/22  0630 05/16/22  0507 05/15/22  0506 05/14/22  0455 05/13/22  0745   SODIUM mmol/L 144 141 142 139 140 136 134*   POTASSIUM mmol/L 3 7 4 0 3 8 3 3* 3 9 3 4* 3 6   CHLORIDE mmol/L 114* 111* 111* 107 109* 106 102   CO2 mmol/L 25 24 25 27 23 23 22   ANION GAP mmol/L 5 6 6 5 8 7 10   BUN mg/dL 50* 37* 28* 20 18 16 19   CREATININE mg/dL 1 20 0 96 0 93 0 93 0 86 0 86 1 04   CALCIUM mg/dL 7 7* 7 6* 7 4* 7 6* 7 7* 7 4* 7 4*   GLUCOSE RANDOM mg/dL 180* 128 171* 151* 138 141* 125   ALT U/L  --  13 11* 14 15  --   --    AST U/L  --  12 12 16 20  --   --    ALK PHOS U/L  --  43* 47 53 58  --   --    ALBUMIN g/dL  --  2 2* 2 2* 2 5* 2 5*  --   --    TOTAL BILIRUBIN mg/dL  --  0 71 0 68 0 75 0 63  --   --      Results from last 7 days   Lab Units 05/19/22 0517 05/18/22 0446 05/17/22  0630 05/16/22  0507 05/15/22  0506 05/14/22  0455 05/13/22  0745   MAGNESIUM mg/dL 2 8* 2 6 1 9 2 0 2 2 2 3 2 2   PHOSPHORUS mg/dL  --  2 6 1 6* 1 6* 1 6* 1 8* 2 4               Results from last 7 days   Lab Units 05/17/22 2003   LACTIC ACID mmol/L 1 1     ABG:  Results from last 7 days   Lab Units 05/15/22  0852   PH ART  7 373   PCO2 ART mm Hg 32 4*   PO2 ART mm Hg 73 7*   HCO3 ART mmol/L 18 4*   BASE EXC ART mmol/L -6 1   ABG SOURCE  Radial, Right     VBG:  Results from last 7 days   Lab Units 05/15/22  0852   ABG SOURCE  Radial, Right     Results from last 7 days   Lab Units 05/19/22  0517 05/18/22  0446 05/16/22  0507   PROCALCITONIN ng/ml 0 99* 1 24* 2 60*       Micro  Results from last 7 days   Lab Units 05/18/22  1601 05/18/22  1559 05/17/22  0937 05/17/22  0923   BLOOD CULTURE  No Growth at 24 hrs   No Growth at 24 hrs   --   --    SPUTUM CULTURE   --   --  Test not performed  Suggest repeat specimen  --    GRAM STAIN RESULT   --   --  >10 squamous epithelial cells/lpf, indicating orophayngeal contamination  *  1+ Polys*  2+ Budding Yeast with Pseudomycelia*  Rare Gram positive rods*  --    MRSA CULTURE ONLY   --   --  No Methicillin Resistant Staphlyococcus aureus (MRSA) isolated  --    LEGIONELLA URINARY ANTIGEN   --   --   --  Negative   STREP PNEUMONIAE ANTIGEN, URINE   --   --   --  Negative       EKG: NSR rate 95  Imaging: I have personally reviewed pertinent reports  Intake and Output  I/O       05/18 0701 05/19 0700 05/19 0701 05/20 0700    I V  (mL/kg) 200 (3) 100 (1 5)    NG/GT 30 60    IV Piggyback 650 850     709 3    Total Intake(mL/kg) 1167 (17 6) 1719 3 (25 9)    Urine (mL/kg/hr) 1525 (1) 1515 (1)    Emesis/NG output 550 150    Stool 85 60    Total Output 2160 1725    Net -993 -5 7                Height and Weights   Height: 5' 4" (162 6 cm)  IBW (Ideal Body Weight): 59 2 kg  Body mass index is 25 13 kg/m²  Weight (last 2 days)     Date/Time Weight    05/19/22 0553 66 4 (146 39)    05/18/22 0513 66 1 (145 72)            Nutrition       Diet Orders   (From admission, onward)             Start     Ordered    05/12/22 1032  Diet NPO  Diet effective now        Comments: Patient can have ice chips ad alex   References:    Nutrtion Support Algorithm Enteral vs  Parenteral   Question Answer Comment   Diet Type NPO    RD to adjust diet per protocol?  Yes        05/12/22 1033    05/12/22 0906  Room Service  Once        Question:  Type of Service  Answer:  Room Service - Appropriate with Assistance    05/12/22 0905                  Active Medications  Scheduled Meds:  Current Facility-Administered Medications   Medication Dose Route Frequency Provider Last Rate    acetaminophen  650 mg Rectal Q6H PRN Marla Stewardan ISELA OLEARY      acetylcysteine  3 mL Nebulization Q8H ISELA Lawrence      Adult TPN (CUSTOM BASE/STANDARD ELECTROLYTE) Intravenous Continuous TPN Shamar Rojas PA-C 71 4 mL/hr at 05/19/22 2303    cefepime  2,000 mg Intravenous Q12H Cherri Spironello V, CRNP 2,000 mg (05/19/22 2307)    heparin (porcine)  5,000 Units Subcutaneous Q12H [facility] Cherri Spironello V, ISELA      HYDROmorphone  0 5 mg Intravenous Q4H PRN Savanna Almodovar PA-C      insulin lispro  1-5 Units Subcutaneous Q6H [facility] ISELA García      iohexol  50 mL Oral Once in imaging Harlo Lennert V, CRNP      ipratropium-albuterol  3 mL Nebulization Q6H PRN Cherri Spironello V, CRNP      ipratropium-albuterol  3 mL Nebulization Q8H [facility] Spironello V, CRNP      metroNIDAZOLE  500 mg Intravenous Q8H [facility] Spironello V, CRNP 500 mg (05/19/22 2330)    ondansetron  4 mg Intravenous Q6H PRN [facility] Spironello V, CRNP      pantoprazole  40 mg Intravenous Q24H [facility] ISELA Ritter       Continuous Infusions:  Adult TPN (CUSTOM BASE/STANDARD ELECTROLYTE), , Last Rate: 71 4 mL/hr at 05/19/22 2303      PRN Meds:   acetaminophen, 650 mg, Q6H PRN  HYDROmorphone, 0 5 mg, Q4H PRN  iohexol, 50 mL, Once in imaging  ipratropium-albuterol, 3 mL, Q6H PRN  ondansetron, 4 mg, Q6H PRN        Invasive Devices Review  Invasive Devices  Report    Peripherally Inserted Central Catheter Line  Duration           PICC Line 37/08/93 Right Basilic 1 day          Peripheral Intravenous Line  Duration           Peripheral IV 05/18/22 Upper;Right Arm 1 day          Drain  Duration           Colostomy LLQ 9 days    NG/OG/Enteral Tube Nasogastric Left nare 1 day    External Urinary Catheter Medium <1 day                Rationale for remaining devices: quigley - accurate I/O in critically ill, discontinue   PICC - continue for TPN   ---------------------------------------------------------------------------------------  Advance Directive and Living Will:      Power of :    POLST: ---------------------------------------------------------------------------------------  Care Time Delivered:   No Critical Care time spent       Rosa Buckley PA-C      Portions of the record may have been created with voice recognition software  Occasional wrong word or "sound a like" substitutions may have occurred due to the inherent limitations of voice recognition software    Read the chart carefully and recognize, using context, where substitutions have occurred

## 2022-05-20 NOTE — PROGRESS NOTES
Recommend TPN change to 1300 mL 20% Dextrose, 600 mL 15% amino acids and 200 mL 20% lipids for a total volume of 2100 mL  This will provide 1644 kcals, 260 grams CHO, 90 grams protein and 40 grams lipids  Glucose Infusion Rate will be 2 82 mg/kg/min  Lipid load will be 0 62 grams/kg

## 2022-05-20 NOTE — PLAN OF CARE
Problem: RESPIRATORY - ADULT  Goal: Achieves optimal ventilation and oxygenation  Description: INTERVENTIONS:  - Assess for changes in respiratory status  - Assess for changes in mentation and behavior  - Position to facilitate oxygenation and minimize respiratory effort  - Oxygen administered by appropriate delivery if ordered  - Initiate smoking cessation education as indicated  - Encourage broncho-pulmonary hygiene including cough, deep breathe, Incentive Spirometry  - Assess the need for suctioning and aspirate as needed  - Assess and instruct to report SOB or any respiratory difficulty  - Respiratory Therapy support as indicated  5/20/2022 0932 by Pattie Saleh, RT  Outcome: Not Progressing

## 2022-05-20 NOTE — PLAN OF CARE
Problem: OCCUPATIONAL THERAPY ADULT  Goal: Performs self-care activities at highest level of function for planned discharge setting  See evaluation for individualized goals  Description: Treatment Interventions: ADL retraining, Functional transfer training, UE strengthening/ROM, Endurance training, Patient/family training, Equipment evaluation/education, Activityengagement, Compensatory technique education          See flowsheet documentation for full assessment, interventions and recommendations  Outcome: Progressing  Note: Limitation: Decreased ADL status, Decreased UE ROM, Decreased UE strength, Decreased endurance, Decreased self-care trans, Decreased high-level ADLs  Prognosis: Good  Assessment: Patient seen for OT treatment  Patient requires mod/max assist for bed mobility  Patient with NG tube and multiple lines in ICU  Patient trying to pull at NG tube upon arrival to room, pt redirected, nurse made aware  Patient is cooperative for therapy but fatigued  Patient will benefit from continued OT services to maximize functional performance with ADLS       OT Discharge Recommendation: Post acute rehabilitation services

## 2022-05-20 NOTE — ASSESSMENT & PLAN NOTE
Wt Readings from Last 3 Encounters:   05/19/22 66 4 kg (146 lb 6 2 oz)   05/11/22 62 1 kg (136 lb 14 4 oz)   03/25/22 61 2 kg (135 lb)     · 5/16: Echo-EF 65%, mild TR, mild MR  · Vascular congestion evident on CXR  · Has been receiving 40mg IV Lasix, consider re-dosing today   · Monitor I&O  · Daily weights

## 2022-05-20 NOTE — ASSESSMENT & PLAN NOTE
· CXR with b/l pulmonary vascular congestion and worsening b/l upper lobe infiltrates R > L, multifocal consolidation on CT chest   · Concern for aspiration pneumonia as patient has developed post-op ileus with abdominal distention and has been encephalopathic  · Strep pneumo and legionella negative  · MRSA nasal surveillance negative  · Sputum culture unable to be run   · Continue plan as outlined above

## 2022-05-21 NOTE — RESPIRATORY THERAPY NOTE
Patient code blue, CPR initiated, ROSC  Intubated size 7 5 ETT, secured 26 at the lip  ETCO2 of 36  Transported to ICU  Placed on PCV 36/+6 100% rate of 26  CARLTON Little and Dr Rima Espinoza at bedside  Will monitor  1546  Per CARLTON Little, ETT withdrawn 1 cm  Now at 25 at the lip

## 2022-05-21 NOTE — PHYSICAL THERAPY NOTE
PT cancellation   05/21/22 1445   PT Last Visit   PT Visit Date 05/21/22   Note Type   Note type Cancelled Session   Cancel Reasons Other   Additional Comments code blue called: may transfer to ICU  Will follow  Licensure   [place] License Number  [facility] PT  [id number]

## 2022-05-21 NOTE — PROGRESS NOTES
[address] Internal Medicine Progress Note  Patient: Lizette Panchal 80 y o  male   MRN: [id number]  PCP: Geraldo Hayward MD  Unit/Bed#: [address] [place] [address] Encounter: [id number]  Date Of Visit: 05/21/22    Primary Service: Abelardo Lopez MD  Reason for Consultation: Medical management    Problem List:    Principal Problem: Bowel perforation ([address] )  Active Problems:    Acute respiratory failure with hypoxia (HCC)    Severe sepsis (HCC)    Ileus (HCC)    Toxic metabolic encephalopathy    CHF (congestive heart failure) (HCC)    Hyperglycemia      Assessment & Plan:    * Bowel perforation [facility])  Assessment & Plan  Patient is s/p exploratory laparotomy, low anterior resection, protective loop transverse colostomy and segmental small bowel resection secondary to perforation at rectosigmoid junction after colonoscopy on 05/11  · Patient had outpatient EGD and colonoscopy at a stent on 05/11  Patient sustained a perforation during colonoscopy patient was transferred here for further management  · Postop ileus - dilated small bowel on CT scan and KUB   · TPN per surgery  Management per surgery  Acute respiratory failure with hypoxia [facility])  Assessment & Plan  Patient noted to be hypoxic with increased shortness of breath while on the floors  · CTA of the chest negative for pulmonary embolism but did show bilateral upper lobe ground-glass opacities compatible with pneumonia and/or pulmonary edema in addition to small bilateral pleural effusions      · He received IV Lasix   · Was requiring up to 15 L of oxygen per ICU note but currently on 5 L  · Per ID patient with multifocal pneumonitis and less likely pneumonia    Severe sepsis (HCC)  Assessment & Plan  SIRS versus severe sepsis, As noted by tachycardia, tachypnea due to peritonitis  · Continue cefepime, Flagyl for total of 14 days post I&D per ID  · Blood cultures negative so far    Ileus [facility])  Assessment & Plan  · Patient with postop ileus with distension and nausea, vomiting  · NG tube removed today, sips of clears per surgery  · May need repeat CT scan if leukocytosis does not improve    Hyperglycemia  Assessment & Plan  Hyperglycemia on TPN  on Accu-Cheks with sliding scale insulin    CHF (congestive heart failure) ([facility])  Assessment & Plan  Wt Readings from Last 3 Encounters:   22 65 2 kg (143 lb 11 8 oz)   22 62 1 kg (136 lb 14 4 oz)   22 61 2 kg (135 lb)     Echo with normal EF  Received IV Lasix previously        Toxic metabolic encephalopathy  Assessment & Plan  Acute encephalopathy likely multifactorial    Hypokalemia-resolved as of 2022  Assessment & Plan  Potassium will be repleted  VTE Pharmacologic Prophylaxis:   Pharmacologic: Heparin  Mechanical:  Yes    Was care plan discussed with the Primary service today?: Yes    Education and Discussions with Family / Patient: Yes    Time Spent for Care: 45 minutes  More than 50% of total time spent on counseling and coordination of care as described above  Is patient acceptable for discharge from medicine standpoint?: No  Discharge Recommendations: N/A    Subjective:     Reports some abdominal pain  No vomiting    Objective:     Vitals:   Temp (24hrs), Av 7 °F (36 5 °C), Min:97 1 °F (36 2 °C), Max:98 6 °F (37 °C)    Temp:  [97 1 °F (36 2 °C)-98 6 °F (37 °C)] 98 °F (36 7 °C)  HR:  [] 85  Resp:  [18-42] 18  BP: (100-136)/(55-68) 100/62  SpO2:  [91 %-98 %] 96 %  Body mass index is 24 67 kg/m²  Input and Output Summary (last 24 hours): Intake/Output Summary (Last 24 hours) at 2022 0946  Last data filed at 2022 1600  Gross per 24 hour   Intake --   Output 223 ml   Net -223 ml       Physical Exam:     Physical Exam  Constitutional:       General: He is not in acute distress  Appearance: He is ill-appearing  HENT:      Head: Normocephalic and atraumatic  Eyes:      General: No scleral icterus    Cardiovascular:      Rate and Rhythm: Normal rate and regular rhythm  Pulmonary:      Effort: Pulmonary effort is normal  No respiratory distress  Breath sounds: No wheezing  Comments: Decreased breath sounds bilaterally  Abdominal:      General: There is distension  Palpations: Abdomen is soft  Tenderness: There is abdominal tenderness (Mild generalized)  Comments: Ostomy in place, stoma is pink   Neurological:      Mental Status: He is alert  Additional Data:     Labs:    Results from last 7 days   Lab Units 05/21/22  0524   WBC Thousand/uL 23 58*   HEMOGLOBIN g/dL 8 2*   HEMATOCRIT % 24 7*   PLATELETS Thousands/uL 326   LYMPHO PCT % 17   MONO PCT % 7   EOS PCT % 2     Results from last 7 days   Lab Units 05/21/22  0524 05/19/22  0517 05/18/22  0446   POTASSIUM mmol/L 3 5   < > 4 0   CHLORIDE mmol/L 116*   < > 111*   CO2 mmol/L 27   < > 24   BUN mg/dL 45*   < > 37*   CREATININE mg/dL 1 03   < > 0 96   CALCIUM mg/dL 7 5*   < > 7 6*   ALK PHOS U/L  --   --  43*   ALT U/L  --   --  13   AST U/L  --   --  12    < > = values in this interval not displayed  * I Have Reviewed All Lab Data Listed Above  * Additional Pertinent Lab Tests Reviewed: All Labs Within Last 24 Hours Reviewed    Imaging:    EGD    Result Date: 5/11/2022  Narrative: [address] [address] [facility] [place] [address] [id number] Summer Hartley 89 OF SERVICE: 5/11/22 PHYSICIAN(S): Attending: Leon Chavez MD Fellow: No Staff Documented Procedure  :  EGD with biopsies INDICATION: Nausea, Family history of stomach cancer POST-OP DIAGNOSIS: See the impression below  PREPROCEDURE: Informed consent was obtained for the procedure, including sedation  Risks of perforation, hemorrhage, adverse drug reaction and aspiration were discussed  The patient was placed in the left lateral decubitus position  Patient was explained about the risks and benefits of the procedure   Risks including but not limited to bleeding, infection, and perforation were explained in detail  Also explained about less than 100% sensitivity with the exam and other alternatives  DETAILS OF PROCEDURE: Patient was taken to the procedure room where a time out was performed to confirm correct patient and correct procedure  The patient underwent monitored anesthesia care, which was administered by an anesthesia professional  The patient's blood pressure, heart rate, level of consciousness, respirations and oxygen were monitored throughout the procedure  The scope was advanced to the second part of the duodenum  Retroflexion was performed in the fundus  Prior to the procedure, the patient's H  Pylori status was unknown  The patient experienced no blood loss  The procedure was not difficult  The patient tolerated the procedure well  There were no apparent complications  ANESTHESIA INFORMATION: ASA: III Anesthesia Type: IV Sedation with Anesthesia MEDICATIONS: No administrations occurring from 1356 to 1417 on 05/11/22 FINDINGS: Moderate, localized erythematous mucosa in the antrum; performed cold forceps biopsy to rule out H  pylori The upper third of the esophagus, middle third of the esophagus, lower third of the esophagus, GE junction, duodenal bulb, 1st part of the duodenum and 2nd part of the duodenum appeared normal  Performed random biopsy using biopsy forceps to rule out Cabrera's esophagus  SPECIMENS: ID Type Source Tests Collected by Time Destination 1 : antrum Tissue Stomach TISSUE EXAM Mingo Hutchins MD 5/11/2022  2:04 PM  2 : lower Tissue Esophagus TISSUE EXAM Mingo Hutchins MD 5/11/2022  2:05 PM  3 : sigmoid Tissue Polyp, Colorectal TISSUE EXAM Mingo Hutchins MD 5/11/2022  2:14 PM      Impression: 1  Mild erythema in the antrum 2   Normal esophagus, normal GE junction and normal duodenum RECOMMENDATION: Await pathology results Follow up with me in clinic Anti-reflux diet   Mingo Hutchins MD     Colonoscopy    Result Date: 5/11/2022  Narrative: [facility] [address] [facility] [phone] [phone] DATE OF SERVICE: 5/11/22 PHYSICIAN(S): Attending: Leon Chavez MD Fellow: No Staff Documented Procedure : Incomplete colonoscopy INDICATION: Diverticulosis, Left lower quadrant pain, Colonic intussusception ([address] ), Family history of colon cancer, Adenomatous polyp of colon, unspecified part of colon POST-OP DIAGNOSIS: See the impression below  HISTORY: Prior colonoscopy: 10 years ago  BOWEL PREPARATION: Miralax/Dulcolax PREPROCEDURE: Informed consent was obtained for the procedure, including sedation  Risks including but not limited to bleeding, infection, perforation, adverse drug reaction and aspiration were explained in detail  Also explained about less than 100% sensitivity with the exam and other alternatives  The patient was placed in the left lateral decubitus position  DETAILS OF PROCEDURE: Patient was taken to the procedure room where a time out was performed to confirm correct patient and correct procedure  The patient underwent monitored anesthesia care, which was administered by an anesthesia professional  The patient's blood pressure, heart rate, level of consciousness, oxygen and respirations were monitored throughout the procedure  A digital rectal exam was performed  The scope was introduced through the anus and advanced to the sigmoid colon  Retroflexion was performed in the rectum  The quality of bowel preparation was evaluated using the [facility] Bowel Preparation Scale with scores of: right colon = not assessed, transverse colon = not assessed, left colon = 1  The total BBPS score was 1  Bowel prep was not adequate  The patient experienced no blood loss  The procedure was not difficult  The patient tolerated the procedure well  There were no apparent complications   ANESTHESIA INFORMATION: ASA: III Anesthesia Type: IV Sedation with Anesthesia MEDICATIONS: No administrations occurring from 1356 to 1425 on 05/11/22 FINDINGS: Julia Sor kink in the sigmoid colon, it was very difficult to pass the scope, sigmoid polyp which was removed with cold snare polypectomy  Extensive diverticulosis, ultra thin scope was used,  but I see the peritoneum, procedure was aborted  EVENTS: Procedure Events Event Event Time ENDO SCOPE OUT TIME 5/11/2022  2:24 PM SPECIMENS: ID Type Source Tests Collected by Time Destination 1 : antrum Tissue Stomach TISSUE EXAM Cm Hebert MD 5/11/2022  2:04 PM  2 : lower Tissue Esophagus TISSUE EXAM Cm Hebert MD 5/11/2022  2:05 PM  3 : sigmoid Tissue Polyp, Colorectal TISSUE EXAM Cm Hebert MD 5/11/2022  2:14 PM  EQUIPMENT: Colonoscope -Q180AL Colonoscope -[id number]     Impression: 1  Sharp kink in the sigmoid colon with stricture, scope was unable to pass  Ultra thin scope was use, small sigmoid polyp removed  2  Possible perforation in sigmoid colon RECOMMENDATION: Stat CT scan abdomen and pelvis Surgical consult, discuss case with Dr Sally Fermin MD     CT abdomen pelvis wo contrast    Result Date: 5/11/2022  Narrative: CT ABDOMEN AND PELVIS WITHOUT IV CONTRAST INDICATION:   Bowel obstruction suspected possible bowel perofration  COMPARISON:  None  TECHNIQUE:  CT examination of the abdomen and pelvis was performed without intravenous contrast  This examination was performed without intravenous contrast in the context of the critical nationwide O[place] shortage  Axial, sagittal, and coronal 2D reformatted images were created from the source data and submitted for interpretation  Radiation dose length product (DLP) for this visit:  350 2 mGy-cm   This examination, like all CT scans performed in the [facility], was performed utilizing techniques to minimize radiation dose exposure, including the use of iterative reconstruction and automated exposure control  Enteric contrast was NOT administered   FINDINGS: ABDOMEN LOWER CHEST:  No clinically significant abnormality identified in the visualized lower chest  LIVER/BILIARY TREE:  Unremarkable  GALLBLADDER:  There are tiny punctate gallstone(s) within the gallbladder  SPLEEN:  Unremarkable  PANCREAS:  Unremarkable  ADRENAL GLANDS:  Unremarkable  KIDNEYS/URETERS:  Small right lower pole circumscribed subcentimeter renal hypodensity is present, too small to accurately characterize, and statistically most likely benign finding  According to recent literature (Radiology 2019) no further workup of this finding is recommended  STOMACH AND BOWEL:  There is colonic diverticulosis without evidence of acute diverticulitis  APPENDIX:  No findings to suggest appendicitis  ABDOMINOPELVIC CAVITY: Moderate pneumoperitoneum  This can be seen anterior to the liver and deep to the anterior abdominal wall  No ascites  No enlarged adenopathy  VESSELS:  Atherosclerotic changes are present  No evidence of aneurysm  PELVIS REPRODUCTIVE ORGANS:  The prostate is enlarged  URINARY BLADDER:  Unremarkable  ABDOMINAL WALL/INGUINAL REGIONS:  Unremarkable  OSSEOUS STRUCTURES:  Bones are demineralized  Multilevel vertebroplasties and compression fractures of indeterminate age  Impression: Moderate pneumoperitoneum in keeping with bowel perforation  I personally discussed this study with DAVID Mueller on 5/11/2022 at 3:42 PM  Workstation performed: [id number]     CT chest abdomen pelvis wo contrast    Result Date: 5/18/2022  Narrative: CT CHEST, ABDOMEN AND PELVIS WITHOUT IV CONTRAST INDICATION:   Intra-abdominal abscess R/O intra-abdominal collection  COMPARISON:  CTA chest on 5/15/2022 and CT abdomen and pelvis on 5/11/2022  TECHNIQUE: CT examination of the chest, abdomen and pelvis was performed without intravenous contrast  This examination was performed without intravenous contrast in the context of the critical nationwide Omnipaque shortage   Axial, sagittal, and coronal 2D reformatted images were created from the source data and submitted for interpretation  Radiation dose length product (DLP) for this visit:  523 41 mGy-cm   This examination, like all CT scans performed in the [facility], was performed utilizing techniques to minimize radiation dose exposure, including the use of iterative  reconstruction and automated exposure control  Enteric contrast was administered  FINDINGS: CHEST LUNGS:  Worsening patchy mixed groundglass and consolidative change bilaterally, most prominent in the bilateral upper lobes  Bilateral lower lobe compressive atelectasis  There is septal thickening  There is no tracheal or endobronchial lesion  PLEURA:  Moderate left and small right pleural effusions, increased since the prior study  HEART/GREAT VESSELS: Heart is unremarkable for patient's age  Aortic valvular calcifications  Coronary artery calcifications  No thoracic aortic aneurysm  MEDIASTINUM AND SRI:  Mildly distention of the mid to distal esophagus with enteric contrast material   No mediastinal lymphadenopathy  CHEST WALL AND LOWER NECK:  Unremarkable  ABDOMEN LIVER/BILIARY TREE:  Unremarkable  GALLBLADDER:  There is a tiny calcified gallstone within the gallbladder, without pericholecystic inflammatory changes  SPLEEN:  Unremarkable  PANCREAS:  Unremarkable  ADRENAL GLANDS:  Unremarkable  KIDNEYS/URETERS:  No hydronephrosis or urinary tract calculus  One or more sharply circumscribed subcentimeter renal hypodensities are present, too small to accurately characterize, and statistically most likely benign findings  According to recent literature (Radiology 2019) no further workup of these findings is recommended  STOMACH AND BOWEL:  Postsurgical changes of low anterior resection with creation of loop colostomy in the anterior left upper abdomen  Small bowel anastomosis in the anterior lower abdomen deep to the midline incision    Multiple distended small bowel loops measuring up to 3 5 cm in diameter  No clear transition point is identified  Oral contrast has reached mid small bowel  Enteric tube is present with sidehole visualized near the gastroesophageal junction  APPENDIX:  No findings to suggest appendicitis  ABDOMINOPELVIC CAVITY:  Trace presacral fluid  No pneumoperitoneum  No lymphadenopathy  VESSELS:  Atherosclerotic changes are present  No evidence of aneurysm  PELVIS REPRODUCTIVE ORGANS:  Unremarkable for patient's age  URINARY BLADDER:  Decompressed with a Farnsworth catheter in place  Moderate amount of air in the bladder  ABDOMINAL WALL/INGUINAL REGIONS:  Postsurgical changes in the ventral wall with midline incision noted  Loop colostomy in the anterior left upper abdomen  Mild anasarca  OSSEOUS STRUCTURES:  No acute fracture or destructive osseous lesion  Multilevel vertebroplasty and multilevel age-indeterminate compression fractures in the thoracolumbar spine are redemonstrated  Impression: 1  Worsening patchy mixed groundglass and consolidative change bilaterally compatible with multifocal pneumonia and/or pulmonary edema  2   Increased bilateral pleural effusions  3   Sidehole of enteric tube is visualized near the gastroesophageal junction  Consider tube advancement  4   Mild distention of mid to distal esophagus with enteric contrast material   Correlate for gastroesophageal reflux  5   Multiple dilated small bowel loops without a clear transition point  The finding may reflect ileus, however developing small bowel obstruction cannot be excluded  Follow-up is recommended  6   Moderate amount of air within the urinary bladder, likely related to instrumentation  Correlate with urinalysis to exclude cystitis  I personally discussed this study with Cinthya Sanchez on 5/18/2022 at 1:11 AM  Workstation performed: [id number]     XR chest portable    Result Date: 5/17/2022  Narrative: CHEST INDICATION:   NGT placement    COMPARISON:  Chest radiograph and CT May 15, 2022 EXAM PERFORMED/VIEWS:  XR CHEST PORTABLE FINDINGS:  Tip of enteric tube is at the expected position of the gastroesophageal junction, and sidehole is at the expected position of the esophageal hiatus  Cardiomediastinal silhouette appears unremarkable  Opacities in the bilateral upper lobes have slightly increased in density  Now trace bilateral pleural effusions have decreased since prior study  No pneumothorax  Kyphoplasty material at multiple levels  Impression: 1  Tip of enteric tube is at the expected position of the gastroesophageal junction  Slight advancement is advised  2   Slight increased predominant upper lobe opacities, likely representing pneumonia  Concomitant edema is possible  The now trace bilateral pleural effusions have decreased in size  The study was marked in [facility] for immediate notification  Workstation performed: [id number]     XR chest portable    Result Date: 5/15/2022  Narrative: CHEST INDICATION:   coarse breath sounds  COMPARISON:  None EXAM PERFORMED/VIEWS:  XR CHEST PORTABLE FINDINGS: Heart is mildly enlarged  There is aortic knob calcification  There is central patchy airspace disease bilaterally consistent with pulmonary edema  There are small bilateral pleural effusions  Status post multilevel vertebroplasty  Impression: Pulmonary edema and small bilateral pleural effusions  Workstation performed: [id number]     XR abdomen 1 view kub    Result Date: 5/18/2022  Narrative: ABDOMEN INDICATION:   Contrast follow through  COMPARISON:  CT performed May 17, 2022 VIEWS:  AP supine FINDINGS: Air and contrast filled mildly distended loops of bowel seen throughout the abdomen in a nonspecific pattern suspicious for bowel obstruction  Contrast does not appear to have reached the large bowel  An enteric catheter tip overlies the expected location of the cavoatrial junction and has not quite reached the stomach  Advancement by approximately 8 to 10 cm recommended   Small costophrenic angle effusions with overlying airspace opacity  Osteopenia with multilevel compression fractures and vertebroplasty is noted  Impression: Radiographic appearance suspicious for early or mild distal small bowel obstruction  Alternatively, this could represent ileus  Enteric contrast administered for the May 17, 2022 examination does not appear to have reached the large bowelAn  enteric catheter tip overlies the expected location of the cavoatrial junction and has not quite reached the stomach  Advancement by approximately 8 to 10 cm recommended  This examination was marked "significant notification" in Epic in order to begin the standard process by which the radiology reading room liaison alerts the referring practitioner  Workstation performed: [id number]     XR abdomen 1 view kub    Result Date: 5/16/2022  Narrative: ABDOMEN INDICATION:   Evaluation of bowel gas pattern  Recent pneumoperitoneum seen by CT concerning for bowel perforation  Review of chart shows history of low anterior resection, protective loop transverse colostomy and segmental small bowel resection to treat perforated rectosigmoid junction  COMPARISON:  CT of the abdomen/pelvis dated 5/11/2022  VIEWS:  AP supine FINDINGS: Dilated loops of small bowel scattered throughout the abdomen measuring up to 3 9 cm in caliber and left lower quadrant  No evident pneumatosis  Some gas does appear to progress to the cecum and ascending colon  Rectosigmoid chain sutures are evident  Small amount of gas in the residual rectal pouch  No discernible free air on this supine study  Upright or left lateral decubitus imaging is more sensitive to detect subtle free air in the appropriate setting  No pathologic calcifications or soft tissue masses  Chronic atelectasis/scarring at the lung bases  No acute findings of the visualized lower chest  Prior vertebral plasties  Thoracolumbar spondylosis  No acute osseous abnormalities       Impression: Diffusely distended, dilated small bowel loops with small amount of persistent air extending to the proximal colon  Findings may suggest diffuse ileus or partial obstruction  Workstation performed: [id number]     CTA chest pe study    Result Date: 5/15/2022  Narrative: CTA - CHEST WITH IV CONTRAST - PULMONARY ANGIOGRAM INDICATION:   hypoxic  COMPARISON: None  TECHNIQUE: CTA examination of the chest was performed using angiographic technique according to a protocol specifically tailored to evaluate for pulmonary embolism  Axial, sagittal, and coronal 2D reformatted images were created from the source data and  submitted for interpretation  In addition, coronal 3D MIP postprocessing was performed on the acquisition scanner  Radiation dose length product (DLP) for this visit:  479 mGy-cm   This examination, like all CT scans performed in the [facility], was performed utilizing techniques to minimize radiation dose exposure, including the use of iterative reconstruction and automated exposure control  IV Contrast:  70 mL of iohexol (OMNIPAQUE)  FINDINGS: PULMONARY ARTERIAL TREE:  No pulmonary embolus is seen  LUNGS:  Patchy groundglass densities bilaterally upper lobes, right greater than left Bilateral lower lobe consolidation compatible with compressive atelectasis  There is no tracheal or endobronchial lesion  PLEURA:  Small bilateral effusions  HEART/GREAT VESSELS:  Normal heart size  Aortic valvular calcifications  Coronary artery calcifications  Normal caliber thoracic aorta with atherosclerotic calcifications  No thoracic aortic aneurysm  MEDIASTINUM AND SRI:  Unremarkable  CHEST WALL AND LOWER NECK:   Unremarkable  VISUALIZED STRUCTURES IN THE UPPER ABDOMEN:  Unremarkable  OSSEOUS STRUCTURES:  No acute fracture or destructive osseous lesion  Age indeterminate moderate T5 compression deformity  Multiple old compression deformities and vertebroplasties in the lower thoracic spine  Impression: No pulmonary embolus  Bilateral upper lobe groundglass opacities compatible with pneumonia and/or pulmonary edema  Small bilateral pleural effusions  Workstation performed: [id number]     IR PICC line placement double lumen    Result Date: 5/18/2022  Narrative: PERCUTANEOUSLY INSERTED CENTRAL VENOUS CATHETER PLACEMENT NG TUBE PLACEMENT WITH FLUOROSCOPY HISTORY: TPN FLUORO TIME: 5 9 min NUMBER OF IMAGES: 3 6 PROCEDURE:  The patient was brought to the Interventional Radiology Suite and identified verbally and by wrist band  The right basilic vein was evaluated as a potential access site with ultrasound  The vessel was found to be patent and compressible  The site was prepped and draped in the usual sterile fashion  All elements of maximal sterile barrier technique, cap and mask and sterile gown and sterile gloves and sterile full-body drape and hand hygiene and 2% chlorhexidine for cutaneous antisepsis  Sterile ultrasound technique with sterile gel and sterile probe covers was also utilized  Lidocaine was administered to the skin and a small skin incision was made  Under ultrasound guidance, utilizing sterile ultrasound technique with sterile gel and a sterile probe cover, the right basilic vein was accessed using single wall Seldinger technique  Static images of real time needle entry into the vessel were obtained  This was followed by a  018 guidewire and a sheath and dilator tapered to   018  A 5-Latvian central venous catheter was then advanced under fluoroscopic guidance to the cavoatrial junction  The catheter was cut at 41 cm with 0 cm external length  The position was confirmed fluoroscopically  Blood could be freely aspirated and heparinized saline injected  Patient experienced no untoward reactions  Impression: 1   Status post placement of a 5-Latvian percutaneously inserted central venous catheter via the right basilic vein with its tip at the cavoatrial junction under ultrasound and fluoroscopic guidance  NG tube placement Next using fluoroscopic guidance, an NG tube was placed down into the stomach past the narrowing at the GE junction  2 stiff wires were required to advance this  Impression successful NG tube placement with its tip in the stomach  The tube may be used immediately  Workstation performed: [id number]     Echo complete w/ contrast if indicated    Result Date: 5/16/2022  Narrative: Uli Petersen  Left Ventricle: Left ventricular cavity size is normal  Wall thickness is mild to moderately increased  Systolic function is normal   Estimated ejection fraction is 65%  Wall motion is normal    Left Atrium: The atrium is mildly dilated    Aortic Valve: The aortic valve is trileaflet  The leaflets are moderately thickened  The leaflets are mildly calcified  The leaflets exhibit normal mobility  There is trace regurgitation    Mitral Valve: There is mild regurgitation    Tricuspid Valve: There is mild regurgitation  The right ventricular systolic pressure is mildly elevated at 45 mmHg    Pulmonic Valve: There is trace regurgitation       Imaging Reports Reviewed by myself    Cultures:   Blood Culture:   Lab Results   Component Value Date    BLOODCX No Growth at 48 hrs  05/18/2022    BLOODCX No Growth at 48 hrs  05/18/2022     Urine Culture: No results found for: URINECX  Sputum Culture: No components found for: SPUTUMCX  Wound Culture:   Lab Results   Component Value Date    WOUNDCULT 1+ Growth of Pseudomonas aeruginosa (A) 05/11/2022       Last 24 Hours Medication List:   Current Facility-Administered Medications   Medication Dose Route Frequency Provider Last Rate    acetaminophen  650 mg Per NG Tube Q6H [facility] Cherri Spironello V, CRNP      acetylcysteine  3 mL Nebulization Q8H Cherri Luisnello V, CRNP      Adult TPN (CUSTOM BASE/CUSTOM ELECTROLYTE)   Intravenous Continuous TPN Tavares Domingo PA-C      [MAR Hold] Adult TPN (CUSTOM BASE/STANDARD ELECTROLYTE)   Intravenous Continuous TPN Ivette Mejia PA-C 90 2 mL/hr at 05/20/22 2240    cefepime  2,000 mg Intravenous Q12H Cherri Spironello V, CRNP Stopped (05/20/22 2315)    heparin (porcine)  5,000 Units Subcutaneous Q12H [facility] Spironello V, CRNP      HYDROmorphone  0 5 mg Intravenous Q4H [facility]      insulin lispro  2-12 Units Subcutaneous Q6H [facility]      iohexol  50 mL Oral Once in imaging [employer] V, CRNP      ipratropium-albuterol  3 mL Nebulization Q6H PRN [facility] Spironello V, CRNP      ipratropium-albuterol  3 mL Nebulization Q8H [facility] Spironello V, CRNP      levothyroxine  112 mcg Per NG Tube Early Morning [facility] Spironello V, CRNP      lidocaine  1 patch Topical Daily [facility] Spironello V, CRNP      metoprolol  2 5 mg Intravenous Q6H [facility] Spironello V, CRNP      metroNIDAZOLE  500 mg Intravenous Q8H [facility] 500 mg (05/21/22 0836)    ondansetron  4 mg Intravenous Q6H PRN [facility] Spironello V, CRNP      oxyCODONE  2 5 mg Per NG Tube Q4H PRN [facility] Spironello V, CRNP      oxyCODONE  5 mg Per NG Tube Q4H PRN [facility] Spironello V, CRNP      pantoprazole  40 mg Intravenous Q24H [facility] Spironello V, CRNP      potassium phosphate  12 mmol Intravenous Once Ivette Mejia PA-C 12 mmol (05/21/22 5050)        Today, Patient Was Seen By: Margarita Cueva DO    ** Please Note: "This note has been constructed using a voice recognition system  Therefore there may be syntax, spelling, and/or grammatical errors   Please call if you have any questions  "**

## 2022-05-21 NOTE — PLAN OF CARE
Problem: GASTROINTESTINAL - ADULT  Goal: Minimal or absence of nausea and/or vomiting  Description: INTERVENTIONS:  - Administer IV fluids if ordered to ensure adequate hydration  - Maintain NPO status until nausea and vomiting are resolved  - Nasogastric tube if ordered  - Administer ordered antiemetic medications as needed  - Provide nonpharmacologic comfort measures as appropriate  - Advance diet as tolerated, if ordered  - Consider nutrition services referral to assist patient with adequate nutrition and appropriate food choices  Outcome: Progressing     Problem: RESPIRATORY - ADULT  Goal: Achieves optimal ventilation and oxygenation  Description: INTERVENTIONS:  - Assess for changes in respiratory status  - Assess for changes in mentation and behavior  - Position to facilitate oxygenation and minimize respiratory effort  - Oxygen administered by appropriate delivery if ordered  - Initiate smoking cessation education as indicated  - Encourage broncho-pulmonary hygiene including cough, deep breathe, Incentive Spirometry  - Assess the need for suctioning and aspirate as needed  - Assess and instruct to report SOB or any respiratory difficulty  - Respiratory Therapy support as indicated  Outcome: Progressing     Problem: Prexisting or High Potential for Compromised Skin Integrity  Goal: Skin integrity is maintained or improved  Description: INTERVENTIONS:  - Identify patients at risk for skin breakdown  - Assess and monitor skin integrity  - Assess and monitor nutrition and hydration status  - Monitor labs   - Assess for incontinence   - Turn and reposition patient  - Assist with mobility/ambulation  - Relieve pressure over bony prominences  - Avoid friction and shearing  - Provide appropriate hygiene as needed including keeping skin clean and dry  - Evaluate need for skin moisturizer/barrier cream  - Collaborate with interdisciplinary team   - Patient/family teaching  - Consider wound care consult   Outcome: Progressing

## 2022-05-21 NOTE — PROCEDURES
Arterial Line Insertion    Date/Time: 5/21/2022 6:17 PM  Performed by: Claudia Villa PA-C  Authorized by: Claudia Villa PA-C     Patient location:  Bedside  Consent:     Consent obtained:  Emergent situation  Universal protocol:     Procedure explained and questions answered to patient or proxy's satisfaction: no      Relevant documents present and verified: no      Test results available and properly labeled: no      Radiology Images displayed and confirmed  If images not available, report reviewed: no      Required blood products, implants, devices, and special equipment available: no      Site/side marked: no      Immediately prior to procedure a time out was called: no      Patient identity confirmed: Anonymous protocol, patient vented/unresponsive  Indications:     Indications: multiple ABGs    Pre-procedure details:     Skin preparation:  Chlorhexidine    Preparation: Patient was prepped and draped in sterile fashion    Anesthesia (see MAR for exact dosages): Anesthesia method:  None  Procedure details:     Location / Tip of Catheter:  Femoral    Laterality:  Right    Needle gauge:  20 G    Number of attempts:  1    Successful placement: yes      Transducer: waveform confirmed    Post-procedure details:     Post-procedure:  Sutured and sterile dressing applied    CMS:  Normal    Patient tolerance of procedure:   Tolerated well, no immediate complications

## 2022-05-21 NOTE — PROCEDURES
Intubation    Date/Time: 5/21/2022 6:15 PM  Performed by: Claudia Villa PA-C  Authorized by: Claudia Villa PA-C     Patient location:  Bedside  Other Assisting Provider: No    Consent:     Consent obtained:  Emergent situation  Universal protocol:     Procedure explained and questions answered to patient or proxy's satisfaction: no      Relevant documents present and verified: no      Test results available and properly labeled: no      Radiology Images displayed and confirmed  If images not available, report reviewed: no      Required blood products, implants, devices, and special equipment available: no      Site/side marked: no      Immediately prior to procedure, a time out was called: no      Patient identity confirmed: Anonymous protocol, patient vented/unresponsive  Pre-procedure details:     Patient status:  Unresponsive    Pretreatment medications:  None  Indications:     Indications for intubation: respiratory failure    Procedure details:     Preoxygenation:  Bag valve mask    CPR in progress: yes      Intubation method:  Oral    Oral intubation technique: Avery 4  Laryngoscope size: Avery 4  Tube size (mm):  7 5    Tube type:  Cuffed    Number of attempts:  1    Ventilation between attempts: no      Cricoid pressure: no      Tube visualized through cords: yes    Placement assessment:     Tube secured with:  ETT wheeler    Breath sounds:  Equal    Placement verification: chest rise, CXR verification, equal breath sounds, ETCO2 detector and tube exhalation      CXR findings:  ETT in right main stem    Tube repositioned: yes    Post-procedure details:     Patient tolerance of procedure:   Tolerated well, no immediate complications

## 2022-05-21 NOTE — PROCEDURES
Central Line Insertion    Date/Time: 5/21/2022 7:20 PM  Performed by: ISELA Gorman  Authorized by: Gregorio Martinez [address] [place] [address]     Patient location:  ICU and bedside  Other Assisting Provider: No    Consent:     Consent obtained:  Written    Consent given by:  Healthcare agent (Consent by aleena Hickey and Marlaine Siemens)    Risks discussed:  Arterial puncture, incorrect placement, nerve damage, pneumothorax, infection and bleeding    Alternatives discussed:  No treatment, delayed treatment, alternative treatment and observation  Universal protocol:     Procedure explained and questions answered to patient or proxy's satisfaction: yes      Relevant documents present and verified: yes      Required blood products, implants, devices, and special equipment available: yes      Immediately prior to procedure, a time out was called: yes      Patient identity confirmed: Anonymous protocol, patient vented/unresponsive  Pre-procedure details:     Hand hygiene: Hand hygiene performed prior to insertion      Sterile barrier technique: All elements of maximal sterile technique followed      Skin preparation:  ChloraPrep    Skin preparation agent: Skin preparation agent completely dried prior to procedure    Indications:     Central line indications: medications requiring central line and no peripheral vascular access    Anesthesia (see MAR for exact dosages):      Anesthesia method:  Local infiltration    Local anesthetic:  Lidocaine 1% w/o epi  Procedure details:     Location:  Right internal jugular    Vessel type: vein      Laterality:  Right    Approach: percutaneous technique used      Patient position:  Flat    Catheter type:  Triple lumen 16cm    Catheter size:  7 Fr    Landmarks identified: yes      Ultrasound guidance: yes      Ultrasound image availability:  Images available in PACS and still images obtained    Sterile ultrasound techniques: Sterile gel and sterile probe covers were used      Manometry confirmation: yes Number of attempts:  1    Successful placement: yes      Vessel of catheter tip end:  SVC  Post-procedure details:     Post-procedure:  Dressing applied and line sutured    Assessment:  Blood return through all ports, no pneumothorax on x-ray, placement verified by x-ray and free fluid flow    Patient tolerance of procedure:   Tolerated well, no immediate complications

## 2022-05-21 NOTE — NURSING NOTE
Patient moved from ICU to room 215  [facility]  Belongings with patient at time of transfer: [place], cell phone, watch, and phone

## 2022-05-21 NOTE — QUICK NOTE
responded to code blue on pt at 2:46pm, ACLS protocol in place, personally notified family of the events at 2:49 pm, family Juan Jose ([phone]) stated that she was at hospital previously today and that reported being thirsty in which she gave gingerale  DIL called pts sons on 3-way: Sandie Granados (unable to be reached) and Regina Arreola, who were informed of code and resuscitative efforts  Pts son Regina Arreola stated to continue resuscitative efforts and that they were on the way to hospital  pts family made aware pt being transferred to ICU      Ronak Paul MD

## 2022-05-21 NOTE — QUICK NOTE
Late note  Patient was seen again when Rapid response was called  patient was noted to be unresponsive  Noted to be hypotensive & then found to be pulseless  Later code blue was called and resuscitative measures initiated  I Was personally present in the room during this  ROSC achieved  Per family they were in the room about 20 minutes prior and patient was awake at that time  Patient being transferred to ICU    Dr Ramon Hess made aware

## 2022-05-21 NOTE — RAPID RESPONSE
Rapid Response Note/Code Note  Fady Hudson 80 y o  male MRN: [id number]  Unit/Bed#: ICU 02 Encounter: [id number]    Rapid Response Notification(s):   Response called date/time:  5/21/2022 2:41 PM  Response team arrival date/time:  5/21/2022 2:42 PM  Level of care:  [facility]  Rapid response location:  [facility] unit (room 215)  Primary reason for rapid response call:  Acute change in BP and acute change in neuro status    Rapid Response Intervention(s):   Airway:  Endotracheal intubation  Breathing:  Assisted ventilation  Circulation:  ACLS protocol  Fluids administered:  Normal saline  Medications administered:  Epinephrine and sodium bicarbonate       Assessment/Plan:   · Patient found unresponsive and quickly found to not have pulses  ACLS started  Patient intubated  ROSC obtained  Patient transferred to ICU  · Etiology unclear  Patient was speaking with family member within 20 minutes prior to incident and was without complaints  Patient acutely found to be hypoxic and unresponsive, which quickly deteriorated to pulseless PEA  Suspect MI vs PE?  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  On 05/17, patient was having increasing oxygen requirements and was transferred to the intensive care unit  Patient was thought to have aspiration pneumonia, however patient clinically improved  Patient was transferred to [facility] floor from ICU yesterday after improving oxygenation  Today, patient was found to be hypoxic and unresponsive    Family was present in the room approximately 20 minutes prior and patient was alert, talking, and did not have any complaints at that time  Rapid response was called  Patient was then found to be pulseless and ACLS protocol was followed  ROSC was obtained and patient was transferred to ICU  Review of Systems    Objective:   Vitals:    05/21/22 1449 05/21/22 1452 05/21/22 1452 05/21/22 1455   BP:  (!) 75/59 (!) 75/59 96/52   BP Location:       Pulse: (!) 132      Resp:       Temp:       TempSrc:       SpO2: 100%      Weight:       Height:         Physical Exam  Constitutional:       General: He is in acute distress  Appearance: He is ill-appearing and toxic-appearing  HENT:      Mouth/Throat:      Mouth: Mucous membranes are moist    Eyes:      Pupils: Pupils are equal, round, and reactive to light  Cardiovascular:      Rate and Rhythm: Bradycardia present  Pulmonary:      Effort: Respiratory distress present  Abdominal:      General: There is distension  Neurological:      Comments: unresponsive         Portions of the record may have been created with voice recognition software  Occasional wrong word or "sound a like" substitutions may have occurred due to the inherent limitations of voice recognition software  Read the chart carefully and recognize, using context, where substitutions have occurred      Claudia Villa PA-C

## 2022-05-21 NOTE — PLAN OF CARE
Problem: MOBILITY - ADULT  Goal: Maintain or return to baseline ADL function  Description: INTERVENTIONS:  -  Assess patient's ability to carry out ADLs; assess patient's baseline for ADL function and identify physical deficits which impact ability to perform ADLs (bathing, care of mouth/teeth, toileting, grooming, dressing, etc )  - Assess/evaluate cause of self-care deficits   - Assess range of motion  - Assess patient's mobility; develop plan if impaired  - Assess patient's need for assistive devices and provide as appropriate  - Encourage maximum independence but intervene and supervise when necessary  - Involve family in performance of ADLs  - Assess for home care needs following discharge   - Consider OT consult to assist with ADL evaluation and planning for discharge  - Provide patient education as appropriate  Outcome: Progressing  Goal: Maintains/Returns to pre admission functional level  Description: INTERVENTIONS:  - Perform BMAT or MOVE assessment daily    - Set and communicate daily mobility goal to care team and patient/family/caregiver  - Collaborate with rehabilitation services on mobility goals if consulted  - Perform Range of Motion 2 times a day  - Reposition patient every 2 hours    - Dangle patient 2 times a day  - Stand patient 2 times a day  - Ambulate patient 2 times a day  - Out of bed to chair 2 times a day   - Out of bed for meals 2 times a day  - Out of bed for toileting  - Record patient progress and toleration of activity level   Outcome: Progressing     Problem: Potential for Falls  Goal: Patient will remain free of falls  Description: INTERVENTIONS:  - Educate patient/family on patient safety including physical limitations  - Instruct patient to call for assistance with activity   - Consult OT/PT to assist with strengthening/mobility   - Keep Call bell within reach  - Keep bed low and locked with side rails adjusted as appropriate  - Keep care items and personal belongings within reach  - Initiate and maintain comfort rounds  - Make Fall Risk Sign visible to staff  - Offer Toileting every 2 Hours, in advance of need  - Initiate/Maintain bed alarm  - Obtain necessary fall risk management equipment: yellow socks  - Apply yellow socks and bracelet for high fall risk patients  - Consider moving patient to room near nurses station  Outcome: Progressing     Problem: PAIN - ADULT  Goal: Verbalizes/displays adequate comfort level or baseline comfort level  Description: Interventions:  - Encourage patient to monitor pain and request assistance  - Assess pain using appropriate pain scale  - Administer analgesics based on type and severity of pain and evaluate response  - Implement non-pharmacological measures as appropriate and evaluate response  - Consider cultural and social influences on pain and pain management  - Notify physician/advanced practitioner if interventions unsuccessful or patient reports new pain  Outcome: Progressing     Problem: GASTROINTESTINAL - ADULT  Goal: Minimal or absence of nausea and/or vomiting  Description: INTERVENTIONS:  - Administer IV fluids if ordered to ensure adequate hydration  - Maintain NPO status until nausea and vomiting are resolved  - Nasogastric tube if ordered  - Administer ordered antiemetic medications as needed  - Provide nonpharmacologic comfort measures as appropriate  - Advance diet as tolerated, if ordered  - Consider nutrition services referral to assist patient with adequate nutrition and appropriate food choices  Outcome: Progressing  Goal: Establish and maintain optimal ostomy function  Description: INTERVENTIONS:  - Assess bowel function  - Encourage oral fluids to ensure adequate hydration  - Administer IV fluids if ordered to ensure adequate hydration   - Administer ordered medications as needed  - Encourage mobilization and activity  - Nutrition services referral to assist patient with appropriate food choices  - Assess stoma site  - Consider wound care consult   Outcome: Progressing     Problem: SKIN/TISSUE INTEGRITY - ADULT  Goal: Incision(s), wounds(s) or drain site(s) healing without S/S of infection  Description: INTERVENTIONS  - Assess and document dressing, incision, wound bed, drain sites and surrounding tissue  - Provide patient and family education  - Perform skin care/dressing changes every shift  Outcome: Progressing     Problem: Prexisting or High Potential for Compromised Skin Integrity  Goal: Skin integrity is maintained or improved  Description: INTERVENTIONS:  - Identify patients at risk for skin breakdown  - Assess and monitor skin integrity  - Assess and monitor nutrition and hydration status  - Monitor labs   - Assess for incontinence   - Turn and reposition patient  - Assist with mobility/ambulation  - Relieve pressure over bony prominences  - Avoid friction and shearing  - Provide appropriate hygiene as needed including keeping skin clean and dry  - Evaluate need for skin moisturizer/barrier cream  - Collaborate with interdisciplinary team   - Patient/family teaching  - Consider wound care consult   Outcome: Progressing     Problem: Nutrition/Hydration-ADULT  Goal: Nutrient/Hydration intake appropriate for improving, restoring or maintaining nutritional needs  Description: Monitor and assess patient's nutrition/hydration status for malnutrition  Collaborate with interdisciplinary team and initiate plan and interventions as ordered  Monitor patient's weight and dietary intake as ordered or per policy  Utilize nutrition screening tool and intervene as necessary  Determine patient's food preferences and provide high-protein, high-caloric foods as appropriate       INTERVENTIONS:  - Monitor oral intake, urinary output, labs, and treatment plans  - Assess nutrition and hydration status and recommend course of action  - Evaluate amount of meals eaten  - Assist patient with eating if necessary   - Allow adequate time for meals  - Recommend/ encourage appropriate diets, oral nutritional supplements, and vitamin/mineral supplements  - Order, calculate, and assess calorie counts as needed  - Recommend, monitor, and adjust tube feedings and TPN/PPN based on assessed needs  - Assess need for intravenous fluids  - Provide specific nutrition/hydration education as appropriate  - Include patient/family/caregiver in decisions related to nutrition  Outcome: Progressing     Problem: RESPIRATORY - ADULT  Goal: Achieves optimal ventilation and oxygenation  Description: INTERVENTIONS:  - Assess for changes in respiratory status  - Assess for changes in mentation and behavior  - Position to facilitate oxygenation and minimize respiratory effort  - Oxygen administered by appropriate delivery if ordered  - Initiate smoking cessation education as indicated  - Encourage broncho-pulmonary hygiene including cough, deep breathe, Incentive Spirometry  - Assess the need for suctioning and aspirate as needed  - Assess and instruct to report SOB or any respiratory difficulty  - Respiratory Therapy support as indicated  Outcome: Progressing     Problem: SAFETY,RESTRAINT: NV/NON-SELF DESTRUCTIVE BEHAVIOR  Goal: Remains free of harm/injury (restraint for non violent/non self-detsructive behavior)  Description: INTERVENTIONS:  - Instruct patient/family regarding restraint use   - Assess and monitor physiologic and psychological status   - Provide interventions and comfort measures to meet assessed patient needs   - Identify and implement measures to help patient regain control  - Assess readiness for release of restraint   Outcome: Progressing  Goal: Returns to optimal restraint-free functioning  Description: INTERVENTIONS:  - Assess the patient's behavior and symptoms that indicate continued need for restraint  - Identify and implement measures to help patient regain control  - Assess readiness for release of restraint   Outcome: Progressing

## 2022-05-21 NOTE — PROGRESS NOTES
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   · Postoperative ileus -- dilated small bowel on CT a/p from 5/17 and KUB on 5/18, minimal semi solid output in ostomy bag  · Acute hypoxemic respiratory failure --  Patient still on 5 L nasal cannula, pulmonary toilet, flutter valve and IS  · Aspiration pneumonia -- decreasing oxygen requirements, CT chest concerning for multifocal pneumonia, procalcitonin improving  · Severe sepsis -- tachycardia, tachypnea and bandemia  Suspected secondary to aspiration PNA, WBC count 13 9 > 18 8 > 23  · CHF -- EF 65% on most recent ECHO    · Electrolyte abnormalities -- hypernatremia, hypophosphatemia, and hyperglycemia without any history of diabetes       Plan:  KUB reviewed  NG tube removed  Continue NPO, allowed sips of clears   Consider repeat CT c/a/p tomorrow 5/22 if leukocytosis doesn't improve  Closely monitor I/Os  Condom catheter  Continue wet to dry dressing for midline incision, consider adding Acticoat    Potassium and Phos repletion   TPN reordered with electrolyte adjustments  Continue to wean off supplemental oxygen as able   ID following;  continue cefepime and Flagyl through 5/27  Ostomy nurse, Brandie Mahmood, following   PT/OT      Subjective/Objective     Subjective: Patient complains of back, leg, and arm pain  When asked if it's sharp severe pain or more pressure related, he states it's constant pressure  He is begging for pain medication  He states we can remove the NG tube because he doesn't have a stomach any more  When explained only his sigmoid colon was removed he states that is false      Objective:   Ostomy 20cc semi solid stool and some gas  UOP not recorded  WBC count 13 9 > 18 8 > 23  Blood pressure 100/62, pulse 85, temperature 98 °F (36 7 °C), temperature source Axillary, resp  rate 18, height 5' 4" (1 626 m), weight 65 2 kg (143 lb 11 8 oz), SpO2 95 %  ,Body mass index is 24 67 kg/m²  Intake/Output Summary (Last 24 hours) at 5/21/2022 0820  Last data filed at 5/20/2022 1600  Gross per 24 hour   Intake --   Output 224 ml   Net -224 ml       Invasive Devices  Report    Peripherally Inserted Central Catheter Line  Duration           PICC Line 94/24/35 Right Basilic 2 days          Peripheral Intravenous Line  Duration           Peripheral IV 05/18/22 Upper;Right Arm 2 days          Drain  Duration           Colostomy LLQ 10 days    NG/OG/Enteral Tube Nasogastric Left nare 2 days    External Urinary Catheter Medium 1 day                Physical Exam: /62 (BP Location: Left arm)   Pulse 85   Temp 98 °F (36 7 °C) (Axillary)   Resp 18   Ht 5' 4" (1 626 m)   Wt 65 2 kg (143 lb 11 8 oz)   SpO2 95%   BMI 24 67 kg/m²   General appearance: alert upon arousal, speech is clear but there is confusion evident  Lungs: diminished breath sounds throughout, with rhonchi in left lobes  Heart: normal rate and rhythm  Abdomen: mild distention, stoma pink in color, brown semi-solid stool in ostomy bag, incision has some fibrinous slough at the base but over 80% is pink healthy tissue  Ostomy bridge removed  Extremities: no edema, prophylactic foam border dressings in place on heels, no tenderness to palpation of upper or lower extremities    Lab, Imaging and other studies:  I have personally reviewed pertinent lab results    , CBC:   Lab Results   Component Value Date    WBC 23 58 (H) 05/21/2022    HGB 8 2 (L) 05/21/2022    HCT 24 7 (L) 05/21/2022     (H) 05/21/2022     05/21/2022    MCH 35 0 (H) 05/21/2022    MCHC 33 2 05/21/2022    RDW 17 9 (H) 05/21/2022    MPV 11 3 05/21/2022    NRBC 2 05/21/2022   , CMP:   Lab Results   Component Value Date    SODIUM 147 (H) 05/21/2022    K 3 5 05/21/2022     (H) 05/21/2022    CO2 27 05/21/2022    BUN 45 (H) 05/21/2022    CREATININE 1 03 05/21/2022    CALCIUM 7 5 (L) 05/21/2022    EGFR 63 05/21/2022     VTE Pharmacologic Prophylaxis: Heparin SQ  VTE Mechanical Prophylaxis: sequential compression device

## 2022-05-21 NOTE — ASSESSMENT & PLAN NOTE
Wt Readings from Last 3 Encounters:   05/21/22 65 2 kg (143 lb 11 8 oz)   05/11/22 62 1 kg (136 lb 14 4 oz)   03/25/22 61 2 kg (135 lb)     Echo with normal EF  Received IV Lasix previously

## 2022-05-21 NOTE — ASSESSMENT & PLAN NOTE
SIRS versus severe sepsis, As noted by tachycardia, tachypnea due to peritonitis  · Continue cefepime, Flagyl for total of 14 days post I&D per ID  · Blood cultures negative so far

## 2022-05-21 NOTE — QUICK NOTE
Events of this afternoon noted  I examined the patient in the ICU and discussed his care with the Critical Care Team   I also met twice with his family and relate his overall grim prognosis  Patient had an acute decompensation with rapid response called and then CPR for PEA with ROSC  He is requiring 3 pressors, is intubated and workup is ongoing  Abdominal exam is mildly distended but soft  He has brown stool from his ostomy  Overall picture is most consistent with a primary pulmonary or cardiac event and he is responding poorly to his resuscitation efforts  I explained this at length to the family as well as my impression that this is not due to abdominal source and therefore no acute surgical procedure on the abdomen is indicated  If patient becomes more stable, he might benefit from chest abdomen pelvis CT scan  If he worsens, further discussions regarding goals of care will be necessary  I discussed this at length with the family as well as the ICU team   Family desires to wait at least 1-2 more hours to determine if he will respond to resuscitation efforts  I appreciate the excellent care by the rapid response and ICU teams

## 2022-05-21 NOTE — QUICK NOTE
QUICK NOTE - Deterioration Index  Dre Cabrera 80 y o  male MRN: [id number]  Unit/Bed#: ICU 02 Encounter: [id number]    Date Paged: [phone]  Time Paged: [phone]  Room #: 215  Arrival Time:   Deterioration index score at time of page: 61 %  Patient DI score paged as above 60>, however upon opening chart, DI score back down to 57  Need to escalate level of care: no     PROBLEMS resulting in high DI score:   Patient DI score paged as above 60>, however upon opening chart within a few minutes, DI score back down to 57  Score for Age, nasal canula, sodium  PLAN:     No acute changes at this time  Please contact critical care via [address] [place] [address] with any questions or concerns  Vitals:   Vitals:    22 1500 22 1600 22 1700 22 1800   BP: 133/64 (!) 86/53 100/62 109/72   BP Location:       Pulse:  (!) 145 (!) 176 (!) 157   Resp:  (!) 38 (!) 24 20   Temp:  98 06 °F (36 7 °C) 98 06 °F (36 7 °C) 98 78 °F (37 1 °C)   TempSrc:       SpO2:  93% (!) 86% (!) 87%   Weight:       Height:           Respiratory:  SpO2: SpO2: (!) 87 %, SpO2 Activity: SpO2 Activity: At Rest, SpO2 Device: O2 Device: Nasal cannula  Nasal Cannula O2 Flow Rate (L/min): 5 L/min    Temperature: Temp (24hrs), Av 7 °F (36 5 °C), Min:97 1 °F (36 2 °C), Max:98 78 °F (37 1 °C)  Current: Temperature: 98 78 °F (37 1 °C)    Labs:   Results from last 7 days   Lab Units 22  1539 22  0524 22  0546 22  0446 22  0630   WBC Thousand/uL 21 74* 23 58* 18 87*   < > 15 37*   HEMOGLOBIN g/dL 6 2* 8 2* 8 5*   < > 8 2*   HEMATOCRIT % 18 3* 24 7* 25 5*   < > 24 2*   PLATELETS Thousands/uL 231 326 335   < > 254   MONO PCT %  --  7  --   --  3*    < > = values in this interval not displayed       Results from last 7 days   Lab Units 22  1539 22  0524 22  0546 22  0517 22  0446 22  0630   SODIUM mmol/L 150* 147* 148*   < > 141 142   POTASSIUM mmol/L 3 0* 3 5 3 3*   < > 4 0 3 8 CHLORIDE mmol/L 119* 116* 117*   < > 111* 111*   CO2 mmol/L 25 27 26   < > 24 25   BUN mg/dL 42* 45* 48*   < > 37* 28*   CREATININE mg/dL 0 94 1 03 1 14   < > 0 96 0 93   CALCIUM mg/dL 5 7* 7 5* 7 4*   < > 7 6* 7 4*   ALK PHOS U/L 40*  --   --   --  43* 47   ALT U/L 14  --   --   --  13 11*   AST U/L 31  --   --   --  12 12    < > = values in this interval not displayed       Results from last 7 days   Lab Units 05/21/22  1539 05/21/22  0524 05/20/22  0546   MAGNESIUM mg/dL 2 0 2 6 2 8*     Results from last 7 days   Lab Units 05/21/22  1710 05/21/22  1539 05/17/22  2003   LACTIC ACID mmol/L 3 6* 3 5* 1 1         Results from last 7 days   Lab Units 05/21/22  0524 05/20/22  0546 05/19/22  0517 05/18/22  0446 05/16/22  0507   PROCALCITONIN ng/ml 0 53* 0 76* 0 99* 1 24* 2 60*       Code Status: Level 1 - Full Code

## 2022-05-21 NOTE — ASSESSMENT & PLAN NOTE
· Patient with postop ileus with distension and nausea, vomiting  · NG tube removed today, sips of clears per surgery  · May need repeat CT scan if leukocytosis does not improve

## 2022-05-22 PROBLEM — N17.9 AKI (ACUTE KIDNEY INJURY) (HCC): Status: ACTIVE | Noted: 2022-01-01

## 2022-05-22 PROBLEM — I46.9 CARDIAC ARREST (HCC): Status: ACTIVE | Noted: 2022-01-01

## 2022-05-22 PROBLEM — D64.9 ANEMIA: Status: ACTIVE | Noted: 2022-01-01

## 2022-05-22 PROBLEM — K56.7 ILEUS (HCC): Status: RESOLVED | Noted: 2022-01-01 | Resolved: 2022-01-01

## 2022-05-22 NOTE — RESPIRATORY THERAPY NOTE
Received patient on vent settings documented in flow sheet  Ambu bag in room and restraints on   ETT secure

## 2022-05-22 NOTE — ASSESSMENT & PLAN NOTE
· Patient was found unresponsive and hypoxic approximately 20 minutes after being seen well with family  · PEA arrest x 2  · Immediately hemodynamically unstable, however improving  · Unclear etiology, cardiac vs PE vs other  · Not able to fully anticoagulate as had tameka hematuria and hgb drop following cardiac arrest  · Neurologically following commands this AM  · Distributive shock following arrest? No

## 2022-05-22 NOTE — ASSESSMENT & PLAN NOTE
Wt Readings from Last 3 Encounters:   05/22/22 70 2 kg (154 lb 12 2 oz)   05/11/22 62 1 kg (136 lb 14 4 oz)   03/25/22 61 2 kg (135 lb)     · 5/16: Echo-EF 65%, mild TR, mild MR  · Monitor I&O  · Daily weights

## 2022-05-22 NOTE — ASSESSMENT & PLAN NOTE
· Patient treated for concern aspiration pneumonia/pneumonitis  · CT chest/ab/pelvis with concern of multifocal PNA, no visualized intraabdominal abscess or anastomotic leak   · Cefepime, flagyl  · Infectious disease consulted and following

## 2022-05-22 NOTE — PROGRESS NOTES
Tami 45  Progress Note - Ivory Hudson 2/15/1931, 80 y o  male MRN: [id number]  Unit/Bed#: ICU 02 Encounter: [id number]  Primary Care Provider: Prosper Tello MD   Date and time admitted to hospital: 5/11/2022  4:48 PM    Cardiac arrest [facility])  Assessment & Plan  · Patient was found unresponsive and hypoxic approximately 20 minutes after being seen well with family  · PEA arrest x 2  · Immediately hemodynamically unstable, however improving  · Unclear etiology, cardiac vs PE vs other  · Not able to fully anticoagulate as had tameka hematuria and hgb drop following cardiac arrest  · Neurologically following commands this AM  · Distributive shock following arrest?    * Bowel perforation [facility])  Assessment & Plan  · Outpatient EGD and colonoscopy at [facility] on 5/11 for w/u of chronic anemia, history of colon polyps, intermittent LLQ abdominal pain and possible intermittent intussusception  · EGD unremarklable, colonoscopy technically difficult and required changed from adult scope to pediatric scope, but during traversal of the sigmoid colon there was a kink and patient sustained a perforation  · Procedure was aborted and patient was transferred to [facility] where immediate surgical consultation was obtained  Patient was reported to have obvious signs of peritonitis on exam and CT ab/pelvis demonstrated pneumoperitoneum  · 5/11 Urgently to the OR with Dr Renee Tyson for ex-lap, low anterior resection, protection loop transverse colostomy, flex sigmoidoscopy, and segmental small bowel resection  · Ostomy now with good output  Surgery following    Acute respiratory failure with hypoxia [facility])  Assessment & Plan  · Patient previously in ICU for hypoxia and concern for aspiration pneumonitis  Improved and was able to transfer to regular medical floor     · Intubated during cardiac arrest  Do not suspect infectious etiology for cause of arrest  ?MI vs PE    · PC 24 60% peep 6  · Atrovent/xopenex/mucomyst nebs q8h  · Continue flagy, cefepime    Severe sepsis (HCC)  Assessment & Plan  · Patient treated for concern aspiration pneumonia/pneumonitis  · CT chest/ab/pelvis with concern of multifocal PNA, no visualized intraabdominal abscess or anastomotic leak   · Cefepime, flagyl  · Infectious disease consulted and following     CHF (congestive heart failure) ([facility] )  Assessment & Plan  Wt Readings from Last 3 Encounters:   05/22/22 70 2 kg (154 lb 12 2 oz)   05/11/22 62 1 kg (136 lb 14 4 oz)   03/25/22 61 2 kg (135 lb)     · 5/16: Echo-EF 65%, mild TR, mild MR  · Monitor I&O  · Daily weights         Hyperglycemia  Assessment & Plan  · No hx of diabetes  · Now on TPN with hyperglycemia   · Start patient on accu checks Q 6 hr + SSI    Toxic metabolic encephalopathy  Assessment & Plan  · Likely in setting of acute illness  · Delirium precautions; regulate sleep/wake cycle, environmental controls, daily CAM ICU     Anemia  Assessment & Plan  · hgb drop post cardiac arrest  Noted to have gross hematuria but this has since improved  · Hgb stable after 1 u PRBC  · Trend  · Consider CT ab/pelvis if stable    MARYELLEN (acute kidney injury) ([facility] )  Assessment & Plan  · Secondary to cardiac arrest  · Trend      ----------------------------------------------------------------------------------------  HPI/24hr events: has improved overnight, hemodynamically improved   Neurologically intact    Patient appropriate for transfer out of the ICU today?: No  Disposition: Continue Critical Care   Code Status: Level 1 - Full Code  ---------------------------------------------------------------------------------------  SUBJECTIVE  Following commands    Review of Systems  Review of systems was unable to be performed secondary to intubated  ---------------------------------------------------------------------------------------  OBJECTIVE    Vitals   Vitals:    05/21/22 2131 05/21/22 2142 05/22/22 0100 05/22/22 0600   BP: 131/76     BP Location:       Pulse: (!) 147 (!) 145 74    Resp: 21 (!) 30 (!) 33    Temp: (!) 100 76 °F (38 2 °C) (!) 100 8 °F (38 2 °C) (!) 101 66 °F (38 7 °C)    TempSrc:       SpO2: 99%  100%    Weight:    70 2 kg (154 lb 12 2 oz)   Height:         Temp (24hrs), Av 9 °F (37 7 °C), Min:98 °F (36 7 °C), Max:101 66 °F (38 7 °C)  Current: Temperature: (!) 101 66 °F (38 7 °C)          Respiratory:  SpO2: SpO2: 100 %, SpO2 Activity: SpO2 Activity: At Rest, SpO2 Device: O2 Device: Nasal cannula, Capnography: Capnography: No  Nasal Cannula O2 Flow Rate (L/min): 5 L/min    Invasive/non-invasive ventilation settings   Respiratory  Report   Lab Data (Last 4 hours)    None         O2/Vent Data (Last 4 hours)    None                Physical Exam  Vitals and nursing note reviewed  Constitutional:       General: He is not in acute distress  Appearance: Normal appearance  HENT:      Head: Normocephalic and atraumatic  Mouth/Throat:      Mouth: Mucous membranes are moist    Eyes:      Pupils: Pupils are equal, round, and reactive to light  Cardiovascular:      Rate and Rhythm: Normal rate and regular rhythm  Pulses: Normal pulses  Heart sounds: No murmur heard  Pulmonary:      Effort: Pulmonary effort is normal  No respiratory distress  Breath sounds: Normal breath sounds  Abdominal:      General: Abdomen is flat  There is no distension  Palpations: Abdomen is soft  Comments: Ostomy pink with stool in bag   Musculoskeletal:         General: No swelling  Skin:     General: Skin is warm  Neurological:      General: No focal deficit present  Mental Status: He is alert and oriented to person, place, and time               Laboratory and Diagnostics:  Results from last 7 days   Lab Units 22  0524 22  2214 22  1539 22  0524 22  0546 22  0517 22  0446 22  0630   WBC Thousand/uL 23 05*  --  21 74* 23 58* 18 87* 13 94* 15 24* 15 37* HEMOGLOBIN g/dL 8 5* 9 7* 6 2* 8 2* 8 5* 8 4* 8 3* 8 2*   HEMATOCRIT % 25 4*  --  18 3* 24 7* 25 5* 23 7* 24 1* 24 2*   PLATELETS Thousands/uL 273  --  231 326 335 314 272 254   BANDS PCT %  --   --   --  20*  --   --   --  10*   MONO PCT %  --   --   --  7  --   --   --  3*     Results from last 7 days   Lab Units 05/22/22 0524 05/21/22 2214 05/21/22  1539 05/21/22 0524 05/20/22  0546 05/19/22  0517 05/18/22  0446 05/17/22  0630 05/16/22  0507   SODIUM mmol/L 146* 149* 150* 147* 148* 144 141 142 139   POTASSIUM mmol/L 5 2 4 6 3 0* 3 5 3 3* 3 7 4 0 3 8 3 3*   CHLORIDE mmol/L 115* 118* 119* 116* 117* 114* 111* 111* 107   CO2 mmol/L 20* 22 25 27 26 25 24 25 27   ANION GAP mmol/L 11 9 6 4 5 5 6 6 5   BUN mg/dL 57* 53* 42* 45* 48* 50* 37* 28* 20   CREATININE mg/dL 1 56* 1 35* 0 94 1 03 1 14 1 20 0 96 0 93 0 93   CALCIUM mg/dL 7 6* 7 4* 5 7* 7 5* 7 4* 7 7* 7 6* 7 4* 7 6*   GLUCOSE RANDOM mg/dL 276* 128 249* 215* 233* 180* 128 171* 151*   ALT U/L  --   --  14  --   --   --  13 11* 14   AST U/L  --   --  31  --   --   --  12 12 16   ALK PHOS U/L  --   --  40*  --   --   --  43* 47 53   ALBUMIN g/dL  --   --  1 5*  --   --   --  2 2* 2 2* 2 5*   TOTAL BILIRUBIN mg/dL  --   --  0 48  --   --   --  0 71 0 68 0 75     Results from last 7 days   Lab Units 05/22/22 0524 05/21/22 2214 05/21/22  1539 05/21/22 0524 05/20/22  0546 05/19/22  0517 05/18/22  0446 05/17/22  0630   MAGNESIUM mg/dL 2 3 2 3 2 0 2 6 2 8* 2 8* 2 6 1 9   PHOSPHORUS mg/dL 3 3 2 8 3 7 2 1* 2 0*  --  2 6 1 6*               Results from last 7 days   Lab Units 05/21/22  1710 05/21/22  1539 05/17/22 2003   LACTIC ACID mmol/L 3 6* 3 5* 1 1     ABG:  Results from last 7 days   Lab Units 05/21/22  2018   PH ART  7 371   PCO2 ART mm Hg 34 4*   PO2 ART mm Hg 101 0   HCO3 ART mmol/L 19 5*   BASE EXC ART mmol/L -5 2   ABG SOURCE  Line, Arterial     VBG:  Results from last 7 days   Lab Units 05/22/22  0524 05/21/22  2018   PH SAM  7 378  --    PCO2 SAM mm Hg 29 8*  --    PO2 SAM mm Hg 48 8*  --    HCO3 SAM mmol/L 17 2*  --    BASE EXC SAM mmol/L -7 1  --    ABG SOURCE   --  Line, Arterial     Results from last 7 days   Lab Units 05/21/22  0524 05/20/22  0546 05/19/22  0517 05/18/22  0446 05/16/22  0507   PROCALCITONIN ng/ml 0 53* 0 76* 0 99* 1 24* 2 60*       Micro  Results from last 7 days   Lab Units 05/18/22  1601 05/18/22  1559 05/17/22  0937 05/17/22  0923   BLOOD CULTURE  No Growth at 72 hrs  No Growth at 72 hrs   --   --    SPUTUM CULTURE   --   --  Test not performed  Suggest repeat specimen  --    GRAM STAIN RESULT   --   --  >10 squamous epithelial cells/lpf, indicating orophayngeal contamination  *  1+ Polys*  2+ Budding Yeast with Pseudomycelia*  Rare Gram positive rods*  --    MRSA CULTURE ONLY   --   --  No Methicillin Resistant Staphlyococcus aureus (MRSA) isolated  --    LEGIONELLA URINARY ANTIGEN   --   --   --  Negative   STREP PNEUMONIAE ANTIGEN, URINE   --   --   --  Negative       EKG:   Imaging: I have personally reviewed pertinent reports  Intake and Output  I/O       05/20 0701 05/21 0700 05/21 0701 05/22 0700 05/22 0701 05/23 0700    I V  (mL/kg)  1123 (16)     Blood  350     NG/GT 20      IV Piggyback  350     TPN  739 1     Total Intake(mL/kg) 20 (0 3) 2562 1 (36 5)     Urine (mL/kg/hr) 4 (0) 295 (0 2)     Emesis/NG output 200      Stool 20 300     Total Output 224 595     Net -204 +1967 1            Unmeasured Urine Occurrence  1 x           Height and Weights   Height: 5' 4" (162 6 cm)  IBW (Ideal Body Weight): 59 2 kg  Body mass index is 26 57 kg/m²    Weight (last 2 days)     Date/Time Weight    05/22/22 0600 70 2 (154 76)    05/21/22 0532 65 2 (143 74)    05/20/22 0546 64 1 (141 32)            Nutrition       Diet Orders   (From admission, onward)             Start     Ordered    05/21/22 0844  Diet NPO; Sips of clear liquids  Diet effective now        References:    Nutrtion Support Algorithm Enteral vs  Parenteral   Question Answer Comment   Diet Type NPO    NPO Except: Sips of clear liquids    RD to adjust diet per protocol?  Yes        05/21/22 0844    05/12/22 0906  Room Service  Once        Question:  Type of Service  Answer:  Room Service - Appropriate with Assistance    05/12/22 0905                  Active Medications  Scheduled Meds:  Current Facility-Administered Medications   Medication Dose Route Frequency Provider Last Rate    acetaminophen  650 mg Oral Q6H PRN Alvaro Kaet, CRNP      acetaminophen  650 mg Per NG Tube Q6H [address] Alvaro Kate, CRNP      acetylcysteine  3 mL Nebulization Q8H Alvaro Kate, CRNP      Adult TPN (CUSTOM BASE/CUSTOM ELECTROLYTE)   Intravenous Continuous TPN Alvaro Kate, CRNP 90 5 mL/hr at 05/21/22 2151    amiodarone  0 5 mg/min Intravenous Continuous Alvaro Kate, CRNP 0 5 mg/min (05/22/22 0211)    cefepime  2,000 mg Intravenous Q12H Alvaro Kate, CRNP Stopped (05/22/22 0030)    chlorhexidine  15 mL Mouth/Throat Q12H [address] Alvaro Kate, CRNP      epinephrine  1-10 mcg/min Intravenous Titrated Alvaro Kate, CRNP Stopped (05/21/22 2215)    heparin (porcine)  5,000 Units Subcutaneous Q12H [address] Alvaro Kate, CRNP      hydrocortisone sodium succinate  50 mg Intravenous Q6H [address] Alvaro Ktae, CRNP      HYDROmorphone  0 5 mg Intravenous Q4H PRN Alvaro Kate, CRNP      insulin lispro  2-12 Units Subcutaneous Q6H [address] Alvaro Kate, CRNP      iohexol  50 mL Oral Once in imaging Alvaro Kate, CRNP      ipratropium-albuterol  3 mL Nebulization Q6H PRN Alvaro Kate, CRNP      ipratropium-albuterol  3 mL Nebulization Q8H Alvaro Kate, CRNP      levothyroxine  112 mcg Per NG Tube Early Morning Alvaro Kate, CRNP      lidocaine  1 patch Topical Daily Alvaro Kate, CRNP      metoprolol  2 5 mg Intravenous Q6H Alvaro Kate, CRNP      metroNIDAZOLE  500 mg Intravenous Q8H Alvaro Kate, CRNP Stopped (05/22/22 0120)    norepinephrine  1-30 mcg/min Intravenous Titrated MACARIO ChappellNP 10 mcg/min (05/22/22 0210)    ondansetron  4 mg Intravenous Q6H PRN ISELA Chappell      oxyCODONE  2 5 mg Per NG Tube Q4H PRN Ju Albarran, MACARIONP      oxyCODONE  5 mg Per NG Tube Q4H PRN MACARIO ChappellNP      pantoprazole  40 mg Intravenous Q24H [facility] ISELA Chappell      vasopressin  0 04 Units/min Intravenous Continuous ISELA Chappell Stopped (05/22/22 0615)     Continuous Infusions:  Adult TPN (CUSTOM BASE/CUSTOM ELECTROLYTE), , Last Rate: 90 5 mL/hr at 05/21/22 2151  amiodarone, 0 5 mg/min, Last Rate: 0 5 mg/min (05/22/22 0211)  epinephrine, 1-10 mcg/min, Last Rate: Stopped (05/21/22 2215)  norepinephrine, 1-30 mcg/min, Last Rate: 10 mcg/min (05/22/22 0210)  vasopressin, 0 04 Units/min, Last Rate: Stopped (05/22/22 0615)      PRN Meds:   acetaminophen, 650 mg, Q6H PRN  HYDROmorphone, 0 5 mg, Q4H PRN  iohexol, 50 mL, Once in imaging  ipratropium-albuterol, 3 mL, Q6H PRN  ondansetron, 4 mg, Q6H PRN  oxyCODONE, 2 5 mg, Q4H PRN  oxyCODONE, 5 mg, Q4H PRN        Invasive Devices Review  Invasive Devices  Report    Peripherally Inserted Central Catheter Line  Duration           PICC Line 51/60/80 Right Basilic 3 days          Central Venous Catheter Line  Duration           CVC Central Lines 05/21/22 Triple 16cm <1 day          Peripheral Intravenous Line  Duration           Peripheral IV 05/18/22 Upper;Right Arm 3 days          Arterial Line  Duration           Arterial Line 05/21/22 Femoral <1 day          Drain  Duration           Colostomy LLQ 11 days    Urethral Catheter Double-lumen 16 Fr  1 day          Airway  Duration           ETT  Oral <1 day                Rationale for remaining devices: critically ill  ---------------------------------------------------------------------------------------  Advance Directive and Living Will:      Power of :    POLST:    ---------------------------------------------------------------------------------------  Care Time Delivered:   Upon my evaluation, this patient had a high probability of imminent or life-threatening deterioration due to cardiac arrest, distributive shock, which required my direct attention, intervention, and personal management  I have personally provided 40 minutes (0630 to 0710) of critical care time, exclusive of procedures, teaching, family meetings, and any prior time recorded by providers other than myself  Ana Pearl PA-C      Portions of the record may have been created with voice recognition software  Occasional wrong word or "sound a like" substitutions may have occurred due to the inherent limitations of voice recognition software    Read the chart carefully and recognize, using context, where substitutions have occurred

## 2022-05-22 NOTE — PROGRESS NOTES
Progress Note - General Surgery   Fady Hudson 80 y o  male MRN: [id number]  Unit/Bed#: ICU 02 Encounter: [id number]    Assessment:  · POD#11 s/p exploratory laparotomy, low anterior resection, protective loop transverse colostomy and segmental small bowel resection   · Postoperative ileus -- improving   · Acute hypoxemic respiratory failure --  Now intubated in ICU  · Aspiration pneumonia -- decreasing oxygen requirements, CT chest concerning for multifocal pneumonia, procalcitonin improving  · Severe sepsis -- tachycardia, tachypnea and bandemia  Suspected secondary to aspiration PNA, WBC count 13 9 > 18 8 > 23  · CHF  · Electrolyte abnormalities -- hypernatremia, hyperchloridemia, and hyperglycemia without any history of diabetes   · Leukocytosis       Acute events overnight:  Patient found unresponsive and pulseless yesterday afternoon approx 20 min after family members left  Code blue called  ROSC received  He is now in the ICU and his vasopressor requirements are decreasing  Acute decompensation likely secondary to cardiac or respiratory event      Plan:  Intubated, OG tube in place   Discussed with critical care,   will likely repeat CT c/a/p today with oral contrast if patient remains stable  Closely monitor I/Os  Follow up blood cxs  Farnsworth catheter   Continue wet to dry dressing for midline incision, will discuss adding Acticoat with wound care nurse tomorrow   TPN reordered   ID following;  continue cefepime and Flagyl through 5/27  Ostomy nurse, Hallie Sprague, following   PT/OT  Appreciate remaining care of patient per critical care       Subjective/Objective     Subjective: intubated     Objective:   Ostomy 300cc semi solid brown stool   UOP 0 2, urine blood tinged likely from traumatic Farnsworth placement yesterday  WBC count 18 8 > 23 > 23   Blood pressure 131/76, pulse 91, temperature (!) 100 94 °F (38 3 °C), resp  rate (!) 33, height 5' 4" (1 626 m), weight 70 2 kg (154 lb 12 2 oz), SpO2 98 %  ,Body mass index is 26 57 kg/m²  Intake/Output Summary (Last 24 hours) at 5/22/2022 0845  Last data filed at 5/22/2022 0615  Gross per 24 hour   Intake 2928 69 ml   Output 595 ml   Net 2333 69 ml       Invasive Devices  Report    Peripherally Inserted Central Catheter Line  Duration           PICC Line 40/71/51 Right Basilic 3 days          Central Venous Catheter Line  Duration           CVC Central Lines 05/21/22 Triple 16cm <1 day          Arterial Line  Duration           Arterial Line 05/21/22 Femoral <1 day          Drain  Duration           Colostomy LLQ 11 days    Urethral Catheter Double-lumen 16 Fr  1 day          Airway  Duration           ETT  Oral <1 day                Physical Exam: /76   Pulse 91   Temp (!) 100 94 °F (38 3 °C)   Resp (!) 33   Ht 5' 4" (1 626 m)   Wt 70 2 kg (154 lb 12 2 oz)   SpO2 98%   BMI 26 57 kg/m²   General appearance: intubated, nods head to verbal stimuli, follows with eyes  Lungs: tachypneic, intubated    Heart: tachycardic, regular rhythm   Abdomen: mild distention, stoma pink in color, brown semi-solid stool in ostomy bag, incision has some fibrinous slough at the base but over 85% is pink healthy tissue, OG tube in place with little output   Extremities: edematous upper extremities, no erythema or palpable cords, prophylactic foam border dressings in place on heels, no tenderness to palpation of upper or lower extremities    Lab, Imaging and other studies:  I have personally reviewed pertinent lab results    , CBC:   Lab Results   Component Value Date    WBC 23 05 (H) 05/22/2022    HGB 8 5 (L) 05/22/2022    HCT 25 4 (L) 05/22/2022    MCV 99 (H) 05/22/2022     05/22/2022    MCH 33 1 05/22/2022    MCHC 33 5 05/22/2022    RDW 20 8 (H) 05/22/2022    MPV 12 3 05/22/2022   , CMP:   Lab Results   Component Value Date    SODIUM 146 (H) 05/22/2022    K 5 2 05/22/2022     (H) 05/22/2022    CO2 20 (L) 05/22/2022    BUN 57 (H) 05/22/2022    CREATININE 1 56 (H) 05/22/2022    CALCIUM 7 6 (L) 05/22/2022    AST 31 05/21/2022    ALT 14 05/21/2022    ALKPHOS 40 (L) 05/21/2022    EGFR 38 05/22/2022     VTE Pharmacologic Prophylaxis: Heparin SQ  VTE Mechanical Prophylaxis: sequential compression device

## 2022-05-22 NOTE — ASSESSMENT & PLAN NOTE
· hgb drop post cardiac arrest  Noted to have gross hematuria but this has since improved  · Hgb stable after 1 u PRBC  · Trend  · Consider CT ab/pelvis if stable

## 2022-05-22 NOTE — ASSESSMENT & PLAN NOTE
· Outpatient EGD and colonoscopy at [facility] on 5/11 for w/u of chronic anemia, history of colon polyps, intermittent LLQ abdominal pain and possible intermittent intussusception  · EGD unremarklable, colonoscopy technically difficult and required changed from adult scope to pediatric scope, but during traversal of the sigmoid colon there was a kink and patient sustained a perforation  · Procedure was aborted and patient was transferred to [facility] where immediate surgical consultation was obtained  Patient was reported to have obvious signs of peritonitis on exam and CT ab/pelvis demonstrated pneumoperitoneum  · 5/11 Urgently to the OR with Dr Osvaldo Cao for ex-lap, low anterior resection, protection loop transverse colostomy, flex sigmoidoscopy, and segmental small bowel resection  · Ostomy now with good output   Surgery following

## 2022-05-22 NOTE — RESPIRATORY THERAPY NOTE
Switched out patient ventilator from a C3 to an 840  Unable to trouble shoot error on C3  Patient is on 840 at settings documented and is in no distress

## 2022-05-22 NOTE — ASSESSMENT & PLAN NOTE
· Patient previously in ICU for hypoxia and concern for aspiration pneumonitis  Improved and was able to transfer to regular medical floor     · Intubated during cardiac arrest  Do not suspect infectious etiology for cause of arrest  ?MI vs PE    · PC 24 60% peep 6  · Atrovent/xopenex/mucomyst nebs q8h  · Continue flagy, cefepime

## 2022-05-22 NOTE — ASSESSMENT & PLAN NOTE
· Likely in setting of acute illness  · Delirium precautions; regulate sleep/wake cycle, environmental controls, daily CAM ICU

## 2022-05-22 NOTE — CONSULTS
Consultation - Cardiology   [facility]     Dorie Zhou 80 y o  male MRN: [id number]  : 2/15/1931  Unit/Bed#: ICU 02 Encounter: [id number]      ASSESSMENT:  S/p cardiac arrest   PEA arrest X 2  Etiology undetermined  Could not be anticoagulated following cardiac arrest due to tameka hematuria and anemia    EF 65%, mild MR and TR  Pro NT BNP 64603  Peak troponin 174    Acute respiratory failure on vent    Bowel perforation, s/p bowel resection and ostomy    Severe sepsis,  Possible multifocal pneumonia  Toxic/metabolic encephalopathy  Anemia, s/p PRBC transfusion  MARYELLEN      RECOMMENDATIONS:    Continue IV antibiotics, IV pressor support and ventilatory support as needed  Will need Coronary artery disease workup when stable unless urgently indicated          Thank you for consulting me in the management and care of this patient  Physician Requesting Consult: Chuck Keller MD    Reason for Consult / Principal Problem:  S/p cardiac arrest    Consults    HPI: Dorie Zhou is a 80y o  year old male who who had colonoscopy on  and developed pneumoperitoneum  Underwent exploratory laparotomy bowel resection and ostomy  Yesterday he had 2 episodes of PEA cardiac arrest and was successively resuscitated  Currently intubated on IV pressor support    Has severe sepsis, anemia, MARYELLEN and elevated BNP      Review of Systems patient is intubated therefore review of systems could not be obtained    Historical Information   Past Medical History:   Diagnosis Date    Aortic valve calcification     Aortic valve stenosis     AR (aortic regurgitation)     Atelectasis     LT BASILAR PASSIVE SEGMENTAL    Autoimmune disease (HCC)     Bilateral pleural effusion     Bilateral senile cataracts     CAD (coronary artery disease)     CAD (coronary artery disease)     Central spinal stenosis     Changes in vascular appearance of retina     Chronic back pain     Colon polyp     Compression fracture of L1 lumbar vertebra ([address] ) 06/02/2010    Compression fracture of T10 vertebra (HCC)     Compression fracture of T12 vertebra (HCC)     Compression fracture of T9 vertebra (HCC)     Dextroscoliosis of lumbar spine     Diverticulosis     Drusen     Dry eye syndrome     Former smoker     Gastritis     GERD (gastroesophageal reflux disease)     Giant cell arteritis (HCC)     Hiatal hernia     History of shingles     HTN (hypertension)     Internal carotid artery stenosis, left     Internal carotid artery stenosis, right     Left lower lobe pneumonia     Lesion of upper eyelid     Lordosis of lumbar region     Osteopenia     SEVERE    Osteoporosis     Pleuritic pain     Radiculopathy     B/L LOWER EXTREMITY    Raynaud's disease     Renal cyst     B/L    Scleroderma ([address] )     Tortuous aorta (HCC)      Past Surgical History:   Procedure Laterality Date    COLONOSCOPY      COLONOSCOPY N/A 5/11/2022    Procedure: NON IMAGING INTRAOP COLONOSCOPY;  Surgeon: Peng Caballero MD;  Location: [address] [place] [address];  Service: General    COLONOSCOPY W/ BIOPSIES  04/24/2009    DXA PROCEDURE (HISTORICAL)  07/24/2019    EGD  04/24/2009    w/ bx    EGD AND COLONOSCOPY  05/11/2022    EXPLORATORY LAPAROTOMY W/ BOWEL RESECTION N/A 5/11/2022    Procedure: LAPAROTOMY EXPLORATORY LOW ANTERIOR RESECTION W/LOOP COLOSTOMY;  Surgeon: Peng Caballero MD;  Location: [facility];  Service: General    IR PICC PLACEMENT DOUBLE LUMEN  5/18/2022    UPPER GASTROINTESTINAL ENDOSCOPY      VERTEBROPLASTY      T9, T10, T12 & L1     Social History     Substance and Sexual Activity   Alcohol Use Yes    Comment: socially     Social History     Substance and Sexual Activity   Drug Use Never     Social History     Tobacco Use   Smoking Status Former Smoker    Types: Cigarettes, Pipe   Smokeless Tobacco Never Used   Tobacco Comment    QUIT 21 YEARS AGO     Family History:   Family History   Problem Relation Age of Onset    No Known Problems Mother     No Known Problems Father     Colon cancer Sister     Colon cancer Brother        Meds/Allergies    PTA meds:    Medications Prior to Admission   Medication    amLODIPine (NORVASC) 5 mg tablet    dextran 70-hypromellose (GENTEAL TEARS) 0 1-0 3 % ophthalmic solution    docusate sodium (COLACE) 100 mg capsule    levothyroxine (Euthyrox) 100 mcg tablet    levothyroxine 112 mcg tablet    acetaminophen (TYLENOL) 325 mg tablet    lidocaine (LIDODERM) 5 %      No Known Allergies    Current Facility-Administered Medications:     acetaminophen (TYLENOL) oral suspension 650 mg, 650 mg, Oral, Q6H PRN, MACARIO SchultzNP, 650 mg at 22    acetaminophen (TYLENOL) tablet 650 mg, 650 mg, Per NG Tube, Q6H [address], Juan Carlos Palmoe CRNP, 650 mg at 22    acetylcysteine (MUCOMYST) 200 mg/mL inhalation solution 600 mg, 3 mL, Nebulization, Q8H, MACARIO SchultzNP, 600 mg at 22    Adult 3-in-1 TPN (custom base / custom electrolytes), , Intravenous, Continuous TPN, MACARIO SchultzNP, Last Rate: 90 5 mL/hr at 22, New Bag at 22    Adult 3-in-1 TPN (custom base / custom electrolytes), , Intravenous, Continuous TPN, Mandy Ruiz PA-C    [] amiodarone (CORDARONE) 900 mg in dextrose 5 % 500 mL infusion, 1 mg/min, Intravenous, Continuous, Stopped at 22 **FOLLOWED BY** amiodarone (CORDARONE) 900 mg in dextrose 5 % 500 mL infusion, 0 5 mg/min, Intravenous, Continuous, MACARIO SchultzNP, Last Rate: 16 7 mL/hr at 22, 0 5 mg/min at 22    cefepime (MAXIPIME) 2 g/50 mL dextrose IVPB, 2,000 mg, Intravenous, Q12H, MACARIO SchultzNP, Stopped at 22 0030    chlorhexidine (PERIDEX) 0 12 % oral rinse 15 mL, 15 mL, Mouth/Throat, Q12H [address], ISELA Schultz, 15 mL at 22 0824    heparin (porcine) subcutaneous injection 5,000 Units, 5,000 Units, Subcutaneous, Q12H [address], ISELA Schultz, 5,000 Units at 05/22/22 1129    hydrocortisone sodium succinate (PF) (Solu-CORTEF) injection 50 mg, 50 mg, Intravenous, Q6H [address], Ples Jonatan, CRNP, 50 mg at 05/22/22 0530    HYDROmorphone (DILAUDID) injection 0 5 mg, 0 5 mg, Intravenous, Q4H PRN, Ples Kimberlifall, CRNP, 0 5 mg at 05/20/22 1400    insulin lispro (HumaLOG) 100 units/mL subcutaneous injection 2-12 Units, 2-12 Units, Subcutaneous, Q6H [address], 8 Units at 05/22/22 0539 **AND** Fingerstick Glucose (POCT), , , Q6H, Ples Jonatan, CRNP    iohexol (OMNIPAQUE) 240 MG/ML solution 50 mL, 50 mL, Oral, Once in imaging, Ples Jonatan, CRNP    iohexol (OMNIPAQUE) 240 MG/ML solution 50 mL, 50 mL, Oral, Once in imaging, Guicho Bronson PA-C    ipratropium-albuterol (DUO-NEB) 0 5-2 5 mg/3 mL inhalation solution 3 mL, 3 mL, Nebulization, Q6H PRN, Ples Jonatan, CRNP    ipratropium-albuterol (DUO-NEB) 0 5-2 5 mg/3 mL inhalation solution 3 mL, 3 mL, Nebulization, Q8H, Ples Kimberlifall, CRNP, 3 mL at 05/22/22 9191    lactated ringers bolus 500 mL, 500 mL, Intravenous, Once, Ju Little PA-C    levothyroxine tablet 112 mcg, 112 mcg, Per NG Tube, Early Morning, Ples Jonatan, CRNP, 112 mcg at 05/22/22 0530    lidocaine (LIDODERM) 5 % patch 1 patch, 1 patch, Topical, Daily, Ples Jonatan CRNP, 1 patch at 05/22/22 1299    metroNIDAZOLE (FLAGYL) IVPB (premix) 500 mg 100 mL, 500 mg, Intravenous, Q8H, Ples Horsfall, CRNP, Last Rate: 200 mL/hr at 05/22/22 0823, 500 mg at 05/22/22 0823    norepinephrine (LEVOPHED) 8 mg (DOUBLE CONCENTRATION) IV in sodium chloride 0 9% 250 mL, 1-30 mcg/min, Intravenous, Titrated, ISELA George, Last Rate: 18 8 mL/hr at 05/22/22 0827, 10 mcg/min at 05/22/22 0827    ondansetron (ZOFRAN) injection 4 mg, 4 mg, Intravenous, Q6H PRN, ISELA George    oxyCODONE (ROXICODONE) IR tablet 2 5 mg, 2 5 mg, Per NG Tube, Q4H PRN, ISELA George    oxyCODONE (ROXICODONE) IR tablet 5 mg, 5 mg, Per NG Tube, Q4H PRN, ISELA Mason, 5 mg at 05/21/22 0515    pantoprazole (PROTONIX) injection 40 mg, 40 mg, Intravenous, Q24H [facility], ISELA Mason, 40 mg at 05/22/22 6410    vasopressin (PITRESSIN) 20 Units in sodium chloride 0 9 % 100 mL infusion, 0 04 Units/min, Intravenous, Continuous, ISELA Mason, Stopped at 05/22/22 0615    VTE Pharmacologic Prophylaxis:       Objective:   Vitals: Blood pressure 113/55, pulse 79, temperature 100 22 °F (37 9 °C), temperature source Bladder, resp  rate (!) 32, height 5' 4" (1 626 m), weight 70 2 kg (154 lb 12 2 oz), SpO2 97 %  Body mass index is 26 57 kg/m²    BP Readings from Last 3 Encounters:   05/22/22 113/55   05/11/22 141/69   03/25/22 152/76     Orthostatic Blood Pressures    Flowsheet Row Most Recent Value   Blood Pressure 113/55 filed at 05/22/2022 0900   Patient Position - Orthostatic VS Lying filed at 05/21/2022 0741          Intake/Output Summary (Last 24 hours) at 5/22/2022 1048  Last data filed at 5/22/2022 0615  Gross per 24 hour   Intake 2928 69 ml   Output 595 ml   Net 2333 69 ml       Invasive Devices  Report    Peripherally Inserted Central Catheter Line  Duration           PICC Line 20/31/05 Right Basilic 3 days          Central Venous Catheter Line  Duration           CVC Central Lines 05/21/22 Triple 16cm <1 day          Arterial Line  Duration           Arterial Line 05/21/22 Femoral <1 day          Drain  Duration           Colostomy LLQ 11 days    Urethral Catheter Double-lumen 16 Fr  1 day          Airway  Duration           ETT  Oral <1 day                  Physical Exam:     Neurologic:  Patient is intubated  Constitutional:  Well developed,   Eyes:  Pupil equal and reacting to light, conjunctiva normal   HENT:  Atraumatic, oropharynx moist, Neck- normal range of motion, no tenderness, supple   Respiratory:  Bilateral air entry, intubated  Cardiovascular: S1-S2 regular with a 1/6 ejection systolic murmur  GI:  Soft, ostomy present  Musculoskeletal: no deformities     Skin:  Well hydrated, no rash   Lymphatic:  No lymphadenopathy noted   Extremities:  Mild edema and distal pulses are present    Labs:   Troponins:       CBC with diff:   Results from last 7 days   Lab Units 05/22/22 0524 05/21/22 2214 05/21/22  1539 05/21/22  0524 05/20/22  0546 05/19/22  0517 05/18/22  0446 05/17/22  0630   WBC Thousand/uL 23 05*  --  21 74* 23 58* 18 87* 13 94* 15 24* 15 37*   HEMOGLOBIN g/dL 8 5* 9 7* 6 2* 8 2* 8 5* 8 4* 8 3* 8 2*   HEMATOCRIT % 25 4*  --  18 3* 24 7* 25 5* 23 7* 24 1* 24 2*   MCV fL 99*  --  105* 106* 105* 99* 103* 104*   PLATELETS Thousands/uL 273  --  231 326 335 314 272 254   MCH pg 33 1  --  34 3 35 0* 35 3* 35 0* 35 5* 35 2*   MCHC g/dL 33 5  --  32 8 33 2 33 3 35 4 34 4 33 9   RDW % 20 8*  --  17 8* 17 9* 17 4* 16 4* 17 0* 16 8*   MPV fL 12 3  --  11 6 11 3 11 3 11 2 10 9 10 6   NRBC /100 WBC  --   --   --  2  --   --   --   --        CMP:   Results from last 7 days   Lab Units 05/22/22 0524 05/21/22 2214 05/21/22  1539 05/21/22  0524 05/20/22  0546 05/19/22  0517 05/18/22  0446 05/17/22  0630 05/16/22  0507   SODIUM mmol/L 146* 149* 150* 147* 148* 144 141 142 139   POTASSIUM mmol/L 5 2 4 6 3 0* 3 5 3 3* 3 7 4 0 3 8 3 3*   CHLORIDE mmol/L 115* 118* 119* 116* 117* 114* 111* 111* 107   CO2 mmol/L 20* 22 25 27 26 25 24 25 27   ANION GAP mmol/L 11 9 6 4 5 5 6 6 5   BUN mg/dL 57* 53* 42* 45* 48* 50* 37* 28* 20   CREATININE mg/dL 1 56* 1 35* 0 94 1 03 1 14 1 20 0 96 0 93 0 93   CALCIUM mg/dL 7 6* 7 4* 5 7* 7 5* 7 4* 7 7* 7 6* 7 4* 7 6*   AST U/L 29  --  31  --   --   --  12 12 16   ALT U/L 22  --  14  --   --   --  13 11* 14   ALK PHOS U/L 43*  --  40*  --   --   --  43* 47 53   TOTAL PROTEIN g/dL 5 1*  --  4 3*  --   --   --  5 8* 5 8* 5 9*   ALBUMIN g/dL 1 8*  --  1 5*  --   --   --  2 2* 2 2* 2 5*   TOTAL BILIRUBIN mg/dL 1 11*  --  0 48  --   --   --  0 71 0 68 0 75   EGFR ml/min/1 73sq m 38 45 70 63 55 52 68 71 71   GLUCOSE RANDOM mg/dL 276* 128 249* 215* 233* 180* 128 171* 151*       Magnesium:   Results from last 7 days   Lab Units 05/22/22  0524 05/21/22  2214 05/21/22  1539 05/21/22  0524 05/20/22  0546 05/19/22  0517 05/18/22  0446   MAGNESIUM mg/dL 2 3 2 3 2 0 2 6 2 8* 2 8* 2 6     Coags:     TSH:      No components found for: TSH3  Lipid Profile:   Results from last 7 days   Lab Units 05/22/22  0524 05/16/22  0507   TRIGLYCERIDES mg/dL 145 54     Lipid Profile:   Lab Results   Component Value Date    CHOLESTEROL 137 01/15/2022    HDL 92 01/15/2022    LDLCALC 30 01/15/2022    TRIG 145 05/22/2022     Hgb A1c:   Results from last 7 days   Lab Units 05/18/22  0446   HEMOGLOBIN A1C % 5 3     NT-proBNP: No results for input(s): NTBNP in the last 72 hours  Imaging & Testing         Imaging:   EGD    Result Date: 5/11/2022  Narrative: [facility] [address] [facility] [address] [phone] [place] [address] OF SERVICE: 5/11/22 PHYSICIAN(S): Attending: Narda Morales MD Fellow: No Staff Documented Procedure  :  EGD with biopsies INDICATION: Nausea, Family history of stomach cancer POST-OP DIAGNOSIS: See the impression below  PREPROCEDURE: Informed consent was obtained for the procedure, including sedation  Risks of perforation, hemorrhage, adverse drug reaction and aspiration were discussed  The patient was placed in the left lateral decubitus position  Patient was explained about the risks and benefits of the procedure  Risks including but not limited to bleeding, infection, and perforation were explained in detail  Also explained about less than 100% sensitivity with the exam and other alternatives  DETAILS OF PROCEDURE: Patient was taken to the procedure room where a time out was performed to confirm correct patient and correct procedure   The patient underwent monitored anesthesia care, which was administered by an anesthesia professional  The patient's blood pressure, heart rate, level of consciousness, respirations and oxygen were monitored throughout the procedure  The scope was advanced to the second part of the duodenum  Retroflexion was performed in the fundus  Prior to the procedure, the patient's H  Pylori status was unknown  The patient experienced no blood loss  The procedure was not difficult  The patient tolerated the procedure well  There were no apparent complications  ANESTHESIA INFORMATION: ASA: III Anesthesia Type: IV Sedation with Anesthesia MEDICATIONS: No administrations occurring from 1356 to 1417 on 05/11/22 FINDINGS: Moderate, localized erythematous mucosa in the antrum; performed cold forceps biopsy to rule out H  pylori The upper third of the esophagus, middle third of the esophagus, lower third of the esophagus, GE junction, duodenal bulb, 1st part of the duodenum and 2nd part of the duodenum appeared normal  Performed random biopsy using biopsy forceps to rule out Cabrera's esophagus  SPECIMENS: ID Type Source Tests Collected by Time Destination 1 : antrum Tissue Stomach TISSUE EXAM Cristhian Garcia MD 5/11/2022  2:04 PM  2 : lower Tissue Esophagus TISSUE EXAM Cristhian Garcia MD 5/11/2022  2:05 PM  3 : sigmoid Tissue Polyp, Colorectal TISSUE EXAM Cristhian Garcia MD 5/11/2022  2:14 PM      Impression: 1  Mild erythema in the antrum 2  Normal esophagus, normal GE junction and normal duodenum RECOMMENDATION: Await pathology results Follow up with me in clinic Anti-reflux diet   Cristhian Garcia MD     Colonoscopy    Result Date: 5/11/2022  Narrative: [facility] [address] [facility] [phone] [place] [address] OF SERVICE: 5/11/22 PHYSICIAN(S): Attending: Cristhian Garcia MD Fellow: No Staff Documented Procedure : Incomplete colonoscopy INDICATION: Diverticulosis, Left lower quadrant pain, Colonic intussusception ([facility] [address] ), Family history of colon cancer, Adenomatous polyp of colon, unspecified part of colon POST-OP DIAGNOSIS: See the impression below  HISTORY: Prior colonoscopy: 10 years ago  BOWEL PREPARATION: Miralax/Dulcolax PREPROCEDURE: Informed consent was obtained for the procedure, including sedation  Risks including but not limited to bleeding, infection, perforation, adverse drug reaction and aspiration were explained in detail  Also explained about less than 100% sensitivity with the exam and other alternatives  The patient was placed in the left lateral decubitus position  DETAILS OF PROCEDURE: Patient was taken to the procedure room where a time out was performed to confirm correct patient and correct procedure  The patient underwent monitored anesthesia care, which was administered by an anesthesia professional  The patient's blood pressure, heart rate, level of consciousness, oxygen and respirations were monitored throughout the procedure  A digital rectal exam was performed  The scope was introduced through the anus and advanced to the sigmoid colon  Retroflexion was performed in the rectum  The quality of bowel preparation was evaluated using the Dougie Bowel Preparation Scale with scores of: right colon = not assessed, transverse colon = not assessed, left colon = 1  The total BBPS score was 1  Bowel prep was not adequate  The patient experienced no blood loss  The procedure was not difficult  The patient tolerated the procedure well  There were no apparent complications  ANESTHESIA INFORMATION: ASA: III Anesthesia Type: IV Sedation with Anesthesia MEDICATIONS: No administrations occurring from 1356 to 1425 on 05/11/22 FINDINGS: Bran Colla kink in the sigmoid colon, it was very difficult to pass the scope, sigmoid polyp which was removed with cold snare polypectomy  Extensive diverticulosis, ultra thin scope was used,  but I see the peritoneum, procedure was aborted   EVENTS: Procedure Events Event Event Time ENDO SCOPE OUT TIME 5/11/2022  2:24 PM SPECIMENS: ID Type Source Tests Collected by Time Destination 1 : antrum Tissue Stomach TISSUE EXAM Ion Sharron Sutton MD 5/11/2022  2:04 PM  2 : lower Tissue Esophagus TISSUE EXAM Minoo Vitale MD 5/11/2022  2:05 PM  3 : sigmoid Tissue Polyp, Colorectal TISSUE EXAM Minoo Vitale MD 5/11/2022  2:14 PM  EQUIPMENT: Colonoscope -Q180AL Colonoscope -[id number]     Impression: 1  Sharp kink in the sigmoid colon with stricture, scope was unable to pass  Ultra thin scope was use, small sigmoid polyp removed  2  Possible perforation in sigmoid colon RECOMMENDATION: Stat CT scan abdomen and pelvis Surgical consult, discuss case with Dr Kathy Sweeney MD     CT abdomen pelvis wo contrast    Result Date: 5/11/2022  Narrative: CT ABDOMEN AND PELVIS WITHOUT IV CONTRAST INDICATION:   Bowel obstruction suspected possible bowel perofration  COMPARISON:  None  TECHNIQUE:  CT examination of the abdomen and pelvis was performed without intravenous contrast  This examination was performed without intravenous contrast in the context of the critical nationwide O[place] shortage  Axial, sagittal, and coronal 2D reformatted images were created from the source data and submitted for interpretation  Radiation dose length product (DLP) for this visit:  350 2 mGy-cm   This examination, like all CT scans performed in the [facility], was performed utilizing techniques to minimize radiation dose exposure, including the use of iterative reconstruction and automated exposure control  Enteric contrast was NOT administered  FINDINGS: ABDOMEN LOWER CHEST:  No clinically significant abnormality identified in the visualized lower chest  LIVER/BILIARY TREE:  Unremarkable  GALLBLADDER:  There are tiny punctate gallstone(s) within the gallbladder  SPLEEN:  Unremarkable  PANCREAS:  Unremarkable  ADRENAL GLANDS:  Unremarkable   KIDNEYS/URETERS:  Small right lower pole circumscribed subcentimeter renal hypodensity is present, too small to accurately characterize, and statistically most likely benign finding  According to recent literature (Radiology 2019) no further workup of this finding is recommended  STOMACH AND BOWEL:  There is colonic diverticulosis without evidence of acute diverticulitis  APPENDIX:  No findings to suggest appendicitis  ABDOMINOPELVIC CAVITY: Moderate pneumoperitoneum  This can be seen anterior to the liver and deep to the anterior abdominal wall  No ascites  No enlarged adenopathy  VESSELS:  Atherosclerotic changes are present  No evidence of aneurysm  PELVIS REPRODUCTIVE ORGANS:  The prostate is enlarged  URINARY BLADDER:  Unremarkable  ABDOMINAL WALL/INGUINAL REGIONS:  Unremarkable  OSSEOUS STRUCTURES:  Bones are demineralized  Multilevel vertebroplasties and compression fractures of indeterminate age  Impression: Moderate pneumoperitoneum in keeping with bowel perforation  I personally discussed this study with DAVID Parsons on 5/11/2022 at 3:42 PM  Workstation performed: [id number]     CT chest abdomen pelvis wo contrast    Result Date: 5/18/2022  Narrative: CT CHEST, ABDOMEN AND PELVIS WITHOUT IV CONTRAST INDICATION:   Intra-abdominal abscess R/O intra-abdominal collection  COMPARISON:  CTA chest on 5/15/2022 and CT abdomen and pelvis on 5/11/2022  TECHNIQUE: CT examination of the chest, abdomen and pelvis was performed without intravenous contrast  This examination was performed without intravenous contrast in the context of the critical nationwide Omnipaque shortage  Axial, sagittal, and coronal 2D reformatted images were created from the source data and submitted for interpretation  Radiation dose length product (DLP) for this visit:  523 41 mGy-cm   This examination, like all CT scans performed in the [facility], was performed utilizing techniques to minimize radiation dose exposure, including the use of iterative  reconstruction and automated exposure control  Enteric contrast was administered   FINDINGS: CHEST LUNGS:  Worsening patchy mixed groundglass and consolidative change bilaterally, most prominent in the bilateral upper lobes  Bilateral lower lobe compressive atelectasis  There is septal thickening  There is no tracheal or endobronchial lesion  PLEURA:  Moderate left and small right pleural effusions, increased since the prior study  HEART/GREAT VESSELS: Heart is unremarkable for patient's age  Aortic valvular calcifications  Coronary artery calcifications  No thoracic aortic aneurysm  MEDIASTINUM AND SRI:  Mildly distention of the mid to distal esophagus with enteric contrast material   No mediastinal lymphadenopathy  CHEST WALL AND LOWER NECK:  Unremarkable  ABDOMEN LIVER/BILIARY TREE:  Unremarkable  GALLBLADDER:  There is a tiny calcified gallstone within the gallbladder, without pericholecystic inflammatory changes  SPLEEN:  Unremarkable  PANCREAS:  Unremarkable  ADRENAL GLANDS:  Unremarkable  KIDNEYS/URETERS:  No hydronephrosis or urinary tract calculus  One or more sharply circumscribed subcentimeter renal hypodensities are present, too small to accurately characterize, and statistically most likely benign findings  According to recent literature (Radiology 2019) no further workup of these findings is recommended  STOMACH AND BOWEL:  Postsurgical changes of low anterior resection with creation of loop colostomy in the anterior left upper abdomen  Small bowel anastomosis in the anterior lower abdomen deep to the midline incision  Multiple distended small bowel loops measuring up to 3 5 cm in diameter  No clear transition point is identified  Oral contrast has reached mid small bowel  Enteric tube is present with sidehole visualized near the gastroesophageal junction  APPENDIX:  No findings to suggest appendicitis  ABDOMINOPELVIC CAVITY:  Trace presacral fluid  No pneumoperitoneum  No lymphadenopathy  VESSELS:  Atherosclerotic changes are present  No evidence of aneurysm   PELVIS REPRODUCTIVE ORGANS:  Unremarkable for patient's age  URINARY BLADDER:  Decompressed with a Farnsworth catheter in place  Moderate amount of air in the bladder  ABDOMINAL WALL/INGUINAL REGIONS:  Postsurgical changes in the ventral wall with midline incision noted  Loop colostomy in the anterior left upper abdomen  Mild anasarca  OSSEOUS STRUCTURES:  No acute fracture or destructive osseous lesion  Multilevel vertebroplasty and multilevel age-indeterminate compression fractures in the thoracolumbar spine are redemonstrated  Impression: 1  Worsening patchy mixed groundglass and consolidative change bilaterally compatible with multifocal pneumonia and/or pulmonary edema  2   Increased bilateral pleural effusions  3   Sidehole of enteric tube is visualized near the gastroesophageal junction  Consider tube advancement  4   Mild distention of mid to distal esophagus with enteric contrast material   Correlate for gastroesophageal reflux  5   Multiple dilated small bowel loops without a clear transition point  The finding may reflect ileus, however developing small bowel obstruction cannot be excluded  Follow-up is recommended  6   Moderate amount of air within the urinary bladder, likely related to instrumentation  Correlate with urinalysis to exclude cystitis  I personally discussed this study with Carlos Dumont on 5/18/2022 at 1:11 AM  Workstation performed: [id number]     XR chest portable    Result Date: 5/17/2022  Narrative: CHEST INDICATION:   NGT placement    COMPARISON:  Chest radiograph and CT May 15, 2022 EXAM PERFORMED/VIEWS:  XR CHEST PORTABLE FINDINGS:  Tip of enteric tube is at the expected position of the gastroesophageal junction, and sidehole is at the expected position of the esophageal hiatus  Cardiomediastinal silhouette appears unremarkable  Opacities in the bilateral upper lobes have slightly increased in density  Now trace bilateral pleural effusions have decreased since prior study  No pneumothorax    Kyphoplasty material at multiple levels  Impression: 1  Tip of enteric tube is at the expected position of the gastroesophageal junction  Slight advancement is advised  2   Slight increased predominant upper lobe opacities, likely representing pneumonia  Concomitant edema is possible  The now trace bilateral pleural effusions have decreased in size  The study was marked in [facility] for immediate notification  Workstation performed: [id number]     XR chest portable    Result Date: 5/15/2022  Narrative: CHEST INDICATION:   coarse breath sounds  COMPARISON:  None EXAM PERFORMED/VIEWS:  XR CHEST PORTABLE FINDINGS: Heart is mildly enlarged  There is aortic knob calcification  There is central patchy airspace disease bilaterally consistent with pulmonary edema  There are small bilateral pleural effusions  Status post multilevel vertebroplasty  Impression: Pulmonary edema and small bilateral pleural effusions  Workstation performed: [id number]     XR abdomen 1 view kub    Result Date: 5/22/2022  Narrative: ABDOMEN INDICATION:   f/u ileus  COMPARISON:  Compared with 5/18/2022 VIEWS:  AP supine FINDINGS: Feeding tube in the upper abdomen  Contrast in the colon  Prominent gas-filled small bowel loops throughout the abdomen  No discernible free air on this supine study  Upright or left lateral decubitus imaging is more sensitive to detect subtle free air in the appropriate setting  No pathologic calcifications or soft tissue masses  Visualized lung bases are clear  Prior kyphoplasty changes in the thoracolumbar region  Impression: Slight worsening of the prominent dilated small bowel loops  Contrast in the colon  Workstation performed: [id number]     XR abdomen 1 view kub    Result Date: 5/22/2022  Narrative: ABDOMEN INDICATION:   abdominal distention  COMPARISON:  Compared with 5/21/2022 VIEWS:  AP supine FINDINGS: Feeding tube tip in the upper abdomen  Diffuse gas distended small bowel loops throughout the abdomen    Contrast in the colon suggested as seen on prior study  No discernible free air on this supine study  Upright or left lateral decubitus imaging is more sensitive to detect subtle free air in the appropriate setting  No pathologic calcifications or soft tissue masses  Visualized lung bases are clear  Visualized osseous structures are unremarkable for the patient's age  Impression: Persistent gas distended small bowel loops with normal caliber colon with contrast  Workstation performed: [id number]     XR abdomen 1 view kub    Result Date: 5/18/2022  Narrative: ABDOMEN INDICATION:   Contrast follow through  COMPARISON:  CT performed May 17, 2022 VIEWS:  AP supine FINDINGS: Air and contrast filled mildly distended loops of bowel seen throughout the abdomen in a nonspecific pattern suspicious for bowel obstruction  Contrast does not appear to have reached the large bowel  An enteric catheter tip overlies the expected location of the cavoatrial junction and has not quite reached the stomach  Advancement by approximately 8 to 10 cm recommended  Small costophrenic angle effusions with overlying airspace opacity  Osteopenia with multilevel compression fractures and vertebroplasty is noted  Impression: Radiographic appearance suspicious for early or mild distal small bowel obstruction  Alternatively, this could represent ileus  Enteric contrast administered for the May 17, 2022 examination does not appear to have reached the large bowelAn  enteric catheter tip overlies the expected location of the cavoatrial junction and has not quite reached the stomach  Advancement by approximately 8 to 10 cm recommended  This examination was marked "significant notification" in Epic in order to begin the standard process by which the radiology reading room liaison alerts the referring practitioner     Workstation performed: [id number]     XR abdomen 1 view kub    Result Date: 5/16/2022  Narrative: ABDOMEN INDICATION:   Evaluation of bowel gas pattern  Recent pneumoperitoneum seen by CT concerning for bowel perforation  Review of chart shows history of low anterior resection, protective loop transverse colostomy and segmental small bowel resection to treat perforated rectosigmoid junction  COMPARISON:  CT of the abdomen/pelvis dated 5/11/2022  VIEWS:  AP supine FINDINGS: Dilated loops of small bowel scattered throughout the abdomen measuring up to 3 9 cm in caliber and left lower quadrant  No evident pneumatosis  Some gas does appear to progress to the cecum and ascending colon  Rectosigmoid chain sutures are evident  Small amount of gas in the residual rectal pouch  No discernible free air on this supine study  Upright or left lateral decubitus imaging is more sensitive to detect subtle free air in the appropriate setting  No pathologic calcifications or soft tissue masses  Chronic atelectasis/scarring at the lung bases  No acute findings of the visualized lower chest  Prior vertebral plasties  Thoracolumbar spondylosis  No acute osseous abnormalities  Impression: Diffusely distended, dilated small bowel loops with small amount of persistent air extending to the proximal colon  Findings may suggest diffuse ileus or partial obstruction  Workstation performed: [id number]     XR chest portable ICU    Result Date: 5/22/2022  Narrative: CHEST INDICATION:   R  IJ TLC placement  COMPARISON:  Compared with 5/21/2022 EXAM PERFORMED/VIEWS:  XR CHEST PORTABLE ICU FINDINGS:  Endotracheal tube above the chriss  Right neck catheter in the distal SVC  PICC line catheter tip in the cavoatrial region  Feeding tube below the diaphragm  Cardiomediastinal silhouette appears unremarkable  Patchy groundglass parenchymal infiltrates predominantly in the right upper lobe and left lower lobe with slight worsening  No pneumothorax  No pneumothorax or pleural effusion  Osseous structures appear within normal limits for patient age       Impression: Right neck catheter in the distal SVC Workstation performed: [id number]     XR chest portable ICU    Result Date: 5/22/2022  Narrative: CHEST INDICATION:   hypoxia  COMPARISON:  Compared with 5/21/2022 EXAM PERFORMED/VIEWS:  XR CHEST PORTABLE ICU FINDINGS:  Endotracheal tube above the chriss  Right-sided PICC line tip in the cavoatrial region  Feeding tube below the diaphragm  Cardiomediastinal silhouette appears unremarkable  Poor inspiration  Diffuse prominent interstitial markings  Patchy parenchymal groundglass change , more on the right  No pneumothorax  No large effusion  Osseous structures appear within normal limits for patient age  Impression: Improved pneumomediastinum  Bilateral groundglass infiltrative changes  Workstation performed: [id number]     XR chest portable ICU    Result Date: 5/21/2022  Narrative: CHEST INDICATION:   intubation  COMPARISON:  May 17, 2022 EXAM PERFORMED/VIEWS:  XR CHEST PORTABLE ICU FINDINGS:  Transcutaneous pacer pads and numerous EKG leads overlie the patient  An endotracheal tube is present with its tip approximately 2 5 cm above the chriss  An enteric tube is present with its tip extending to below the inferior margin of this film in the epigastric region, presumably over the stomach but not seen  There is pneumomediastinum best visualized along the left heart border with lucency measures up to an estimated thickness of 8 mm  Patchy multifocal groundglass airspace opacities appear slightly progressed when compared to May 17, 2022  Trace costophrenic angle effusions  No evidence for pneumothorax  Kyphoplasty is at multiple levels in the lower thoracic and upper lumbar spine  No evidence for acute osseous abnormality        Impression: Pneumomediastinum  Endotracheal tube tip above the level of the chriss by 2 5 cm  Enteric tube tip overlying the epigastric region below the inferior margin of this film   Slight progression of multifocal groundglass pulmonary parenchymal airspace opacities  Trace costophrenic angle effusions, unchanged  This examination was marked "immediate notification" in Epic in order to begin the standard process by which the radiology reading room liaison alerts the referring practitioner  Workstation performed: [id number]     CTA chest pe study    Result Date: 5/15/2022  Narrative: CTA - CHEST WITH IV CONTRAST - PULMONARY ANGIOGRAM INDICATION:   hypoxic  COMPARISON: None  TECHNIQUE: CTA examination of the chest was performed using angiographic technique according to a protocol specifically tailored to evaluate for pulmonary embolism  Axial, sagittal, and coronal 2D reformatted images were created from the source data and  submitted for interpretation  In addition, coronal 3D MIP postprocessing was performed on the acquisition scanner  Radiation dose length product (DLP) for this visit:  479 mGy-cm   This examination, like all CT scans performed in the [facility], was performed utilizing techniques to minimize radiation dose exposure, including the use of iterative reconstruction and automated exposure control  IV Contrast:  70 mL of iohexol (OMNIPAQUE)  FINDINGS: PULMONARY ARTERIAL TREE:  No pulmonary embolus is seen  LUNGS:  Patchy groundglass densities bilaterally upper lobes, right greater than left Bilateral lower lobe consolidation compatible with compressive atelectasis  There is no tracheal or endobronchial lesion  PLEURA:  Small bilateral effusions  HEART/GREAT VESSELS:  Normal heart size  Aortic valvular calcifications  Coronary artery calcifications  Normal caliber thoracic aorta with atherosclerotic calcifications  No thoracic aortic aneurysm  MEDIASTINUM AND SRI:  Unremarkable  CHEST WALL AND LOWER NECK:   Unremarkable  VISUALIZED STRUCTURES IN THE UPPER ABDOMEN:  Unremarkable  OSSEOUS STRUCTURES:  No acute fracture or destructive osseous lesion  Age indeterminate moderate T5 compression deformity    Multiple old compression deformities and vertebroplasties in the lower thoracic spine  Impression: No pulmonary embolus  Bilateral upper lobe groundglass opacities compatible with pneumonia and/or pulmonary edema  Small bilateral pleural effusions  Workstation performed: [id number]     IR PICC line placement double lumen    Result Date: 5/18/2022  Narrative: PERCUTANEOUSLY INSERTED CENTRAL VENOUS CATHETER PLACEMENT NG TUBE PLACEMENT WITH FLUOROSCOPY HISTORY: TPN FLUORO TIME: 5 9 min NUMBER OF IMAGES: 3 6 PROCEDURE:  The patient was brought to the Interventional Radiology Suite and identified verbally and by wrist band  The right basilic vein was evaluated as a potential access site with ultrasound  The vessel was found to be patent and compressible  The site was prepped and draped in the usual sterile fashion  All elements of maximal sterile barrier technique, cap and mask and sterile gown and sterile gloves and sterile full-body drape and hand hygiene and 2% chlorhexidine for cutaneous antisepsis  Sterile ultrasound technique with sterile gel and sterile probe covers was also utilized  Lidocaine was administered to the skin and a small skin incision was made  Under ultrasound guidance, utilizing sterile ultrasound technique with sterile gel and a sterile probe cover, the right basilic vein was accessed using single wall Seldinger technique  Static images of real time needle entry into the vessel were obtained  This was followed by a  018 guidewire and a sheath and dilator tapered to   018  A 5-Kinyarwanda central venous catheter was then advanced under fluoroscopic guidance to the cavoatrial junction  The catheter was cut at 41 cm with 0 cm external length  The position was confirmed fluoroscopically  Blood could be freely aspirated and heparinized saline injected  Patient experienced no untoward reactions  Impression: 1   Status post placement of a 5-Kinyarwanda percutaneously inserted central venous catheter via the right basilic vein with its tip at the cavoatrial junction under ultrasound and fluoroscopic guidance  NG tube placement Next using fluoroscopic guidance, an NG tube was placed down into the stomach past the narrowing at the GE junction  2 stiff wires were required to advance this  Impression successful NG tube placement with its tip in the stomach  The tube may be used immediately  Workstation performed: [id number]     Echo complete w/ contrast if indicated    Result Date: 5/16/2022  Narrative: Carissa Hugo  Left Ventricle: Left ventricular cavity size is normal  Wall thickness is mild to moderately increased  Systolic function is normal   Estimated ejection fraction is 65%  Wall motion is normal    Left Atrium: The atrium is mildly dilated    Aortic Valve: The aortic valve is trileaflet  The leaflets are moderately thickened  The leaflets are mildly calcified  The leaflets exhibit normal mobility  There is trace regurgitation    Mitral Valve: There is mild regurgitation    Tricuspid Valve: There is mild regurgitation  The right ventricular systolic pressure is mildly elevated at 45 mmHg    Pulmonic Valve: There is trace regurgitation  EKG/ Monitor: Personally reviewed     Normal sinus rhythm, heart rate 80 per minute, RBBB, low-voltage QRS     Medications/ Allergies:     Current Facility-Administered Medications   Medication Dose Route Frequency Provider Last Rate    acetaminophen  650 mg Oral Q6H PRN Jose Nails, CRNP      acetaminophen  650 mg Per NG Tube Q6H [address] Jose Nails, CRNP      acetylcysteine  3 mL Nebulization Q8H Jose Nails, CRNP      Adult TPN (CUSTOM BASE/CUSTOM ELECTROLYTE)   Intravenous Continuous TPN Jose Nails, CRNP 90 5 mL/hr at 05/21/22 2151    Adult TPN (CUSTOM BASE/CUSTOM ELECTROLYTE)   Intravenous Continuous TPN Otilia Robbins PA-C      amiodarone  0 5 mg/min Intravenous Continuous Jose Nails, CRNP 0 5 mg/min (05/22/22 0211)    cefepime  2,000 mg Intravenous Q12H [place] Vinicius Godfrey, CRNP Stopped (05/22/22 0030)    chlorhexidine  15 mL Mouth/Throat Q12H [address] Charly Alstrom, CRNP      heparin (porcine)  5,000 Units Subcutaneous Q12H [address] Charly Alstrom, CRNP      hydrocortisone sodium succinate  50 mg Intravenous Q6H [address] Charly Alstrom, CRNP      HYDROmorphone  0 5 mg Intravenous Q4H PRN Charly Alstrom, CRNP      insulin lispro  2-12 Units Subcutaneous Q6H [address] Charly Alstrom, CRNP      iohexol  50 mL Oral Once in imaging Charly Alstrom, CRNP      iohexol  50 mL Oral Once in imaging [place]on Carreon PA-C      ipratropium-albuterol  3 mL Nebulization Q6H PRN Charly Alstrom, CRNP      ipratropium-albuterol  3 mL Nebulization Q8H Charly Alstrom, CRNP      lactated ringers  500 mL Intravenous Once [place] Carreon PA-C      levothyroxine  112 mcg Per NG Tube Early Morning Charly Alstrom, CRNP      lidocaine  1 patch Topical Daily Charly Alstrom, CRNP      metroNIDAZOLE  500 mg Intravenous Q8H Charly Alstrom, CRNP 500 mg (05/22/22 0823)    norepinephrine  1-30 mcg/min Intravenous Titrated Charly Alstrom, CRNP 10 mcg/min (05/22/22 0827)    ondansetron  4 mg Intravenous Q6H PRN Charly Alstrom, CRNP      oxyCODONE  2 5 mg Per NG Tube Q4H PRN Charly Alstrom, CRNP      oxyCODONE  5 mg Per NG Tube Q4H PRN Charly Alstrom, CRNP      pantoprazole  40 mg Intravenous Q24H [address] Charly Alstrom, CRNP      vasopressin  0 04 Units/min Intravenous Continuous Charly Alstrom, CRNP Stopped (05/22/22 0615)     acetaminophen, 650 mg, Q6H PRN  HYDROmorphone, 0 5 mg, Q4H PRN  iohexol, 50 mL, Once in imaging  iohexol, 50 mL, Once in imaging  ipratropium-albuterol, 3 mL, Q6H PRN  ondansetron, 4 mg, Q6H PRN  oxyCODONE, 2 5 mg, Q4H PRN  oxyCODONE, 5 mg, Q4H PRN      No Known Allergies    Code Status: Level 1 - Full Code  Advance Directive and Living Will:      POLST:        María Elena Copeland MD, [facility]      "This note has been constructed using a voice recognition system  Therefore there may be syntax, spelling, and/or grammatical errors   Please call if you have any questions  "

## 2022-05-23 PROBLEM — I48.91 ATRIAL FIBRILLATION (HCC): Status: ACTIVE | Noted: 2022-01-01

## 2022-05-23 NOTE — PLAN OF CARE
Patient transitioned this am to spont trial, on PS of 14  PA Deuce at bedside and aware  At 1326 PS weaned to 8  VSS, tolerating well

## 2022-05-23 NOTE — PROGRESS NOTES
Progress Note - General Surgery   Gurpreet Rowan 80 y o  male MRN: [id number]  Unit/Bed#: ICU 02 Encounter: [id number]    Assessment:  81 y/o M p/w recto-sigmoid perforation s/p Colonoscopy on 5/11, now s/p Ex-lap, LAR, transverse loop colostomy on 5/11 5/21- Intubated d/t acute hypoxic respiratory failure, with PEA arrest    On PC- 18/6, 50%  Off pressors, Amiodarone gtt @0 5    Plan:  --Wean mechanical ventilation as tolerated  --NPO/NGT  --PICC/TPN, initiate trickle TF today   --f/u Echocardiogram, appreciate Cardiology recs  --Continue Abx for multifocal PNA  --Consider bilateral thoracentesis to improve respiratory status  --Trend H/H, transfuse PRN for Hgb< 7 0  --Continue Insulin gtt for glucose control  --Rest of care per ICU team    Subjective/Objective     Subjective:     No acute events overnight  This morning, pt comfortable, awake and interactive off sedation  Objective:     Blood pressure 111/56, pulse 71, temperature 98 2 °F (36 8 °C), resp  rate (!) 28, height 5' 4" (1 626 m), weight 72 3 kg (159 lb 6 3 oz), SpO2 96 %  ,Body mass index is 27 36 kg/m²        Intake/Output Summary (Last 24 hours) at 5/23/2022 0717  Last data filed at 5/23/2022 0601  Gross per 24 hour   Intake 3552 27 ml   Output 3080 ml   Net 472 27 ml       Invasive Devices  Report    Peripherally Inserted Central Catheter Line  Duration           PICC Line 25/27/25 Right Basilic 4 days          Central Venous Catheter Line  Duration           CVC Central Lines 05/21/22 Triple 16cm 1 day          Arterial Line  Duration           Arterial Line 05/21/22 Femoral 1 day          Drain  Duration           Colostomy LLQ 12 days    NG/OG/Enteral Tube Orogastric 16 Fr Center mouth 2 days    Urethral Catheter Double-lumen 16 Fr  2 days          Airway  Duration           ETT  Oral 1 day                Physical Exam:    GEN: NAD  HEENT: MMM, NGT in place with bilious output  CV: RRR  Lung: normal effort  Ab: Soft, NT, distended, midline incision open with fascial sutures intact- appears clean without any evidence of drainage, ostomy viable with stool in bag  Extrem: No CCE    Lab, Imaging and other studies:  CBC:   Lab Results   Component Value Date    WBC 21 01 (H) 05/23/2022    HGB 7 2 (L) 05/23/2022    HCT 20 4 (L) 05/23/2022    MCV 97 05/23/2022     05/23/2022    MCH 34 1 05/23/2022    MCHC 35 3 05/23/2022    RDW 19 2 (H) 05/23/2022    MPV 12 2 05/23/2022   , CMP:   Lab Results   Component Value Date    SODIUM 141 05/23/2022    K 3 7 05/23/2022     (H) 05/23/2022    CO2 21 05/23/2022    BUN 66 (H) 05/23/2022    CREATININE 1 72 (H) 05/23/2022    CALCIUM 7 2 (L) 05/23/2022    AST 19 05/23/2022    ALT 18 05/23/2022    ALKPHOS 41 (L) 05/23/2022    EGFR 34 05/23/2022   , Coagulation: No results found for: PT, INR, APTT  VTE Pharmacologic Prophylaxis: Heparin  VTE Mechanical Prophylaxis: sequential compression device

## 2022-05-23 NOTE — ASSESSMENT & PLAN NOTE
· Patient was found unresponsive and hypoxic approximately 20 minutes after being seen well with family  · PEA arrest x 2  · Unclear etiology will requiring eventual ischemic work up pending recovery   · Cardiology following appreciate recommendations   · ECHO pending  · Neurologically following commands this AM

## 2022-05-23 NOTE — PROGRESS NOTES
Tami 45  Progress Note - Kendra Lombard [place] 2/15/1931, 80 y o  male MRN: [id number]  Unit/Bed#: ICU 02 Encounter: [id number]  Primary Care Provider: Jones Case MD   Date and time admitted to hospital: 5/11/2022  4:48 PM    Cardiac arrest [facility])  Assessment & Plan  · Patient was found unresponsive and hypoxic approximately 20 minutes after being seen well with family  · PEA arrest x 2  · Unclear etiology will requiring eventual ischemic work up pending recovery   · Cardiology following appreciate recommendations   · ECHO pending  · Neurologically following commands this AM      * Bowel perforation [facility])  Assessment & Plan  · Outpatient EGD and colonoscopy at [address] on 5/11 for w/u of chronic anemia, history of colon polyps, intermittent LLQ abdominal pain and possible intermittent intussusception  EGD unremarkable, colonoscopy technically difficult and required changed from adult scope to pediatric scope, but during traversal of the sigmoid colon there was a kink and patient sustained a perforation  Procedure was aborted and patient was transferred to [facility] where immediate surgical consultation was obtained  Patient was reported to have obvious signs of peritonitis on exam and CT ab/pelvis demonstrated pneumoperitoneum  · 5/11 Urgently to the OR with Dr Sydney Dawson for ex-lap, low anterior resection, protection loop transverse colostomy, flex sigmoidoscopy, and segmental small bowel resection  · Ostomy now with good output  Surgery following  · Continues on TPN  · Now with ostomy output discuss with surgery plans for feeding vs continuing TPN    Acute respiratory failure with hypoxia [facility])  Assessment & Plan  · Patient previously in ICU for hypoxia and concern for aspiration pneumonitis  Improved and was able to transfer to regular medical floor     · Intubated during cardiac arrest  Do not suspect infectious etiology for cause of arrest  · AC/PC 26/18/50/6  · Wean respiratory rate  · SBT today with goal of extubation  · VAP bundle ordered  · Noted to have pleural effusions on CT scan 5/22 would diurese   · Atrovent/xopenex/mucomyst nebs q8h    Severe sepsis [facility])  Assessment & Plan  · Source peritonitis s/p bowel perforation   · 5/22 CT chest/ab/pelvis with concern of multifocal PNA, no visualized intraabdominal abscess or anastomotic leak   · Continue Cefepime, flagyl  · To continue through 5/27  · Infectious disease consulted, appreciate recommendations    CHF (congestive heart failure) ([address] )  Assessment & Plan  Wt Readings from Last 3 Encounters:   05/23/22 72 3 kg (159 lb 6 3 oz)   05/11/22 62 1 kg (136 lb 14 4 oz)   03/25/22 61 2 kg (135 lb)     · 5/16: Echo-EF 65%, mild TR, mild MR  · Monitor I&O  · Daily weights   · Consider some diuresis today        Hyperglycemia  Assessment & Plan  · A1c 5 3%  · Likely contributed by TPN  · Required 77U insulin in 24hrs, continue insulin infusion given unclear plan for TPN at this time     Toxic metabolic encephalopathy  Assessment & Plan  · Likely in setting of acute illness and cardiac arrest  · Delirium precautions; regulate sleep/wake cycle, environmental controls, daily CAM ICU   · Trend neuro exam, improving    Atrial fibrillation (HCC)  Assessment & Plan  · S/p cardiac arrest while on 3 pressors noted to have afib with RVR  · Bolused with amio and placed on infusion  · Converted to sinus rhythm on 5/22   · Would d/c amio given likely related to acute stress response as patient has no history of afib  · Continue to monitor on telemetry  · No indication for [facility] at this time given resolved and poor candidate with downtrending hgb     Anemia  Assessment & Plan  · hgb drop post cardiac arrest  Noted to have gross hematuria but this has since resolved  · 5/21 1 u PRBC  · Trend  · Downtrending today however this may be dilutional, consider check this PM    [facility]) [facility] )  Assessment & Plan  · Secondary to cardiac arrest  · Continue to increase Cr however good UOP  · Trend UOP and Cr  · Avoid hypotension/nephrotoxic medications        ----------------------------------------------------------------------------------------  HPI/24hr events: Now wakes up and follows commands, stool noted from ostomy, no additional hematuria  Levophed has been weaned to off overnight  Patient appropriate for transfer out of the ICU today?: No  Disposition: Continue Critical Care   Code Status: Level 1 - Full Code  ---------------------------------------------------------------------------------------  SUBJECTIVE  Unable to provide    Review of Systems  Review of systems was unable to be performed secondary to intubation  ---------------------------------------------------------------------------------------  OBJECTIVE    Vitals   Vitals:    22 0400 22 0439 22 0500 22 0600   BP: 118/56  110/56 111/56   Pulse: 72  67 71   Resp: (!) 26  (!) 26 (!) 28   Temp: 98 6 °F (37 °C)  98 2 °F (36 8 °C) 98 2 °F (36 8 °C)   TempSrc:       SpO2: 96%  97% 96%   Weight:  72 3 kg (159 lb 6 3 oz)     Height:         Temp (24hrs), Av °F (37 2 °C), Min:98 2 °F (36 8 °C), Max:100 22 °F (37 9 °C)  Current: Temperature: 98 2 °F (36 8 °C)          Respiratory:  SpO2: SpO2: 96 %  Nasal Cannula O2 Flow Rate (L/min): 5 L/min    Invasive/non-invasive ventilation settings   Respiratory  Report   Lab Data (Last 4 hours)    None         O2/Vent Data (Last 4 hours)       0331           Vent Mode AC/PC       Resp Rate (BPM) (BPM) 26       Pressure Control (cmH2O) (cm) 18       Insp Time (sec) (sec) 0 6       FiO2 (%) (%) 50       PEEP (cmH2O) (cmH2O) 6       MV 20 5                   Physical Exam  Vitals reviewed  Constitutional:       General: He is not in acute distress  Appearance: He is well-developed  HENT:      Head: Normocephalic  Eyes:      Pupils: Pupils are equal, round, and reactive to light  Neck:      Vascular: No JVD        Trachea: No tracheal deviation  Cardiovascular:      Rate and Rhythm: Normal rate and regular rhythm  Heart sounds: Normal heart sounds  No murmur heard  No friction rub  No gallop  Pulmonary:      Effort: Pulmonary effort is normal  No respiratory distress  Breath sounds: No wheezing or rales  Chest:      Chest wall: No tenderness  Abdominal:      General: Bowel sounds are normal  There is no distension  Palpations: Abdomen is soft  Tenderness: There is no abdominal tenderness  Comments: + brown stool from ostomy site  Incision C/D/I    Musculoskeletal:      Right lower leg: Edema present  Left lower leg: Edema present  Skin:     General: Skin is warm and dry  Neurological:      Mental Status: He is alert        Comments: Nonfocal exam  Follows 2 step commands briskly               Laboratory and Diagnostics:  Results from last 7 days   Lab Units 05/23/22  0438 05/22/22  0524 05/21/22 2214 05/21/22  1539 05/21/22  0524 05/20/22  0546 05/19/22  0517 05/18/22  0446 05/17/22  0630   WBC Thousand/uL 21 01* 23 05*  --  21 74* 23 58* 18 87* 13 94* 15 24* 15 37*   HEMOGLOBIN g/dL 7 2* 8 5* 9 7* 6 2* 8 2* 8 5* 8 4* 8 3* 8 2*   HEMATOCRIT % 20 4* 25 4*  --  18 3* 24 7* 25 5* 23 7* 24 1* 24 2*   PLATELETS Thousands/uL 243 273  --  231 326 335 314 272 254   BANDS PCT %  --   --   --   --  20*  --   --   --  10*   MONO PCT %  --   --   --   --  7  --   --   --  3*     Results from last 7 days   Lab Units 05/23/22 0438 05/22/22  0524 05/21/22 2214 05/21/22  1539 05/21/22  0524 05/20/22  0546 05/19/22  0517 05/18/22  0446 05/17/22  0630   SODIUM mmol/L 141 146* 149* 150* 147* 148* 144 141 142   POTASSIUM mmol/L 3 7 5 2 4 6 3 0* 3 5 3 3* 3 7 4 0 3 8   CHLORIDE mmol/L 113* 115* 118* 119* 116* 117* 114* 111* 111*   CO2 mmol/L 21 20* 22 25 27 26 25 24 25   ANION GAP mmol/L 7 11 9 6 4 5 5 6 6   BUN mg/dL 66* 57* 53* 42* 45* 48* 50* 37* 28*   CREATININE mg/dL 1 72* 1 56* 1 35* 0 94 1 03 1 14 1 20 0  96 0 93   CALCIUM mg/dL 7 2* 7 6* 7 4* 5 7* 7 5* 7 4* 7 7* 7 6* 7 4*   GLUCOSE RANDOM mg/dL 191* 276* 128 249* 215* 233* 180* 128 171*   ALT U/L 18 22  --  14  --   --   --  13 11*   AST U/L 19 29  --  31  --   --   --  12 12   ALK PHOS U/L 41* 43*  --  40*  --   --   --  43* 47   ALBUMIN g/dL 1 6* 1 8*  --  1 5*  --   --   --  2 2* 2 2*   TOTAL BILIRUBIN mg/dL 0 96 1 11*  --  0 48  --   --   --  0 71 0 68     Results from last 7 days   Lab Units 05/23/22  0438 05/22/22  0524 05/21/22  2214 05/21/22  1539 05/21/22  0524 05/20/22  0546 05/19/22  0517 05/18/22  0446   MAGNESIUM mg/dL 2 4 2 3 2 3 2 0 2 6 2 8* 2 8* 2 6   PHOSPHORUS mg/dL 3 6 3 3 2 8 3 7 2 1* 2 0*  --  2 6               Results from last 7 days   Lab Units 05/23/22  0438 05/22/22  1302 05/22/22  0929 05/21/22  1710 05/21/22  1539 05/17/22 2003   LACTIC ACID mmol/L 1 6 2 5* 3 5* 3 6* 3 5* 1 1     ABG:  Results from last 7 days   Lab Units 05/22/22  0952   PH ART  7 466*   PCO2 ART mm Hg 25 4*   PO2 ART mm Hg 76 3   HCO3 ART mmol/L 17 9*   BASE EXC ART mmol/L -4 8   ABG SOURCE  Line, Arterial     VBG:  Results from last 7 days   Lab Units 05/22/22  0952 05/22/22  0524   PH SAM   --  7 378   PCO2 SAM mm Hg  --  29 8*   PO2 SAM mm Hg  --  48 8*   HCO3 SAM mmol/L  --  17 2*   BASE EXC SAM mmol/L  --  -7 1   ABG SOURCE  Line, Arterial  --      Results from last 7 days   Lab Units 05/23/22  0438 05/21/22  0524 05/20/22  0546 05/19/22  0517 05/18/22  0446   PROCALCITONIN ng/ml 2 97* 0 53* 0 76* 0 99* 1 24*       Micro  Results from last 7 days   Lab Units 05/22/22  0934 05/18/22  1601 05/18/22  1559 05/17/22  0937 05/17/22  0923   BLOOD CULTURE  Received in Microbiology Lab  Culture in Progress  Received in Microbiology Lab  Culture in Progress  No Growth After 4 Days  No Growth After 4 Days  --   --    SPUTUM CULTURE   --   --   --  Test not performed  Suggest repeat specimen    --    GRAM STAIN RESULT   --   --   --  >10 squamous epithelial cells/lpf, indicating orophayngeal contamination  *  1+ Polys*  2+ Budding Yeast with Pseudomycelia*  Rare Gram positive rods*  --    MRSA CULTURE ONLY   --   --   --  No Methicillin Resistant Staphlyococcus aureus (MRSA) isolated  --    LEGIONELLA URINARY ANTIGEN   --   --   --   --  Negative   STREP PNEUMONIAE ANTIGEN, URINE   --   --   --   --  Negative       EKG: no new  Imaging: no new    Intake and Output  I/O       05/21 0701 05/22 0700 05/22 0701 05/23 0700 05/23 0701 05/24 0700    I V  (mL/kg) 1389 6 (19 8) 807 3 (11 2)     Blood 350      NG/GT       IV Piggyback 450 900      1 1844 9     Total Intake(mL/kg) 2928 7 (41 7) 3552 3 (49 1)     Urine (mL/kg/hr) 295 (0 2) 1080 (0 6)     Emesis/NG output 30 1200     Stool 300 800     Total Output 625 3080     Net +2303 7 +472 3            Unmeasured Urine Occurrence 1 x            Height and Weights   Height: 5' 4" (162 6 cm)  IBW (Ideal Body Weight): 59 2 kg  Body mass index is 27 36 kg/m²  Weight (last 2 days)     Date/Time Weight    05/23/22 0439 72 3 (159 39)    05/22/22 0600 70 2 (154 76)    05/21/22 0532 65 2 (143 74)            Nutrition       Diet Orders   (From admission, onward)             Start     Ordered    05/22/22 1850  Diet NPO  Diet effective now        References:    Nutrtion Support Algorithm Enteral vs  Parenteral   Question Answer Comment   Diet Type NPO    RD to adjust diet per protocol?  Yes        05/22/22 1849    05/12/22 0906  Room Service  Once        Question:  Type of Service  Answer:  Room Service - Appropriate with Assistance    05/12/22 0905                  Active Medications  Scheduled Meds:  Current Facility-Administered Medications   Medication Dose Route Frequency Provider Last Rate    acetaminophen  650 mg Oral Q6H PRN Carry Rucks, CRNP      acetaminophen  650 mg Per NG Tube Q6H [address] Carry Rucks, CRNP      acetylcysteine  3 mL Nebulization Q8H Carry Rucks, CRNP      Adult TPN (CUSTOM BASE/CUSTOM ELECTROLYTE) Intravenous Continuous TPN Qiana Barnett PA-C 47 1 mL/hr at 05/22/22 2300    amiodarone  0 5 mg/min Intravenous Continuous Eva MACARIO KhanNP 0 5 mg/min (05/22/22 1850)    cefepime  2,000 mg Intravenous Q12H Eva Bill, CRNP Stopped (05/23/22 0030)    chlorhexidine  15 mL Mouth/Throat Q12H [facility] Eva ISELA Khan      heparin (porcine)  5,000 Units Subcutaneous Q12H [facility] Eva Bill, MACARIONP      hydrocortisone sodium succinate  50 mg Intravenous Q6H [facility] ISE[facility]      HYDROmorphone  0 5 mg Intravenous Q4H PRN Eva Bill, MACARIONP      insulin regular (HumuLIN R,NovoLIN R) infusion  0 3-21 Units/hr Intravenous Titrated Qiana Barnett PA-C 4 Units/hr (05/23/22 0629)    iohexol  50 mL Oral Once in imaging Eva Bill, MACARIONP      ipratropium-albuterol  3 mL Nebulization Q6H PRN Eva Bill, MACARIONP      ipratropium-albuterol  3 mL Nebulization Q8H Eva Bill, MACARIONP      levothyroxine  112 mcg Per NG Tube Early Morning Eva Bill, ISELA      lidocaine  2 patch Topical Daily Ruma Roberto      metroNIDAZOLE  500 mg Intravenous Q8H Eva Bill, MACARIONP Stopped (05/23/22 0101)    norepinephrine  1-30 mcg/min Intravenous Titrated EvaMACARIO JoynerNP Stopped (05/23/22 0135)    ondansetron  4 mg Intravenous Q6H PRN Eva Bill, CRNP      oxyCODONE  2 5 mg Per NG Tube Q4H PRN Eva Bill, CRNP      oxyCODONE  5 mg Per NG Tube Q4H PRN Eva Bill, MACARIONP      pantoprazole  40 mg Intravenous Q24H [facility] Eva Bill, ISELA      vasopressin  0 04 Units/min Intravenous Continuous Eva Bill, CRNP Stopped (05/22/22 0615)     Continuous Infusions:  Adult TPN (CUSTOM BASE/CUSTOM ELECTROLYTE), , Last Rate: 89 3 mL/hr at 05/22/22 2300  amiodarone, 0 5 mg/min, Last Rate: 0 5 mg/min (05/22/22 1850)  insulin regular (HumuLIN R,NovoLIN R) infusion, 0 3-21 Units/hr, Last Rate: 4 Units/hr (05/23/22 0629)  norepinephrine, 1-30 mcg/min, Last Rate: Stopped (05/23/22 0135)  vasopressin, 0 04 Units/min, Last Rate: Stopped (05/22/22 0615)      PRN Meds:   acetaminophen, 650 mg, Q6H PRN  HYDROmorphone, 0 5 mg, Q4H PRN  iohexol, 50 mL, Once in imaging  ipratropium-albuterol, 3 mL, Q6H PRN  ondansetron, 4 mg, Q6H PRN  oxyCODONE, 2 5 mg, Q4H PRN  oxyCODONE, 5 mg, Q4H PRN        Invasive Devices Review  Invasive Devices  Report    Peripherally Inserted Central Catheter Line  Duration           PICC Line 73/20/38 Right Basilic 4 days          Central Venous Catheter Line  Duration           CVC Central Lines 05/21/22 Triple 16cm 1 day          Arterial Line  Duration           Arterial Line 05/21/22 Femoral 1 day          Drain  Duration           Colostomy LLQ 12 days    NG/OG/Enteral Tube Orogastric 16 Fr Center mouth 2 days    Urethral Catheter Double-lumen 16 Fr  2 days          Airway  Duration           ETT  Oral 1 day                Rationale for remaining devices: critical illness  ---------------------------------------------------------------------------------------  Advance Directive and Living Will:      Power of :    POLST:    ---------------------------------------------------------------------------------------  Care Time Delivered:   No Critical Care time spent       [facility]      Portions of the record may have been created with voice recognition software  Occasional wrong word or "sound a like" substitutions may have occurred due to the inherent limitations of voice recognition software    Read the chart carefully and recognize, using context, where substitutions have occurred

## 2022-05-23 NOTE — ASSESSMENT & PLAN NOTE
· hgb drop post cardiac arrest  Noted to have gross hematuria but this has since resolved  · 5/21 1 u PRBC  · Trend  · Downtrending today however this may be dilutional, consider check this PM

## 2022-05-23 NOTE — PHYSICAL THERAPY NOTE
PT TREATMENT     05/23/22 1210   Note Type   Note Type Treatment   Pain Assessment   Pain Assessment Tool Boudreaux-Baker FACES   Boudreaux-Baker FACES Pain Rating 0   Restrictions/Precautions   Other Precautions Chair Alarm; Bed Alarm; Fall Risk;O2  (In ICU with intubation)   General   Chart Reviewed Yes   Family/Caregiver Present No   Cognition   Arousal/Participation Cooperative   Subjective   Subjective Nonverbal with intubation but able to follow simple commands   Bed Mobility   Rolling R 2  Maximal assistance   Additional items Assist x 1   Rolling L 2  Maximal assistance   Additional items Assist x 1   Additional Comments Unable to assess out of bed and sitting at this time due to intubation   Exercises   Hamstring Stretch Supine;5 reps;PROM; Bilateral   Quad Sets Supine;5 reps;Bilateral   Heelslides Supine;10 reps;AAROM;AROM; Bilateral   Hip Abduction Supine;10 reps;AAROM;AROM; Bilateral   Knee AROM Short Arc Quad Supine;5 reps;AAROM; Bilateral   Heel Cord Stretch Supine;5 reps;PROM; Bilateral   Assessment   Assessment Patient cooperative able to follow simple commands although nonverbal and intubated in ICU and will benefit from continued physical therapy with progression as tolerated  The patient's AM-PAC Basic Mobility Inpatient Short Form Raw Score is 8  A Raw score of less than or equal to 16 suggests the patient may benefit from discharge to post-acute rehabilitation services  Please also refer to the recommendation of the Physical Therapist for safe discharge planning  Plan   Treatment/Interventions ADL retraining;Functional transfer training;LE strengthening/ROM; Therapeutic exercise; Endurance training;Patient/family training;Equipment eval/education; Bed mobility;Gait training   PT Frequency Other (Comment)  (5w)   Recommendation   PT Discharge Recommendation Post acute rehabilitation services   AM-PAC Basic Mobility Inpatient   Turning in Bed Without Bedrails 2   Lying on Back to Sitting on Edge of Flat Bed 2   Moving Bed to Chair 1   Standing Up From Chair 1   Walk in Room 1   Climb 3-5 Stairs 1   Basic Mobility Inpatient Raw Score 8   Turning Head Towards Sound 3   Follow Simple Instructions 2   Low Function Basic Mobility Raw Score 13   Low Function Basic Mobility Standardized Score 20 14   Highest Level Of Mobility   [facility] Goal 3: Sit at edge of bed   Education   Patient Reinforcement needed; Explanation/teachback used   [employer] License Number  Nathalia Burnette  [id number]

## 2022-05-23 NOTE — ASSESSMENT & PLAN NOTE
· Source peritonitis s/p bowel perforation   · 5/22 CT chest/ab/pelvis with concern of multifocal PNA, no visualized intraabdominal abscess or anastomotic leak   · Continue Cefepime, flagyl  · To continue through 5/27  · Infectious disease consulted, appreciate recommendations

## 2022-05-23 NOTE — ASSESSMENT & PLAN NOTE
· Secondary to cardiac arrest  · Continue to increase Cr however good UOP  · Trend UOP and Cr  · Avoid hypotension/nephrotoxic medications

## 2022-05-23 NOTE — ASSESSMENT & PLAN NOTE
· A1c 5 3%  · Likely contributed by TPN  · Required 77U insulin in 24hrs, continue insulin infusion given unclear plan for TPN at this time

## 2022-05-23 NOTE — PROGRESS NOTES
Progress Note - Infectious Disease   Fady Hudson 80 y o  male MRN: [id number]  Unit/Bed#: ICU 02 Encounter: [id number]      Impression/Plan:  1  S/p cardiac arrest 5/21/22  Patient intubated during RR  In ICU on ventilator assistance  Recurrent SIRS possibly reactive to arrest with fever, tachycardia, tachypnea, and increased leukocytosis post arrest  Possible recurrent aspiration event s/p arrest   -continue antibiotics as below  -monitor temperature and hemodynamics  -follow-up repeat blood cultures  -monitor serial a m  Labs  -continue supportive measures per critical care team  -cardiology on board  -ventilator management per critical care team    2  SIRS vs Severe Sepsis, evolving on admission and post #1   Evidenced by tachycardia, tachypnea, bandemia and encephalopathy   Suspect possible aspiration in setting of significant retained secretions, acute respiratory failure requiring supplemental oxygenation and with recent NG tube for postop ileus management   MRSA screen negative  WBC count 21 K  -continue cefepime and Flagyl as below  -follow-up final blood cultures - negative 4 days  -monitor temperature and hemodynamics  -serial exam  -monitor CBC and BMP   -additional supportive measures per critical care team as below      3    Peritonitis s/p Bowel perforation  s/p 5/11/22 exploratory laparotomy, low anterior resection, protective loop transverse colostomy and segmental small bowel resection secondary to perforation rectosigmoid junction after colonoscopy   OR cultures grew Pseudomonas aeruginosa and Bacteroides fragilis  Now being managed with NGT/NPO status for post op ileus on TPN  -antibiotics as above  -monitor temperature and hemodynamics  -serial exam  -close surgical follow-up  -recheck CBC and BMP   -plan to complete 2 weeks antibiotics total through 5/27/22  -TPN per surgical team     4   Acute hypoxic respiratory failure with hypoxia   Suspicious for aspiration pneumonitis over pneumonia     22 CT C/A/P predominantly UL groundglass and consolidations, bilateral pleural effusions, SB mild dilation unchanged  Patient now intubated s/p #1  22 Procalcitonin level  2 60 >  0 753 < 22 2 97  22 Sputum cx with mixed oropharyngeal contaminants  -continue antibiotics for #3 as above  -ventilator management per critical care team      5  Aspiration pneumonitis versus pneumonia in setting of #1/3   22 CT C/A/P predominantly UL groundglass and consolidations, bilateral pleural effusions, SB mild dilation unchanged  Patient now intubated s/p #1  22 Procalcitonin level  2 60 >  0 753 < 22 2 97   22 sputum specimen oral pharyngeal contamination   -continue antibiotics as above  -secretion and pulmonary toilet management per critical care team   -monitor respiratory symptoms  -monitor vent requirements     6  MARYELLEN on CKD 3  CrCl 33%  Creatinine 1 72  -renal dose adjust antibiotics as needed  -volume management  -monitor creatinine     Antibiotics:  Cefepime D10/Flagyl D13     Above impression and plan discussed in detail with patient and grandchildren at bedside, RN, and critical care team   I spent 35 minutes on the unit floor of which 20 minutes was in counseling/coordination of care as outlined in my note including coordination of inpatient intravenous antibiotics, laboratory follow-up, and follow-up with Infectious Diseases  All of family's increased discussed      Subjective:  Patient has no fever, chills, sweats on ventilator in ICU s/p cardiac arrest 22; no nausea, vomiting, diarrhea reported    Appears to be tolerating IV antibiotics    Objective:  Vitals:  Temp:  [98 06 °F (36 7 °C)-99 5 °F (37 5 °C)] 98 24 °F (36 8 °C)  HR:  [64-76] 71  Resp:  [15-52] 32  BP: (103-150)/(51-83) 111/54  SpO2:  [88 %-100 %] 93 %  Temp (24hrs), Av 7 °F (37 1 °C), Min:98 06 °F (36 7 °C), Max:99 5 °F (37 5 °C)  Current: Temperature: 98 24 °F (36 8 °C)    Physical Exam: General Appearance:  80year-old acute on chronically debilitated male, appearing sluggish but arousable on ventilator at FIO2 45% propped in ICU bed, actively moving extremities with soft UE restraints in place, no acute distress  Aware of his family's presents at bedside   Throat: Oropharynx moist without lesions  Lungs:   Few scattered coarse ventilated breath sounds clearer to auscultation bilaterally; on ventilator, no purulent secretions   Heart:  RRR   Abdomen:   Soft, no focal tenderness a midline abdominal incision site with ABD dressing intact without strike through drainage or spreading erythema outside of dressing, left lower quadrant colostomy with some brown formed stool in bag, positive diminished bowel sounds  Extremities: No clubbing, cyanosis or edema, + venodynes in place   : [place] with clear, yellow urine in bag, no SPT   Skin: No new rashes or lesions  IV site and Right neck CVP nontender  TPN running       Labs, Imaging, & Other studies:   All pertinent labs and imaging studies were personally reviewed  Results from last 7 days   Lab Units 05/23/22  0438 05/22/22  0524 05/21/22 2214 05/21/22  1539   WBC Thousand/uL 21 01* 23 05*  --  21 74*   HEMOGLOBIN g/dL 7 2* 8 5* 9 7* 6 2*   PLATELETS Thousands/uL 243 273  --  231     Results from last 7 days   Lab Units 05/23/22  0438 05/22/22  0524 05/21/22 2214 05/21/22  1539   SODIUM mmol/L 141 146* 149* 150*   POTASSIUM mmol/L 3 7 5 2 4 6 3 0*   CHLORIDE mmol/L 113* 115* 118* 119*   CO2 mmol/L 21 20* 22 25   BUN mg/dL 66* 57* 53* 42*   CREATININE mg/dL 1 72* 1 56* 1 35* 0 94   EGFR ml/min/1 73sq m 34 38 45 70   CALCIUM mg/dL 7 2* 7 6* 7 4* 5 7*   AST U/L 19 29  --  31   ALT U/L 18 22  --  14   ALK PHOS U/L 41* 43*  --  40*     Results from last 7 days   Lab Units 05/22/22  0934 05/21/22  1656 05/18/22  1601 05/18/22  1559 05/17/22  0937 05/17/22  0923   BLOOD CULTURE  Received in Microbiology Lab  Culture in Progress    Received in Microbiology Lab  Culture in Progress  --  No Growth After 4 Days  No Growth After 4 Days  --   --    SPUTUM CULTURE   --   --   --   --  Test not performed  Suggest repeat specimen  --    GRAM STAIN RESULT   --   --   --   --  >10 squamous epithelial cells/lpf, indicating orophayngeal contamination  *  1+ Polys*  2+ Budding Yeast with Pseudomycelia*  Rare Gram positive rods*  --    MRSA CULTURE ONLY   --  No Methicillin Resistant Staphlyococcus aureus (MRSA) isolated  --   --  No Methicillin Resistant Staphlyococcus aureus (MRSA) isolated  --    LEGIONELLA URINARY ANTIGEN   --   --   --   --   --  Negative     Results from last 7 days   Lab Units 05/23/22  0438 05/21/22  0524 05/20/22  0546 05/19/22  0517 05/18/22  0446   PROCALCITONIN ng/ml 2 97* 0 53* 0 76* 0 99* 1 24*                     5/22/22 CT C/A/P predominantly UL groundglass and consolidations, bilateral pleural effusions, SB mild dilation unchanged

## 2022-05-23 NOTE — ASSESSMENT & PLAN NOTE
· S/p cardiac arrest while on 3 pressors noted to have afib with RVR  · Bolused with amio and placed on infusion  · Converted to sinus rhythm on 5/22   · Would d/c amio given likely related to acute stress response as patient has no history of afib  · Continue to monitor on telemetry  · No indication for [facility] at this time given resolved and poor candidate with downtrending hgb

## 2022-05-23 NOTE — ASSESSMENT & PLAN NOTE
· Likely in setting of acute illness and cardiac arrest  · Delirium precautions; regulate sleep/wake cycle, environmental controls, daily CAM ICU   · Trend neuro exam, improving

## 2022-05-23 NOTE — OCCUPATIONAL THERAPY NOTE
OT TREATMENT     05/23/22 1146   Note Type   Note Type Treatment   Restrictions/Precautions   Other Precautions Chair Alarm; Bed Alarm;Multiple lines; Fall Risk;Pain   Pain Assessment   Pain Assessment Tool Boudreaux-Baker FACES   Boudreaux-Baker FACES Pain Rating 2   Pain Location/Orientation Location: Abdomen   Hospital Pain Intervention(s) Repositioned; Emotional support  (Candi, RN aware)   ADL   Where Assessed Supine, bed   Grooming Assistance 2  Maximal Assistance   Grooming Deficit Setup;Verbal cueing;Supervision/safety; Increased time to complete;Wash/dry face;Brushing hair   Bed Mobility   Rolling R 2  Maximal assistance   Additional items Assist x 1;Verbal cues; Increased time required   Rolling L 2  Maximal assistance   Additional items Assist x 1; Increased time required   Supine to Sit Unable to assess   Additional Comments Positioned on left sideying with wedge and pillow supports  ROM- Right Upper Extremities   R Shoulder AAROM; Flexion; Extension;Horizontal ABduction   R Elbow AAROM;Elbow flexion;Elbow extension   R Wrist AAROM;Wrist flexion;Wrist extension   R Hand AAROM; Thumb; Index finger; Long finger;Ring finger;Little finger   R Position Supine   R Weight/Reps/Sets 10 reps each with verbal and physical cues   RUE ROM Comment 1+ edema both hands/wrists and forearms   ROM - Left Upper Extremities    L Shoulder AAROM; Flexion; Extension;Horizontal ABduction   L Elbow AAROM;Elbow extension   L Wrist AAROM;Wrist flexion;Wrist extension   L Hand AAROM; Thumb; Index finger; Long finger;Ring finger;Little finger   L Weight/Reps/Sets 10 reps each with verbal and physical cues   Cognition   Arousal/Participation Cooperative   Attention Attends with cues to redirect   Orientation Level Oriented to person   Memory Unable to assess   Following Commands Follows multistep commands with increased time or repetition   Comments on vent without sedation   Activity Tolerance   Activity Tolerance Patient limited by fatigue; Other (Comment)  (Limited by vent and multiple lines)   Medical Staff Made Aware ARLEN Christopher   Assessment   Assessment Pt not sedated on vent in ICU  Pt able to follow simple 1-2 step directions for BUE exercises and rolling in bed with cues  Opens eyes on command, but otherwise has them usually shut  Able to answer simple yes/no questions appropriately with head shake or nod  The patient's raw score on the AM-PAC Daily Activity LOW FUNCTION inpatient short form is 11, standardized score is 29 0419 45, which is less than 39 4  Patients at this level are likely to benefit from discharge to post-acute rehabilitation services  However, please refer to the recommendation of the Occupational Therapist for safe discharge planning  Pt left in bed with wrist restraints on and music playing  ARLEN Christopher aware  Plan   Treatment Interventions ADL retraining;Functional transfer training;UE strengthening/ROM; Endurance training;Cognitive reorientation;Patient/family training;Equipment evaluation/education; Neuromuscular reeducation; Compensatory technique education; Energy conservation; Activityengagement   Goal Expiration Date 06/06/22   OT Frequency 3-5x/wk   Recommendation   OT Discharge Recommendation Post acute rehabilitation services   AM-PAC Daily Activity Inpatient   Lower Body Dressing 1   Bathing 1   Toileting 1   Upper Body Dressing 1   Grooming 1   Eating 1   Daily Activity Raw Score 6   Turning Head Towards Sound 2   Follow Simple Instructions 3   Low Function Daily Activity Raw Score 11   Low Function Daily Activity Standardized Score 19 45   AM-PAC Applied Cognition Inpatient   Following a Speech/Presentation 2   Understanding Ordinary Conversation 3   Taking Medications 1   Remembering Where Things Are Placed or Put Away 1   Remembering List of 4-5 Errands 1   Taking Care of Complicated Tasks 1   Applied Cognition Raw Score 9   Applied Cognition Standardized Score 22 48   Barthel Index   Feeding 0   Bathing 0   Grooming Score 0 Dressing Score 0   Bladder Score 0   Bowels Score 0   Toilet Use Score 0   Transfers (Bed/Chair) Score 0   Mobility (Level Surface) Score 0   Stairs Score 0   Barthel Index Score 0   Licensure   [place] License Number  Maurice Radha Palumbo [address] 87, OTR/L, [place] Lic# [id number]

## 2022-05-23 NOTE — ASSESSMENT & PLAN NOTE
· Patient previously in ICU for hypoxia and concern for aspiration pneumonitis  Improved and was able to transfer to regular medical floor     · Intubated during cardiac arrest  Do not suspect infectious etiology for cause of arrest  · AC/PC 26/18/50/6  · Wean respiratory rate  · SBT today with goal of extubation  · VAP bundle ordered  · Noted to have pleural effusions on CT scan 5/22 would diurese   · Atrovent/xopenex/mucomyst nebs q8h

## 2022-05-23 NOTE — PROGRESS NOTES
Progress Note - Cardiology   [facility]     Ady Dahl 80 y o  male MRN: [id number]  : 2/15/1931  Unit/Bed#: ICU 02 Encounter: [id number]    Assessment and Plan:   1  Post PEA arrest:  EKG and echocardiogram unchanged post-arrest, question whether arrest was not caused by hypoxemia    -  continue monitor    -  off IV pressors    -  currently weaning ventilator    2  Bowel perforation status post exploratory lap with resection and loop colostomy:  Care per surgical team      3  Volume overload secondary to hypoalbuminemia:  Albumin is 1 6    -  intermittent IV Lasix ordered per primary team    -  patient currently on TPN    4  Paroxysmal atrial fibrillation:  Post-arrest, resolved after receiving IV amiodarone    -  patient off IV pressors now    -  continue monitor telemetry, consider adding low-dose beta-blocker    -  unable to start anticoagulation due to hematuria    5  Acute kidney injury:  Creatinine 1 72 post cardiac arrest    -  will continue monitor    Subjective / Objective:   Patient seen in the intensive care unit staff in the process of attempting weaning from ventilator  Patient chart reviewed and noted that he was at the [facility] for an elective colonoscopy on , postprocedure he experienced increasing left lower quadrant pain for which she underwent is CT scan was found to have free air and a pneumoperitoneum  Patient was transferred to the [facility] and underwent emergent exploratory lap with resection and loop colostomy creation  Postop was complicated with postop ileus, requiring TPN, hypoxemia with transfer to the ICU from -  Patient was transferred back to general medical-surgical floor on   At 1421 on  patient was found to be unresponsive, without pulse and code blue was called  He was intubated and ROSC was achieved with the use of 3 pressors  Patient was transferred back to the intensive care unit    Unclear of the etiology of his PEA arrest     Post-arrest echocardiogram demonstrates EF of 60-65% and is unchanged from previous  There is mild aortic stenosis and mild tricuspid valve regurgitation which is also unchanged    Vitals: Blood pressure 111/54, pulse 71, temperature 98 24 °F (36 8 °C), temperature source Bladder, resp  rate (!) 32, height 5' 4" (1 626 m), weight 72 1 kg (159 lb), SpO2 96 %  Vitals:    05/23/22 0439 05/23/22 0724   Weight: 72 3 kg (159 lb 6 3 oz) 72 1 kg (159 lb)     Body mass index is 27 29 kg/m²    BP Readings from Last 3 Encounters:   05/23/22 111/54   05/11/22 141/69   03/25/22 152/76     Orthostatic Blood Pressures    Flowsheet Row Most Recent Value   Blood Pressure 111/54 filed at 05/23/2022 1200   Patient Position - Orthostatic VS Lying filed at 05/23/2022 1200        I/O       05/21 0701  05/22 0700 05/22 0701  05/23 0700 05/23 0701  05/24 0700    I V  (mL/kg) 1389 6 (19 8) 807 3 (11 2) 69 9 (1)    Blood 350      NG/GT   110    IV Piggyback 450 900 150     1 1844 9     Total Intake(mL/kg) 2928 7 (41 7) 3552 3 (49 1) 329 9 (4 6)    Urine (mL/kg/hr) 295 (0 2) 1080 (0 6) 225 (0 5)    Emesis/NG output 30 1200 300    Stool 300 800 75    Total Output 625 3080 600    Net +2303 7 +472 3 -270 1           Unmeasured Urine Occurrence 1 x          Invasive Devices  Report    Peripherally Inserted Central Catheter Line  Duration           PICC Line 05/36/68 Right Basilic 4 days          Central Venous Catheter Line  Duration           CVC Central Lines 05/21/22 Triple 16cm 1 day          Drain  Duration           Colostomy LLQ 12 days    NG/OG/Enteral Tube Orogastric 16 Fr Center mouth 2 days    Urethral Catheter Double-lumen 16 Fr  2 days          Airway  Duration           ETT  Oral 1 day                  Intake/Output Summary (Last 24 hours) at 5/23/2022 1336  Last data filed at 5/23/2022 1213  Gross per 24 hour   Intake 3232 13 ml   Output 3680 ml   Net -447 87 ml         Physical Exam: Physical Exam  Vitals reviewed  Constitutional:       General: He is not in acute distress  Appearance: He is obese  Interventions: He is sedated  HENT:      Right Ear: External ear normal       Left Ear: External ear normal    Eyes:      General: No scleral icterus  Right eye: No discharge  Left eye: No discharge  Cardiovascular:      Rate and Rhythm: Normal rate and regular rhythm  Pulses: Normal pulses  Pulmonary:      Effort: Pulmonary effort is normal  No respiratory distress  Breath sounds: Normal breath sounds  Abdominal:      General: Bowel sounds are normal  There is no distension  Palpations: Abdomen is soft  Musculoskeletal:      Right lower leg: No edema  Left lower leg: No edema  Skin:     General: Skin is warm and dry  Capillary Refill: Capillary refill takes less than 2 seconds  Neurological:      General: No focal deficit present  Mental Status: He is alert  Mental status is at baseline                   Medications/ Allergies:     Current Facility-Administered Medications   Medication Dose Route Frequency Provider Last Rate    acetaminophen  650 mg Oral Q6H PRN Naoma Lio, CRNP      acetylcysteine  3 mL Nebulization Q8H Naoma Lio, CRNP      Adult TPN (CUSTOM BASE/CUSTOM ELECTROLYTE)   Intravenous Continuous TPN Omega Heath PA-C 53 4 mL/hr at 05/23/22 0720    Adult TPN (CUSTOM BASE/CUSTOM ELECTROLYTE)   Intravenous Continuous TPN Clara Tripathi PA-C      cefepime  2,000 mg Intravenous Q12H Naoma Lio, CRNP 2,000 mg (05/23/22 1126)    chlorhexidine  15 mL Mouth/Throat Q12H [address] Naoma Lio, CRNP      heparin (porcine)  5,000 Units Subcutaneous Q12H [address] Naoma Lio, CRNP      HYDROmorphone  0 5 mg Intravenous Q4H PRN Naoma Lio, CRNP      insulin regular (HumuLIN R,NovoLIN R) infusion  0 3-21 Units/hr Intravenous Titrated Steff Little PA-C 6 Units/hr (05/23/22 1212)    iohexol  50 mL Oral Once in imaging Anastacoi Dmrier, CRNP      ipratropium-albuterol  3 mL Nebulization Q6H PRN Anastacio Dmmary, CRNP      ipratropium-albuterol  3 mL Nebulization Q8H Anastacio Dmrier, CRNP      levothyroxine  112 mcg Per NG Tube Early Morning Anastacio Dmmary, CRNP      lidocaine  2 patch Topical Daily Cjus Knott [place]      metroNIDAZOLE  500 mg Intravenous Q8H Anastacio Go, CRNP Stopped (05/23/22 0955)    ondansetron  4 mg Intravenous Q6H PRN Anastacio Abbi, CRNP      oxyCODONE  2 5 mg Per NG Tube Q4H PRN Anastacio Dmmary, CRNP      oxyCODONE  5 mg Per NG Tube Q4H PRN Anastacio Dmmary, CRNP      pantoprazole  40 mg Intravenous Q24H [address] Anastacio Dmrier, CRNP       acetaminophen, 650 mg, Q6H PRN  HYDROmorphone, 0 5 mg, Q4H PRN  iohexol, 50 mL, Once in imaging  ipratropium-albuterol, 3 mL, Q6H PRN  ondansetron, 4 mg, Q6H PRN  oxyCODONE, 2 5 mg, Q4H PRN  oxyCODONE, 5 mg, Q4H PRN      No Known Allergies    VTE Pharmacologic Prophylaxis:   Sequential compression device (Venodyne)     Labs:   Troponins:  Results from last 7 days   Lab Units 05/22/22  1302 05/21/22 2018   HSTNI D2 ng/L -36  --    HSTNI D4 ng/L  --  87*       CBC with diff:  Results from last 7 days   Lab Units 05/23/22  0438 05/22/22  0524 05/21/22  2214 05/21/22  1539 05/21/22  0524 05/20/22  0546 05/19/22  0517 05/18/22  0446   WBC Thousand/uL 21 01* 23 05*  --  21 74* 23 58* 18 87* 13 94* 15 24*   HEMOGLOBIN g/dL 7 2* 8 5* 9 7* 6 2* 8 2* 8 5* 8 4* 8 3*   HEMATOCRIT % 20 4* 25 4*  --  18 3* 24 7* 25 5* 23 7* 24 1*   MCV fL 97 99*  --  105* 106* 105* 99* 103*   PLATELETS Thousands/uL 243 273  --  231 326 335 314 272   MCH pg 34 1 33 1  --  34 3 35 0* 35 3* 35 0* 35 5*   MCHC g/dL 35 3 33 5  --  32 8 33 2 33 3 35 4 34 4   RDW % 19 2* 20 8*  --  17 8* 17 9* 17 4* 16 4* 17 0*   MPV fL 12 2 12 3  --  11 6 11 3 11 3 11 2 10 9   NRBC /100 WBC  --   --   --   --  2  --   --   --      CMP:  Results from last 7 days   Lab Units 05/23/22  0438 05/22/22  0524 05/21/22 2214 05/21/22  1539 05/21/22  0524 05/20/22  0546 05/19/22  0517 05/18/22  0446 05/17/22  0630   SODIUM mmol/L 141 146* 149* 150* 147* 148* 144 141 142   POTASSIUM mmol/L 3 7 5 2 4 6 3 0* 3 5 3 3* 3 7 4 0 3 8   CHLORIDE mmol/L 113* 115* 118* 119* 116* 117* 114* 111* 111*   CO2 mmol/L 21 20* 22 25 27 26 25 24 25   ANION GAP mmol/L 7 11 9 6 4 5 5 6 6   BUN mg/dL 66* 57* 53* 42* 45* 48* 50* 37* 28*   CREATININE mg/dL 1 72* 1 56* 1 35* 0 94 1 03 1 14 1 20 0 96 0 93   CALCIUM mg/dL 7 2* 7 6* 7 4* 5 7* 7 5* 7 4* 7 7* 7 6* 7 4*   AST U/L 19 29  --  31  --   --   --  12 12   ALT U/L 18 22  --  14  --   --   --  13 11*   ALK PHOS U/L 41* 43*  --  40*  --   --   --  43* 47   TOTAL PROTEIN g/dL 4 8* 5 1*  --  4 3*  --   --   --  5 8* 5 8*   ALBUMIN g/dL 1 6* 1 8*  --  1 5*  --   --   --  2 2* 2 2*   TOTAL BILIRUBIN mg/dL 0 96 1 11*  --  0 48  --   --   --  0 71 0 68   EGFR ml/min/1 73sq m 34 38 45 70 63 55 52 68 71     Magnesium:  Results from last 7 days   Lab Units 05/23/22  0438 05/22/22  0524 05/21/22 2214 05/21/22  1539 05/21/22  0524 05/20/22  0546 05/19/22  0517   MAGNESIUM mg/dL 2 4 2 3 2 3 2 0 2 6 2 8* 2 8*     Lipid Profile:  Results from last 7 days   Lab Units 05/22/22  0524   TRIGLYCERIDES mg/dL 145     Hgb A1c:  Results from last 7 days   Lab Units 05/18/22  0446   HEMOGLOBIN A1C % 5 3       Imaging & Testing   I have personally reviewed pertinent reports  EGD    Result Date: 5/11/2022  Narrative: JOSE [facility] [address] [facility] [phone] [place] 89 OF SERVICE: 5/11/22 PHYSICIAN(S): Attending: Vandana Crabtree MD Fellow: No Staff Documented Procedure  :  EGD with biopsies INDICATION: Nausea, Family history of stomach cancer POST-OP DIAGNOSIS: See the impression below  PREPROCEDURE: Informed consent was obtained for the procedure, including sedation  Risks of perforation, hemorrhage, adverse drug reaction and aspiration were discussed  The patient was placed in the left lateral decubitus position  Patient was explained about the risks and benefits of the procedure  Risks including but not limited to bleeding, infection, and perforation were explained in detail  Also explained about less than 100% sensitivity with the exam and other alternatives  DETAILS OF PROCEDURE: Patient was taken to the procedure room where a time out was performed to confirm correct patient and correct procedure  The patient underwent monitored anesthesia care, which was administered by an anesthesia professional  The patient's blood pressure, heart rate, level of consciousness, respirations and oxygen were monitored throughout the procedure  The scope was advanced to the second part of the duodenum  Retroflexion was performed in the fundus  Prior to the procedure, the patient's H  Pylori status was unknown  The patient experienced no blood loss  The procedure was not difficult  The patient tolerated the procedure well  There were no apparent complications  ANESTHESIA INFORMATION: ASA: III Anesthesia Type: IV Sedation with Anesthesia MEDICATIONS: No administrations occurring from 1356 to 1417 on 05/11/22 FINDINGS: Moderate, localized erythematous mucosa in the antrum; performed cold forceps biopsy to rule out H  pylori The upper third of the esophagus, middle third of the esophagus, lower third of the esophagus, GE junction, duodenal bulb, 1st part of the duodenum and 2nd part of the duodenum appeared normal  Performed random biopsy using biopsy forceps to rule out Cabrera's esophagus  SPECIMENS: ID Type Source Tests Collected by Time Destination 1 : antrum Tissue Stomach TISSUE EXAM Diana Wills MD 5/11/2022  2:04 PM  2 : lower Tissue Esophagus TISSUE EXAM Diana Wills MD 5/11/2022  2:05 PM  3 : sigmoid Tissue Polyp, Colorectal TISSUE EXAM Diana Wills MD 5/11/2022  2:14 PM      Impression: 1  Mild erythema in the antrum 2   Normal esophagus, normal GE junction and normal duodenum RECOMMENDATION: Await pathology results Follow up with me in clinic Anti-reflux diet   Martha Woods MD     Colonoscopy    Result Date: 5/11/2022  Narrative: JOSE Wang [address] [facility] [phone] Summer Hartley 89 OF SERVICE: 5/11/22 PHYSICIAN(S): Attending: Martha Woods MD Fellow: No Staff Documented Procedure : Incomplete colonoscopy INDICATION: Diverticulosis, Left lower quadrant pain, Colonic intussusception ([address] ), Family history of colon cancer, Adenomatous polyp of colon, unspecified part of colon POST-OP DIAGNOSIS: See the impression below  HISTORY: Prior colonoscopy: 10 years ago  BOWEL PREPARATION: Miralax/Dulcolax PREPROCEDURE: Informed consent was obtained for the procedure, including sedation  Risks including but not limited to bleeding, infection, perforation, adverse drug reaction and aspiration were explained in detail  Also explained about less than 100% sensitivity with the exam and other alternatives  The patient was placed in the left lateral decubitus position  DETAILS OF PROCEDURE: Patient was taken to the procedure room where a time out was performed to confirm correct patient and correct procedure  The patient underwent monitored anesthesia care, which was administered by an anesthesia professional  The patient's blood pressure, heart rate, level of consciousness, oxygen and respirations were monitored throughout the procedure  A digital rectal exam was performed  The scope was introduced through the anus and advanced to the sigmoid colon  Retroflexion was performed in the rectum  The quality of bowel preparation was evaluated using the [facility] Bowel Preparation Scale with scores of: right colon = not assessed, transverse colon = not assessed, left colon = 1  The total BBPS score was 1  Bowel prep was not adequate  The patient experienced no blood loss  The procedure was not difficult   The patient tolerated the procedure well  There were no apparent complications  ANESTHESIA INFORMATION: ASA: III Anesthesia Type: IV Sedation with Anesthesia MEDICATIONS: No administrations occurring from 1356 to 1425 on 05/11/22 FINDINGS: Renetta Cardenas kink in the sigmoid colon, it was very difficult to pass the scope, sigmoid polyp which was removed with cold snare polypectomy  Extensive diverticulosis, ultra thin scope was used,  but I see the peritoneum, procedure was aborted  EVENTS: Procedure Events Event Event Time ENDO SCOPE OUT TIME 5/11/2022  2:24 PM SPECIMENS: ID Type Source Tests Collected by Time Destination 1 : antrum Tissue Stomach TISSUE EXAM Chaim Palafox MD 5/11/2022  2:04 PM  2 : lower Tissue Esophagus TISSUE EXAM Chaim Palafox MD 5/11/2022  2:05 PM  3 : sigmoid Tissue Polyp, Colorectal TISSUE EXAM Chaim Palafox MD 5/11/2022  2:14 PM  EQUIPMENT: Colonoscope -Q180AL Colonoscope -[id number]     Impression: 1  Sharp kink in the sigmoid colon with stricture, scope was unable to pass  Ultra thin scope was use, small sigmoid polyp removed  2  Possible perforation in sigmoid colon RECOMMENDATION: Stat CT scan abdomen and pelvis Surgical consult, discuss case with Dr Chanel Wilson MD     CT abdomen pelvis wo contrast    Result Date: 5/11/2022  Narrative: CT ABDOMEN AND PELVIS WITHOUT IV CONTRAST INDICATION:   Bowel obstruction suspected possible bowel perofration  COMPARISON:  None  TECHNIQUE:  CT examination of the abdomen and pelvis was performed without intravenous contrast  This examination was performed without intravenous contrast in the context of the critical nationwide Omin[place]que shortage  Axial, sagittal, and coronal 2D reformatted images were created from the source data and submitted for interpretation  Radiation dose length product (DLP) for this visit:  350 2 mGy-cm     This examination, like all CT scans performed in the [facility], was performed utilizing techniques to minimize radiation dose exposure, including the use of iterative reconstruction and automated exposure control  Enteric contrast was NOT administered  FINDINGS: ABDOMEN LOWER CHEST:  No clinically significant abnormality identified in the visualized lower chest  LIVER/BILIARY TREE:  Unremarkable  GALLBLADDER:  There are tiny punctate gallstone(s) within the gallbladder  SPLEEN:  Unremarkable  PANCREAS:  Unremarkable  ADRENAL GLANDS:  Unremarkable  KIDNEYS/URETERS:  Small right lower pole circumscribed subcentimeter renal hypodensity is present, too small to accurately characterize, and statistically most likely benign finding  According to recent literature (Radiology 2019) no further workup of this finding is recommended  STOMACH AND BOWEL:  There is colonic diverticulosis without evidence of acute diverticulitis  APPENDIX:  No findings to suggest appendicitis  ABDOMINOPELVIC CAVITY: Moderate pneumoperitoneum  This can be seen anterior to the liver and deep to the anterior abdominal wall  No ascites  No enlarged adenopathy  VESSELS:  Atherosclerotic changes are present  No evidence of aneurysm  PELVIS REPRODUCTIVE ORGANS:  The prostate is enlarged  URINARY BLADDER:  Unremarkable  ABDOMINAL WALL/INGUINAL REGIONS:  Unremarkable  OSSEOUS STRUCTURES:  Bones are demineralized  Multilevel vertebroplasties and compression fractures of indeterminate age  Impression: Moderate pneumoperitoneum in keeping with bowel perforation  I personally discussed this study with DAVID Sy on 5/11/2022 at 3:42 PM  Workstation performed: [id number]     CT chest abdomen pelvis wo contrast    Result Date: 5/22/2022  Narrative: CT CHEST, ABDOMEN AND PELVIS WITHOUT IV CONTRAST INDICATION:   Cardiac arrest, history of recent surgery   COMPARISON:  May 17, 2022 TECHNIQUE: CT examination of the chest, abdomen and pelvis was performed without intravenous contrast  This examination was performed without intravenous contrast in the context of the critical nationwide Omnipaque shortage  Axial, sagittal, and coronal 2D reformatted images were created from the source data and submitted for interpretation  Radiation dose length product (DLP) for this visit:  920 mGy-cm   This examination, like all CT scans performed in the [facility], was performed utilizing techniques to minimize radiation dose exposure, including the use of iterative reconstruction and automated exposure control  Enteric contrast was administered  FINDINGS: CHEST LUNGS:  Similar-appearing patchy mixed groundglass and consolidative changes are seen in the lungs bilaterally predominantly in the upper lobes  Bilateral lobe compressive atelectasis again noted  PLEURA:  Moderate size bilateral pleural effusions similar to slightly increased in size from prior study  HEART/GREAT VESSELS: Heart is unremarkable for patient's age  No thoracic aortic aneurysm  MEDIASTINUM AND SRI:  Unremarkable  CHEST WALL AND LOWER NECK:  Unremarkable  ABDOMEN LIVER/BILIARY TREE:  Unremarkable  GALLBLADDER:  No calcified gallstones  No pericholecystic inflammatory change  SPLEEN:  Unremarkable  PANCREAS:  Unremarkable  ADRENAL GLANDS:  Unremarkable  KIDNEYS/URETERS:  Right lower pole renal cyst  STOMACH AND BOWEL:  Air-fluid levels are identified within mildly dilated segments of small bowel measuring up to 3 7 cm    APPENDIX:  No findings to suggest appendicitis  ABDOMINOPELVIC CAVITY:  No ascites  No pneumoperitoneum  No lymphadenopathy  VESSELS:  Unremarkable for patient's age  PELVIS REPRODUCTIVE ORGANS:  Unremarkable for patient's age  URINARY BLADDER:  Farnsworth catheter noted within the decompressed urinary bladder  ABDOMINAL WALL/INGUINAL REGIONS:  Unremarkable  OSSEOUS STRUCTURES:  There are innumerable compression deformities in the thoracic and lumbar spine which appear chronic  Evidence of multilevel vertebroplasty  Impression: 1  Stable appearance of mixed groundglass and consolidative abnormalities in the lungs bilaterally predominantly affecting the upper lobes suspicious for multilobar pneumonia  Superimposed pulmonary edema not excluded  2   Similar to slight increase size of moderate bilateral pleural effusions  3   Diffuse mild dilation of small bowel segments identified without transition point  Workstation performed: [id number]     CT chest abdomen pelvis wo contrast    Result Date: 5/18/2022  Narrative: CT CHEST, ABDOMEN AND PELVIS WITHOUT IV CONTRAST INDICATION:   Intra-abdominal abscess R/O intra-abdominal collection  COMPARISON:  CTA chest on 5/15/2022 and CT abdomen and pelvis on 5/11/2022  TECHNIQUE: CT examination of the chest, abdomen and pelvis was performed without intravenous contrast  This examination was performed without intravenous contrast in the context of the critical nationwide Omnipaque shortage  Axial, sagittal, and coronal 2D reformatted images were created from the source data and submitted for interpretation  Radiation dose length product (DLP) for this visit:  523 41 mGy-cm   This examination, like all CT scans performed in the [facility], was performed utilizing techniques to minimize radiation dose exposure, including the use of iterative  reconstruction and automated exposure control  Enteric contrast was administered  FINDINGS: CHEST LUNGS:  Worsening patchy mixed groundglass and consolidative change bilaterally, most prominent in the bilateral upper lobes  Bilateral lower lobe compressive atelectasis  There is septal thickening  There is no tracheal or endobronchial lesion  PLEURA:  Moderate left and small right pleural effusions, increased since the prior study  HEART/GREAT VESSELS: Heart is unremarkable for patient's age  Aortic valvular calcifications  Coronary artery calcifications  No thoracic aortic aneurysm   MEDIASTINUM AND SRI:  Mildly distention of the mid to distal esophagus with enteric contrast material   No mediastinal lymphadenopathy  CHEST WALL AND LOWER NECK:  Unremarkable  ABDOMEN LIVER/BILIARY TREE:  Unremarkable  GALLBLADDER:  There is a tiny calcified gallstone within the gallbladder, without pericholecystic inflammatory changes  SPLEEN:  Unremarkable  PANCREAS:  Unremarkable  ADRENAL GLANDS:  Unremarkable  KIDNEYS/URETERS:  No hydronephrosis or urinary tract calculus  One or more sharply circumscribed subcentimeter renal hypodensities are present, too small to accurately characterize, and statistically most likely benign findings  According to recent literature (Radiology 2019) no further workup of these findings is recommended  STOMACH AND BOWEL:  Postsurgical changes of low anterior resection with creation of loop colostomy in the anterior left upper abdomen  Small bowel anastomosis in the anterior lower abdomen deep to the midline incision  Multiple distended small bowel loops measuring up to 3 5 cm in diameter  No clear transition point is identified  Oral contrast has reached mid small bowel  Enteric tube is present with sidehole visualized near the gastroesophageal junction  APPENDIX:  No findings to suggest appendicitis  ABDOMINOPELVIC CAVITY:  Trace presacral fluid  No pneumoperitoneum  No lymphadenopathy  VESSELS:  Atherosclerotic changes are present  No evidence of aneurysm  PELVIS REPRODUCTIVE ORGANS:  Unremarkable for patient's age  URINARY BLADDER:  Decompressed with a Farnsworth catheter in place  Moderate amount of air in the bladder  ABDOMINAL WALL/INGUINAL REGIONS:  Postsurgical changes in the ventral wall with midline incision noted  Loop colostomy in the anterior left upper abdomen  Mild anasarca  OSSEOUS STRUCTURES:  No acute fracture or destructive osseous lesion  Multilevel vertebroplasty and multilevel age-indeterminate compression fractures in the thoracolumbar spine are redemonstrated  Impression: 1    Worsening patchy mixed groundglass and consolidative change bilaterally compatible with multifocal pneumonia and/or pulmonary edema  2   Increased bilateral pleural effusions  3   Sidehole of enteric tube is visualized near the gastroesophageal junction  Consider tube advancement  4   Mild distention of mid to distal esophagus with enteric contrast material   Correlate for gastroesophageal reflux  5   Multiple dilated small bowel loops without a clear transition point  The finding may reflect ileus, however developing small bowel obstruction cannot be excluded  Follow-up is recommended  6   Moderate amount of air within the urinary bladder, likely related to instrumentation  Correlate with urinalysis to exclude cystitis  I personally discussed this study with Andrew Cherry on 5/18/2022 at 1:11 AM  Workstation performed: [id number]     XR chest portable    Result Date: 5/17/2022  Narrative: CHEST INDICATION:   NGT placement    COMPARISON:  Chest radiograph and CT May 15, 2022 EXAM PERFORMED/VIEWS:  XR CHEST PORTABLE FINDINGS:  Tip of enteric tube is at the expected position of the gastroesophageal junction, and sidehole is at the expected position of the esophageal hiatus  Cardiomediastinal silhouette appears unremarkable  Opacities in the bilateral upper lobes have slightly increased in density  Now trace bilateral pleural effusions have decreased since prior study  No pneumothorax  Kyphoplasty material at multiple levels  Impression: 1  Tip of enteric tube is at the expected position of the gastroesophageal junction  Slight advancement is advised  2   Slight increased predominant upper lobe opacities, likely representing pneumonia  Concomitant edema is possible  The now trace bilateral pleural effusions have decreased in size  The study was marked in [facility] for immediate notification  Workstation performed: [id number]     XR chest portable    Result Date: 5/15/2022  Narrative: CHEST INDICATION:   coarse breath sounds  COMPARISON:  None EXAM PERFORMED/VIEWS:  XR CHEST PORTABLE FINDINGS: Heart is mildly enlarged  There is aortic knob calcification  There is central patchy airspace disease bilaterally consistent with pulmonary edema  There are small bilateral pleural effusions  Status post multilevel vertebroplasty  Impression: Pulmonary edema and small bilateral pleural effusions  Workstation performed: [id number]     XR abdomen 1 view kub    Result Date: 5/22/2022  Narrative: ABDOMEN INDICATION:   f/u ileus  COMPARISON:  Compared with 5/18/2022 VIEWS:  AP supine FINDINGS: Feeding tube in the upper abdomen  Contrast in the colon  Prominent gas-filled small bowel loops throughout the abdomen  No discernible free air on this supine study  Upright or left lateral decubitus imaging is more sensitive to detect subtle free air in the appropriate setting  No pathologic calcifications or soft tissue masses  Visualized lung bases are clear  Prior kyphoplasty changes in the thoracolumbar region  Impression: Slight worsening of the prominent dilated small bowel loops  Contrast in the colon  Workstation performed: [id number]     XR abdomen 1 view kub    Result Date: 5/22/2022  Narrative: ABDOMEN INDICATION:   abdominal distention  COMPARISON:  Compared with 5/21/2022 VIEWS:  AP supine FINDINGS: Feeding tube tip in the upper abdomen  Diffuse gas distended small bowel loops throughout the abdomen  Contrast in the colon suggested as seen on prior study  No discernible free air on this supine study  Upright or left lateral decubitus imaging is more sensitive to detect subtle free air in the appropriate setting  No pathologic calcifications or soft tissue masses  Visualized lung bases are clear  Visualized osseous structures are unremarkable for the patient's age       Impression: Persistent gas distended small bowel loops with normal caliber colon with contrast  Workstation performed: [id number]     XR abdomen 1 view kub    Result Date: 5/18/2022  Narrative: ABDOMEN INDICATION:   Contrast follow through  COMPARISON:  CT performed May 17, 2022 VIEWS:  AP supine FINDINGS: Air and contrast filled mildly distended loops of bowel seen throughout the abdomen in a nonspecific pattern suspicious for bowel obstruction  Contrast does not appear to have reached the large bowel  An enteric catheter tip overlies the expected location of the cavoatrial junction and has not quite reached the stomach  Advancement by approximately 8 to 10 cm recommended  Small costophrenic angle effusions with overlying airspace opacity  Osteopenia with multilevel compression fractures and vertebroplasty is noted  Impression: Radiographic appearance suspicious for early or mild distal small bowel obstruction  Alternatively, this could represent ileus  Enteric contrast administered for the May 17, 2022 examination does not appear to have reached the large bowelAn  enteric catheter tip overlies the expected location of the cavoatrial junction and has not quite reached the stomach  Advancement by approximately 8 to 10 cm recommended  This examination was marked "significant notification" in Epic in order to begin the standard process by which the radiology reading room liaison alerts the referring practitioner  Workstation performed: [id number]     XR abdomen 1 view kub    Result Date: 5/16/2022  Narrative: ABDOMEN INDICATION:   Evaluation of bowel gas pattern  Recent pneumoperitoneum seen by CT concerning for bowel perforation  Review of chart shows history of low anterior resection, protective loop transverse colostomy and segmental small bowel resection to treat perforated rectosigmoid junction  COMPARISON:  CT of the abdomen/pelvis dated 5/11/2022  VIEWS:  AP supine FINDINGS: Dilated loops of small bowel scattered throughout the abdomen measuring up to 3 9 cm in caliber and left lower quadrant  No evident pneumatosis   Some gas does appear to progress to the cecum and ascending colon  Rectosigmoid chain sutures are evident  Small amount of gas in the residual rectal pouch  No discernible free air on this supine study  Upright or left lateral decubitus imaging is more sensitive to detect subtle free air in the appropriate setting  No pathologic calcifications or soft tissue masses  Chronic atelectasis/scarring at the lung bases  No acute findings of the visualized lower chest  Prior vertebral plasties  Thoracolumbar spondylosis  No acute osseous abnormalities  Impression: Diffusely distended, dilated small bowel loops with small amount of persistent air extending to the proximal colon  Findings may suggest diffuse ileus or partial obstruction  Workstation performed: [id number]     XR chest portable ICU    Result Date: 5/22/2022  Narrative: CHEST INDICATION:   R  IJ TLC placement  COMPARISON:  Compared with 5/21/2022 EXAM PERFORMED/VIEWS:  XR CHEST PORTABLE ICU FINDINGS:  Endotracheal tube above the chriss  Right neck catheter in the distal SVC  PICC line catheter tip in the cavoatrial region  Feeding tube below the diaphragm  Cardiomediastinal silhouette appears unremarkable  Patchy groundglass parenchymal infiltrates predominantly in the right upper lobe and left lower lobe with slight worsening  No pneumothorax  No pneumothorax or pleural effusion  Osseous structures appear within normal limits for patient age  Impression: Right neck catheter in the distal SVC Workstation performed: [id number]     XR chest portable ICU    Result Date: 5/22/2022  Narrative: CHEST INDICATION:   hypoxia  COMPARISON:  Compared with 5/21/2022 EXAM PERFORMED/VIEWS:  XR CHEST PORTABLE ICU FINDINGS:  Endotracheal tube above the chriss  Right-sided PICC line tip in the cavoatrial region  Feeding tube below the diaphragm  Cardiomediastinal silhouette appears unremarkable  Poor inspiration  Diffuse prominent interstitial markings    Patchy parenchymal groundglass change , more on the right  No pneumothorax  No large effusion  Osseous structures appear within normal limits for patient age  Impression: Improved pneumomediastinum  Bilateral groundglass infiltrative changes  Workstation performed: [id number]     XR chest portable ICU    Result Date: 5/21/2022  Narrative: CHEST INDICATION:   intubation  COMPARISON:  May 17, 2022 EXAM PERFORMED/VIEWS:  XR CHEST PORTABLE ICU FINDINGS:  Transcutaneous pacer pads and numerous EKG leads overlie the patient  An endotracheal tube is present with its tip approximately 2 5 cm above the chriss  An enteric tube is present with its tip extending to below the inferior margin of this film in the epigastric region, presumably over the stomach but not seen  There is pneumomediastinum best visualized along the left heart border with lucency measures up to an estimated thickness of 8 mm  Patchy multifocal groundglass airspace opacities appear slightly progressed when compared to May 17, 2022  Trace costophrenic angle effusions  No evidence for pneumothorax  Kyphoplasty is at multiple levels in the lower thoracic and upper lumbar spine  No evidence for acute osseous abnormality        Impression: Pneumomediastinum  Endotracheal tube tip above the level of the chriss by 2 5 cm  Enteric tube tip overlying the epigastric region below the inferior margin of this film  Slight progression of multifocal groundglass pulmonary parenchymal airspace opacities  Trace costophrenic angle effusions, unchanged  This examination was marked "immediate notification" in Epic in order to begin the standard process by which the radiology reading room liaison alerts the referring practitioner  Workstation performed: [id number]     CTA chest pe study    Result Date: 5/15/2022  Narrative: CTA - CHEST WITH IV CONTRAST - PULMONARY ANGIOGRAM INDICATION:   hypoxic  COMPARISON: None   TECHNIQUE: CTA examination of the chest was performed using angiographic technique according to a protocol specifically tailored to evaluate for pulmonary embolism  Axial, sagittal, and coronal 2D reformatted images were created from the source data and  submitted for interpretation  In addition, coronal 3D MIP postprocessing was performed on the acquisition scanner  Radiation dose length product (DLP) for this visit:  479 mGy-cm   This examination, like all CT scans performed in the [facility], was performed utilizing techniques to minimize radiation dose exposure, including the use of iterative reconstruction and automated exposure control  IV Contrast:  70 mL of iohexol (OMNIPAQUE)  FINDINGS: PULMONARY ARTERIAL TREE:  No pulmonary embolus is seen  LUNGS:  Patchy groundglass densities bilaterally upper lobes, right greater than left Bilateral lower lobe consolidation compatible with compressive atelectasis  There is no tracheal or endobronchial lesion  PLEURA:  Small bilateral effusions  HEART/GREAT VESSELS:  Normal heart size  Aortic valvular calcifications  Coronary artery calcifications  Normal caliber thoracic aorta with atherosclerotic calcifications  No thoracic aortic aneurysm  MEDIASTINUM AND SRI:  Unremarkable  CHEST WALL AND LOWER NECK:   Unremarkable  VISUALIZED STRUCTURES IN THE UPPER ABDOMEN:  Unremarkable  OSSEOUS STRUCTURES:  No acute fracture or destructive osseous lesion  Age indeterminate moderate T5 compression deformity  Multiple old compression deformities and vertebroplasties in the lower thoracic spine  Impression: No pulmonary embolus  Bilateral upper lobe groundglass opacities compatible with pneumonia and/or pulmonary edema  Small bilateral pleural effusions   Workstation performed: [id number]     IR PICC line placement double lumen    Result Date: 5/18/2022  Narrative: PERCUTANEOUSLY INSERTED CENTRAL VENOUS CATHETER PLACEMENT NG TUBE PLACEMENT WITH FLUOROSCOPY HISTORY: TPN FLUORO TIME: 5 9 min NUMBER OF IMAGES: 3 6 PROCEDURE:  The patient was brought to the Interventional Radiology Suite and identified verbally and by wrist band  The right basilic vein was evaluated as a potential access site with ultrasound  The vessel was found to be patent and compressible  The site was prepped and draped in the usual sterile fashion  All elements of maximal sterile barrier technique, cap and mask and sterile gown and sterile gloves and sterile full-body drape and hand hygiene and 2% chlorhexidine for cutaneous antisepsis  Sterile ultrasound technique with sterile gel and sterile probe covers was also utilized  Lidocaine was administered to the skin and a small skin incision was made  Under ultrasound guidance, utilizing sterile ultrasound technique with sterile gel and a sterile probe cover, the right basilic vein was accessed using single wall Seldinger technique  Static images of real time needle entry into the vessel were obtained  This was followed by a  018 guidewire and a sheath and dilator tapered to   018  A 5-Lebanese central venous catheter was then advanced under fluoroscopic guidance to the cavoatrial junction  The catheter was cut at 41 cm with 0 cm external length  The position was confirmed fluoroscopically  Blood could be freely aspirated and heparinized saline injected  Patient experienced no untoward reactions  Impression: 1  Status post placement of a 5-Lebanese percutaneously inserted central venous catheter via the right basilic vein with its tip at the cavoatrial junction under ultrasound and fluoroscopic guidance  NG tube placement Next using fluoroscopic guidance, an NG tube was placed down into the stomach past the narrowing at the GE junction  2 stiff wires were required to advance this  Impression successful NG tube placement with its tip in the stomach  The tube may be used immediately   Workstation performed: [id number]     Echo complete w/ contrast if indicated    Result Date: 5/16/2022  Narrative: Toby Middleton  Left Ventricle: Left ventricular cavity size is normal  Wall thickness is mild to moderately increased  Systolic function is normal   Estimated ejection fraction is 65%  Wall motion is normal    Left Atrium: The atrium is mildly dilated    Aortic Valve: The aortic valve is trileaflet  The leaflets are moderately thickened  The leaflets are mildly calcified  The leaflets exhibit normal mobility  There is trace regurgitation    Mitral Valve: There is mild regurgitation    Tricuspid Valve: There is mild regurgitation  The right ventricular systolic pressure is mildly elevated at 45 mmHg    Pulmonic Valve: There is trace regurgitation  EKG / Monitor: Personally reviewed  Sinus rhythm        Yoel Patel, [facility]  Cardiology      "This note has been constructed using a voice recognition system  Therefore there may be syntax, spelling, and/or grammatical errors   Please call if you have any questions  "

## 2022-05-23 NOTE — ASSESSMENT & PLAN NOTE
Wt Readings from Last 3 Encounters:   05/23/22 72 3 kg (159 lb 6 3 oz)   05/11/22 62 1 kg (136 lb 14 4 oz)   03/25/22 61 2 kg (135 lb)     · 5/16: Echo-EF 65%, mild TR, mild MR  · Monitor I&O  · Daily weights   · Consider some diuresis today

## 2022-05-23 NOTE — CASE MANAGEMENT
Case Management Progress Note    Patient name Lizette Panchal  Location ICU 02/ICU 02 MRN [id number]  : 2/15/1931 Date 2022       LOS (days): 12  Geometric Mean LOS (GMLOS) (days): 10 30  Days to GMLOS:-1 6        OBJECTIVE:     Current admission status: Inpatient  Preferred Pharmacy:   [id number], [address] [address]  Phone: [phone] Fax: [phone]    [id number], [address],  [facility] [address]  [facility] [address]  Phone: [phone] Fax: [phone]    Primary Care Provider: Geraldo Hayward MD    Primary Insurance: MEDICARE  Secondary Insurance: AARP    PROGRESS NOTE:    SW continue to follow for discharge planning needs  Pt went into cardiac arrest and was intubated on   Plan is to continue diuresis and ABX at this time  Per Critical Care AP, pt appears to be neurolocally intact and is following commands appropriately  Hoping for extubating within the next few days

## 2022-05-23 NOTE — ASSESSMENT & PLAN NOTE
· Outpatient EGD and colonoscopy at [facility] on 5/11 for w/u of chronic anemia, history of colon polyps, intermittent LLQ abdominal pain and possible intermittent intussusception  EGD unremarkable, colonoscopy technically difficult and required changed from adult scope to pediatric scope, but during traversal of the sigmoid colon there was a kink and patient sustained a perforation  Procedure was aborted and patient was transferred to [facility] where immediate surgical consultation was obtained  Patient was reported to have obvious signs of peritonitis on exam and CT ab/pelvis demonstrated pneumoperitoneum  · 5/11 Urgently to the OR with Dr Kennedy Rudd for ex-lap, low anterior resection, protection loop transverse colostomy, flex sigmoidoscopy, and segmental small bowel resection  · Ostomy now with good output   Surgery following  · Continues on TPN  · Now with ostomy output discuss with surgery plans for feeding vs continuing TPN

## 2022-05-24 NOTE — PROGRESS NOTES
Progress Note - General Surgery   Fady Hudson 80 y o  male MRN: [id number]  Unit/Bed#: ICU 02 Encounter: [id number]    Assessment:  81 y/o M p/w recto-sigmoid perforation s/p Colonoscopy on 5/11, now s/p Ex-lap, LAR, transverse loop colostomy on 5/11 5/21- Intubated d/t acute hypoxic respiratory failure, with PEA arrest   55% FiO2, 6 PEEP, tachypnea 42 RR  Off pressors, Amiodarone gtt @0 5  CT exhibits moderate basilar effusions and patchy airspace opacities with pulmonary edema  Echocardiogram is still pending  Hemoglobin continues to downtrend but did receive 1 unit PRBC overnight  Creatinine still elevated at 1 88    Significant output from ostomy over the last 24 hours, CT scan exhibits possibility of SBO at the transition point of the small bowel resection anastomosis but no p o  contrast, skin is open and packed but fascia has appeared intact, do not have suspicion of wound dehiscence at the base of the fascia as the CT may be suggesting, given the circumstances ileus more likely than SBO, leukocytosis slightly downtrending    Stomal prolapse and small peristomal hernia noted at the transverse loop colostomy but is soft and reducible and does not appear to be causing any sort of obstruction or experiencing any sort of ischemia to the bowel wall    Plan:  - discussion and coordination with critical care  - will discuss with general surgeon possibility of SBO on CT scan versus ileus, patient may be experiencing mild ileus but based on output from stoma volume continues to exit into colostomy bag    Will not pursue imaging currently based on the fact that CT imaging may be over-read  - based on continuing/persisting down trending hemoglobin may consider holding DVT prophylaxis for 24 hour   - reduced peristomal hernia and stomal prolapse with palpation  - reviewed I/Os  - attempt to try trickle feeds again at 10 mL/hour based on discussion with critical care team  - intubation and mechanical ventilation per critical care team  - reorder TPN  - CBC, BMP, Mag, phos  - fingerstick glucose  - may consider placing wound VAC on open abdominal incision based on the fact that it looks clean and fascia is intact upon direct visualization  - continue to turn patient Q2 hours with foam wedges  - continue IV antibiotics  - continue to trend hemoglobin    Subjective/Objective   Chief Complaint:  Intubated    Subjective:  Patient was seen examined at bedside  Patient does appear alert  Patient responds appropriately to questions nodding his head  Patient appears to indicate that he does have some abdominal discomfort particularly in the lower quadrants  Patient cannot qualify her quantify the pain that he is experiencing  Patient is intubated    Objective:     Blood pressure 96/53, pulse 66, temperature 99 14 °F (37 3 °C), resp  rate 21, height 5' 4" (1 626 m), weight 73 4 kg (161 lb 13 1 oz), SpO2 100 %  ,Body mass index is 27 78 kg/m²        Intake/Output Summary (Last 24 hours) at 5/24/2022 1101  Last data filed at 5/24/2022 1001  Gross per 24 hour   Intake 3705 18 ml   Output 1725 ml   Net 1980 18 ml       Invasive Devices  Report    Peripherally Inserted Central Catheter Line  Duration           PICC Line 17/48/30 Right Basilic 5 days          Central Venous Catheter Line  Duration           [facility] Central Lines 05/21/22 Triple 16cm 2 days          Drain  Duration           Colostomy LLQ 13 days    NG/OG/Enteral Tube Orogastric 16 Fr Center mouth 3 days    Urethral Catheter Double-lumen 16 Fr  3 days          Airway  Duration           ETT  Oral 2 days                Physical Exam: BP (!) 85/49 Comment: albumin ordered  Pulse 70   Temp 99 32 °F (37 4 °C)   Resp 22   Ht 5' 4" (1 626 m)   Wt 73 4 kg (161 lb 13 1 oz)   SpO2 100%   BMI 27 78 kg/m²   General appearance: alert  Head: Normocephalic, without obvious abnormality, atraumatic  Lungs: rhonchi and course breath sounds from ventilator  Heart: regular rate and rhythm, S1, S2 normal, no murmur, click, rub or gallop  Abdomen: Soft, no guarding, no distention, hypoactive bowel sounds, tenderness in lower quadrants  Skin: Skin color, texture, turgor normal  No rashes or lesions or wound open with healthy fascia in tact, wet to dry packing in place    Lab, Imaging and other studies:  I have personally reviewed pertinent lab results    , CBC:   Lab Results   Component Value Date    WBC 18 23 (H) 05/24/2022    HGB 8 3 (L) 05/24/2022    HCT 19 6 (L) 05/24/2022    MCV 99 (H) 05/24/2022     05/24/2022    MCH 33 7 05/24/2022    MCHC 34 2 05/24/2022    RDW 19 5 (H) 05/24/2022    MPV 12 2 05/24/2022    NRBC 4 (H) 05/24/2022   , CMP:   Lab Results   Component Value Date    SODIUM 142 05/24/2022    K 3 6 05/24/2022     (H) 05/24/2022    CO2 21 05/24/2022    BUN 73 (H) 05/24/2022    CREATININE 1 88 (H) 05/24/2022    CALCIUM 7 1 (L) 05/24/2022    AST 18 05/24/2022    ALT 13 05/24/2022    ALKPHOS 53 05/24/2022    EGFR 30 05/24/2022     VTE Pharmacologic Prophylaxis: per CC team  VTE Mechanical Prophylaxis: sequential compression device

## 2022-05-24 NOTE — QUICK NOTE
Calling son this afternoon for surgical updates  Did not place wound vac today  Will consider placement for tomorrow but then would need to be changed on Friday for scheduled Friday Tuesday changes routinely  Discussions regarding veraflo

## 2022-05-24 NOTE — ASSESSMENT & PLAN NOTE
· Secondary to hypoperfusion mehul cardiac arrest   · Creat 1 8 from baseline around 0 8, continues to up-trend from 1 7 yesterday  · Would hold further diuretic today  · Trend UOP and Cr  · Avoid hypotension/nephrotoxic medications

## 2022-05-24 NOTE — PROGRESS NOTES
Progress Note - Cardiology   [facility]     Colby Chaudhari 80 y o  male MRN: [id number]  : 2/15/1931  Unit/Bed#: ICU 02 Encounter: [id number]    Assessment and Plan:   1  Post PEA arrest:  EKG and echocardiogram unchanged post-arrest, question whether arrest was not caused by hypoxemia    -  continue monitor    -  off IV pressors    -  currently weaning ventilator     2  Bowel perforation status post exploratory lap with resection and loop colostomy:  Care per surgical team      3  Volume overload secondary to hypoalbuminemia:  Albumin is 1 6    -  intermittent IV Lasix ordered per primary team    -  patient currently on TPN     4  Paroxysmal atrial fibrillation:  Post-arrest, resolved after receiving IV amiodarone    -  patient off IV pressors now    -  continue monitor telemetry, consider adding low-dose beta-blocker    -  unable to start anticoagulation due to hematuria     5  Acute kidney injury:  Creatinine 1 72 post cardiac arrest    -  will continue monitor    Subjective / Objective:   Patient seen and examined  He is still intubated, but remains off pressors  He is awake and nods head appropriately  Does not appear to be in any distress  Vitals: Blood pressure (!) 85/49, pulse 70, temperature 99 32 °F (37 4 °C), resp  rate 22, height 5' 4" (1 626 m), weight 73 4 kg (161 lb 13 1 oz), SpO2 100 %  Vitals:    22 0724 22 0600   Weight: 72 1 kg (159 lb) 73 4 kg (161 lb 13 1 oz)     Body mass index is 27 78 kg/m²  BP Readings from Last 3 Encounters:   22 (!) 85/49   22 141/69   22 152/76     Orthostatic Blood Pressures    Flowsheet Row Most Recent Value   Blood Pressure 85/49  [albumin ordered] filed at 2022 1300   Patient Position - Orthostatic VS Lying filed at 2022 1200        I/O        07 0700  0701   07 07 07    P  O    0    I V  (mL/kg) 807 3 (11 2) 363 3 (5)     Blood  350     NG/GT  150 30    IV Piggyback 900 500 300    TPN 1844 9 2141  7     Feedings  300     Total Intake(mL/kg) 3552 3 (49 1) 3805 (51 8) 330 (4 5)    Urine (mL/kg/hr) 1080 (0 6) 1015 (0 6) 285 (0 6)    Emesis/NG output 1200 550     Stool 800 75 490    Total Output 3080 1640 775    Net +472 3 +2165 -445               Invasive Devices  Report    Peripherally Inserted Central Catheter Line  Duration           PICC Line 90/99/77 Right Basilic 5 days          Central Venous Catheter Line  Duration           CVC Central Lines 05/21/22 Triple 16cm 2 days          Drain  Duration           Colostomy LLQ 13 days    NG/OG/Enteral Tube Orogastric 16 Fr Center mouth 3 days    Urethral Catheter Double-lumen 16 Fr  3 days          Airway  Duration           ETT  Oral 2 days                  Intake/Output Summary (Last 24 hours) at 5/24/2022 1349  Last data filed at 5/24/2022 1311  Gross per 24 hour   Intake 3805 18 ml   Output 1815 ml   Net 1990 18 ml         Physical Exam:   Physical Exam  Vitals and nursing note reviewed  Constitutional:       General: He is not in acute distress  Appearance: Normal appearance  He is normal weight  Interventions: He is intubated  HENT:      Right Ear: External ear normal       Left Ear: External ear normal       Nose: Nose normal    Eyes:      General: No scleral icterus  Right eye: No discharge  Left eye: No discharge  Cardiovascular:      Rate and Rhythm: Normal rate and regular rhythm  Pulses: Normal pulses  Pulmonary:      Effort: No accessory muscle usage or respiratory distress  He is intubated  Breath sounds: Examination of the right-lower field reveals decreased breath sounds  Examination of the left-lower field reveals decreased breath sounds  Decreased breath sounds present  No wheezing  Abdominal:      General: Bowel sounds are normal  There is no distension  Palpations: Abdomen is soft  Musculoskeletal:      Right lower leg: No edema        Left lower leg: No edema  Skin:     General: Skin is warm and dry  Capillary Refill: Capillary refill takes less than 2 seconds  Neurological:      General: No focal deficit present  Mental Status: He is alert and oriented to person, place, and time  Mental status is at baseline     Psychiatric:         Mood and Affect: Mood normal                  Medications/ Allergies:     Current Facility-Administered Medications   Medication Dose Route Frequency Provider Last Rate    acetaminophen  650 mg Oral Q6H PRN Ples Horsfall, CRNP      acetylcysteine  3 mL Nebulization Q8H Ples Horsfall, CRNP      Adult TPN (CUSTOM BASE/CUSTOM ELECTROLYTE)   Intravenous Continuous TPN Jose Tripathi PA-C 89 3 mL/hr at 05/24/22 0715    Adult TPN (CUSTOM BASE/CUSTOM ELECTROLYTE)   Intravenous Continuous TPN ISELA Farr      cefepime  2,000 mg Intravenous Q12H Ples Jonatan, ISELA Stopped (05/24/22 1215)    chlorhexidine  15 mL Mouth/Throat Q12H [address] Ples Horsfall, CRNP      furosemide  40 mg Intravenous BID (diuretic) ISELA Farr      insulin regular (HumuLIN R,NovoLIN R) infusion  0 3-21 Units/hr Intravenous Titrated Virgilio Little PA-C 1 5 Units/hr (05/24/22 1203)    iohexol  50 mL Oral Once in imaging Ples Jonatan, ISELA      ipratropium-albuterol  3 mL Nebulization Q6H PRN Ples Horsfall, ISELA      ipratropium-albuterol  3 mL Nebulization Q8H Ples Horsfall, CROLIVER      levothyroxine  112 mcg Per NG Tube Early Morning Ples Horscarmelo, ISELA      lidocaine  2 patch Topical Daily [place], [place]      metroNIDAZOLE  500 mg Intravenous Q8H Ples Horscarmelo, ISELA Stopped (05/24/22 0840)    ondansetron  4 mg Intravenous Q6H PRN Ples Jonatan, ISELA      oxyCODONE  5 mg Per NG Tube Q4H PRN Jose Lehman PA-C      oxyCODONE  7 5 mg Per NG Tube Q4H PRN Jose Tripathi PA-C      pantoprazole  40 mg Intravenous Q24H [address] Ples Horsfall, CROLIVER       acetaminophen, 650 mg, Q6H PRN  iohexol, 50 mL, Once in imaging  ipratropium-albuterol, 3 mL, Q6H PRN  ondansetron, 4 mg, Q6H PRN  oxyCODONE, 5 mg, Q4H PRN  oxyCODONE, 7 5 mg, Q4H PRN      No Known Allergies    VTE Pharmacologic Prophylaxis:   Sequential compression device (Venodyne)     Labs:   Troponins:  Results from last 7 days   Lab Units 05/22/22  1302 05/21/22 2018   HSTNI D2 ng/L -36  --    HSTNI D4 ng/L  --  87*       CBC with diff:  Results from last 7 days   Lab Units 05/24/22  1059 05/24/22  0238 05/23/22  1541 05/23/22  0438 05/22/22  0524 05/21/22  2214 05/21/22  1539 05/21/22  0524 05/20/22  0546 05/19/22  0517   WBC Thousand/uL  --  18 23*  --  21 01* 23 05*  --  21 74* 23 58* 18 87* 13 94*   HEMOGLOBIN g/dL 8 3* 6 7* 7 2* 7 2* 8 5* 9 7* 6 2* 8 2* 8 5* 8 4*   HEMATOCRIT %  --  19 6* 21 0* 20 4* 25 4*  --  18 3* 24 7* 25 5* 23 7*   MCV fL  --  99*  --  97 99*  --  105* 106* 105* 99*   PLATELETS Thousands/uL  --  241  --  243 273  --  231 326 335 314   MCH pg  --  33 7  --  34 1 33 1  --  34 3 35 0* 35 3* 35 0*   MCHC g/dL  --  34 2  --  35 3 33 5  --  32 8 33 2 33 3 35 4   RDW %  --  19 5*  --  19 2* 20 8*  --  17 8* 17 9* 17 4* 16 4*   MPV fL  --  12 2  --  12 2 12 3  --  11 6 11 3 11 3 11 2   NRBC /100 WBC  --  4*  --   --   --   --   --  2  --   --      CMP:  Results from last 7 days   Lab Units 05/24/22  0238 05/23/22  1541 05/23/22  0438 05/22/22  0524 05/21/22  2214 05/21/22  1539 05/21/22  0524 05/19/22  0517 05/18/22  0446   SODIUM mmol/L 142 141 141 146* 149* 150* 147*   < > 141   POTASSIUM mmol/L 3 6 3 7 3 7 5 2 4 6 3 0* 3 5   < > 4 0   CHLORIDE mmol/L 111* 112* 113* 115* 118* 119* 116*   < > 111*   CO2 mmol/L 21 23 21 20* 22 25 27   < > 24   ANION GAP mmol/L 10 6 7 11 9 6 4   < > 6   BUN mg/dL 73* 69* 66* 57* 53* 42* 45*   < > 37*   CREATININE mg/dL 1 88* 1 77* 1 72* 1 56* 1 35* 0 94 1 03   < > 0 96   CALCIUM mg/dL 7 1* 7 1* 7 2* 7 6* 7 4* 5 7* 7 5*   < > 7 6*   AST U/L 18  --  19 29  --  31  --   --  12 ALT U/L 13  --  18 22  --  14  --   --  13   ALK PHOS U/L 53  --  41* 43*  --  40*  --   --  43*   TOTAL PROTEIN g/dL 4 8*  --  4 8* 5 1*  --  4 3*  --   --  5 8*   ALBUMIN g/dL 1 9*  --  1 6* 1 8*  --  1 5*  --   --  2 2*   TOTAL BILIRUBIN mg/dL 1 05*  --  0 96 1 11*  --  0 48  --   --  0 71   EGFR ml/min/1 73sq m 30 32 34 38 45 70 63   < > 68    < > = values in this interval not displayed  Magnesium:  Results from last 7 days   Lab Units 05/24/22  0238 05/23/22  0438 05/22/22  0524 05/21/22  2214 05/21/22  1539 05/21/22  0524 05/20/22  0546   MAGNESIUM mg/dL 2 6 2 4 2 3 2 3 2 0 2 6 2 8*     Lipid Profile:  Results from last 7 days   Lab Units 05/22/22  0524   TRIGLYCERIDES mg/dL 145     Hgb A1c:  Results from last 7 days   Lab Units 05/18/22  0446   HEMOGLOBIN A1C % 5 3       Imaging & Testing   I have personally reviewed pertinent reports  EGD    Result Date: 5/11/2022  Narrative: [address] [place] [address] [place] [id number] [facility] 92 OF SERVICE: 5/11/22 PHYSICIAN(S): Attending: Terry Mcgrath MD Fellow: No Staff Documented Procedure  :  EGD with biopsies INDICATION: Nausea, Family history of stomach cancer POST-OP DIAGNOSIS: See the impression below  PREPROCEDURE: Informed consent was obtained for the procedure, including sedation  Risks of perforation, hemorrhage, adverse drug reaction and aspiration were discussed  The patient was placed in the left lateral decubitus position  Patient was explained about the risks and benefits of the procedure  Risks including but not limited to bleeding, infection, and perforation were explained in detail  Also explained about less than 100% sensitivity with the exam and other alternatives  DETAILS OF PROCEDURE: Patient was taken to the procedure room where a time out was performed to confirm correct patient and correct procedure   The patient underwent monitored anesthesia care, which was administered by an anesthesia professional  The patient's blood pressure, heart rate, level of consciousness, respirations and oxygen were monitored throughout the procedure  The scope was advanced to the second part of the duodenum  Retroflexion was performed in the fundus  Prior to the procedure, the patient's H  Pylori status was unknown  The patient experienced no blood loss  The procedure was not difficult  The patient tolerated the procedure well  There were no apparent complications  ANESTHESIA INFORMATION: ASA: III Anesthesia Type: IV Sedation with Anesthesia MEDICATIONS: No administrations occurring from 1356 to 1417 on 05/11/22 FINDINGS: Moderate, localized erythematous mucosa in the antrum; performed cold forceps biopsy to rule out H  pylori The upper third of the esophagus, middle third of the esophagus, lower third of the esophagus, GE junction, duodenal bulb, 1st part of the duodenum and 2nd part of the duodenum appeared normal  Performed random biopsy using biopsy forceps to rule out Cabrera's esophagus  SPECIMENS: ID Type Source Tests Collected by Time Destination 1 : antrum Tissue Stomach TISSUE EXAM Stefanie Arias MD 5/11/2022  2:04 PM  2 : lower Tissue Esophagus TISSUE EXAM Stefanie Arias MD 5/11/2022  2:05 PM  3 : sigmoid Tissue Polyp, Colorectal TISSUE EXAM Stefanie Arias MD 5/11/2022  2:14 PM      Impression: 1  Mild erythema in the antrum 2  Normal esophagus, normal GE junction and normal duodenum RECOMMENDATION: Await pathology results Follow up with me in clinic Anti-reflux diet   Stefanie Arias MD     Colonoscopy    Result Date: 5/11/2022  Narrative: [facility] [address] [facility] [address] [phone] [place] 92 OF SERVICE: 5/11/22 PHYSICIAN(S): Attending: Stefanie Arias MD Fellow: No Staff Documented Procedure :   Incomplete colonoscopy INDICATION: Diverticulosis, Left lower quadrant pain, Colonic intussusception ([address] ), Family history of colon cancer, Adenomatous polyp of colon, unspecified part of colon POST-OP DIAGNOSIS: See the impression below  HISTORY: Prior colonoscopy: 10 years ago  BOWEL PREPARATION: Miralax/Dulcolax PREPROCEDURE: Informed consent was obtained for the procedure, including sedation  Risks including but not limited to bleeding, infection, perforation, adverse drug reaction and aspiration were explained in detail  Also explained about less than 100% sensitivity with the exam and other alternatives  The patient was placed in the left lateral decubitus position  DETAILS OF PROCEDURE: Patient was taken to the procedure room where a time out was performed to confirm correct patient and correct procedure  The patient underwent monitored anesthesia care, which was administered by an anesthesia professional  The patient's blood pressure, heart rate, level of consciousness, oxygen and respirations were monitored throughout the procedure  A digital rectal exam was performed  The scope was introduced through the anus and advanced to the sigmoid colon  Retroflexion was performed in the rectum  The quality of bowel preparation was evaluated using the Dougie Bowel Preparation Scale with scores of: right colon = not assessed, transverse colon = not assessed, left colon = 1  The total BBPS score was 1  Bowel prep was not adequate  The patient experienced no blood loss  The procedure was not difficult  The patient tolerated the procedure well  There were no apparent complications  ANESTHESIA INFORMATION: ASA: III Anesthesia Type: IV Sedation with Anesthesia MEDICATIONS: No administrations occurring from 1356 to 1425 on 05/11/22 FINDINGS: Santiago Painter kink in the sigmoid colon, it was very difficult to pass the scope, sigmoid polyp which was removed with cold snare polypectomy  Extensive diverticulosis, ultra thin scope was used,  but I see the peritoneum, procedure was aborted   EVENTS: Procedure Events Event Event Time ENDO SCOPE OUT TIME 5/11/2022  2:24 PM SPECIMENS: ID Type Source Tests Collected by Time Destination 1 : antrum Tissue Stomach TISSUE EXAM David Rodriguez MD 5/11/2022  2:04 PM  2 : lower Tissue Esophagus TISSUE EXAM David Rodriguez MD 5/11/2022  2:05 PM  3 : sigmoid Tissue Polyp, Colorectal TISSUE EXAM David Rodriguez MD 5/11/2022  2:14 PM  EQUIPMENT: Colonoscope -Q180AL Colonoscope -[id number]     Impression: 1  Sharp kink in the sigmoid colon with stricture, scope was unable to pass  Ultra thin scope was use, small sigmoid polyp removed  2  Possible perforation in sigmoid colon RECOMMENDATION: Stat CT scan abdomen and pelvis Surgical consult, discuss case with Dr Carmel Dow MD     CT abdomen pelvis wo contrast    Result Date: 5/11/2022  Narrative: CT ABDOMEN AND PELVIS WITHOUT IV CONTRAST INDICATION:   Bowel obstruction suspected possible bowel perofration  COMPARISON:  None  TECHNIQUE:  CT examination of the abdomen and pelvis was performed without intravenous contrast  This examination was performed without intravenous contrast in the context of the critical nationwide Omin[place]que shortage  Axial, sagittal, and coronal 2D reformatted images were created from the source data and submitted for interpretation  Radiation dose length product (DLP) for this visit:  350 2 mGy-cm   This examination, like all CT scans performed in the [facility], was performed utilizing techniques to minimize radiation dose exposure, including the use of iterative reconstruction and automated exposure control  Enteric contrast was NOT administered  FINDINGS: ABDOMEN LOWER CHEST:  No clinically significant abnormality identified in the visualized lower chest  LIVER/BILIARY TREE:  Unremarkable  GALLBLADDER:  There are tiny punctate gallstone(s) within the gallbladder  SPLEEN:  Unremarkable  PANCREAS:  Unremarkable  ADRENAL GLANDS:  Unremarkable   KIDNEYS/URETERS:  Small right lower pole circumscribed subcentimeter renal hypodensity is present, too small to accurately characterize, and statistically most likely benign finding  According to recent literature (Radiology 2019) no further workup of this finding is recommended  STOMACH AND BOWEL:  There is colonic diverticulosis without evidence of acute diverticulitis  APPENDIX:  No findings to suggest appendicitis  ABDOMINOPELVIC CAVITY: Moderate pneumoperitoneum  This can be seen anterior to the liver and deep to the anterior abdominal wall  No ascites  No enlarged adenopathy  VESSELS:  Atherosclerotic changes are present  No evidence of aneurysm  PELVIS REPRODUCTIVE ORGANS:  The prostate is enlarged  URINARY BLADDER:  Unremarkable  ABDOMINAL WALL/INGUINAL REGIONS:  Unremarkable  OSSEOUS STRUCTURES:  Bones are demineralized  Multilevel vertebroplasties and compression fractures of indeterminate age  Impression: Moderate pneumoperitoneum in keeping with bowel perforation  I personally discussed this study with DAVID Bethene Gums on 5/11/2022 at 3:42 PM  Workstation performed: [id number]     CT chest abdomen pelvis wo contrast    Result Date: 5/24/2022  Narrative: CT CHEST, ABDOMEN AND PELVIS WITHOUT IV CONTRAST INDICATION:   free air under diaphragm on CXR  COMPARISON:  5/22/2020 TECHNIQUE: CT examination of the chest, abdomen and pelvis was performed without intravenous contrast  This examination was performed without intravenous contrast in the context of the critical nationwide Omnipaque shortage  Axial, sagittal, and coronal 2D reformatted images were created from the source data and submitted for interpretation  Radiation dose length product (DLP) for this visit:  845 45 mGy-cm   This examination, like all CT scans performed in the [facility], was performed utilizing techniques to minimize radiation dose exposure, including the use of iterative  reconstruction and automated exposure control  Enteric contrast was administered  FINDINGS: CHEST LUNGS:  Moderate bibasilar pleural effusions with associated probable compressive atelectasis  Additional patchy airspace opacities noted upper lung zones could reflect asymmetric pulmonary edema with infiltrates not excluded  PLEURA:  As above  HEART/GREAT VESSELS: Heart is unremarkable for patient's age  No thoracic aortic aneurysm  MEDIASTINUM AND SRI:  Unremarkable  CHEST WALL AND LOWER NECK:  Endotracheal tube terminates within the trachea  Nasogastric tube terminates within the stomach  ABDOMEN LIVER/BILIARY TREE:  Unremarkable  GALLBLADDER:  There are gallstone(s) within the gallbladder, without pericholecystic inflammatory changes  SPLEEN:  Unremarkable  PANCREAS:  Unremarkable  ADRENAL GLANDS:  Unremarkable  KIDNEYS/URETERS:  Unremarkable  No hydronephrosis  STOMACH AND BOWEL:  Left ventral loop colostomy identified with herniated fat at the site of the stoma  Distended small bowel loops with scattered air-fluid levels concerning for early/partial obstruction  There appears to be transition at the ventral pelvis at a site of small bowel anastomosis best seen on series 2 image 104  This occurs in a region of dehiscent ventral wound  Sigmoid anastomosis appears patent  APPENDIX:  No findings to suggest appendicitis  ABDOMINOPELVIC CAVITY:  No ascites  No pneumoperitoneum  No lymphadenopathy  VESSELS:  Unremarkable for patient's age  PELVIS REPRODUCTIVE ORGANS:  Unremarkable for patient's age  URINARY BLADDER:  Decompressed with a Farnsworth catheter in place  ABDOMINAL WALL/INGUINAL REGIONS:  Unremarkable  OSSEOUS STRUCTURES:  Multilevel thoracolumbar compression fractures and multiple kyphoplasty levels  Impression: 1  CHF with moderate basilar effusions and additional upper lung zone patchy airspace opacities likely reflecting asymmetric pulmonary edema  Infiltrate is not entirely excluded   2   Distended small bowel loops with scattered air-fluid levels consistent with early/partial small bowel obstruction with transition at the small bowel anastomosis in the region of the ventral pelvis as above  No free air  3   Cholelithiasis without cholecystitis  4   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  Concordant preliminary results were provided by [employer] at 0531 hours on 5/24/2022  Workstation performed: [id number]     CT chest abdomen pelvis wo contrast    Result Date: 5/22/2022  Narrative: CT CHEST, ABDOMEN AND PELVIS WITHOUT IV CONTRAST INDICATION:   Cardiac arrest, history of recent surgery  COMPARISON:  May 17, 2022 TECHNIQUE: CT examination of the chest, abdomen and pelvis was performed without intravenous contrast  This examination was performed without intravenous contrast in the context of the critical nationwide Omnipaque shortage  Axial, sagittal, and coronal 2D reformatted images were created from the source data and submitted for interpretation  Radiation dose length product (DLP) for this visit:  920 mGy-cm   This examination, like all CT scans performed in the [facility], was performed utilizing techniques to minimize radiation dose exposure, including the use of iterative reconstruction and automated exposure control  Enteric contrast was administered  FINDINGS: CHEST LUNGS:  Similar-appearing patchy mixed groundglass and consolidative changes are seen in the lungs bilaterally predominantly in the upper lobes  Bilateral lobe compressive atelectasis again noted  PLEURA:  Moderate size bilateral pleural effusions similar to slightly increased in size from prior study  HEART/GREAT VESSELS: Heart is unremarkable for patient's age  No thoracic aortic aneurysm  MEDIASTINUM AND SRI:  Unremarkable  CHEST WALL AND LOWER NECK:  Unremarkable  ABDOMEN LIVER/BILIARY TREE:  Unremarkable  GALLBLADDER:  No calcified gallstones  No pericholecystic inflammatory change  SPLEEN:  Unremarkable  PANCREAS:  Unremarkable  ADRENAL GLANDS:  Unremarkable  KIDNEYS/URETERS:  Right lower pole renal cyst  STOMACH AND BOWEL:  Air-fluid levels are identified within mildly dilated segments of small bowel measuring up to 3 7 cm    APPENDIX:  No findings to suggest appendicitis  ABDOMINOPELVIC CAVITY:  No ascites  No pneumoperitoneum  No lymphadenopathy  VESSELS:  Unremarkable for patient's age  PELVIS REPRODUCTIVE ORGANS:  Unremarkable for patient's age  URINARY BLADDER:  Farnsworth catheter noted within the decompressed urinary bladder  ABDOMINAL WALL/INGUINAL REGIONS:  Unremarkable  OSSEOUS STRUCTURES:  There are innumerable compression deformities in the thoracic and lumbar spine which appear chronic  Evidence of multilevel vertebroplasty  Impression: 1  Stable appearance of mixed groundglass and consolidative abnormalities in the lungs bilaterally predominantly affecting the upper lobes suspicious for multilobar pneumonia  Superimposed pulmonary edema not excluded  2   Similar to slight increase size of moderate bilateral pleural effusions  3   Diffuse mild dilation of small bowel segments identified without transition point  Workstation performed: [id number]     CT chest abdomen pelvis wo contrast    Result Date: 5/18/2022  Narrative: CT CHEST, ABDOMEN AND PELVIS WITHOUT IV CONTRAST INDICATION:   Intra-abdominal abscess R/O intra-abdominal collection  COMPARISON:  CTA chest on 5/15/2022 and CT abdomen and pelvis on 5/11/2022  TECHNIQUE: CT examination of the chest, abdomen and pelvis was performed without intravenous contrast  This examination was performed without intravenous contrast in the context of the critical nationwide Omnipaque shortage  Axial, sagittal, and coronal 2D reformatted images were created from the source data and submitted for interpretation  Radiation dose length product (DLP) for this visit:  523 41 mGy-cm     This examination, like all CT scans performed in the [facility], was performed utilizing techniques to minimize radiation dose exposure, including the use of iterative  reconstruction and automated exposure control  Enteric contrast was administered  FINDINGS: CHEST LUNGS:  Worsening patchy mixed groundglass and consolidative change bilaterally, most prominent in the bilateral upper lobes  Bilateral lower lobe compressive atelectasis  There is septal thickening  There is no tracheal or endobronchial lesion  PLEURA:  Moderate left and small right pleural effusions, increased since the prior study  HEART/GREAT VESSELS: Heart is unremarkable for patient's age  Aortic valvular calcifications  Coronary artery calcifications  No thoracic aortic aneurysm  MEDIASTINUM AND SRI:  Mildly distention of the mid to distal esophagus with enteric contrast material   No mediastinal lymphadenopathy  CHEST WALL AND LOWER NECK:  Unremarkable  ABDOMEN LIVER/BILIARY TREE:  Unremarkable  GALLBLADDER:  There is a tiny calcified gallstone within the gallbladder, without pericholecystic inflammatory changes  SPLEEN:  Unremarkable  PANCREAS:  Unremarkable  ADRENAL GLANDS:  Unremarkable  KIDNEYS/URETERS:  No hydronephrosis or urinary tract calculus  One or more sharply circumscribed subcentimeter renal hypodensities are present, too small to accurately characterize, and statistically most likely benign findings  According to recent literature (Radiology 2019) no further workup of these findings is recommended  STOMACH AND BOWEL:  Postsurgical changes of low anterior resection with creation of loop colostomy in the anterior left upper abdomen  Small bowel anastomosis in the anterior lower abdomen deep to the midline incision  Multiple distended small bowel loops measuring up to 3 5 cm in diameter  No clear transition point is identified  Oral contrast has reached mid small bowel  Enteric tube is present with sidehole visualized near the gastroesophageal junction  APPENDIX:  No findings to suggest appendicitis   ABDOMINOPELVIC CAVITY:  Trace presacral fluid  No pneumoperitoneum  No lymphadenopathy  VESSELS:  Atherosclerotic changes are present  No evidence of aneurysm  PELVIS REPRODUCTIVE ORGANS:  Unremarkable for patient's age  URINARY BLADDER:  Decompressed with a Farnsworth catheter in place  Moderate amount of air in the bladder  ABDOMINAL WALL/INGUINAL REGIONS:  Postsurgical changes in the ventral wall with midline incision noted  Loop colostomy in the anterior left upper abdomen  Mild anasarca  OSSEOUS STRUCTURES:  No acute fracture or destructive osseous lesion  Multilevel vertebroplasty and multilevel age-indeterminate compression fractures in the thoracolumbar spine are redemonstrated  Impression: 1  Worsening patchy mixed groundglass and consolidative change bilaterally compatible with multifocal pneumonia and/or pulmonary edema  2   Increased bilateral pleural effusions  3   Sidehole of enteric tube is visualized near the gastroesophageal junction  Consider tube advancement  4   Mild distention of mid to distal esophagus with enteric contrast material   Correlate for gastroesophageal reflux  5   Multiple dilated small bowel loops without a clear transition point  The finding may reflect ileus, however developing small bowel obstruction cannot be excluded  Follow-up is recommended  6   Moderate amount of air within the urinary bladder, likely related to instrumentation  Correlate with urinalysis to exclude cystitis  I personally discussed this study with Ashley Polk on 5/18/2022 at 1:11 AM  Workstation performed: [id number]     XR chest portable    Addendum Date: 5/24/2022 Addendum:   ADDENDUM: The right IJ line terminates in the SVC  The PICC line terminates at the junction of the SVC and right atrium  Result Date: 5/24/2022  Narrative: CHEST INDICATION:   keofed placement  COMPARISON:  05/21/2022 EXAM PERFORMED/VIEWS:  XR CHEST PORTABLE Images: 2 FINDINGS: Cardiomediastinal silhouette appears enlarged    Right IJ line terminates in the right atrium  Right PICC line obscured by the IJ line but probably terminates in the SVC  Endotracheal tube terminates 2 7 cm above the chriss  An enteric tube extends into the stomach  Keofed tube terminates at the EG junction  Bilateral airspace disease may represent infiltrates or the alveolar phase of heart failure  Small bilateral pleural effusions  Multiple thoracic and lumbar  vertebro plasties  Impression: 1  Keofed tube terminates at the EG junction and should be advanced for  use  2   Bilateral airspace disease may represent infiltrates or the alveolar phase of heart failure  Bilateral pleural effusions  Workstation performed: [id number]     XR chest portable    Result Date: 5/17/2022  Narrative: CHEST INDICATION:   NGT placement    COMPARISON:  Chest radiograph and CT May 15, 2022 EXAM PERFORMED/VIEWS:  XR CHEST PORTABLE FINDINGS:  Tip of enteric tube is at the expected position of the gastroesophageal junction, and sidehole is at the expected position of the esophageal hiatus  Cardiomediastinal silhouette appears unremarkable  Opacities in the bilateral upper lobes have slightly increased in density  Now trace bilateral pleural effusions have decreased since prior study  No pneumothorax  Kyphoplasty material at multiple levels  Impression: 1  Tip of enteric tube is at the expected position of the gastroesophageal junction  Slight advancement is advised  2   Slight increased predominant upper lobe opacities, likely representing pneumonia  Concomitant edema is possible  The now trace bilateral pleural effusions have decreased in size  The study was marked in [facility] for immediate notification  Workstation performed: [id number]     XR chest portable    Result Date: 5/15/2022  Narrative: CHEST INDICATION:   coarse breath sounds  COMPARISON:  None EXAM PERFORMED/VIEWS:  XR CHEST PORTABLE FINDINGS: Heart is mildly enlarged  There is aortic knob calcification   There is central patchy airspace disease bilaterally consistent with pulmonary edema  There are small bilateral pleural effusions  Status post multilevel vertebroplasty  Impression: Pulmonary edema and small bilateral pleural effusions  Workstation performed: [id number]     XR abdomen 1 view kub    Result Date: 5/22/2022  Narrative: ABDOMEN INDICATION:   f/u ileus  COMPARISON:  Compared with 5/18/2022 VIEWS:  AP supine FINDINGS: Feeding tube in the upper abdomen  Contrast in the colon  Prominent gas-filled small bowel loops throughout the abdomen  No discernible free air on this supine study  Upright or left lateral decubitus imaging is more sensitive to detect subtle free air in the appropriate setting  No pathologic calcifications or soft tissue masses  Visualized lung bases are clear  Prior kyphoplasty changes in the thoracolumbar region  Impression: Slight worsening of the prominent dilated small bowel loops  Contrast in the colon  Workstation performed: [id number]     XR abdomen 1 view kub    Result Date: 5/22/2022  Narrative: ABDOMEN INDICATION:   abdominal distention  COMPARISON:  Compared with 5/21/2022 VIEWS:  AP supine FINDINGS: Feeding tube tip in the upper abdomen  Diffuse gas distended small bowel loops throughout the abdomen  Contrast in the colon suggested as seen on prior study  No discernible free air on this supine study  Upright or left lateral decubitus imaging is more sensitive to detect subtle free air in the appropriate setting  No pathologic calcifications or soft tissue masses  Visualized lung bases are clear  Visualized osseous structures are unremarkable for the patient's age  Impression: Persistent gas distended small bowel loops with normal caliber colon with contrast  Workstation performed: [id number]     XR abdomen 1 view kub    Result Date: 5/18/2022  Narrative: ABDOMEN INDICATION:   Contrast follow through   COMPARISON:  CT performed May 17, 2022 VIEWS:  AP supine FINDINGS: Air and contrast filled mildly distended loops of bowel seen throughout the abdomen in a nonspecific pattern suspicious for bowel obstruction  Contrast does not appear to have reached the large bowel  An enteric catheter tip overlies the expected location of the cavoatrial junction and has not quite reached the stomach  Advancement by approximately 8 to 10 cm recommended  Small costophrenic angle effusions with overlying airspace opacity  Osteopenia with multilevel compression fractures and vertebroplasty is noted  Impression: Radiographic appearance suspicious for early or mild distal small bowel obstruction  Alternatively, this could represent ileus  Enteric contrast administered for the May 17, 2022 examination does not appear to have reached the large bowelAn  enteric catheter tip overlies the expected location of the cavoatrial junction and has not quite reached the stomach  Advancement by approximately 8 to 10 cm recommended  This examination was marked "significant notification" in Epic in order to begin the standard process by which the radiology reading room liaison alerts the referring practitioner  Workstation performed: [id number]     XR abdomen 1 view kub    Result Date: 5/16/2022  Narrative: ABDOMEN INDICATION:   Evaluation of bowel gas pattern  Recent pneumoperitoneum seen by CT concerning for bowel perforation  Review of chart shows history of low anterior resection, protective loop transverse colostomy and segmental small bowel resection to treat perforated rectosigmoid junction  COMPARISON:  CT of the abdomen/pelvis dated 5/11/2022  VIEWS:  AP supine FINDINGS: Dilated loops of small bowel scattered throughout the abdomen measuring up to 3 9 cm in caliber and left lower quadrant  No evident pneumatosis  Some gas does appear to progress to the cecum and ascending colon  Rectosigmoid chain sutures are evident  Small amount of gas in the residual rectal pouch   No discernible free air on this supine study  Upright or left lateral decubitus imaging is more sensitive to detect subtle free air in the appropriate setting  No pathologic calcifications or soft tissue masses  Chronic atelectasis/scarring at the lung bases  No acute findings of the visualized lower chest  Prior vertebral plasties  Thoracolumbar spondylosis  No acute osseous abnormalities  Impression: Diffusely distended, dilated small bowel loops with small amount of persistent air extending to the proximal colon  Findings may suggest diffuse ileus or partial obstruction  Workstation performed: [id number]     XR chest portable ICU    Result Date: 5/24/2022  Narrative: CHEST INDICATION:   hypoxic respiratory failure  COMPARISON:  05/23/2022 EXAM PERFORMED/VIEWS:  XR CHEST PORTABLE ICU Images: 2 FINDINGS: Cardiomediastinal silhouette appears unremarkable  An endotracheal tube terminates 2 6 cm above the chriss  Right PICC line terminates in the right atrium  Right IJ line terminates at the junction of the SVC and right atrium  The Keofeed tube has been removed  Enteric tube extends into the stomach  Bilateral airspace disease is unchanged  Multiple vertebro plasties  Impression: Bilateral airspace disease is unchanged  The Keofeed tube has been removed  Workstation performed: [id number]     XR chest portable ICU    Result Date: 5/22/2022  Narrative: CHEST INDICATION:   R  IJ TLC placement  COMPARISON:  Compared with 5/21/2022 EXAM PERFORMED/VIEWS:  XR CHEST PORTABLE ICU FINDINGS:  Endotracheal tube above the chriss  Right neck catheter in the distal SVC  PICC line catheter tip in the cavoatrial region  Feeding tube below the diaphragm  Cardiomediastinal silhouette appears unremarkable  Patchy groundglass parenchymal infiltrates predominantly in the right upper lobe and left lower lobe with slight worsening  No pneumothorax  No pneumothorax or pleural effusion  Osseous structures appear within normal limits for patient age  Impression: Right neck catheter in the distal SVC Workstation performed: [id number]     XR chest portable ICU    Result Date: 5/22/2022  Narrative: CHEST INDICATION:   hypoxia  COMPARISON:  Compared with 5/21/2022 EXAM PERFORMED/VIEWS:  XR CHEST PORTABLE ICU FINDINGS:  Endotracheal tube above the chriss  Right-sided PICC line tip in the cavoatrial region  Feeding tube below the diaphragm  Cardiomediastinal silhouette appears unremarkable  Poor inspiration  Diffuse prominent interstitial markings  Patchy parenchymal groundglass change , more on the right  No pneumothorax  No large effusion  Osseous structures appear within normal limits for patient age  Impression: Improved pneumomediastinum  Bilateral groundglass infiltrative changes  Workstation performed: [id number]     XR chest portable ICU    Result Date: 5/21/2022  Narrative: CHEST INDICATION:   intubation  COMPARISON:  May 17, 2022 EXAM PERFORMED/VIEWS:  XR CHEST PORTABLE ICU FINDINGS:  Transcutaneous pacer pads and numerous EKG leads overlie the patient  An endotracheal tube is present with its tip approximately 2 5 cm above the chriss  An enteric tube is present with its tip extending to below the inferior margin of this film in the epigastric region, presumably over the stomach but not seen  There is pneumomediastinum best visualized along the left heart border with lucency measures up to an estimated thickness of 8 mm  Patchy multifocal groundglass airspace opacities appear slightly progressed when compared to May 17, 2022  Trace costophrenic angle effusions  No evidence for pneumothorax  Kyphoplasty is at multiple levels in the lower thoracic and upper lumbar spine  No evidence for acute osseous abnormality        Impression: Pneumomediastinum  Endotracheal tube tip above the level of the chriss by 2 5 cm  Enteric tube tip overlying the epigastric region below the inferior margin of this film   Slight progression of multifocal groundglass pulmonary parenchymal airspace opacities  Trace costophrenic angle effusions, unchanged  This examination was marked "immediate notification" in Epic in order to begin the standard process by which the radiology reading room liaison alerts the referring practitioner  Workstation performed: [id number]     CTA chest pe study    Result Date: 5/15/2022  Narrative: CTA - CHEST WITH IV CONTRAST - PULMONARY ANGIOGRAM INDICATION:   hypoxic  COMPARISON: None  TECHNIQUE: CTA examination of the chest was performed using angiographic technique according to a protocol specifically tailored to evaluate for pulmonary embolism  Axial, sagittal, and coronal 2D reformatted images were created from the source data and  submitted for interpretation  In addition, coronal 3D MIP postprocessing was performed on the acquisition scanner  Radiation dose length product (DLP) for this visit:  479 mGy-cm   This examination, like all CT scans performed in the [facility], was performed utilizing techniques to minimize radiation dose exposure, including the use of iterative reconstruction and automated exposure control  IV Contrast:  70 mL of iohexol (OMNIPAQUE)  FINDINGS: PULMONARY ARTERIAL TREE:  No pulmonary embolus is seen  LUNGS:  Patchy groundglass densities bilaterally upper lobes, right greater than left Bilateral lower lobe consolidation compatible with compressive atelectasis  There is no tracheal or endobronchial lesion  PLEURA:  Small bilateral effusions  HEART/GREAT VESSELS:  Normal heart size  Aortic valvular calcifications  Coronary artery calcifications  Normal caliber thoracic aorta with atherosclerotic calcifications  No thoracic aortic aneurysm  MEDIASTINUM AND SRI:  Unremarkable  CHEST WALL AND LOWER NECK:   Unremarkable  VISUALIZED STRUCTURES IN THE UPPER ABDOMEN:  Unremarkable  OSSEOUS STRUCTURES:  No acute fracture or destructive osseous lesion    Age indeterminate moderate T5 compression deformity  Multiple old compression deformities and vertebroplasties in the lower thoracic spine  Impression: No pulmonary embolus  Bilateral upper lobe groundglass opacities compatible with pneumonia and/or pulmonary edema  Small bilateral pleural effusions  Workstation performed: [id number]     IR PICC line placement double lumen    Result Date: 5/18/2022  Narrative: PERCUTANEOUSLY INSERTED CENTRAL VENOUS CATHETER PLACEMENT NG TUBE PLACEMENT WITH FLUOROSCOPY HISTORY: TPN FLUORO TIME: 5 9 min NUMBER OF IMAGES: 3 6 PROCEDURE:  The patient was brought to the Interventional Radiology Suite and identified verbally and by wrist band  The right basilic vein was evaluated as a potential access site with ultrasound  The vessel was found to be patent and compressible  The site was prepped and draped in the usual sterile fashion  All elements of maximal sterile barrier technique, cap and mask and sterile gown and sterile gloves and sterile full-body drape and hand hygiene and 2% chlorhexidine for cutaneous antisepsis  Sterile ultrasound technique with sterile gel and sterile probe covers was also utilized  Lidocaine was administered to the skin and a small skin incision was made  Under ultrasound guidance, utilizing sterile ultrasound technique with sterile gel and a sterile probe cover, the right basilic vein was accessed using single wall Seldinger technique  Static images of real time needle entry into the vessel were obtained  This was followed by a  018 guidewire and a sheath and dilator tapered to   018  A 5-Ivorian central venous catheter was then advanced under fluoroscopic guidance to the cavoatrial junction  The catheter was cut at 41 cm with 0 cm external length  The position was confirmed fluoroscopically  Blood could be freely aspirated and heparinized saline injected  Patient experienced no untoward reactions  Impression: 1   Status post placement of a 5-Ivorian percutaneously inserted central venous catheter via the right basilic vein with its tip at the cavoatrial junction under ultrasound and fluoroscopic guidance  NG tube placement Next using fluoroscopic guidance, an NG tube was placed down into the stomach past the narrowing at the GE junction  2 stiff wires were required to advance this  Impression successful NG tube placement with its tip in the stomach  The tube may be used immediately  Workstation performed: [id number]     Echo complete w/ contrast if indicated    Result Date: 5/23/2022  Narrative: Normie Glory  Left Ventricle: Left ventricular cavity size is normal  Wall thickness is mildly increased  There is mild concentric hypertrophy  The left ventricular ejection fraction is 60 to 65 % by visual estimation  Systolic function is normal  Although no diagnostic regional wall motion abnormality was identified, this possibility cannot be completely excluded on the basis of this study  Diastolic function is mildly abnormal, consistent with grade I (abnormal) relaxation    Aortic Valve: There is mild stenosis  Aortic valve area is 1 5 cm2  Peak gradient 25 and mean gradient 15 mmHg    Mitral Valve: There is moderate annular calcification  There is mild regurgitation    Tricuspid Valve: There is mild regurgitation  Pulmonary artery pressure around 45-50 mmHg    As compared to previous study done on 05/16/2022 no significant change  EF remains the same  Echo complete w/ contrast if indicated    Result Date: 5/16/2022  Narrative: Normie Glory  Left Ventricle: Left ventricular cavity size is normal  Wall thickness is mild to moderately increased  Systolic function is normal   Estimated ejection fraction is 65%  Wall motion is normal    Left Atrium: The atrium is mildly dilated    Aortic Valve: The aortic valve is trileaflet  The leaflets are moderately thickened  The leaflets are mildly calcified  The leaflets exhibit normal mobility  There is trace regurgitation    Mitral Valve:  There is mild regurgitation    Tricuspid Valve: There is mild regurgitation  The right ventricular systolic pressure is mildly elevated at 45 mmHg    Pulmonic Valve: There is trace regurgitation  EKG / Monitor: Personally reviewed  Sinus rhythm        Shaq Silva, [address] [facility]  Cardiology      "This note has been constructed using a voice recognition system  Therefore there may be syntax, spelling, and/or grammatical errors   Please call if you have any questions  "

## 2022-05-24 NOTE — RESPIRATORY THERAPY NOTE
Patient received on ventilator on full support vent settings  Transitioned to PS  See vent flowsheet for settings  He is sync with the vent, VSS, SpO2 100%, RR in low 20's  Will continue to monitor  1227    ETT withdrawn 1 cm per [employer]  Now at 24 at the lip

## 2022-05-24 NOTE — ASSESSMENT & PLAN NOTE
· Patient previously in ICU for hypoxia with a max of 15L midflow and concern for aspiration pneumonitis  Improved and was able to transfer to regular medical floor  · Intubated during cardiac arrest  Possibly hypoxia driven cause of cardiac arrest  May have aspirated   · AC/PC 20/18/40%/6  · SBT today  · Tolerated several hours of pressure support 8 yesterday, but became tachypneic and was switched back to AC/PC  · VAP bundle ordered; chlorhexidine, ppi, head of bed > 30°  · 5/22 CT C/A/P: Stable appearance of mixed groundglass and consolidative abnormalities in the lungs bilaterally predominantly affecting the upper lobes suspicious for multilobar pneumonia  Superimposed pulmonary edema not excluded  Slight increased size of moderate bilateral pleural effusions  · 5/24 CXR multifocal infiltrates/CHF  · S/p Lasix 20mg yesterday  · Pulmonary toilet for development of thick secretions  · Atrovent/xopenex/mucomyst nebs q8h  · Chest PT   · ETT suctioning PRN  · Acute episode of hypoxia overnight initially improved with PEEP 8 and fio2 60%, second episode of hypoxia later on with sudden desaturation to 77%, patient bagged and O2 sat improved, place on 10 PEEP and 100% with improvement in O2 sat to 100%   Now titrating fio2 down, currently 70%  · ABG with compensated metabolic acidosis; 2 33/68 8/353/75 3/8 2

## 2022-05-24 NOTE — PROGRESS NOTES
prog  Progress Note - Infectious Disease   Fady Hudson 80 y o  male MRN: [id number]  Unit/Bed#: ICU 02 Encounter: [id number]      Impression/Plan:  1  S/p cardiac arrest 5/21/22  Patient intubated during RR  In ICU on ventilator assistance  Recurrent SIRS possibly reactive to arrest with fever, tachycardia, tachypnea, and increased leukocytosis post arrest  Possible recurrent aspiration event s/p arrest  Fever down trended  -continue antibiotics as below  -monitor temperature and hemodynamics  -follow-up repeat blood cultures  -monitor serial a m  Labs  -continue supportive measures per critical care team  -cardiology on board  -ventilator management per critical care team     2  SIRS vs Severe Sepsis, evolving on admission and post #1   Evidenced by tachycardia, tachypnea, bandemia and encephalopathy   Suspect possible aspiration in setting of significant retained secretions, acute respiratory failure requiring supplemental oxygenation and with recent NG tube for postop ileus management   MRSA screen negative  WBC count 18 K  -continue cefepime and Flagyl as below  -follow-up final repeat blood cultures   -monitor temperature and hemodynamics  -serial exam  -monitor CBC and BMP   -additional supportive measures per critical care team as below      3    Peritonitis s/p Bowel perforation  s/p 5/11/22 exploratory laparotomy, low anterior resection, protective loop transverse colostomy and segmental small bowel resection secondary to perforation rectosigmoid junction after colonoscopy   OR cultures grew Pseudomonas aeruginosa and Bacteroides fragilis  Now being managed with NGT/NPO status for post op ileus on TPN    -Finish 2 weeks post op Flagyl today   -Continue Cefepime through 5/27/22  -monitor temperature and hemodynamics  -serial exam  -close surgical follow-up  -recheck CBC and BMP   -plan to complete 2 weeks antibiotics total through 5/27/22  -TPN per surgical team     4   Acute hypoxic respiratory failure with hypoxia   Suspicious for aspiration pneumonitis over pneumonia     22 CT C/A/P predominantly UL groundglass and consolidations, bilateral pleural effusions, SB mild dilation unchanged  Patient now intubated s/p #1  22 Procalcitonin level  2 60 >  0 753 < 22 2 97  22 Sputum cx with mixed oropharyngeal contaminants  -continue antibiotics for #3 as above  -ventilator management per critical care team      5  Aspiration pneumonitis versus pneumonia in setting of #1/3   22 CT C/A/P predominantly UL groundglass and consolidations, bilateral pleural effusions, SB mild dilation unchanged  Patient now intubated s/p #1  22 Procalcitonin level  2 60 >  0 753 < 22 2 97   22 sputum specimen oral pharyngeal contamination   -continue antibiotics as above  -secretion and pulmonary toilet management per critical care team   -monitor respiratory symptoms  -monitor vent requirements     6  MARYELLEN on CKD 3  CrCl 33%  Creatinine 1 88  -renal dose adjust antibiotics as needed  -volume management  -monitor creatinine     Antibiotics:  Cefepime D11/Flagyl D14     Above impression and plan discussed in detail with patient's RN and critical care team      Subjective:  Patient has no fever, chills, sweats on ventilator in ICU s/p cardiac arrest 22; no nausea, vomiting, diarrhea reported    Appears to be tolerating IV antibiotics    Objective:  Vitals:  Temp:  [98 24 °F (36 8 °C)-99 32 °F (37 4 °C)] 99 32 °F (37 4 °C)  HR:  [58-85] 70  Resp:  [20-31] 22  BP: ()/(45-60) 85/49  SpO2:  [76 %-100 %] 100 %  Temp (24hrs), Av 7 °F (37 1 °C), Min:98 24 °F (36 8 °C), Max:99 32 °F (37 4 °C)  Current: Temperature: 99 32 °F (37 4 °C)    Physical Exam:   General Appearance:  80year-old acute on chronically debilitated male, opens eyes briefly to name and exam, sluggish on ventilator at FiO2 of 65% propped in ICU bed, arms propped on pillows, soft upper extremity restraints in place Throat: Oropharynx moist without lesions  ET tube to vent   Lungs:   Decreased ventilated breath sounds, clearer to auscultation bilaterally; no purulent secretions   Heart:  RRR; no murmur   Abdomen:   Soft, no focal tenderness at midline abdominal incision site with ABD dressing intact without strike through drainage or spreading erythema outside dressing, left lower quadrant colostomy with scant brown stool in bag positive diminished bowel sounds  Extremities: No clubbing, cyanosis or edema, + venodynes in place   : [place] with clear, yellow urine in bag, no SPT   Skin: No new rashes or lesions  Right arm PICC and right CVP lines in place, TPN running       Labs, Imaging, & Other studies:   All pertinent labs and imaging studies were personally reviewed  Results from last 7 days   Lab Units 05/24/22  1059 05/24/22  0238 05/23/22  1541 05/23/22  0438 05/22/22  0524   WBC Thousand/uL  --  18 23*  --  21 01* 23 05*   HEMOGLOBIN g/dL 8 3* 6 7* 7 2* 7 2* 8 5*   PLATELETS Thousands/uL  --  241  --  243 273     Results from last 7 days   Lab Units 05/24/22  0238 05/23/22  1541 05/23/22  0438 05/22/22  0524   SODIUM mmol/L 142 141 141 146*   POTASSIUM mmol/L 3 6 3 7 3 7 5 2   CHLORIDE mmol/L 111* 112* 113* 115*   CO2 mmol/L 21 23 21 20*   BUN mg/dL 73* 69* 66* 57*   CREATININE mg/dL 1 88* 1 77* 1 72* 1 56*   EGFR ml/min/1 73sq m 30 32 34 38   CALCIUM mg/dL 7 1* 7 1* 7 2* 7 6*   AST U/L 18  --  19 29   ALT U/L 13  --  18 22   ALK PHOS U/L 53  --  41* 43*     Results from last 7 days   Lab Units 05/22/22  0934 05/21/22  1656 05/18/22  1601 05/18/22  1559   BLOOD CULTURE  No Growth at 24 hrs  No Growth at 24 hrs   --  No Growth After 5 Days  No Growth After 5 Days     MRSA CULTURE ONLY   --  No Methicillin Resistant Staphlyococcus aureus (MRSA) isolated  --   --      Results from last 7 days   Lab Units 05/24/22  0238 05/23/22  0438 05/21/22  0524 05/20/22  0546 05/19/22  0517 05/18/22  0446   PROCALCITONIN ng/ml 2 58* 2 97* 0 53* 0 76* 0 99* 1 24*

## 2022-05-24 NOTE — ASSESSMENT & PLAN NOTE
· S/p cardiac arrest while on 3 pressors noted to have afib with RVR  · Bolused with amio and placed on infusion  · Converted to sinus rhythm on 5/22   · Amio gtt d/c 5/23  · Has remained in NSR  · Continue to monitor on telemetry  · Optimize electrolytes  · If BP remains stable start beta blocker   · No indication for [facility] at this time given resolved and poor candidate with downtrending hgb

## 2022-05-24 NOTE — ASSESSMENT & PLAN NOTE
· Peritonitis with intra-abdominal/pelvic abscess secondary to colonic perforation    · 5/22 CT chest/ab/pelvis with concern of multifocal PNA, no visualized intraabdominal abscess or anastomotic leak   · Per ID suspect multifocal pneumonitis   · Continue Cefepime, flagyl  · To continue through 5/27  · Repeat [facility] 5/22 negative x 24 hours   · MRSA nasal surveillance negative  · Sputum culture contaminated   · OR culture 5/21 pseudomonas and bacteroides fragilis  · ID following, appreciate recs

## 2022-05-24 NOTE — PROGRESS NOTES
Tami 45  Progress Note - Anna Hudson 2/15/1931, 80 y o  male MRN: [id number]  Unit/Bed#: ICU 02 Encounter: [id number]  Primary Care Provider: Diane Almendarez MD   Date and time admitted to hospital: 5/11/2022  4:48 PM    * Bowel perforation [facility])  Assessment & Plan  · Outpatient EGD and colonoscopy at [facility] on 5/11 for w/u of chronic anemia, history of colon polyps, intermittent LLQ abdominal pain and possible intermittent intussusception  EGD unremarkable, colonoscopy technically difficult and required change from adult scope to pediatric scope, but during traversal of the sigmoid colon there was a kink and patient sustained a perforation  Procedure was aborted and patient was transferred to [facility] where immediate surgical consultation was obtained  Patient was reported to have obvious signs of peritonitis on exam and CT ab/pelvis demonstrated pneumoperitoneum  · 5/11 Urgently to the OR with Dr Es Rock for ex-lap, low anterior resection, protective loop transverse colostomy, flex sigmoidoscopy, and segmental small bowel resection  · Post op ileus requiring NGT decompression and initiation of TPN  · Ileus now resolved  · CT 5/22  Diffuse mild dilation of small bowel segments identified without transition point    · Started on trickle tube feedings yesterday  · Ostomy with 75mL out for 24 hours  · Overnight with concern for free air under left hemidiaphragm on CXR, send for CT CAP, formal read pending, however overnight vRAD did not see any discernible free air  · Abdomen distended, continue serial abdominal exams   · Lactic repeated, 0 9  · Tube feedings held, NGT to LCWS-300mL output   · Continue TPN  · Appreciate surgery recs       Cardiac arrest [facility])  Assessment & Plan  · Patient was found unresponsive and hypoxic approximately 20 minutes after being seen well with family 5/21 at 1441  · PEA arrest x 2  · Unclear etiology, possibly hypoxia driven   · 8/22: Echo-EF 60-65%, G1DD, mild AS (BRADY 1 5cm2), mild MR, mild TR  · Neurologically intact post arrest   · Hypotension post ROSC requiring levo, vaso, and epi and stress dose steroids  · All pressors off since 0100 yesterday  · Hydrocortisone d/c 5/23      Acute respiratory failure with hypoxia (HCC)  Assessment & Plan  · Patient previously in ICU for hypoxia with a max of 15L midflow and concern for aspiration pneumonitis  Improved and was able to transfer to regular medical floor  · Intubated during cardiac arrest  Possibly hypoxia driven cause of cardiac arrest  May have aspirated   · AC/PC 20/18/40%/6  · SBT today  · Tolerated several hours of pressure support 8 yesterday, but became tachypneic and was switched back to AC/PC  · VAP bundle ordered; chlorhexidine, ppi, head of bed > 30°  · 5/22 CT C/A/P: Stable appearance of mixed groundglass and consolidative abnormalities in the lungs bilaterally predominantly affecting the upper lobes suspicious for multilobar pneumonia  Superimposed pulmonary edema not excluded  Slight increased size of moderate bilateral pleural effusions  · 5/24 CXR multifocal infiltrates/CHF  · S/p Lasix 20mg yesterday  · Pulmonary toilet for development of thick secretions  · Atrovent/xopenex/mucomyst nebs q8h  · Chest PT   · ETT suctioning PRN  · Acute episode of hypoxia overnight initially improved with PEEP 8 and fio2 60%, second episode of hypoxia later on with sudden desaturation to 77%, patient bagged and O2 sat improved, place on 10 PEEP and 100% with improvement in O2 sat to 100%   Now titrating fio2 down, currently 70%  · ABG with compensated metabolic acidosis; 7 78/68 9/611/28 7/9 3    Severe sepsis (HCC)  Assessment & Plan  · Peritonitis with intra-abdominal/pelvic abscess secondary to colonic perforation    · 5/22 CT chest/ab/pelvis with concern of multifocal PNA, no visualized intraabdominal abscess or anastomotic leak   · Per ID suspect multifocal pneumonitis   · Continue Cefepime, flagyl  · To continue through 5/27  · Repeat [facility] 5/22 negative x 24 hours   · MRSA nasal surveillance negative  · Sputum culture contaminated   · OR culture 5/21 pseudomonas and bacteroides fragilis  · ID following, appreciate recs     CHF (congestive heart failure) ([facility])  Assessment & Plan  Wt Readings from Last 3 Encounters:   05/23/22 72 1 kg (159 lb)   05/11/22 62 1 kg (136 lb 14 4 oz)   03/25/22 61 2 kg (135 lb)     · 5/16: Echo-EF 65%, mild TR, mild MR  · Repeat Echo post cardiac arrest 5/23 EF 60-65%, G1DD, mild AS (BRADY 1 5cm2), mild MR, mild TR  · Monitor I&O  · Daily weights   · +3 UE edema, +1 LE edema  · S/p lasix 20mg IV yesterday  · Soft BPs post diuresis, received 25g 25% albumin last evening         Hyperglycemia  Assessment & Plan  · A1c 5 3%  · Hyperglycemia likely secondary to TPN, critical illness, and 3 days of stress dose steroids  · Continue insulin infusion until TPN stopped then transition to SSI +/- long acting insulin     Toxic metabolic encephalopathy  Assessment & Plan  · Likely in setting of acute illness/delirium and cardiac arrest  · Delirium precautions; regulate sleep/wake cycle, environmental controls, daily CAM ICU   · Trend neuro exam  · Following commands, nodding appropriately to questions, moves all extremities    MARYELLEN (acute kidney injury) ([facility] [address] )  Assessment & Plan  · Secondary to hypoperfusion mehul cardiac arrest   · Creat 1 8 from baseline around 0 8, continues to up-trend from 1 7 yesterday  · Would hold further diuretic today  · Trend UOP and Cr  · Avoid hypotension/nephrotoxic medications    Anemia  Assessment & Plan  · hgb drop post cardiac arrest  Noted to have gross hematuria but this has since resolved  · 5/21 1 u PRBC for hgb 6 8, then improved to 9 2  · Hgb then dropped to 7 2 on 2 subsequent checks   · Hgb 6 7 this morning, 1 unit PRBC transfused  · Repeat hgb at 1000   · Per vRAD report no large hematoma seen on CT  · Continue to trend and transfuse for hgb less than 7    Atrial fibrillation (HCC)  Assessment & Plan  · S/p cardiac arrest while on 3 pressors noted to have afib with RVR  · Bolused with amio and placed on infusion  · Converted to sinus rhythm on 5/22   · Amio gtt d/c 5/23  · Has remained in NSR  · Continue to monitor on telemetry  · Optimize electrolytes  · If BP remains stable start beta blocker   · [facility] at this time given resolved and poor candidate with downtrending hgb     ----------------------------------------------------------------------------------------    HPI/24hr events: Acute episode of hypoxia overnight initiatially improved with PEEP 8 and fio2 60%, second episode of hypoxia later on with sudden desaturation to 77%, patient bagged and O2 sat improved, place on 10 PEEP and 100% with improvement in O2 sat to 100%  Now titrating fio2 down, currently 70%  Overnight with concern for free air under left hemidiaphragm on CXR, sent for CT CAP, formal read pending, however overnight vRAD did not see any discernible free air  NGT placed back to suction, 300mL out  Low ostomy output, 75mL x 24 hours  Hgb 6 7 this morning, 1 unit PRBC transfused  No overt signs of bleeding, no hematoma identified on CT       Patient appropriate for transfer out of the ICU today?: No  Disposition: Continue Critical Care   Code Status: Level 1 - Full Code  ---------------------------------------------------------------------------------------  SUBJECTIVE    Review of Systems   Unable to perform ROS: Intubated     Review of systems was unable to be performed secondary to intubation  ---------------------------------------------------------------------------------------  OBJECTIVE    Vitals   Vitals:    05/24/22 0100 05/24/22 0322 05/24/22 0334 05/24/22 0402   BP: 125/60  90/55 102/55   Pulse: 79  67 62   Resp: (!) 30  20 20   Temp: 98 42 °F (36 9 °C)  98 6 °F (37 °C) 98 6 °F (37 °C)   TempSrc:       SpO2: 98% 100% 100% 100%   Weight:       Height:         Temp (24hrs), Av 3 °F (36 8 °C), Min:98 06 °F (36 7 °C), Max:98 6 °F (37 °C)  Current: Temperature: 98 6 °F (37 °C)  Arterial Line BP: 120/63  Arterial Line MAP (mmHg): 86 mmHg    Respiratory:  SpO2: SpO2: 100 %, SpO2 Activity: SpO2 Activity: At Rest, SpO2 Device: O2 Device: Ventilator  Nasal Cannula O2 Flow Rate (L/min): 5 L/min    Invasive/non-invasive ventilation settings   Respiratory  Report   Lab Data (Last 4 hours)       0225            pH, Arterial       7 405             pCO2, Arterial       28 5             pO2, Arterial       220 0             HCO3, Arterial       17 5             Base Excess, Arterial       -6 5                  O2/Vent Data        0051   Most Recent         Vent Mode   AC/PC      Resp Rate (BPM) (BPM)   20      Pressure Control (cmH2O) (cm)   18      Insp Time (sec) (sec) 0 9  0 9      FiO2 (%) (%) 60  80      PEEP (cmH2O) (cmH2O) 8  10      MV   13 7                  Physical Exam  Constitutional:       General: He is awake  Appearance: He is ill-appearing  Interventions: He is intubated and restrained  HENT:      Head: Normocephalic and atraumatic  Eyes:      General: Lids are normal       Extraocular Movements: Extraocular movements intact  Conjunctiva/sclera: Conjunctivae normal    Neck:      Trachea: Trachea normal    Cardiovascular:      Rate and Rhythm: Normal rate and regular rhythm  Pulses: Normal pulses  Radial pulses are 2+ on the right side and 2+ on the left side  Dorsalis pedis pulses are 2+ on the right side and 2+ on the left side  Heart sounds: Normal heart sounds, S1 normal and S2 normal       Comments: +3 UE edema, +1 LE edema  Pulmonary:      Effort: Pulmonary effort is normal  He is intubated  Comments: Course rhonchi, b/l upper lobes  Abdominal:      General: Bowel sounds are normal  There is distension  Tenderness: There is generalized abdominal tenderness        Comments: Midline abdominal incision with dry dressing, ostomy with small amount of green/brown output  Musculoskeletal:      Cervical back: Full passive range of motion without pain, normal range of motion and neck supple  Comments: Normal ROM, generalized decreased strength   Skin:     General: Skin is warm and dry  Capillary Refill: Capillary refill takes less than 2 seconds  Neurological:      Mental Status: He is alert  GCS: GCS eye subscore is 3  GCS verbal subscore is 1  GCS motor subscore is 6  Psychiatric:         Behavior: Behavior is cooperative  Laboratory and Diagnostics:  Results from last 7 days   Lab Units 05/24/22  0238 05/23/22  1541 05/23/22  0438 05/22/22  0524 05/21/22  2214 05/21/22  1539 05/21/22  0524 05/20/22  0546 05/19/22  0517 05/18/22  0446 05/17/22  0630   WBC Thousand/uL 18 23*  --  21 01* 23 05*  --  21 74* 23 58* 18 87* 13 94*   < > 15 37*   HEMOGLOBIN g/dL 6 7* 7 2* 7 2* 8 5* 9 7* 6 2* 8 2* 8 5* 8 4*   < > 8 2*   HEMATOCRIT % 19 6* 21 0* 20 4* 25 4*  --  18 3* 24 7* 25 5* 23 7*   < > 24 2*   PLATELETS Thousands/uL 241  --  243 273  --  231 326 335 314   < > 254   BANDS PCT % 13*  --   --   --   --   --  20*  --   --   --  10*   MONO PCT % 5  --   --   --   --   --  7  --   --   --  3*    < > = values in this interval not displayed       Results from last 7 days   Lab Units 05/24/22  0238 05/23/22  1541 05/23/22  0438 05/22/22  0524 05/21/22  2214 05/21/22  1539 05/21/22  0524 05/19/22  0517 05/18/22  0446 05/17/22  0630   SODIUM mmol/L 142 141 141 146* 149* 150* 147*   < > 141 142   POTASSIUM mmol/L 3 6 3 7 3 7 5 2 4 6 3 0* 3 5   < > 4 0 3 8   CHLORIDE mmol/L 111* 112* 113* 115* 118* 119* 116*   < > 111* 111*   CO2 mmol/L 21 23 21 20* 22 25 27   < > 24 25   ANION GAP mmol/L 10 6 7 11 9 6 4   < > 6 6   BUN mg/dL 73* 69* 66* 57* 53* 42* 45*   < > 37* 28*   CREATININE mg/dL 1 88* 1 77* 1 72* 1 56* 1 35* 0 94 1 03   < > 0 96 0 93   CALCIUM mg/dL 7 1* 7 1* 7 2* 7 6* 7 4* 5 7* 7 5* < > 7 6* 7 4*   GLUCOSE RANDOM mg/dL 143* 162* 191* 276* 128 249* 215*   < > 128 171*   ALT U/L 13  --  18 22  --  14  --   --  13 11*   AST U/L 18  --  19 29  --  31  --   --  12 12   ALK PHOS U/L 53  --  41* 43*  --  40*  --   --  43* 47   ALBUMIN g/dL 1 9*  --  1 6* 1 8*  --  1 5*  --   --  2 2* 2 2*   TOTAL BILIRUBIN mg/dL 1 05*  --  0 96 1 11*  --  0 48  --   --  0 71 0 68    < > = values in this interval not displayed  Results from last 7 days   Lab Units 05/24/22 0238 05/23/22  0438 05/22/22  0524 05/21/22  2214 05/21/22  1539 05/21/22  0524 05/20/22  0546   MAGNESIUM mg/dL 2 6 2 4 2 3 2 3 2 0 2 6 2 8*   PHOSPHORUS mg/dL 3 4 3 6 3 3 2 8 3 7 2 1* 2 0*               Results from last 7 days   Lab Units 05/24/22 0238 05/23/22  0438 05/22/22  1302 05/22/22  0929 05/21/22  1710 05/21/22  1539 05/17/22 2003   LACTIC ACID mmol/L 0 9 1 6 2 5* 3 5* 3 6* 3 5* 1 1     ABG:  Results from last 7 days   Lab Units 05/24/22 0225   PH ART  7 405   PCO2 ART mm Hg 28 5*   PO2 ART mm Hg 220 0*   HCO3 ART mmol/L 17 5*   BASE EXC ART mmol/L -6 5   ABG SOURCE  Radial, Left     VBG:  Results from last 7 days   Lab Units 05/24/22 0225 05/22/22  0952 05/22/22  0524   PH SAM   --   --  7 378   PCO2 SAM mm Hg  --   --  29 8*   PO2 SAM mm Hg  --   --  48 8*   HCO3 SAM mmol/L  --   --  17 2*   BASE EXC SAM mmol/L  --   --  -7 1   ABG SOURCE  Radial, Left   < >  --     < > = values in this interval not displayed  Results from last 7 days   Lab Units 05/24/22  0238 05/23/22  0438 05/21/22  0524 05/20/22  0546 05/19/22  0517 05/18/22  0446   PROCALCITONIN ng/ml 2 58* 2 97* 0 53* 0 76* 0 99* 1 24*       Micro  Results from last 7 days   Lab Units 05/22/22  0934 05/21/22  1656 05/18/22  1601 05/18/22  1559 05/17/22  0937 05/17/22  0923   BLOOD CULTURE  No Growth at 24 hrs  No Growth at 24 hrs   --  No Growth After 5 Days  No Growth After 5 Days  --   --    SPUTUM CULTURE   --   --   --   --  Test not performed   Suggest repeat specimen  --    GRAM STAIN RESULT   --   --   --   --  >10 squamous epithelial cells/lpf, indicating orophayngeal contamination  *  1+ Polys*  2+ Budding Yeast with Pseudomycelia*  Rare Gram positive rods*  --    MRSA CULTURE ONLY   --  No Methicillin Resistant Staphlyococcus aureus (MRSA) isolated  --   --  No Methicillin Resistant Staphlyococcus aureus (MRSA) isolated  --    LEGIONELLA URINARY ANTIGEN   --   --   --   --   --  Negative   STREP PNEUMONIAE ANTIGEN, URINE   --   --   --   --   --  Negative       Intake and Output  I/O       05/22 0701 05/23 0700 05/23 0701 05/24 0700    I V  (mL/kg) 807 3 (11 2) 237 1 (3 3)    Blood      NG/GT  150    IV Piggyback 900 150    TPN 1844 9 1250 2    Feedings  180    Total Intake(mL/kg) 3552 3 (49 1) 1967 3 (27 3)    Urine (mL/kg/hr) 1080 (0 6) 710 (0 7)    Emesis/NG output 1200 300    Stool 800 75    Total Output 3080 1085    Net +472 3 +882 3                Height and Weights   Height: 5' 4" (162 6 cm)  IBW (Ideal Body Weight): 59 2 kg  Body mass index is 27 29 kg/m²  Weight (last 2 days)     Date/Time Weight    05/23/22 0724 72 1 (159)    05/23/22 0439 72 3 (159 39)    05/22/22 0600 70 2 (154 76)            Nutrition       Diet Orders   (From admission, onward)             Start     Ordered    05/23/22 0831  Diet Enteral/Parenteral; Tube Feeding No Oral Diet; Jevity 1 2 Ulysses; Continuous; 20  Diet effective now        References:    Nutrtion Support Algorithm Enteral vs  Parenteral   Question Answer Comment   Diet Type Enteral/Parenteral    Enteral/Parenteral Tube Feeding No Oral Diet    Tube Feeding Formula: Jevity 1 2 Ulysses    Bolus/Cyclic/Continuous Continuous    Tube Feeding Goal Rate (mL/hr): 20    RD to adjust diet per protocol?  Yes        05/23/22 4870    05/12/22 0906  Room Service  Once        Question:  Type of Service  Answer:  Room Service - Appropriate with Assistance    05/12/22 0905                  Active Medications  Scheduled Meds:  Current Facility-Administered Medications   Medication Dose Route Frequency Provider Last Rate    acetaminophen  650 mg Oral Q6H PRN Shankar Kettle, CRNP      acetylcysteine  3 mL Nebulization Q8H Shankar Kettle, CRNP      Adult TPN (CUSTOM BASE/CUSTOM ELECTROLYTE)   Intravenous Continuous TPN Damon Tripathi PA-C 89 3 mL/hr at 05/23/22 2150    cefepime  2,000 mg Intravenous Q12H Shankar Kettle, CRNP Stopped (05/24/22 0037)    chlorhexidine  15 mL Mouth/Throat Q12H [address] Shankar Kettle, CRNP      insulin regular (HumuLIN R,NovoLIN R) infusion  0 3-21 Units/hr Intravenous Titrated Lisa Crowder PA-C 2 Units/hr (05/24/22 0410)    iohexol  50 mL Oral Once in imaging Shankar Kettle, CRNP      ipratropium-albuterol  3 mL Nebulization Q6H PRN Shankar Kettle, CRNP      ipratropium-albuterol  3 mL Nebulization Q8H Shankar Kettle, CRNP      levothyroxine  112 mcg Per NG Tube Early Morning Shankar Kettle, CRNP      lidocaine  2 patch Topical Daily Lisa Crowder, [place]      metroNIDAZOLE  500 mg Intravenous Q8H Shankar Kettle, CRNP 500 mg (05/24/22 0058)    ondansetron  4 mg Intravenous Q6H PRN Shankar Kettle, CRNP      oxyCODONE  5 mg Per NG Tube Q4H PRN Damon Lehman PA-C      oxyCODONE  7 5 mg Per NG Tube Q4H PRN Damon Tripathi PA-C      pantoprazole  40 mg Intravenous Q24H [address] Shankar Kettle, CRNP      potassium chloride  40 mEq Intravenous Once Levander Simran V, CRNP 40 mEq (05/24/22 0400)     Continuous Infusions:  Adult TPN (CUSTOM BASE/CUSTOM ELECTROLYTE), , Last Rate: 89 3 mL/hr at 05/23/22 2150  insulin regular (HumuLIN R,NovoLIN R) infusion, 0 3-21 Units/hr, Last Rate: 2 Units/hr (05/24/22 0410)      PRN Meds:   acetaminophen, 650 mg, Q6H PRN  iohexol, 50 mL, Once in imaging  ipratropium-albuterol, 3 mL, Q6H PRN  ondansetron, 4 mg, Q6H PRN  oxyCODONE, 5 mg, Q4H PRN  oxyCODONE, 7 5 mg, Q4H PRN        Invasive Devices Review  Invasive Devices  Report    Peripherally Inserted Central Catheter Line  Duration           PICC Line 78/47/86 Right Basilic 5 days          Central Venous Catheter Line  Duration           CVC Central Lines 05/21/22 Triple 16cm 2 days          Drain  Duration           Colostomy LLQ 13 days    NG/OG/Enteral Tube Orogastric 16 Fr Center mouth 3 days    Urethral Catheter Double-lumen 16 Fr  3 days          Airway  Duration           ETT  Oral 2 days                Rationale for remaining devices: D/C TLC today, continue PICC for TPN, continue quigley for strict I&O  ---------------------------------------------------------------------------------------  Advance Directive and Living Will:      Power of :    POLST:    ---------------------------------------------------------------------------------------  Care Time Delivered:   Upon my evaluation, this patient had a high probability of imminent or life-threatening deterioration due to acute respiratory failure with hypoxia, which required my direct attention, intervention, and personal management  I have personally provided 35 minutes (0300 to 0500) of critical care time, exclusive of procedures, teaching, family meetings, and any prior time recorded by providers other than myself  ISELA De Leon      Portions of the record may have been created with voice recognition software  Occasional wrong word or "sound a like" substitutions may have occurred due to the inherent limitations of voice recognition software    Read the chart carefully and recognize, using context, where substitutions have occurred

## 2022-05-24 NOTE — ASSESSMENT & PLAN NOTE
· A1c 5 3%  · Hyperglycemia likely secondary to TPN, critical illness, and 3 days of stress dose steroids  · Continue insulin infusion until TPN stopped then transition to SSI +/- long acting insulin

## 2022-05-24 NOTE — ASSESSMENT & PLAN NOTE
Wt Readings from Last 3 Encounters:   05/23/22 72 1 kg (159 lb)   05/11/22 62 1 kg (136 lb 14 4 oz)   03/25/22 61 2 kg (135 lb)     · 5/16: Echo-EF 65%, mild TR, mild MR  · Repeat Echo post cardiac arrest 5/23 EF 60-65%, G1DD, mild AS (BRADY 1 5cm2), mild MR, mild TR  · Monitor I&O  · Daily weights   · +3 UE edema, +1 LE edema  · S/p lasix 20mg IV yesterday  · Soft BPs post diuresis, received 25g 25% albumin last evening

## 2022-05-24 NOTE — CASE MANAGEMENT
Case Management Progress Note    Patient name Alejo Strickland  Location ICU /ICU 02 MRN [id number]  : 2/15/1931 Date 2022       LOS (days): 13  Geometric Mean LOS (GMLOS) (days): 10 30  Days to GMLOS:-2 6        OBJECTIVE:        Current admission status: Inpatient  Preferred Pharmacy:   [id number], [address]   [address] [address]  Phone: [phone] Fax: [phone]    [id number] [facility], [address]  [address] [place] [address],  [address]  [address] [place] [address]  Phone: [phone] Fax: [phone]    Primary Care Provider: Frances Kumar MD    Primary Insurance: MEDICARE  Secondary Insurance: AARP    PROGRESS NOTE:  Weekly Care Management Length of Stay Review     Current LOS: 13    Most Recent Labs:     Lab Results   Component Value Date/Time    WBC 18 23 (H) 2022 02:38 AM    HGB 8 3 (L) 2022 10:59 AM    HCT 19 6 (L) 2022 02:38 AM     2022 02:38 AM    BANDSPCT 13 (H) 2022 02:38 AM    SODIUM 142 2022 02:38 AM    K 3 6 2022 02:38 AM     (H) 2022 02:38 AM    CO2 21 2022 02:38 AM    BUN 73 (H) 2022 02:38 AM    CREATININE 1 88 (H) 2022 02:38 AM    GLUC 143 (H) 2022 02:38 AM    ALKPHOS 53 2022 02:38 AM    ALT 13 2022 02:38 AM    AST 18 2022 02:38 AM    ALB 1 9 (L) 2022 02:38 AM    TBILI 1 05 (H) 2022 02:38 AM    TRIG 145 2022 05:24 AM       Most Recent Vitals:   Vitals:    22 1322   BP:    Pulse:    Resp:    Temp:    SpO2: 100%        Brief Care Summary & Need for Continued Stay[de-identified]     Intended Discharge Disposition: STR    Expected Discharge Date: TBD    Current Identified Barriers to Discharge & Delays: not medically stable    D/C Goals/ CM Interventions:    Coded over weekend, intubated  Last evening, resp status declined  Plan for [address] meeting with surgery and family  Referrals out to [facility] and sub-acute

## 2022-05-24 NOTE — ASSESSMENT & PLAN NOTE
· Patient was found unresponsive and hypoxic approximately 20 minutes after being seen well with family 5/21 at 1441  · PEA arrest x 2  · Unclear etiology, possibly hypoxia driven   · 5/47: Echo-EF 60-65%, G1DD, mild AS (BRADY 1 5cm2), mild MR, mild TR  · Neurologically intact post arrest   · Hypotension post ROSC requiring levo, vaso, and epi and stress dose steroids  · All pressors off since 0100 yesterday  · Hydrocortisone d/c 5/23

## 2022-05-24 NOTE — ASSESSMENT & PLAN NOTE
· hgb drop post cardiac arrest  Noted to have gross hematuria but this has since resolved  · 5/21 1 u PRBC for hgb 6 8, then improved to 9 2  · Hgb then dropped to 7 2 on 2 subsequent checks   · Hgb 6 7 this morning, 1 unit PRBC transfused  · Repeat hgb at 1000   · Per vRAD report no large hematoma seen on CT  · Continue to trend and transfuse for hgb less than 7

## 2022-05-24 NOTE — PROGRESS NOTES
Patient successfully taken to and from catscan via parapac + vent w/o incident  Vitals remained stable/ vent volumes returned w/o complications  Pt  Returned back to  Vent (see flow sheet for settings)  O2sat currently 100% and in NARD  BVM @ bedside  Will continue to monitor pt

## 2022-05-24 NOTE — ASSESSMENT & PLAN NOTE
· Likely in setting of acute illness/delirium and cardiac arrest  · Delirium precautions; regulate sleep/wake cycle, environmental controls, daily CAM ICU   · Trend neuro exam  · Following commands, nodding appropriately to questions, moves all extremities

## 2022-05-24 NOTE — PROGRESS NOTES
Recommend switching to Osmolite 1 0 at trickle feed of 10 mL/hour  Also recommend TPN be changed to 1000 mL 30% dextrose, 500 mL 15% amino acids, 200 mL 20% lipids for a total volume of 1700 mL  This with the TF will provide 1974 kcals, 300 grams CHO, 86 grams protein, 40 grams lipids, 1900 mL free water  Glucose infusion rate will be 2 84 mg/kg/min and lipid load will be 0 55 grams/kg

## 2022-05-24 NOTE — PHYSICAL THERAPY NOTE
PT cancellation   05/24/22 1040   PT Last Visit   PT Visit Date 05/24/22   Note Type   Note Type Cancelled Session   Cancel Reasons Other  (Pt declines:shook his head "no"; PT asked pt to squeeze therapist's hand if he did not want therapy today  Will follow )   Licensure   [place] License Number  Kwadwo Huang PT  [id number]

## 2022-05-24 NOTE — ASSESSMENT & PLAN NOTE
· Outpatient EGD and colonoscopy at [facility] on 5/11 for w/u of chronic anemia, history of colon polyps, intermittent LLQ abdominal pain and possible intermittent intussusception  EGD unremarkable, colonoscopy technically difficult and required change from adult scope to pediatric scope, but during traversal of the sigmoid colon there was a kink and patient sustained a perforation  Procedure was aborted and patient was transferred to [facility] where immediate surgical consultation was obtained  Patient was reported to have obvious signs of peritonitis on exam and CT ab/pelvis demonstrated pneumoperitoneum  · 5/11 Urgently to the OR with Dr Cárdenas Devoid for ex-lap, low anterior resection, protective loop transverse colostomy, flex sigmoidoscopy, and segmental small bowel resection  · Post op ileus requiring NGT decompression and initiation of TPN  · Ileus now resolved  · CT 5/22  Diffuse mild dilation of small bowel segments identified without transition point    · Started on trickle tube feedings yesterday  · Ostomy with 75mL out for 24 hours  · Overnight with concern for free air under left hemidiaphragm on CXR, send for CT CAP, formal read pending, however overnight vRAD did not see any discernible free air  · Abdomen distended, continue serial abdominal exams   · Lactic repeated, 0 9  · Tube feedings held, NGT to LCWS-300mL output   · Continue TPN  · Appreciate surgery recs

## 2022-05-25 NOTE — RESPIRATORY THERAPY NOTE
Patient received on full vent support  Transitioned to PS  See flowsheet for settings  VSS, SpO2 99%, patient is sync and tolerating vent settings

## 2022-05-25 NOTE — ASSESSMENT & PLAN NOTE
· Secondary to hypoperfusion mehul cardiac arrest, with likely ATN now  · Baseline creat 0 8  · Creatinine continues to up-trend now at 2 2  · Oliguric overnight with minimal response to lasix 40, given lasix 80 and metolazone 10 and started to make some urine, subsequently started on lasix infusion 20mg/hr  · Trend UOP; soraida for strict I&O  · BMP q6h  · Avoid hypotension/nephrotoxic medications

## 2022-05-25 NOTE — PROGRESS NOTES
[address]  [facility]  Progress Note - Colan Terri Hudson 2/15/1931, 80 y o  male MRN: [id number]  Unit/Bed#: ICU 02 Encounter: [id number]  Primary Care Provider: Semaj Smith MD   Date and time admitted to hospital: 5/11/2022  4:48 PM    * Bowel perforation [facility])  Assessment & Plan  · Outpatient EGD and colonoscopy at [facility] on 5/11 for w/u of chronic anemia, history of colon polyps, intermittent LLQ abdominal pain and possible intermittent intussusception  EGD unremarkable, colonoscopy technically difficult and required change from adult scope to pediatric scope, but during traversal of the sigmoid colon there was a kink and patient sustained a perforation  Procedure was aborted and patient was transferred to [facility] where immediate surgical consultation was obtained  Patient was reported to have obvious signs of peritonitis on exam and CT ab/pelvis demonstrated pneumoperitoneum  · 5/11 Urgently to the OR with Dr Idania Larson for ex-lap, low anterior resection, protective loop transverse colostomy, flex sigmoidoscopy, and segmental small bowel resection  · Post op ileus requiring NGT decompression and initiation of TPN  · CT 5/22  Diffuse mild dilation of small bowel segments identified without transition point  · Started on trickle tube feedings 5/22  · Ostomy with improved output mL/24 hours   · Concern for free air under left hemidiaphragm on CXR 5/24, sent for CT CAP- Distended small bowel loops with scattered air-fluid levels consistent with early/partial small bowel obstruction with transition at the small bowel anastomosis in the region of the ventral pelvis as above  No free air  Cholelithiasis without cholecystitis  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       Cardiac arrest [facility])  Assessment & Plan  · Patient was found unresponsive and hypoxic approximately 20 minutes after being seen well with family 5/21 at 1441  · PEA arrest x 2  · Unclear etiology, possibly hypoxia driven   · 4/53: Echo-EF 60-65%, G1DD, mild AS (BRADY 1 5cm2), mild MR, mild TR  · Neurologically intact post arrest   · Hypotension post ROSC requiring levo, vaso, and epi and stress dose steroids  · All pressors off since 0100 5/23  · Hydrocortisone d/c 5/23      Acute respiratory failure with hypoxia ([facility] [address] )  Assessment & Plan  · Patient previously in ICU for hypoxia with a max of 15L midflow and concern for aspiration pneumonitis  Improved and was able to transfer to general medical floor  · Intubated during cardiac arrest  Possibly hypoxia driven cause of cardiac arrest  May have aspirated   · AC/PC 20/18/65%/8  · SBT today  · Tolerated several hours of pressure support 14 yesterday, rested on AC/PC overnight  · VAP bundle ordered; chlorhexidine, ppi, head of bed > 30°  · 5/24 CT CAP-CHF with moderate basilar effusions and additional upper lung zone patchy airspace opacities likely reflecting asymmetric pulmonary edema  Infiltrate is not entirely excluded     · Lasix infusion 20mg/hr  · Pulmonary toilet for development of thick secretions  · Atrovent/xopenex/mucomyst nebs q8h  · Chest PT   · ETT suctioning PRN    Severe sepsis [facility])  Assessment & Plan  · Peritonitis with intra-abdominal/pelvic abscess secondary to colonic perforation    · 5/22 CT chest/ab/pelvis with concern of multifocal PNA, no visualized intraabdominal abscess or anastomotic leak   · Per ID suspect multifocal pneumonitis   · OR culture 5/21 pseudomonas and bacteroides fragilis  · Continue Cefepime through 5/27  · Completed 14 days of Flagyl 5/24  · Repeat [facility] 5/22 negative x 24 hours   · MRSA nasal surveillance negative  · Sputum culture contaminated   · ID following, appreciate recs     CHF (congestive heart failure) ([facility] )  Assessment & Plan  Wt Readings from Last 3 Encounters:   05/24/22 73 4 kg (161 lb 13 1 oz)   05/11/22 62 1 kg (136 lb 14 4 oz)   03/25/22 61 2 kg (135 lb)     · 5/16: Echo-EF 65%, mild TR, mild MR  · Repeat Echo post cardiac arrest 5/23 EF 60-65%, G1DD, mild AS (BRADY 1 5cm2), mild MR, mild TR  · Monitor I&O  · Daily weights   · Gross anasarca on exam   · Lasix infusion 20mg/hr  · Albumin 25g 25% x 4 doses       Hyperglycemia  Assessment & Plan  · A1c 5 3%  · Hyperglycemia likely secondary to TPN, critical illness, and 3 days of stress dose steroids  · Continue insulin infusion until TPN stopped then transition to SSI +/- long acting insulin     Toxic metabolic encephalopathy  Assessment & Plan  · Likely in setting of acute illness/delirium and cardiac arrest  · Delirium precautions; regulate sleep/wake cycle, environmental controls, daily CAM ICU   · Trend neuro exam  · Following commands, nodding appropriately to questions, moves all extremities    MARYELLEN (acute kidney injury) ([facility] )  Assessment & Plan  · Secondary to hypoperfusion mehul cardiac arrest, with likely ATN now  · Baseline creat 0 8  · Creatinine continues to up-trend now at 2 2  · Oliguric overnight with minimal response to lasix 40, given lasix 80 and metolazone 10 and started to make some urine, subsequently started on lasix infusion 20mg/hr  · Trend UOP; quigley for strict I&O  · BMP q6h  · Avoid hypotension/nephrotoxic medications    Anemia  Assessment & Plan  · hgb drop post cardiac arrest  Noted to have gross hematuria but this has since resolved  · 5/21 1 u PRBC for hgb 6 8, then improved to 9 2  · Hgb then dropped to 7 2 on 2 subsequent checks   · Hgb 6 7 yesterday morning s/p 1 unit PRBC, with improvement in hgb to 8 3  · No occult bleeding identified on CT 5/24   · Continue to trend and transfuse for hgb less than 7    Atrial fibrillation (HCC)  Assessment & Plan  · S/p cardiac arrest while on 3 pressors noted to have afib with RVR  · Bolused with amio and placed on infusion  · Converted to sinus rhythm on 5/22   · Amio gtt d/c 5/23  · Has remained in NSR  · Continue to monitor on telemetry  · Optimize electrolytes  · Start beta blocker once BP stabilizes, has had intermittent hypotension requiring albumin   · No indication for 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  Tube feedings held yesterday morning and OGT placed to LCWS  Tube feedings restarted at trickle yesterday afternoon and formula changed to osmolite to help with absorption  Ostomy with improved output mL/24 hours  Stomal prolapse and small peristomal hernia now at the transverse loop colostomy  Reduced by surgery at bedside yesterday  Surgery planning for possible wound vac placement on abdominal incision tomorrow, CT suggested possible wound dehiscence  PS 14 yesterday, switch back to AC/PC overnight   PEEP weaned to 8, fio2 65%    Oliguric overnight with minimal response to lasix 40, given lasix 80 and metolazone 10 and started to make some urine, subsequently started on lasix infusion 20mg/hr    Patient appropriate for transfer out of the ICU today?: No  Disposition: Continue Critical Care   Code Status: Level 1 - Full Code  ---------------------------------------------------------------------------------------  SUBJECTIVE    Review of Systems   Unable to perform ROS: Intubated     Review of systems was unable to be performed secondary to intubation  ---------------------------------------------------------------------------------------  OBJECTIVE    Vitals   Vitals:    22 0045 22 0100 22 0115 22 0130   BP: 105/53 105/51 113/55    BP Location:       Pulse: 69 71 73 73   Resp: (!) 26 (!) 28 (!) 28 (!) 27   Temp: 98 6 °F (37 °C) 98 6 °F (37 °C) 98 6 °F (37 °C) 98 78 °F (37 1 °C)   TempSrc:       SpO2: 99% 100% 100% 100%   Weight:       Height:         Temp (24hrs), Av 9 °F (37 2 °C), Min:97 88 °F (36 6 °C), Max:99 32 °F (37 4 °C)  Current: Temperature: 98 78 °F (37 1 °C)  Arterial Line BP: 120/63  Arterial Line MAP (mmHg): 86 mmHg    Respiratory:  SpO2: SpO2: 100 %, SpO2 Activity: SpO2 Activity: At Rest, SpO2 Device: O2 Device: Ventilator  Nasal Cannula O2 Flow Rate (L/min): 5 L/min    Invasive/non-invasive ventilation settings   Respiratory  Report   Lab Data (Last 4 hours)    None         O2/Vent Data (Last 4 hours)       2305           Vent Mode AC/PC       Resp Rate (BPM) (BPM) 20       Pressure Control (cmH2O) (cm) 18       Insp Time (sec) (sec) 0 9       FiO2 (%) (%) 45       PEEP (cmH2O) (cmH2O) 8       MV 14 5                   Physical Exam  Constitutional:       General: He is awake  Appearance: He is well-developed  He is ill-appearing  HENT:      Head: Normocephalic and atraumatic  Eyes:      General: Lids are normal       Extraocular Movements: Extraocular movements intact  Conjunctiva/sclera: Conjunctivae normal    Neck:      Trachea: Trachea normal    Cardiovascular:      Rate and Rhythm: Normal rate and regular rhythm  Pulses: Normal pulses             Radial pulses are 2+ on the right side and 2+ on the left side  Dorsalis pedis pulses are 2+ on the right side and 2+ on the left side  Heart sounds: Normal heart sounds, S1 normal and S2 normal       Comments: Gross anasarca   Pulmonary:      Effort: Pulmonary effort is normal       Comments: Course scattered rhonchi  Abdominal:      General: Bowel sounds are decreased  There is distension  Tenderness: There is generalized abdominal tenderness  Comments: Midline abdominal incision with ABD in place-dry and intact  Colostomy with green/brown liquid stool output, peristomal hernia  Genitourinary:     Comments: Scrotal edema   Musculoskeletal:      Cervical back: Full passive range of motion without pain, normal range of motion and neck supple  Comments: Normal ROM, generalized weakness   Skin:     General: Skin is warm and dry  Capillary Refill: Capillary refill takes less than 2 seconds  Neurological:      General: No focal deficit present  Mental Status: He is alert  GCS: GCS eye subscore is 4  GCS verbal subscore is 1  GCS motor subscore is 6  Psychiatric:         Behavior: Behavior is cooperative               Laboratory and Diagnostics:  Results from last 7 days   Lab Units 05/24/22  1059 05/24/22  0238 05/23/22  1541 05/23/22  4296 05/22/22  0524 05/21/22 2214 05/21/22  1539 05/21/22  0524 05/20/22  0546 05/19/22  0517   WBC Thousand/uL  --  18 23*  --  21 01* 23 05*  --  21 74* 23 58* 18 87* 13 94*   HEMOGLOBIN g/dL 8 3* 6 7* 7 2* 7 2* 8 5* 9 7* 6 2* 8 2* 8 5* 8 4*   HEMATOCRIT %  --  19 6* 21 0* 20 4* 25 4*  --  18 3* 24 7* 25 5* 23 7*   PLATELETS Thousands/uL  --  241  --  243 273  --  231 326 335 314   BANDS PCT %  --  13*  --   --   --   --   --  20*  --   --    MONO PCT %  --  5  --   --   --   --   --  7  --   --      Results from last 7 days   Lab Units 05/24/22  2239 05/24/22  0238 05/23/22  1541 05/23/22  0032 05/22/22  0524 05/21/22  2214 05/21/22  1539 05/19/22  0517 05/18/22  0446 SODIUM mmol/L 138 142 141 141 146* 149* 150*   < > 141   POTASSIUM mmol/L 4 2 3 6 3 7 3 7 5 2 4 6 3 0*   < > 4 0   CHLORIDE mmol/L 110* 111* 112* 113* 115* 118* 119*   < > 111*   CO2 mmol/L 19* 21 23 21 20* 22 25   < > 24   ANION GAP mmol/L 9 10 6 7 11 9 6   < > 6   BUN mg/dL 87* 73* 69* 66* 57* 53* 42*   < > 37*   CREATININE mg/dL 2 22* 1 88* 1 77* 1 72* 1 56* 1 35* 0 94   < > 0 96   CALCIUM mg/dL 7 2* 7 1* 7 1* 7 2* 7 6* 7 4* 5 7*   < > 7 6*   GLUCOSE RANDOM mg/dL 134 143* 162* 191* 276* 128 249*   < > 128   ALT U/L  --  13  --  18 22  --  14  --  13   AST U/L  --  18  --  19 29  --  31  --  12   ALK PHOS U/L  --  53  --  41* 43*  --  40*  --  43*   ALBUMIN g/dL  --  1 9*  --  1 6* 1 8*  --  1 5*  --  2 2*   TOTAL BILIRUBIN mg/dL  --  1 05*  --  0 96 1 11*  --  0 48  --  0 71    < > = values in this interval not displayed  Results from last 7 days   Lab Units 05/24/22 0238 05/23/22  0438 05/22/22  0524 05/21/22  2214 05/21/22  1539 05/21/22  0524 05/20/22  0546   MAGNESIUM mg/dL 2 6 2 4 2 3 2 3 2 0 2 6 2 8*   PHOSPHORUS mg/dL 3 4 3 6 3 3 2 8 3 7 2 1* 2 0*               Results from last 7 days   Lab Units 05/24/22  0238 05/23/22  0438 05/22/22  1302 05/22/22  0929 05/21/22  1710 05/21/22  1539   LACTIC ACID mmol/L 0 9 1 6 2 5* 3 5* 3 6* 3 5*     ABG:  Results from last 7 days   Lab Units 05/24/22  0225   PH ART  7 405   PCO2 ART mm Hg 28 5*   PO2 ART mm Hg 220 0*   HCO3 ART mmol/L 17 5*   BASE EXC ART mmol/L -6 5   ABG SOURCE  Radial, Left     VBG:  Results from last 7 days   Lab Units 05/24/22  0225 05/22/22  0952 05/22/22  0524   PH SAM   --   --  7 378   PCO2 SAM mm Hg  --   --  29 8*   PO2 SAM mm Hg  --   --  48 8*   HCO3 SAM mmol/L  --   --  17 2*   BASE EXC SAM mmol/L  --   --  -7 1   ABG SOURCE  Radial, Left   < >  --     < > = values in this interval not displayed       Results from last 7 days   Lab Units 05/24/22  0238 05/23/22  1482 05/21/22  0107 05/20/22  0546 05/19/22  0517 05/18/22  0446 PROCALCITONIN ng/ml 2 58* 2 97* 0 53* 0 76* 0 99* 1 24*       Micro  Results from last 7 days   Lab Units 05/22/22  0934 05/21/22  1656 05/18/22  1601 05/18/22  1559   BLOOD CULTURE  No Growth at 48 hrs  No Growth at 48 hrs  --  No Growth After 5 Days  No Growth After 5 Days  MRSA CULTURE ONLY   --  No Methicillin Resistant Staphlyococcus aureus (MRSA) isolated  --   --        No new imaging     Intake and Output  I/O       05/23 0701 05/24 0700 05/24 0701 05/25 0700    P  O   0    I V  (mL/kg) 363 3 (5) 189 2 (2 6)    Blood 350     NG/ 60    IV Piggyback 500 400    TPN 2141  7 1073 1    Feedings 300 50    Total Intake(mL/kg) 3805 (51 8) 1772 3 (24 1)    Urine (mL/kg/hr) 1015 (0 6) 445 (0 4)    Emesis/NG output 550 150    Stool 75 615    Total Output 1640 1210    Net +2165 +562 3                Height and Weights   Height: 5' 4" (162 6 cm)  IBW (Ideal Body Weight): 59 2 kg  Body mass index is 27 78 kg/m²  Weight (last 2 days)     Date/Time Weight    05/24/22 0600 73 4 (161 82)    05/23/22 0724 72 1 (159)    05/23/22 0439 72 3 (159 39)            Nutrition       Diet Orders   (From admission, onward)             Start     Ordered    05/24/22 1507  Diet Enteral/Parenteral; Tube Feeding No Oral Diet; Osmolite 1 0; Continuous; 10  Diet effective now        References:    Nutrtion Support Algorithm Enteral vs  Parenteral   Question Answer Comment   Diet Type Enteral/Parenteral    Enteral/Parenteral Tube Feeding No Oral Diet    Tube Feeding Formula: Osmolite 1 0    Bolus/Cyclic/Continuous Continuous    Tube Feeding Goal Rate (mL/hr): 10    RD to adjust diet per protocol?  Yes        05/24/22 1506    05/12/22 0906  Room Service  Once        Question:  Type of Service  Answer:  Room Service - Appropriate with Assistance    05/12/22 0905                  Active Medications  Scheduled Meds:  Current Facility-Administered Medications   Medication Dose Route Frequency Provider Last Rate    acetaminophen  650 mg Oral Q6H PRN Ples Horsfall, CRNP      acetylcysteine  3 mL Nebulization Q8H Ples Horsfall, CRNP      Adult TPN (CUSTOM BASE/CUSTOM ELECTROLYTE)   Intravenous Continuous TPN Josephine Mare ISELA Tai 89 3 mL/hr at 05/24/22 2157    albumin human  25 g Intravenous Q6H Cherri Spironello V, CRNP 0 g (05/24/22 2100)    cefepime  2,000 mg Intravenous Q12H Ples Horsfall, CRNP Stopped (05/24/22 2316)    chlorhexidine  15 mL Mouth/Throat Q12H [facility] Ples Horsfall, MACARIONP      furosemide  20 mg/hr Intravenous Continuous Cherri Spiroheathero V, CRNP      insulin regular (HumuLIN R,NovoLIN R) infusion  0 3-21 Units/hr Intravenous Titrated Guicho Bronson PA-C 3 Units/hr (05/25/22 0001)    iohexol  50 mL Oral Once in imaging [facility] MAC[facility]IONP      ipratropium-albuterol  3 mL Nebulization Q6H PRN [facility], ISELA      ipratropium-albuterol  3 mL Nebulization Q8H [facility], ISELA      levothyroxine  112 mcg Per NG Tube Early Morning [facility], ISELA      lidocaine  2 patch Topical Daily [place], [place]      norepinephrine  1-30 mcg/min Intravenous Titrated Cherri Gonzales VMACARIONP 2 mcg/min (05/24/22 2250)    ondansetron  4 mg Intravenous Q6H PRN [facility] Horsfall, CROLIVER      oxyCODONE  5 mg Per NG Tube Q4H PRN Jose Lehman PA-C      oxyCODONE  7 5 mg Per NG Tube Q4H PRN Jose Tripathi PA-C      pantoprazole  40 mg Intravenous Q24H [facility] Ples Horscarmelo, ISELA       Continuous Infusions:  Adult TPN (CUSTOM BASE/CUSTOM ELECTROLYTE), , Last Rate: 89 3 mL/hr at 05/24/22 2157  furosemide, 20 mg/hr  insulin regular (HumuLIN R,NovoLIN R) infusion, 0 3-21 Units/hr, Last Rate: 3 Units/hr (05/25/22 0001)  norepinephrine, 1-30 mcg/min, Last Rate: 2 mcg/min (05/24/22 2250)      PRN Meds:   acetaminophen, 650 mg, Q6H PRN  iohexol, 50 mL, Once in imaging  ipratropium-albuterol, 3 mL, Q6H PRN  ondansetron, 4 mg, Q6H PRN  oxyCODONE, 5 mg, Q4H PRN  oxyCODONE, 7 5 mg, Q4H PRN        Invasive Devices Review  Invasive Devices  Report    Peripherally Inserted Central Catheter Line  Duration           PICC Line 43/11/94 Right Basilic 6 days          Central Venous Catheter Line  Duration           CVC Central Lines 05/21/22 Triple 16cm 3 days          Drain  Duration           Colostomy LLQ 14 days    NG/OG/Enteral Tube Orogastric 16 Fr Center mouth 4 days    Urethral Catheter Double-lumen 16 Fr  4 days          Airway  Duration           ETT  Oral 3 days                Rationale for remaining devices: d/c TLC and continue PICC line, continue quigley for strict I&O and significant scrotal edema   ---------------------------------------------------------------------------------------  Advance Directive and Living Will:      Power of :    POLST:    ---------------------------------------------------------------------------------------  Care Time Delivered:   No Critical Care time spent       ISELA Roche      Portions of the record may have been created with voice recognition software  Occasional wrong word or "sound a like" substitutions may have occurred due to the inherent limitations of voice recognition software    Read the chart carefully and recognize, using context, where substitutions have occurred

## 2022-05-25 NOTE — ASSESSMENT & PLAN NOTE
Wt Readings from Last 3 Encounters:   05/24/22 73 4 kg (161 lb 13 1 oz)   05/11/22 62 1 kg (136 lb 14 4 oz)   03/25/22 61 2 kg (135 lb)     · 5/16: Echo-EF 65%, mild TR, mild MR  · Repeat Echo post cardiac arrest 5/23 EF 60-65%, G1DD, mild AS (BRADY 1 5cm2), mild MR, mild TR  · Monitor I&O  · Daily weights   · Gross anasarca on exam   · Lasix infusion 20mg/hr  · Albumin 25g 25% x 4 doses

## 2022-05-25 NOTE — ASSESSMENT & PLAN NOTE
· Outpatient EGD and colonoscopy at [facility] on 5/11 for w/u of chronic anemia, history of colon polyps, intermittent LLQ abdominal pain and possible intermittent intussusception  EGD unremarkable, colonoscopy technically difficult and required change from adult scope to pediatric scope, but during traversal of the sigmoid colon there was a kink and patient sustained a perforation  Procedure was aborted and patient was transferred to [facility] where immediate surgical consultation was obtained  Patient was reported to have obvious signs of peritonitis on exam and CT ab/pelvis demonstrated pneumoperitoneum  · 5/11 Urgently to the OR with Dr José Fox for ex-lap, low anterior resection, protective loop transverse colostomy, flex sigmoidoscopy, and segmental small bowel resection  · Post op ileus requiring NGT decompression and initiation of TPN  · CT 5/22  Diffuse mild dilation of small bowel segments identified without transition point  · Started on trickle tube feedings 5/22  · Ostomy with improved output mL/24 hours   · Concern for free air under left hemidiaphragm on CXR 5/24, sent for CT CAP- Distended small bowel loops with scattered air-fluid levels consistent with early/partial small bowel obstruction with transition at the small bowel anastomosis in the region of the ventral pelvis as above  No free air  Cholelithiasis without cholecystitis  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

## 2022-05-25 NOTE — ASSESSMENT & PLAN NOTE
· S/p cardiac arrest while on 3 pressors noted to have afib with RVR  · Bolused with amio and placed on infusion  · Converted to sinus rhythm on 5/22   · Amio gtt d/c 5/23  · Has remained in NSR  · Continue to monitor on telemetry  · Optimize electrolytes  · Start beta blocker once BP stabilizes, has had intermittent hypotension requiring albumin   · No indication for [facility] at this time given resolved and poor candidate with downtrending hgb

## 2022-05-25 NOTE — PROGRESS NOTES
Progress Note - Infectious Disease   Fady Hudson 80 y o  male MRN: [id number]  Unit/Bed#: ICU 02 Encounter: [id number]      Impression/Plan:  1  S/p cardiac arrest 5/21/22  Patient intubated during RR  In ICU on ventilator assistance  Recurrent SIRS possibly reactive to arrest with fever, tachycardia, tachypnea, and increased leukocytosis post arrest  Possible recurrent aspiration event s/p arrest  Fever down trended  -continue antibiotics as below  -monitor temperature and hemodynamics  -follow-up repeat blood cultures  -monitor serial a m  Labs  -continue supportive measures per critical care team  -cardiology on board  -ventilator management per critical care team     2  SIRS vs Severe Sepsis, evolving on admission and post #1   Evidenced by tachycardia, tachypnea, bandemia and encephalopathy   Suspect possible aspiration in setting of significant retained secretions, acute respiratory failure requiring supplemental oxygenation and with recent NG tube for postop ileus management   MRSA screen negative  WBC count 14 K  -continue cefepime reduced at 1g IV q 12 hours for #6  -follow-up final repeat blood cultures   -monitor temperature and hemodynamics  -serial exam  -monitor CBC and BMP   -additional supportive measures per critical care team as below      3    Peritonitis s/p Bowel perforation  s/p 5/11/22 exploratory laparotomy, low anterior resection, protective loop transverse colostomy and segmental small bowel resection secondary to perforation rectosigmoid junction after colonoscopy   OR cultures grew Pseudomonas aeruginosa and Bacteroides fragilis  Now being managed with NGT/NPO status for post op ileus on TPN    -Continue Cefepime through 5/27/22  -Finished 2 weeks post op Flagyl 5/24/22  -monitor temperature and hemodynamics  -serial exam  -close surgical follow-up  -abdominal wall incision site wound VAC placement planned for 05/26/2022  -recheck CBC and BMP   -plan to complete 2 weeks antibiotics total through 22  -advancing nutrition per surgical team     4   Acute hypoxic respiratory failure with hypoxia   Suspicious for aspiration pneumonitis over pneumonia     22 CT C/A/P predominantly UL airspace opacities consistent with CHF with bilateral pleural effusions, distended small bowel loops id  Patient remains intubated s/p #1  22 Procalcitonin level  2 60 >  0 753 < 22 2 97  22 Sputum cx with mixed oropharyngeal contaminants  -continue antibiotics for #3 as above  -ventilator management per critical care team      5  Aspiration pneumonitis versus pneumonia in setting of #1/3   22 CT C/A/P predominantly UL groundglass and consolidations, bilateral pleural effusions, SB mild dilation unchanged  Patient now intubated s/p #1  22 Procalcitonin level  2 60 >  0 753 < 22 2 97   22 sputum specimen oral pharyngeal contamination   -continue antibiotics as above  -secretion and pulmonary toilet management per critical care team   -monitor respiratory symptoms  -monitor vent requirements     6  MARYELLEN on CKD 3  Creatinine 2 48  -renal dose adjust antibiotics as needed  -volume management  -monitor creatinine     Antibiotics:  Cefepime D12     Above impression and plan discussed in detail with patient's RN and critical care team      Subjective:  Patient has no fever, chills, sweats on ventilator in ICU s/p cardiac arrest 22; no nausea, vomiting, diarrhea reported    Appears to be tolerating IV antibiotics    Objective:  Vitals:  Temp:  [97 88 °F (36 6 °C)-99 14 °F (37 3 °C)] 98 06 °F (36 7 °C)  HR:  [59-87] 77  Resp:  [17-42] 26  BP: ()/(44-58) 90/49  SpO2:  [94 %-100 %] 98 %  Temp (24hrs), Av 7 °F (37 1 °C), Min:97 88 °F (36 6 °C), Max:99 14 °F (37 3 °C)  Current: Temperature: 98 06 °F (36 7 °C)    Physical Exam:   General Appearance:  80-year-old chronically debilitated male, opens eyes briefly to name and exam, sluggish on ventilator at FiO2 of 40%, propped fairly comfortably in ICU bed, arms edematous R>L and propped on pillows, soft extremity restraints in place   Throat: Oropharynx moist without lesions  ET tube to vent   Lungs:   Decreased ventilator breath sounds fairly clearer to auscultation bilaterally; no purulent secretions in canister   Heart:  RRR; no murmur   Abdomen:   Soft, non-tender, non-distended, midline abdominal incision site with packed ABD dressing intact without strike through drainage or spreading erythema outside of dressing, left lower quadrant colostomy with loose dark stool in bag, positive diminished bowel sounds, orogastric tube feeds in place  Extremities: No clubbing, cyanosis, + generalized UE edema more than LE, + LE venodynes   : [place] with clear, yellow urine in bag, no SPT, + 4 scrotal edema elevated on towels   Skin: No new rashes or lesions  Right arm PICC and right CVP lines in place, TPN running       Labs, Imaging, & Other studies:   All pertinent labs and imaging studies were personally reviewed  Results from last 7 days   Lab Units 05/25/22  0505 05/24/22  1059 05/24/22  0238 05/23/22  1541 05/23/22  0438   WBC Thousand/uL 14 81*  --  18 23*  --  21 01*   HEMOGLOBIN g/dL 7 5* 8 3* 6 7*   < > 7 2*   PLATELETS Thousands/uL 219  --  241  --  243    < > = values in this interval not displayed       Results from last 7 days   Lab Units 05/25/22  1132 05/25/22  0505 05/24/22  2239 05/24/22  0238 05/23/22  1541 05/23/22  0438   SODIUM mmol/L 137 136 138 142   < > 141   POTASSIUM mmol/L 4 0 3 9 4 2 3 6   < > 3 7   CHLORIDE mmol/L 107 108 110* 111*   < > 113*   CO2 mmol/L 18* 18* 19* 21   < > 21   BUN mg/dL 93* 92* 87* 73*   < > 66*   CREATININE mg/dL 2 48* 2 41* 2 22* 1 88*   < > 1 72*   EGFR ml/min/1 73sq m 21 22 24 30   < > 34   CALCIUM mg/dL 7 2* 7 5* 7 2* 7 1*   < > 7 2*   AST U/L  --  15  --  18  --  19   ALT U/L  --  15  --  13  --  18   ALK PHOS U/L  --  54  --  53  --  41*    < > = values in this interval not displayed  Results from last 7 days   Lab Units 05/22/22  0934 05/21/22  1656 05/18/22  1601 05/18/22  1559   BLOOD CULTURE  No Growth at 72 hrs  No Growth at 72 hrs   --  No Growth After 5 Days  No Growth After 5 Days     MRSA CULTURE ONLY   --  No Methicillin Resistant Staphlyococcus aureus (MRSA) isolated  --   --      Results from last 7 days   Lab Units 05/24/22  0238 05/23/22  0438 05/21/22  0524 05/20/22  0546 05/19/22  0517   PROCALCITONIN ng/ml 2 58* 2 97* 0 53* 0 76* 0 99*

## 2022-05-25 NOTE — CONSULTS
Consultation - Nephrology   Jenn Davis 80 y o  male MRN: [id number]  Unit/Bed#: ICU 02 Encounter: [id number]    ASSESSMENT AND PLAN:  80 y o  man with PMH of Aostenosis, scleroderma, CAD  Patient underwent EGD and colonoscopy on  for possible intussusception, complicated with perforation, underwent exploratory laparotomy, complicated with pneumonia, sepsis, CHF, PEA arrest on 05/21 x2   Nephrology is consulted for MARYELLEN   ay for assistance in the management of MARYELLEN    PLAN  #Non-Oliguric KDIGO MARYELLEN stage 3    Etiology:  Likely secondary to ATN in the settings of hypotension and PEA arrest   Baseline creatinine:  0 9 mg/dL   Current creatinine:  2 4 mg/dL, trending up   Peak creatinine:  Trending   UA:ordered   Renal imaging :  No hydronephrosis   Treatment:   No urgent indication of dialysis at this time, will continue to monitor kidney function   If worsening kidney function or no response to diuretics patient will require to start CRRT  Will need goals of care discussion with family due to advanced age and poor prognosis after cardiac arrest x 2  Patient is poor candidate for dialysis as he is at risk of having another cardiac arrest during treatment   Discussed with Justin Atkins (son) and explained that dialysis will put patient at risk of severe hypotension and another cardiac arrest    Maintain MAP:  Over 65 mmHg if possible/avoid hypoperfusion:  Hold parameters on blood pressure medications   Avoid nephrotoxic agents such as NSAIDs, and IV contrast if possible   Avoid opioids    Adjust medications to GFR    #Acid-base Disorder   vbg: pH 7 3 , pCO2 30 9, serum HCO3 18   A   Mild anemia Metabolic acidosis and respiratory alkalosis   Secondary to MARYELLEN    #Volume status/hypertension:   Volume:  Fluid overload   Blood pressure:  Hypotensive on vasopressors, /51mmhg, goal<140/90   Recommend:   Continue Lasix infusion 20 milligrams/hour   Continue metolazone    #Anemia:   Current hemoglobin:  7 5 mg/dL   Treatment:   Transfuse for hemoglobin less than 7 0 per primary service      #PE arrest  · And vasopressor support  · Cardiology follow    # sepsis/bowel perforation  · On antibiotics and vasopressor support      HISTORY OF PRESENT ILLNESS:  Requesting Physician: Carroll Robledo DO  Reason for Consult:  MARYELLEN    Fady Shrestha is a 80 y o  male with PMH aortic stenosis, scleroderma, CAD  Patient underwent EGD and colonoscopy on 05/11 for possible intussusception, complicated with perforation, underwent exploratory laparotomy  Admission complicated with pneumonia, sepsis, CHF, PEA arrest on 05/21 x2    A renal consultation is requested today for assistance in the management of MARYELLEN    PAST MEDICAL HISTORY:  Past Medical History:   Diagnosis Date    Aortic valve calcification     Aortic valve stenosis     AR (aortic regurgitation)     Atelectasis     LT BASILAR PASSIVE SEGMENTAL    Autoimmune disease (HCC)     Bilateral pleural effusion     Bilateral senile cataracts     CAD (coronary artery disease)     CAD (coronary artery disease)     Central spinal stenosis     Changes in vascular appearance of retina     Chronic back pain     Colon polyp     Compression fracture of L1 lumbar vertebra (HCC) 06/02/2010    Compression fracture of T10 vertebra (HCC)     Compression fracture of T12 vertebra (HCC)     Compression fracture of T9 vertebra (HCC)     Dextroscoliosis of lumbar spine     Diverticulosis     Drusen     Dry eye syndrome     Former smoker     Gastritis     GERD (gastroesophageal reflux disease)     Giant cell arteritis (HCC)     Hiatal hernia     History of shingles     HTN (hypertension)     Internal carotid artery stenosis, left     Internal carotid artery stenosis, right     Left lower lobe pneumonia     Lesion of upper eyelid     Lordosis of lumbar region     Osteopenia     SEVERE    Osteoporosis     Pleuritic pain     Radiculopathy     B/L LOWER EXTREMITY    Raynaud's disease     Renal cyst     B/L    Scleroderma ([address] )     Tortuous aorta (HCC)        PAST SURGICAL HISTORY:  Past Surgical History:   Procedure Laterality Date    COLONOSCOPY      COLONOSCOPY N/A 5/11/2022    Procedure: NON IMAGING INTRAOP COLONOSCOPY;  Surgeon: Mj Walker MD;  Location: [address] [place] [address];  Service: General    COLONOSCOPY W/ BIOPSIES  04/24/2009    DXA PROCEDURE (HISTORICAL)  07/24/2019    EGD  04/24/2009    w/ bx    EGD AND COLONOSCOPY  05/11/2022    EXPLORATORY LAPAROTOMY W/ BOWEL RESECTION N/A 5/11/2022    Procedure: LAPAROTOMY EXPLORATORY LOW ANTERIOR RESECTION W/LOOP COLOSTOMY;  Surgeon: Mj Walker MD;  Location: [address] [place] [address];  Service: General    IR PICC PLACEMENT DOUBLE LUMEN  5/18/2022    UPPER GASTROINTESTINAL ENDOSCOPY      VERTEBROPLASTY      T9, T10, T12 & L1       ALLERGIES:  No Known Allergies    SOCIAL HISTORY:  Social History     Substance and Sexual Activity   Alcohol Use Yes    Comment: socially     Social History     Substance and Sexual Activity   Drug Use Never     Social History     Tobacco Use   Smoking Status Former Smoker    Types: Cigarettes, Pipe   Smokeless Tobacco Never Used   Tobacco Comment    QUIT 20 YEARS AGO       FAMILY HISTORY:  Family History   Problem Relation Age of Onset    No Known Problems Mother     No Known Problems Father     Colon cancer Sister     Colon cancer Brother        MEDICATIONS:    Current Facility-Administered Medications:     acetaminophen (TYLENOL) oral suspension 650 mg, 650 mg, Oral, Q6H PRN, ISELA Tomas, 650 mg at 05/21/22 2039    acetylcysteine (MUCOMYST) 200 mg/mL inhalation solution 600 mg, 3 mL, Nebulization, Q8H, ISELA Tomas, 600 mg at 05/25/22 0717    Adult 3-in-1 TPN (custom base / custom electrolytes), , Intravenous, Continuous TPN, ISELA Ritter, Last Rate: 89 3 mL/hr at 05/24/22 2157, New Bag at 05/24/22 2157    Adult 3-in-1 TPN (custom base / custom electrolytes), , Intravenous, Continuous TPN, ISELA Ritter    albumin human (FLEXBUMIN) 25 % injection 25 g, 25 g, Intravenous, Q6H, ISELA Ritter, Last Rate: 0 mL/hr at 05/25/22 0215, 25 g at 05/25/22 0826    cefepime (MAXIPIME) 2 g/50 mL dextrose IVPB, 2,000 mg, Intravenous, Q12H, ISELA Hernandez, Stopped at 05/24/22 2316    chlorhexidine (PERIDEX) 0 12 % oral rinse 15 mL, 15 mL, Mouth/Throat, Q12H [address], ISELA Hernandez, 15 mL at 05/25/22 0828    furosemide (LASIX) 500 mg infusion 50 mL, 20 mg/hr, Intravenous, Continuous, Cherri Spironello V, CRNP, Last Rate: 2 mL/hr at 05/25/22 0727, 20 mg/hr at 05/25/22 0727    HYDROmorphone (DILAUDID) injection 0 5 mg, 0 5 mg, Intravenous, Q4H PRN, ISELA Ritter, 0 5 mg at 05/25/22 0852    insulin lispro (HumaLOG) 100 units/mL subcutaneous injection 1-6 Units, 1-6 Units, Subcutaneous, Q6H [address] **AND** Fingerstick Glucose (POCT), , , Q6H, ISELA Ritter    iohexol (OMNIPAQUE) 240 MG/ML solution 50 mL, 50 mL, Oral, Once in imaging, ISELA Hernandez    ipratropium-albuterol (DUO-NEB) 0 5-2 5 mg/3 mL inhalation solution 3 mL, 3 mL, Nebulization, Q6H PRN, ISELA Hernandez, 3 mL at 05/23/22 1330    ipratropium-albuterol (DUO-NEB) 0 5-2 5 mg/3 mL inhalation solution 3 mL, 3 mL, Nebulization, Q8H, ISELA Hernandez, 3 mL at 05/25/22 0717    levothyroxine tablet 112 mcg, 112 mcg, Per NG Tube, Early Morning, ThomasISELA Enriquez, 112 mcg at 05/25/22 0508    lidocaine (LIDODERM) 5 % patch 2 patch, 2 patch, Topical, Daily, Diedra Dakins Rudd, PA-C, 2 patch at 05/25/22 0855    norepinephrine (LEVOPHED) 4 mg (STANDARD CONCENTRATION) IV in sodium chloride 0 9% 250 mL, 1-30 mcg/min, Intravenous, Titrated, ISELA Lawrence, Last Rate: 11 3 mL/hr at 05/25/22 0727, 3 mcg/min at 05/25/22 0727    ondansetron (ZOFRAN) injection 4 mg, 4 mg, Intravenous, Q6H PRN, Thomas Sampson, MACARIONP    oxyCODONE (ROXICODONE) IR tablet 5 mg, 5 mg, Per NG Tube, Q4H PRN, Derrell Tripathi PA-C, 5 mg at 05/25/22 0128    oxyCODONE (ROXICODONE) IR tablet 7 5 mg, 7 5 mg, Per NG Tube, Q4H PRN, Derrell Tripathi PA-C, 7 5 mg at 05/24/22 0023    pantoprazole (PROTONIX) injection 40 mg, 40 mg, Intravenous, Q24H [address], Littie Marcos, ISELA, 40 mg at 05/25/22 7487    REVIEW OF SYSTEMS:  Complete 10 point review of systems were obtained and discussed in length with the patient  Complete review of systems were negative / unremarkable except mentioned in the HPI section       Review of Systems unable to assess ROS patient is sedated and intubated    PHYSICAL EXAM:  Current Weight: Weight - Scale: 75 4 kg (166 lb 3 6 oz)  First Weight: Weight - Scale: 62 kg (136 lb 11 oz)  Vitals:    05/25/22 0900   BP: 106/51   Pulse: 85   Resp: (!) 42   Temp: 98 78 °F (37 1 °C)   SpO2: 96%       Intake/Output Summary (Last 24 hours) at 5/25/2022 0918  Last data filed at 5/25/2022 0747  Gross per 24 hour   Intake 3425 37 ml   Output 1680 ml   Net 1745 37 ml     Wt Readings from Last 3 Encounters:   05/25/22 75 4 kg (166 lb 3 6 oz)   05/11/22 62 1 kg (136 lb 14 4 oz)   03/25/22 61 2 kg (135 lb)     Temp Readings from Last 3 Encounters:   05/25/22 98 78 °F (37 1 °C)   05/11/22 (!) 97 1 °F (36 2 °C) (Temporal)   01/14/22 97 9 °F (36 6 °C) (Temporal)     BP Readings from Last 3 Encounters:   05/25/22 106/51   05/11/22 141/69   03/25/22 152/76     Pulse Readings from Last 3 Encounters:   05/25/22 85   05/11/22 73   03/25/22 89        General:  Critically ill, intubated  Skin:  No acute rash  Eyes:  No scleral icterus and noninjected  ENT:  mucous membranes moist  Chest:  Mechanical breath sounds bilaterally  CVS:  Regular rate and rhythm without a murmur rub ,   Abdomen:  soft and nontender   Extremities:  Lower extremity edema  Neuro:  No gross focality  Psych:  Alert , cooperative   Farnsworth catheter:      Invasive Devices:   Urethral Catheter Double-lumen 16 Fr  (Active)   Reasons to continue Urinary Catheter  Accurate I&O assessment in critically ill patients (48 hr  max) 05/25/22 0747   Goal for Removal Other (Comment) 05/25/22 0747   Site Assessment Clean;Skin intact 05/25/22 0747   Farnsworth Care Done 05/25/22 0747   Collection Container Standard drainage bag 05/25/22 0747   Securement Method Securing device (Describe) 05/24/22 2000   Output (mL) 150 mL 05/25/22 0747     Lab Results:   Results from last 7 days   Lab Units 05/25/22  0505 05/24/22  2239 05/24/22  1059 05/24/22  0238 05/23/22  1541 05/23/22  0438 05/22/22  0524 05/21/22  2214 05/21/22  1539 05/21/22  0524 05/20/22  0546   WBC Thousand/uL 14 81*  --   --  18 23*  --  21 01* 23 05*  --  21 74* 23 58* 18 87*   HEMOGLOBIN g/dL 7 5*  --  8 3* 6 7* 7 2* 7 2* 8 5* 9 7* 6 2* 8 2* 8 5*   HEMATOCRIT % 21 9*  --   --  19 6* 21 0* 20 4* 25 4*  --  18 3* 24 7* 25 5*   PLATELETS Thousands/uL 219  --   --  241  --  243 273  --  231 326 335   POTASSIUM mmol/L 3 9 4 2  --  3 6 3 7 3 7 5 2 4 6 3 0* 3 5 3 3*   CHLORIDE mmol/L 108 110*  --  111* 112* 113* 115* 118* 119* 116* 117*   CO2 mmol/L 18* 19*  --  21 23 21 20* 22 25 27 26   BUN mg/dL 92* 87*  --  73* 69* 66* 57* 53* 42* 45* 48*   CREATININE mg/dL 2 41* 2 22*  --  1 88* 1 77* 1 72* 1 56* 1 35* 0 94 1 03 1 14   CALCIUM mg/dL 7 5* 7 2*  --  7 1* 7 1* 7 2* 7 6* 7 4* 5 7* 7 5* 7 4*   MAGNESIUM mg/dL 2 8*  --   --  2 6  --  2 4 2 3 2 3 2 0 2 6 2 8*   PHOSPHORUS mg/dL 4 2*  --   --  3 4  --  3 6 3 3 2 8 3 7 2 1* 2 0*       Other Studies:   CT chest abdomen pelvis wo contrast   Final Result by Olive Mejia MD (05/24 5539)   1  CHF with moderate basilar effusions and additional upper lung zone patchy airspace opacities likely reflecting asymmetric pulmonary edema  Infiltrate is not entirely excluded     2   Distended small bowel loops with scattered air-fluid levels consistent with early/partial small bowel obstruction with transition at the small bowel anastomosis in the region of the ventral pelvis as above  No free air  3   Cholelithiasis without cholecystitis  4   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  Concordant preliminary results were provided by [facility] at 0531 hours on 5/24/2022  Workstation performed: [id number]         XR chest portable ICU   Final Result by Radha Corona MD (05/24 1325)      Bilateral airspace disease is unchanged  The Keofeed tube has been removed  Workstation performed: [id number]         XR chest portable   Final Result by Radha Corona MD (05/24 1329)   Addendum 1 of 1 by Radha Corona MD (05/24 1329)   ADDENDUM:      The right IJ line terminates in the SVC  The PICC line terminates at the    junction of the SVC and right atrium  Final         1  Keofed tube terminates at the EG junction and should be advanced for  use  2   Bilateral airspace disease may represent infiltrates or the alveolar phase of heart failure  Bilateral pleural effusions  Workstation performed: [id number]         CT chest abdomen pelvis wo contrast   Final Result by [facility],  (05/22 8800)      1  Stable appearance of mixed groundglass and consolidative abnormalities in the lungs bilaterally predominantly affecting the upper lobes suspicious for multilobar pneumonia  Superimposed pulmonary edema not excluded  2   Similar to slight increase size of moderate bilateral pleural effusions  3   Diffuse mild dilation of small bowel segments identified without transition point                    Workstation performed: [id number]         XR chest portable ICU   Final Result by Ethel Joseph MD (05/22 3277)      Right neck catheter in the distal SVC                  Workstation performed: [id number]         XR abdomen 1 view kub   Final Result by Ethel Joseph MD (05/22 9550)      Persistent gas distended small bowel loops with normal caliber colon with contrast                Workstation performed: [id number]         XR chest portable ICU   Final Result by Delores López MD (05/22 1065)      Improved pneumomediastinum  Bilateral groundglass infiltrative changes  Workstation performed: [id number]         XR chest portable ICU   Final Result by Kaylyn Jaramillo MD (05/21 1625)      Pneumomediastinum  Endotracheal tube tip above the level of the chriss by 2 5 cm  Enteric tube tip overlying the epigastric region below the inferior margin of this film  Slight progression of multifocal groundglass pulmonary parenchymal airspace opacities  Trace costophrenic angle effusions, unchanged  This examination was marked "immediate notification" in Epic in order to begin the standard process by which the radiology reading room liaison alerts the referring practitioner  Workstation performed: [id number]         XR abdomen 1 view kub   Final Result by Delores López MD (05/22 9020)      Slight worsening of the prominent dilated small bowel loops  Contrast in the colon  Workstation performed: [id number]         IR PICC line placement double lumen   Final Result by Karthik Sun MD (05/18 6311)   1  Status post placement of a 5-Yi percutaneously inserted central venous catheter via the right basilic vein with its tip at the cavoatrial junction under ultrasound and fluoroscopic guidance  NG tube placement   Next using fluoroscopic guidance, an NG tube was placed down into the stomach past the narrowing at the GE junction  2 stiff wires were required to advance this  Impression successful NG tube placement with its tip in the stomach  The tube may be used immediately                       Workstation performed: [id number]         XR abdomen 1 view kub   Final Result by Kaylyn aJramillo MD (05/18 4232)      Radiographic appearance suspicious for early or mild distal small bowel obstruction  Alternatively, this could represent ileus  Enteric contrast administered for the May 17, 2022 examination does not appear to have reached the large bowelAn       enteric catheter tip overlies the expected location of the cavoatrial junction and has not quite reached the stomach  Advancement by approximately 8 to 10 cm recommended  This examination was marked "significant notification" in Epic in order to begin the standard process by which the radiology reading room liaison alerts the referring practitioner  Workstation performed: [id number]         CT chest abdomen pelvis wo contrast   Final Result by Tahmina Pruitt MD (05/18 7633)      1  Worsening patchy mixed groundglass and consolidative change bilaterally compatible with multifocal pneumonia and/or pulmonary edema  2   Increased bilateral pleural effusions  3   Sidehole of enteric tube is visualized near the gastroesophageal junction  Consider tube advancement  4   Mild distention of mid to distal esophagus with enteric contrast material   Correlate for gastroesophageal reflux  5   Multiple dilated small bowel loops without a clear transition point  The finding may reflect ileus, however developing small bowel obstruction cannot be excluded  Follow-up is recommended  6   Moderate amount of air within the urinary bladder, likely related to instrumentation  Correlate with urinalysis to exclude cystitis  I personally discussed this study with Lucius Pérez on 5/18/2022 at 1:11 AM                Workstation performed: [id number]         XR chest portable   Final Result by Norm Ruggiero MD (05/17 5961)      1  Tip of enteric tube is at the expected position of the gastroesophageal junction  Slight advancement is advised  2   Slight increased predominant upper lobe opacities, likely representing pneumonia  Concomitant edema is possible  The now trace bilateral pleural effusions have decreased in size  The study was marked in [facility] for immediate notification  Workstation performed: [id number]         XR abdomen 1 view kub   Final Result by Alivia Duckworth MD (05/16 1606)      Diffusely distended, dilated small bowel loops with small amount of persistent air extending to the proximal colon  Findings may suggest diffuse ileus or partial obstruction  Workstation performed: [id number]         CTA chest pe study   Final Result by Jose Miguel Savage MD (05/15 1229)      No pulmonary embolus  Bilateral upper lobe groundglass opacities compatible with pneumonia and/or pulmonary edema  Small bilateral pleural effusions  Workstation performed: [id number]         XR chest portable   Final Result by Yasmeen Gomez MD (05/15 1023)      Pulmonary edema and small bilateral pleural effusions  Workstation performed: [id number]         IR other    (Results Pending)   XR abdomen 1 view kub    (Results Pending)       Portions of the record may have been created with voice recognition software  Occasional wrong word or "sound a like" substitutions may have occurred due to the inherent limitations of voice recognition software  Read the chart carefully and recognize, using context, where substitutions have occurred

## 2022-05-25 NOTE — ASSESSMENT & PLAN NOTE
· Patient previously in ICU for hypoxia with a max of 15L midflow and concern for aspiration pneumonitis  Improved and was able to transfer to general medical floor  · Intubated during cardiac arrest  Possibly hypoxia driven cause of cardiac arrest  May have aspirated   · AC/PC 20/18/65%/8  · SBT today  · Tolerated several hours of pressure support 14 yesterday, rested on AC/PC overnight  · VAP bundle ordered; chlorhexidine, ppi, head of bed > 30°  · 5/24 CT CAP-CHF with moderate basilar effusions and additional upper lung zone patchy airspace opacities likely reflecting asymmetric pulmonary edema  Infiltrate is not entirely excluded     · Lasix infusion 20mg/hr  · Pulmonary toilet for development of thick secretions  · Atrovent/xopenex/mucomyst nebs q8h  · Chest PT   · ETT suctioning PRN

## 2022-05-25 NOTE — ASSESSMENT & PLAN NOTE
· Patient was found unresponsive and hypoxic approximately 20 minutes after being seen well with family 5/21 at 1441  · PEA arrest x 2  · Unclear etiology, possibly hypoxia driven   · 5/28: Echo-EF 60-65%, G1DD, mild AS (BRADY 1 5cm2), mild MR, mild TR  · Neurologically intact post arrest   · Hypotension post ROSC requiring levo, vaso, and epi and stress dose steroids  · All pressors off since 0100 5/23  · Hydrocortisone d/c 5/23

## 2022-05-25 NOTE — ASSESSMENT & PLAN NOTE
· Peritonitis with intra-abdominal/pelvic abscess secondary to colonic perforation    · 5/22 CT chest/ab/pelvis with concern of multifocal PNA, no visualized intraabdominal abscess or anastomotic leak   · Per ID suspect multifocal pneumonitis   · OR culture 5/21 pseudomonas and bacteroides fragilis  · Continue Cefepime through 5/27  · Completed 14 days of Flagyl 5/24  · Repeat [facility] 5/22 negative x 24 hours   · MRSA nasal surveillance negative  · Sputum culture contaminated   · ID following, appreciate recs

## 2022-05-25 NOTE — PROGRESS NOTES
Pastoral Care Progress Note    2022  Patient: Tammie Cota : 2/15/1931  Admission Date & Time: 2022 1648  MRN: [id number] CSN: [id number]                     Chaplaincy Interventions Utilized:   Relationship Building: Cultivated a relationship of care and support  Visited with patient, his son and granddaughter  Patient and his family were receptive to a spiritual care visit, and the patient nodded that he wanted me to say a prayer for him  The granddaughter flew in from [place] "to spend as much time with Poppy as possible " His son and granddaughter seemed very supportive     Ritual: Provided prayer   22 1600   Clinical Encounter Type   Visited With Patient and family together   Routine Visit Introduction   Referral To   (census/rounds)   Christianity Encounters   Christianity Needs Prayer

## 2022-05-25 NOTE — PROGRESS NOTES
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, reduced at bedside  · Bilateral pleural effusions        Plan:  - discussion with critical care, PEG placement under consideration secondary to high residuals with trickle tube feeds  - intubation and mechanical ventilation per critical care team  - TPN reordered  - Place wound VAC tomorrow 5/26 to continue with Monday/Thursday changes   - cefepime and flagyl atleast through 5/27      Subjective/Objective     Subjective:   Patient is intubated    Objective:   Ostomy output 800 > 845cc    Blood pressure 98/54, pulse 78, temperature 98 42 °F (36 9 °C), resp  rate (!) 28, height 5' 4" (1 626 m), weight 75 4 kg (166 lb 3 6 oz), SpO2 99 %  ,Body mass index is 28 53 kg/m²        Intake/Output Summary (Last 24 hours) at 5/25/2022 1017  Last data filed at 5/25/2022 1001  Gross per 24 hour   Intake 3425 37 ml   Output 1750 ml   Net 1675 37 ml       Invasive Devices  Report    Peripherally Inserted Central Catheter Line  Duration           PICC Line 82/21/72 Right Basilic 6 days          Central Venous Catheter Line  Duration           CVC Central Lines 05/21/22 Triple 16cm 3 days          Drain  Duration           Colostomy LLQ 14 days    NG/OG/Enteral Tube Orogastric 16 Fr Center mouth 4 days    Urethral Catheter Double-lumen 16 Fr  4 days          Airway  Duration           ETT  Oral 3 days                Physical Exam: BP 98/54   Pulse 78   Temp 98 42 °F (36 9 °C)   Resp (!) 28   Ht 5' 4" (1 626 m)   Wt 75 4 kg (166 lb 3 6 oz)   SpO2 99%   BMI 28 53 kg/m²   General appearance: intubated, opens eyes to verbal stimuli  Head: Normocephalic, without obvious abnormality, atraumatic  Lungs: rhonchi and course breath sounds from ventilator  Heart: regular rate and rhythm  Abdomen: Soft, no guarding, no distention, hypoactive bowel sounds, stoma is pink, no peristomal prolapse appreciated, brown loose stool in ostomy bag  Skin: Skin color, texture, turgor normal  No rashes or lesions  Midline wound open with healthy fascia intact, wet to dry dressing in place    Lab, Imaging and other studies:  I have personally reviewed pertinent lab results    , CBC:   Lab Results   Component Value Date    WBC 14 81 (H) 05/25/2022    HGB 7 5 (L) 05/25/2022    HCT 21 9 (L) 05/25/2022    MCV 97 05/25/2022     05/25/2022    MCH 33 2 05/25/2022    MCHC 34 2 05/25/2022    RDW 18 7 (H) 05/25/2022    MPV 12 3 05/25/2022   , CMP:   Lab Results   Component Value Date    SODIUM 136 05/25/2022    K 3 9 05/25/2022     05/25/2022    CO2 18 (L) 05/25/2022    BUN 92 (H) 05/25/2022    CREATININE 2 41 (H) 05/25/2022    CALCIUM 7 5 (L) 05/25/2022    AST 15 05/25/2022    ALT 15 05/25/2022    ALKPHOS 54 05/25/2022    EGFR 22 05/25/2022     VTE Pharmacologic Prophylaxis: no pharmacologic DVT prophylaxis secondary to anemia  VTE Mechanical Prophylaxis: sequential compression device

## 2022-05-25 NOTE — ASSESSMENT & PLAN NOTE
· hgb drop post cardiac arrest  Noted to have gross hematuria but this has since resolved  · 5/21 1 u PRBC for hgb 6 8, then improved to 9 2  · Hgb then dropped to 7 2 on 2 subsequent checks   · Hgb 6 7 yesterday morning s/p 1 unit PRBC, with improvement in hgb to 8 3  · No occult bleeding identified on CT 5/24   · Continue to trend and transfuse for hgb less than 7

## 2022-05-26 NOTE — PROCEDURES
Temporary HD Catheter    Date/Time: 5/26/2022 3:37 PM  Performed by: Vianne Hashimoto, CRNP  Authorized by: Vianne Hashimoto, [address] [place] [address]     Patient location:  Bedside  Other Assisting Provider: No    Consent:     Consent obtained:  Written    Consent given by:  Healthcare agent    Risks discussed:  Arterial puncture, infection, incorrect placement, bleeding and nerve damage    Alternatives discussed:  No treatment  Universal protocol:     Procedure explained and questions answered to patient or proxy's satisfaction: yes      Relevant documents present and verified: yes      Test results available and properly labeled: yes      Radiology Images displayed and confirmed  If images not available, report reviewed: yes      Required blood products, implants, devices, and special equipment available: yes      Site/side marked: yes      Immediately prior to procedure, a time out was called: yes      Patient identity confirmed:  Arm band and hospital-assigned identification number  Pre-procedure details:     Hand hygiene: Hand hygiene performed prior to insertion      Sterile barrier technique: All elements of maximal sterile technique followed      Skin preparation:  2% chlorhexidine    Skin preparation agent: Skin preparation agent completely dried prior to procedure    Indications:     Central line indications: dialysis      Site selection rationale:  Replacing a TLC for HD cath  Anesthesia (see MAR for exact dosages):      Anesthesia method:  Local infiltration    Local anesthetic:  Lidocaine 1% w/o epi  Procedure details:     Location:  Right internal jugular    Vessel type: vein      Laterality:  Right    Approach: percutaneous technique used      Patient position:  Flat    Catheter type:  Triple lumen    Catheter size:  12 5 Fr    Catheter length:  16 cm    Landmarks identified: yes      Ultrasound guidance: no (changed over guidewire; TLC already in place)      Number of attempts:  1    Successful placement: yes Vessel of catheter tip end:  SVC  Post-procedure details:     Post-procedure:  Line sutured and dressing applied    Assessment:  Blood return through all ports, free fluid flow and no pneumothorax on x-ray    Post-procedure complications: none      Patient tolerance of procedure:   Tolerated well, no immediate complications  Comments:      Right IJ TLC placed 5/21 was replaced over guidewire with temp HD cath

## 2022-05-26 NOTE — PROCEDURES
General Surgery  Fady Hudson 80 y o  male MRN: [id number]  Unit/Bed#: ICU 02 Encounter: [id number]    Wound V A C   Placement     Date: 5/26/2022    Time: 1:21 PM      Location of wound: midline surgical wound     Sponges removed:  0 Black Sponges  0 White Sponges    Dimensions of wound: 12 5 cm x 3 cm x 2 cm    Description of wound: fibrinous base comprised of fascial layer, wound edges are rounded, sides of wound are clean and bleeding              Sponges placed:  1 Black Sponges  0 White Sponges    VAC settings:  125 mmHg  Veraflo instilling 14mL for 10 minutes every 2 hrs     Pt tolerated procedure well  VAC sticker placed to wound dressing    Wound vac changes to occur every Monday and Thursday      Ashley Cook PA-C  5/26/2022

## 2022-05-26 NOTE — PHYSICAL THERAPY NOTE
PHYSICAL THERAPY     05/26/22 1203   Note Type   Note Type Cancelled Session   Cancel Reasons   (patient deferred and awaiting wound vac placement, will reattempt)   Licensure   [place] License Number  Kemi Guzmán [id number]

## 2022-05-26 NOTE — QUICK NOTE
Addendum:  No need to continue TPN tonight, tube feeds at goal  Nutrition recommendations appreciated    Wound vac with veraflo placed today  It is instilling 14mL for 10 min every 2 hrs with 125mmHg continuous suction  Plan for wound vac changes every Monday and Thursday    Ostomy output dropped significantly over the past 24 hrs, from 800cc to 20cc    Consider miralax per OG tube tomorrow

## 2022-05-26 NOTE — ASSESSMENT & PLAN NOTE
· Outpatient EGD and colonoscopy at [facility] on 5/11 for w/u of chronic anemia, history of colon polyps, intermittent LLQ abdominal pain and possible intermittent intussusception  · EGD unremarkable, colonoscopy technically difficult and required change from adult scope to pediatric scope  · During traversal of the sigmoid colon there was a kink and patient sustained a perforation  Procedure was aborted and patient was transferred to [facility] where immediate surgical consultation was obtained  · Emergent ex- lap on 5/11 > low anterior resection, protective loop transverse colostomy, flex sigmoidoscopy, and segmental small bowel resection  · Difficult post op course with post op ileus requiring NGT decompression and initiation of TPN; not tolerating trickle feeds  · 5/22: CT: Diffuse mild dilation of small bowel segments identified without transition point  · 5/24: CT CAP- Distended small bowel loops with scattered air-fluid levels consistent with early/partial small bowel obstruction with transition at the small bowel anastomosis in the region of the ventral pelvis as above  No free air  Cholelithiasis without cholecystitis  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

## 2022-05-26 NOTE — ASSESSMENT & PLAN NOTE
· Secondary to hypoperfusion mehul cardiac arrest +/- ATN  · Baseline creat 0 8  · Creatinine peaked at 3 07  · Creatine now on CVVH 1 47  · CVVH started on 5/27; continue with UF -100 ml/hr  · Continue bumex gtt at 1 mg; urine output about 100 ml/hr  · Continue BMP q6h while on CVVH  · Avoid hypotension; continue levophed for MAP > 65

## 2022-05-26 NOTE — PROGRESS NOTES
Progress Note - Critical Care   Hilary Loop 80 y o  male MRN: [id number]  Unit/Bed#: ICU 02 Encounter: [id number]      * Bowel perforation [facility])  Assessment & Plan  · Outpatient EGD and colonoscopy at [facility] on 5/11 for w/u of chronic anemia, history of colon polyps, intermittent LLQ abdominal pain and possible intermittent intussusception  EGD unremarkable, colonoscopy technically difficult and required change from adult scope to pediatric scope, but during traversal of the sigmoid colon there was a kink and patient sustained a perforation  Procedure was aborted and patient was transferred to [facility] where immediate surgical consultation was obtained  Patient was reported to have obvious signs of peritonitis on exam and CT ab/pelvis demonstrated pneumoperitoneum  · 5/11 Urgently to the OR with Dr Gopal Marquis for ex-lap, low anterior resection, protective loop transverse colostomy, flex sigmoidoscopy, and segmental small bowel resection  · Post op ileus requiring NGT decompression and initiation of TPN  · CT 5/22  Diffuse mild dilation of small bowel segments identified without transition point  · Started on trickle tube feedings 5/22  · Ostomy with improved output mL/24 hours   · Concern for free air under left hemidiaphragm on CXR 5/24, sent for CT CAP- Distended small bowel loops with scattered air-fluid levels consistent with early/partial small bowel obstruction with transition at the small bowel anastomosis in the region of the ventral pelvis as above  No free air  Cholelithiasis without cholecystitis  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       Atrial fibrillation [facility])  Assessment & Plan  · S/p cardiac arrest while on 3 pressors noted to have afib with RVR  · Bolused with amio and placed on infusion  · Converted to sinus rhythm on 5/22   · Amio gtt d/c 5/23  · Has remained in NSR  · Continue to monitor on telemetry  · Optimize electrolytes  · Start beta blocker once BP stabilizes, has had intermittent hypotension requiring albumin   · No indication for [facility] at this time given resolved and poor candidate with downtrending hgb     Anemia  Assessment & Plan  · hgb drop post cardiac arrest  Noted to have gross hematuria but this has since resolved  · 5/21 1 u PRBC for hgb 6 8, then improved to 9 2  · Hgb then dropped to 7 2 on 2 subsequent checks   · Hgb 6 7 yesterday morning s/p 1 unit PRBC, with improvement in hgb to 8 3  · No occult bleeding identified on CT 5/24   · Continue to trend and transfuse for hgb less than 7    MARYELLEN (acute kidney injury) ([facility] [address] )  Assessment & Plan  · Secondary to hypoperfusion mehul cardiac arrest, with likely ATN now  · Baseline creat 0 8  · Creatinine continues to up-trend now at 2 2  · Continues to be oliguric given 2 mg bumex and placed on bumex gtt and metolazone 10 mg given, bicarb gtt   · Trend UOP; quigley for strict I&O  · BMP q6h  · Avoid hypotension/nephrotoxic medications    Cardiac arrest [facility])  Assessment & Plan  · Patient was found unresponsive and hypoxic approximately 20 minutes after being seen well with family 5/21 at 1441  · PEA arrest x 2  · Unclear etiology, possibly hypoxia driven   · 6/15: Echo-EF 60-65%, G1DD, mild AS (BRADY 1 5cm2), mild MR, mild TR  · Neurologically intact post arrest   · Hypotension post ROSC requiring levo, vaso, and epi and stress dose steroids  · All pressors off since 0100 5/23  · Hydrocortisone d/c 5/23      Hyperglycemia  Assessment & Plan  · A1c 5 3%  · Hyperglycemia likely secondary to TPN, critical illness, and 3 days of stress dose steroids  · Transitioned from insulin gtt to ISS- consider adding lantus    CHF (congestive heart failure) (HCC)  Assessment & Plan  Wt Readings from Last 3 Encounters:   05/26/22 77 9 kg (171 lb 11 8 oz)   05/11/22 62 1 kg (136 lb 14 4 oz)   03/25/22 61 2 kg (135 lb)     · 5/16: Echo-EF 65%, mild TR, mild MR  · Repeat Echo post cardiac arrest 5/23 EF 60-65%, G1DD, mild AS (BRADY 1 5cm2), mild MR, mild TR  · Monitor I&O  · Daily weights   · Gross anasarca on exam   · Bumex gtt  · Albumin 25g 25% x 4 doses       Acute respiratory failure with hypoxia (HCC)  Assessment & Plan  · Patient previously in ICU for hypoxia with a max of 15L midflow and concern for aspiration pneumonitis  Improved and was able to transfer to general medical floor  · Intubated during cardiac arrest  Possibly hypoxia driven cause of cardiac arrest  May have aspirated   · AC/PC 20/18/65%/8  · SBT today  · Tolerated several hours of pressure support 14 yesterday, rested on AC/PC overnight  · VAP bundle ordered; chlorhexidine, ppi, head of bed > 30°  · 5/24 CT CAP-CHF with moderate basilar effusions and additional upper lung zone patchy airspace opacities likely reflecting asymmetric pulmonary edema  Infiltrate is not entirely excluded  · Lasix infusion 20mg/hr  · Pulmonary toilet for development of thick secretions  · Atrovent/xopenex/mucomyst nebs q8h  · Chest PT   · ETT suctioning PRN          HPI/24hr events: Patient remains to be oliguric overnight  Given bumex 2 mg, metalozone 10 mg and placed on bumex gtt  Patient is also on bicarb gtt  He is currently on tube feeds  Physical Exam:   Constitutional:       General: He is awake  Appearance: He is well-developed  He is ill-appearing      Cardiovascular:      Rate and Rhythm: Normal rate and regular rhythm  Pulses: Normal pulses  Radial pulses are 2+ on the right side and 2+ on the left side  Dorsalis pedis pulses are 2+ on the right side and 2+ on the left side  Heart sounds: Normal heart sounds, S1 normal and S2 normal       Comments: Gross anasarca   Pulmonary:      Effort: Pulmonary effort is normal       Comments: Course scattered crackles   Abdominal:      General: Bowel sounds are decreased  There is distension  Comments: Midline abdominal incision with ABD in place-dry and intact  Colostomy with green/brown liquid stool output, peristomal hernia  Skin:     General: Skin is warm and dry  Capillary Refill: Capillary refill takes less than 2 seconds  Neurological:      General: No focal deficit present  Mental Status: He is alert  GCS: GCS eye subscore is 4  GCS verbal subscore is 1  GCS motor subscore is 6  Psychiatric:         Behavior: Behavior is cooperative  Vitals:    22 0544 22 0600 22 0630 22 0700   BP: 109/54 104/55 115/58 108/53   BP Location:       Pulse: 84 82 87 86   Resp: (!) 30 (!) 26 (!) 31 (!) 33   Temp: 97 7 °F (36 5 °C) 97 7 °F (36 5 °C) 97 7 °F (36 5 °C) 97 7 °F (36 5 °C)   TempSrc:       SpO2: 97% 96% 96% 98%   Weight:       Height:         Arterial Line BP: 120/63  Arterial Line MAP (mmHg): 86 mmHg    Temperature:   Temp (24hrs), Av 1 °F (36 7 °C), Min:96 6 °F (35 9 °C), Max:98 78 °F (37 1 °C)    Current: Temperature: 97 7 °F (36 5 °C)    Weights:   IBW (Ideal Body Weight): 59 2 kg    Body mass index is 29 48 kg/m²    Weight (last 2 days)     Date/Time Weight    22 0534 77 9 (171 74)    22 0549 75 4 (166 23)    22 0600 73 4 (161 82)          Hemodynamic Monitoring:  PAP:       Non-Invasive/Invasive Ventilation Settings:  Respiratory  Report   Lab Data (Last 4 hours)    None         O2/Vent Data (Last 4 hours)       0346           Vent Mode AC/PC       Resp Rate (BPM) (BPM) 20       Pressure Control (cmH2O) (cm) 18       Insp Time (sec) (sec) 0 9       FiO2 (%) (%) 40       PEEP (cmH2O) (cmH2O) 8       MV 14 2                 No results found for: PHART, KPG9TKI, PO2ART, UNV6ZIQ, L2EVMMQE, BEART, SOURCE  SpO2: SpO2: 95 %    Intake and Outputs:  I/O       05/24 0701 05/25 0700 05/25 0701 05/26 0700 05/26 0701 05/27 0700    P  O  0      I V  (mL/kg) 400 7 (5 3) 846 8 (10 9)     Blood       NG/ 180     IV Piggyback 650 400     TPN 2144 7 2141  7     Feedings 160 410     Total Intake(mL/kg) 3575 4 (47 4) 3978 5 (51 1)     Urine (mL/kg/hr) 925 (0 5) 1550 (0 8)     Emesis/NG output 150      Stool 865 20     Total Output 1940 1570     Net +1635 4 +2408 5                  Nutrition:        Diet Orders   (From admission, onward)             Start     Ordered    05/25/22 0911  Diet Enteral/Parenteral; Tube Feeding No Oral Diet; Osmolite 1 0; Continuous; 20  Diet effective now        References:    Nutrtion Support Algorithm Enteral vs  Parenteral   Question Answer Comment   Diet Type Enteral/Parenteral    Enteral/Parenteral Tube Feeding No Oral Diet    Tube Feeding Formula: Osmolite 1 0    Bolus/Cyclic/Continuous Continuous    Tube Feeding Goal Rate (mL/hr): 20    RD to adjust diet per protocol? Yes        05/25/22 0910    05/12/22 0906  Room Service  Once        Question:  Type of Service  Answer:  Room Service - Appropriate with Assistance    05/12/22 0905                  Labs:   Results from last 7 days   Lab Units 05/26/22  0541 05/25/22  0505 05/24/22  1059 05/24/22  0238 05/21/22  1539 05/21/22  0524   WBC Thousand/uL 15 71* 14 81*  --  18 23*   < > 23 58*   HEMOGLOBIN g/dL 7 3* 7 5* 8 3* 6 7*   < > 8 2*   HEMATOCRIT % 21 6* 21 9*  --  19 6*   < > 24 7*   PLATELETS Thousands/uL 212 219  --  241   < > 326   MONO PCT %  --   --   --  5  --  7    < > = values in this interval not displayed        Results from last 7 days   Lab Units 05/26/22  0541 05/25/22  2337 05/25/22  182 05/25/22  1132 05/25/22  0505 05/24/22  2239 05/24/22  0238 05/23/22  1541 05/23/22  0438   SODIUM mmol/L 133* 134* 135*   < > 136   < > 142   < > 141   POTASSIUM mmol/L 3 8 3 9 4 0   < > 3 9   < > 3 6   < > 3 7   CHLORIDE mmol/L 101 103 104   < > 108   < > 111*   < > 113*   CO2 mmol/L 18* 18* 17*   < > 18*   < > 21   < > 21   BUN mg/dL 106* 105* 99*   < > 92*   < > 73*   < > 66*   CREATININE mg/dL 2 93* 2 79* 2 55*   < > 2 41*   < > 1 88*   < > 1 72*   CALCIUM mg/dL 7 3* 7 4* 7 4*   < > 7 5*   < > 7 1*   < > 7 2*   ALK PHOS U/L  --   --   --   --  54  --  53  --  41*   ALT U/L  --   --   --   --  15  --  13  --  18   AST U/L  --   --   --   --  15  --  18  --  19    < > = values in this interval not displayed  Results from last 7 days   Lab Units 05/26/22  0541 05/25/22  0505 05/24/22  0238   MAGNESIUM mg/dL 2 8* 2 8* 2 6     Results from last 7 days   Lab Units 05/26/22  0541 05/25/22  0505 05/24/22  0238   PHOSPHORUS mg/dL 5 7* 4 2* 3 4          Results from last 7 days   Lab Units 05/24/22  0238   LACTIC ACID mmol/L 0 9     No results found for: TROPONINI    Imaging:     CT chest abdomen pelvis wo contrast   Final Result   1  CHF with moderate basilar effusions and additional upper lung zone patchy airspace opacities likely reflecting asymmetric pulmonary edema  Infiltrate is not entirely excluded  2   Distended small bowel loops with scattered air-fluid levels consistent with early/partial small bowel obstruction with transition at the small bowel anastomosis in the region of the ventral pelvis as above  No free air  3   Cholelithiasis without cholecystitis  4   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  Concordant preliminary results were provided by "[employer] at 0531 hours on 5/24/2022  Workstation performed: [id number]         XR chest portable ICU   Final Result      Bilateral airspace disease is unchanged  The Keofeed tube has been removed  Workstation performed: [id number]         XR chest portable   Final Result   Addendum 1 of 1   ADDENDUM:      The right IJ line terminates in the SVC  The PICC line terminates at the    junction of the SVC and right atrium  Final         1  Keofed tube terminates at the EG junction and should be advanced for  use  2   Bilateral airspace disease may represent infiltrates or the alveolar phase of heart failure  Bilateral pleural effusions  Workstation performed: [id number]         CT chest abdomen pelvis wo contrast   Final Result      1  Stable appearance of mixed groundglass and consolidative abnormalities in the lungs bilaterally predominantly affecting the upper lobes suspicious for multilobar pneumonia  Superimposed pulmonary edema not excluded  2   Similar to slight increase size of moderate bilateral pleural effusions  3   Diffuse mild dilation of small bowel segments identified without transition point  Workstation performed: [id number]         XR chest portable ICU   Final Result      Right neck catheter in the distal SVC                  Workstation performed: [id number]         XR abdomen 1 view kub   Final Result      Persistent gas distended small bowel loops with normal caliber colon with contrast                Workstation performed: [id number]         XR chest portable ICU   Final Result      Improved pneumomediastinum  Bilateral groundglass infiltrative changes  Workstation performed: [id number]         XR chest portable ICU   Final Result      Pneumomediastinum  Endotracheal tube tip above the level of the chriss by 2 5 cm  Enteric tube tip overlying the epigastric region below the inferior margin of this film  Slight progression of multifocal groundglass pulmonary parenchymal airspace opacities  Trace costophrenic angle effusions, unchanged        This examination was marked "immediate notification" in Epic in order to begin the standard process by which the radiology reading room liaison alerts the referring practitioner  Workstation performed: [id number]         XR abdomen 1 view kub   Final Result      Slight worsening of the prominent dilated small bowel loops  Contrast in the colon  Workstation performed: [id number]         IR PICC line placement double lumen   Final Result   1  Status post placement of a 5-Lao percutaneously inserted central venous catheter via the right basilic vein with its tip at the cavoatrial junction under ultrasound and fluoroscopic guidance  NG tube placement   Next using fluoroscopic guidance, an NG tube was placed down into the stomach past the narrowing at the GE junction  2 stiff wires were required to advance this  Impression successful NG tube placement with its tip in the stomach  The tube may be used immediately  Workstation performed: [id number]         XR abdomen 1 view kub   Final Result      Radiographic appearance suspicious for early or mild distal small bowel obstruction  Alternatively, this could represent ileus  Enteric contrast administered for the May 17, 2022 examination does not appear to have reached the large bowelAn       enteric catheter tip overlies the expected location of the cavoatrial junction and has not quite reached the stomach  Advancement by approximately 8 to 10 cm recommended  This examination was marked "significant notification" in Epic in order to begin the standard process by which the radiology reading room liaison alerts the referring practitioner  Workstation performed: [id number]         CT chest abdomen pelvis wo contrast   Final Result      1  Worsening patchy mixed groundglass and consolidative change bilaterally compatible with multifocal pneumonia and/or pulmonary edema  2   Increased bilateral pleural effusions        3   Sidehole of enteric tube is visualized near the gastroesophageal junction  Consider tube advancement  4   Mild distention of mid to distal esophagus with enteric contrast material   Correlate for gastroesophageal reflux  5   Multiple dilated small bowel loops without a clear transition point  The finding may reflect ileus, however developing small bowel obstruction cannot be excluded  Follow-up is recommended  6   Moderate amount of air within the urinary bladder, likely related to instrumentation  Correlate with urinalysis to exclude cystitis  I personally discussed this study with Yannick Celestin on 5/18/2022 at 1:11 AM                Workstation performed: [id number]         XR chest portable   Final Result      1  Tip of enteric tube is at the expected position of the gastroesophageal junction  Slight advancement is advised  2   Slight increased predominant upper lobe opacities, likely representing pneumonia  Concomitant edema is possible  The now trace bilateral pleural effusions have decreased in size  The study was marked in [facility] for immediate notification  Workstation performed: [id number]         XR abdomen 1 view kub   Final Result      Diffusely distended, dilated small bowel loops with small amount of persistent air extending to the proximal colon  Findings may suggest diffuse ileus or partial obstruction  Workstation performed: [id number]         CTA chest pe study   Final Result      No pulmonary embolus  Bilateral upper lobe groundglass opacities compatible with pneumonia and/or pulmonary edema  Small bilateral pleural effusions  Workstation performed: [id number]         XR chest portable   Final Result      Pulmonary edema and small bilateral pleural effusions                    Workstation performed: [id number]         IR other    (Results Pending)         Micro:  Lab Results   Component Value Date    BLOODCX No Growth at 72 hrs  05/22/2022 BLOODCX No Growth at 72 hrs  05/22/2022    BLOODCX No Growth After 5 Days  05/18/2022    WOUNDCULT 1+ Growth of Pseudomonas aeruginosa (A) 05/11/2022    SPUTUMCULTUR Test not performed  Suggest repeat specimen   05/17/2022       Allergies: No Known Allergies    Medications:   Scheduled Meds:  Current Facility-Administered Medications   Medication Dose Route Frequency Provider Last Rate    acetaminophen  650 mg Oral Q6H PRN Charly Alstrom, CRNP      acetylcysteine  3 mL Nebulization Q8H Charly Alstrom, CRNP      Adult TPN (CUSTOM BASE/CUSTOM ELECTROLYTE)   Intravenous Continuous TPN Art Jose PA-C 89 3 mL/hr at 05/25/22 2256    bumetanide  2 mg/hr Intravenous Continuous ISELA Lawrence 2 mg/hr (05/26/22 0338)    cefepime  1,000 mg Intravenous Q12H Darius Mayo PA-C Stopped (05/25/22 2247)    chlorhexidine  15 mL Mouth/Throat Q12H [address] Charly Alstrom, CRNP      HYDROmorphone  0 5 mg Intravenous Q3H PRN ISELA Arceo      insulin lispro  1-6 Units Subcutaneous Q6H [address] Josephine ISELA Negron      iohexol  50 mL Oral Once in imaging Charly Alstrom, CRNP      ipratropium-albuterol  3 mL Nebulization Q6H PRN Charly Alstrom, CROLIVER      ipratropium-albuterol  3 mL Nebulization Q8H Charly Alstrom, CRNP      levothyroxine  112 mcg Per NG Tube Early Morning Charly Alsom, CROLIVER      lidocaine  2 patch Topical Daily [place], [place]      norepinephrine  1-30 mcg/min Intravenous Titrated Cherri Spiroheathero ISELA OLEARY Stopped (05/26/22 0603)    ondansetron  4 mg Intravenous Q6H PRN Charly Alstrom, CRNP      oxyCODONE  5 mg Per NG Tube Q4H PRN Juwan Betancur PA-C      oxyCODONE  7 5 mg Per NG Tube Q4H PRN Juwan Tripathi PA-C      pantoprazole  40 mg Intravenous Q24H [address] Charly Alstrom, CRNP      sodium bicarbonate infusion  100 mL/hr Intravenous Continuous Cherri Spiromikayla OLEARY CRNP 100 mL/hr (05/26/22 0100)    sodium bicarbonate  650 mg Oral TID after meals Migel Rios MD       Continuous Infusions:Adult TPN (CUSTOM BASE/CUSTOM ELECTROLYTE), , Last Rate: 89 3 mL/hr at 05/25/22 2256  bumetanide, 2 mg/hr, Last Rate: 2 mg/hr (05/26/22 0338)  norepinephrine, 1-30 mcg/min, Last Rate: Stopped (05/26/22 0603)  sodium bicarbonate infusion, 100 mL/hr, Last Rate: 100 mL/hr (05/26/22 0100)      PRN Meds:  acetaminophen, 650 mg, Q6H PRN  HYDROmorphone, 0 5 mg, Q3H PRN  iohexol, 50 mL, Once in imaging  ipratropium-albuterol, 3 mL, Q6H PRN  ondansetron, 4 mg, Q6H PRN  oxyCODONE, 5 mg, Q4H PRN  oxyCODONE, 7 5 mg, Q4H PRN        VTE Pharmacologic Prophylaxis: dropping hg  VTE Mechanical Prophylaxis: sequential compression device    Invasive lines and devices: Invasive Devices  Report    Peripherally Inserted Central Catheter Line  Duration           PICC Line 65/47/38 Right Basilic 7 days          Central Venous Catheter Line  Duration           CVC Central Lines 05/21/22 Triple 16cm 4 days          Drain  Duration           Colostomy LLQ 15 days    NG/OG/Enteral Tube Orogastric 16 Fr Center mouth 5 days    Urethral Catheter Double-lumen 16 Fr  5 days          Airway  Duration           ETT  Oral 4 days                   Counseling / Coordination of Care  Total Critical Care time spent 30 minutes excluding procedures, teaching and family updates  Code Status: Level 1 - Full Code     Portions of the record may have been created with voice recognition software  Occasional wrong word or "sound a like" substitutions may have occurred due to the inherent limitations of voice recognition software  Read the chart carefully and recognize, using context, where substitutions have occurred       Diogo Hernandez MD

## 2022-05-26 NOTE — WOUND OSTOMY CARE
Progress Note - Wound   Fady Hudson 80 y o  male MRN: [id number]  Unit/Bed#: ICU 02 Encounter: [id number]    Jessi Damian RN   Registered Nurse   Specialty:  Wound Care   Wound Ostomy Care       Signed   Date of Service:  5/20/2022  5:46 PM                 Signed        Summary: Wound Care Initial Consult         Progress Note - Wound   Fady Hudson 80 y o  male MRN: [id number]  Unit/Bed#: ICU 10 Encounter: [id number]     Assessment:   This is a one week follow-up visit for this 80year old male patient admitted on 5/11/22 with bowel perforation and pneumoperitoneum s/p colonoscopy and transverse loop colostomy done on 5/11/22  He has a history of Raynaud's disease, scleroderma, colon polyps, chronic anemia, gall stones and HTN  The patient was re-intubated via oral endotracheal tube on 5/21 post cardiac arrest  His renal status is now compromised and his family was approached regarding initiation of hemodialysis treatments  He has a quigley to gravity and left quadrant colostomy draining small amounts of watery stool  The patient remains totally dependent upon nursing staff for all of his care and is repositioned in bed with assist of 2 persons  During the wound care assessment, the patient was placed on his side to assess sacrobuttock area but started to desaturate so he was placed in a supine position  As per primary RN, she would turn him later to reapply sacral foam dressing  Skin care Plan:  1-Cleanse sacro-buttocks with soap and water  Apply Allevyn foam  Danyel with T for treatment  Change every two days and PRN  check skin q-shift  2-Turn/reposition q2h with foam wedges/pillows (including the use of micro-turns) or when medically stable for pressure re-distribution on skin  3-Float heels on a pillow to offload pressure  If unable to do this, please apply bilateral heel protectors to be worn at all times in bed    4-Moisturize skin daily with skin nourishing cream  5-Ehob cushion in chair when out of bed  Limit sitting for 1-2 hours at a time to prevent excessive pressure to sacral area then offload back in bed for at least 1 hour  6-Apply bilateral heel foam dressings or use Hydraguard to bilateral heels BID and PRN  7-Ostomy supplies to be ordered before patient is discharged  8-Please only use warm water and plain wipes not soap or bath wipes to cleanse parastomal site  The use of bath wipes and soap would prevent the appliance from adhering correctly and may sensitize the patient to possible allergens and skin irritation       Assessment Findings:    1  The sacral area has blanchable erythema and is intact  Orders for prevention in place  2  Bilateral heels are intact with mottling noted (due to Raynaud's disease)  Orders for prevention in place  3  Left sided ostomy appliance intact and draining small amounts of watery stool  Orders in place for ostomy care                    Wound 05/13/22 Sacrum (Active)   Wound Image Unable to photo 05/20/22 0915   Wound Description Blanchable erythema 05/26/22 0915   Alisa-wound Assessment Intact 05/26/22 0915   Drainage Amount None 05/26/22 0915   Treatments Cleansed 05/26/22 0915   Dressing Moisture barrier 05/26/22 0915   Dressing Status Intact 05/26/22 0915       Wound 05/13/22  Heel Left (Active)   Wound Image   05/26/22 0915   Wound Description Blanchable erythema 05/26/22 0915   Alisa-wound Assessment Intact 05/26/22 0915   Drainage Amount None 05/26/22 0915   Treatments Cleansed 05/26/22 0915   Dressing Moisture barrier 05/26/22 0915   Dressing Status Intact 05/26/22 0915       Wound 05/13/22  Heel Right (Active)   Wound Image   05/26/22 0921   Wound Description Non-blanchable erythema 05/26/22 0921   Pressure Injury Stage Stage1 05/26/22 0921   Alisa-wound Assessment Intact 05/26/22 0921   Drainage Amount None 05/26/22 0921   Treatments Cleansed 05/26/22 0921   Dressing Moisture barrier 05/26/22 0921   Dressing Status Intact 05/26/22 0921       Wound 05/26/22 Ankle Left;Lateral (Active)   Wound Image  Blanchable erythema 05/26/22 0917   Alisa-wound Assessment Intact 05/26/22 0917   Wound Length (cm) 1 4 cm 05/26/22 0917   Wound Width (cm) 1 2 cm 05/26/22 0917   Wound Depth (cm) 0 cm 05/26/22 0917   Wound Surface Area (cm^2) 1 68 cm^2 05/26/22 0917   Wound Volume (cm^3) 0 cm^3 05/26/22 0917   Calculated Wound Volume (cm^3) 0 cm^3 05/26/22 0917   Drainage Amount None 05/26/22 0917   Treatments Cleansed 05/26/22 0917   Dressing Moisture barrier 05/26/22 0917   Wound packed? No 05/26/22 0917   Dressing Status Intact 05/26/22 0917       Wound 05/26/22 Foot Right;Lateral (Active)   Wound Description Non-blanchable erythema 05/26/22 0924   Pressure Injury Stage Stage 1 05/26/22 0924   Alisa-wound Assessment Intact 05/26/22 0924   Wound Length (cm) 0 4 cm 05/26/22 0924   Wound Width (cm) 0 4 cm 05/26/22 0924   Wound Depth (cm) 0 cm 05/26/22 0924   Wound Surface Area (cm^2) 0 16 cm^2 05/26/22 0924   Wound Volume (cm^3) 0 cm^3 05/26/22 0924   Calculated Wound Volume (cm^3) 0 cm^3 05/26/22 0924   Drainage Amount None 05/26/22 0924   Treatments Cleansed 05/26/22 0924   Dressing Moisture barrier 05/26/22 0924   Dressing Status Intact 05/26/22 0924     Discussed assessment findings, and plan of care/recommendations with Baeza Europe RN  Wound care will follow along with patient throughout admission, please call or tiger text with questions and concerns      Ivory Moyer RN, BSN, [facility]

## 2022-05-26 NOTE — ASSESSMENT & PLAN NOTE
· Hemoglobin drop post cardiac arrest    · Noted to have gross hematuria but this has since resolved  · 5/21: 1 u PRBC for hgb 6 8  · 5/24: 1 u PRBC  · CT obtained on 5/24 and negative for any overt signs of bleeding  · 5/26: 1 u PRBC  · Continue to monitor   · Transfuse for hgb less than 7  · Most recent hemoglobin 7 8 down from 8 8

## 2022-05-26 NOTE — QUICK NOTE
I spoke with  Mr Danni Barber about CRRT this morning , he wanted to discussed it further with his brother  Patient 's son agree to proceed with CRRT, understand the risk and benefits and gives verbal consent  ICU team aware         Nicolle Silverio MD  Nephrology Attending

## 2022-05-26 NOTE — ASSESSMENT & PLAN NOTE
· Patient previously in ICU for hypoxia with a max of 15L midflow and concern for aspiration pneumonitis  · Improved and was able to transfer to general medical floor 5/21  · 5/21: PEA arrest on the floor most likely respiratory driven   · ROSC obtained; transferred to unit and again PEA arrested  · 5/24 CT CAP-CHF with moderate basilar effusions and additional upper lung zone patchy airspace opacities likely reflecting asymmetric pulmonary edema  Infiltrate is not entirely excluded     · Has remained on ventilator, day # 7: AC/PC 20/18/50/8  · Tolerated several hours of PS yesterday 14/8  · Atrovent/xopenex/mucomyst nebs q8h  · Continue pulmonary hygiene/ VAP bundle  · Continue volume removal with bumex/CVVH  · Plan for SBT today

## 2022-05-26 NOTE — ASSESSMENT & PLAN NOTE
Wt Readings from Last 3 Encounters:   05/26/22 77 9 kg (171 lb 11 8 oz)   05/11/22 62 1 kg (136 lb 14 4 oz)   03/25/22 61 2 kg (135 lb)     · 5/16: Echo-EF 65%, mild TR, mild MR  · 5/23: Repeat Echo post cardiac arrest: EF 60-65%, G1DD, mild AS (BRADY 1 5cm2), mild MR, mild TR  · Gross anasarca on exam: continue volume removal with Bumex infusion and CVVH  · Monitor I&O, Daily weights

## 2022-05-26 NOTE — PROGRESS NOTES
Recommend Osmolite 1 0 at goal rate of 55 mL/hour for a total volume of 1320 mL  This will provide 1399 kcals, 59 grams protein and 1102 mL free water  Will not put flushes on for right now as he is volume overloaded and in discussion with family about CRRT  If TPN must remain, recommend returning to a standard bag of 500 mL 30% dextrose, 333 mL 15% amino acids and 100 mL 20% lipids for a total volume of 933 mL

## 2022-05-26 NOTE — PROGRESS NOTES
Progress Note - Infectious Disease   Fady Hudson 80 y o  male MRN: [id number]  Unit/Bed#: ICU 02 Encounter: [id number]      Impression/Plan:  1  s/p cardiac arrest 5/21/22  Patient intubated during RR  In ICU on ventilator assistance  Recurrent SIRS possibly reactive to arrest with fever, tachycardia, tachypnea, and increased leukocytosis post arrest  Possible recurrent aspiration event s/p arrest  Fever down trended  -continue antibiotics as below  -monitor temperature and hemodynamics  -follow-up repeat blood cultures  -monitor serial a m  Labs  -continue supportive measures per critical care team  -cardiology on board  -ventilator management per critical care team     2  SIRS vs Severe Sepsis, evolving on admission and post #1   Evidenced by tachycardia, tachypnea, bandemia and encephalopathy   Suspect possible aspiration in setting of significant retained secretions, acute respiratory failure requiring supplemental oxygenation and with recent NG tube for postop ileus management   MRSA screen negative  WBC count 15 K  -continue cefepime reduced at 1g IV q 12 hours for #6  -follow-up final repeat blood cultures   -monitor temperature and hemodynamics  -serial exam  -monitor CBC and BMP   -additional supportive measures per critical care team as below      3    Peritonitis s/p Bowel perforation  s/p 5/11/22 exploratory laparotomy, low anterior resection, protective loop transverse colostomy and segmental small bowel resection secondary to perforation rectosigmoid junction after colonoscopy   OR cultures grew Pseudomonas aeruginosa and Bacteroides fragilis  Now being managed with NGT/NPO status for post op ileus on TPN    -Continue Cefepime through 5/27/22  -Finished 2 weeks post op Flagyl 5/24/22  -monitor temperature and hemodynamics  -serial exam  -close surgical follow-up  -abdominal wall incision site wound VAC placement planned for 05/26/2022  -plan to complete 2 weeks antibiotics total through 22  -advancing nutrition per surgical team     4   Acute hypoxic respiratory failure with hypoxia   Suspicious for aspiration pneumonitis over pneumonia     22 CT C/A/P predominantly UL airspace opacities consistent with CHF with bilateral pleural effusions, distended small bowel loops id  Patient remains intubated s/p #1  22 Procalcitonin level  2 60 >  0 753 < 22 2 97  22 Sputum cx with mixed oropharyngeal contaminants  -continue antibiotics for #3 as above  -ventilator management per critical care team      5  Aspiration pneumonitis versus pneumonia in setting of #1/3   22 CT C/A/P predominantly UL groundglass and consolidations, bilateral pleural effusions, SB mild dilation unchanged  Patient now intubated s/p #1  22 Procalcitonin level  2 60 >  0 753 < 22 2 97   22 sputum specimen oral pharyngeal contamination   -continue antibiotics as above  -secretion and pulmonary toilet management per critical care team   -monitor respiratory symptoms  -monitor vent requirements     6  MARYELLEN on CKD 3  Creatinine up to 2 93  -renal dose adjust antibiotics as needed  -volume management per Nephrology  -CRT being considered by family  Can increase Cefepime to 2 g IV q 12 hours if CRT initiated prior to Cefepime end date of 22  -monitor creatinine     Antibiotics:  Cefepime D13     Above impression and plan discussed in detail with patient's RN and critical care team      Subjective:  Patient has no fever, chills, sweats on ventilator in ICU s/p cardiac arrest 22; no nausea, vomiting, diarrhea reported    Appears to be tolerating IV antibiotics    Objective:  Vitals:  Temp:  [96 6 °F (35 9 °C)-98 06 °F (36 7 °C)] 97 34 °F (36 3 °C)  HR:  [73-92] 85  Resp:  [12-43] 34  BP: ()/(43-58) 83/46  SpO2:  [90 %-98 %] 93 %  Temp (24hrs), Av 8 °F (36 6 °C), Min:96 6 °F (35 9 °C), Max:98 06 °F (36 7 °C)  Current: Temperature: (!) 97 34 °F (36 3 °C)    Physical Exam: General Appearance:  80year-old acute on chronically debilitated, elderly male who opens eyes briefly to name and exam, sluggish on ventilator at FiO2 40%, propped fairly comfortably appearing in ICU bed   Throat: Oropharynx moist without lesions  ET tube to vent, orogastric tube feeds in place   Lungs:   Scattered coarse ventilated breath sounds anteriorly and laterally, mild thin phlegm secretions in canister  Heart:  RRR; no murmur   Abdomen:   Soft, no no focal tenderness or wincing, non-distended, midline abdominal incision site with packed ABD dressing intact without strike through drainage or spreading erythema outside of dressing, left lower quadrant colostomy with dark loose stool in bag, positive diminished bowel sounds  Extremities: No clubbing, cyanosis, + generalized edema predominantly in arms and thighs  + LE venodynes and Prevalon boots in place   : [place] with clear, yellow urine in bag, no SPT, +3-4 scrotal edema propped on towels   Skin: No new rashes or lesions    Right arm PICC and right CVP lines in place, TPN running       Labs, Imaging, & Other studies:   All pertinent labs and imaging studies were personally reviewed  Results from last 7 days   Lab Units 05/26/22  0541 05/25/22  0505 05/24/22  1059 05/24/22  0238   WBC Thousand/uL 15 71* 14 81*  --  18 23*   HEMOGLOBIN g/dL 7 3* 7 5* 8 3* 6 7*   PLATELETS Thousands/uL 212 219  --  241     Results from last 7 days   Lab Units 05/26/22  0541 05/25/22  2337 05/25/22  1823 05/25/22  1132 05/25/22  0505 05/24/22  2239 05/24/22  0238 05/23/22  1541 05/23/22  0438   SODIUM mmol/L 133* 134* 135*   < > 136   < > 142   < > 141   POTASSIUM mmol/L 3 8 3 9 4 0   < > 3 9   < > 3 6   < > 3 7   CHLORIDE mmol/L 101 103 104   < > 108   < > 111*   < > 113*   CO2 mmol/L 18* 18* 17*   < > 18*   < > 21   < > 21   BUN mg/dL 106* 105* 99*   < > 92*   < > 73*   < > 66*   CREATININE mg/dL 2 93* 2 79* 2 55*   < > 2 41*   < > 1 88*   < > 1 72*   EGFR ml/min/1 73sq m 17 18 21   < > 22   < > 30   < > 34   CALCIUM mg/dL 7 3* 7 4* 7 4*   < > 7 5*   < > 7 1*   < > 7 2*   AST U/L  --   --   --   --  15  --  18  --  19   ALT U/L  --   --   --   --  15  --  13  --  18   ALK PHOS U/L  --   --   --   --  54  --  53  --  41*    < > = values in this interval not displayed  Results from last 7 days   Lab Units 05/22/22  0934 05/21/22  1656   BLOOD CULTURE  No Growth at 72 hrs  No Growth at 72 hrs    --    MRSA CULTURE ONLY   --  No Methicillin Resistant Staphlyococcus aureus (MRSA) isolated     Results from last 7 days   Lab Units 05/24/22  0238 05/23/22  0438 05/21/22  0524 05/20/22  0546   PROCALCITONIN ng/ml 2 58* 2 97* 0 53* 0 76*

## 2022-05-26 NOTE — ASSESSMENT & PLAN NOTE
· S/p cardiac arrest while on 3 pressors noted to have afib with RVR  · Bolused with amio and placed on infusion  · Converted to sinus rhythm on 5/22   · Amio gtt d/c 5/23  · Has remained in NSR  · Start beta blocker once BP stabilizes; remains on vasopressor support  · No indication for [facility] at this time; see as outlined under anemia

## 2022-05-26 NOTE — PROGRESS NOTES
CVVH aborted  Levophed titrated See VS urine output improved  Asia Avtar aware  BMP results known to same CRNP

## 2022-05-26 NOTE — PROGRESS NOTES
NEPHROLOGY PROGRESS NOTE   Rene Rodriguez 80 y o  male MRN: [id number]  Unit/Bed#: ICU 02 Encounter: [id number]  Reason for Consult: MARYELLEN    ASSESSMENT AND PLAN:  80 y o  man with PMH of Aostenosis, scleroderma, CAD  Patient underwent EGD and colonoscopy on  for possible intussusception, complicated with perforation, underwent exploratory laparotomy, complicated with pneumonia, sepsis, CHF, PEA arrest on 05/21 x2   Nephrology is consulted for MARYELLEN   ay for assistance in the management of MARYELLEN     PLAN  #Non-Oliguric KDIGO MARYELLEN stage 3   · Etiology:  Likely secondary to ATN in the settings of hypotension and PEA arrest  · Baseline creatinine:  0 9 mg/dL  · Peak creatinine:   still trending  · Current creatinine:  2 93 mg/dL, slowly trending up  · UA: He micro hematuria, leukocyturia, moderate bacteria  · Renal imaging :  No hydronephrosis  · Treatment:  · Patient is not responding well to high-dose diuretics remains positive and fluid overload  · Discussed with son Melo Reardon) about benefits and risk of dialysis  I explained to today patient is more hemodynamically stable and after discussing with the ICU team we considered that he will be able to tolerate CRT for volume removal and acidosis however there is still risk of severe hypotension and cardiac arrest  · Thompson Klinefelter will discuss it with his brother and will make a decision later this morning  · Maintain MAP:  Over 65 mmHg if possible/avoid hypoperfusion:  Hold parameters on blood pressure medications  · Avoid nephrotoxic agents such as NSAIDs, and IV contrast if possible   Avoid opioids   · Adjust medications to GFR     #Acid-base Disorder  · serum HCO3 18  · A  · Metabolic acidosis secondary to MARYELLEN  · Started on sodium bicarb, decrease rate to 75 mL/hour   · Pending consent for CRT     #Volume status/hypertension:  · Volume:  fluid overload  · Blood pressure:   still hypotensive /53mmhg off vasopressors  · Recommend:  · Continue Bumex infusion 2 milligrams/hour and metolazone  · Will attempt fluid removal with CRRT if family agree     #Anemia:  · Current hemoglobin:  7 3 mg/dL  · Treatment:  · Transfuse for hemoglobin less than 7 0 per primary service       #PE arrest  · off vasopressor support  · Cardiology follow     # sepsis/bowel perforation  · On antibiotics and vasopressor support       SUBJECTIVE:  Patient seen and examined at bedside    Patient remains intubated, awake, follow simple commands, off vasopressors     OBJECTIVE:  Current Weight: Weight - Scale: 77 9 kg (171 lb 11 8 oz)  Vitals:    05/26/22 0725   BP:    Pulse: 92   Resp: (!) 28   Temp:    SpO2: 95%       Intake/Output Summary (Last 24 hours) at 5/26/2022 0907  Last data filed at 5/26/2022 0800  Gross per 24 hour   Intake 3878 47 ml   Output 1545 ml   Net 2333 47 ml     Wt Readings from Last 3 Encounters:   05/26/22 77 9 kg (171 lb 11 8 oz)   05/11/22 62 1 kg (136 lb 14 4 oz)   03/25/22 61 2 kg (135 lb)     Temp Readings from Last 3 Encounters:   05/26/22 97 7 °F (36 5 °C)   05/11/22 (!) 97 1 °F (36 2 °C) (Temporal)   01/14/22 97 9 °F (36 6 °C) (Temporal)     BP Readings from Last 3 Encounters:   05/26/22 108/53   05/11/22 141/69   03/25/22 152/76     Pulse Readings from Last 3 Encounters:   05/26/22 92   05/11/22 73   03/25/22 89        General:  Critically ill, intubated, sarcoma  Skin:  No acute rash  Eyes:  No scleral icterus and noninjected  ENT:  mucous membranes moist  Neck:  no carotid bruits  Chest:  Clear to auscultation percussion, good respiratory effort, no use of accessory respiratory muscles  CVS:  Regular rate and rhythm without a murmur rub  Abdomen:  soft and nontender   Extremities:  Lower extremity edema  Neuro:  No gross focality  Psych:  Awake  Farnsworth catheter:  Clear urine    Medications:    Current Facility-Administered Medications:     acetaminophen (TYLENOL) oral suspension 650 mg, 650 mg, Oral, Q6H PRN, ISELA Warren, 650 mg at 05/21/22 2030   acetylcysteine (MUCOMYST) 200 mg/mL inhalation solution 600 mg, 3 mL, Nebulization, Q8H, ISELA Guajardo, 600 mg at 05/26/22 0720    Adult 3-in-1 TPN (custom base / custom electrolytes), , Intravenous, Continuous TPN, Lauren Melissa PA-C, Last Rate: 89 3 mL/hr at 05/26/22 0729, Rate Verify at 05/26/22 0729    bumetanide (BUMEX) 12 5 mg infusion 50 mL, 2 mg/hr, Intravenous, Continuous, Cherri Gonzales V, CRNP, Last Rate: 8 mL/hr at 05/26/22 0729, 2 mg/hr at 05/26/22 0729    cefepime (MAXIPIME) IVPB (premix in dextrose) 1,000 mg 50 mL, 1,000 mg, Intravenous, Q12H, Tremayne Zuleta PA-C, Stopped at 05/25/22 2247    chlorhexidine (PERIDEX) 0 12 % oral rinse 15 mL, 15 mL, Mouth/Throat, Q12H [address], ISELA Guajardo, 15 mL at 05/26/22 0805    HYDROmorphone (DILAUDID) injection 0 5 mg, 0 5 mg, Intravenous, Q3H PRN, ISELA Ritter, 0 5 mg at 05/25/22 2126    insulin glargine (LANTUS) subcutaneous injection 5 Units 0 05 mL, 5 Units, Subcutaneous, Kathy SWANN CRNP    insulin lispro (HumaLOG) 100 units/mL subcutaneous injection 1-6 Units, 1-6 Units, Subcutaneous, Q6H [address], 4 Units at 05/26/22 0611 **AND** Fingerstick Glucose (POCT), , , Q6H, ISELA Ritter    iohexol (OMNIPAQUE) 240 MG/ML solution 50 mL, 50 mL, Oral, Once in imaging, ISELA Guajardo    ipratropium-albuterol (DUO-NEB) 0 5-2 5 mg/3 mL inhalation solution 3 mL, 3 mL, Nebulization, Q6H PRN, ISELA Guajardo, 3 mL at 05/23/22 1330    ipratropium-albuterol (DUO-NEB) 0 5-2 5 mg/3 mL inhalation solution 3 mL, 3 mL, Nebulization, Q8H, Valentina Narvaez, MACARIONP, 3 mL at 05/26/22 0719    levothyroxine tablet 112 mcg, 112 mcg, Per NG Tube, Early Morning, Priyank Montanez, ISELA, 112 mcg at 05/26/22 0542    lidocaine (LIDODERM) 5 % patch 2 patch, 2 patch, Topical, Daily, Kassy Little PA-C, 2 patch at 05/26/22 0804    norepinephrine (LEVOPHED) 4 mg (STANDARD CONCENTRATION) IV in sodium chloride 0 9% 250 mL, 1-30 mcg/min, Intravenous, Titrated, ISELA Lawrence, Stopped at 05/26/22 0603    ondansetron (ZOFRAN) injection 4 mg, 4 mg, Intravenous, Q6H PRN, ISELA Warren    oxyCODONE (ROXICODONE) IR tablet 5 mg, 5 mg, Per NG Tube, Q4H PRN, Faye Tripathi PA-C, 5 mg at 05/25/22 1234    oxyCODONE (ROXICODONE) IR tablet 7 5 mg, 7 5 mg, Per NG Tube, Q4H PRN, Faye Tripathi PA-C, 7 5 mg at 05/26/22 0804    pantoprazole (PROTONIX) injection 40 mg, 40 mg, Intravenous, Q24H [facility], ISELA Warren, 40 mg at 05/26/22 0805    sodium bicarbonate 150 mEq in dextrose 5 % 1,000 mL infusion, 75 mL/hr, Intravenous, Continuous, Maribel Ag MD, Last Rate: 75 mL/hr at 05/26/22 0824, 75 mL/hr at 05/26/22 0824    Laboratory Results:  Results from last 7 days   Lab Units 05/26/22  0541 05/25/22  2337 05/25/22  1823 05/25/22  1132 05/25/22  0505 05/24/22  2239 05/24/22  1059 05/24/22  0238 05/23/22  1541 05/23/22  0438 05/22/22  0524 05/21/22  2214 05/21/22  1539 05/21/22  0524   WBC Thousand/uL 15 71*  --   --   --  14 81*  --   --  18 23*  --  21 01* 23 05*  --  21 74* 23 58*   HEMOGLOBIN g/dL 7 3*  --   --   --  7 5*  --  8 3* 6 7* 7 2* 7 2* 8 5* 9 7* 6 2* 8 2*   HEMATOCRIT % 21 6*  --   --   --  21 9*  --   --  19 6* 21 0* 20 4* 25 4*  --  18 3* 24 7*   PLATELETS Thousands/uL 212  --   --   --  219  --   --  241  --  243 273  --  231 326   SODIUM mmol/L 133* 134* 135* 137 136 138  --  142 141 141 146* 149* 150* 147*   POTASSIUM mmol/L 3 8 3 9 4 0 4 0 3 9 4 2  --  3 6 3 7 3 7 5 2 4 6 3 0* 3 5   CHLORIDE mmol/L 101 103 104 107 108 110*  --  111* 112* 113* 115* 118* 119* 116*   CO2 mmol/L 18* 18* 17* 18* 18* 19*  --  21 23 21 20* 22 25 27   BUN mg/dL 106* 105* 99* 93* 92* 87*  --  73* 69* 66* 57* 53* 42* 45*   CREATININE mg/dL 2 93* 2 79* 2 55* 2 48* 2 41* 2 22*  --  1 88* 1 77* 1 72* 1 56* 1 35* 0 94 1 03   CALCIUM mg/dL 7 3* 7 4* 7 4* 7 2* 7 5* 7 2*  --  7 1* 7 1* 7 2* 7 6* 7 4* 5 7* 7 5* MAGNESIUM mg/dL 2 8*  --   --   --  2 8*  --   --  2 6  --  2 4 2 3 2 3 2 0 2 6   PHOSPHORUS mg/dL 5 7*  --   --   --  4 2*  --   --  3 4  --  3 6 3 3 2 8 3 7 2 1*       CT chest abdomen pelvis wo contrast   Final Result by Ethan Vazquez MD (05/24 8137)   1  CHF with moderate basilar effusions and additional upper lung zone patchy airspace opacities likely reflecting asymmetric pulmonary edema  Infiltrate is not entirely excluded  2   Distended small bowel loops with scattered air-fluid levels consistent with early/partial small bowel obstruction with transition at the small bowel anastomosis in the region of the ventral pelvis as above  No free air  3   Cholelithiasis without cholecystitis  4   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  Concordant preliminary results were provided by [employer] at 0531 hours on 5/24/2022  Workstation performed: [id number]         XR chest portable ICU   Final Result by Lora Andrews MD (05/24 1005)      Bilateral airspace disease is unchanged  The Keofeed tube has been removed  Workstation performed: [id number]         XR chest portable   Final Result by Lora Andrews MD (05/24 0995)   Addendum 1 of 1 by Lora Andrews MD (05/24 1329)   ADDENDUM:      The right IJ line terminates in the SVC  The PICC line terminates at the    junction of the SVC and right atrium  Final         1  Keofed tube terminates at the EG junction and should be advanced for  use  2   Bilateral airspace disease may represent infiltrates or the alveolar phase of heart failure  Bilateral pleural effusions  Workstation performed: [id number]         CT chest abdomen pelvis wo contrast   Final Result by Charlie 6, DO (05/22 2810)      1    Stable appearance of mixed groundglass and consolidative abnormalities in the lungs bilaterally predominantly affecting the upper lobes suspicious for multilobar pneumonia  Superimposed pulmonary edema not excluded  2   Similar to slight increase size of moderate bilateral pleural effusions  3   Diffuse mild dilation of small bowel segments identified without transition point  Workstation performed: [id number]         XR chest portable ICU   Final Result by Padilla Ramos MD (05/22 2209)      Right neck catheter in the distal SVC                  Workstation performed: [id number]         XR abdomen 1 view kub   Final Result by Padilla Ramos MD (05/22 6791)      Persistent gas distended small bowel loops with normal caliber colon with contrast                Workstation performed: [id number]         XR chest portable ICU   Final Result by Padilla Ramos MD (05/22 5990)      Improved pneumomediastinum  Bilateral groundglass infiltrative changes  Workstation performed: [id number]         XR chest portable ICU   Final Result by Andreina Yepez MD (05/21 7512)      Pneumomediastinum  Endotracheal tube tip above the level of the chriss by 2 5 cm  Enteric tube tip overlying the epigastric region below the inferior margin of this film  Slight progression of multifocal groundglass pulmonary parenchymal airspace opacities  Trace costophrenic angle effusions, unchanged  This examination was marked "immediate notification" in Epic in order to begin the standard process by which the radiology reading room liaison alerts the referring practitioner  Workstation performed: [id number]         XR abdomen 1 view kub   Final Result by Padilla Ramos MD (05/22 0750)      Slight worsening of the prominent dilated small bowel loops  Contrast in the colon  Workstation performed: [id number]         IR PICC line placement double lumen   Final Result by Homer Blevins MD (05/18 2449)   1   Status post placement of a 5-Nigerian percutaneously inserted central venous catheter via the right basilic vein with its tip at the cavoatrial junction under ultrasound and fluoroscopic guidance  NG tube placement   Next using fluoroscopic guidance, an NG tube was placed down into the stomach past the narrowing at the GE junction  2 stiff wires were required to advance this  Impression successful NG tube placement with its tip in the stomach  The tube may be used immediately  Workstation performed: [id number]         XR abdomen 1 view kub   Final Result by Jane Og MD (05/18 4654)      Radiographic appearance suspicious for early or mild distal small bowel obstruction  Alternatively, this could represent ileus  Enteric contrast administered for the May 17, 2022 examination does not appear to have reached the large bowelAn       enteric catheter tip overlies the expected location of the cavoatrial junction and has not quite reached the stomach  Advancement by approximately 8 to 10 cm recommended  This examination was marked "significant notification" in Epic in order to begin the standard process by which the radiology reading room liaison alerts the referring practitioner  Workstation performed: [id number]         CT chest abdomen pelvis wo contrast   Final Result by Krystian Lance MD (05/18 1830)      1  Worsening patchy mixed groundglass and consolidative change bilaterally compatible with multifocal pneumonia and/or pulmonary edema  2   Increased bilateral pleural effusions  3   Sidehole of enteric tube is visualized near the gastroesophageal junction  Consider tube advancement  4   Mild distention of mid to distal esophagus with enteric contrast material   Correlate for gastroesophageal reflux  5   Multiple dilated small bowel loops without a clear transition point  The finding may reflect ileus, however developing small bowel obstruction cannot be excluded  Follow-up is recommended        6  Moderate amount of air within the urinary bladder, likely related to instrumentation  Correlate with urinalysis to exclude cystitis  I personally discussed this study with Raymond Americus on 5/18/2022 at 1:11 AM                Workstation performed: [id number]         XR chest portable   Final Result by Claudene Durham, MD (05/17 1603)      1  Tip of enteric tube is at the expected position of the gastroesophageal junction  Slight advancement is advised  2   Slight increased predominant upper lobe opacities, likely representing pneumonia  Concomitant edema is possible  The now trace bilateral pleural effusions have decreased in size  The study was marked in [facility] for immediate notification  Workstation performed: [id number]         XR abdomen 1 view kub   Final Result by Starla Chavez MD (05/16 1606)      Diffusely distended, dilated small bowel loops with small amount of persistent air extending to the proximal colon  Findings may suggest diffuse ileus or partial obstruction  Workstation performed: [id number]         CTA chest pe study   Final Result by Waldemar Ko MD (05/15 1229)      No pulmonary embolus  Bilateral upper lobe groundglass opacities compatible with pneumonia and/or pulmonary edema  Small bilateral pleural effusions  Workstation performed: [id number]         XR chest portable   Final Result by Talib Cruz MD (05/15 1023)      Pulmonary edema and small bilateral pleural effusions  Workstation performed: [id number]         IR other    (Results Pending)       Portions of the record may have been created with voice recognition software  Occasional wrong word or "sound a like" substitutions may have occurred due to the inherent limitations of voice recognition software  Read the chart carefully and recognize, using context, where substitutions have occurred

## 2022-05-27 NOTE — PROGRESS NOTES
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, reduced at bedside  · Bilateral pleural effusions    WBC:22 51(20 46)  UOP: 1 3L  Stool: 150 mL    Plan:  Continue symptomatic supportive care per critical care team   Continue tube feeds  Continue veraflo wound vac  Dressing changes on Monday and Thursdays  Subjective/Objective      Subjective: Patient is intubated  Objective:    Blood pressure 129/63, pulse 66, temperature (!) 94 64 °F (34 8 °C), temperature source Bladder, resp  rate (!) 38, height 5' 4" (1 626 m), weight 78 9 kg (173 lb 15 1 oz), SpO2 92 %  ,Body mass index is 29 86 kg/m²        Intake/Output Summary (Last 24 hours) at 5/27/2022 1356  Last data filed at 5/27/2022 1200  Gross per 24 hour   Intake 4851 98 ml   Output 4146 ml   Net 705 98 ml       Invasive Devices  Report    Peripherally Inserted Central Catheter Line  Duration           PICC Line 12/31/23 Right Basilic 8 days          Hemodialysis Catheter  Duration           HD Temporary Double Catheter <1 day          Drain  Duration           Colostomy LLQ 16 days    NG/OG/Enteral Tube Orogastric 16 Fr Center mouth 6 days    Urethral Catheter Double-lumen 16 Fr  6 days          Airway  Duration           ETT  Oral 5 days                Physical Exam: /63 (BP Location: Right arm)   Pulse 66   Temp (!) 94 64 °F (34 8 °C) (Bladder)   Resp (!) 38   Ht 5' 4" (1 626 m)   Wt 78 9 kg (173 lb 15 1 oz)   SpO2 92%   BMI 29 86 kg/m² General appearance: intubated, opens eyes to verbal stimuli  Lungs: rhonchi and Mechanical breath sounds  Heart: Mechanical coarse breath sounds  Abdomen: mild distension, colosotomy intact, stoma pink in color, no peristomal herina, minimal liquid stool in bag  Lab, Imaging and other studies:  I have personally reviewed pertinent lab results    , CBC:   Lab Results   Component Value Date    WBC 22 51 (H) 05/27/2022    HGB 8 8 (L) 05/27/2022    HCT 24 9 (L) 05/27/2022    MCV 92 05/27/2022     (L) 05/27/2022    MCH 32 6 05/27/2022    MCHC 35 3 05/27/2022    RDW 17 9 (H) 05/27/2022    MPV 13 0 (H) 05/27/2022   , CMP:   Lab Results   Component Value Date    SODIUM 134 (L) 05/27/2022    K 4 6 05/27/2022     05/27/2022    CO2 22 05/27/2022    BUN 69 (H) 05/27/2022    CREATININE 1 99 (H) 05/27/2022    CALCIUM 7 9 (L) 05/27/2022    EGFR 28 05/27/2022     VTE Pharmacologic Prophylaxis: no ppx secondary to anemia   VTE Mechanical Prophylaxis: sequential compression device

## 2022-05-27 NOTE — TREATMENT PLAN
Discussed with icu team  Patient with frequent filter clotting on CRRT   Increased QB to 300 ml/min  ICU team okay with heparin but with low Hb, so would start prefilter heparin at 500 units/hr and Try to keep PTT <45 sec

## 2022-05-27 NOTE — PROGRESS NOTES
NEPHROLOGY PROGRESS NOTE   Gissel Vásquez 80 y o  male MRN: [id number]  Unit/Bed#: ICU 02 Encounter: [id number]  Reason for Consult: MARYELLEN    ASSESSMENT AND PLAN:  80 y  o  man with PMH of Aostenosis, scleroderma, CAD   Patient underwent EGD and colonoscopy on 05/11 for possible intussusception, complicated with perforation, underwent exploratory laparotomy, complicated with pneumonia, sepsis, CHF, PEA arrest on 05/21 x2   Nephrology is consulted for MARYELLEN   ay for assistance in the management of MARYELLEN     PLAN  #Non-Oliguric KDIGO MARYELLEN stage 3   · Etiology:  Likely secondary to ATN in the settings of hypotension and PEA arrest  · Baseline creatinine:  0 9 mg/dL  · Peak creatinine:  Up to 3 mg/dL before CRRT  · Current creatinine:   2 35 mg/dL on CVVH  · UA: He micro hematuria, leukocyturia, moderate bacteria  · Renal imaging :  No hydronephrosis  · Treatment:  · Started on CVVH on 05/26 due to worsening fluid overload  · Consent verbally given by patient's son Karena Moralez)  · Maintain MAP:  Over 65 mmHg if possible/avoid hypoperfusion:  Hold parameters on blood pressure medications  · Avoid nephrotoxic agents such as NSAIDs, and IV contrast if possible   Avoid opioids   · Adjust medications to GFR    # CVVH  · Therapy fluid 1900 mL/hour  · Dialysis fluid switch to 4 K bath  ·  mL/hour  · Electrolytes as per ICU protocol  · Started on heparin pre filter due to clotting issues     #Acid-base Disorder  · serum HCO3 20  · XE:  52  · Metabolic acidosis secondary to MARYELLEN improving with CVVH  · Started on sodium bicarb, decrease rate to 75 mL/hour   · Pending consent for CRT     #Volume status/hypertension:  · Volume:  fluid overload, urinary output improving with Bumex  · Blood pressure:  Tendency to hypotension /56, started on Levophed and vasopressin yesterday before CVVH, now only on Levophed  · Recommend:  · Decrease Bumex infusion to 1 milligram/hour  · Increase UF on CVVH to 100 mL/hour as above     #Anemia:  · Current hemoglobin:  8 5 mg/dL  · Treatment:  · Transfuse for hemoglobin less than 7 0 per primary service       #PE arrest  · off vasopressor support  · Cardiology following     # sepsis/bowel perforation  · On antibiotics and vasopressor support       SUBJECTIVE:  Patient seen and examined at bedside on CVVH remains intubated and vented     Urinary output 2 5 L on Bumex, CVVH well tolerated      OBJECTIVE:  Current Weight: Weight - Scale: 78 9 kg (173 lb 15 1 oz)  Vitals:    05/27/22 0800   BP: 114/56   Pulse: 62   Resp: (!) 27   Temp: (!) 94 46 °F (34 7 °C)   SpO2: 99%       Intake/Output Summary (Last 24 hours) at 5/27/2022 0900  Last data filed at 5/27/2022 0800  Gross per 24 hour   Intake 4006 98 ml   Output 3015 ml   Net 991 98 ml     Wt Readings from Last 3 Encounters:   05/27/22 78 9 kg (173 lb 15 1 oz)   05/11/22 62 1 kg (136 lb 14 4 oz)   03/25/22 61 2 kg (135 lb)     Temp Readings from Last 3 Encounters:   05/27/22 (!) 94 46 °F (34 7 °C) (Bladder)   05/11/22 (!) 97 1 °F (36 2 °C) (Temporal)   01/14/22 97 9 °F (36 6 °C) (Temporal)     BP Readings from Last 3 Encounters:   05/27/22 114/56   05/11/22 141/69   03/25/22 152/76     Pulse Readings from Last 3 Encounters:   05/27/22 62   05/11/22 73   03/25/22 89        General:  Critically ill, intubated on a vent  Skin:  No acute rash  Eyes:  No scleral icterus and noninjected  ENT:  mucous membranes moist  Neck:  Bilateral central lines, dialysis line  Chest:  Mechanical breath sounds bilaterally  CVS:  Regular rate and rhythm without a murmur rub   Abdomen:  soft and nontender   Extremities: For extremity edema  Neuro:  Unable to assess due to sedation  Psych:   Intubated  Farnsworth catheter:  Clear urine      Medications:    Current Facility-Administered Medications:     acetaminophen (TYLENOL) oral suspension 650 mg, 650 mg, Oral, Q6H PRN, ISELA Carranza, 650 mg at 05/21/22 2039    acetylcysteine (MUCOMYST) 200 mg/mL inhalation solution 600 mg, 3 mL, Nebulization, Q8H, ISELA Lunsford, 600 mg at 05/27/22 0726    albumin human (FLEXBUMIN) 25 % injection 25 g, 25 g, Intravenous, Q8H, ISELA Eng    bumetanide (BUMEX) 12 5 mg infusion 50 mL, 1 mg/hr, Intravenous, Continuous, Maribel Jade MD, Last Rate: 4 mL/hr at 05/27/22 0731, 1 mg/hr at 05/27/22 0731    cefepime (MAXIPIME) IVPB (premix in dextrose) 1,000 mg 50 mL, 1,000 mg, Intravenous, Q12H, Tahira Drew PA-C, Stopped at 05/26/22 2348    chlorhexidine (PERIDEX) 0 12 % oral rinse 15 mL, 15 mL, Mouth/Throat, Q12H [address], ISELA Lunsford, 15 mL at 05/27/22 0810    dexmedeTOMIDine (Precedex) 400 mcg in sodium chloride 0 9% 100 mL, 0 1-1 5 mcg/kg/hr, Intravenous, Titrated, ISELA Eng    heparin (porcine) 25,000 units in 0 45% NaCl 250 mL infusion (premix), 400 Units/hr, Intravenous, Continuous, ISELA Ritter, Last Rate: 4 mL/hr at 05/27/22 0531, 400 Units/hr at 05/27/22 0531    HYDROmorphone (DILAUDID) injection 0 5 mg, 0 5 mg, Intravenous, Q3H PRN, ISELA Ritter, 0 5 mg at 05/26/22 2343    insulin glargine (LANTUS) subcutaneous injection 5 Units 0 05 mL, 5 Units, Subcutaneous, QAM, ISELA Eng, 5 Units at 05/27/22 0810    insulin lispro (HumaLOG) 100 units/mL subcutaneous injection 1-6 Units, 1-6 Units, Subcutaneous, Q6H [address], 1 Units at 05/27/22 0019 **AND** Fingerstick Glucose (POCT), , , Q6H, ISELA Ritter    iohexol (OMNIPAQUE) 240 MG/ML solution 50 mL, 50 mL, Oral, Once in imaging, ISELA Lunsford    ipratropium-albuterol (DUO-NEB) 0 5-2 5 mg/3 mL inhalation solution 3 mL, 3 mL, Nebulization, Q6H PRN, ISELA Lunsford, 3 mL at 05/23/22 1330    ipratropium-albuterol (DUO-NEB) 0 5-2 5 mg/3 mL inhalation solution 3 mL, 3 mL, Nebulization, Q8H, ISELA Man, 3 mL at 05/27/22 0726    levothyroxine tablet 112 mcg, 112 mcg, Per NG Tube, Early Morning, Valentina GOFF ISELA Moss, 112 mcg at 05/27/22 0550    lidocaine (LIDODERM) 5 % patch 2 patch, 2 patch, Topical, Daily, Mark Little PA-C, 2 patch at 05/27/22 0810    norepinephrine (LEVOPHED) 4 mg (STANDARD CONCENTRATION) IV in sodium chloride 0 9% 250 mL, 1-30 mcg/min, Intravenous, Titrated, ISELA Ritter, Last Rate: 22 5 mL/hr at 05/27/22 0400, 6 mcg/min at 05/27/22 0400    NxStage K 4/Ca 3 dialysis solution (RFP-401) 20,000 mL, 20,000 mL, Dialysis, Continuous, Jai Mast MD, 20,000 mL at 05/27/22 0759    ondansetron (ZOFRAN) injection 4 mg, 4 mg, Intravenous, Q6H PRN, ISELA Phillip    oxyCODONE (ROXICODONE) oral solution 5 mg, 5 mg, Per NG Tube, Q4H PRN, 5 mg at 05/27/22 0223 **OR** oxyCODONE (ROXICODONE) oral solution 7 5 mg, 7 5 mg, Per NG Tube, Q4H PRN, ISELA Anderson    pantoprazole (PROTONIX) injection 40 mg, 40 mg, Intravenous, Q24H [address], ISELA Phillip, 40 mg at 05/27/22 0810    vasopressin (PITRESSIN) 20 Units in sodium chloride 0 9 % 100 mL infusion, 0 04 Units/min, Intravenous, Continuous, ISELA Anderson, Stopped at 05/27/22 8979    Laboratory Results:  Results from last 7 days   Lab Units 05/27/22  0546 05/26/22  2353 05/26/22  1716 05/26/22  1235 05/26/22  0541 05/25/22  2337 05/25/22  1823 05/25/22  1132 05/25/22  0505 05/24/22  2239 05/24/22  1059 05/24/22  0238 05/23/22  1541 05/23/22  0438 05/22/22  0524   WBC Thousand/uL 22 51* 20 46*  --   --  15 71*  --   --   --  14 81*  --   --  18 23*  --  21 01* 23 05*   HEMOGLOBIN g/dL 8 8* 8 5*  --   --  7 3*  --   --   --  7 5*  --  8 3* 6 7* 7 2* 7 2* 8 5*   HEMATOCRIT % 24 9* 24 5*  --   --  21 6*  --   --   --  21 9*  --   --  19 6* 21 0* 20 4* 25 4*   PLATELETS Thousands/uL 114* 121*  --   --  212  --   --   --  219  --   --  241  --  243 273   SODIUM mmol/L 134* 135* 134* 131* 133* 134* 135*   < > 136   < >  --  142 141 141 146*   POTASSIUM mmol/L 3 9 3 8 3 5 3 7 3 8 3 9 4 0   < > 3 9 < >  --  3 6 3 7 3 7 5 2   CHLORIDE mmol/L 102 103 101 98* 101 103 104   < > 108   < >  --  111* 112* 113* 115*   CO2 mmol/L 20* 18* 19* 19* 18* 18* 17*   < > 18*   < >  --  21 23 21 20*   BUN mg/dL 81* 107* 106* 107* 106* 105* 99*   < > 92*   < >  --  73* 69* 66* 57*   CREATININE mg/dL 2 35* 3 07* 3 00* 3 07* 2 93* 2 79* 2 55*   < > 2 41*   < >  --  1 88* 1 77* 1 72* 1 56*   CALCIUM mg/dL 7 9* 7 3* 7 1* 7 2* 7 3* 7 4* 7 4*   < > 7 5*   < >  --  7 1* 7 1* 7 2* 7 6*   MAGNESIUM mg/dL 2 3 2 7* 2 7* 2 9* 2 8*  --   --   --  2 8*  --   --  2 6  --  2 4 2 3   PHOSPHORUS mg/dL 4 6* 5 9* 5 5* 6 0* 5 7*  --   --   --  4 2*  --   --  3 4  --  3 6 3 3    < > = values in this interval not displayed  XR chest portable ICU   Final Result by Jeromy Andino MD (05/26 8489)      Interval placement of a right IJ catheter with tip projecting over the mid SVC  No pneumothorax  Persistent bilateral pulmonary opacities  Workstation performed: [id number]         CT chest abdomen pelvis wo contrast   Final Result by Ashley Marino MD (05/24 1686)   1  CHF with moderate basilar effusions and additional upper lung zone patchy airspace opacities likely reflecting asymmetric pulmonary edema  Infiltrate is not entirely excluded  2   Distended small bowel loops with scattered air-fluid levels consistent with early/partial small bowel obstruction with transition at the small bowel anastomosis in the region of the ventral pelvis as above  No free air  3   Cholelithiasis without cholecystitis  4   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  Concordant preliminary results were provided by virtual radiologic at 0531 hours on 5/24/2022  Workstation performed: [id number]         XR chest portable ICU   Final Result by Rosina Crowell MD (05/24 9226)      Bilateral airspace disease is unchanged  The Keofeed tube has been removed              Workstation performed: [id number] XR chest portable   Final Result by Kasey Lerma MD (05/24 1329)   Addendum 1 of 1 by Kasey Lerma MD (05/24 1329)   ADDENDUM:      The right IJ line terminates in the SVC  The PICC line terminates at the    junction of the SVC and right atrium  Final         1  Keofed tube terminates at the EG junction and should be advanced for  use  2   Bilateral airspace disease may represent infiltrates or the alveolar phase of heart failure  Bilateral pleural effusions  Workstation performed: [id number]         CT chest abdomen pelvis wo contrast   Final Result by Charlie Balderas,  (05/22 1430)      1  Stable appearance of mixed groundglass and consolidative abnormalities in the lungs bilaterally predominantly affecting the upper lobes suspicious for multilobar pneumonia  Superimposed pulmonary edema not excluded  2   Similar to slight increase size of moderate bilateral pleural effusions  3   Diffuse mild dilation of small bowel segments identified without transition point  Workstation performed: [id number]         XR chest portable ICU   Final Result by Padilla Ramos MD (05/22 2218)      Right neck catheter in the distal SVC                  Workstation performed: [id number]         XR abdomen 1 view kub   Final Result by Padilla Ramos MD (05/22 9657)      Persistent gas distended small bowel loops with normal caliber colon with contrast                Workstation performed: [id number]         XR chest portable ICU   Final Result by Padilla Ramos MD (05/22 7634)      Improved pneumomediastinum  Bilateral groundglass infiltrative changes  Workstation performed: [id number]         XR chest portable ICU   Final Result by Andreina Yepez MD (05/21 1543)      Pneumomediastinum  Endotracheal tube tip above the level of the chriss by 2 5 cm    Enteric tube tip overlying the epigastric region below the inferior margin of this film  Slight progression of multifocal groundglass pulmonary parenchymal airspace opacities  Trace costophrenic angle effusions, unchanged  This examination was marked "immediate notification" in Epic in order to begin the standard process by which the radiology reading room liaison alerts the referring practitioner  Workstation performed: [id number]         XR abdomen 1 view kub   Final Result by Renetta Clifford MD (05/22 8854)      Slight worsening of the prominent dilated small bowel loops  Contrast in the colon  Workstation performed: [id number]         IR PICC line placement double lumen   Final Result by Rafat Mendez MD (05/18 7394)   1  Status post placement of a 5-Thai percutaneously inserted central venous catheter via the right basilic vein with its tip at the cavoatrial junction under ultrasound and fluoroscopic guidance  NG tube placement   Next using fluoroscopic guidance, an NG tube was placed down into the stomach past the narrowing at the GE junction  2 stiff wires were required to advance this  Impression successful NG tube placement with its tip in the stomach  The tube may be used immediately  Workstation performed: [id number]         XR abdomen 1 view kub   Final Result by Ivan Hurt MD (05/18 8303)      Radiographic appearance suspicious for early or mild distal small bowel obstruction  Alternatively, this could represent ileus  Enteric contrast administered for the May 17, 2022 examination does not appear to have reached the large bowelAn       enteric catheter tip overlies the expected location of the cavoatrial junction and has not quite reached the stomach  Advancement by approximately 8 to 10 cm recommended        This examination was marked "significant notification" in Epic in order to begin the standard process by which the radiology reading room liaison alerts the referring practitioner  Workstation performed: [id number]         CT chest abdomen pelvis wo contrast   Final Result by Julio Estrada MD (05/18 1281)      1  Worsening patchy mixed groundglass and consolidative change bilaterally compatible with multifocal pneumonia and/or pulmonary edema  2   Increased bilateral pleural effusions  3   Sidehole of enteric tube is visualized near the gastroesophageal junction  Consider tube advancement  4   Mild distention of mid to distal esophagus with enteric contrast material   Correlate for gastroesophageal reflux  5   Multiple dilated small bowel loops without a clear transition point  The finding may reflect ileus, however developing small bowel obstruction cannot be excluded  Follow-up is recommended  6   Moderate amount of air within the urinary bladder, likely related to instrumentation  Correlate with urinalysis to exclude cystitis  I personally discussed this study with Jewel Chambers on 5/18/2022 at 1:11 AM                Workstation performed: [id number]         XR chest portable   Final Result by Rafael Hays MD (05/17 9749)      1  Tip of enteric tube is at the expected position of the gastroesophageal junction  Slight advancement is advised  2   Slight increased predominant upper lobe opacities, likely representing pneumonia  Concomitant edema is possible  The now trace bilateral pleural effusions have decreased in size  The study was marked in [facility] for immediate notification  Workstation performed: [id number]         XR abdomen 1 view kub   Final Result by Jesus Iglesias MD (05/16 3272)      Diffusely distended, dilated small bowel loops with small amount of persistent air extending to the proximal colon  Findings may suggest diffuse ileus or partial obstruction                 Workstation performed: [id number]         CTA chest pe study   Final Result by Hetal Griffith MD (05/15 1229)      No pulmonary embolus  Bilateral upper lobe groundglass opacities compatible with pneumonia and/or pulmonary edema  Small bilateral pleural effusions  Workstation performed: [id number]         XR chest portable   Final Result by Melvi Nash MD (05/15 1023)      Pulmonary edema and small bilateral pleural effusions  Workstation performed: [id number]         IR other    (Results Pending)       Portions of the record may have been created with voice recognition software  Occasional wrong word or "sound a like" substitutions may have occurred due to the inherent limitations of voice recognition software  Read the chart carefully and recognize, using context, where substitutions have occurred

## 2022-05-27 NOTE — PROGRESS NOTES
Progress Note - Critical Care   Tammie Cota 80 y o  male MRN: [id number]  Unit/Bed#: ICU 02 Encounter: [id number]          * Bowel perforation [facility])  Assessment & Plan  · Outpatient EGD and colonoscopy at Located within [facility] on 5/11 for w/u of chronic anemia, history of colon polyps, intermittent LLQ abdominal pain and possible intermittent intussusception  EGD unremarkable, colonoscopy technically difficult and required change from adult scope to pediatric scope, but during traversal of the sigmoid colon there was a kink and patient sustained a perforation  Procedure was aborted and patient was transferred to [facility] where immediate surgical consultation was obtained  Patient was reported to have obvious signs of peritonitis on exam and CT ab/pelvis demonstrated pneumoperitoneum  · 5/11 Urgently to the OR with Dr Es Rock for ex-lap, low anterior resection, protective loop transverse colostomy, flex sigmoidoscopy, and segmental small bowel resection  · Post op ileus requiring NGT decompression and initiation of TPN  · CT 5/22  Diffuse mild dilation of small bowel segments identified without transition point  · Started on trickle tube feedings 5/22  · Ostomy with improved output mL/24 hours   · Concern for free air under left hemidiaphragm on CXR 5/24, sent for CT CAP- Distended small bowel loops with scattered air-fluid levels consistent with early/partial small bowel obstruction with transition at the small bowel anastomosis in the region of the ventral pelvis as above  No free air  Cholelithiasis without cholecystitis  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       Atrial fibrillation [facility])  Assessment & Plan  · S/p cardiac arrest while on 3 pressors noted to have afib with RVR  · Bolused with amio and placed on infusion  · Converted to sinus rhythm on 5/22   · Amio gtt d/c 5/23  · Has remained in NSR  · Continue to monitor on telemetry  · Optimize electrolytes  · Start beta blocker once BP stabilizes, has had intermittent hypotension requiring albumin   · No indication for [facility] at this time given resolved and poor candidate with downtrending hgb     Anemia  Assessment & Plan  · hgb drop post cardiac arrest  Noted to have gross hematuria but this has since resolved  · 5/21 1 u PRBC for hgb 6 8, then improved to 9 2  · Hgb then dropped to 7 2 on 2 subsequent checks   · Hgb 6 7 yesterday morning s/p 1 unit PRBC, with improvement in hgb to 8 3  · No occult bleeding identified on CT 5/24   · Continue to trend and transfuse for hgb less than 7    Acute renal failure (ARF) ([facility] [address] )  Assessment & Plan  · Secondary to hypoperfusion mehul cardiac arrest, with likely ATN now  · Baseline creat 0 8  · Creatinine 3 00-->3 07-->2 35  · Continue bumex gtt  · 5/27- started on CVVH- noted to have clots present, heparin started at 400 u/hr  · Trend UOP; quigley for strict I&O  · BMP q6h  · Avoid hypotension/nephrotoxic medications    Cardiac arrest [facility])  Assessment & Plan  · Patient was found unresponsive and hypoxic approximately 20 minutes after being seen well with family 5/21 at 1441  · PEA arrest x 2  · Unclear etiology, possibly hypoxia driven   · 8/98: Echo-EF 60-65%, G1DD, mild AS (BRADY 1 5cm2), mild MR, mild TR  · Neurologically intact post arrest   · Hypotension post ROSC requiring levo, vaso, and epi and stress dose steroids  · All pressors off since 0100 5/23  · Hydrocortisone d/c 5/23      Hyperglycemia  Assessment & Plan  · A1c 5 3%  · Hyperglycemia likely secondary to TPN, critical illness, and 3 days of stress dose steroids  · Transitioned from insulin gtt to ISS- consider adding lantus    CHF (congestive heart failure) ([facility])  Assessment & Plan  Wt Readings from Last 3 Encounters:   05/26/22 77 9 kg (171 lb 11 8 oz)   05/11/22 62 1 kg (136 lb 14 4 oz)   03/25/22 61 2 kg (135 lb)     · 5/16: Echo-EF 65%, mild TR, mild MR  · Repeat Echo post cardiac arrest 5/23 EF 60-65%, G1DD, mild AS (BRADY 1 5cm2), mild MR, mild TR  · Monitor I&O  · Daily weights   · Gross anasarca on exam   · Bumex gtt  · Albumin 25g 25% x 4 doses       Acute respiratory failure with hypoxia (HCC)  Assessment & Plan  · Patient previously in ICU for hypoxia with a max of 15L midflow and concern for aspiration pneumonitis  Improved and was able to transfer to general medical floor  · Intubated during cardiac arrest  Possibly hypoxia driven cause of cardiac arrest  May have aspirated   · AC/PC 20/18/65%/8  · SBT today  · Tolerated several hours of pressure support 14 yesterday, rested on AC/PC overnight  · VAP bundle ordered; chlorhexidine, ppi, head of bed > 30°  · 5/24 CT CAP-CHF with moderate basilar effusions and additional upper lung zone patchy airspace opacities likely reflecting asymmetric pulmonary edema  Infiltrate is not entirely excluded  · Lasix infusion 20mg/hr  · Pulmonary toilet for development of thick secretions  · Atrovent/xopenex/mucomyst nebs q8h  · Chest PT   · ETT suctioning PRN        HPI/24hr events: Patient started on CVVH 5/26  Noted to have clots present in the  Arterial and venous lines so started on heparin at 400 u/hr  Unable to attain ROS- patient is intubated    Physical Exam:     Constitutional:       General: He is awake       Appearance: He is well-developed  He is ill-appearing  Cardiovascular:      Rate and Rhythm: Normal rate and regular rhythm     Pulses: Normal pulses            Radial pulses are 2+ on the right side and 2+ on the left side         Dorsalis pedis pulses are 2+ on the right side and 2+ on the left side       Heart sounds: Normal heart sounds, S1 normal and S2 normal       Comments: Gross anasarca   Pulmonary:      Effort: Pulmonary effort is normal       Comments: Course scattered crackles   Abdominal:      General: Bowel sounds are decreased  There is distension        Comments: Midline abdominal incision with ABD in place-dry and intact  Skin:     General: Skin is warm and dry  Neurological:      General:   Patient is sedated     Mental Status: responds to name     GCS: GCS eye subscore is 4  GCS verbal subscore is 1  GCS motor subscore is 6  Psychiatric:         Behavior: Behavior is cooperative  Vitals:    22 0310 22 0400 22 0500 22 0600   BP:  169/75 142/66 162/67   Pulse:  62 59 57   Resp:  19 18 21   Temp:  (!) 96 26 °F (35 7 °C) (!) 95 72 °F (35 4 °C) (!) 95 18 °F (35 1 °C)   TempSrc:       SpO2: 90% 91% 96% 95%   Weight:    78 9 kg (173 lb 15 1 oz)   Height:         Arterial Line BP: 120/63  Arterial Line MAP (mmHg): 86 mmHg    Temperature:   Temp (24hrs), Av °F (36 7 °C), Min:95 18 °F (35 1 °C), Max:98 78 °F (37 1 °C)    Current: Temperature: (!) 95 18 °F (35 1 °C)    Weights:   IBW (Ideal Body Weight): 59 2 kg    Body mass index is 29 86 kg/m²    Weight (last 2 days)     Date/Time Weight    22 0600 78 9 (173 94)    22 0534 77 9 (171 74)    22 0549 75 4 (166 23)          Hemodynamic Monitoring:  N/A     Non-Invasive/Invasive Ventilation Settings:  Respiratory  Report   Lab Data (Last 4 hours)    None         O2/Vent Data (Last 4 hours)       0310           Vent Mode AC/PC       Resp Rate (BPM) (BPM) 18       Pressure Control (cmH2O) (cm) 20       Insp Time (sec) (sec) 0 9       FiO2 (%) (%) 60       PEEP (cmH2O) (cmH2O) 8       MV 13 4                 No results found for: PHART, BCI5DCN, PO2ART, RSP0CVO, Y8ICKWGJ, BEART, SOURCE  SpO2: SpO2: 95 %    Intake and Outputs:  I/O       05/25 0701 05/26 0700 05/26 0701 05/27 0700    I V  (mL/kg) 846 8 (10 9) 2002 4 (25 7)    Blood  350    NG/ 30    IV Piggyback 400 50    TPN 2141 7 946  6    Feedings 410 628    Total Intake(mL/kg) 3978 5 (51 1) 4007 (51 4)    Urine (mL/kg/hr) 1550 (0 8) 2535 (1 4)    Drains  200    Other  305    Stool 20 150    Total Output 1570 3190    Net +2408 5 +817                Nutrition:        Diet Orders   (From admission, onward)             Start     Ordered    05/26/22 1351  Diet Enteral/Parenteral; Tube Feeding No Oral Diet; Osmolite 1 0; Continuous; 55  Diet effective now        Comments: Increase by 10 cc every four hours until reach goal   References:    Nutrtion Support Algorithm Enteral vs  Parenteral   Question Answer Comment   Diet Type Enteral/Parenteral    Enteral/Parenteral Tube Feeding No Oral Diet    Tube Feeding Formula: Osmolite 1 0    Bolus/Cyclic/Continuous Continuous    Tube Feeding Goal Rate (mL/hr): 55    RD to adjust diet per protocol? Yes        05/26/22 1350    05/12/22 0906  Room Service  Once        Question:  Type of Service  Answer:  Room Service - Appropriate with Assistance    05/12/22 0905                  Labs:   Results from last 7 days   Lab Units 05/27/22  0546 05/26/22  2353 05/26/22  0541 05/24/22  1059 05/24/22  0238 05/21/22  1539 05/21/22  0524   WBC Thousand/uL 22 51* 20 46* 15 71*   < > 18 23*   < > 23 58*   HEMOGLOBIN g/dL 8 8* 8 5* 7 3*   < > 6 7*   < > 8 2*   HEMATOCRIT % 24 9* 24 5* 21 6*   < > 19 6*   < > 24 7*   PLATELETS Thousands/uL 114* 121* 212   < > 241   < > 326   MONO PCT % 5  --   --   --  5  --  7    < > = values in this interval not displayed        Results from last 7 days   Lab Units 05/27/22  0546 05/26/22  2353 05/26/22  1716 05/25/22  1132 05/25/22  0505 05/24/22  2239 05/24/22  0238 05/23/22  1541 05/23/22  0438   SODIUM mmol/L 134* 135* 134*   < > 136   < > 142   < > 141   POTASSIUM mmol/L 3 9 3 8 3 5   < > 3 9   < > 3 6   < > 3 7   CHLORIDE mmol/L 102 103 101   < > 108   < > 111*   < > 113*   CO2 mmol/L 20* 18* 19*   < > 18*   < > 21   < > 21   BUN mg/dL 81* 107* 106*   < > 92*   < > 73*   < > 66*   CREATININE mg/dL 2 35* 3 07* 3 00*   < > 2 41*   < > 1 88*   < > 1 72*   CALCIUM mg/dL 7 9* 7 3* 7 1*   < > 7 5*   < > 7 1*   < > 7 2*   ALK PHOS U/L  --   --   --   --  54  --  53  --  41*   ALT U/L  --   --   --   --  15  --  13  --  18   AST U/L  --   --   --   --  15  --  18  --  19    < > = values in this interval not displayed  Results from last 7 days   Lab Units 05/27/22  0546 05/26/22  2353 05/26/22  1716   MAGNESIUM mg/dL 2 3 2 7* 2 7*     Results from last 7 days   Lab Units 05/27/22  0546 05/26/22  2353 05/26/22  1716   PHOSPHORUS mg/dL 4 6* 5 9* 5 5*      Results from last 7 days   Lab Units 05/26/22  2353   INR  1 58*   PTT seconds 61*     Results from last 7 days   Lab Units 05/24/22  0238   LACTIC ACID mmol/L 0 9     No results found for: TROPONINI    Imaging:   XR chest portable ICU   Final Result      Interval placement of a right IJ catheter with tip projecting over the mid SVC  No pneumothorax  Persistent bilateral pulmonary opacities  Workstation performed: [id number]         CT chest abdomen pelvis wo contrast   Final Result   1  CHF with moderate basilar effusions and additional upper lung zone patchy airspace opacities likely reflecting asymmetric pulmonary edema  Infiltrate is not entirely excluded  2   Distended small bowel loops with scattered air-fluid levels consistent with early/partial small bowel obstruction with transition at the small bowel anastomosis in the region of the ventral pelvis as above  No free air  3   Cholelithiasis without cholecystitis  4   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        Concordant preliminary results were provided by virtual radiologic at 0531 hours on 5/24/2022  Workstation performed: [id number]         XR chest portable ICU   Final Result      Bilateral airspace disease is unchanged  The Keofeed tube has been removed  Workstation performed: [id number]         XR chest portable   Final Result   Addendum 1 of 1   ADDENDUM:      The right IJ line terminates in the SVC  The PICC line terminates at the    junction of the SVC and right atrium  Final         1  Keofed tube terminates at the EG junction and should be advanced for  use  2   Bilateral airspace disease may represent infiltrates or the alveolar phase of heart failure  Bilateral pleural effusions  Workstation performed: [id number]         CT chest abdomen pelvis wo contrast   Final Result      1  Stable appearance of mixed groundglass and consolidative abnormalities in the lungs bilaterally predominantly affecting the upper lobes suspicious for multilobar pneumonia  Superimposed pulmonary edema not excluded  2   Similar to slight increase size of moderate bilateral pleural effusions  3   Diffuse mild dilation of small bowel segments identified without transition point  Workstation performed: [id number]         XR chest portable ICU   Final Result      Right neck catheter in the distal SVC                  Workstation performed: [id number]         XR abdomen 1 view kub   Final Result      Persistent gas distended small bowel loops with normal caliber colon with contrast                Workstation performed: [id number]         XR chest portable ICU   Final Result      Improved pneumomediastinum  Bilateral groundglass infiltrative changes  Workstation performed: [id number]         XR chest portable ICU   Final Result      Pneumomediastinum  Endotracheal tube tip above the level of the chriss by 2 5 cm  Enteric tube tip overlying the epigastric region below the inferior margin of this film        Slight progression of multifocal groundglass pulmonary parenchymal airspace opacities  Trace costophrenic angle effusions, unchanged  This examination was marked "immediate notification" in Epic in order to begin the standard process by which the radiology reading room liaison alerts the referring practitioner  Workstation performed: [id number]         XR abdomen 1 view kub   Final Result      Slight worsening of the prominent dilated small bowel loops  Contrast in the colon  Workstation performed: [id number]         IR PICC line placement double lumen   Final Result   1  Status post placement of a 5-Japanese percutaneously inserted central venous catheter via the right basilic vein with its tip at the cavoatrial junction under ultrasound and fluoroscopic guidance  NG tube placement   Next using fluoroscopic guidance, an NG tube was placed down into the stomach past the narrowing at the GE junction  2 stiff wires were required to advance this  Impression successful NG tube placement with its tip in the stomach  The tube may be used immediately  Workstation performed: [id number]         XR abdomen 1 view kub   Final Result      Radiographic appearance suspicious for early or mild distal small bowel obstruction  Alternatively, this could represent ileus  Enteric contrast administered for the May 17, 2022 examination does not appear to have reached the large bowelAn       enteric catheter tip overlies the expected location of the cavoatrial junction and has not quite reached the stomach  Advancement by approximately 8 to 10 cm recommended  This examination was marked "significant notification" in Epic in order to begin the standard process by which the radiology reading room liaison alerts the referring practitioner  Workstation performed: [id number]         CT chest abdomen pelvis wo contrast   Final Result      1    Worsening patchy mixed groundglass and consolidative change bilaterally compatible with multifocal pneumonia and/or pulmonary edema  2   Increased bilateral pleural effusions  3   Sidehole of enteric tube is visualized near the gastroesophageal junction  Consider tube advancement  4   Mild distention of mid to distal esophagus with enteric contrast material   Correlate for gastroesophageal reflux  5   Multiple dilated small bowel loops without a clear transition point  The finding may reflect ileus, however developing small bowel obstruction cannot be excluded  Follow-up is recommended  6   Moderate amount of air within the urinary bladder, likely related to instrumentation  Correlate with urinalysis to exclude cystitis  I personally discussed this study with Ashley Polk on 5/18/2022 at 1:11 AM                Workstation performed: [id number]         XR chest portable   Final Result      1  Tip of enteric tube is at the expected position of the gastroesophageal junction  Slight advancement is advised  2   Slight increased predominant upper lobe opacities, likely representing pneumonia  Concomitant edema is possible  The now trace bilateral pleural effusions have decreased in size  The study was marked in [facility] for immediate notification  Workstation performed: [id number]         XR abdomen 1 view kub   Final Result      Diffusely distended, dilated small bowel loops with small amount of persistent air extending to the proximal colon  Findings may suggest diffuse ileus or partial obstruction  Workstation performed: [id number]         CTA chest pe study   Final Result      No pulmonary embolus  Bilateral upper lobe groundglass opacities compatible with pneumonia and/or pulmonary edema  Small bilateral pleural effusions                    Workstation performed: [id number]         XR chest portable   Final Result      Pulmonary edema and small bilateral pleural effusions  Workstation performed: [id number]         IR other    (Results Pending)           Micro:  Lab Results   Component Value Date    BLOODCX No Growth After 4 Days  05/22/2022    BLOODCX No Growth After 4 Days  05/22/2022    BLOODCX No Growth After 5 Days  05/18/2022    WOUNDCULT 1+ Growth of Pseudomonas aeruginosa (A) 05/11/2022    SPUTUMCULTUR Test not performed  Suggest repeat specimen   05/17/2022       Allergies: No Known Allergies    Medications:   Scheduled Meds:  Current Facility-Administered Medications   Medication Dose Route Frequency Provider Last Rate    acetaminophen  650 mg Oral Q6H PRN Jose Nails, CRNP      acetylcysteine  3 mL Nebulization Q8H Jose Nails, CRNP      bumetanide  2 mg/hr Intravenous Continuous Cherri Spironello V, CRNP 2 mg/hr (05/27/22 0427)    cefepime  1,000 mg Intravenous Q12H Phyllistine FAYE RoblesC Stopped (05/26/22 1160)    chlorhexidine  15 mL Mouth/Throat Q12H [address] Jose Nails, CRNP      dexmedetomidine  0 1-1 5 mcg/kg/hr Intravenous Titrated ISELA De La Rosa 1 2 mcg/kg/hr (05/27/22 0510)    heparin (porcine)  400 Units/hr Intravenous Continuous MACARIO RitterNP 400 Units/hr (05/27/22 0531)    HYDROmorphone  0 5 mg Intravenous Q3H PRN ISELA Boucher      insulin glargine  5 Units Subcutaneous QAM Sara Church, ISELA      insulin lispro  1-6 Units Subcutaneous Q6H [address] Josephnie ISELA Harrell      iohexol  50 mL Oral Once in imaging Jose Nails, CRNP      ipratropium-albuterol  3 mL Nebulization Q6H PRN Jose Nails, CRNP      ipratropium-albuterol  3 mL Nebulization Q8H Jose Nails, CRNP      levothyroxine  112 mcg Per NG Tube Early Morning Jose Nails, CRNP      lidocaine  2 patch Topical Daily Ruma Casarez      norepinephrine  1-30 mcg/min Intravenous Titrated MACARIO BoucherNP 6 mcg/min (05/27/22 0400)    NxStage K 2/Ca 3  20,000 mL Dialysis Continuous Jai Mast MD      ondansetron  4 mg Intravenous Q6H PRN ISELA Phillip      oxyCODONE  5 mg Per NG Tube Q4H PRN ISELA Anderson      Or    oxyCODONE  7 5 mg Per NG Tube Q4H PRN ISELA Anderson      pantoprazole  40 mg Intravenous Q24H [facility] ISELA Phillip      vasopressin  0 04 Units/min Intravenous Continuous ISELA Anderson Stopped (05/27/22 9782)     Continuous Infusions:bumetanide, 2 mg/hr, Last Rate: 2 mg/hr (05/27/22 0427)  dexmedetomidine, 0 1-1 5 mcg/kg/hr, Last Rate: 1 2 mcg/kg/hr (05/27/22 0510)  heparin (porcine), 400 Units/hr, Last Rate: 400 Units/hr (05/27/22 0531)  norepinephrine, 1-30 mcg/min, Last Rate: 6 mcg/min (05/27/22 0400)  NxStage K 2/Ca 3, 20,000 mL  vasopressin, 0 04 Units/min, Last Rate: Stopped (05/27/22 0607)      PRN Meds:  acetaminophen, 650 mg, Q6H PRN  HYDROmorphone, 0 5 mg, Q3H PRN  iohexol, 50 mL, Once in imaging  ipratropium-albuterol, 3 mL, Q6H PRN  ondansetron, 4 mg, Q6H PRN  oxyCODONE, 5 mg, Q4H PRN   Or  oxyCODONE, 7 5 mg, Q4H PRN        VTE Pharmacologic Prophylaxis: Heparin  VTE Mechanical Prophylaxis: sequential compression device    Invasive lines and devices: Invasive Devices  Report    Peripherally Inserted Central Catheter Line  Duration           PICC Line 21/91/24 Right Basilic 8 days          Hemodialysis Catheter  Duration           HD Temporary Double Catheter <1 day          Drain  Duration           Colostomy LLQ 16 days    NG/OG/Enteral Tube Orogastric 16 Fr Center mouth 6 days    Urethral Catheter Double-lumen 16 Fr  6 days          Airway  Duration           ETT  Oral 5 days                   Counseling / Coordination of Care  Total Critical Care time spent 30 minutes excluding procedures, teaching and family updates  Code Status: Level 1 - Full Code     Portions of the record may have been created with voice recognition software    Occasional wrong word or "sound a like" substitutions may have occurred due to the inherent limitations of voice recognition software  Read the chart carefully and recognize, using context, where substitutions have occurred       Armand Sorto MD

## 2022-05-27 NOTE — QUICK NOTE
Patient's CVVH was up and running; however, again began to alarm with high chamber pressures  Day team was receiving high venous access pressures  Arterial and venous lines were aspirated with noticeable clot in line  It was completely aspirated and flushed  The filter was flushed with noteable clots and unable to be ran  On-call Nephrologist, Dr Jennifer Jenkins, was notified  I requested pre-filter heparin  He reviewed the chart and agreed he had no objection to prefilter Heparin at 500 u/hr and to monitor platelets  Since patient is at risk for bleeding, he recommends keeping goal PTT < 45 seconds   Orders placed

## 2022-05-27 NOTE — PROGRESS NOTES
Progress Note - Infectious Disease   Fady Hudson 80 y o  male MRN: [id number]  Unit/Bed#: ICU 02 Encounter: [id number]      Impression/Plan:  1  s/p cardiac arrest 5/21/22  Patient intubated during RR  In ICU on ventilator assistance  Recurrent SIRS possibly reactive to arrest with fever, tachycardia, tachypnea, and increased leukocytosis post arrest  Possible recurrent aspiration event s/p arrest  Fever down trended  Patient now hypothermic on CVVH with warming blanket in place   -continue antibiotics as below  -monitor temperature and hemodynamics  -follow-up repeat blood cultures  -monitor serial a m  Labs  -continue supportive measures per critical care team  -cardiology on board  -ventilator management per critical care team     2  SIRS vs Severe Sepsis, evolving on admission and post #1   Evidenced by tachycardia, tachypnea, bandemia and encephalopathy   Suspect possible aspiration in setting of significant retained secretions, acute respiratory failure requiring supplemental oxygenation and with recent NG tube for postop ileus management   MRSA screen negative  WBC count 22 K  -continue cefepime reduced at 1g IV q 12 hours for #6  -follow-up final repeat blood cultures   -monitor temperature and hemodynamics  -serial exam  -monitor CBC and BMP   -additional supportive measures per critical care team as below      3    Peritonitis s/p Bowel perforation  s/p 5/11/22 exploratory laparotomy, low anterior resection, protective loop transverse colostomy and segmental small bowel resection secondary to perforation rectosigmoid junction after colonoscopy   OR cultures grew Pseudomonas aeruginosa and Bacteroides fragilis  Now being managed with NGT/NPO status for post op ileus on TPN    -Continue Cefepime through today, 5/27/22  -Finished 2 weeks post op Flagyl 5/24/22  -monitor temperature and hemodynamics  -serial exam  -close surgical follow-up  -abdominal wall incision site wound VAC care per surgery  -plan to complete 2 weeks antibiotics total through today, 22  -OGT nutrition per surgical team     4   Acute hypoxic respiratory failure with hypoxia   Suspicious for aspiration pneumonitis over pneumonia     22 CT C/A/P predominantly UL airspace opacities consistent with CHF with bilateral pleural effusions, distended small bowel loops id  Patient remains intubated s/p #1  22 Procalcitonin level  2 60 >  0 753 < 22 2 97  22 Sputum cx with mixed oropharyngeal contaminants  -continue antibiotics for #3 as above  -ventilator management per critical care team      5  Aspiration pneumonitis versus pneumonia in setting of #1/3   22 CT C/A/P predominantly UL groundglass and consolidations, bilateral pleural effusions, SB mild dilation unchanged  Patient now intubated s/p #1  22 Procalcitonin level  2 60 >  0 753 < 22 2 97   22 sputum specimen oral pharyngeal contamination   -continue antibiotics as above  -secretion and pulmonary toilet management per critical care team   -monitor respiratory symptoms  -monitor vent requirements     6  MARYELLEN on CKD 3  Creatinine 1 99  On CVVH  -renal dose adjust antibiotics as needed  -volume management per Nephrology  -CVVH management per Nephrology  -monitor creatinine and urinary output     Antibiotics:  Cefepime D14     Above impression and plan discussed in detail with patient's RN and critical care team   We will see patient again 22 if here  Please call ID on call physician in meantime if questions      Subjective:  Patient has no fever, chills, sweats on ventilator in ICU s/p cardiac arrest 22; no nausea, vomiting, diarrhea reported   CVVH started 22  Appears to be tolerating IV antibiotics    Objective:  Vitals:  Temp:  [94 1 °F (34 5 °C)-98 78 °F (37 1 °C)] 94 64 °F (34 8 °C)  HR:  [57-97] 66  Resp:  [17-38] 38  BP: ()/(32-75) 129/63  SpO2:  [87 %-99 %] 92 %  Temp (24hrs), Av 5 °F (36 4 °C), Min:94 1 °F (34 5 °C), Max:98 78 °F (37 1 °C)  Current: Temperature: (!) 94 64 °F (34 8 °C)    Physical Exam:   General Appearance:  80year-old acute on chronically debilitated, elderly male, who opens eyes briefly to name and exam, sluggish on ventilator at FiO2 settings of 40%, propped fairly comfortably appearing in ICU bed, now on CVVH  Throat: Oropharynx moist without lesions  ET tube to vent, orogastric tube feeds in place   Lungs:   Scattered coarse ventilated breath sounds anteriorly and laterally to auscultation bilaterally; thin phlegm secretions in suction canister   Heart:  RRR; no murmur   Abdomen:   Soft, no focal tenderness on palpation, midline abdominal wound to wound VAC irrigation system with moderate blood-tinged output in canister, wound edges without erythema, left lower quadrant colostomy with dark loose stool in bag, prominent stoma, positive diminished bowel sounds  Extremities: Generalized edema of arms and thighs prominently, + LE vena Dines and Prevalon boots in place  : [place] with clear, yellow urine in bag, no SPT, +4 scrotal edema propped on pillows   Skin: No new rashes or lesions  Right arm PICC and right neck IJ CVP lines in place, CVVH running    Warming blanket in place       Labs, Imaging, & Other studies:   All pertinent labs and imaging studies were personally reviewed  Results from last 7 days   Lab Units 05/27/22  0546 05/26/22  2353 05/26/22  0541   WBC Thousand/uL 22 51* 20 46* 15 71*   HEMOGLOBIN g/dL 8 8* 8 5* 7 3*   PLATELETS Thousands/uL 114* 121* 212     Results from last 7 days   Lab Units 05/27/22  1228 05/27/22  0546 05/26/22  2353 05/25/22  1132 05/25/22  0505 05/24/22  2239 05/24/22  0238 05/23/22  1541 05/23/22  0438   SODIUM mmol/L 134* 134* 135*   < > 136   < > 142   < > 141   POTASSIUM mmol/L 4 6 3 9 3 8   < > 3 9   < > 3 6   < > 3 7   CHLORIDE mmol/L 102 102 103   < > 108   < > 111*   < > 113*   CO2 mmol/L 22 20* 18*   < > 18*   < > 21   < > 21   BUN mg/dL 69* 81* 107*   < > 92*   < > 73*   < > 66*   CREATININE mg/dL 1 99* 2 35* 3 07*   < > 2 41*   < > 1 88*   < > 1 72*   EGFR ml/min/1 73sq m 28 23 16   < > 22   < > 30   < > 34   CALCIUM mg/dL 7 9* 7 9* 7 3*   < > 7 5*   < > 7 1*   < > 7 2*   AST U/L  --   --   --   --  15  --  18  --  19   ALT U/L  --   --   --   --  15  --  13  --  18   ALK PHOS U/L  --   --   --   --  54  --  53  --  41*    < > = values in this interval not displayed  Results from last 7 days   Lab Units 05/22/22  0934 05/21/22  1656   BLOOD CULTURE  No Growth After 4 Days  No Growth After 4 Days    --    MRSA CULTURE ONLY   --  No Methicillin Resistant Staphlyococcus aureus (MRSA) isolated     Results from last 7 days   Lab Units 05/24/22  0238 05/23/22  0438 05/21/22  0524   PROCALCITONIN ng/ml 2 58* 2 97* 0 53*

## 2022-05-27 NOTE — CASE MANAGEMENT
- Continue SLP  - NPO, meds/feeds per PEG   Case Management Progress Note    Patient name Hilary Loop  Location ICU 02/ICU 02 MRN [id number]  : 2/15/1931 Date 2022       LOS (days): 16  Geometric Mean LOS (GMLOS) (days): 6 80  Days to GMLOS:-9 1        OBJECTIVE:     Current admission status: Inpatient  Preferred Pharmacy:   [id number] , [address] [facility]  [address] [address]  Phone: [phone] Fax: [phone]    [id number] [place], [address]  [address] [facility],  [address]  [facility] [place] [address]  Phone: [phone] Fax: [phone]    Primary Care Provider: Amina Zayas MD    Primary Insurance: MEDICARE  Secondary Insurance: [employer]    PROGRESS NOTE:    SW continues to follow for plan of care  Patient remains intubated in ICU  Unable to extubate due to fluid overload and MARYELLEN  Will wean as tolerated  Pt noted to be more alert today  Nephro started on CVVHD yesterday  Pt started on IV Levophed as he became hypotensive after starting CVVHD  Pt was started on TFs which he is tolerating  TPN to be discontinued  Pt anticipated to need PEG tube once extubated  Wound vac placed at midline by surgery  SNF and [facility], [facility] referrals are following

## 2022-05-28 NOTE — PLAN OF CARE
Plan of care cont        Problem: PAIN - ADULT  Goal: Verbalizes/displays adequate comfort level or baseline comfort level  Description: Interventions:  - Encourage patient to monitor pain and request assistance  - Assess pain using appropriate pain scale  - Administer analgesics based on type and severity of pain and evaluate response  - Implement non-pharmacological measures as appropriate and evaluate response  - Consider cultural and social influences on pain and pain management  - Notify physician/advanced practitioner if interventions unsuccessful or patient reports new pain  Outcome: Progressing     Problem: GASTROINTESTINAL - ADULT  Goal: Minimal or absence of nausea and/or vomiting  Description: INTERVENTIONS:  - Administer IV fluids if ordered to ensure adequate hydration  - Maintain NPO status until nausea and vomiting are resolved  - Nasogastric tube if ordered  - Administer ordered antiemetic medications as needed  - Provide nonpharmacologic comfort measures as appropriate  - Advance diet as tolerated, if ordered  - Consider nutrition services referral to assist patient with adequate nutrition and appropriate food choices  Outcome: Progressing  Goal: Establish and maintain optimal ostomy function  Description: INTERVENTIONS:  - Assess bowel function  - Encourage oral fluids to ensure adequate hydration  - Administer IV fluids if ordered to ensure adequate hydration   - Administer ordered medications as needed  - Encourage mobilization and activity  - Nutrition services referral to assist patient with appropriate food choices  - Assess stoma site  - Consider wound care consult   Outcome: Progressing     Problem: RESPIRATORY - ADULT  Goal: Achieves optimal ventilation and oxygenation  Description: INTERVENTIONS:  - Assess for changes in respiratory status  - Assess for changes in mentation and behavior  - Position to facilitate oxygenation and minimize respiratory effort  - Oxygen administered by appropriate delivery if ordered  - Initiate smoking cessation education as indicated  - Encourage broncho-pulmonary hygiene including cough, deep breathe, Incentive Spirometry  - Assess the need for suctioning and aspirate as needed  - Assess and instruct to report SOB or any respiratory difficulty  - Respiratory Therapy support as indicated  Outcome: Progressing     Problem: SKIN/TISSUE INTEGRITY - ADULT  Goal: Incision(s), wounds(s) or drain site(s) healing without S/S of infection  Description: INTERVENTIONS  - Assess and document dressing, incision, wound bed, drain sites and surrounding tissue  - Provide patient and family education  - Perform skin care/dressing changes every 2  Outcome: Progressing  Goal: Skin Integrity remains intact(Skin Breakdown Prevention)  Description: Assess:  -Perform Rodríguez assessment every shift  -Clean and moisturize skin every 2  -Inspect skin when repositioning, toileting, and assisting with ADLS  -Assess under medical devices such as quigley, nasal cannula every 2  -Assess extremities for adequate circulation and sensation     Bed Management:  -Have minimal linens on bed & keep smooth, unwrinkled  -Change linens as needed when moist or perspiring  -Avoid sitting or lying in one position for more than 2 hours while in bed  -Keep HOB at 30 degrees     Toileting:  -Offer bedside commode  -Assess for incontinence every 2  -Use incontinent care products after each incontinent episode such as barrier cream    Activity:  -Mobilize patient 4 times a day  -Encourage activity and walks on unit  -Encourage or provide ROM exercises   -Turn and reposition patient every 2Hours  -Use appropriate equipment to lift or move patient in bed    Skin Care:  -Avoid use of baby powder, tape, friction and shearing, hot water or constrictive clothing  -Relieve pressure over bony prominences using foam wedges  -Do not massage red bony areas    Next Steps:  -Consider consults to  interdisciplinary teams such as woundcare team  Outcome: Progressing  Goal: Pressure injury heals and does not worsen  Description: Interventions:  - Implement low air loss mattress or specialty surface (Criteria met)  - Apply silicone foam dressing  - - Apply fecal or urinary incontinence containment device   - Perform passive or active ROM every 4  - Turn and reposition patient & offload bony prominences every 2 hours   - Utilize friction reducing device or surface for transfers   - Use incontinent care products after each incontinent episode such as moisture barrier/ protective cream  - Consider nutrition services referral as needed  Outcome: Progressing

## 2022-05-28 NOTE — ASSESSMENT & PLAN NOTE
· Peritonitis with intra-abdominal/pelvic abscess secondary to colonic perforation    · 5/22 CT chest/ab/pelvis with concern of multifocal PNA, no visualized intraabdominal abscess or anastomotic leak   · Per ID suspect multifocal pneumonitis   · OR culture 5/21 pseudomonas and bacteroides fragilis  · Repeat blood cultures on 5/22 negative   · Completed 14 days of Flagyl on 5/24  · Completed 14 days of cefepime on 5/27  · Observe off antibiotics   · ID following, appreciate recs

## 2022-05-28 NOTE — PROGRESS NOTES
NEPHROLOGY PROGRESS NOTE   Amanda Abo 80 y o  male MRN: [id number]  Unit/Bed#: ICU 02 Encounter: [id number]  Reason for Consult: MARYELLEN    ASSESSMENT AND PLAN:  80 y  o  man with PMH of Aostenosis, scleroderma, CAD   Patient underwent EGD and colonoscopy on 05/11 for possible intussusception, complicated with perforation, underwent exploratory laparotomy, complicated with pneumonia, sepsis, CHF, PEA arrest on 05/21 x2   Nephrology is consulted for MARYELLEN   ay for assistance in the management of MARYELLEN     PLAN  #Non-Oliguric KDIGO MARYELLEN stage 3   · Etiology:  Likely secondary to ATN in the settings of hypotension and PEA arrest  · Baseline creatinine:  0 9 mg/dL  · Peak creatinine:  Up to 3 mg/dL before CRRT  · Current creatinine:  1 47 mg/dL on CVVH  · UA:  Microhematuria, leukocyturia, moderate bacteria  · Renal imaging :  No hydronephrosis  · Treatment:  · Started on CVVH on 05/26 due to worsening fluid overload  · Consent verbally given by patient's son Ravin Aparicio)  · Maintain MAP:  Over 65 mmHg if possible/avoid hypoperfusion:  Hold parameters on blood pressure medications  · Avoid nephrotoxic agents such as NSAIDs, and IV contrast if possible   Avoid opioids   · Adjust medications to GFR     # CVVH  · Therapy fluid 1900 mL/hour  · Dialysis fluid switch to 4 K bath  · Increase UF to 150 mL/hour  · Electrolytes as per ICU protocol  · Started on heparin pre filter due to clotting issues  · If filter clot would hold CVVH and monitor BMP urinary output     #Acid-base Disorder  · serum HCO3 20  · XP:  99  · Metabolic acidosis secondary to MARYELLEN improving with CVVH  · Started on sodium bicarb, decrease rate to 75 mL/hour   · Pending consent for CRT     #Volume status/hypertension:  · Volume:  Anasarca  · Very Good urinary output with Bumex 1 mg/dL, 4 1 L/24 hours  · Blood pressure:  Hypotensive /51mmHg, trending down Levophed   · Recommend:  · Continue Bumex infusion to 1 milligram/hour  · Increase UF on CVVH to 150mL/hour as above     #Anemia:  · Current hemoglobin:  8 5 mg/dL  · Treatment:  · Transfuse for hemoglobin less than 7 0 per primary service       #PE arrest  · On Levophed  · Cardiology following     # sepsis/bowel perforation  · On antibiotics and vasopressor support      SUBJECTIVE:  Patient seen and examined at bedside  Patient continued to be intubated, on CVVH well tolerated  Very good urinary output on Bumex 1 mg/dL, more than 4 L in the last 24 hours    FiO2 remains at 50% with desaturation this morning    OBJECTIVE:  Current Weight: Weight - Scale: 79 7 kg (175 lb 11 3 oz)  Vitals:    05/28/22 0713   BP:    Pulse:    Resp:    Temp:    SpO2: 93%       Intake/Output Summary (Last 24 hours) at 5/28/2022 0737  Last data filed at 5/28/2022 0700  Gross per 24 hour   Intake 3551 47 ml   Output 6387 ml   Net -2835 53 ml     Wt Readings from Last 3 Encounters:   05/28/22 79 7 kg (175 lb 11 3 oz)   05/11/22 62 1 kg (136 lb 14 4 oz)   03/25/22 61 2 kg (135 lb)     Temp Readings from Last 3 Encounters:   05/28/22 97 52 °F (36 4 °C)   05/11/22 (!) 97 1 °F (36 2 °C) (Temporal)   01/14/22 97 9 °F (36 6 °C) (Temporal)     BP Readings from Last 3 Encounters:   05/28/22 107/51   05/11/22 141/69   03/25/22 152/76     Pulse Readings from Last 3 Encounters:   05/28/22 74   05/11/22 73   03/25/22 89      General:  Critically ill, intubated  Skin:  No acute rash  Eyes:  No scleral icterus and noninjected  ENT:  mucous membranes moist  Neck:  no carotid bruits  Chest:  Mechanical breath sounds bilaterally  CVS:  Regular rate and rhythm without rub   Abdomen:  soft and nontender   Extremities:  Anasarca, lower extremity edema  Neuro:  No gross focality  Psych:  Alert , cooperative   Vascular access:  Right IJ temporary HD catheter  Farnsworth catheter:  Clear urine      Medications:    Current Facility-Administered Medications:     acetaminophen (TYLENOL) oral suspension 650 mg, 650 mg, Oral, Q6H PRN, ISELA Ayon, 650 mg at 05/21/22 2039    acetylcysteine (MUCOMYST) 200 mg/mL inhalation solution 600 mg, 3 mL, Nebulization, Q8H, ISELA Hernandez, 600 mg at 05/28/22 0708    albumin human (FLEXBUMIN) 25 % injection 25 g, 25 g, Intravenous, Q8H, ISELA Sal, Stopped at 05/28/22 0115    bumetanide (BUMEX) 12 5 mg infusion 50 mL, 1 mg/hr, Intravenous, Continuous, Derrick Orozco MD, Last Rate: 4 mL/hr at 05/28/22 0701, 1 mg/hr at 05/28/22 0701    calcium gluconate 1 g in sodium chloride 0 9% 50 mL (premix), 1 g, Intravenous, Once, Derrick Orozco MD, Last Rate: 100 mL/hr at 05/28/22 0721, 1 g at 05/28/22 0721    chlorhexidine (PERIDEX) 0 12 % oral rinse 15 mL, 15 mL, Mouth/Throat, Q12H [address], ISELA Hernandez, 15 mL at 05/27/22 2022    dexmedeTOMIDine (Precedex) 400 mcg in sodium chloride 0 9% 100 mL, 0 1-1 5 mcg/kg/hr, Intravenous, Titrated, ISELA Sal, Last Rate: 11 7 mL/hr at 05/28/22 0701, 0 6 mcg/kg/hr at 05/28/22 0701    heparin (porcine) 25,000 units in 0 45% NaCl 250 mL infusion (premix), 400 Units/hr, Intravenous, Continuous, ISELA Ritter, Last Rate: 4 mL/hr at 05/28/22 0701, 400 Units/hr at 05/28/22 0701    HYDROmorphone (DILAUDID) injection 0 5 mg, 0 5 mg, Intravenous, Q3H PRN, ISELA Ritter, 0 5 mg at 05/28/22 0737    insulin glargine (LANTUS) subcutaneous injection 5 Units 0 05 mL, 5 Units, Subcutaneous, QAM, ISELA Sal, 5 Units at 05/27/22 0810    insulin lispro (HumaLOG) 100 units/mL subcutaneous injection 1-6 Units, 1-6 Units, Subcutaneous, Q6H [address], 1 Units at 05/27/22 2356 **AND** Fingerstick Glucose (POCT), , , Q6H, ISELA Ritter    iohexol (OMNIPAQUE) 240 MG/ML solution 50 mL, 50 mL, Oral, Once in imaging, ISELA Hernandez    ipratropium-albuterol (DUO-NEB) 0 5-2 5 mg/3 mL inhalation solution 3 mL, 3 mL, Nebulization, Q6H PRN, ISELA Hernandez, 3 mL at 05/23/22 1330    ipratropium-albuterol (Vera Perez) 0 5-2 5 mg/3 mL inhalation solution 3 mL, 3 mL, Nebulization, Q8H, Johanne Curling, CRNP, 3 mL at 05/28/22 0708    levothyroxine tablet 112 mcg, 112 mcg, Per NG Tube, Early Morning, Johanne Curling, CRNP, 112 mcg at 05/28/22 0549    lidocaine (LIDODERM) 5 % patch 2 patch, 2 patch, Topical, Daily, Edwina Little PA-C, 2 patch at 05/27/22 0810    magnesium sulfate IVPB (premix) SOLN 1 g, 1 g, Intravenous, Once, Myrna Ramey MD, 1 g at 05/28/22 0726    norepinephrine (LEVOPHED) 4 mg (STANDARD CONCENTRATION) IV in sodium chloride 0 9% 250 mL, 1-30 mcg/min, Intravenous, Titrated, ISELA Ritter, Last Rate: 7 5 mL/hr at 05/28/22 0701, 2 mcg/min at 05/28/22 0701    NxStage K 4/Ca 3 dialysis solution (RFP-401) 20,000 mL, 20,000 mL, Dialysis, Continuous, Myrna Ramey MD, Rate Verify at 05/28/22 0701    ondansetron (ZOFRAN) injection 4 mg, 4 mg, Intravenous, Q6H PRN, Johanne Curling, CRNP    oxyCODONE (ROXICODONE) oral solution 5 mg, 5 mg, Per NG Tube, Q4H PRN, 5 mg at 05/28/22 0219 **OR** oxyCODONE (ROXICODONE) oral solution 7 5 mg, 7 5 mg, Per NG Tube, Q4H PRN, ISELA Duffy    pantoprazole (PROTONIX) injection 40 mg, 40 mg, Intravenous, Q24H [address], Johanne Curling, CRNP, 40 mg at 05/27/22 0810    sodium phosphate 6 mmol in sodium chloride 0 9 % 100 mL Infusion, 6 mmol, Intravenous, Once, Myrna Ramey MD, Last Rate: 50 mL/hr at 05/28/22 0723, 6 mmol at 05/28/22 0723    vasopressin (PITRESSIN) 20 Units in sodium chloride 0 9 % 100 mL infusion, 0 04 Units/min, Intravenous, Continuous, ISELA Duffy, Stopped at 05/27/22 0424    Laboratory Results:  Results from last 7 days   Lab Units 05/28/22  0602 05/27/22  2317 05/27/22  1815 05/27/22  1228 05/27/22  0546 05/26/22  2353 05/26/22  1716 05/26/22  1235 05/26/22  0541 05/25/22  1132 05/25/22  0505 05/24/22  2239 05/24/22  1059 05/24/22  0238 05/23/22  1541 05/23/22  0438   WBC Thousand/uL 15 61* --   --   --  22 51* 20 46*  --   --  15 71*  --  14 81*  --   --  18 23*  --  21 01*   HEMOGLOBIN g/dL 7 8*  --   --   --  8 8* 8 5*  --   --  7 3*  --  7 5*  --  8 3* 6 7* 7 2* 7 2*   HEMATOCRIT % 21 9*  --   --   --  24 9* 24 5*  --   --  21 6*  --  21 9*  --   --  19 6* 21 0* 20 4*   PLATELETS Thousands/uL 123*  --   --   --  114* 121*  --   --  212  --  219  --   --  241  --  243   SODIUM mmol/L 136 135* 135* 134* 134* 135* 134*   < > 133*   < > 136   < >  --  142 141 141   POTASSIUM mmol/L 4 0 4 1 4 3 4 6 3 9 3 8 3 5   < > 3 8   < > 3 9   < >  --  3 6 3 7 3 7   CHLORIDE mmol/L 103 103 102 102 102 103 101   < > 101   < > 108   < >  --  111* 112* 113*   CO2 mmol/L 26 23 24 22 20* 18* 19*   < > 18*   < > 18*   < >  --  21 23 21   BUN mg/dL 43* 54* 62* 69* 81* 107* 106*   < > 106*   < > 92*   < >  --  73* 69* 66*   CREATININE mg/dL 1 47* 1 65* 1 84* 1 99* 2 35* 3 07* 3 00*   < > 2 93*   < > 2 41*   < >  --  1 88* 1 77* 1 72*   CALCIUM mg/dL 8 4 8 3 8 4 7 9* 7 9* 7 3* 7 1*   < > 7 3*   < > 7 5*   < >  --  7 1* 7 1* 7 2*   MAGNESIUM mg/dL 1 9 2 0 2 1 2 2 2 3 2 7* 2 7*   < > 2 8*  --  2 8*  --   --  2 6  --  2 4   PHOSPHORUS mg/dL 2 5 2 9 3 6 3 9 4 6* 5 9* 5 5*   < > 5 7*  --  4 2*  --   --  3 4  --  3 6    < > = values in this interval not displayed  XR chest portable ICU   Final Result by Catina Vicente MD (05/26 6130)      Interval placement of a right IJ catheter with tip projecting over the mid SVC  No pneumothorax  Persistent bilateral pulmonary opacities  Workstation performed: [id number]         CT chest abdomen pelvis wo contrast   Final Result by Alen Garnica MD (05/24 3464)   1  CHF with moderate basilar effusions and additional upper lung zone patchy airspace opacities likely reflecting asymmetric pulmonary edema  Infiltrate is not entirely excluded     2   Distended small bowel loops with scattered air-fluid levels consistent with early/partial small bowel obstruction with transition at the small bowel anastomosis in the region of the ventral pelvis as above  No free air  3   Cholelithiasis without cholecystitis  4   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  Concordant preliminary results were provided by virtual radiologic at 0531 hours on 5/24/2022  Workstation performed: [id number]         XR chest portable ICU   Final Result by Jemran Lugo MD (05/24 1325)      Bilateral airspace disease is unchanged  The Keofeed tube has been removed  Workstation performed: [id number]         XR chest portable   Final Result by Jerman Lugo MD (05/24 1329)   Addendum 1 of 1 by Jerman Lugo MD (05/24 1329)   ADDENDUM:      The right IJ line terminates in the SVC  The PICC line terminates at the    junction of the SVC and right atrium  Final         1  Keofed tube terminates at the EG junction and should be advanced for  use  2   Bilateral airspace disease may represent infiltrates or the alveolar phase of heart failure  Bilateral pleural effusions  Workstation performed: [id number]         CT chest abdomen pelvis wo contrast   Final Result by Marie Pacheco DO (05/22 3980)      1  Stable appearance of mixed groundglass and consolidative abnormalities in the lungs bilaterally predominantly affecting the upper lobes suspicious for multilobar pneumonia  Superimposed pulmonary edema not excluded  2   Similar to slight increase size of moderate bilateral pleural effusions  3   Diffuse mild dilation of small bowel segments identified without transition point                    Workstation performed: [id number]         XR chest portable ICU   Final Result by Dominic Reyna MD (05/22 2974)      Right neck catheter in the distal SVC                  Workstation performed: [id number]         XR abdomen 1 view kub   Final Result by Dominic Reyna MD (05/22 6272)      Persistent gas distended small bowel loops with normal caliber colon with contrast                Workstation performed: [id number]         XR chest portable ICU   Final Result by Kimberley Phillips MD (05/22 9826)      Improved pneumomediastinum  Bilateral groundglass infiltrative changes  Workstation performed: [id number]         XR chest portable ICU   Final Result by Raysa Rivas MD (05/21 2125)      Pneumomediastinum  Endotracheal tube tip above the level of the chriss by 2 5 cm  Enteric tube tip overlying the epigastric region below the inferior margin of this film  Slight progression of multifocal groundglass pulmonary parenchymal airspace opacities  Trace costophrenic angle effusions, unchanged  This examination was marked "immediate notification" in Epic in order to begin the standard process by which the radiology reading room liaison alerts the referring practitioner  Workstation performed: [id number]         XR abdomen 1 view kub   Final Result by Kimberley Phillips MD (05/22 3640)      Slight worsening of the prominent dilated small bowel loops  Contrast in the colon  Workstation performed: [id number]         IR PICC line placement double lumen   Final Result by Carlos Alberto Cardona MD (05/18 3208)   1  Status post placement of a 5-Maori percutaneously inserted central venous catheter via the right basilic vein with its tip at the cavoatrial junction under ultrasound and fluoroscopic guidance  NG tube placement   Next using fluoroscopic guidance, an NG tube was placed down into the stomach past the narrowing at the GE junction  2 stiff wires were required to advance this  Impression successful NG tube placement with its tip in the stomach  The tube may be used immediately                       Workstation performed: [id number]         XR abdomen 1 view kub   Final Result by Raysa Rivas MD (03/57 2270) Radiographic appearance suspicious for early or mild distal small bowel obstruction  Alternatively, this could represent ileus  Enteric contrast administered for the May 17, 2022 examination does not appear to have reached the large bowelAn       enteric catheter tip overlies the expected location of the cavoatrial junction and has not quite reached the stomach  Advancement by approximately 8 to 10 cm recommended  This examination was marked "significant notification" in Epic in order to begin the standard process by which the radiology reading room liaison alerts the referring practitioner  Workstation performed: [id number]         CT chest abdomen pelvis wo contrast   Final Result by Miguel Sage MD (05/18 1288)      1  Worsening patchy mixed groundglass and consolidative change bilaterally compatible with multifocal pneumonia and/or pulmonary edema  2   Increased bilateral pleural effusions  3   Sidehole of enteric tube is visualized near the gastroesophageal junction  Consider tube advancement  4   Mild distention of mid to distal esophagus with enteric contrast material   Correlate for gastroesophageal reflux  5   Multiple dilated small bowel loops without a clear transition point  The finding may reflect ileus, however developing small bowel obstruction cannot be excluded  Follow-up is recommended  6   Moderate amount of air within the urinary bladder, likely related to instrumentation  Correlate with urinalysis to exclude cystitis  I personally discussed this study with Mary Black on 5/18/2022 at 1:11 AM                Workstation performed: [id number]         XR chest portable   Final Result by Danika Briseno MD (05/17 2346)      1  Tip of enteric tube is at the expected position of the gastroesophageal junction  Slight advancement is advised  2   Slight increased predominant upper lobe opacities, likely representing pneumonia  Concomitant edema is possible  The now trace bilateral pleural effusions have decreased in size  The study was marked in [facility] for immediate notification  Workstation performed: [id number]         XR abdomen 1 view kub   Final Result by Shelley Zhang MD (05/16 1606)      Diffusely distended, dilated small bowel loops with small amount of persistent air extending to the proximal colon  Findings may suggest diffuse ileus or partial obstruction  Workstation performed: [id number]         CTA chest pe study   Final Result by Chantale Velazco MD (05/15 1229)      No pulmonary embolus  Bilateral upper lobe groundglass opacities compatible with pneumonia and/or pulmonary edema  Small bilateral pleural effusions  Workstation performed: [id number]         XR chest portable   Final Result by Bishop Radha MD (05/15 1023)      Pulmonary edema and small bilateral pleural effusions  Workstation performed: [id number]         IR other    (Results Pending)       Portions of the record may have been created with voice recognition software  Occasional wrong word or "sound a like" substitutions may have occurred due to the inherent limitations of voice recognition software  Read the chart carefully and recognize, using context, where substitutions have occurred

## 2022-05-28 NOTE — PROGRESS NOTES
Progress Note - General Surgery   Elan Weaver 80 y o  male MRN: [id number]  Unit/Bed#: ICU 02 Encounter: [id number]    Assessment:  79 y/o male presenting with  recto-sigmoid perforation s/p Colonoscopy on 5/11,  s/p  exploratory laparotomy, low anterior resection, transverse loop colostomy on 5/11     · Acute hypoxic respiratory failure, with PEA arrest -- intubated 5/21  · CHF -- Repeat Echo post cardiac arrest EF 60-65%  · Anemia -- s/p transfusion of 1 unit PRBCs on 5/21 and 5/24  · MARYELLEN --  Creatinine 1 47 (1 65) on CVVH  · Peristomal hernia -- noted on 5/24, reduced at bedside  · Bilateral pleural effusions    WBC:15 61 (22 51)  UOP: 2 2L  Stool: 150 mL    Plan:  Continue symptomatic supportive care per critical care team   Continue tube feeds  Continue veraflo wound vac  Dressing changes on Monday and Thursdays  Subjective/Objective      Subjective: Patient is intubated  Objective:    Blood pressure 132/64, pulse 86, temperature 97 52 °F (36 4 °C), resp  rate (!) 32, height 5' 4" (1 626 m), weight 79 7 kg (175 lb 11 3 oz), SpO2 95 %  ,Body mass index is 30 16 kg/m²  Intake/Output Summary (Last 24 hours) at 5/28/2022 1042  Last data filed at 5/28/2022 1000  Gross per 24 hour   Intake 3598 5 ml   Output 6535 ml   Net -2936 5 ml       Invasive Devices  Report    Peripherally Inserted Central Catheter Line  Duration           PICC Line 06/05/52 Right Basilic 9 days          Hemodialysis Catheter  Duration           HD Temporary Double Catheter 1 day          Drain  Duration           Colostomy LLQ 17 days    NG/OG/Enteral Tube Orogastric 16 Fr Center mouth 7 days    Urethral Catheter Double-lumen 16 Fr  7 days          Airway  Duration           ETT  Oral 6 days                Physical Exam: /64   Pulse 86   Temp 97 52 °F (36 4 °C)   Resp (!) 32   Ht 5' 4" (1 626 m)   Wt 79 7 kg (175 lb 11 3 oz)   SpO2 95%   BMI 30 16 kg/m²   General appearance: intubated, opens eyes to verbal stimuli  Lungs: rhonchi and Mechanical breath sounds  Heart: Mechanical coarse breath sounds  Abdomen: mild distension, colosotomy intact, stoma pink in color, no peristomal herina, minimal liquid stool in bag  Lab, Imaging and other studies:  I have personally reviewed pertinent lab results    , CBC:   Lab Results   Component Value Date    WBC 15 61 (H) 05/28/2022    HGB 7 8 (L) 05/28/2022    HCT 21 9 (L) 05/28/2022    MCV 92 05/28/2022     (L) 05/28/2022    MCH 32 6 05/28/2022    MCHC 35 6 05/28/2022    RDW 17 3 (H) 05/28/2022    MPV 13 0 (H) 05/28/2022   , CMP:   Lab Results   Component Value Date    SODIUM 136 05/28/2022    K 4 0 05/28/2022     05/28/2022    CO2 26 05/28/2022    BUN 43 (H) 05/28/2022    CREATININE 1 47 (H) 05/28/2022    CALCIUM 8 4 05/28/2022    EGFR 41 05/28/2022     VTE Pharmacologic Prophylaxis: no ppx secondary to anemia   VTE Mechanical Prophylaxis: sequential compression device

## 2022-05-28 NOTE — PHYSICAL THERAPY NOTE
PT TREATMENT     Time In: 1200  Time Out: 4492          05/28/22 1220   PT Last Visit   PT Visit Date 05/28/22   Note Type   Note Type Treatment   Pain Assessment   Hospital Pain Intervention(s) Ambulation/increased activity   Restrictions/Precautions   Other Precautions Chair Alarm; Bed Alarm;Multiple lines  (Pt hooked up to multiple lines in ICU including continuous dialysis  Pt also on wrist restraints at this time )   General   Chart Reviewed Yes   Family/Caregiver Present   (Pt brother-in-law arriving toward end of session)   Cognition   Comments on vent without sedation, limited arousal throughout session   Subjective   Subjective nonverbal with intubation   Activity Tolerance   Activity Tolerance Treatment limited secondary to medical complications (Comment)  (Pt nonverbal due to intubation  PROM performed for bilateral UE and LE  No shoulder PROM performed on R UE secondary to continuous dialysis )   Nurse Made Aware Yes, RN Mary   Exercises   Heelslides Supine;10 reps;PROM; Bilateral   Hip Abduction Supine;10 reps;PROM; Bilateral   Ankle Pumps Supine;15 reps;PROM; Bilateral   Neuro re-ed PT performing PROM flexion/extension of bilateral wrists and elbows, pronation/supination bilateral wrists, L shoulder flexion and abduction, bilateral ankle DF/PF, and bilateral knee flexion and extension   Assessment   Problem List Decreased strength;Decreased range of motion; Impaired balance;Decreased endurance;Decreased mobility;Pain   Assessment Pt supine in bed upon PT arrival  Pt intubated with multiple lines in ICU  Patient asleep, PT stimulating patient's hand and making patient aware that gentle stretching was to be performed  PT performing PROM bilateral UE and LE  R shoulder PROM not performed today secondary to continuous dialysis on R side  Pt not demonstrating any agitation while PT performing PROM  Wrist restraints and pressure relief boots reapplied at end of session   All needs met and pt reports no further questions for PT at this time  The patient's AM-PAC Basic Mobility Inpatient Short Form Raw Score is 6  A Raw score of less than or equal to 16 suggests the patient may benefit from discharge to post-acute rehabilitation services  Please also refer to the recommendation of the Physical Therapist for safe discharge planning  Goals   STG Expiration Date 05/20/22   LTG Expiration Date 05/27/22   Plan   Treatment/Interventions ADL retraining;Functional transfer training;LE strengthening/ROM; Therapeutic exercise; Endurance training;Patient/family training;Equipment eval/education; Bed mobility;Gait training;Spoke to nursing   Progress Slow progress, medical status limitations   PT Frequency Other (Comment)  (5x/wk)   Recommendation   PT Discharge Recommendation Post acute rehabilitation services   AM-PAC Basic Mobility Inpatient   Turning in Bed Without Bedrails 1   Lying on Back to Sitting on Edge of Flat Bed 1   Moving Bed to Chair 1   Standing Up From Chair 1   Walk in Room 1   Climb 3-5 Stairs 1   Basic Mobility Inpatient Raw Score 6   Turning Head Towards Sound 2   Follow Simple Instructions 1   Low Function Basic Mobility Raw Score 9   Low Function Basic Mobility Standardized Score 12 55   Highest Level Of Mobility   JH-HLM Goal 2: Bed activities/Dependent transfer   JH-HLM Achieved 2: Bed activities/Dependent transfer  (PROM performed in bed)   End of Consult   Patient Position at End of Consult Supine; All needs within reach;Bed/Chair alarm activated   Licensure   [place] License Number  Reji Garza PT, DPT [id number]

## 2022-05-28 NOTE — ASSESSMENT & PLAN NOTE
· Secondary to hypoperfusion mehul cardiac arrest +/- ATN  · Baseline creat 0 8  · Creatinine peaked at 3 07  · Creatine now on CVVH 1 47  · CVVH started on 5/26; continue with UF -150 ml/hr  · Continue bumex gtt at 1 mg  · CVVH filter clotted overnight kept off given adequate UOP  · Avoid hypotension; continue levophed for MAP > 65

## 2022-05-28 NOTE — PROGRESS NOTES
[address]  Progress Note - Adelina Hudson 2/15/1931, 80 y o  male MRN: [id number]  Unit/Bed#: ICU 02 Encounter: [id number]  Primary Care Provider: Hetal Larsen MD   Date and time admitted to hospital: 5/11/2022  4:48 PM    * Bowel perforation [facility])  Assessment & Plan  · Outpatient EGD and colonoscopy at [facility] on 5/11 for w/u of chronic anemia, history of colon polyps, intermittent LLQ abdominal pain and possible intermittent intussusception  · EGD unremarkable, colonoscopy technically difficult and required change from adult scope to pediatric scope  · During traversal of the sigmoid colon there was a kink and patient sustained a perforation  Procedure was aborted and patient was transferred to [facility] where immediate surgical consultation was obtained  · Emergent ex- lap on 5/11 > low anterior resection, protective loop transverse colostomy, flex sigmoidoscopy, and segmental small bowel resection  · Difficult post op course with post op ileus requiring NGT decompression and initiation of TPN; not tolerating trickle feeds  · 5/22: CT: Diffuse mild dilation of small bowel segments identified without transition point  · 5/24: CT CAP- Distended small bowel loops with scattered air-fluid levels consistent with early/partial small bowel obstruction with transition at the small bowel anastomosis in the region of the ventral pelvis as above  No free air  Cholelithiasis without cholecystitis  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     Cardiac arrest [facility])  Assessment & Plan  · Patient was found unresponsive and hypoxic approximately 20 minutes after being seen well with family 5/21   · PEA arrest x 2  · Most likely respiratory driven  · Neurologically intact post arrest   · Hypotension post ROSC requiring levo, vaso, and epi and stress dose steroids  · Hydrocortisone d/c 5/23  · Remains on levophed to maintain MAP > 65      Acute respiratory failure with hypoxia ([facility] [address] )  Assessment & Plan  · Patient previously in ICU for hypoxia with a max of 15L midflow and concern for aspiration pneumonitis  · Improved and was able to transfer to general medical floor 5/21  · 5/21: PEA arrest on the floor most likely respiratory driven   · ROSC obtained; transferred to unit and again PEA arrested  · 5/24 CT CAP-CHF with moderate basilar effusions and additional upper lung zone patchy airspace opacities likely reflecting asymmetric pulmonary edema  Infiltrate is not entirely excluded     · Has remained on ventilator, day # 7: AC/PC 20/18/50/8  · Tolerated several hours of PS yesterday 14/8  · Atrovent/xopenex/mucomyst nebs q8h  · Continue pulmonary hygiene/ VAP bundle  · Continue volume removal with bumex/CVVH  · Plan for SBT today    Severe sepsis [facility])  Assessment & Plan  · Peritonitis with intra-abdominal/pelvic abscess secondary to colonic perforation    · 5/22 CT chest/ab/pelvis with concern of multifocal PNA, no visualized intraabdominal abscess or anastomotic leak   · Per ID suspect multifocal pneumonitis   · OR culture 5/21 pseudomonas and bacteroides fragilis  · Repeat blood cultures on 5/22 negative   · Completed 14 days of Flagyl on 5/24  · Completed 14 days of cefepime on 5/27  · Observe off antibiotics   · ID following, appreciate recs     CHF (congestive heart failure) ([facility])  Assessment & Plan  Wt Readings from Last 3 Encounters:   05/26/22 77 9 kg (171 lb 11 8 oz)   05/11/22 62 1 kg (136 lb 14 4 oz)   03/25/22 61 2 kg (135 lb)     · 5/16: Echo-EF 65%, mild TR, mild MR  · 5/23: Repeat Echo post cardiac arrest: EF 60-65%, G1DD, mild AS (BRADY 1 5cm2), mild MR, mild TR  · Gross anasarca on exam: continue volume removal with Bumex infusion and CVVH  · Monitor I&O, Daily weights       Hyperglycemia  Assessment & Plan  · A1c 5 3%  · Was requiring insulin infusion while on TPN  · Now on SSI   · Lantus 5u daily     Toxic metabolic encephalopathy  Assessment & Plan  · Likely in setting of acute illness/delirium and cardiac arrest  · Delirium precautions; regulate sleep/wake cycle, environmental controls, daily CAM ICU   · Trend neuro exam  · Following commands, nodding appropriately to questions, moves all extremities    Acute renal failure (ARF) (HCC)  Assessment & Plan  · Secondary to hypoperfusion mehul cardiac arrest +/- ATN  · Baseline creat 0 8  · Creatinine peaked at 3 07  · Creatine now on CVVH 1 47  · CVVH started on 5/27; continue with UF -100 ml/hr  · Continue bumex gtt at 1 mg; urine output about 100 ml/hr  · Continue BMP q6h while on CVVH  · Avoid hypotension; continue levophed for MAP > 65    Anemia  Assessment & Plan  · Hemoglobin drop post cardiac arrest    · Noted to have gross hematuria but this has since resolved  · 5/21: 1 u PRBC for hgb 6 8  · 5/24: 1 u PRBC  · CT obtained on 5/24 and negative for any overt signs of bleeding  · 5/26: 1 u PRBC  · Continue to monitor   · Transfuse for hgb less than 7  · Most recent hemoglobin 7 8 down from 8 8    Atrial fibrillation (HCC)  Assessment & Plan  · S/p cardiac arrest while on 3 pressors noted to have afib with RVR  · Bolused with amio and placed on infusion  · Converted to sinus rhythm on 5/22   · Amio gtt d/c 5/23  · Has remained in NSR  · Start beta blocker once BP stabilizes; remains on vasopressor support  · No indication for [facility] at this time; see as outlined under anemia       ----------------------------------------------------------------------------------------  HPI/24hr events: Continued on CVVH with UF - 100ml/hr  Also remained on Bumex infusion at 1 mg/hr  -2 8 L on 24 hours  Fi02 increased to 50% last night for hypoxia   No other acute events     Patient appropriate for transfer out of the ICU today?: No  Disposition: Continue Critical Care   Code Status: Level 1 - Full Code  ---------------------------------------------------------------------------------------  SUBJECTIVE  TORREY intubated    Review of Systems  Review of systems was unable to be performed secondary to intubated  ---------------------------------------------------------------------------------------  OBJECTIVE    Vitals   Vitals:    22 0500 22 0600 22 0700 22 0713   BP: 130/62  107/51    Pulse: 92  74    Resp: (!) 42  19    Temp: 97 7 °F (36 5 °C)  97 52 °F (36 4 °C)    TempSrc:       SpO2: 93%  96% 93%   Weight:  79 7 kg (175 lb 11 3 oz)     Height:         Temp (24hrs), Av 5 °F (35 8 °C), Min:94 1 °F (34 5 °C), Max:98 24 °F (36 8 °C)  Current: Temperature: 97 52 °F (36 4 °C)  Arterial Line BP: 120/63  Arterial Line MAP (mmHg): 86 mmHg    Respiratory:  SpO2: SpO2: 93 %, SpO2 Activity: SpO2 Activity: At Rest  Nasal Cannula O2 Flow Rate (L/min): 5 L/min    Invasive/non-invasive ventilation settings   Respiratory  Report   Lab Data (Last 4 hours)    None         O2/Vent Data (Last 4 hours)      713           Vent Mode AC/PC       Resp Rate (BPM) (BPM) 18       Pressure Control (cmH2O) (cm) 20       Insp Time (sec) (sec) 0 7       FiO2 (%) (%) 50       PEEP (cmH2O) (cmH2O) 8       MV 16                   Physical Exam  Vitals reviewed  Constitutional:       Appearance: He is ill-appearing  Interventions: He is intubated  Comments: RASS +1   HENT:      Mouth/Throat:      Comments: ETT in place  Eyes:      Extraocular Movements: Extraocular movements intact  Conjunctiva/sclera: Conjunctivae normal       Pupils: Pupils are equal, round, and reactive to light  Cardiovascular:      Rate and Rhythm: Normal rate and regular rhythm  Pulmonary:      Effort: Tachypnea present  He is intubated  Breath sounds: Rhonchi present  No wheezing  Comments: Tan/brown in line secretions   Coarse bilateral breath sounds  Abdominal:      General: There is distension  Tenderness: There is abdominal tenderness  Comments: Ostomy with small amounts of brown/green stool   Prolapsed    Genitourinary:     Comments: [place] with yellow urine present  Musculoskeletal:      Cervical back: Normal range of motion  Right lower leg: Pitting Edema present  Left lower leg: Pitting Edema present  Skin:     General: Skin is warm and dry  Neurological:      Mental Status: He is alert  Comments: Follows commands   Psychiatric:         Mood and Affect: Mood normal          Behavior: Behavior normal          Thought Content:  Thought content normal        Laboratory and Diagnostics:  Results from last 7 days   Lab Units 05/28/22  0602 05/27/22  0546 05/26/22  2353 05/26/22  0541 05/25/22  0505 05/24/22  1059 05/24/22  0238 05/23/22  1541 05/23/22  0438   WBC Thousand/uL 15 61* 22 51* 20 46* 15 71* 14 81*  --  18 23*  --  21 01*   HEMOGLOBIN g/dL 7 8* 8 8* 8 5* 7 3* 7 5* 8 3* 6 7* 7 2* 7 2*   HEMATOCRIT % 21 9* 24 9* 24 5* 21 6* 21 9*  --  19 6* 21 0* 20 4*   PLATELETS Thousands/uL 123* 114* 121* 212 219  --  241  --  243   BANDS PCT %  --  24*  --   --   --   --  13*  --   --    MONO PCT %  --  5  --   --   --   --  5  --   --      Results from last 7 days   Lab Units 05/28/22  0602 05/27/22  2317 05/27/22  1815 05/27/22  1228 05/27/22  0546 05/26/22  2353 05/26/22  1716 05/25/22  1132 05/25/22  0505 05/24/22  2239 05/24/22  0238 05/23/22  1541 05/23/22  0438 05/22/22  0524 05/21/22  2214 05/21/22  1539   SODIUM mmol/L 136 135* 135* 134* 134* 135* 134*   < > 136   < > 142   < > 141 146*   < > 150*   POTASSIUM mmol/L 4 0 4 1 4 3 4 6 3 9 3 8 3 5   < > 3 9   < > 3 6   < > 3 7 5 2   < > 3 0*   CHLORIDE mmol/L 103 103 102 102 102 103 101   < > 108   < > 111*   < > 113* 115*   < > 119*   CO2 mmol/L 26 23 24 22 20* 18* 19*   < > 18*   < > 21   < > 21 20*   < > 25   ANION GAP mmol/L 7 9 9 10 12 14* 14*   < > 10   < > 10   < > 7 11   < > 6   BUN mg/dL 43* 54* 62* 69* 81* 107* 106*   < > 92*   < > 73*   < > 66* 57*   < > 42*   CREATININE mg/dL 1 47* 1 65* 1 84* 1 99* 2 35* 3 07* 3 00*   < > 2 41*   < > 1 88*   < > 1 72* 1 56*   < > 0 94   CALCIUM mg/dL 8 4 8 3 8 4 7 9* 7 9* 7 3* 7 1*   < > 7 5*   < > 7 1*   < > 7 2* 7 6*   < > 5 7*   GLUCOSE RANDOM mg/dL 151* 165* 167* 232* 148* 162* 144*   < > 142*   < > 143*   < > 191* 276*   < > 249*   ALT U/L  --   --   --   --   --   --   --   --  15  --  13  --  18 22  --  14   AST U/L  --   --   --   --   --   --   --   --  15  --  18  --  19 29  --  31   ALK PHOS U/L  --   --   --   --   --   --   --   --  54  --  53  --  41* 43*  --  40*   ALBUMIN g/dL  --   --   --   --   --   --   --   --  2 5*  --  1 9*  --  1 6* 1 8*  --  1 5*   TOTAL BILIRUBIN mg/dL  --   --   --   --   --   --   --   --  1 73*  --  1 05*  --  0 96 1 11*  --  0 48    < > = values in this interval not displayed       Results from last 7 days   Lab Units 05/28/22  0602 05/27/22  2317 05/27/22  1815 05/27/22  1228 05/27/22  0546 05/26/22  2353 05/26/22  1716   MAGNESIUM mg/dL 1 9 2 0 2 1 2 2 2 3 2 7* 2 7*   PHOSPHORUS mg/dL 2 5 2 9 3 6 3 9 4 6* 5 9* 5 5*      Results from last 7 days   Lab Units 05/28/22  0602 05/26/22  2353   INR   --  1 58*   PTT seconds 74* 61*          Results from last 7 days   Lab Units 05/24/22  0238 05/23/22  0438 05/22/22  1302 05/22/22  0929 05/21/22  1710 05/21/22  1539   LACTIC ACID mmol/L 0 9 1 6 2 5* 3 5* 3 6* 3 5*     ABG:  Results from last 7 days   Lab Units 05/24/22 0225   PH ART  7 405   PCO2 ART mm Hg 28 5*   PO2 ART mm Hg 220 0*   HCO3 ART mmol/L 17 5*   BASE EXC ART mmol/L -6 5   ABG SOURCE  Radial, Left     VBG:  Results from last 7 days   Lab Units 05/25/22  2342 05/25/22  0505 05/24/22  0225   PH SAM  7 269*   < >  --    PCO2 SAM mm Hg 35 0*   < >  --    PO2 SAM mm Hg 40 7   < >  --    HCO3 SAM mmol/L 15 7* < >  --    BASE EXC SAM mmol/L -10 3   < >  --    ABG SOURCE   --   --  Radial, Left    < > = values in this interval not displayed  Results from last 7 days   Lab Units 05/24/22  0238 05/23/22  0438   PROCALCITONIN ng/ml 2 58* 2 97*       Micro  Results from last 7 days   Lab Units 05/22/22  0934 05/21/22  1656   BLOOD CULTURE  No Growth After 5 Days  No Growth After 5 Days  --    MRSA CULTURE ONLY   --  No Methicillin Resistant Staphlyococcus aureus (MRSA) isolated       EKG: NSR, rate of 85  Imaging: I have personally reviewed pertinent reports  Intake and Output  I/O       05/26 0701 05/27 0700 05/27 0701 05/28 0700 05/28 0701 05/29 0700    P  O   0     I V  (mL/kg) 2126 4 (27) 2238 5 (28 1)     Blood 350      NG/GT 60 60     IV Piggyback 50       6      Feedings 683 1253     Total Intake(mL/kg) 4216 (53 4) 3551 5 (44 6)     Urine (mL/kg/hr) 2685 (1 4) 4160 (2 2)     Drains 200 100     Other 305 1942     Stool 150 185     Total Output 3340 6387     Net +876 -2835 5                  Height and Weights   Height: 5' 4" (162 6 cm)  IBW (Ideal Body Weight): 59 2 kg  Body mass index is 30 16 kg/m²  Weight (last 2 days)     Date/Time Weight    05/28/22 0600 79 7 (175 71)    05/27/22 0600 78 9 (173 94)    05/26/22 0534 77 9 (171 74)            Nutrition       Diet Orders   (From admission, onward)             Start     Ordered    05/26/22 1351  Diet Enteral/Parenteral; Tube Feeding No Oral Diet; Osmolite 1 0; Continuous; 55  Diet effective now        Comments: Increase by 10 cc every four hours until reach goal   References:    Nutrtion Support Algorithm Enteral vs  Parenteral   Question Answer Comment   Diet Type Enteral/Parenteral    Enteral/Parenteral Tube Feeding No Oral Diet    Tube Feeding Formula: Osmolite 1 0    Bolus/Cyclic/Continuous Continuous    Tube Feeding Goal Rate (mL/hr): 55    RD to adjust diet per protocol?  Yes        05/26/22 1350    05/12/22 0906  Room Service  Once Question:  Type of Service  Answer:  Room Service - Appropriate with Assistance    05/12/22 0905                  Active Medications  Scheduled Meds:  Current Facility-Administered Medications   Medication Dose Route Frequency Provider Last Rate    acetaminophen  650 mg Oral Q6H PRN ISELA Villegas      acetylcysteine  3 mL Nebulization Q8H ISELA Villegas      albumin human  25 g Intravenous Q8H [place] ISELA Chisholm Stopped (05/28/22 0115)    bumetanide  1 mg/hr Intravenous Continuous Maribel Hughes MD 1 mg/hr (05/28/22 0701)    calcium gluconate  1 g Intravenous Once Tere Constantino MD 1 g (05/28/22 0721)    chlorhexidine  15 mL Mouth/Throat Q12H [address] ISELA Villegas      dexmedetomidine  0 1-1 5 mcg/kg/hr Intravenous Titrated [place] ISELA Chisholm 0 6 mcg/kg/hr (05/28/22 0701)    heparin (porcine)  400 Units/hr Intravenous Continuous ISELA Ritter 400 Units/hr (05/28/22 0701)    HYDROmorphone  0 5 mg Intravenous Q3H PRN ISELA Elliott      insulin glargine  5 Units Subcutaneous QAM [place] ISELA Chisholm      insulin lispro  1-6 Units Subcutaneous Q6H [address] ISELA Elliott      iohexol  50 mL Oral Once in imaging ISELA Villegas      ipratropium-albuterol  3 mL Nebulization Q6H PRN ISELA Villegas      ipratropium-albuterol  3 mL Nebulization Q8H ISELA Villegas      levothyroxine  112 mcg Per NG Tube Early Morning ISELA Villegas      lidocaine  2 patch Topical Daily Jamie Little PA-C      magnesium sulfate  1 g Intravenous Once Tere Constantino MD      norepinephrine  1-30 mcg/min Intravenous Titrated ISELA Elliott 2 mcg/min (05/28/22 0701)    NxStage K 4/Ca 3  20,000 mL Dialysis Continuous Tere Constantino MD      ondansetron  4 mg Intravenous Q6H PRN ISELA Villegas      oxyCODONE  5 mg Per NG Tube Q4H PRN [place] ISELA Chisholm      Or   Chai Farley oxyCODONE  7 5 mg Per NG Tube Q4H PRN Unk Gamma, CRNP      pantoprazole  40 mg Intravenous Q24H [address] ISELA Stevenson      sodium phosphate  6 mmol Intravenous Once Pilo Ashby MD      vasopressin  0 04 Units/min Intravenous Continuous Unk Gamma, CRNP Stopped (05/27/22 9543)     Continuous Infusions:  bumetanide, 1 mg/hr, Last Rate: 1 mg/hr (05/28/22 0701)  dexmedetomidine, 0 1-1 5 mcg/kg/hr, Last Rate: 0 6 mcg/kg/hr (05/28/22 0701)  heparin (porcine), 400 Units/hr, Last Rate: 400 Units/hr (05/28/22 0701)  norepinephrine, 1-30 mcg/min, Last Rate: 2 mcg/min (05/28/22 0701)  NxStage K 4/Ca 3, 20,000 mL  vasopressin, 0 04 Units/min, Last Rate: Stopped (05/27/22 0607)      PRN Meds:   acetaminophen, 650 mg, Q6H PRN  HYDROmorphone, 0 5 mg, Q3H PRN  iohexol, 50 mL, Once in imaging  ipratropium-albuterol, 3 mL, Q6H PRN  ondansetron, 4 mg, Q6H PRN  oxyCODONE, 5 mg, Q4H PRN   Or  oxyCODONE, 7 5 mg, Q4H PRN        Invasive Devices Review  Invasive Devices  Report    Peripherally Inserted Central Catheter Line  Duration           PICC Line 02/95/52 Right Basilic 9 days          Hemodialysis Catheter  Duration           HD Temporary Double Catheter 1 day          Drain  Duration           Colostomy LLQ 17 days    NG/OG/Enteral Tube Orogastric 16 Fr Center mouth 7 days    Urethral Catheter Double-lumen 16 Fr  7 days          Airway  Duration           ETT  Oral 6 days                Rationale for remaining devices: medically necessary   ---------------------------------------------------------------------------------------  Advance Directive and Living Will:      Power of :    POLST:    ---------------------------------------------------------------------------------------  Care Time Delivered:   No Critical Care time spent       ISELA Flower      Portions of the record may have been created with voice recognition software    Occasional wrong word or "sound a like" substitutions may have occurred due to the inherent limitations of voice recognition software    Read the chart carefully and recognize, using context, where substitutions have occurred

## 2022-05-29 PROBLEM — I95.9 HYPOTENSION: Status: ACTIVE | Noted: 2022-01-01

## 2022-05-29 NOTE — OCCUPATIONAL THERAPY NOTE
Occupational Therapy Cancel Note       05/29/22 1207   Note Type   Note Type Cancelled Session   Cancel Reasons Patient to operating room   Licensure   [place] License Number  Destiny Rodríguez, OTR/L [id number]

## 2022-05-29 NOTE — ASSESSMENT & PLAN NOTE
Wt Readings from Last 3 Encounters:   05/28/22 79 7 kg (175 lb 11 3 oz)   05/11/22 62 1 kg (136 lb 14 4 oz)   03/25/22 61 2 kg (135 lb)     · 5/16: Echo-EF 65%, mild TR, mild MR  · 5/23: Repeat Echo post cardiac arrest: EF 60-65%, G1DD, mild AS (BRADY 1 5cm2), mild MR, mild TR  · Gross anasarca on exam: continue volume removal with Bumex infusion  · Monitor I&O, Daily weights

## 2022-05-29 NOTE — PHYSICAL THERAPY NOTE
PT TREATMENT     05/29/22 0940   Note Type   Note Type Treatment   Pain Assessment   Pain Assessment Tool Boudreaux-Baker FACES   Boudreaux-Baker FACES Pain Rating 0   Restrictions/Precautions   Other Precautions Chair Alarm; Bed Alarm;Multiple lines; Fall Risk  (Intubated seen in ICU)   General   Chart Reviewed Yes   Family/Caregiver Present No   Cognition   Arousal/Participation Lethargic  (Intubated in ICU)   Subjective   Subjective Nonverbal with intubation in ICU   Bed Mobility   Rolling R 2  Maximal assistance   Rolling L 2  Maximal assistance   Additional Comments Patient remains intubated in ICU unable to assess transfers and out of bed at this time   Activity Tolerance   Activity Tolerance Patient limited by fatigue;Treatment limited secondary to medical complications (Comment)   Nurse Made Aware Yes   Exercises   Neuro re-ed Passive ROM bilateral lower extremities all joints 5 repetitions with heel cord stretching as well   Assessment   Assessment Patient remains in ICU intubated and will benefit from continued physical therapy with progression as tolerated  The patient's AM-PAC Basic Mobility Inpatient Short Form Raw Score is 6  A Raw score of less than or equal to 16 suggests the patient may benefit from discharge to post-acute rehabilitation services  Please also refer to the recommendation of the Physical Therapist for safe discharge planning  Plan   Treatment/Interventions ADL retraining;Functional transfer training;LE strengthening/ROM; Therapeutic exercise; Endurance training;Patient/family training;Equipment eval/education; Bed mobility;Gait training; Compensatory technique education   PT Frequency Other (Comment)  (5 times per week)   Recommendation   PT Discharge Recommendation Post acute rehabilitation services   AM-PAC Basic Mobility Inpatient   Turning in Bed Without Bedrails 1   Lying on Back to Sitting on Edge of Flat Bed 1   Moving Bed to Chair 1   Standing Up From Chair 1   Walk in Room 1 Climb 3-5 Stairs 1   Basic Mobility Inpatient Raw Score 6   Turning Head Towards Sound 1   Follow Simple Instructions 1   Low Function Basic Mobility Raw Score 8   Low Function Basic Mobility Standardized Score 10 37   Highest Level Of Mobility   -[facility] Goal 2: Bed activities/Dependent transfer   -[facility] Achieved 2: Bed activities/Dependent transfer   Education   Patient Reinforcement needed   Licensure   [place] License Number  Janine Perry  [id number]

## 2022-05-29 NOTE — ASSESSMENT & PLAN NOTE
· Patient was found unresponsive and hypoxic approximately 20 minutes after being seen well with family 5/21   · PEA arrest x 2  · Most likely respiratory driven  · Neurologically intact post arrest

## 2022-05-29 NOTE — PROGRESS NOTES
Tami 45  Progress Note - Giuliana Hudson 2/15/1931, 80 y o  male MRN: [id number]  Unit/Bed#: ICU 02 Encounter: [id number]  Primary Care Provider: Nicole Laughlin MD   Date and time admitted to hospital: 5/11/2022  4:48 PM    * Bowel perforation [facility])  Assessment & Plan  · Outpatient EGD and colonoscopy at [facility] on 5/11 for w/u of chronic anemia, history of colon polyps, intermittent LLQ abdominal pain and possible intermittent intussusception  · EGD unremarkable, colonoscopy technically difficult and required change from adult scope to pediatric scope  · During traversal of the sigmoid colon there was a kink and patient sustained a perforation  Procedure was aborted and patient was transferred to [facility] where immediate surgical consultation was obtained  · Emergent ex- lap on 5/11 > low anterior resection, protective loop transverse colostomy, flex sigmoidoscopy, and segmental small bowel resection  · Difficult post op course with post op ileus requiring NGT decompression and initiation of TPN; not tolerating trickle feeds  · 5/22: CT: Diffuse mild dilation of small bowel segments identified without transition point  · 5/24: CT CAP- Distended small bowel loops with scattered air-fluid levels consistent with early/partial small bowel obstruction with transition at the small bowel anastomosis in the region of the ventral pelvis as above  No free air  Cholelithiasis without cholecystitis  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     Cardiac arrest [facility])  Assessment & Plan  · Patient was found unresponsive and hypoxic approximately 20 minutes after being seen well with family 5/21   · PEA arrest x 2  · Most likely respiratory driven  · Neurologically intact post arrest     Acute respiratory failure with hypoxia ([address] )  Assessment & Plan  · Patient previously in ICU for hypoxia with a max of 15L midflow and concern for aspiration pneumonitis  · Improved and was able to transfer to general medical floor 5/21  · 5/21: PEA arrest on the floor most likely respiratory driven   · ROSC obtained; transferred to unit and again PEA arrested  · 5/24 CT CAP-CHF with moderate basilar effusions and additional upper lung zone patchy airspace opacities likely reflecting asymmetric pulmonary edema  Infiltrate is not entirely excluded     · Has remained on ventilator, day # 8: AC/PC 20/18/60/8  · Wean Fio2 as able for SPO2>90%  · Tolerated several hours of PS yesterday 14/8  · Atrovent/xopenex/mucomyst nebs q8h  · Continue pulmonary hygiene/ VAP bundle  · Continue volume removal   · Consider bronchoscopy for RUL evaluation    Severe sepsis [facility])  Assessment & Plan  · Peritonitis with intra-abdominal/pelvic abscess secondary to colonic perforation    · 5/22 CT chest/ab/pelvis with concern of multifocal PNA, no visualized intraabdominal abscess or anastomotic leak   · Per ID suspect multifocal pneumonitis   · OR culture 5/21 pseudomonas and bacteroides fragilis  · Repeat blood cultures on 5/22 negative   · Completed 14 days of Flagyl on 5/24  · Completed 14 days of cefepime on 5/27  · Observe off antibiotics   · ID following, appreciate recs     CHF (congestive heart failure) ([facility])  Assessment & Plan  Wt Readings from Last 3 Encounters:   05/28/22 79 7 kg (175 lb 11 3 oz)   05/11/22 62 1 kg (136 lb 14 4 oz)   03/25/22 61 2 kg (135 lb)     · 5/16: Echo-EF 65%, mild TR, mild MR  · 5/23: Repeat Echo post cardiac arrest: EF 60-65%, G1DD, mild AS (BRADY 1 5cm2), mild MR, mild TR  · Gross anasarca on exam: continue volume removal with Bumex infusion  · Monitor I&O, Daily weights Hyperglycemia  Assessment & Plan  · A1c 5 3%  · Was requiring insulin infusion while on TPN  · Now on SSI   · Lantus 5u daily, consider d/c as only required 2U SSI    Toxic metabolic encephalopathy  Assessment & Plan  · Likely in setting of acute illness/delirium and cardiac arrest  · Delirium precautions; regulate sleep/wake cycle, environmental controls, daily CAM ICU   · Trend neuro exam  · Following commands, nodding appropriately to questions, moves all extremities    Acute renal failure (ARF) (HCC)  Assessment & Plan  · Secondary to hypoperfusion mehul cardiac arrest +/- ATN  · Baseline creat 0 8  · Creatinine peaked at 3 07  · Creatine now on CVVH 1 47  · CVVH started on 5/26; continue with UF -150 ml/hr  · Continue bumex gtt at 1 mg  · CVVH filter clotted overnight kept off given adequate UOP  · Avoid hypotension; continue levophed for MAP > 65    Anemia  Assessment & Plan  · Hemoglobin drop post cardiac arrest    · Noted to have gross hematuria but this has since resolved  · 5/21: 1 u PRBC for hgb 6 8  · 5/24: 1 u PRBC  · CT obtained on 5/24 and negative for any overt signs of bleeding  · 5/26: 1 u PRBC  · Continue to monitor   · Transfuse for hgb less than 7      Atrial fibrillation (HCC)  Assessment & Plan  · S/p cardiac arrest while on 3 pressors noted to have afib with RVR  · Bolused with amio and placed on infusion  · Converted to sinus rhythm on 5/22   · Amio gtt d/c 5/23  · Has remained in NSR  · No indication for [facility]  · Afib was likely post arrest in setting of triple pressors    Hypotension  Assessment & Plan  · Previously septic shock  · Now likely secondary to sedation requirements  · Wean levophed for MAP >65      ----------------------------------------------------------------------------------------  HPI/24hr events: CVVH filter went down overnight and it was not restarted   Overnight required Fio2 increase to 60%     Patient appropriate for transfer out of the ICU today?: No  Disposition: Continue Critical Care   Code Status: Level 1 - Full Code  ---------------------------------------------------------------------------------------  SUBJECTIVE  Unable to provide    Review of Systems  Review of systems was unable to be performed secondary to intubated  ---------------------------------------------------------------------------------------  OBJECTIVE    Vitals   Vitals:    22 0300 22 0337 22 0400 22 0415   BP: (!) 99/49  (!) 89/44 (!) 92/46   Pulse: 87  71 69   Resp: (!) 43  (!) 24 (!) 24   Temp: 97 7 °F (36 5 °C)  98 96 °F (37 2 °C) 98 96 °F (37 2 °C)   TempSrc:       SpO2: 93% 92% 92% 93%   Weight:       Height:         Temp (24hrs), Av 9 °F (36 6 °C), Min:97 34 °F (36 3 °C), Max:98 96 °F (37 2 °C)  Current: Temperature: 98 96 °F (37 2 °C)  Arterial Line BP: 120/63  Arterial Line MAP (mmHg): 86 mmHg    Respiratory:  SpO2: SpO2: 93 %  Nasal Cannula O2 Flow Rate (L/min): 5 L/min    Invasive/non-invasive ventilation settings   Respiratory  Report   Lab Data (Last 4 hours)    None         O2/Vent Data (Last 4 hours)      337           Vent Mode AC/PC       Resp Rate (BPM) (BPM) 18       Pressure Control (cmH2O) (cm) 20       Insp Time (sec) (sec) 0 7       FiO2 (%) (%) 60       PEEP (cmH2O) (cmH2O) 8       MV 13 1                   Physical Exam  Vitals reviewed  Constitutional:       General: He is not in acute distress  Appearance: He is well-developed  He is ill-appearing  HENT:      Head: Normocephalic and atraumatic  Eyes:      Pupils: Pupils are equal, round, and reactive to light  Neck:      Vascular: No JVD  Trachea: No tracheal deviation  Cardiovascular:      Rate and Rhythm: Normal rate and regular rhythm  Heart sounds: Normal heart sounds  No murmur heard  No friction rub  No gallop  Pulmonary:      Effort: No respiratory distress  Breath sounds: No wheezing        Comments: Tachypneic   Diminished at bases   Crackles b/l   Chest:      Chest wall: No tenderness  Abdominal:      General: Bowel sounds are normal  There is no distension  Palpations: Abdomen is soft  Tenderness: There is no abdominal tenderness  Comments: Ostomy site swollen, but with brown soft stool    Musculoskeletal:      Right lower leg: Edema present  Left lower leg: Edema present  Skin:     General: Skin is warm and dry  Neurological:      Comments: Nonfocal exam               Laboratory and Diagnostics:  Results from last 7 days   Lab Units 05/29/22  0600 05/28/22  1412 05/28/22  0602 05/27/22  0546 05/26/22  2353 05/26/22  0541 05/25/22  0505 05/24/22  1059 05/24/22  0238   WBC Thousand/uL 13 39*  --  15 61* 22 51* 20 46* 15 71* 14 81*  --  18 23*   HEMOGLOBIN g/dL 7 3* 7 2* 7 8* 8 8* 8 5* 7 3* 7 5*   < > 6 7*   HEMATOCRIT % 21 0* 20 6* 21 9* 24 9* 24 5* 21 6* 21 9*  --  19 6*   PLATELETS Thousands/uL 140*  --  123* 114* 121* 212 219  --  241   BANDS PCT %  --   --   --  24*  --   --   --   --  13*   MONO PCT %  --   --   --  5  --   --   --   --  5    < > = values in this interval not displayed       Results from last 7 days   Lab Units 05/29/22  0600 05/28/22  2334 05/28/22  1817 05/28/22  1205 05/28/22  0602 05/27/22  2317 05/27/22  1815 05/25/22  1132 05/25/22  0505 05/24/22  2239 05/24/22  0238 05/23/22  1541 05/23/22  0438   SODIUM mmol/L 136 139 136 137 136 135* 135*   < > 136   < > 142   < > 141   POTASSIUM mmol/L 4 1 4 2 4 1 3 9 4 0 4 1 4 3   < > 3 9   < > 3 6   < > 3 7   CHLORIDE mmol/L 103 105 103 104 103 103 102   < > 108   < > 111*   < > 113*   CO2 mmol/L 26 27 26 28 26 23 24   < > 18*   < > 21   < > 21   ANION GAP mmol/L 7 7 7 5 7 9 9   < > 10   < > 10   < > 7   BUN mg/dL 36* 30* 33* 38* 43* 54* 62*   < > 92*   < > 73*   < > 66*   CREATININE mg/dL 1 44* 1 10 1 18 1 35* 1 47* 1 65* 1 84*   < > 2 41*   < > 1 88*   < > 1 72*   CALCIUM mg/dL 9 0 8 5 8 5 8 1* 8 4 8 3 8 4   < > 7 5*   < > 7 1*   < > 7 2*   GLUCOSE RANDOM mg/dL 163* 131 146* 161* 151* 165* 167*   < > 142*   < > 143*   < > 191*   ALT U/L 19  --   --   --   --   --   --   --  15  --  13  --  18   AST U/L 15  --   --   --   --   --   --   --  15  --  18  --  19   ALK PHOS U/L 260*  --   --   --   --   --   --   --  54  --  53  --  41*   ALBUMIN g/dL 2 9*  --   --   --   --   --   --   --  2 5*  --  1 9*  --  1 6*   TOTAL BILIRUBIN mg/dL 1 33*  --   --   --   --   --   --   --  1 73*  --  1 05*  --  0 96    < > = values in this interval not displayed  Results from last 7 days   Lab Units 05/29/22  0614 05/28/22  2334 05/28/22  1817 05/28/22  1205 05/28/22  0602 05/27/22  2317 05/27/22  1815   MAGNESIUM mg/dL 2 3 1 9 2 1 1 9 1 9 2 0 2 1   PHOSPHORUS mg/dL 2 9 2 4 2 1* 2 6 2 5 2 9 3 6      Results from last 7 days   Lab Units 05/28/22  0602 05/26/22  2353   INR   --  1 58*   PTT seconds 74* 61*          Results from last 7 days   Lab Units 05/24/22  0238 05/23/22  0438 05/22/22  1302 05/22/22  0929   LACTIC ACID mmol/L 0 9 1 6 2 5* 3 5*     ABG:  Results from last 7 days   Lab Units 05/24/22 0225   PH ART  7 405   PCO2 ART mm Hg 28 5*   PO2 ART mm Hg 220 0*   HCO3 ART mmol/L 17 5*   BASE EXC ART mmol/L -6 5   ABG SOURCE  Radial, Left     VBG:  Results from last 7 days   Lab Units 05/25/22  2342 05/25/22  0505 05/24/22  0225   PH SAM  7 269*   < >  --    PCO2 SAM mm Hg 35 0*   < >  --    PO2 SAM mm Hg 40 7   < >  --    HCO3 SAM mmol/L 15 7*   < >  --    BASE EXC SAM mmol/L -10 3   < >  --    ABG SOURCE   --   --  Radial, Left    < > = values in this interval not displayed  Results from last 7 days   Lab Units 05/24/22  0238 05/23/22  0438   PROCALCITONIN ng/ml 2 58* 2 97*       Micro  Results from last 7 days   Lab Units 05/22/22  0934   BLOOD CULTURE  No Growth After 5 Days  No Growth After 5 Days         EKG: no new  Imaging:CXR with near complete opacification of RUL persistent b/l pulmonary opacities I have personally reviewed pertinent films in PACS    Intake and Output  I/O       05/27 0701 05/28 0700 05/28 0701  05/29 0700 05/29 0701 05/30 0700    P  O  0      I V  (mL/kg) 2238 5 (28 1) 1915 7 (24)     Blood       NG/GT 60 300     IV Piggyback  50     TPN       Feedings 1253 815     Total Intake(mL/kg) 3551 5 (44 6) 3080 7 (38 7)     Urine (mL/kg/hr) 4160 (2 2) 3615 (1 9)     Drains 100 75     Other 1942 2458     Stool 185 75     Total Output 6387 6290     Net -8585 4 -7731 3                  Height and Weights   Height: 5' 4" (162 6 cm)  IBW (Ideal Body Weight): 59 2 kg  Body mass index is 30 16 kg/m²  Weight (last 2 days)     Date/Time Weight    05/28/22 0600 79 7 (175 71)    05/27/22 0600 78 9 (173 94)            Nutrition       Diet Orders   (From admission, onward)             Start     Ordered    05/26/22 1351  Diet Enteral/Parenteral; Tube Feeding No Oral Diet; Osmolite 1 0; Continuous; 55  Diet effective now        Comments: Increase by 10 cc every four hours until reach goal   References:    Nutrtion Support Algorithm Enteral vs  Parenteral   Question Answer Comment   Diet Type Enteral/Parenteral    Enteral/Parenteral Tube Feeding No Oral Diet    Tube Feeding Formula: Osmolite 1 0    Bolus/Cyclic/Continuous Continuous    Tube Feeding Goal Rate (mL/hr): 55    RD to adjust diet per protocol?  Yes        05/26/22 1350    05/12/22 0906  Room Service  Once        Question:  Type of Service  Answer:  Room Service - Appropriate with Assistance    05/12/22 0905                  Active Medications  Scheduled Meds:  Current Facility-Administered Medications   Medication Dose Route Frequency Provider Last Rate    acetaminophen  650 mg Oral Q6H PRN ISELA Mason      albumin human  25 g Intravenous Q8H ISELA Arizmendi 0 g (05/28/22 1636)    bumetanide  1 mg/hr Intravenous Continuous Theodore Sams MD 1 mg/hr (05/29/22 0248)    chlorhexidine  15 mL Mouth/Throat Q12H [facility] ISELA Mason      dexmedetomidine  0 1-1 5 mcg/kg/hr Intravenous Titrated ISELA De La Rosa 1 2 mcg/kg/hr (05/29/22 0248)    gabapentin  200 mg Oral HS JosephineISELA Mullins      heparin (porcine)  400 Units/hr Intravenous Continuous Josephine MACARIO HarrellNP 400 Units/hr (05/28/22 0701)    HYDROmorphone  0 3 mg/hr Intravenous Continuous Josephine MACARIO HarrellNP 0 3 mg/hr (05/28/22 1026)    insulin glargine  5 Units Subcutaneous QAM ISELA De La Rosa      insulin lispro  1-6 Units Subcutaneous Q6H [facility] ISELA Boucher      iohexol  50 mL Oral Once in imaging Jose Nails, CRNP      ipratropium-albuterol  3 mL Nebulization Q6H PRN Jose Nails, CRNP      levothyroxine  112 mcg Per NG Tube Early Morning Jose Nails, CRNP      lidocaine  2 patch Topical Daily Ruma Casarez      norepinephrine  1-30 mcg/min Intravenous Titrated JosephineMACARIO MullinsNP 2 mcg/min (05/28/22 2005)    NxStage K 4/Ca 3  20,000 mL Dialysis Continuous Josephine ISELA Harrell Stopped (05/28/22 2350)    ondansetron  4 mg Intravenous Q6H PRN Jose Nails, CRNP      oxyCODONE  5 mg Per NG Tube Q4H PRN ISELA De La Rosa      Or    oxyCODONE  7 5 mg Per NG Tube Q4H PRN ISELA De La Rosa      pantoprazole  40 mg Intravenous Q24H [facility] Jose Nails, CRNP      polyethylene glycol  17 g Oral Daily ISELA Ritter       Continuous Infusions:  bumetanide, 1 mg/hr, Last Rate: 1 mg/hr (05/29/22 0248)  dexmedetomidine, 0 1-1 5 mcg/kg/hr, Last Rate: 1 2 mcg/kg/hr (05/29/22 0248)  heparin (porcine), 400 Units/hr, Last Rate: 400 Units/hr (05/28/22 0701)  HYDROmorphone, 0 3 mg/hr, Last Rate: 0 3 mg/hr (05/28/22 1026)  norepinephrine, 1-30 mcg/min, Last Rate: 2 mcg/min (05/28/22 2005)  NxStage K 4/Ca 3, 20,000 mL, Last Rate: Stopped (05/28/22 2350)      PRN Meds:   acetaminophen, 650 mg, Q6H PRN  iohexol, 50 mL, Once in imaging  ipratropium-albuterol, 3 mL, Q6H PRN  ondansetron, 4 mg, Q6H PRN  oxyCODONE, 5 mg, Q4H PRN   Or  oxyCODONE, 7 5 mg, Q4H PRN        Invasive Devices Review  Invasive Devices  Report    Peripherally Inserted Central Catheter Line  Duration           PICC Line 35/67/85 Right Basilic 10 days          Hemodialysis Catheter  Duration           HD Temporary Double Catheter 2 days          Drain  Duration           Colostomy LLQ 18 days    NG/OG/Enteral Tube Orogastric 16 Fr Center mouth 8 days    Urethral Catheter Double-lumen 16 Fr  8 days          Airway  Duration           ETT  Oral 7 days                Rationale for remaining devices: critical illness   ---------------------------------------------------------------------------------------  Advance Directive and Living Will:      Power of :    POLST:    ---------------------------------------------------------------------------------------  Care Time Delivered:   No Critical Care time spent       [employer], PA-C      Portions of the record may have been created with voice recognition software  Occasional wrong word or "sound a like" substitutions may have occurred due to the inherent limitations of voice recognition software    Read the chart carefully and recognize, using context, where substitutions have occurred

## 2022-05-29 NOTE — ASSESSMENT & PLAN NOTE
· Outpatient EGD and colonoscopy at [facility] on 5/11 for w/u of chronic anemia, history of colon polyps, intermittent LLQ abdominal pain and possible intermittent intussusception  · EGD unremarkable, colonoscopy technically difficult and required change from adult scope to pediatric scope  · During traversal of the sigmoid colon there was a kink and patient sustained a perforation  Procedure was aborted and patient was transferred to [facility] where immediate surgical consultation was obtained  · Emergent ex- lap on 5/11 > low anterior resection, protective loop transverse colostomy, flex sigmoidoscopy, and segmental small bowel resection  · Difficult post op course with post op ileus requiring NGT decompression and initiation of TPN; not tolerating trickle feeds  · 5/22: CT: Diffuse mild dilation of small bowel segments identified without transition point  · 5/24: CT CAP- Distended small bowel loops with scattered air-fluid levels consistent with early/partial small bowel obstruction with transition at the small bowel anastomosis in the region of the ventral pelvis as above  No free air  Cholelithiasis without cholecystitis  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

## 2022-05-29 NOTE — ASSESSMENT & PLAN NOTE
· Hemoglobin drop post cardiac arrest    · Noted to have gross hematuria but this has since resolved  · 5/21: 1 u PRBC for hgb 6 8  · 5/24: 1 u PRBC  · CT obtained on 5/24 and negative for any overt signs of bleeding  · 5/26: 1 u PRBC  · Continue to monitor   · Transfuse for hgb less than 7

## 2022-05-29 NOTE — ASSESSMENT & PLAN NOTE
· Previously septic shock  · Now likely secondary to sedation requirements  · Wean levophed for MAP >65

## 2022-05-29 NOTE — ASSESSMENT & PLAN NOTE
· S/p cardiac arrest while on 3 pressors noted to have afib with RVR  · Bolused with amio and placed on infusion  · Converted to sinus rhythm on 5/22   · Amio gtt d/c 5/23  · Has remained in NSR  · No indication for [facility]  · Afib was likely post arrest in setting of triple pressors

## 2022-05-29 NOTE — PROGRESS NOTES
NEPHROLOGY PROGRESS NOTE   Dre Cabrera 80 y o  male MRN: [id number]  Unit/Bed#: ICU 02 Encounter: [id number]  Reason for Consult: MARYELLEN    ASSESSMENT AND PLAN:  80 y  o  man with PMH of Aostenosis, scleroderma, CAD   Patient underwent EGD and colonoscopy on 05/11 for possible intussusception, complicated with perforation, underwent exploratory laparotomy, complicated with pneumonia, sepsis, CHF, PEA arrest on 05/21 x2   Nephrology is consulted for MARYELLEN   ay for assistance in the management of MARYELLEN     PLAN  #Non-Oliguric KDIGO MARYELLEN stage 3   · Etiology:  Likely secondary to ATN in the settings of hypotension and PEA arrest  · Baseline creatinine:  0 9 mg/dL  · Peak creatinine:  Up to 3 mg/dL before CRRT  · Current creatinine:  1 5 mg/dL off CVVH  · UA:  Microhematuria, leukocyturia, moderate bacteria  · Renal imaging :   no hydronephrosis  · Treatment:  · Started on CVVH on 05/26 due to worsening fluid overload  · Consent verbally given by patient's son Mateus Verde)  · Maintain MAP:  Over 65 mmHg if possible/avoid hypoperfusion:  Hold parameters on blood pressure medications  · Avoid nephrotoxic agents such as NSAIDs, and IV contrast if possible   Avoid opioids   · Adjust medications to GFR     # CVVH   · CVVH discontinue due to air on venous line  · Plan to hold CVVH and reassess volume status and kidney function  · If no improvement of urinary output or worsening acidosis and creatinine plan is to resume CVVH  · Prescription   · Therapy fluid 1900 mL/hour  · Dialysis fluid switch to 4 K bath  · UF to 150 mL/hour  · Electrolytes as per ICU protocol     #Acid-base Disorder  · serum UWH1 82  · AG:  For  · Metabolic alkalosis secondary to recent CVVH and vascular contraction in the settings of diuretics     #Volume status/hypertension:  · Volume:   hypervolemic improving  · Blood pressure:   hypotensive BP 92/40 -120/56mmHg,  on low-dose Levophed   · Recommend:  · Increase Bumex to 2 mg/dL  · Plan to resume CVVH for UF if no good urinary output is achieved with Bumex     #Anemia:  · Current hemoglobin:  7 3 mg/dL  · Treatment:  · Transfuse for hemoglobin less than 7 0 per primary service       #PE arrest  · On Levophed  · Cardiology following     # sepsis/bowel perforation  · On antibiotics and vasopressor support  · Febrile this morning       SUBJECTIVE:  Patient seen and examined at bedside  Patient continues to be intubated, on Levophed  CVVH was discontinue due to air on venous line  Good urinary output           OBJECTIVE:  Current Weight: Weight - Scale: 78 6 kg (173 lb 4 5 oz)  Vitals:    05/29/22 0800   BP: 120/56   Pulse: 92   Resp: (!) 25   Temp: (!) 100 58 °F (38 1 °C)   SpO2: 94%       Intake/Output Summary (Last 24 hours) at 5/29/2022 0816  Last data filed at 5/29/2022 0600  Gross per 24 hour   Intake 3867 1 ml   Output 6536 ml   Net -2668 9 ml     Wt Readings from Last 3 Encounters:   05/29/22 78 6 kg (173 lb 4 5 oz)   05/11/22 62 1 kg (136 lb 14 4 oz)   03/25/22 61 2 kg (135 lb)     Temp Readings from Last 3 Encounters:   05/29/22 (!) 100 58 °F (38 1 °C) (Bladder)   05/11/22 (!) 97 1 °F (36 2 °C) (Temporal)   01/14/22 97 9 °F (36 6 °C) (Temporal)     BP Readings from Last 3 Encounters:   05/29/22 120/56   05/11/22 141/69   03/25/22 152/76     Pulse Readings from Last 3 Encounters:   05/29/22 92   05/11/22 73   03/25/22 89        General:  Elderly, no acute distress at this time  Skin:  No acute rash  Eyes:  No scleral icterus and noninjected  ENT:  mucous membranes moist, intubated  Neck:  no carotid bruits  Chest:  Mechanical breath sounds bilaterally  CVS:  Regular rate and rhythm without rub   Abdomen:  soft and nontender   Extremities:  4 extremity edema, improving  Neuro:  No gross focality  Psych:  Alert , cooperative  Farnsworth catheter:  Clear urine  Dialysis access:  Right IJ temporary HD line     Medications:    Current Facility-Administered Medications:     acetaminophen (TYLENOL) oral suspension 650 mg, 650 mg, Oral, Q6H PRN, ISELA Andino, 650 mg at 05/21/22 2039    albumin human (FLEXBUMIN) 25 % injection 25 g, 25 g, Intravenous, Q8H, ISELA Gross, Stopped at 05/29/22 0100    bumetanide (BUMEX) 12 5 mg infusion 50 mL, 2 mg/hr, Intravenous, Continuous, Hardeep Benitez MD, Last Rate: 8 mL/hr at 05/29/22 0742, 2 mg/hr at 05/29/22 0742    chlorhexidine (PERIDEX) 0 12 % oral rinse 15 mL, 15 mL, Mouth/Throat, Q12H [address], ISELA Andino, 15 mL at 05/28/22 2113    dexmedeTOMIDine (Precedex) 400 mcg in sodium chloride 0 9% 100 mL, 0 1-1 5 mcg/kg/hr, Intravenous, Titrated, ISELA Gross, Last Rate: 19 5 mL/hr at 05/29/22 0742, 1 mcg/kg/hr at 05/29/22 0742    gabapentin (NEURONTIN) oral solution 200 mg, 200 mg, Oral, HS, ISELA Ritter, 200 mg at 05/28/22 2134    heparin (porcine) 25,000 units in 0 45% NaCl 250 mL infusion (premix), 400 Units/hr, Intravenous, Continuous, ISELA Ritter, Stopped at 05/28/22 2350    HYDROmorphone (DILAUDID) 50 mg in sodium chloride 0 9% 50mL drip, 0 3 mg/hr, Intravenous, Continuous, ISELA Ritter, Last Rate: 0 3 mL/hr at 05/28/22 1026, 0 3 mg/hr at 05/28/22 1026    insulin glargine (LANTUS) subcutaneous injection 5 Units 0 05 mL, 5 Units, Subcutaneous, QAMMendez CRNP, 5 Units at 05/28/22 0803    insulin lispro (HumaLOG) 100 units/mL subcutaneous injection 1-6 Units, 1-6 Units, Subcutaneous, Q6H [address], 1 Units at 05/29/22 0556 **AND** Fingerstick Glucose (POCT), , , Q6H, ISELA Ritter    iohexol (OMNIPAQUE) 240 MG/ML solution 50 mL, 50 mL, Oral, Once in imaging, ISELA Andino    ipratropium-albuterol (DUO-NEB) 0 5-2 5 mg/3 mL inhalation solution 3 mL, 3 mL, Nebulization, Q6H PRN, ISELA Andino, 3 mL at 05/28/22 2106    levothyroxine tablet 112 mcg, 112 mcg, Per NG Tube, Early Morning, ISELA Andino, 112 mcg at 05/29/22 0549    lidocaine (LIDODERM) 5 % patch 2 patch, 2 patch, Topical, Daily, Fortino Cerda, [place], 2 patch at 05/28/22 3126    norepinephrine (LEVOPHED) 4 mg (STANDARD CONCENTRATION) IV in sodium chloride 0 9% 250 mL, 1-30 mcg/min, Intravenous, Titrated, ISELA Ritter, Last Rate: 7 5 mL/hr at 05/28/22 2350, 2 mcg/min at 05/28/22 2350    NxStage K 4/Ca 3 dialysis solution (RFP-401) 20,000 mL, 20,000 mL, Dialysis, Continuous, ISELA Covington, Stopped at 05/28/22 2350    ondansetron (ZOFRAN) injection 4 mg, 4 mg, Intravenous, Q6H PRN, ISELA Schneider    oxyCODONE (ROXICODONE) oral solution 5 mg, 5 mg, Per NG Tube, Q4H PRN, 5 mg at 05/28/22 1320 **OR** oxyCODONE (ROXICODONE) oral solution 7 5 mg, 7 5 mg, Per NG Tube, Q4H PRN, ISELA Gomez    pantoprazole (PROTONIX) injection 40 mg, 40 mg, Intravenous, Q24H [facility], [place]ry ISELA Bryant, 40 mg at 05/28/22 0803    polyethylene glycol (MIRALAX) packet 17 g, 17 g, Oral, Daily, ISELA Ritter, 17 g at 05/28/22 1737    Laboratory Results:  Results from last 7 days   Lab Units 05/29/22  0753 05/29/22  0614 05/29/22  0600 05/28/22  2334 05/28/22  1817 05/28/22  1412 05/28/22  1205 05/28/22  0602 05/27/22  2317 05/27/22  1815 05/27/22  1228 05/27/22  0546 05/26/22  2353 05/26/22  1235 05/26/22  0541 05/25/22  1132 05/25/22  0505 05/24/22  1059 05/24/22  0238   WBC Thousand/uL  --   --  13 39*  --   --   --   --  15 61*  --   --   --  22 51* 20 46*  --  15 71*  --  14 81*  --  18 23*   HEMOGLOBIN g/dL  --   --  7 3*  --   --  7 2*  --  7 8*  --   --   --  8 8* 8 5*  --  7 3*  --  7 5*   < > 6 7*   HEMATOCRIT %  --   --  21 0*  --   --  20 6*  --  21 9*  --   --   --  24 9* 24 5*  --  21 6*  --  21 9*  --  19 6*   PLATELETS Thousands/uL  --   --  140*  --   --   --   --  123*  --   --   --  114* 121*  --  212  --  219  --  241   SODIUM mmol/L 135*  --  136 139 136  --  137 136 135* 135*   < > 134* 135*   < > 133*   < > 136   < > 142   POTASSIUM mmol/L 4 2  -- 4 1 4 2 4 1  --  3 9 4 0 4 1 4 3   < > 3 9 3 8   < > 3 8   < > 3 9   < > 3 6   CHLORIDE mmol/L 103  --  103 105 103  --  104 103 103 102   < > 102 103   < > 101   < > 108   < > 111*   CO2 mmol/L 28  --  26 27 26  --  28 26 23 24   < > 20* 18*   < > 18*   < > 18*   < > 21   BUN mg/dL 38*  --  36* 30* 33*  --  38* 43* 54* 62*   < > 81* 107*   < > 106*   < > 92*   < > 73*   CREATININE mg/dL 1 50*  --  1 44* 1 10 1 18  --  1 35* 1 47* 1 65* 1 84*   < > 2 35* 3 07*   < > 2 93*   < > 2 41*   < > 1 88*   CALCIUM mg/dL 8 9  --  9 0 8 5 8 5  --  8 1* 8 4 8 3 8 4   < > 7 9* 7 3*   < > 7 3*   < > 7 5*   < > 7 1*   MAGNESIUM mg/dL  --  2 3  --  1 9 2 1  --  1 9 1 9 2 0 2 1   < > 2 3 2 7*   < > 2 8*  --  2 8*  --  2 6   PHOSPHORUS mg/dL  --  2 9  --  2 4 2 1*  --  2 6 2 5 2 9 3 6   < > 4 6* 5 9*   < > 5 7*  --  4 2*  --  3 4    < > = values in this interval not displayed  XR chest portable ICU   Final Result by Alan Monzon MD (05/26 0223)      Interval placement of a right IJ catheter with tip projecting over the mid SVC  No pneumothorax  Persistent bilateral pulmonary opacities  Workstation performed: [id number]         CT chest abdomen pelvis wo contrast   Final Result by Tiana Simmonds, MD (05/24 6437)   1  CHF with moderate basilar effusions and additional upper lung zone patchy airspace opacities likely reflecting asymmetric pulmonary edema  Infiltrate is not entirely excluded  2   Distended small bowel loops with scattered air-fluid levels consistent with early/partial small bowel obstruction with transition at the small bowel anastomosis in the region of the ventral pelvis as above  No free air  3   Cholelithiasis without cholecystitis  4   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  Concordant preliminary results were provided by virtual radiologic at 0531 hours on 5/24/2022                 Workstation performed: [id number]         XR chest portable ICU   Final Result by Jayro Herrmann MD (05/24 1325)      Bilateral airspace disease is unchanged  The Keofeed tube has been removed  Workstation performed: [id number]         XR chest portable   Final Result by Jayro Herrmann MD (05/24 1329)   Addendum 1 of 1 by Jayro Herrmann MD (05/24 1329)   ADDENDUM:      The right IJ line terminates in the SVC  The PICC line terminates at the    junction of the SVC and right atrium  Final         1  Keofed tube terminates at the EG junction and should be advanced for  use  2   Bilateral airspace disease may represent infiltrates or the alveolar phase of heart failure  Bilateral pleural effusions  Workstation performed: [id number]         CT chest abdomen pelvis wo contrast   Final Result by Charlie Balderas DO (05/22 1330)      1  Stable appearance of mixed groundglass and consolidative abnormalities in the lungs bilaterally predominantly affecting the upper lobes suspicious for multilobar pneumonia  Superimposed pulmonary edema not excluded  2   Similar to slight increase size of moderate bilateral pleural effusions  3   Diffuse mild dilation of small bowel segments identified without transition point  Workstation performed: [id number]         XR chest portable ICU   Final Result by Mandeep Campoverde MD (05/22 2072)      Right neck catheter in the distal SVC                  Workstation performed: [id number]         XR abdomen 1 view kub   Final Result by Mandeep Campoverde MD (05/22 7802)      Persistent gas distended small bowel loops with normal caliber colon with contrast                Workstation performed: [id number]         XR chest portable ICU   Final Result by Mandeep Campoverde MD (05/22 1240)      Improved pneumomediastinum  Bilateral groundglass infiltrative changes                    Workstation performed: [id number]         XR chest portable ICU   Final Result by Yinka Mendes Alize Darnell MD (05/21 5874)      Pneumomediastinum  Endotracheal tube tip above the level of the chriss by 2 5 cm  Enteric tube tip overlying the epigastric region below the inferior margin of this film  Slight progression of multifocal groundglass pulmonary parenchymal airspace opacities  Trace costophrenic angle effusions, unchanged  This examination was marked "immediate notification" in Epic in order to begin the standard process by which the radiology reading room liaison alerts the referring practitioner  Workstation performed: [id number]         XR abdomen 1 view kub   Final Result by Bruce Lu MD (05/22 0457)      Slight worsening of the prominent dilated small bowel loops  Contrast in the colon  Workstation performed: [id number]         IR PICC line placement double lumen   Final Result by Stuart Hansen MD (05/18 9594)   1  Status post placement of a 5-[place] percutaneously inserted central venous catheter via the right basilic vein with its tip at the cavoatrial junction under ultrasound and fluoroscopic guidance  NG tube placement   Next using fluoroscopic guidance, an NG tube was placed down into the stomach past the narrowing at the GE junction  2 stiff wires were required to advance this  Impression successful NG tube placement with its tip in the stomach  The tube may be used immediately  Workstation performed: [id number]         XR abdomen 1 view kub   Final Result by Trino Arreola MD (05/18 9427)      Radiographic appearance suspicious for early or mild distal small bowel obstruction  Alternatively, this could represent ileus  Enteric contrast administered for the May 17, 2022 examination does not appear to have reached the large bowelAn       enteric catheter tip overlies the expected location of the cavoatrial junction and has not quite reached the stomach    Advancement by approximately 8 to 10 cm recommended  This examination was marked "significant notification" in Epic in order to begin the standard process by which the radiology reading room liaison alerts the referring practitioner  Workstation performed: [id number]         CT chest abdomen pelvis wo contrast   Final Result by Judson West MD (05/18 0134)      1  Worsening patchy mixed groundglass and consolidative change bilaterally compatible with multifocal pneumonia and/or pulmonary edema  2   Increased bilateral pleural effusions  3   Sidehole of enteric tube is visualized near the gastroesophageal junction  Consider tube advancement  4   Mild distention of mid to distal esophagus with enteric contrast material   Correlate for gastroesophageal reflux  5   Multiple dilated small bowel loops without a clear transition point  The finding may reflect ileus, however developing small bowel obstruction cannot be excluded  Follow-up is recommended  6   Moderate amount of air within the urinary bladder, likely related to instrumentation  Correlate with urinalysis to exclude cystitis  I personally discussed this study with Carlos Dumont on 5/18/2022 at 1:11 AM                Workstation performed: [id number]         XR chest portable   Final Result by Jerral Kocher, MD (05/17 0080)      1  Tip of enteric tube is at the expected position of the gastroesophageal junction  Slight advancement is advised  2   Slight increased predominant upper lobe opacities, likely representing pneumonia  Concomitant edema is possible  The now trace bilateral pleural effusions have decreased in size  The study was marked in [facility] for immediate notification                       Workstation performed: [id number]         XR abdomen 1 view kub   Final Result by Enrique Arndt MD (05/16 0594)      Diffusely distended, dilated small bowel loops with small amount of persistent air extending to the proximal colon   Findings may suggest diffuse ileus or partial obstruction  Workstation performed: [id number]         CTA chest pe study   Final Result by Fernando Leblanc MD (05/15 1229)      No pulmonary embolus  Bilateral upper lobe groundglass opacities compatible with pneumonia and/or pulmonary edema  Small bilateral pleural effusions  Workstation performed: [id number]         XR chest portable   Final Result by Ana Luisa Pollack MD (05/15 1023)      Pulmonary edema and small bilateral pleural effusions  Workstation performed: [id number]         IR other    (Results Pending)   XR chest portable ICU    (Results Pending)       Portions of the record may have been created with voice recognition software  Occasional wrong word or "sound a like" substitutions may have occurred due to the inherent limitations of voice recognition software  Read the chart carefully and recognize, using context, where substitutions have occurred

## 2022-05-29 NOTE — ASSESSMENT & PLAN NOTE
· A1c 5 3%  · Was requiring insulin infusion while on TPN  · Now on SSI   · Lantus 5u daily, consider d/c as only required 2U SSI

## 2022-05-29 NOTE — ASSESSMENT & PLAN NOTE
· Patient previously in ICU for hypoxia with a max of 15L midflow and concern for aspiration pneumonitis  · Improved and was able to transfer to general medical floor 5/21  · 5/21: PEA arrest on the floor most likely respiratory driven   · ROSC obtained; transferred to unit and again PEA arrested  · 5/24 CT CAP-CHF with moderate basilar effusions and additional upper lung zone patchy airspace opacities likely reflecting asymmetric pulmonary edema  Infiltrate is not entirely excluded     · Has remained on ventilator, day # 8: AC/PC 20/18/60/8  · Wean Fio2 as able for SPO2>90%  · Tolerated several hours of PS yesterday 14/8  · Atrovent/xopenex/mucomyst nebs q8h  · Continue pulmonary hygiene/ VAP bundle  · Continue volume removal   · Consider bronchoscopy for RUL evaluation

## 2022-05-29 NOTE — PLAN OF CARE
Problem: MOBILITY - ADULT  Goal: Maintain or return to baseline ADL function  Description: INTERVENTIONS:  -  Assess patient's ability to carry out ADLs; assess patient's baseline for ADL function and identify physical deficits which impact ability to perform ADLs (bathing, care of mouth/teeth, toileting, grooming, dressing, etc )  - Assess/evaluate cause of self-care deficits   - Assess range of motion  - Assess patient's mobility; develop plan if impaired  - Assess patient's need for assistive devices and provide as appropriate  - Encourage maximum independence but intervene and supervise when necessary  - Involve family in performance of ADLs  - Assess for home care needs following discharge   - Consider OT consult to assist with ADL evaluation and planning for discharge  - Provide patient education as appropriate  Outcome: Progressing  Goal: Maintains/Returns to pre admission functional level  Description: INTERVENTIONS:  - Perform BMAT or MOVE assessment daily    - Set and communicate daily mobility goal to care team and patient/family/caregiver  - Collaborate with rehabilitation services on mobility goals if consulted  - Perform Range of Motion 4 times a day  - Reposition patient every 2 hours    -- Record patient progress and toleration of activity level   Outcome: Progressing     Problem: Potential for Falls  Goal: Patient will remain free of falls  Description: INTERVENTIONS:  - Educate patient/family on patient safety including physical limitations  - Instruct patient to call for assistance with activity   - Consult OT/PT to assist with strengthening/mobility   - Keep Call bell within reach  - Keep bed low and locked with side rails adjusted as appropriate  - Keep care items and personal belongings within reach  - Initiate and maintain comfort rounds  - Make Fall Risk Sign visible to staff  - Offer Toileting every 2 Hours, in advance of need  - Initiate/Maintain bed alarm  - Apply yellow socks and bracelet for high fall risk patients  - Consider moving patient to room near nurses station  Outcome: Progressing     Problem: PAIN - ADULT  Goal: Verbalizes/displays adequate comfort level or baseline comfort level  Description: Interventions:  - Encourage patient to monitor pain and request assistance  - Assess pain using appropriate pain scale  - Administer analgesics based on type and severity of pain and evaluate response  - Implement non-pharmacological measures as appropriate and evaluate response  - Consider cultural and social influences on pain and pain management  - Notify physician/advanced practitioner if interventions unsuccessful or patient reports new pain  Outcome: Progressing     Problem: GASTROINTESTINAL - ADULT  Goal: Minimal or absence of nausea and/or vomiting  Description: INTERVENTIONS:  - Administer IV fluids if ordered to ensure adequate hydration  - Maintain NPO status until nausea and vomiting are resolved  - Nasogastric tube if ordered  - Administer ordered antiemetic medications as needed  - Provide nonpharmacologic comfort measures as appropriate  - Advance diet as tolerated, if ordered  - Consider nutrition services referral to assist patient with adequate nutrition and appropriate food choices  Outcome: Progressing  Goal: Establish and maintain optimal ostomy function  Description: INTERVENTIONS:  - Assess bowel function  - Encourage oral fluids to ensure adequate hydration  - Administer IV fluids if ordered to ensure adequate hydration   - Administer ordered medications as needed  - Encourage mobilization and activity  - Nutrition services referral to assist patient with appropriate food choices  - Assess stoma site  - Consider wound care consult   Outcome: Progressing     Problem: SKIN/TISSUE INTEGRITY - ADULT  Goal: Incision(s), wounds(s) or drain site(s) healing without S/S of infection  Description: INTERVENTIONS  - Assess and document dressing, incision, wound bed, drain sites and surrounding tissue  - Provide patient and family education  - Perform skin care/dressing changes every day and as needed  Outcome: Progressing  Goal: Skin Integrity remains intact(Skin Breakdown Prevention)  Description: Assess:  -Perform Rodríguez assessment every shift  -Clean and moisturize skin as needed   -Inspect skin when repositioning, toileting, and assisting with ADLS  -Assess under medical devices such as cardiac monitors, NGT every 2 hours  -Assess extremities for adequate circulation and sensation     Bed Management:  -Have minimal linens on bed & keep smooth, unwrinkled  -Change linens as needed when moist or perspiring  -Avoid sitting or lying in one position for more than 2 hours while in bed  -Keep HOB at 45degrees       Activity:  -Encourage or provide ROM exercises   -Turn and reposition patient every 2 Hours  -Use appropriate equipment to lift or move patient in bed    Skin Care:  -Avoid use of baby powder, tape, friction and shearing, hot water or constrictive clothing  -Relieve pressure over bony prominences using wedges and pillows  -Do not massage red bony areas    Outcome: Progressing  Goal: Pressure injury heals and does not worsen  Description: Interventions:  - Implement low air loss mattress or specialty surface (Criteria met)  - Apply silicone foam dressing  - Apply fecal or urinary incontinence containment device   - Perform passive or active ROM every 2 hours  - Turn and reposition patient & offload bony prominences every 2  hours   - Utilize friction reducing device or surface for transfers   - Consider nutrition services referral as needed  Outcome: Progressing     Problem: RESPIRATORY - ADULT  Goal: Achieves optimal ventilation and oxygenation  Description: INTERVENTIONS:  - Assess for changes in respiratory status  - Assess for changes in mentation and behavior  - Position to facilitate oxygenation and minimize respiratory effort  - Oxygen administered by appropriate delivery if ordered   Assess the need for suctioning and aspirate as needed  - Assess and instruct to report SOB or any respiratory difficulty  - Respiratory Therapy support as indicated  Outcome: Progressing

## 2022-05-29 NOTE — RESPIRATORY THERAPY NOTE
Patient is on vent AC/PC 18/20/0 7/+8 50%  Restraints are secure  Vibration therapy done  Tolerating therapy

## 2022-05-29 NOTE — PROCEDURES
Bronchoscopy    Date/Time: 5/29/2022 2:29 PM  Performed by: Donita Ramirez DO  Authorized by: Tyler Reardon PA-C     Patient location:  Bedside  Consent:     Consent obtained:  Verbal (Verbal consent obtained from the patient's son Lennie Romberg  Risks benefits and alternatives were discussed )    Consent given by:  Healthcare agent    Risks discussed:  Pneumothorax, infection and bleeding  Universal protocol:     Procedure explained and questions answered to patient or proxy's satisfaction: yes      Relevant documents present and verified: yes      Test results available and properly labeled: yes      Radiology Images displayed and confirmed  If images not available, report reviewed: yes      Required blood products, implants, devices and special equipment available: yes      Immediately prior to procedure a time out was called: yes      Patient identity confirmed: Anonymous protocol, patient vented/unresponsive and arm band  Indications:     Procedure Purpose: therapeutic      Indications comment:  Right upper lobe collapse  Sedation:     Sedation type:  Continuous (ICU/vent)      The fiberoptic bronchoscope was introduced via the existing endotracheal tube  Immediately upon entering the distal trachea, it was apparent that the patient had diffuse pulmonary edema  With low volume lavaged and suctioning, the airways were visualized  There were no apparent endobronchial lesions though thorough examination was limited by the severity of the pulmonary edema  I did examine the lungs bilaterally throughout to the 2nd division  There was no injury/bleeding/complications  Primary reason for the exam was the collapse of the right upper lobe  There was no significant plugging noted  I did perform lavage of the right upper lobe with 10 cc of normal saline  Upon completion the airways were clear  The right middle and lower lobes also demonstrated diffuse pulmonary edema without any significant secretions    The left upper lobe, lingula, and lower lobe also demonstrated diffuse pulmonary edema without significant secretions  At the conclusion of the exam the bronchoscope was withdrawn  There were no significant events, hypoxemia, or any complications  Chest x-ray pending

## 2022-05-29 NOTE — QUICK NOTE
Tiana GLASS   Change Note    Date: 05/29/2022    Location of wound:midline abdominal incision            Sponges removed:  1 Black Sponges      Sponges placed:  1 Black Sponges      Verchelita VAC settings:  125 mmHg  Continuous     Saline instillation   Instilling 8 mL for 10 mins every 2 hours    Juni Lazar PA-C

## 2022-05-30 NOTE — PROGRESS NOTES
Progress Note - General Surgery   Fady Hudson 80 y o  male MRN: [id number]  Unit/Bed#: ICU 02 Encounter: [id number]    Assessment:  Postop day 19  Status post laparotomy for perforated rectosigmoid colon  Ventilator dependent respiratory failure  Acute renal failure      Plan:  Continue as per ICU service    Subjective/Objective   Chief Complaint:  Noncommunicative as patient is on a ventilator    Subjective:  Noncommunicative    Objective:  Afebrile  No antibiotics at present time  Was hypotensive during the night requiring Levophed which has been taken off  Patient had a run of atrial fibrillation which responded to IV Lopressor  Patient is in normal sinus rhythm now  Patient is on CVVH    Blood pressure 131/61, pulse 83, temperature (!) 97 16 °F (36 2 °C), resp  rate 22, height 5' 4" (1 626 m), weight 79 5 kg (175 lb 4 3 oz), SpO2 93 %  ,Body mass index is 30 08 kg/m²  Intake/Output Summary (Last 24 hours) at 5/30/2022 1132  Last data filed at 5/30/2022 1000  Gross per 24 hour   Intake 3136 8 ml   Output 4005 ml   Net -868 2 ml       Invasive Devices  Report    Peripherally Inserted Central Catheter Line  Duration           PICC Line 58/57/43 Right Basilic 11 days          Hemodialysis Catheter  Duration           HD Temporary Double Catheter 3 days          Drain  Duration           Colostomy LLQ 19 days    NG/OG/Enteral Tube Orogastric 16 Fr Center mouth 9 days    Urethral Catheter Double-lumen 16 Fr  9 days          Airway  Duration           ETT  Oral 8 days                Physical Exam:  Heart sounds are normal   Chest auscultation reveals bilateral crackles  Abdominal exam:  Colostomy is functional   Abdominal incision is covered with a VAC  Patient is on tube feeds which he is tolerating  Lab, Imaging and other studies:I have personally reviewed pertinent lab results      VTE Pharmacologic Prophylaxis: Heparin  VTE Mechanical Prophylaxis: sequential compression device

## 2022-05-30 NOTE — PROGRESS NOTES
Progress Note - Infectious Disease   Fady Hudson 80 y o  male MRN: [id number]  Unit/Bed#: ICU 02 Encounter: [id number]      Impression/Recommendations:  1  s/p cardiac arrest 5/21/22  Patient intubated during RR  In ICU on ventilator assistance  Recurrent SIRS possibly reactive to arrest with fever, tachycardia, tachypnea, and increased leukocytosis post arrest  Possible recurrent aspiration event s/p arrest  Fever down trended  Patient now hypothermic on CVVH with warming blanket in place   -continue supportive measures per critical care team  -cardiology on board  -ventilator management per critical care team     2  SIRS vs Severe Sepsis, evolving on admission and post #1   Evidenced by tachycardia, tachypnea, bandemia and encephalopathy   Suspect possible aspiration in setting of significant retained secretions, acute respiratory failure requiring supplemental oxygenation and with recent NG tube for postop ileus management   MRSA screen negative  [address] [facility]  Blood cultures have no growth  Patient completed antibiotic course  Patient is back on Levophed over the weekend   -observe off further antibody  -monitor temperature and hemodynamics  -serial exam  -monitor CBC and BMP   -pressor support per Critical Medicine Service     3    Peritonitis s/p Bowel perforation  s/p 5/11/22 exploratory laparotomy, low anterior resection, protective loop transverse colostomy and segmental small bowel resection secondary to perforation rectosigmoid junction after colonoscopy   OR cultures grew Pseudomonas aeruginosa and Bacteroides fragilis  Now being managed with NGT/NPO status for post op ileus on TPN    Patient completed antibiotic course   -observe off further antibiotic  -VAC/wound care per surgery     4   Acute hypoxic respiratory failure with hypoxia   Suspicious for aspiration pneumonitis over pneumonia     5/24/22 CT C/A/P predominantly UL airspace opacities consistent with CHF with bilateral pleural effusions, distended small bowel loops id  Patient remains intubated s/p #1  22 Procalcitonin level  2 60 >  0 753 < 22 2 97  22 Sputum cx with mixed oropharyngeal contaminants  Patient is status post bronchoscopy yesterday for RUL collapse   -observe off further antibiotic  -ventilator management per critical care team      5  Aspiration pneumonitis versus pneumonia in setting of #1/3   22 CT C/A/P predominantly UL groundglass and consolidations, bilateral pleural effusions, SB mild dilation unchanged  Patient now intubated s/p #1  22 Procalcitonin level  2 60 >  0 753 < 22 2 97   22 sputum specimen oral pharyngeal contamination   -observe off antibiotic  -secretion and pulmonary toilet management per critical care team   -monitor respiratory symptoms  -monitor vent requirements     6  MARYELLEN on CKD 3  Creatinine 1 99  On CVVH  -renal dose adjust antibiotics as needed  -volume management per Nephrology  -CVVH management per Nephrology  -monitor creatinine and urinary output     Antibiotics:  Off antibiotic     Subjective:  Patient was seen earlier  He remains sedated on ventilator, in ICU  He remains noncommunicative  Temperature stays down  Patient is now on Levophed since weekend      Objective:  Vitals:  Temp:  [96 98 °F (36 1 °C)-100 04 °F (37 8 °C)] 97 34 °F (36 3 °C)  HR:  [] 83  Resp:  [20-47] 34  BP: ()/(42-63) 106/52  SpO2:  [91 %-100 %] 94 %  Temp (24hrs), Av °F (36 7 °C), Min:96 98 °F (36 1 °C), Max:100 04 °F (37 8 °C)  Current: Temperature: (!) 97 34 °F (36 3 °C)    Physical Exam:     General: Sedated on ventilator, not communicative  Comfortable  Nontoxic  Neck:  Supple  No mass  No lymphadenopathy  Lungs: Expansion symmetric, diffuse rhonchi, no rales, no wheezing  Heart:  Regular rate and rhythm, S1 and S2 normal, no murmur  Abdomen: Soft, nondistended, wound with VAC, without purulence  No erythema     Extremities: Stable leg edema  No erythema/warmth  No ulcer  Nontender to palpation  Skin:  No rash  Neuro: Not assessable  Invasive Devices  Report    Peripherally Inserted Central Catheter Line  Duration           PICC Line 00/64/08 Right Basilic 11 days          Arterial Line  Duration           Arterial Line 05/30/22 Radial <1 day          Hemodialysis Catheter  Duration           HD Temporary Double Catheter 3 days          Drain  Duration           Colostomy LLQ 19 days    NG/OG/Enteral Tube Orogastric 16 Fr Center mouth 9 days    Urethral Catheter Double-lumen 16 Fr  9 days          Airway  Duration           ETT  Oral 8 days                Labs studies:   I have personally reviewed pertinent labs  Results from last 7 days   Lab Units 05/30/22  1217 05/30/22  0523 05/29/22  2217 05/29/22  0753 05/29/22  0600 05/25/22  1132 05/25/22  0505 05/24/22  2239 05/24/22  0238   POTASSIUM mmol/L 4 3 4 5 4 1   < > 4 1   < > 3 9   < > 3 6   CHLORIDE mmol/L 105 104 104   < > 103   < > 108   < > 111*   CO2 mmol/L 26 27 26   < > 26   < > 18*   < > 21   BUN mg/dL 29* 31* 34*   < > 36*   < > 92*   < > 73*   CREATININE mg/dL 1 26 1 36* 1 40*   < > 1 44*   < > 2 41*   < > 1 88*   EGFR ml/min/1 73sq m 49 45 43   < > 42   < > 22   < > 30   CALCIUM mg/dL 8 5 8 4 8 3   < > 9 0   < > 7 5*   < > 7 1*   AST U/L  --   --   --   --  15  --  15  --  18   ALT U/L  --   --   --   --  19  --  15  --  13   ALK PHOS U/L  --   --   --   --  260*  --  54  --  53    < > = values in this interval not displayed  Results from last 7 days   Lab Units 05/30/22 1217 05/30/22  0523 05/29/22  0600 05/28/22  1412 05/28/22  0602   WBC Thousand/uL  --  16 84* 13 39*  --  15 61*   HEMOGLOBIN g/dL 6 5* 7 0* 7 3*   < > 7 8*   PLATELETS Thousands/uL  --  115* 140*  --  123*    < > = values in this interval not displayed  Imaging Studies:   I have personally reviewed pertinent imaging study reports and images in PACS   5/29 CXR reviewed personally    MANOHAR collapse  Bilateral pleural effusion  EKG, Pathology, and Other Studies:   I have personally reviewed pertinent reports

## 2022-05-30 NOTE — PROGRESS NOTES
Tami 45  Progress Note - Kimberley Hudson 2/15/1931, 80 y o  male MRN: [id number]  Unit/Bed#: ICU 02 Encounter: [id number]  Primary Care Provider: Sukhwinder Mohan MD   Date and time admitted to hospital: 5/11/2022  4:48 PM    * Bowel perforation [facility])  Assessment & Plan  · Outpatient EGD and colonoscopy at [facility] on 5/11 for w/u of chronic anemia, history of colon polyps, intermittent LLQ abdominal pain and possible intermittent intussusception  · EGD unremarkable, colonoscopy technically difficult and required change from adult scope to pediatric scope  · During traversal of the sigmoid colon there was a kink and patient sustained a perforation  Procedure was aborted and patient was transferred to [facility] where immediate surgical consultation was obtained  · Emergent ex- lap on 5/11 > low anterior resection, protective loop transverse colostomy, flex sigmoidoscopy, and segmental small bowel resection  · Difficult post op course with post op ileus requiring NGT decompression and initiation of TPN; not tolerating trickle feeds  · 5/22: CT: Diffuse mild dilation of small bowel segments identified without transition point  · 5/24: CT CAP- Distended small bowel loops with scattered air-fluid levels consistent with early/partial small bowel obstruction with transition at the small bowel anastomosis in the region of the ventral pelvis as above  No free air  Cholelithiasis without cholecystitis  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     Cardiac arrest [facility])  Assessment & Plan  · Patient was found unresponsive and hypoxic approximately 20 minutes after being seen well with family 5/21   · PEA arrest x 2  · Most likely respiratory driven  · Neurologically intact post arrest     Acute respiratory failure with hypoxia ([address] )  Assessment & Plan  · Patient previously in ICU for hypoxia with a max of 15L midflow and concern for aspiration pneumonitis  · Improved and was able to transfer to general medical floor 5/21  · 5/21: PEA arrest on the floor most likely respiratory driven   · ROSC obtained; transferred to unit and again PEA arrested  · 5/24 CT CAP-CHF with moderate basilar effusions and additional upper lung zone patchy airspace opacities likely reflecting asymmetric pulmonary edema  Infiltrate is not entirely excluded     · Has remained on ventilator, day # 8: AC/PC 20/18/60/8  · Wean Fio2 as able for SPO2>90%  · Continues to tolerate  · Atrovent/xopenex/mucomyst nebs q8h  · Continue pulmonary hygiene/ VAP bundle  · Continue volume removal   · Bronchoscopy done 5/29    Severe sepsis [facility])  Assessment & Plan  · Peritonitis with intra-abdominal/pelvic abscess secondary to colonic perforation    · 5/22 CT chest/ab/pelvis with concern of multifocal PNA, no visualized intraabdominal abscess or anastomotic leak   · Per ID suspect multifocal pneumonitis   · OR culture 5/21 pseudomonas and bacteroides fragilis  · Repeat blood cultures on 5/22 negative   · Completed 14 days of Flagyl on 5/24  · Completed 14 days of cefepime on 5/27  · Observe off antibiotics   · ID following, appreciate recs     CHF (congestive heart failure) ([facility])  Assessment & Plan  Wt Readings from Last 3 Encounters:   05/29/22 78 6 kg (173 lb 4 5 oz)   05/11/22 62 1 kg (136 lb 14 4 oz)   03/25/22 61 2 kg (135 lb)     · 5/16: Echo-EF 65%, mild TR, mild MR  · 5/23: Repeat Echo post cardiac arrest: EF 60-65%, G1DD, mild AS (BRADY 1 5cm2), mild MR, mild TR  · Gross anasarca on exam: continue volume removal with Bumex infusion  · Monitor I&O, Daily weights       Hyperglycemia  Assessment & Plan  · A1c 5 3%  · Was requiring insulin infusion while on TPN  · Continue SSI    Toxic metabolic encephalopathy  Assessment & Plan  · Likely in setting of acute illness/delirium and cardiac arrest  · Delirium precautions; regulate sleep/wake cycle, environmental controls, daily CAM ICU   · Trend neuro exam  · Following commands, nodding appropriately to questions, moves all extremities    Acute renal failure (ARF) (HCC)  Assessment & Plan  · Secondary to hypoperfusion mehul cardiac arrest +/- ATN  · Baseline creat 0 8  · Creatinine peaked at 3 07  · Continue CVVH - goal to run negative   · Continue bumex gtt   · CVVH filter clotted overnight kept off given adequate UOP - continue pre filter heparin drip   · Avoid hypotension; continue levophed for MAP > 65    Anemia  Assessment & Plan  · Hemoglobin drop post cardiac arrest    · Noted to have gross hematuria but this has since resolved  · 5/21: 1 u PRBC for hgb 6 8  · 5/24: 1 u PRBC  · CT obtained on 5/24 and negative for any overt signs of bleeding  · 5/26: 1 u PRBC  · Continue to monitor   · Transfuse for hgb less than 7      Atrial fibrillation (HCC)  Assessment & Plan  · S/p cardiac arrest while on 3 pressors noted to have afib with RVR  · Bolused with amio and placed on infusion  · Converted to sinus rhythm on 5/22   · Amio gtt d/c 5/23  · Has remained in NSR  · No indication for [facility]  · Afib was likely post arrest in setting of triple pressors    Hypotension  Assessment & Plan  · Previously septic shock  · Now likely secondary to sedation requirements  · Wean levophed for MAP >65      ----------------------------------------------------------------------------------------  HPI/24hr events:  Patient remained on levophed overnight  He continued on mechanical ventilation and CVVH increasing the UF to -125 an hour  He had an episode of afib with HR in the 130s and required one dose of lopressor  He converted back to NSR soon after the lopressor was given       Patient appropriate for transfer out of the ICU today?: No  Disposition: Continue Critical Care   Code Status: Level 1 - Full Code  ---------------------------------------------------------------------------------------  SUBJECTIVE  Intubated     Review of Systems   Unable to perform ROS: Intubated     Review of systems was unable to be performed secondary to intubation   ---------------------------------------------------------------------------------------  OBJECTIVE    Vitals   Vitals:    22 0100 22 0200 22 0300 22 0310   BP: 113/53 111/54 119/55    Pulse: 75 79 83    Resp: (!) 25 (!) 29 (!) 30    Temp: 98 06 °F (36 7 °C) 97 88 °F (36 6 °C) 97 88 °F (36 6 °C)    TempSrc:       SpO2: 95% 94% 93% 92%   Weight:       Height:         Temp (24hrs), Av 6 °F (37 6 °C), Min:97 88 °F (36 6 °C), Max:100 76 °F (38 2 °C)  Current: Temperature: 97 88 °F (36 6 °C)  Arterial Line BP: 120/63  Arterial Line MAP (mmHg): 86 mmHg    Respiratory:  SpO2: SpO2: 92 %  Nasal Cannula O2 Flow Rate (L/min): 5 L/min    Invasive/non-invasive ventilation settings   Respiratory  Report   Lab Data (Last 4 hours)    None         O2/Vent Data (Last 4 hours)      310           Vent Mode AC/PC       Resp Rate (BPM) (BPM) 18       Pressure Control (cmH2O) (cm) 20       Insp Time (sec) (sec) 0 7       FiO2 (%) (%) 60       PEEP (cmH2O) (cmH2O) 8       MV 11 9                   Physical Exam  Vitals and nursing note reviewed  Constitutional:       Appearance: He is ill-appearing and toxic-appearing  Comments: B/L wrist restraints in place   HENT:      Head: Normocephalic and atraumatic  Right Ear: Tympanic membrane, ear canal and external ear normal       Left Ear: Tympanic membrane, ear canal and external ear normal       Nose: Nose normal       Mouth/Throat:      Mouth: Mucous membranes are dry  Pharynx: Oropharynx is clear  Eyes:      Extraocular Movements: Extraocular movements intact        Conjunctiva/sclera: Conjunctivae normal       Pupils: Pupils are equal, round, and reactive to light  Cardiovascular:      Rate and Rhythm: Normal rate and regular rhythm  Pulses: Normal pulses  Heart sounds: Normal heart sounds  Pulmonary:      Comments: ETT on mechanical ventilation  B/L breath sounds diminished   Abdominal:      General: Bowel sounds are normal       Palpations: Abdomen is soft  Comments: Tube feeds via OGT  Left upper quadrant colostomy   Genitourinary:     Comments: Urinary catheter   Musculoskeletal:      Cervical back: Normal range of motion and neck supple  Comments: Generalized swelling   Skin:     General: Skin is warm and dry  Capillary Refill: Capillary refill takes 2 to 3 seconds  Neurological:      Comments: Sedated but arouses to painful stimuli and follows commands    Psychiatric:      Comments: Sedated              Laboratory and Diagnostics:  Results from last 7 days   Lab Units 05/29/22  0600 05/28/22  1412 05/28/22  0602 05/27/22  0546 05/26/22  2353 05/26/22  0541 05/25/22  0505 05/24/22  1059 05/24/22  0238   WBC Thousand/uL 13 39*  --  15 61* 22 51* 20 46* 15 71* 14 81*  --  18 23*   HEMOGLOBIN g/dL 7 3* 7 2* 7 8* 8 8* 8 5* 7 3* 7 5*   < > 6 7*   HEMATOCRIT % 21 0* 20 6* 21 9* 24 9* 24 5* 21 6* 21 9*  --  19 6*   PLATELETS Thousands/uL 140*  --  123* 114* 121* 212 219  --  241   BANDS PCT %  --   --   --  24*  --   --   --   --  13*   MONO PCT %  --   --   --  5  --   --   --   --  5    < > = values in this interval not displayed       Results from last 7 days   Lab Units 05/29/22  2217 05/29/22  1545 05/29/22  0753 05/29/22  0600 05/28/22  2334 05/28/22  1817 05/28/22  1205 05/25/22  1132 05/25/22  0505 05/24/22  2239 05/24/22  0238   SODIUM mmol/L 137 137 135* 136 139 136 137   < > 136   < > 142   POTASSIUM mmol/L 4 1 4 4 4 2 4 1 4 2 4 1 3 9   < > 3 9   < > 3 6   CHLORIDE mmol/L 104 103 103 103 105 103 104   < > 108   < > 111*   CO2 mmol/L 26 27 28 26 27 26 28   < > 18*   < > 21   ANION GAP mmol/L 7 7 4 7 7 7 5   < > 10   < > 10   BUN mg/dL 34* 39* 38* 36* 30* 33* 38*   < > 92*   < > 73*   CREATININE mg/dL 1 40* 1 65* 1 50* 1 44* 1 10 1 18 1 35*   < > 2 41*   < > 1 88*   CALCIUM mg/dL 8 3 8 4 8 9 9 0 8 5 8 5 8 1*   < > 7 5*   < > 7 1*   GLUCOSE RANDOM mg/dL 160* 138 158* 163* 131 146* 161*   < > 142*   < > 143*   ALT U/L  --   --   --  19  --   --   --   --  15  --  13   AST U/L  --   --   --  15  --   --   --   --  15  --  18   ALK PHOS U/L  --   --   --  260*  --   --   --   --  54  --  53   ALBUMIN g/dL  --   --   --  2 9*  --   --   --   --  2 5*  --  1 9*   TOTAL BILIRUBIN mg/dL  --   --   --  1 33*  --   --   --   --  1 73*  --  1 05*    < > = values in this interval not displayed  Results from last 7 days   Lab Units 05/29/22  2217 05/29/22  1545 05/29/22  0614 05/28/22  2334 05/28/22  1817 05/28/22  1205 05/28/22  0602   MAGNESIUM mg/dL 2 0 2 1 2 3 1 9 2 1 1 9 1 9   PHOSPHORUS mg/dL 2 6 3 4 2 9 2 4 2 1* 2 6 2 5      Results from last 7 days   Lab Units 05/29/22  1545 05/28/22  0602 05/26/22  2353   INR  1 28*  --  1 58*   PTT seconds 53* 74* 61*          Results from last 7 days   Lab Units 05/24/22  0238   LACTIC ACID mmol/L 0 9     ABG:  Results from last 7 days   Lab Units 05/24/22  0225   PH ART  7 405   PCO2 ART mm Hg 28 5*   PO2 ART mm Hg 220 0*   HCO3 ART mmol/L 17 5*   BASE EXC ART mmol/L -6 5   ABG SOURCE  Radial, Left     VBG:  Results from last 7 days   Lab Units 05/25/22  2342 05/25/22  0505 05/24/22  0225   PH SAM  7 269*   < >  --    PCO2 SAM mm Hg 35 0*   < >  --    PO2 SAM mm Hg 40 7   < >  --    HCO3 SAM mmol/L 15 7*   < >  --    BASE EXC SAM mmol/L -10 3   < >  --    ABG SOURCE   --   --  Radial, Left    < > = values in this interval not displayed  Results from last 7 days   Lab Units 05/24/22  0238   PROCALCITONIN ng/ml 2 58*         Imaging: I have personally reviewed pertinent reports  EGD    Result Date: 5/11/2022  Impression: 1   Mild erythema in the antrum 2  Normal esophagus, normal GE junction and normal duodenum RECOMMENDATION: Await pathology results Follow up with me in clinic Anti-reflux diet   Martha Woods MD     Colonoscopy    Result Date: 5/11/2022  Impression: 1  Sharp kink in the sigmoid colon with stricture, scope was unable to pass  Ultra thin scope was use, small sigmoid polyp removed  2  Possible perforation in sigmoid colon RECOMMENDATION: Stat CT scan abdomen and pelvis Surgical consult, discuss case with Dr Freda Bansal MD     CT abdomen pelvis wo contrast    Result Date: 5/11/2022  Impression: Moderate pneumoperitoneum in keeping with bowel perforation  I personally discussed this study with DAVID Huynh on 5/11/2022 at 3:42 PM  Workstation performed: [id number]     XR chest portable    Result Date: 5/15/2022  Impression: Pulmonary edema and small bilateral pleural effusions  Workstation performed: [id number]     XR abdomen 1 view kub    Result Date: 5/16/2022  Impression: Diffusely distended, dilated small bowel loops with small amount of persistent air extending to the proximal colon  Findings may suggest diffuse ileus or partial obstruction  Workstation performed: [id number]     CTA chest pe study    Result Date: 5/15/2022  Impression: No pulmonary embolus  Bilateral upper lobe groundglass opacities compatible with pneumonia and/or pulmonary edema  Small bilateral pleural effusions   Workstation performed: [id number]     Intake and Output  I/O       05/28 0701  05/29 0700 05/29 0701  05/30 0700    I V  (mL/kg) 2204 1 (28) 1161 8 (14 8)    NG/ 100    IV Piggyback 300 156    Feedings 1200 728    Total Intake(mL/kg) 4034 1 (51 3) 2145 8 (27 3)    Urine (mL/kg/hr) 3965 (2 1) 655 (0 3)    Drains 175 100    Other 2458 1044    Stool 225 230    Total Output 6823 2029    Net -2788 9 +116 8                Height and Weights   Height: 5' 4" (162 6 cm)  IBW (Ideal Body Weight): 59 2 kg  Body mass index is 29 74 kg/m²  Weight (last 2 days)     Date/Time Weight    05/29/22 0600 78 6 (173 28)    05/28/22 0600 79 7 (175 71)            Nutrition       Diet Orders   (From admission, onward)             Start     Ordered    05/26/22 1351  Diet Enteral/Parenteral; Tube Feeding No Oral Diet; Osmolite 1 0; Continuous; 55  Diet effective now        Comments: Increase by 10 cc every four hours until reach goal   References:    Nutrtion Support Algorithm Enteral vs  Parenteral   Question Answer Comment   Diet Type Enteral/Parenteral    Enteral/Parenteral Tube Feeding No Oral Diet    Tube Feeding Formula: Osmolite 1 0    Bolus/Cyclic/Continuous Continuous    Tube Feeding Goal Rate (mL/hr): 55    RD to adjust diet per protocol?  Yes        05/26/22 1350    05/12/22 0906  Room Service  Once        Question:  Type of Service  Answer:  Room Service - Appropriate with Assistance    05/12/22 0905                  Active Medications  Scheduled Meds:  Current Facility-Administered Medications   Medication Dose Route Frequency Provider Last Rate    acetaminophen  650 mg Oral Q6H PRN Jose Nails, CRNP      bumetanide  2 mg/hr Intravenous Continuous Maribel Carrillo MD 2 mg/hr (05/30/22 0014)    chlorhexidine  15 mL Mouth/Throat Q12H [address] Jose Nails, CRNP      dexmedetomidine  0 1-1 5 mcg/kg/hr Intravenous Titrated MACARIO De La RosaNP 0 8 mcg/kg/hr (05/29/22 2313)    gabapentin  200 mg Oral HS ISELA Boucher      heparin (porcine)  400 Units/hr Intravenous Continuous ISELA Stevens 400 Units/hr (05/29/22 1525)    HYDROmorphone  0 3 mg/hr Intravenous Continuous MACARIO RitterNP 0 3 mg/hr (05/28/22 1026)    insulin lispro  1-6 Units Subcutaneous Q6H [address] Josephine Omar Castillo, CRNP      iohexol  50 mL Oral Once in imaging Jose Nails, CRNP      ipratropium-albuterol  3 mL Nebulization Q6H PRN Jose Nails, CRNP      levothyroxine  112 mcg Per NG Tube Early Morning Alexis Love Janyth Seeds, CRNP      lidocaine  2 patch Topical Daily Ruma Casarez      norepinephrine  1-30 mcg/min Intravenous Titrated ISELA Boucher 8 mcg/min (05/30/22 0012)    NxStage K 4/Ca 3  20,000 mL Dialysis Continuous Carlos Adamson MD Stopped (05/28/22 2350)    NxStage K 4/Ca 3  20,000 mL Dialysis Continuous Maribel Carrillo MD      ondansetron  4 mg Intravenous Q6H PRN Jose Nails, CROLIVER      oxyCODONE  5 mg Per NG Tube Q4H PRN ISELA De La Rosa      Or    oxyCODONE  7 5 mg Per NG Tube Q4H PRN ISELA De La Rosa      pantoprazole  40 mg Intravenous Q24H [address] Jose Nails, CRNP      polyethylene glycol  17 g Oral Daily ISELA Ritter       Continuous Infusions:  bumetanide, 2 mg/hr, Last Rate: 2 mg/hr (05/30/22 0014)  dexmedetomidine, 0 1-1 5 mcg/kg/hr, Last Rate: 0 8 mcg/kg/hr (05/29/22 2313)  heparin (porcine), 400 Units/hr, Last Rate: 400 Units/hr (05/29/22 1525)  HYDROmorphone, 0 3 mg/hr, Last Rate: 0 3 mg/hr (05/28/22 1026)  norepinephrine, 1-30 mcg/min, Last Rate: 8 mcg/min (05/30/22 0012)  NxStage K 4/Ca 3, 20,000 mL, Last Rate: Stopped (05/28/22 2350)  NxStage K 4/Ca 3, 20,000 mL      PRN Meds:   acetaminophen, 650 mg, Q6H PRN  iohexol, 50 mL, Once in imaging  ipratropium-albuterol, 3 mL, Q6H PRN  ondansetron, 4 mg, Q6H PRN  oxyCODONE, 5 mg, Q4H PRN   Or  oxyCODONE, 7 5 mg, Q4H PRN        Invasive Devices Review  Invasive Devices  Report    Peripherally Inserted Central Catheter Line  Duration           PICC Line 90/75/71 Right Basilic 11 days          Hemodialysis Catheter  Duration           HD Temporary Double Catheter 3 days          Drain  Duration           Colostomy LLQ 19 days    NG/OG/Enteral Tube Orogastric 16 Fr Center mouth 9 days    Urethral Catheter Double-lumen 16 Fr  9 days          Airway  Duration           ETT  Oral 8 days ---------------------------------------------------------------------------------------  Care Time Delivered:   No Critical Care time spent       ISELA Ahumada      Portions of the record may have been created with voice recognition software  Occasional wrong word or "sound a like" substitutions may have occurred due to the inherent limitations of voice recognition software    Read the chart carefully and recognize, using context, where substitutions have occurred

## 2022-05-30 NOTE — PLAN OF CARE
Plan of care cont          Problem: PAIN - ADULT  Goal: Verbalizes/displays adequate comfort level or baseline comfort level  Description: Interventions:  - Encourage patient to monitor pain and request assistance  - Assess pain using appropriate pain scale  - Administer analgesics based on type and severity of pain and evaluate response  - Implement non-pharmacological measures as appropriate and evaluate response  - Consider cultural and social influences on pain and pain management  - Notify physician/advanced practitioner if interventions unsuccessful or patient reports new pain  Outcome: Progressing     Problem: GASTROINTESTINAL - ADULT  Goal: Minimal or absence of nausea and/or vomiting  Description: INTERVENTIONS:  - Administer IV fluids if ordered to ensure adequate hydration  - Maintain NPO status until nausea and vomiting are resolved  - Nasogastric tube if ordered  - Administer ordered antiemetic medications as needed  - Provide nonpharmacologic comfort measures as appropriate  - Advance diet as tolerated, if ordered  - Consider nutrition services referral to assist patient with adequate nutrition and appropriate food choices  Outcome: Progressing  Goal: Establish and maintain optimal ostomy function  Description: INTERVENTIONS:  - Assess bowel function  - Encourage oral fluids to ensure adequate hydration  - Administer IV fluids if ordered to ensure adequate hydration   - Administer ordered medications as needed  - Encourage mobilization and activity  - Nutrition services referral to assist patient with appropriate food choices  - Assess stoma site  - Consider wound care consult   Outcome: Progressing     Problem: SKIN/TISSUE INTEGRITY - ADULT  Goal: Incision(s), wounds(s) or drain site(s) healing without S/S of infection  Description: INTERVENTIONS  - Assess and document dressing, incision, wound bed, drain sites and surrounding tissue  - Provide patient and family education  - Perform skin care/dressing changes every 2  Outcome: Progressing  Goal: Skin Integrity remains intact(Skin Breakdown Prevention)  Description: Assess:  -Perform Rodríguez assessment every shift  -Clean and moisturize skin every 2  -Inspect skin when repositioning, toileting, and assisting with ADLS  -Assess under medical devices such as cannula, catheter every 2  -Assess extremities for adequate circulation and sensation     Bed Management:  -Have minimal linens on bed & keep smooth, unwrinkled  -Change linens as needed when moist or perspiring  -Avoid sitting or lying in one position for more than 2 hours while in bed  -Keep HOB at 30 degrees     Toileting:  -Offer bedside commode  -Assess for incontinence every 2-Use incontinent care products after each incontinent episode such as moisture barrier  Activity:  -Mobilize patient 3times a day  -Encourage activity and walks on unit  -Encourage or provide ROM exercises   -Turn and reposition patient every 2Hours  -Use appropriate equipment to lift or move patient in bed  -Instruct/ Assist with weight shifting every 2 when out of bed in chair  -Consider limitation of chair time 2 hour intervals    Skin Care:  -Avoid use of baby powder, tape, friction and shearing, hot water or constrictive clothing  -Relieve pressure over bony prominences using chair cushion  -Do not massage red bony areas      Outcome: Progressing  GProblem: RESPIRATORY - ADULT  Goal: Achieves optimal ventilation and oxygenation  Description: INTERVENTIONS:  - Assess for changes in respiratory status  - Assess for changes in mentation and behavior  - Position to facilitate oxygenation and minimize respiratory effort  - Oxygen administered by appropriate delivery if ordered  - Initiate smoking cessation education as indicated  - Encourage broncho-pulmonary hygiene including cough, deep breathe, Incentive Spirometry  - Assess the need for suctioning and aspirate as needed  - Assess and instruct to report SOB or any respiratory difficulty  - Respiratory Therapy support as indicated  Outcome: Progressing     Problem: CARDIOVASCULAR - ADULT  Goal: Maintains optimal cardiac output and hemodynamic stability  Description: INTERVENTIONS:  - Monitor I/O, vital signs and rhythm  - Monitor for S/S and trends of decreased cardiac output  - Administer and titrate ordered vasoactive medications to optimize hemodynamic stability  - Assess quality of pulses, skin color and temperature  - Assess for signs of decreased coronary artery perfusion  - Instruct patient to report change in severity of symptoms  Outcome: Progressing  Goal: Absence of cardiac dysrhythmias or at baseline rhythm  Description: INTERVENTIONS:  - Continuous cardiac monitoring, vital signs, obtain 12 lead EKG if ordered  - Administer antiarrhythmic and heart rate control medications as ordered  - Monitor electrolytes and administer replacement therapy as ordered  Outcome: Progressing     Problem: METABOLIC, FLUID AND ELECTROLYTES - ADULT  Goal: Electrolytes maintained within normal limits  Description: INTERVENTIONS:  - Monitor labs and assess patient for signs and symptoms of electrolyte imbalances  - Administer electrolyte replacement as ordered  - Monitor response to electrolyte replacements, including repeat lab results as appropriate  - Instruct patient on fluid and nutrition as appropriate  Outcome: Progressing  Goal: Fluid balance maintained  Description: INTERVENTIONS:  - Monitor labs   - Monitor I/O and WT  - Instruct patient on fluid and nutrition as appropriate  - Assess for signs & symptoms of volume excess or deficit  Outcome: Progressing  Goal: Glucose maintained within target range  Description: INTERVENTIONS:  - Monitor Blood Glucose as ordered  - Assess for signs and symptoms of hyperglycemia and hypoglycemia  - Administer ordered medications to maintain glucose within target range  - Assess nutritional intake and initiate nutrition service referral as needed  Outcome: Progressing     Problem: HEMATOLOGIC - ADULT  Goal: Maintains hematologic stability  Description: INTERVENTIONS  - Assess for signs and symptoms of bleeding or hemorrhage  - Monitor labs  - Administer supportive blood products/factors as ordered and appropriate  Outcome: Progressing     Problem: Nutrition/Hydration-ADULT  Goal: Nutrient/Hydration intake appropriate for improving, restoring or maintaining nutritional needs  Description: Monitor and assess patient's nutrition/hydration status for malnutrition  Collaborate with interdisciplinary team and initiate plan and interventions as ordered  Monitor patient's weight and dietary intake as ordered or per policy  Utilize nutrition screening tool and intervene as necessary  Determine patient's food preferences and provide high-protein, high-caloric foods as appropriate       INTERVENTIONS:  - Monitor oral intake, urinary output, labs, and treatment plans  - Assess nutrition and hydration status and recommend course of action  - Evaluate amount of meals eaten  - Assist patient with eating if necessary   - Allow adequate time for meals  - Recommend/ encourage appropriate diets, oral nutritional supplements, and vitamin/mineral supplements  - Order, calculate, and assess calorie counts as needed  - Recommend, monitor, and adjust tube feedings and TPN/PPN based on assessed needs  - Assess need for intravenous fluids  - Provide specific nutrition/hydration education as appropriate  - Include patient/family/caregiver in decisions related to nutrition  Outcome: Progressing

## 2022-05-30 NOTE — PROGRESS NOTES
Progress Note - Cardiology   [facility]     Mohini Coley 80 y o  male MRN: [id number]  : 2/15/1931  Unit/Bed#: ICU 02 Encounter: [id number]    Assessment and Plan:   1  Post PEA arrest:  EKG and echocardiogram unchanged post-arrest, question whether arrest was not caused by hypoxemia    -  patient initially had demonstrated improvement and there was hope to wean him off both pressors and ventilator  Unfortunately his condition has worsened    -  remains ventilator dependent and now requiring CVVHD    -  remains vasopressor dependent and Nephrology is unable to remove fluid due to profound hypotension    -  at this time would not add inotrope as it does not appear it will provide any benefit    -  agree with limited echo in a m  To re-evaluate EF     2  Bowel perforation status post exploratory lap with resection and loop colostomy:  Care per surgical team      3  Volume overload secondary to hypoalbuminemia:  Albumin has improved to 2 6 with intermittent IV albumin therapy    -  patient still remains anasarca it and appears as if he has gained 37 lb of fluid (3rd spacing) since admission     -  prognosis is poor    4  Acute renal failure/acute tubular necrosis:  Followed by Nephrology    -  has been on CVVHD since May 26    -  Bumex drip discontinued 2022 by Nephrology    -  will continue CVVHD filter only     5  Paroxysmal atrial fibrillation:  Post-arrest, resolved after receiving IV amiodarone    -  currently off beta-blockers due to hypotension    -  continue monitor telemetry    -  unable to start anticoagulation due to hematuria and critical condition requiring multiple invasive procedures    Subjective / Objective:   Patient seen and examined  He is extremely anasarca and if weights are correct he has gained approximately 37 lb of fluid since admission to hospital   Remains intubated and requiring pressor support    He is on CVVH which is currently only on filter due to inability to remove fluid because of his tenuous situation  Patient's albumin is 2 9, he is profoundly anemic with a hemoglobin of 7, white count is elevating and today is 16 8  Case discussed with other disciplines, plan at this point is to just keep patient even on CVVHD and continue to monitor  ICU team in ongoing discussion with family regarding goals of care  Previous echocardiogram from 05/23/2022 demonstrated EF of 65% and mild aortic stenosis with valve area estimated at 1 5 cm 2  His pulmonary artery pressures were 45-50 mm Hg  At this point I do not feel that inotrope therapy would be beneficial to this patient  But I do agree that we can repeat limited echo to look at LV and RV function at this time and re-evaluate in the a m     Vitals: Blood pressure 103/51, pulse 81, temperature (!) 97 16 °F (36 2 °C), resp  rate (!) 31, height 5' 4" (1 626 m), weight 79 5 kg (175 lb 4 3 oz), SpO2 94 %  Vitals:    05/29/22 0600 05/30/22 0600   Weight: 78 6 kg (173 lb 4 5 oz) 79 5 kg (175 lb 4 3 oz)     Body mass index is 30 08 kg/m²  BP Readings from Last 3 Encounters:   05/30/22 103/51   05/11/22 141/69   03/25/22 152/76     Orthostatic Blood Pressures    Flowsheet Row Most Recent Value   Blood Pressure 103/51 filed at 05/30/2022 1130   Patient Position - Orthostatic VS Lying filed at 05/27/2022 1600        I/O       05/28 0701  05/29 0700 05/29 0701  05/30 0700 05/30 0701  05/31 0700    P  O        I V  (mL/kg) 2204 1 (28) 1855 9 (23 3) 519 2 (6 5)    NG/ 130 50    IV Piggyback 300 156     Feedings 1200 1063 94    Total Intake(mL/kg) 4034 1 (51 3) 3204 9 (40 3) 663 2 (8 3)    Urine (mL/kg/hr) 3965 (2 1) 970 (0 5) 185 (0 5)    Drains 175 150 0    Other 2458 2411 309    Stool 225 290 0    Total Output 6823 3821 494    Net -2788 9 -616 1 +169 2               Invasive Devices  Report    Peripherally Inserted Central Catheter Line  Duration           PICC Line 59/90/18 Right Basilic 11 days Hemodialysis Catheter  Duration           HD Temporary Double Catheter 3 days          Drain  Duration           Colostomy LLQ 19 days    NG/OG/Enteral Tube Orogastric 16 Fr Center mouth 9 days    Urethral Catheter Double-lumen 16 Fr  9 days          Airway  Duration           ETT  Oral 8 days                  Intake/Output Summary (Last 24 hours) at 5/30/2022 1150  Last data filed at 5/30/2022 1100  Gross per 24 hour   Intake 3344 1 ml   Output 4040 ml   Net -695 9 ml         Physical Exam:   Physical Exam  Vitals and nursing note reviewed  Constitutional:       Appearance: Normal appearance  Interventions: He is sedated and intubated  HENT:      Right Ear: External ear normal       Left Ear: External ear normal    Eyes:      General: No scleral icterus  Right eye: No discharge  Left eye: No discharge  Cardiovascular:      Rate and Rhythm: Regular rhythm  Tachycardia present  Pulmonary:      Effort: He is intubated  Breath sounds: Examination of the right-middle field reveals rales  Examination of the right-lower field reveals rales  Examination of the left-lower field reveals rales  Rhonchi and rales present  No wheezing  Abdominal:      General: There is distension  Musculoskeletal:      Right lower leg: Edema present  Left lower leg: Edema present  Comments: +2-3 3 generalized anasarca, per weights patient has put on approximately 37 lb of fluid since admission to hospital   Skin:     Capillary Refill: Capillary refill takes less than 2 seconds     Psychiatric:         Mood and Affect: Mood normal                  Medications/ Allergies:     Current Facility-Administered Medications   Medication Dose Route Frequency Provider Last Rate    acetaminophen  650 mg Oral Q6H PRN Becky Bumpers, CRNP      albumin human  25 g Intravenous Q6H Grzegorz Lehman PA-C Stopped (05/30/22 1050)    chlorhexidine  15 mL Mouth/Throat Q12H [facility] Becky Bumpers, CRNP      dexmedetomidine  0 1-1 5 mcg/kg/hr Intravenous Titrated Geryl MACARIO MenaNP 0 8 mcg/kg/hr (05/30/22 0701)    gabapentin  200 mg Oral HS ISELA García      heparin (porcine)  400 Units/hr Intravenous Continuous ISELA Garduno 400 Units/hr (05/29/22 1525)    HYDROmorphone  0 3 mg/hr Intravenous Continuous ISELA García 0 3 mg/hr (05/28/22 1026)    insulin lispro  1-6 Units Subcutaneous Q6H [address] ISELA Ritter      iohexol  50 mL Oral Once in imaging ISELA Chappell      ipratropium-albuterol  3 mL Nebulization Q6H PRN ISELA Chappell      levothyroxine  112 mcg Per NG Tube Early Morning ISELA Chappell      lidocaine  2 patch Topical Daily [place], [place]      norepinephrine  1-30 mcg/min Intravenous Titrated Galina Tripathi PA-C 7 mcg/min (05/30/22 1121)    NxStage K 4/Ca 3  20,000 mL Dialysis Continuous Ronny Wu MD Stopped (05/28/22 0160)    NxStage K 4/Ca 3  20,000 mL Dialysis Continuous Ronny Wu MD      ondansetron  4 mg Intravenous Q6H PRN ISELA Chappell      oxyCODONE  5 mg Per NG Tube Q4H PRN Geryl ISELA Mena      Or    oxyCODONE  7 5 mg Per NG Tube Q4H PRN Geryl ISELA Mena      pantoprazole  40 mg Intravenous Q24H [address] ISELA Chappell      polyethylene glycol  17 g Oral Daily ISELA Ritter       acetaminophen, 650 mg, Q6H PRN  iohexol, 50 mL, Once in imaging  ipratropium-albuterol, 3 mL, Q6H PRN  ondansetron, 4 mg, Q6H PRN  oxyCODONE, 5 mg, Q4H PRN   Or  oxyCODONE, 7 5 mg, Q4H PRN      No Known Allergies    VTE Pharmacologic Prophylaxis:   Sequential compression device (Venodyne)     Labs:     CBC with diff:  Results from last 7 days   Lab Units 05/30/22  0523 05/29/22  0600 05/28/22  1412 05/28/22  0602 05/27/22  0546 05/26/22  2353 05/26/22  0541 05/25/22  0505 05/24/22  1059 05/24/22  0238   WBC Thousand/uL 16 84* 13 39*  --  15 61* 22 51* 20 46* 15 71* 14 81*  -- 18 23*   HEMOGLOBIN g/dL 7 0* 7 3* 7 2* 7 8* 8 8* 8 5* 7 3* 7 5*   < > 6 7*   HEMATOCRIT % 20 5* 21 0* 20 6* 21 9* 24 9* 24 5* 21 6* 21 9*  --  19 6*   MCV fL 99* 95  --  92 92 94 97 97  --  99*   PLATELETS Thousands/uL 115* 140*  --  123* 114* 121* 212 219  --  241   MCH pg 33 7 32 9  --  32 6 32 6 32 4 32 9 33 2  --  33 7   MCHC g/dL 34 1 34 8  --  35 6 35 3 34 7 33 8 34 2  --  34 2   RDW % 18 0* 17 7*  --  17 3* 17 9* 17 2* 18 5* 18 7*  --  19 5*   MPV fL 13 0* 13 4*  --  13 0* 13 0* 12 8* 12 5 12 3  --  12 2   NRBC /100 WBC  --   --   --   --   --   --   --   --   --  4*    < > = values in this interval not displayed  CMP:  Results from last 7 days   Lab Units 05/30/22  0523 05/29/22  2217 05/29/22  1545 05/29/22  0753 05/29/22  0600 05/28/22  2334 05/28/22  1817 05/25/22  1132 05/25/22  0505 05/24/22  2239 05/24/22  0238   SODIUM mmol/L 137 137 137 135* 136 139 136   < > 136   < > 142   POTASSIUM mmol/L 4 5 4 1 4 4 4 2 4 1 4 2 4 1   < > 3 9   < > 3 6   CHLORIDE mmol/L 104 104 103 103 103 105 103   < > 108   < > 111*   CO2 mmol/L 27 26 27 28 26 27 26   < > 18*   < > 21   ANION GAP mmol/L 6 7 7 4 7 7 7   < > 10   < > 10   BUN mg/dL 31* 34* 39* 38* 36* 30* 33*   < > 92*   < > 73*   CREATININE mg/dL 1 36* 1 40* 1 65* 1 50* 1 44* 1 10 1 18   < > 2 41*   < > 1 88*   CALCIUM mg/dL 8 4 8 3 8 4 8 9 9 0 8 5 8 5   < > 7 5*   < > 7 1*   AST U/L  --   --   --   --  15  --   --   --  15  --  18   ALT U/L  --   --   --   --  19  --   --   --  15  --  13   ALK PHOS U/L  --   --   --   --  260*  --   --   --  54  --  53   TOTAL PROTEIN g/dL  --   --   --   --  5 7*  --   --   --  5 2*  --  4 8*   ALBUMIN g/dL  --   --   --   --  2 9*  --   --   --  2 5*  --  1 9*   TOTAL BILIRUBIN mg/dL  --   --   --   --  1 33*  --   --   --  1 73*  --  1 05*   EGFR ml/min/1 73sq m 45 43 35 40 42 58 53   < > 22   < > 30    < > = values in this interval not displayed       Magnesium:  Results from last 7 days   Lab Units 05/30/22  4675 05/29/22  2217 05/29/22  1545 05/29/22  0614 05/28/22  2334 05/28/22  1817 05/28/22  1205   MAGNESIUM mg/dL 1 9 2 0 2 1 2 3 1 9 2 1 1 9     Coags:  Results from last 7 days   Lab Units 05/29/22  1545 05/28/22  0602 05/26/22  2353   PTT seconds 53* 74* 61*   INR  1 28*  --  1 58*       Imaging & Testing   I have personally reviewed pertinent reports  EGD    Result Date: 5/11/2022  Narrative: [facility] [address] [facility] [address] [id number] [place] [address] OF SERVICE: 5/11/22 PHYSICIAN(S): Attending: Randy Miranda MD Fellow: No Staff Documented Procedure  :  EGD with biopsies INDICATION: Nausea, Family history of stomach cancer POST-OP DIAGNOSIS: See the impression below  PREPROCEDURE: Informed consent was obtained for the procedure, including sedation  Risks of perforation, hemorrhage, adverse drug reaction and aspiration were discussed  The patient was placed in the left lateral decubitus position  Patient was explained about the risks and benefits of the procedure  Risks including but not limited to bleeding, infection, and perforation were explained in detail  Also explained about less than 100% sensitivity with the exam and other alternatives  DETAILS OF PROCEDURE: Patient was taken to the procedure room where a time out was performed to confirm correct patient and correct procedure  The patient underwent monitored anesthesia care, which was administered by an anesthesia professional  The patient's blood pressure, heart rate, level of consciousness, respirations and oxygen were monitored throughout the procedure  The scope was advanced to the second part of the duodenum  Retroflexion was performed in the fundus  Prior to the procedure, the patient's H  Pylori status was unknown  The patient experienced no blood loss  The procedure was not difficult  The patient tolerated the procedure well  There were no apparent complications   ANESTHESIA INFORMATION: ASA: III Anesthesia Type: IV Sedation with Anesthesia MEDICATIONS: No administrations occurring from 1356 to 1417 on 05/11/22 FINDINGS: Moderate, localized erythematous mucosa in the antrum; performed cold forceps biopsy to rule out H  pylori The upper third of the esophagus, middle third of the esophagus, lower third of the esophagus, GE junction, duodenal bulb, 1st part of the duodenum and 2nd part of the duodenum appeared normal  Performed random biopsy using biopsy forceps to rule out Cabrera's esophagus  SPECIMENS: ID Type Source Tests Collected by Time Destination 1 : antrum Tissue Stomach TISSUE EXAM Pattie Mcclelland MD 5/11/2022  2:04 PM  2 : lower Tissue Esophagus TISSUE EXAM Pattie Mcclelland MD 5/11/2022  2:05 PM  3 : sigmoid Tissue Polyp, Colorectal TISSUE EXAM Pattie Mcclelland MD 5/11/2022  2:14 PM      Impression: 1  Mild erythema in the antrum 2  Normal esophagus, normal GE junction and normal duodenum RECOMMENDATION: Await pathology results Follow up with me in clinic Anti-reflux diet   Pattie Mcclelland MD     Colonoscopy    Result Date: 5/11/2022  Narrative: [facility] [id number] [facility] OF SERVICE: 5/11/22 PHYSICIAN(S): Attending: Pattie Mcclelland MD Fellow: No Staff Documented Procedure : Incomplete colonoscopy INDICATION: Diverticulosis, Left lower quadrant pain, Colonic intussusception ([address] ), Family history of colon cancer, Adenomatous polyp of colon, unspecified part of colon POST-OP DIAGNOSIS: See the impression below  HISTORY: Prior colonoscopy: 10 years ago  BOWEL PREPARATION: Miralax/Dulcolax PREPROCEDURE: Informed consent was obtained for the procedure, including sedation  Risks including but not limited to bleeding, infection, perforation, adverse drug reaction and aspiration were explained in detail  Also explained about less than 100% sensitivity with the exam and other alternatives   The patient was placed in the left lateral decubitus position  DETAILS OF PROCEDURE: Patient was taken to the procedure room where a time out was performed to confirm correct patient and correct procedure  The patient underwent monitored anesthesia care, which was administered by an anesthesia professional  The patient's blood pressure, heart rate, level of consciousness, oxygen and respirations were monitored throughout the procedure  A digital rectal exam was performed  The scope was introduced through the anus and advanced to the sigmoid colon  Retroflexion was performed in the rectum  The quality of bowel preparation was evaluated using the [facility] Bowel Preparation Scale with scores of: right colon = not assessed, transverse colon = not assessed, left colon = 1  The total BBPS score was 1  Bowel prep was not adequate  The patient experienced no blood loss  The procedure was not difficult  The patient tolerated the procedure well  There were no apparent complications  ANESTHESIA INFORMATION: ASA: III Anesthesia Type: IV Sedation with Anesthesia MEDICATIONS: No administrations occurring from 1356 to 1425 on 05/11/22 FINDINGS: Verhernán Fabians kink in the sigmoid colon, it was very difficult to pass the scope, sigmoid polyp which was removed with cold snare polypectomy  Extensive diverticulosis, ultra thin scope was used,  but I see the peritoneum, procedure was aborted  EVENTS: Procedure Events Event Event Time ENDO SCOPE OUT TIME 5/11/2022  2:24 PM SPECIMENS: ID Type Source Tests Collected by Time Destination 1 : antrum Tissue Stomach TISSUE EXAM Vanessa Howell MD 5/11/2022  2:04 PM  2 : lower Tissue Esophagus TISSUE EXAM Vanessa Howell MD 5/11/2022  2:05 PM  3 : sigmoid Tissue Polyp, Colorectal TISSUE EXAM Vanessa Howell MD 5/11/2022  2:14 PM  EQUIPMENT: Colonoscope -Q180AL Colonoscope -[id number]     Impression: 1  Sharp kink in the sigmoid colon with stricture, scope was unable to pass    Ultra thin scope was use, small sigmoid polyp removed  2  Possible perforation in sigmoid colon RECOMMENDATION: Stat CT scan abdomen and pelvis Surgical consult, discuss case with Dr Alisa More MD     CT abdomen pelvis wo contrast    Result Date: 5/11/2022  Narrative: CT ABDOMEN AND PELVIS WITHOUT IV CONTRAST INDICATION:   Bowel obstruction suspected possible bowel perofration  COMPARISON:  None  TECHNIQUE:  CT examination of the abdomen and pelvis was performed without intravenous contrast  This examination was performed without intravenous contrast in the context of the critical nationwide Omin[place] shortage  Axial, sagittal, and coronal 2D reformatted images were created from the source data and submitted for interpretation  Radiation dose length product (DLP) for this visit:  350 2 mGy-cm   This examination, like all CT scans performed in the [facility], was performed utilizing techniques to minimize radiation dose exposure, including the use of iterative reconstruction and automated exposure control  Enteric contrast was NOT administered  FINDINGS: ABDOMEN LOWER CHEST:  No clinically significant abnormality identified in the visualized lower chest  LIVER/BILIARY TREE:  Unremarkable  GALLBLADDER:  There are tiny punctate gallstone(s) within the gallbladder  SPLEEN:  Unremarkable  PANCREAS:  Unremarkable  ADRENAL GLANDS:  Unremarkable  KIDNEYS/URETERS:  Small right lower pole circumscribed subcentimeter renal hypodensity is present, too small to accurately characterize, and statistically most likely benign finding  According to recent literature (Radiology 2019) no further workup of this finding is recommended  STOMACH AND BOWEL:  There is colonic diverticulosis without evidence of acute diverticulitis  APPENDIX:  No findings to suggest appendicitis  ABDOMINOPELVIC CAVITY: Moderate pneumoperitoneum  This can be seen anterior to the liver and deep to the anterior abdominal wall  No ascites   No enlarged adenopathy  VESSELS:  Atherosclerotic changes are present  No evidence of aneurysm  PELVIS REPRODUCTIVE ORGANS:  The prostate is enlarged  URINARY BLADDER:  Unremarkable  ABDOMINAL WALL/INGUINAL REGIONS:  Unremarkable  OSSEOUS STRUCTURES:  Bones are demineralized  Multilevel vertebroplasties and compression fractures of indeterminate age  Impression: Moderate pneumoperitoneum in keeping with bowel perforation  I personally discussed this study with DAVID Ochoa on 5/11/2022 at 3:42 PM  Workstation performed: [id number]     CT chest abdomen pelvis wo contrast    Result Date: 5/24/2022  Narrative: CT CHEST, ABDOMEN AND PELVIS WITHOUT IV CONTRAST INDICATION:   free air under diaphragm on CXR  COMPARISON:  5/22/2020 TECHNIQUE: CT examination of the chest, abdomen and pelvis was performed without intravenous contrast  This examination was performed without intravenous contrast in the context of the critical nationwide Omnipaque shortage  Axial, sagittal, and coronal 2D reformatted images were created from the source data and submitted for interpretation  Radiation dose length product (DLP) for this visit:  845 45 mGy-cm   This examination, like all CT scans performed in the [facility], was performed utilizing techniques to minimize radiation dose exposure, including the use of iterative  reconstruction and automated exposure control  Enteric contrast was administered  FINDINGS: CHEST LUNGS:  Moderate bibasilar pleural effusions with associated probable compressive atelectasis  Additional patchy airspace opacities noted upper lung zones could reflect asymmetric pulmonary edema with infiltrates not excluded  PLEURA:  As above  HEART/GREAT VESSELS: Heart is unremarkable for patient's age  No thoracic aortic aneurysm  MEDIASTINUM AND SRI:  Unremarkable  CHEST WALL AND LOWER NECK:  Endotracheal tube terminates within the trachea  Nasogastric tube terminates within the stomach   ABDOMEN LIVER/BILIARY TREE:  Unremarkable  GALLBLADDER:  There are gallstone(s) within the gallbladder, without pericholecystic inflammatory changes  SPLEEN:  Unremarkable  PANCREAS:  Unremarkable  ADRENAL GLANDS:  Unremarkable  KIDNEYS/URETERS:  Unremarkable  No hydronephrosis  STOMACH AND BOWEL:  Left ventral loop colostomy identified with herniated fat at the site of the stoma  Distended small bowel loops with scattered air-fluid levels concerning for early/partial obstruction  There appears to be transition at the ventral pelvis at a site of small bowel anastomosis best seen on series 2 image 104  This occurs in a region of dehiscent ventral wound  Sigmoid anastomosis appears patent  APPENDIX:  No findings to suggest appendicitis  ABDOMINOPELVIC CAVITY:  No ascites  No pneumoperitoneum  No lymphadenopathy  VESSELS:  Unremarkable for patient's age  PELVIS REPRODUCTIVE ORGANS:  Unremarkable for patient's age  URINARY BLADDER:  Decompressed with a Farnsworth catheter in place  ABDOMINAL WALL/INGUINAL REGIONS:  Unremarkable  OSSEOUS STRUCTURES:  Multilevel thoracolumbar compression fractures and multiple kyphoplasty levels  Impression: 1  CHF with moderate basilar effusions and additional upper lung zone patchy airspace opacities likely reflecting asymmetric pulmonary edema  Infiltrate is not entirely excluded  2   Distended small bowel loops with scattered air-fluid levels consistent with early/partial small bowel obstruction with transition at the small bowel anastomosis in the region of the ventral pelvis as above  No free air  3   Cholelithiasis without cholecystitis  4   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  Concordant preliminary results were provided by [employer] at 0531 hours on 5/24/2022   Workstation performed: [id number]     CT chest abdomen pelvis wo contrast    Result Date: 5/22/2022  Narrative: CT CHEST, ABDOMEN AND PELVIS WITHOUT IV CONTRAST INDICATION:   Cardiac arrest, history of recent surgery  COMPARISON:  May 17, 2022 TECHNIQUE: CT examination of the chest, abdomen and pelvis was performed without intravenous contrast  This examination was performed without intravenous contrast in the context of the critical nationwide Omnipaque shortage  Axial, sagittal, and coronal 2D reformatted images were created from the source data and submitted for interpretation  Radiation dose length product (DLP) for this visit:  920 mGy-cm   This examination, like all CT scans performed in the [facility], was performed utilizing techniques to minimize radiation dose exposure, including the use of iterative reconstruction and automated exposure control  Enteric contrast was administered  FINDINGS: CHEST LUNGS:  Similar-appearing patchy mixed groundglass and consolidative changes are seen in the lungs bilaterally predominantly in the upper lobes  Bilateral lobe compressive atelectasis again noted  PLEURA:  Moderate size bilateral pleural effusions similar to slightly increased in size from prior study  HEART/GREAT VESSELS: Heart is unremarkable for patient's age  No thoracic aortic aneurysm  MEDIASTINUM AND SRI:  Unremarkable  CHEST WALL AND LOWER NECK:  Unremarkable  ABDOMEN LIVER/BILIARY TREE:  Unremarkable  GALLBLADDER:  No calcified gallstones  No pericholecystic inflammatory change  SPLEEN:  Unremarkable  PANCREAS:  Unremarkable  ADRENAL GLANDS:  Unremarkable  KIDNEYS/URETERS:  Right lower pole renal cyst  STOMACH AND BOWEL:  Air-fluid levels are identified within mildly dilated segments of small bowel measuring up to 3 7 cm    APPENDIX:  No findings to suggest appendicitis  ABDOMINOPELVIC CAVITY:  No ascites  No pneumoperitoneum  No lymphadenopathy  VESSELS:  Unremarkable for patient's age  PELVIS REPRODUCTIVE ORGANS:  Unremarkable for patient's age  URINARY BLADDER:  Farnsworth catheter noted within the decompressed urinary bladder   ABDOMINAL WALL/INGUINAL REGIONS: Unremarkable  OSSEOUS STRUCTURES:  There are innumerable compression deformities in the thoracic and lumbar spine which appear chronic  Evidence of multilevel vertebroplasty  Impression: 1  Stable appearance of mixed groundglass and consolidative abnormalities in the lungs bilaterally predominantly affecting the upper lobes suspicious for multilobar pneumonia  Superimposed pulmonary edema not excluded  2   Similar to slight increase size of moderate bilateral pleural effusions  3   Diffuse mild dilation of small bowel segments identified without transition point  Workstation performed: [id number]     CT chest abdomen pelvis wo contrast    Result Date: 5/18/2022  Narrative: CT CHEST, ABDOMEN AND PELVIS WITHOUT IV CONTRAST INDICATION:   Intra-abdominal abscess R/O intra-abdominal collection  COMPARISON:  CTA chest on 5/15/2022 and CT abdomen and pelvis on 5/11/2022  TECHNIQUE: CT examination of the chest, abdomen and pelvis was performed without intravenous contrast  This examination was performed without intravenous contrast in the context of the critical nationwide Omnipaque shortage  Axial, sagittal, and coronal 2D reformatted images were created from the source data and submitted for interpretation  Radiation dose length product (DLP) for this visit:  523 41 mGy-cm   This examination, like all CT scans performed in the [facility], was performed utilizing techniques to minimize radiation dose exposure, including the use of iterative  reconstruction and automated exposure control  Enteric contrast was administered  FINDINGS: CHEST LUNGS:  Worsening patchy mixed groundglass and consolidative change bilaterally, most prominent in the bilateral upper lobes  Bilateral lower lobe compressive atelectasis  There is septal thickening  There is no tracheal or endobronchial lesion  PLEURA:  Moderate left and small right pleural effusions, increased since the prior study   HEART/GREAT VESSELS: Heart is unremarkable for patient's age  Aortic valvular calcifications  Coronary artery calcifications  No thoracic aortic aneurysm  MEDIASTINUM AND SRI:  Mildly distention of the mid to distal esophagus with enteric contrast material   No mediastinal lymphadenopathy  CHEST WALL AND LOWER NECK:  Unremarkable  ABDOMEN LIVER/BILIARY TREE:  Unremarkable  GALLBLADDER:  There is a tiny calcified gallstone within the gallbladder, without pericholecystic inflammatory changes  SPLEEN:  Unremarkable  PANCREAS:  Unremarkable  ADRENAL GLANDS:  Unremarkable  KIDNEYS/URETERS:  No hydronephrosis or urinary tract calculus  One or more sharply circumscribed subcentimeter renal hypodensities are present, too small to accurately characterize, and statistically most likely benign findings  According to recent literature (Radiology 2019) no further workup of these findings is recommended  STOMACH AND BOWEL:  Postsurgical changes of low anterior resection with creation of loop colostomy in the anterior left upper abdomen  Small bowel anastomosis in the anterior lower abdomen deep to the midline incision  Multiple distended small bowel loops measuring up to 3 5 cm in diameter  No clear transition point is identified  Oral contrast has reached mid small bowel  Enteric tube is present with sidehole visualized near the gastroesophageal junction  APPENDIX:  No findings to suggest appendicitis  ABDOMINOPELVIC CAVITY:  Trace presacral fluid  No pneumoperitoneum  No lymphadenopathy  VESSELS:  Atherosclerotic changes are present  No evidence of aneurysm  PELVIS REPRODUCTIVE ORGANS:  Unremarkable for patient's age  URINARY BLADDER:  Decompressed with a Farnsworth catheter in place  Moderate amount of air in the bladder  ABDOMINAL WALL/INGUINAL REGIONS:  Postsurgical changes in the ventral wall with midline incision noted  Loop colostomy in the anterior left upper abdomen  Mild anasarca   OSSEOUS STRUCTURES:  No acute fracture or destructive osseous lesion  Multilevel vertebroplasty and multilevel age-indeterminate compression fractures in the thoracolumbar spine are redemonstrated  Impression: 1  Worsening patchy mixed groundglass and consolidative change bilaterally compatible with multifocal pneumonia and/or pulmonary edema  2   Increased bilateral pleural effusions  3   Sidehole of enteric tube is visualized near the gastroesophageal junction  Consider tube advancement  4   Mild distention of mid to distal esophagus with enteric contrast material   Correlate for gastroesophageal reflux  5   Multiple dilated small bowel loops without a clear transition point  The finding may reflect ileus, however developing small bowel obstruction cannot be excluded  Follow-up is recommended  6   Moderate amount of air within the urinary bladder, likely related to instrumentation  Correlate with urinalysis to exclude cystitis  I personally discussed this study with Kimberlee Sherman on 5/18/2022 at 1:11 AM  Workstation performed: [id number]     XR chest portable    Result Date: 5/29/2022  Narrative: CHEST INDICATION:   Follow-up post bronch  COMPARISON:  Chest radiograph 5/29/2022 at 40 6:00 AM  EXAM PERFORMED/VIEWS:  XR CHEST PORTABLE FINDINGS:  Lines and tubes are unchanged from prior exam  Cardiomediastinal silhouette appears unremarkable  Stable bilateral airspace opacities and small right pleural effusion  Increased size of a moderate left pleural effusion now with fluid in the major fissure, with adjacent atelectasis  Stable right upper lobe collapse  Osseous structures appear within normal limits for patient age  Impression: Increased size of a moderate left pleural effusion and atelectasis adjacent to the major fissure  Stable bilateral airspace opacities, right upper lobe collapse and small right pleural effusion   Workstation performed: [id number]     XR chest portable    Addendum Date: 5/24/2022 Addendum:   ADDENDUM: The right IJ line terminates in the SVC  The PICC line terminates at the junction of the SVC and right atrium  Result Date: 5/24/2022  Narrative: CHEST INDICATION:   keofed placement  COMPARISON:  05/21/2022 EXAM PERFORMED/VIEWS:  XR CHEST PORTABLE Images: 2 FINDINGS: Cardiomediastinal silhouette appears enlarged  Right IJ line terminates in the right atrium  Right PICC line obscured by the IJ line but probably terminates in the SVC  Endotracheal tube terminates 2 7 cm above the chriss  An enteric tube extends into the stomach  Keofed tube terminates at the EG junction  Bilateral airspace disease may represent infiltrates or the alveolar phase of heart failure  Small bilateral pleural effusions  Multiple thoracic and lumbar  vertebro plasties  Impression: 1  Keofed tube terminates at the EG junction and should be advanced for  use  2   Bilateral airspace disease may represent infiltrates or the alveolar phase of heart failure  Bilateral pleural effusions  Workstation performed: [id number]     XR chest portable    Result Date: 5/17/2022  Narrative: CHEST INDICATION:   NGT placement    COMPARISON:  Chest radiograph and CT May 15, 2022 EXAM PERFORMED/VIEWS:  XR CHEST PORTABLE FINDINGS:  Tip of enteric tube is at the expected position of the gastroesophageal junction, and sidehole is at the expected position of the esophageal hiatus  Cardiomediastinal silhouette appears unremarkable  Opacities in the bilateral upper lobes have slightly increased in density  Now trace bilateral pleural effusions have decreased since prior study  No pneumothorax  Kyphoplasty material at multiple levels  Impression: 1  Tip of enteric tube is at the expected position of the gastroesophageal junction  Slight advancement is advised  2   Slight increased predominant upper lobe opacities, likely representing pneumonia  Concomitant edema is possible  The now trace bilateral pleural effusions have decreased in size   The study was marked in EPIC for immediate notification  Workstation performed: [id number]     XR chest portable    Result Date: 5/15/2022  Narrative: CHEST INDICATION:   coarse breath sounds  COMPARISON:  None EXAM PERFORMED/VIEWS:  XR CHEST PORTABLE FINDINGS: Heart is mildly enlarged  There is aortic knob calcification  There is central patchy airspace disease bilaterally consistent with pulmonary edema  There are small bilateral pleural effusions  Status post multilevel vertebroplasty  Impression: Pulmonary edema and small bilateral pleural effusions  Workstation performed: [id number]     XR abdomen 1 view kub    Result Date: 5/22/2022  Narrative: ABDOMEN INDICATION:   f/u ileus  COMPARISON:  Compared with 5/18/2022 VIEWS:  AP supine FINDINGS: Feeding tube in the upper abdomen  Contrast in the colon  Prominent gas-filled small bowel loops throughout the abdomen  No discernible free air on this supine study  Upright or left lateral decubitus imaging is more sensitive to detect subtle free air in the appropriate setting  No pathologic calcifications or soft tissue masses  Visualized lung bases are clear  Prior kyphoplasty changes in the thoracolumbar region  Impression: Slight worsening of the prominent dilated small bowel loops  Contrast in the colon  Workstation performed: [id number]     XR abdomen 1 view kub    Result Date: 5/22/2022  Narrative: ABDOMEN INDICATION:   abdominal distention  COMPARISON:  Compared with 5/21/2022 VIEWS:  AP supine FINDINGS: Feeding tube tip in the upper abdomen  Diffuse gas distended small bowel loops throughout the abdomen  Contrast in the colon suggested as seen on prior study  No discernible free air on this supine study  Upright or left lateral decubitus imaging is more sensitive to detect subtle free air in the appropriate setting  No pathologic calcifications or soft tissue masses  Visualized lung bases are clear  Visualized osseous structures are unremarkable for the patient's age  Impression: Persistent gas distended small bowel loops with normal caliber colon with contrast  Workstation performed: [id number]     XR abdomen 1 view kub    Result Date: 5/18/2022  Narrative: ABDOMEN INDICATION:   Contrast follow through  COMPARISON:  CT performed May 17, 2022 VIEWS:  AP supine FINDINGS: Air and contrast filled mildly distended loops of bowel seen throughout the abdomen in a nonspecific pattern suspicious for bowel obstruction  Contrast does not appear to have reached the large bowel  An enteric catheter tip overlies the expected location of the cavoatrial junction and has not quite reached the stomach  Advancement by approximately 8 to 10 cm recommended  Small costophrenic angle effusions with overlying airspace opacity  Osteopenia with multilevel compression fractures and vertebroplasty is noted  Impression: Radiographic appearance suspicious for early or mild distal small bowel obstruction  Alternatively, this could represent ileus  Enteric contrast administered for the May 17, 2022 examination does not appear to have reached the large bowelAn  enteric catheter tip overlies the expected location of the cavoatrial junction and has not quite reached the stomach  Advancement by approximately 8 to 10 cm recommended  This examination was marked "significant notification" in Epic in order to begin the standard process by which the radiology reading room liaison alerts the referring practitioner  Workstation performed: [id number]     XR abdomen 1 view kub    Result Date: 5/16/2022  Narrative: ABDOMEN INDICATION:   Evaluation of bowel gas pattern  Recent pneumoperitoneum seen by CT concerning for bowel perforation  Review of chart shows history of low anterior resection, protective loop transverse colostomy and segmental small bowel resection to treat perforated rectosigmoid junction  COMPARISON:  CT of the abdomen/pelvis dated 5/11/2022   VIEWS:  AP supine FINDINGS: Dilated loops of small bowel scattered throughout the abdomen measuring up to 3 9 cm in caliber and left lower quadrant  No evident pneumatosis  Some gas does appear to progress to the cecum and ascending colon  Rectosigmoid chain sutures are evident  Small amount of gas in the residual rectal pouch  No discernible free air on this supine study  Upright or left lateral decubitus imaging is more sensitive to detect subtle free air in the appropriate setting  No pathologic calcifications or soft tissue masses  Chronic atelectasis/scarring at the lung bases  No acute findings of the visualized lower chest  Prior vertebral plasties  Thoracolumbar spondylosis  No acute osseous abnormalities  Impression: Diffusely distended, dilated small bowel loops with small amount of persistent air extending to the proximal colon  Findings may suggest diffuse ileus or partial obstruction  Workstation performed: [id number]     Bronchoscopy    Result Date: 5/29/2022  Narrative: Maida Vega DO     5/29/2022  2:33 PM Bronchoscopy Date/Time: 5/29/2022 2:29 PM Performed by: Maida Vega DO Authorized by: Sera Camacho PA-C Patient location:  Bedside Consent:   Consent obtained:  Verbal (Verbal consent obtained from the patient's son [facility]  Risks benefits and alternatives were discussed )   Consent given by:  Healthcare agent   Risks discussed:  Pneumothorax, infection and bleeding Universal protocol:   Procedure explained and questions answered to patient or proxy's satisfaction: yes    Relevant documents present and verified: yes    Test results available and properly labeled: yes    Radiology Images displayed and confirmed  If images not available, report reviewed: yes    Required blood products, implants, devices and special equipment available: yes    Immediately prior to procedure a time out was called: yes    Patient identity confirmed:   Anonymous protocol, patient vented/unresponsive and arm band Indications:   Procedure Purpose: therapeutic    Indications comment:  Right upper lobe collapse Sedation:   Sedation type:  Continuous (ICU/vent)    XR chest portable ICU    Result Date: 5/29/2022  Narrative: CHEST INDICATION:   Eval  COMPARISON:  Chest radiograph from 5/26/2022 and 5/24/2022, chest CT from 5/24/2022  EXAM PERFORMED/VIEWS:  XR CHEST PORTABLE ICU FINDINGS:  ET tube 3 cm above the chriss  NG tube in stomach  Right PICC at cavoatrial junction  Cardiomediastinal silhouette appears unremarkable  Worsening bilateral consolidation with small effusions  Recurrent right upper lobe collapse  No pneumothorax  Vertebral augmentation  Impression: Worsening severe bilateral consolidation with small effusions and recurrent right upper lobe collapse  Workstation performed: [id number]     XR chest portable ICU    Result Date: 5/26/2022  Narrative: CHEST INDICATION:   line insertion  COMPARISON:  AP chest and CT chest 5/24/2022 EXAM PERFORMED/VIEWS:  XR CHEST PORTABLE ICU FINDINGS:  Interval placement of a right IJ catheter with tip projecting over the mid SVC  No pneumothorax  Other lines and tubes are unchanged from prior exam  Heart shadow is enlarged but unchanged from prior exam  Persistent bilateral pulmonary opacities most prominent in the right lung apex  Osseous structures appear within normal limits for patient age  Impression: Interval placement of a right IJ catheter with tip projecting over the mid SVC  No pneumothorax  Persistent bilateral pulmonary opacities  Workstation performed: [id number]     XR chest portable ICU    Result Date: 5/24/2022  Narrative: CHEST INDICATION:   hypoxic respiratory failure  COMPARISON:  05/23/2022 EXAM PERFORMED/VIEWS:  XR CHEST PORTABLE ICU Images: 2 FINDINGS: Cardiomediastinal silhouette appears unremarkable  An endotracheal tube terminates 2 6 cm above the chriss  Right PICC line terminates in the right atrium    Right IJ line terminates at the junction of the SVC and right atrium  The Keofeed tube has been removed  Enteric tube extends into the stomach  Bilateral airspace disease is unchanged  Multiple vertebro plasties  Impression: Bilateral airspace disease is unchanged  The Keofeed tube has been removed  Workstation performed: [id number]     XR chest portable ICU    Result Date: 5/22/2022  Narrative: CHEST INDICATION:   R  IJ TLC placement  COMPARISON:  Compared with 5/21/2022 EXAM PERFORMED/VIEWS:  XR CHEST PORTABLE ICU FINDINGS:  Endotracheal tube above the chriss  Right neck catheter in the distal SVC  PICC line catheter tip in the cavoatrial region  Feeding tube below the diaphragm  Cardiomediastinal silhouette appears unremarkable  Patchy groundglass parenchymal infiltrates predominantly in the right upper lobe and left lower lobe with slight worsening  No pneumothorax  No pneumothorax or pleural effusion  Osseous structures appear within normal limits for patient age  Impression: Right neck catheter in the distal SVC Workstation performed: [id number]     XR chest portable ICU    Result Date: 5/22/2022  Narrative: CHEST INDICATION:   hypoxia  COMPARISON:  Compared with 5/21/2022 EXAM PERFORMED/VIEWS:  XR CHEST PORTABLE ICU FINDINGS:  Endotracheal tube above the chriss  Right-sided PICC line tip in the cavoatrial region  Feeding tube below the diaphragm  Cardiomediastinal silhouette appears unremarkable  Poor inspiration  Diffuse prominent interstitial markings  Patchy parenchymal groundglass change , more on the right  No pneumothorax  No large effusion  Osseous structures appear within normal limits for patient age  Impression: Improved pneumomediastinum  Bilateral groundglass infiltrative changes  Workstation performed: [id number]     XR chest portable ICU    Result Date: 5/21/2022  Narrative: CHEST INDICATION:   intubation   COMPARISON:  May 17, 2022 EXAM PERFORMED/VIEWS:  XR CHEST PORTABLE ICU FINDINGS:  Transcutaneous pacer pads and numerous EKG leads overlie the patient  An endotracheal tube is present with its tip approximately 2 5 cm above the chriss  An enteric tube is present with its tip extending to below the inferior margin of this film in the epigastric region, presumably over the stomach but not seen  There is pneumomediastinum best visualized along the left heart border with lucency measures up to an estimated thickness of 8 mm  Patchy multifocal groundglass airspace opacities appear slightly progressed when compared to May 17, 2022  Trace costophrenic angle effusions  No evidence for pneumothorax  Kyphoplasty is at multiple levels in the lower thoracic and upper lumbar spine  No evidence for acute osseous abnormality        Impression: Pneumomediastinum  Endotracheal tube tip above the level of the chriss by 2 5 cm  Enteric tube tip overlying the epigastric region below the inferior margin of this film  Slight progression of multifocal groundglass pulmonary parenchymal airspace opacities  Trace costophrenic angle effusions, unchanged  This examination was marked "immediate notification" in Epic in order to begin the standard process by which the radiology reading room liaison alerts the referring practitioner  Workstation performed: [id number]     CTA chest pe study    Result Date: 5/15/2022  Narrative: CTA - CHEST WITH IV CONTRAST - PULMONARY ANGIOGRAM INDICATION:   hypoxic  COMPARISON: None  TECHNIQUE: CTA examination of the chest was performed using angiographic technique according to a protocol specifically tailored to evaluate for pulmonary embolism  Axial, sagittal, and coronal 2D reformatted images were created from the source data and  submitted for interpretation  In addition, coronal 3D MIP postprocessing was performed on the acquisition scanner  Radiation dose length product (DLP) for this visit:  479 mGy-cm     This examination, like all CT scans performed in the [facility], was performed utilizing techniques to minimize radiation dose exposure, including the use of iterative reconstruction and automated exposure control  IV Contrast:  70 mL of iohexol (OMNIPAQUE)  FINDINGS: PULMONARY ARTERIAL TREE:  No pulmonary embolus is seen  LUNGS:  Patchy groundglass densities bilaterally upper lobes, right greater than left Bilateral lower lobe consolidation compatible with compressive atelectasis  There is no tracheal or endobronchial lesion  PLEURA:  Small bilateral effusions  HEART/GREAT VESSELS:  Normal heart size  Aortic valvular calcifications  Coronary artery calcifications  Normal caliber thoracic aorta with atherosclerotic calcifications  No thoracic aortic aneurysm  MEDIASTINUM AND SRI:  Unremarkable  CHEST WALL AND LOWER NECK:   Unremarkable  VISUALIZED STRUCTURES IN THE UPPER ABDOMEN:  Unremarkable  OSSEOUS STRUCTURES:  No acute fracture or destructive osseous lesion  Age indeterminate moderate T5 compression deformity  Multiple old compression deformities and vertebroplasties in the lower thoracic spine  Impression: No pulmonary embolus  Bilateral upper lobe groundglass opacities compatible with pneumonia and/or pulmonary edema  Small bilateral pleural effusions  Workstation performed: [id number]     IR PICC line placement double lumen    Result Date: 5/18/2022  Narrative: PERCUTANEOUSLY INSERTED CENTRAL VENOUS CATHETER PLACEMENT NG TUBE PLACEMENT WITH FLUOROSCOPY HISTORY: TPN FLUORO TIME: 5 9 min NUMBER OF IMAGES: 3 6 PROCEDURE:  The patient was brought to the Interventional Radiology Suite and identified verbally and by wrist band  The right basilic vein was evaluated as a potential access site with ultrasound  The vessel was found to be patent and compressible  The site was prepped and draped in the usual sterile fashion    All elements of maximal sterile barrier technique, cap and mask and sterile gown and sterile gloves and sterile full-body drape and hand hygiene and 2% chlorhexidine for cutaneous antisepsis  Sterile ultrasound technique with sterile gel and sterile probe covers was also utilized  Lidocaine was administered to the skin and a small skin incision was made  Under ultrasound guidance, utilizing sterile ultrasound technique with sterile gel and a sterile probe cover, the right basilic vein was accessed using single wall Seldinger technique  Static images of real time needle entry into the vessel were obtained  This was followed by a  018 guidewire and a sheath and dilator tapered to   018  A 5-[place] central venous catheter was then advanced under fluoroscopic guidance to the cavoatrial junction  The catheter was cut at 41 cm with 0 cm external length  The position was confirmed fluoroscopically  Blood could be freely aspirated and heparinized saline injected  Patient experienced no untoward reactions  Impression: 1  Status post placement of a 5-[place]alan percutaneously inserted central venous catheter via the right basilic vein with its tip at the cavoatrial junction under ultrasound and fluoroscopic guidance  NG tube placement Next using fluoroscopic guidance, an NG tube was placed down into the stomach past the narrowing at the GE junction  2 stiff wires were required to advance this  Impression successful NG tube placement with its tip in the stomach  The tube may be used immediately  Workstation performed: [id number]     Echo complete w/ contrast if indicated    Result Date: 5/23/2022  Narrative: Elaine Casillas  Left Ventricle: Left ventricular cavity size is normal  Wall thickness is mildly increased  There is mild concentric hypertrophy  The left ventricular ejection fraction is 60 to 65 % by visual estimation  Systolic function is normal  Although no diagnostic regional wall motion abnormality was identified, this possibility cannot be completely excluded on the basis of this study  Diastolic function is mildly abnormal, consistent with grade I (abnormal) relaxation     Aortic Valve: There is mild stenosis  Aortic valve area is 1 5 cm2  Peak gradient 25 and mean gradient 15 mmHg    Mitral Valve: There is moderate annular calcification  There is mild regurgitation    Tricuspid Valve: There is mild regurgitation  Pulmonary artery pressure around 45-50 mmHg    As compared to previous study done on 05/16/2022 no significant change  EF remains the same  Echo complete w/ contrast if indicated    Result Date: 5/16/2022  Narrative: Mike De Dios  Left Ventricle: Left ventricular cavity size is normal  Wall thickness is mild to moderately increased  Systolic function is normal   Estimated ejection fraction is 65%  Wall motion is normal    Left Atrium: The atrium is mildly dilated    Aortic Valve: The aortic valve is trileaflet  The leaflets are moderately thickened  The leaflets are mildly calcified  The leaflets exhibit normal mobility  There is trace regurgitation    Mitral Valve: There is mild regurgitation    Tricuspid Valve: There is mild regurgitation  The right ventricular systolic pressure is mildly elevated at 45 mmHg    Pulmonic Valve: There is trace regurgitation  EKG / Monitor: Personally reviewed  Sinus tachycardia      Nato Ashby, [address] [facility]  Cardiology      "This note has been constructed using a voice recognition system  Therefore there may be syntax, spelling, and/or grammatical errors   Please call if you have any questions  "

## 2022-05-30 NOTE — QUICK NOTE
Bob June updated at bedside  All questions answered  Discussed overall course and team's current concerns of multisystem organ failure  Encouraged family to discuss if the patient would require a tracheostomy, what his wishes would be

## 2022-05-30 NOTE — ASSESSMENT & PLAN NOTE
Wt Readings from Last 3 Encounters:   05/29/22 78 6 kg (173 lb 4 5 oz)   05/11/22 62 1 kg (136 lb 14 4 oz)   03/25/22 61 2 kg (135 lb)     · 5/16: Echo-EF 65%, mild TR, mild MR  · 5/23: Repeat Echo post cardiac arrest: EF 60-65%, G1DD, mild AS (BRADY 1 5cm2), mild MR, mild TR  · Gross anasarca on exam: continue volume removal with Bumex infusion  · Monitor I&O, Daily weights

## 2022-05-30 NOTE — PROCEDURES
Arterial Line Insertion    Date/Time: 5/30/2022 12:00 PM  Performed by: Tammy Apodaca PA-C  Authorized by: Tammy Apodaca PA-C     Patient location:  Bedside  Other Assisting Provider: Yes (comment) (Dr Jakub Gonzalez)    Consent:     Consent obtained:  Verbal    Consent given by:  Healthcare agent Mahin Mercado    Risks discussed:  Bleeding, infection, ischemia and pain  Universal protocol:     Procedure explained and questions answered to patient or proxy's satisfaction: yes      Site/side marked: yes      Immediately prior to procedure a time out was called: yes      Patient identity confirmed: Anonymous protocol, patient vented/unresponsive  Indications:     Indications: continuous blood pressure monitoring    Pre-procedure details:     Skin preparation:  Chlorhexidine    Preparation: Patient was prepped and draped in sterile fashion    Sedation:     Sedation type: See MAR  Anesthesia (see MAR for exact dosages): Anesthesia method:  None  Procedure details:     Location / Tip of Catheter:  Radial    Laterality:  Left    Earle's test performed: no      Needle gauge:  20 G    Placement technique:  Percutaneous and ultrasound guided    Ultrasound image availability:  Not saved    Sterile ultrasound techniques: Sterile gel and sterile probe covers were used      Number of attempts:  2    Successful placement: yes      Transducer: waveform confirmed    Post-procedure details:     Post-procedure:  Biopatch applied, sterile dressing applied and sutured    CMS:  Unable to assess    Patient tolerance of procedure:   Tolerated well, no immediate complications

## 2022-05-30 NOTE — ASSESSMENT & PLAN NOTE
· Patient previously in ICU for hypoxia with a max of 15L midflow and concern for aspiration pneumonitis  · Improved and was able to transfer to general medical floor 5/21  · 5/21: PEA arrest on the floor most likely respiratory driven   · ROSC obtained; transferred to unit and again PEA arrested  · 5/24 CT CAP-CHF with moderate basilar effusions and additional upper lung zone patchy airspace opacities likely reflecting asymmetric pulmonary edema  Infiltrate is not entirely excluded     · Has remained on ventilator, day # 8: AC/PC 20/18/60/8  · Wean Fio2 as able for SPO2>90%  · Continues to tolerate  · Atrovent/xopenex/mucomyst nebs q8h  · Continue pulmonary hygiene/ VAP bundle  · Continue volume removal   · Bronchoscopy done 5/29

## 2022-05-30 NOTE — PROCEDURES
NEPHROLOGY DIALYSIS PROCEDURE NOTE      Patient seen and examined on CVVH, tolerating procedure well  All documentation, labs, medications were reviewed by myself, and the treatment plan was reviewed with nurse and patient  Seen on Dialysis at : 11:01 AM  Dialysis Access: Right IJ non tunneled dialysis catheter  Vitals:  112/55  Gtts:  Heparin drip, Dilaudid drip, norepinephrine drip, Precedex, Bumex drip, IV albumin  CVVH:  Will renew 4 K bath, were reduce UF goal to running even, therapy fluid 1 9 L, blood flow rate 200 mL/minute  High access pressures, system keeps alarming    Assessment/Plan:    80 y  o  man with PMH of Aostenosis, scleroderma, CAD   Patient underwent EGD and colonoscopy on 05/11 for possible intussusception, complicated with perforation, underwent exploratory laparotomy, complicated with pneumonia, sepsis, CHF, PEA arrest on 05/21 x2   Nephrology is consulted for MARYELLEN   ay for assistance in the management of MARYELLEN    Acute renal failure/acute tubular necrosis  --acute tubular necrosis in setting of hypotension and cardiac arrest  --started CVVH on May 26, remained so since that time  --will discontinue the Bumex drip  --will plan to run even  --continue CVVH  --discussed with critical care  --ongoing goals of care discussions  --overall prognosis is poor    PEA cardiac arrest  --x2 episodes    Ventilatory dependent respiratory failure  --55% FiO2    Blood pressure/volume status  --suspect that he has extravascular volume overloaded but intravascular volume depleted  --given a dose of IV albumin  --remains on pressor support  --hold Bumex drip  --will run even    Bowel perforation  --status post emergent ex lap on May 11th with low anterior resection, protective loop transverse colostomy with a flex sigmoidoscopy and small-bowel resection  --currently has an abdominal wound VAC in place  --management as per surgery    Anemia  --transfuse for as per the primary team    Review of Systems:  Patient intubated unable to get a review systems    Physical Exam:    General:  Intubated and sedated on the ventilator, looks younger than his stated age  Skin:  No rashes no lesions  CVS:  S1-S2 appreciated, tachycardic  Lungs:  Coarse breath sounds bilaterally  Abdomen:  Nontender nondistended  Access:  Temporary dialysis catheter in place with no exudate  Extremities:  Positive edema  Neuro:  Sedated on the ventilator          Current Facility-Administered Medications:     acetaminophen (TYLENOL) oral suspension 650 mg, 650 mg, Oral, Q6H PRN, ISLEA Mclean, 650 mg at 05/29/22 0857    albumin human (FLEXBUMIN) 25 % injection 25 g, 25 g, Intravenous, Q6H, Zackery Tripathi PA-C, Stopped at 05/30/22 1050    chlorhexidine (PERIDEX) 0 12 % oral rinse 15 mL, 15 mL, Mouth/Throat, Q12H [facility], ISELA Mclean, 15 mL at 05/30/22 0810    dexmedeTOMIDine (Precedex) 400 mcg in sodium chloride 0 9% 100 mL, 0 1-1 5 mcg/kg/hr, Intravenous, Titrated, MACARIO AlcantarNP, Last Rate: 15 6 mL/hr at 05/30/22 0701, 0 8 mcg/kg/hr at 05/30/22 0701    gabapentin (NEURONTIN) oral solution 200 mg, 200 mg, Oral, HS, MACARIO RitterNP, 200 mg at 05/29/22 2110    heparin (porcine) 25,000 units in 0 45% NaCl 250 mL infusion (premix), 400 Units/hr, Intravenous, Continuous, [employer], CRNP, Last Rate: 4 mL/hr at 05/29/22 1525, 400 Units/hr at 05/29/22 1525    HYDROmorphone (DILAUDID) 50 mg in sodium chloride 0 9% 50mL drip, 0 3 mg/hr, Intravenous, Continuous, MACARIO RitterNP, Last Rate: 0 3 mL/hr at 05/28/22 1026, 0 3 mg/hr at 05/28/22 1026    insulin lispro (HumaLOG) 100 units/mL subcutaneous injection 1-6 Units, 1-6 Units, Subcutaneous, Q6H [facility], 1 Units at 05/30/22 0012 **AND** Fingerstick Glucose (POCT), , , Q6H, ISELA Ritter    iohexol (OMNIPAQUE) 240 MG/ML solution 50 mL, 50 mL, Oral, Once in imaging, ISELA Mclean    ipratropium-albuterol (DUO-NEB) 0 5-2 5 mg/3 mL inhalation solution 3 mL, 3 mL, Nebulization, Q6H PRN, Becky Bumpers, CRNP, 3 mL at 05/28/22 2106    levothyroxine tablet 112 mcg, 112 mcg, Per NG Tube, Early Morning, Becky Bumpers, CRNP, 112 mcg at 05/30/22 0541    lidocaine (LIDODERM) 5 % patch 2 patch, 2 patch, Topical, Daily, Khris Renee PA-C, 2 patch at 05/30/22 0809    norepinephrine (LEVOPHED) 8 mg (DOUBLE CONCENTRATION) IV in sodium chloride 0 9% 250 mL, 1-30 mcg/min, Intravenous, Titrated, Grzegorz Tripathi PA-C, Last Rate: 18 8 mL/hr at 05/30/22 1030, 10 mcg/min at 05/30/22 1030    NxStage K 4/Ca 3 dialysis solution (RFP-401) 20,000 mL, 20,000 mL, Dialysis, Continuous, Jaelyn Servin MD, Stopped at 05/28/22 2350    NxStage K 4/Ca 3 dialysis solution (RFP-401) 20,000 mL, 20,000 mL, Dialysis, Continuous, Jaelyn Servin MD, 20,000 mL at 05/30/22 0800    ondansetron (ZOFRAN) injection 4 mg, 4 mg, Intravenous, Q6H PRN, Becky Bumpers, CRNP    oxyCODONE (ROXICODONE) oral solution 5 mg, 5 mg, Per NG Tube, Q4H PRN, 5 mg at 05/29/22 2149 **OR** oxyCODONE (ROXICODONE) oral solution 7 5 mg, 7 5 mg, Per NG Tube, Q4H PRN, ISELA Shaw    pantoprazole (PROTONIX) injection 40 mg, 40 mg, Intravenous, Q24H [address], Becky Bumpers, CRNP, 40 mg at 05/30/22 0810    polyethylene glycol (MIRALAX) packet 17 g, 17 g, Oral, Daily, ISELA Ritter, 17 g at 05/30/22 0610

## 2022-05-30 NOTE — RESPIRATORY THERAPY NOTE
Patient received on PC mode of ventilation  See flowsheet for settings  VSS, and sync with vent  Will monitor

## 2022-05-30 NOTE — ASSESSMENT & PLAN NOTE
· Outpatient EGD and colonoscopy at [facility] on 5/11 for w/u of chronic anemia, history of colon polyps, intermittent LLQ abdominal pain and possible intermittent intussusception  · EGD unremarkable, colonoscopy technically difficult and required change from adult scope to pediatric scope  · During traversal of the sigmoid colon there was a kink and patient sustained a perforation  Procedure was aborted and patient was transferred to [facility] where immediate surgical consultation was obtained  · Emergent ex- lap on 5/11 > low anterior resection, protective loop transverse colostomy, flex sigmoidoscopy, and segmental small bowel resection  · Difficult post op course with post op ileus requiring NGT decompression and initiation of TPN; not tolerating trickle feeds  · 5/22: CT: Diffuse mild dilation of small bowel segments identified without transition point  · 5/24: CT CAP- Distended small bowel loops with scattered air-fluid levels consistent with early/partial small bowel obstruction with transition at the small bowel anastomosis in the region of the ventral pelvis as above  No free air  Cholelithiasis without cholecystitis  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

## 2022-05-30 NOTE — ASSESSMENT & PLAN NOTE
· S/p cardiac arrest while on 3 pressors noted to have afib with RVR  · Bolused with amio and placed on infusion  · Converted to sinus rhythm on 5/22   · Amio gtt d/c 5/23  · Has remained in NSR  · No indication for [facility] [place]  · Afib was likely post arrest in setting of triple pressors

## 2022-05-30 NOTE — ASSESSMENT & PLAN NOTE
· Secondary to hypoperfusion mehul cardiac arrest +/- ATN  · Baseline creat 0 8  · Creatinine peaked at 3 07  · Continue CVVH - goal to run negative   · Continue bumex gtt   · CVVH filter clotted overnight kept off given adequate UOP - continue pre filter heparin drip   · Avoid hypotension; continue levophed for MAP > 65

## 2022-05-31 NOTE — CASE MANAGEMENT
Case Management Progress Note    Patient name Debby Tamayo  Location ICU 02/ICU 02 MRN [id number]  : 2/15/1931 Date 2022       LOS (days): 20  Geometric Mean LOS (GMLOS) (days): 6 80  Days to GMLOS:-13 2        OBJECTIVE:     Current admission status: Inpatient  Preferred Pharmacy:   [id number], [address] [facility]  [address] [address]  Phone: [phone] Fax: [phone]    [id number], [address],  [address]  [facility] [address] [address]  Phone: [phone] Fax: [phone]    Primary Care Provider: Tony Wheeler MD    Primary Insurance: MEDICARE  Secondary Insurance: [facility]    PROGRESS NOTE:    Patient remains intubated in ICU (ventilator day #8)  Nephro continues CVVHD for renal failure/fluid overload  Pt is now making urine  Plans to stop CVVHD and transition to IHD in next 1-2 days  Per cardio, pt has gained 37lb of fluid since admission  Cardio recommending limited ECHO to re-evaluate EF  Surgery continues to follow  POD #20  Wound vac remains in place  Pt continues with TFs  Per Critical Care AP, pt is not anticipated to be able to be extubated at this time  [address] discussion held with family re likely need for Trach/Peg  Plan is to monitor pt's progress over the next few days  Family will need to make decision on if they want to pursue Trach/PEG  Family understanding and would like to discuss amongst themselves  SW discussed scheduling Care Team meeting with SLLUCIA AP this week  LTACH/SNF referrals continue to follow

## 2022-05-31 NOTE — PROGRESS NOTES
Progress Note - Critical Care   Kota Mathur 80 y o  male MRN: [id number]  Unit/Bed#: ICU 02 Encounter: [id number]            Acute renal failure (ARF) ([address] )  Assessment & Plan  · Secondary to hypoperfusion mehul cardiac arrest +/- ATN  · Baseline creat 0 8  · Creatinine peaked at 3 07  · Continue CVVH - goal to run negative   · CVVH at 125  · Avoid hypotension; continue levophed for MAP > 65    * Bowel perforation (HCC)  Assessment & Plan  · Outpatient EGD and colonoscopy at [facility] on 5/11 for w/u of chronic anemia, history of colon polyps, intermittent LLQ abdominal pain and possible intermittent intussusception  · EGD unremarkable, colonoscopy technically difficult and required change from adult scope to pediatric scope  · During traversal of the sigmoid colon there was a kink and patient sustained a perforation  Procedure was aborted and patient was transferred to [facility] where immediate surgical consultation was obtained  · Emergent ex- lap on 5/11 > low anterior resection, protective loop transverse colostomy, flex sigmoidoscopy, and segmental small bowel resection  · Difficult post op course with post op ileus requiring NGT decompression and initiation of TPN; not tolerating trickle feeds  · 5/22: CT: Diffuse mild dilation of small bowel segments identified without transition point  · 5/24: CT CAP- Distended small bowel loops with scattered air-fluid levels consistent with early/partial small bowel obstruction with transition at the small bowel anastomosis in the region of the ventral pelvis as above  No free air  Cholelithiasis without cholecystitis  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     Hypotension  Assessment & Plan  · Previously septic shock  · Now likely secondary to sedation requirements  · Wean levophed for MAP >65    Atrial fibrillation (HCC)  Assessment & Plan  · S/p cardiac arrest while on 3 pressors noted to have afib with RVR  · Bolused with amio and placed on infusion  · Converted to sinus rhythm on 5/22   · Amio gtt d/c 5/23  · Has remained in NSR  · No indication for [facility]  · Afib was likely post arrest in setting of triple pressors    Anemia  Assessment & Plan  · Hemoglobin drop post cardiac arrest    · Noted to have gross hematuria but this has since resolved  · 5/21: 1 u PRBC for hgb 6 8  · 5/24: 1 u PRBC  · CT obtained on 5/24 and negative for any overt signs of bleeding  · 5/26: 1 u PRBC  · Continue to monitor   · Transfuse for hgb less than 7      Cardiac arrest [facility])  Assessment & Plan  · Patient was found unresponsive and hypoxic approximately 20 minutes after being seen well with family 5/21   · PEA arrest x 2  · Most likely respiratory driven  · Neurologically intact post arrest     Hyperglycemia  Assessment & Plan  · A1c 5 3%  · Was requiring insulin infusion while on TPN  · Continue SSI    CHF (congestive heart failure) ([address] )  Assessment & Plan  Wt Readings from Last 3 Encounters:   05/31/22 77 6 kg (171 lb 1 2 oz)   05/11/22 62 1 kg (136 lb 14 4 oz)   03/25/22 61 2 kg (135 lb)     · 5/16: Echo-EF 65%, mild TR, mild MR  · 5/23: Repeat Echo post cardiac arrest: EF 60-65%, G1DD, mild AS (BRADY 1 5cm2), mild MR, mild TR  · Monitor I&O, Daily weights   · Limited ECHO pending      Severe sepsis (HCC)  Assessment & Plan  · Peritonitis with intra-abdominal/pelvic abscess secondary to colonic perforation    · 5/22 CT chest/ab/pelvis with concern of multifocal PNA, no visualized intraabdominal abscess or anastomotic leak   · Per ID suspect multifocal pneumonitis   · OR culture 5/21 pseudomonas and bacteroides fragilis  · Repeat blood cultures on 5/22 negative   · Completed 14 days of Flagyl on 5/24  · Completed 14 days of cefepime on 5/27  · Observe off antibiotics   · ID following, appreciate recs     Toxic metabolic encephalopathy  Assessment & Plan  · Likely in setting of acute illness/delirium and cardiac arrest  · Delirium precautions; regulate sleep/wake cycle, environmental controls, daily CAM ICU   · Trend neuro exam  · Following commands, nodding appropriately to questions, moves all extremities    Acute respiratory failure with hypoxia ([address] )  Assessment & Plan  · Patient previously in ICU for hypoxia with a max of 15L midflow and concern for aspiration pneumonitis  · Improved and was able to transfer to general medical floor 5/21  · 5/21: PEA arrest on the floor most likely respiratory driven   · ROSC obtained; transferred to unit and again PEA arrested  · 5/24 CT CAP-CHF with moderate basilar effusions and additional upper lung zone patchy airspace opacities likely reflecting asymmetric pulmonary edema  Infiltrate is not entirely excluded  · Has remained on ventilator, day # 8: AC/PC 20/18/60/8  · Wean Fio2 as able for SPO2>90%  · Continues to tolerate  · Atrovent/xopenex/mucomyst nebs q8h  · Continue pulmonary hygiene/ VAP bundle  · Continue volume removal   · Bronchoscopy done 5/29          HPI/24hr events: Patient hemoglobin 6 7 yesterday afternoon  Given 1 unit pRBC and repeat hemoglobin 7 4  Patient continues to be intubated  He withdraws from painful stimuli and follows commands  Physical Exam:     Constitutional:       Appearance: He is ill-appearing and toxic-appearing  Comments: B/L wrist restraints in place   Cardiovascular:      Rate and Rhythm: Normal rate and regular rhythm  Pulses: Normal pulses  Heart sounds: Normal heart sounds  Pulmonary:      Comments: ETT on mechanical ventilation  B/L breath sounds diminished   Abdominal:      General: Bowel sounds are normal       Palpations: Abdomen is soft        Comments: Tube feeds via OGT  Left upper quadrant colostomy   Genitourinary:     Comments: Urinary catheter   Musculoskeletal:      Cervical back: Normal range of motion and neck supple  Comments: Generalized swelling   Skin:     General: Skin is warm and dry  Neurological:      Comments: Sedated but arouses to painful stimuli and follows commands    Psychiatric:      Comments: Sedated      Vitals:    22 0400 22 0500 22 0534 22 0600   BP: 116/56 123/59  124/65   Pulse: 77 85  83   Resp: (!) 33 (!) 40  (!) 38   Temp: (!) 97 3 °F (36 3 °C) (!) 97 2 °F (36 2 °C)  (!) 96 8 °F (36 °C)   TempSrc:       SpO2: 96% 95%  94%   Weight:   77 6 kg (171 lb 1 2 oz)    Height:         Arterial Line BP: 136/43  Arterial Line MAP (mmHg): 74 mmHg    Temperature:   Temp (24hrs), Av 4 °F (36 3 °C), Min:96 8 °F (36 °C), Max:97 7 °F (36 5 °C)    Current: Temperature: (!) 96 8 °F (36 °C)    Weights:   IBW (Ideal Body Weight): 59 2 kg    Body mass index is 29 37 kg/m²    Weight (last 2 days)     Date/Time Weight    22 0534 77 6 (171 08)    22 0600 79 5 (175 27)    22 0600 78 6 (173 28)          Hemodynamic Monitoring:  N/A     Non-Invasive/Invasive Ventilation Settings:  Respiratory  Report   Lab Data (Last 4 hours)       0547            pH, Arterial       7 402             pCO2, Arterial       39 4             pO2, Arterial       65 1             HCO3, Arterial       24 0             Base Excess, Arterial       -0 7                  O2/Vent Data     None              Lab Results   Component Value Date    PHART 7 402 2022    KPW9SWN 39 4 2022    PO2ART 65 1 (L) 2022    HNZ2HSI 24 0 2022    BEART -0 7 2022    SOURCE Line, Arterial 2022     SpO2: SpO2: 94 %    Intake and Outputs:  I/O        0701   0700  0701   07    I V  (mL/kg) 1855 9 (23 3) 2054 9 (26 5)    Blood  350    NG/ 105    IV Piggyback 156     Feedings 1063 547    Total Intake(mL/kg) 3204 9 (40 3) 3056 9 (39 4)    Urine (mL/kg/hr) 970 (0 5) 1025 (0 6)    Drains 150 100    Other 2411 3094    Stool 290 100    Total Output 3891 8299    Net -619 7 -4435 1                Nutrition:        Diet Orders   (From admission, onward)             Start     Ordered    05/26/22 1351  Diet Enteral/Parenteral; Tube Feeding No Oral Diet; Osmolite 1 0; Continuous; 55  Diet effective now        Comments: Increase by 10 cc every four hours until reach goal   References:    Nutrtion Support Algorithm Enteral vs  Parenteral   Question Answer Comment   Diet Type Enteral/Parenteral    Enteral/Parenteral Tube Feeding No Oral Diet    Tube Feeding Formula: Osmolite 1 0    Bolus/Cyclic/Continuous Continuous    Tube Feeding Goal Rate (mL/hr): 55    RD to adjust diet per protocol? Yes        05/26/22 1350    05/12/22 0906  Room Service  Once        Question:  Type of Service  Answer:  Room Service - Appropriate with Assistance    05/12/22 0905                  Labs:   Results from last 7 days   Lab Units 05/31/22  0533 05/30/22  1217 05/30/22  0523 05/29/22  0600 05/28/22  0602 05/27/22  0546   WBC Thousand/uL 15 28*  --  16 84* 13 39*   < > 22 51*   HEMOGLOBIN g/dL 7 4* 6 5* 7 0* 7 3*   < > 8 8*   HEMATOCRIT % 21 8* 19 6* 20 5* 21 0*   < > 24 9*   PLATELETS Thousands/uL 116*  --  115* 140*   < > 114*   MONO PCT % 3*  --   --   --   --  5    < > = values in this interval not displayed        Results from last 7 days   Lab Units 05/31/22  0533 05/30/22  2317 05/30/22  1807 05/29/22  0753 05/29/22  0600 05/25/22  1132 05/25/22  0505   SODIUM mmol/L 136 135* 137   < > 136   < > 136   POTASSIUM mmol/L 4 2 4 2 4 5   < > 4 1   < > 3 9   CHLORIDE mmol/L 104 104 103   < > 103   < > 108   CO2 mmol/L 26 24 26   < > 26   < > 18*   BUN mg/dL 24 25 27*   < > 36*   < > 92*   CREATININE mg/dL 1 13 1 18 1 19   < > 1 44*   < > 2 41*   CALCIUM mg/dL 8 8 8 8 8 6   < > 9 0   < > 7 5*   ALK PHOS U/L  --   --   --   --  260*  --  54   ALT U/L  --   -- --   --  19  --  15   AST U/L  --   --   --   --  15  --  15    < > = values in this interval not displayed  Results from last 7 days   Lab Units 05/31/22  0533 05/30/22  2317 05/30/22  1807   MAGNESIUM mg/dL 2 0 2 1 1 9     Results from last 7 days   Lab Units 05/31/22  0533 05/30/22  2317 05/30/22  1807   PHOSPHORUS mg/dL 2 9 2 3 2 8      Results from last 7 days   Lab Units 05/29/22  1545 05/28/22  0602 05/26/22  2353   INR  1 28*  --  1 58*   PTT seconds 53* 74* 61*         No results found for: TROPONINI    Imaging:   XR chest portable   Final Result      Increased size of a moderate left pleural effusion and atelectasis adjacent to the major fissure  Stable bilateral airspace opacities, right upper lobe collapse and small right pleural effusion  Workstation performed: [id number]         XR chest portable ICU   Final Result      Worsening severe bilateral consolidation with small effusions and recurrent right upper lobe collapse  Workstation performed: [id number]         XR chest portable ICU   Final Result      Interval placement of a right IJ catheter with tip projecting over the mid SVC  No pneumothorax  Persistent bilateral pulmonary opacities  Workstation performed: [id number]         CT chest abdomen pelvis wo contrast   Final Result   1  CHF with moderate basilar effusions and additional upper lung zone patchy airspace opacities likely reflecting asymmetric pulmonary edema  Infiltrate is not entirely excluded  2   Distended small bowel loops with scattered air-fluid levels consistent with early/partial small bowel obstruction with transition at the small bowel anastomosis in the region of the ventral pelvis as above  No free air  3   Cholelithiasis without cholecystitis  4   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  Concordant preliminary results were provided by [employer] at 0531 hours on 5/24/2022  Workstation performed: [id number]         XR chest portable ICU   Final Result      Bilateral airspace disease is unchanged  The Keofeed tube has been removed  Workstation performed: [id number]         XR chest portable   Final Result   Addendum 1 of 1   ADDENDUM:      The right IJ line terminates in the SVC  The PICC line terminates at the    junction of the SVC and right atrium  Final         1  Keofed tube terminates at the EG junction and should be advanced for  use  2   Bilateral airspace disease may represent infiltrates or the alveolar phase of heart failure  Bilateral pleural effusions  Workstation performed: [id number]         CT chest abdomen pelvis wo contrast   Final Result      1  Stable appearance of mixed groundglass and consolidative abnormalities in the lungs bilaterally predominantly affecting the upper lobes suspicious for multilobar pneumonia  Superimposed pulmonary edema not excluded  2   Similar to slight increase size of moderate bilateral pleural effusions  3   Diffuse mild dilation of small bowel segments identified without transition point  Workstation performed: [id number]         XR chest portable ICU   Final Result      Right neck catheter in the distal SVC                  Workstation performed: [id number]         XR abdomen 1 view kub   Final Result      Persistent gas distended small bowel loops with normal caliber colon with contrast                Workstation performed: [id number]         XR chest portable ICU   Final Result      Improved pneumomediastinum  Bilateral groundglass infiltrative changes  Workstation performed: [id number]         XR chest portable ICU   Final Result      Pneumomediastinum  Endotracheal tube tip above the level of the chriss by 2 5 cm  Enteric tube tip overlying the epigastric region below the inferior margin of this film        Slight progression of multifocal groundglass pulmonary parenchymal airspace opacities  Trace costophrenic angle effusions, unchanged  This examination was marked "immediate notification" in Epic in order to begin the standard process by which the radiology reading room liaison alerts the referring practitioner  Workstation performed: [id number]         XR abdomen 1 view kub   Final Result      Slight worsening of the prominent dilated small bowel loops  Contrast in the colon  Workstation performed: [id number]         IR PICC line placement double lumen   Final Result   1  Status post placement of a 5-Kazakh percutaneously inserted central venous catheter via the right basilic vein with its tip at the cavoatrial junction under ultrasound and fluoroscopic guidance  NG tube placement   Next using fluoroscopic guidance, an NG tube was placed down into the stomach past the narrowing at the GE junction  2 stiff wires were required to advance this  Impression successful NG tube placement with its tip in the stomach  The tube may be used immediately  Workstation performed: [id number]         XR abdomen 1 view kub   Final Result      Radiographic appearance suspicious for early or mild distal small bowel obstruction  Alternatively, this could represent ileus  Enteric contrast administered for the May 17, 2022 examination does not appear to have reached the large bowelAn       enteric catheter tip overlies the expected location of the cavoatrial junction and has not quite reached the stomach  Advancement by approximately 8 to 10 cm recommended  This examination was marked "significant notification" in Epic in order to begin the standard process by which the radiology reading room liaison alerts the referring practitioner  Workstation performed: [id number]         CT chest abdomen pelvis wo contrast   Final Result      1    Worsening patchy mixed groundglass and consolidative change bilaterally compatible with multifocal pneumonia and/or pulmonary edema  2   Increased bilateral pleural effusions  3   Sidehole of enteric tube is visualized near the gastroesophageal junction  Consider tube advancement  4   Mild distention of mid to distal esophagus with enteric contrast material   Correlate for gastroesophageal reflux  5   Multiple dilated small bowel loops without a clear transition point  The finding may reflect ileus, however developing small bowel obstruction cannot be excluded  Follow-up is recommended  6   Moderate amount of air within the urinary bladder, likely related to instrumentation  Correlate with urinalysis to exclude cystitis  I personally discussed this study with Benedict Sorto on 5/18/2022 at 1:11 AM                Workstation performed: [id number]         XR chest portable   Final Result      1  Tip of enteric tube is at the expected position of the gastroesophageal junction  Slight advancement is advised  2   Slight increased predominant upper lobe opacities, likely representing pneumonia  Concomitant edema is possible  The now trace bilateral pleural effusions have decreased in size  The study was marked in [facility] for immediate notification  Workstation performed: [id number]         XR abdomen 1 view kub   Final Result      Diffusely distended, dilated small bowel loops with small amount of persistent air extending to the proximal colon  Findings may suggest diffuse ileus or partial obstruction  Workstation performed: [id number]         CTA chest pe study   Final Result      No pulmonary embolus  Bilateral upper lobe groundglass opacities compatible with pneumonia and/or pulmonary edema  Small bilateral pleural effusions  Workstation performed: [id number]         XR chest portable   Final Result      Pulmonary edema and small bilateral pleural effusions  Workstation performed: [id number]         IR other    (Results Pending)           Micro:  Lab Results   Component Value Date    BLOODCX No Growth After 5 Days  05/22/2022    BLOODCX No Growth After 5 Days  05/22/2022    BLOODCX No Growth After 5 Days  05/18/2022    WOUNDCULT 1+ Growth of Pseudomonas aeruginosa (A) 05/11/2022    SPUTUMCULTUR Test not performed  Suggest repeat specimen   05/17/2022       Allergies: No Known Allergies    Medications:   Scheduled Meds:  Current Facility-Administered Medications   Medication Dose Route Frequency Provider Last Rate    acetaminophen  650 mg Oral Q6H PRN Naoma Lio, CRNP      chlorhexidine  15 mL Mouth/Throat Q12H [address] Naoma Lio, CRNP      dexmedetomidine  0 1-1 5 mcg/kg/hr Intravenous Titrated Lauryn Lupillo, CRNP 0 4 mcg/kg/hr (05/31/22 0449)    gabapentin  200 mg Oral HS ISELA Andres      heparin (porcine)  400 Units/hr Intravenous Continuous Birtha Bores, CRNP 400 Units/hr (05/29/22 1525)    HYDROmorphone  0 3 mg/hr Intravenous Continuous Josephine [place] Jonathan, CRNP 0 3 mg/hr (05/30/22 1617)    insulin lispro  1-6 Units Subcutaneous Q6H [address] Josephine [place] Jonathan, CRNP      iohexol  50 mL Oral Once in imaging Naoma Lio, CRNP      ipratropium-albuterol  3 mL Nebulization Q6H PRN Naoma Lio, CRNP      levothyroxine  112 mcg Per NG Tube Early Morning Naoma Lio, CRNP      lidocaine  2 patch Topical Daily [place], [place]      norepinephrine  1-30 mcg/min Intravenous Titrated Jenaro Coral [place], [address] Stopped (05/30/22 2209)    NxStage K 4/Ca 3  20,000 mL Dialysis Continuous Kavitha Kimble MD 0 mL (05/28/22 2350)    NxStage K 4/Ca 3  20,000 mL Dialysis Continuous Kavitha Kimble MD      ondansetron  4 mg Intravenous Q6H PRN Naoma Lio, CRNP      oxyCODONE  5 mg Per NG Tube Q4H PRN Lauryn Lupillo, CRNP      Or    oxyCODONE  7 5 mg Per NG Tube Q4H PRN SIELA Rizzo      pantoprazole  40 mg Intravenous Q24H [facility] ISELA Chappell      polyethylene glycol  17 g Oral Daily ISELA Ritter       Continuous Infusions:dexmedetomidine, 0 1-1 5 mcg/kg/hr, Last Rate: 0 4 mcg/kg/hr (05/31/22 0449)  heparin (porcine), 400 Units/hr, Last Rate: 400 Units/hr (05/29/22 1525)  HYDROmorphone, 0 3 mg/hr, Last Rate: 0 3 mg/hr (05/30/22 1617)  norepinephrine, 1-30 mcg/min, Last Rate: Stopped (05/30/22 2209)  NxStage K 4/Ca 3, 20,000 mL, Last Rate: 0 mL (05/28/22 2350)  NxStage K 4/Ca 3, 20,000 mL      PRN Meds:  acetaminophen, 650 mg, Q6H PRN  iohexol, 50 mL, Once in imaging  ipratropium-albuterol, 3 mL, Q6H PRN  ondansetron, 4 mg, Q6H PRN  oxyCODONE, 5 mg, Q4H PRN   Or  oxyCODONE, 7 5 mg, Q4H PRN        VTE Pharmacologic Prophylaxis: Heparin  VTE Mechanical Prophylaxis: sequential compression device    Invasive lines and devices: Invasive Devices  Report    Peripherally Inserted Central Catheter Line  Duration           PICC Line 57/10/73 Right Basilic 12 days          Arterial Line  Duration           Arterial Line 05/30/22 Radial <1 day          Hemodialysis Catheter  Duration           HD Temporary Double Catheter 4 days          Drain  Duration           Colostomy LLQ 20 days    NG/OG/Enteral Tube Orogastric 16 Fr Center mouth 10 days    Urethral Catheter Double-lumen 16 Fr  10 days          Airway  Duration           ETT  Oral 9 days                   Counseling / Coordination of Care  Total Critical Care time spent 30 minutes excluding procedures, teaching and family updates  Code Status: Level 1 - Full Code     Portions of the record may have been created with voice recognition software  Occasional wrong word or "sound a like" substitutions may have occurred due to the inherent limitations of voice recognition software  Read the chart carefully and recognize, using context, where substitutions have occurred       Ellen Scanlon MD

## 2022-05-31 NOTE — ASSESSMENT & PLAN NOTE
· Secondary to hypoperfusion mehul cardiac arrest +/- ATN  · Baseline creat 0 8  · Creatinine peaked at 3 07  · CVVH started on 5/26 and stopped on 6/1  · It was restarted on 6/2 after he came anuric and had increasing oxygen requirements  · Continue CVVH with goal negative   · Patient was tolerating -100ml/hr; however had acute decline overnight requiring 2nd vasopressor to be added   Continue even for now  · Avoid hypotension/hypoperfusion  · Continue levophed for MAP > 65

## 2022-05-31 NOTE — ASSESSMENT & PLAN NOTE
· In the setting of cardiac arrest while on 3 pressors noted to have afib with RVR  · Bolused with amio and placed on infusion  · Converted to sinus rhythm on 5/22   · Amio gtt d/c 5/23  · Has remained in NSR  · No indication for [facility] [place] [place] by [facility]  · Afib was likely post arrest in setting of triple pressors

## 2022-05-31 NOTE — ASSESSMENT & PLAN NOTE
· Patient previously in ICU for hypoxia with a max of 15L midflow and concern for aspiration pneumonitis  · Transfered to general medical floor 5/21  · 5/21: PEA arrest x2   · 5/24 CT CAP-CHF with moderate basilar effusions and additional upper lung zone patchy airspace opacities likely reflecting asymmetric pulmonary edema  Infiltrate is not entirely excluded     · 5/29 Bronchoscopy for mucous plugging  · Day # 14 on ventilator: AC/PC 20/24/60/10  · Wean Fio2 as able for SPO2>90%  · Continue pulmonary hygiene/ VAP bundle  · Continue volume removal with CVVH  · Ongoing GOC discussions in regards to proceeding with trach/peg

## 2022-05-31 NOTE — ASSESSMENT & PLAN NOTE
· Levophed restarted on 6/1  · Midodrine added 5 mg TID  · Continue to monitor and make adjustments accordingly

## 2022-05-31 NOTE — PHYSICAL THERAPY NOTE
PHYSICAL THERAPY     05/31/22 1706   Note Type   Note Type Cancelled Session   Cancel Reasons Other  (patient with family and medical staff at this time, will reattempt)   Licensure   [place] License Number  Valeri Leyden PT [id number]

## 2022-05-31 NOTE — ASSESSMENT & PLAN NOTE
· Hemoglobin drop post cardiac arrest    · Noted to have gross hematuria but this has since resolved  · 5/21: 1 u PRBC for hgb 6 8  · 5/24: 1 u PRBC  · CT obtained on 5/24 and negative for any overt signs of bleeding  · 5/26: 1 u PRBC  · 5/30: 1 u PRBC  · Continue to monitor and transfuse for hgb less than 7

## 2022-05-31 NOTE — ASSESSMENT & PLAN NOTE
Wt Readings from Last 3 Encounters:   05/31/22 77 6 kg (171 lb 1 2 oz)   05/11/22 62 1 kg (136 lb 14 4 oz)   03/25/22 61 2 kg (135 lb)     · 5/16: Echo-EF 65%, mild TR, mild MR  · 5/23: Repeat Echo post cardiac arrest: EF 60-65%, G1DD, mild AS (BRADY 1 5cm2), mild MR, mild TR  · Monitor I&O, Daily weights   · Limited ECHO-EF 65%, G1DD, mild AS

## 2022-05-31 NOTE — PROCEDURES
NEPHROLOGY DIALYSIS PROCEDURE NOTE      Patient seen and examined on CVVH, tolerating procedure well  All documentation, labs, medications were reviewed by myself, and the treatment plan was reviewed with nurse and patient  Seen on Dialysis at : 8:45 AM  Dialysis Access: Right IJ non tunneled dialysis catheter  Vitals:   A line blood pressure 132/63, MAP 91  Gtts:  heparin drip, Precedex, off Levophed drip since 10:00 p m  Last night  CVVH:  Will let the 4K bath , and then possibly transition to intermittent hemodialysis    Assessment/Plan:    80 y  o  man with PMH of Aostenosis, scleroderma, CAD   Patient underwent EGD and colonoscopy on  for possible intussusception, complicated with perforation, underwent exploratory laparotomy, complicated with pneumonia, sepsis, CHF, PEA arrest on 05/21 x2   Nephrology is consulted for MARYELLEN   ay for assistance in the management of MARYELLEN     Acute renal failure/acute tubular necrosis  --acute tubular necrosis in setting of hypotension and cardiac arrest  --started CVVH on May 26, remained so since that time  --off Bumex drip  --currently running net -125 mL hour, tolerating procedure well  --urine output has started to improve, could be reflection better blood pressure  --discussed with critical care  --ongoing goals of care discussions  --will allow CVVH to run out and then plan to stop, then plan for intermittent hemo hemodialysis in the next 24-48 hours  --no objections to intermittent diuretics once off CVVH if needed for urine output and volume     PEA cardiac arrest  --x2 episodes     Ventilatory dependent respiratory failure  --55% FiO2     Blood pressure/volume status  --suspect that he has extravascular volume overloaded but intravascular volume depleted  --currently receiving colloid expansion with IV albumin  --remains on pressor support  --extravascular volume overloaded  --running net -125 mL/hour     Bowel perforation  --status post emergent ex lap on May 11th with low anterior resection, protective loop transverse colostomy with a flex sigmoidoscopy and small-bowel resection  --currently has an abdominal wound VAC in place  --management as per surgery     Anemia  --transfuse for as per the primary team    Review of Systems: The entire 12 point ROS has been reviewed      Physical Exam:    General:  Intubated and sedated  Skin:  No rashes no lesions  CVS:  S1-S2 appreciated regular rhythm  Lungs:  Coarse breath sounds bilaterally  Abdomen:  No guarding no tenderness  Access:  Temporary dialysis catheter with no exudate  Extremities:  Positive anasarca  Neuro:  Sedated on the ventilator          Current Facility-Administered Medications:     acetaminophen (TYLENOL) oral suspension 650 mg, 650 mg, Oral, Q6H PRN, Yost Cost, CRNP, 650 mg at 05/29/22 0857    chlorhexidine (PERIDEX) 0 12 % oral rinse 15 mL, 15 mL, Mouth/Throat, Q12H EWELINA, Priyank Montanez, CRNP, 15 mL at 05/31/22 0809    dexmedeTOMIDine (Precedex) 400 mcg in sodium chloride 0 9% 100 mL, 0 1-1 5 mcg/kg/hr, Intravenous, Titrated, Kathy Ogden CRNP, Last Rate: 7 8 mL/hr at 05/31/22 0701, 0 4 mcg/kg/hr at 05/31/22 0701    gabapentin (NEURONTIN) oral solution 200 mg, 200 mg, Oral, HS, Josephine Castillo, CRNP, 200 mg at 05/30/22 2226    heparin (porcine) 25,000 units in 0 45% NaCl 250 mL infusion (premix), 400 Units/hr, Intravenous, Continuous, MACARIO MartínezNP, Last Rate: 4 mL/hr at 05/29/22 1525, 400 Units/hr at 05/29/22 1525    HYDROmorphone (DILAUDID) 50 mg in sodium chloride 0 9% 50mL drip, 0 3 mg/hr, Intravenous, Continuous, MACARIO RitterNP, Last Rate: 0 3 mL/hr at 05/30/22 1617, 0 3 mg/hr at 05/30/22 1617    insulin lispro (HumaLOG) 100 units/mL subcutaneous injection 1-6 Units, 1-6 Units, Subcutaneous, Q6H [facility], 1 Units at 05/31/22 0538 **AND** Fingerstick Glucose (POCT), , , Q6H, ISELA Ritter    iohexol (OMNIPAQUE) 240 MG/ML solution 50 mL, 50 mL, Oral, Once in imaging, ISELA Gorman    ipratropium-albuterol (DUO-NEB) 0 5-2 5 mg/3 mL inhalation solution 3 mL, 3 mL, Nebulization, Q6H PRN, ISELA Gorman, 3 mL at 05/28/22 2106    levothyroxine tablet 112 mcg, 112 mcg, Per NG Tube, Early Morning, ISELA Gorman, 112 mcg at 05/31/22 0510    lidocaine (LIDODERM) 5 % patch 2 patch, 2 patch, Topical, Daily, Toan Earl PA-C, 2 patch at 05/31/22 0809    norepinephrine (LEVOPHED) 8 mg (DOUBLE CONCENTRATION) IV in sodium chloride 0 9% 250 mL, 1-30 mcg/min, Intravenous, Titrated, [employer], CRNP, Stopped at 05/30/22 2209    NxStage K 4/Ca 3 dialysis solution (RFP-401) 20,000 mL, 20,000 mL, Dialysis, Continuous, Bora Jenkins MD, Last Rate: 0 mL/hr at 05/28/22 2350, Rate Verify at 05/31/22 0701    NxStage K 4/Ca 3 dialysis solution (RFP-401) 20,000 mL, 20,000 mL, Dialysis, Continuous, Bora Jenkins MD, 20,000 mL at 05/30/22 0800    ondansetron (ZOFRAN) injection 4 mg, 4 mg, Intravenous, Q6H PRN, ISELA Gorman    oxyCODONE (ROXICODONE) oral solution 5 mg, 5 mg, Per NG Tube, Q4H PRN, 5 mg at 05/29/22 2149 **OR** oxyCODONE (ROXICODONE) oral solution 7 5 mg, 7 5 mg, Per NG Tube, Q4H PRN, ISELA Garcia    pantoprazole (PROTONIX) injection 40 mg, 40 mg, Intravenous, Q24H [address], ISELA Gorman, 40 mg at 05/31/22 0809    polyethylene glycol (MIRALAX) packet 17 g, 17 g, Oral, Daily, ISELA Ritter, 17 g at 05/31/22 6263

## 2022-05-31 NOTE — PLAN OF CARE
Plan of care cont        Problem: PAIN - ADULT  Goal: Verbalizes/displays adequate comfort level or baseline comfort level  Description: Interventions:  - Encourage patient to monitor pain and request assistance  - Assess pain using appropriate pain scale  - Administer analgesics based on type and severity of pain and evaluate response  - Implement non-pharmacological measures as appropriate and evaluate response  - Consider cultural and social influences on pain and pain management  - Notify physician/advanced practitioner if interventions unsuccessful or patient reports new pain  Outcome: Progressing     Problem: GASTROINTESTINAL - ADULT  Goal: Minimal or absence of nausea and/or vomiting  Description: INTERVENTIONS:  - Administer IV fluids if ordered to ensure adequate hydration  - Maintain NPO status until nausea and vomiting are resolved  - Nasogastric tube if ordered  - Administer ordered antiemetic medications as needed  - Provide nonpharmacologic comfort measures as appropriate  - Advance diet as tolerated, if ordered  - Consider nutrition services referral to assist patient with adequate nutrition and appropriate food choices  Outcome: Progressing  Goal: Establish and maintain optimal ostomy function  Description: INTERVENTIONS:  - Assess bowel function  - Encourage oral fluids to ensure adequate hydration  - Administer IV fluids if ordered to ensure adequate hydration   - Administer ordered medications as needed  - Encourage mobilization and activity  - Nutrition services referral to assist patient with appropriate food choices  - Assess stoma site  - Consider wound care consult   Outcome: Progressing     Problem: RESPIRATORY - ADULT  Goal: Achieves optimal ventilation and oxygenation  Description: INTERVENTIONS:  - Assess for changes in respiratory status  - Assess for changes in mentation and behavior  - Position to facilitate oxygenation and minimize respiratory effort  - Oxygen administered by appropriate delivery if ordered  - Initiate smoking cessation education as indicated  - Encourage broncho-pulmonary hygiene including cough, deep breathe, Incentive Spirometry  - Assess the need for suctioning and aspirate as needed  - Assess and instruct to report SOB or any respiratory difficulty  - Respiratory Therapy support as indicated  Outcome: Progressing     Problem: CARDIOVASCULAR - ADULT  Goal: Maintains optimal cardiac output and hemodynamic stability  Description: INTERVENTIONS:  - Monitor I/O, vital signs and rhythm  - Monitor for S/S and trends of decreased cardiac output  - Administer and titrate ordered vasoactive medications to optimize hemodynamic stability  - Assess quality of pulses, skin color and temperature  - Assess for signs of decreased coronary artery perfusion  - Instruct patient to report change in severity of symptoms  Outcome: Progressing  Goal: Absence of cardiac dysrhythmias or at baseline rhythm  Description: INTERVENTIONS:  - Continuous cardiac monitoring, vital signs, obtain 12 lead EKG if ordered  - Administer antiarrhythmic and heart rate control medications as ordered  - Monitor electrolytes and administer replacement therapy as ordered  Outcome: Progressing     Problem: GENITOURINARY - ADULT  Goal: Maintains or returns to baseline urinary function  Description: INTERVENTIONS:  - Assess urinary function  - Encourage oral fluids to ensure adequate hydration if ordered  - Administer IV fluids as ordered to ensure adequate hydration  - Administer ordered medications as needed  - Offer frequent toileting  - Follow urinary retention protocol if ordered  Outcome: Progressing  Goal: Urinary catheter remains patent  Description: INTERVENTIONS:  - Assess patency of urinary catheter  - If patient has a chronic quigley, consider changing catheter if non-functioning  - Follow guidelines for intermittent irrigation of non-functioning urinary catheter  Outcome: Progressing     Problem: METABOLIC, FLUID AND ELECTROLYTES - ADULT  Goal: Electrolytes maintained within normal limits  Description: INTERVENTIONS:  - Monitor labs and assess patient for signs and symptoms of electrolyte imbalances  - Administer electrolyte replacement as ordered  - Monitor response to electrolyte replacements, including repeat lab results as appropriate  - Instruct patient on fluid and nutrition as appropriate  Outcome: Progressing  Goal: Fluid balance maintained  Description: INTERVENTIONS:  - Monitor labs   - Monitor I/O and WT  - Instruct patient on fluid and nutrition as appropriate  - Assess for signs & symptoms of volume excess or deficit  Outcome: Progressing  Goal: Glucose maintained within target range  Description: INTERVENTIONS:  - Monitor Blood Glucose as ordered  - Assess for signs and symptoms of hyperglycemia and hypoglycemia  - Administer ordered medications to maintain glucose within target range  - Assess nutritional intake and initiate nutrition service referral as needed  Outcome: Progressing     Problem: HEMATOLOGIC - ADULT  Goal: Maintains hematologic stability  Description: INTERVENTIONS  - Assess for signs and symptoms of bleeding or hemorrhage  - Monitor labs  - Administer supportive blood products/factors as ordered and appropriate  Outcome: Progressing     Problem: Nutrition/Hydration-ADULT  Goal: Nutrient/Hydration intake appropriate for improving, restoring or maintaining nutritional needs  Description: Monitor and assess patient's nutrition/hydration status for malnutrition  Collaborate with interdisciplinary team and initiate plan and interventions as ordered  Monitor patient's weight and dietary intake as ordered or per policy  Utilize nutrition screening tool and intervene as necessary  Determine patient's food preferences and provide high-protein, high-caloric foods as appropriate       INTERVENTIONS:  - Monitor oral intake, urinary output, labs, and treatment plans  - Assess nutrition and hydration status and recommend course of action  - Evaluate amount of meals eaten  - Assist patient with eating if necessary   - Allow adequate time for meals  - Recommend/ encourage appropriate diets, oral nutritional supplements, and vitamin/mineral supplements  - Order, calculate, and assess calorie counts as needed  - Recommend, monitor, and adjust tube feedings and TPN/PPN based on assessed needs  - Assess need for intravenous fluids  - Provide specific nutrition/hydration education as appropriate  - Include patient/family/caregiver in decisions related to nutrition  Outcome: Progressing     Problem: SAFETY,RESTRAINT: NV/NON-SELF DESTRUCTIVE BEHAVIOR  Goal: Remains free of harm/injury (restraint for non violent/non self-detsructive behavior)  Description: INTERVENTIONS:  - Instruct patient/family regarding restraint use   - Assess and monitor physiologic and psychological status   - Provide interventions and comfort measures to meet assessed patient needs   - Identify and implement measures to help patient regain control  - Assess readiness for release of restraint   Outcome: Progressing  Goal: Returns to optimal restraint-free functioning  Description: INTERVENTIONS:  - Assess the patient's behavior and symptoms that indicate continued need for restraint  - Identify and implement measures to help patient regain control  - Assess readiness for release of restraint   Outcome: Progressing

## 2022-05-31 NOTE — PROGRESS NOTES
Progress Note - General Surgery   Fady Hudson 80 y o  male MRN: [id number]  Unit/Bed#: ICU 02 Encounter: [id number]    Assessment:  Postop day 20  Status post laparotomy for perforated rectosigmoid colon  Acute respiratory failure with hypoxia: Ventilator day #8  FiO2 50% Peep6  Acute renal failure: on CVVH       Plan:  Continue supportive symptomatic care per critical care team    Plan for veraflow wound vac dressing changes Monday/Thrusday  Subjective/Objective   Chief Complaint: Intubated  Subjective: Patient was seen and examined bedside  Follows commands  Objective:    Blood pressure 114/75, pulse 86, temperature (!) 97 16 °F (36 2 °C), resp  rate (!) 36, height 5' 4" (1 626 m), weight 77 6 kg (171 lb 1 2 oz), SpO2 98 %  ,Body mass index is 29 37 kg/m²  Intake/Output Summary (Last 24 hours) at 5/31/2022 1155  Last data filed at 5/31/2022 1100  Gross per 24 hour   Intake 3164 9 ml   Output 5012 ml   Net -1847 1 ml       Invasive Devices  Report    Peripherally Inserted Central Catheter Line  Duration           PICC Line 90/08/80 Right Basilic 12 days          Arterial Line  Duration           Arterial Line 05/30/22 Radial <1 day          Hemodialysis Catheter  Duration           HD Temporary Double Catheter 4 days          Drain  Duration           Colostomy LLQ 20 days    NG/OG/Enteral Tube Orogastric 16 Fr Center mouth 10 days    Urethral Catheter Double-lumen 16 Fr  10 days          Airway  Duration           ETT  Oral 9 days                Physical Exam: /75   Pulse 86   Temp (!) 97 16 °F (36 2 °C)   Resp (!) 36   Ht 5' 4" (1 626 m)   Wt 77 6 kg (171 lb 1 2 oz)   SpO2 98%   BMI 29 37 kg/m²   General appearance: Follows commands   Lungs: diminished breath sounds and Mechanical ventilation  Heart: Regular rate   Abdomen: Soft, veraflow wound vac in place, colostomy with stool output, +BS  Skin: generalized edema       Lab, Imaging and other studies:  I have personally reviewed pertinent lab results    , CBC:   Lab Results   Component Value Date    WBC 15 28 (H) 05/31/2022    HGB 7 4 (L) 05/31/2022    HCT 21 8 (L) 05/31/2022    MCV 94 05/31/2022     (L) 05/31/2022    MCH 31 8 05/31/2022    MCHC 33 9 05/31/2022    RDW 18 3 (H) 05/31/2022    MPV 13 0 (H) 05/31/2022   , CMP:   Lab Results   Component Value Date    SODIUM 136 05/31/2022    K 4 2 05/31/2022     05/31/2022    CO2 26 05/31/2022    BUN 24 05/31/2022    CREATININE 1 13 05/31/2022    CALCIUM 8 8 05/31/2022    EGFR 56 05/31/2022     VTE Pharmacologic Prophylaxis: Reason for no pharmacologic prophylaxis anemia  VTE Mechanical Prophylaxis: sequential compression device

## 2022-05-31 NOTE — PROGRESS NOTES
Progress Note - Infectious Disease   Fady Hudson 80 y o  male MRN: [id number]  Unit/Bed#: ICU 02 Encounter: [id number]      Impression/Plan:  1  s/p cardiac arrest 5/21/22  Patient intubated during RR  In ICU on ventilator assistance  Recurrent SIRS possibly reactive to arrest with fever, tachycardia, tachypnea, and increased leukocytosis post arrest  Possible recurrent aspiration event s/p arrest  Fever down trended   Patient off Levophed  -continue supportive measures per critical care team  -cardiology on board  -ventilator management per critical care team     2  SIRS vs Severe Sepsis, evolving on admission and post #1   Evidenced by tachycardia, tachypnea, bandemia and encephalopathy   Suspect possible aspiration in setting of significant retained secretions, acute respiratory failure requiring supplemental oxygenation and with recent NG tube for postop ileus management   MRSA screen negative  [address]  Blood cultures have no growth  Patient completed antibiotic course  Patient  back on Levophed over the weekend, now off again   -observe off further antibiotic  -monitor temperature and hemodynamics  -serial exam  -monitor CBC and BMP   -pressor support per Critical Medicine Service     3    Peritonitis s/p Bowel perforation  s/p 5/11/22 exploratory laparotomy, low anterior resection, protective loop transverse colostomy and segmental small bowel resection secondary to perforation rectosigmoid junction after colonoscopy   OR cultures grew Pseudomonas aeruginosa and Bacteroides fragilis  Now being managed with NGT/NPO status for post op ileus on TPN    Patient completed antibiotic course   -observe off further antibiotic  -VAC/wound care per surgery     4   Acute hypoxic respiratory failure with hypoxia   Suspicious for aspiration pneumonitis over pneumonia     5/24/22 CT C/A/P predominantly UL airspace opacities consistent with CHF with bilateral pleural effusions, distended small bowel loops id  Patient remains intubated s/p #1  22 Procalcitonin level  2 60 >  0 753 < 22 2 97  Patient is status post bronchoscopy 22 for RUL collapse   Sputum cx pending negative to date  -observe off further antibiotic  -ventilator management per critical care team      5  Aspiration pneumonitis versus pneumonia in setting of #1/3   22 CT C/A/P predominantly UL groundglass and consolidations, bilateral pleural effusions, SB mild dilation unchanged  Patient now intubated s/p #1  22 Procalcitonin level  2 60 >  0 753 < 22 2 97   22 sputum specimen oral pharyngeal contamination   -observe off antibiotic  -secretion and pulmonary toilet management per critical care team   -monitor respiratory symptoms  -monitor vent requirements     6  MARYELLEN on CKD 3  Creatinine 1  13  On CVVH  -renal dose adjust medications as needed  -volume management per Nephrology  -CVVH management per Nephrology  -monitor creatinine and urinary output    Antibiotics:  Off antibiotic D4     Above impression and plan discussed in detail with patient's RN and critical care team      Subjective:  Patient has no fever, chills, sweats on ventilator in ICU s/p cardiac arrest 22; no nausea, vomiting, diarrhea reported  CVVH started 22  Patient was on Levophed over weekend, now weaned off  Objective:  Vitals:  Temp:  [96 8 °F (36 °C)-97 7 °F (36 5 °C)] 96 98 °F (36 1 °C)  HR:  [60-87] 87  Resp:  [22-40] 39  BP: ()/(36-65) 114/57  SpO2:  [92 %-98 %] 95 %  Temp (24hrs), Av 3 °F (36 3 °C), Min:96 8 °F (36 °C), Max:97 7 °F (36 5 °C)  Current: Temperature: (!) 96 98 °F (36 1 °C)    Physical Exam:   General Appearance:  80year-old acute on chronically debilitated, elderly male, not easily arousable to name on exam, sluggish on ventilator at FiO2 setting 55%, propped fairly comfortably appearing in ICU bed, on CVVH  Throat: Oropharynx moist without lesions    ET tube to vent, orogastric tube feeds in place   Lungs:   Scattered coarse ventilated breath sounds anteriorly and laterally to auscultation bilaterally; thin phlegm secretions in the suction canister   Heart:  RRR; no murmur   Abdomen:   Soft, no focal tenderness on palpation, midline abdominal wound to wound VAC irrigation system with moderate blood-tinged output in canister, wound edges without erythema, left lower quadrant colostomy with pasty brown stool in bag, positive diminished bowel sounds  Extremities: Generalized edema of arms and thighs predominantly, venoydnes and Prevalon boots in place   : [place] with clear, yellow urine in bag, no SPT, 3+ soft scrotal edema  Skin: No new rashes or lesions  Night arm PICC and right neck I TERI BP line in place, CVVH running  Labs, Imaging, & Other studies:   All pertinent labs and imaging studies were personally reviewed  Results from last 7 days   Lab Units 05/31/22  0533 05/30/22  1217 05/30/22  0523 05/29/22  0600   WBC Thousand/uL 15 28*  --  16 84* 13 39*   HEMOGLOBIN g/dL 7 4* 6 5* 7 0* 7 3*   PLATELETS Thousands/uL 116*  --  115* 140*     Results from last 7 days   Lab Units 05/31/22  0533 05/30/22  2317 05/30/22  1807 05/29/22  0753 05/29/22  0600 05/25/22  1132 05/25/22  0505   SODIUM mmol/L 136 135* 137   < > 136   < > 136   POTASSIUM mmol/L 4 2 4 2 4 5   < > 4 1   < > 3 9   CHLORIDE mmol/L 104 104 103   < > 103   < > 108   CO2 mmol/L 26 24 26   < > 26   < > 18*   BUN mg/dL 24 25 27*   < > 36*   < > 92*   CREATININE mg/dL 1 13 1 18 1 19   < > 1 44*   < > 2 41*   EGFR ml/min/1 73sq m 56 53 53   < > 42   < > 22   CALCIUM mg/dL 8 8 8 8 8 6   < > 9 0   < > 7 5*   AST U/L  --   --   --   --  15  --  15   ALT U/L  --   --   --   --  19  --  15   ALK PHOS U/L  --   --   --   --  260*  --  54    < > = values in this interval not displayed       Results from last 7 days   Lab Units 05/30/22  1828   GRAM STAIN RESULT  Rare Polys  No organisms seen     Results from last 7 days   Lab Units 05/30/22  0523   PROCALCITONIN ng/ml 1 26*

## 2022-05-31 NOTE — ASSESSMENT & PLAN NOTE
· Outpatient EGD and colonoscopy at [facility] on 5/11 for w/u of chronic anemia, history of colon polyps, intermittent LLQ abdominal pain and possible intermittent intussusception  · EGD unremarkable, colonoscopy technically difficult and required change from adult scope to pediatric scope  · During traversal of the sigmoid colon there was a kink and patient sustained a perforation  Procedure was aborted and patient was transferred to [facility] where immediate surgical consultation was obtained  · Emergent ex- lap on 5/11 > low anterior resection, protective loop transverse colostomy, flex sigmoidoscopy, and segmental small bowel resection  · Difficult post op course with post op ileus requiring NGT decompression and initiation of TPN; not tolerating trickle feeds  · 5/22: CT: Diffuse mild dilation of small bowel segments identified without transition point  · 5/24: CT CAP- Distended small bowel loops with scattered air-fluid levels consistent with early/partial small bowel obstruction with transition at the small bowel anastomosis in the region of the ventral pelvis as above  No free air  Cholelithiasis without cholecystitis  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

## 2022-06-01 NOTE — PROGRESS NOTES
Progress Note - Infectious Disease   Fady Hudson 80 y o  male MRN: [id number]  Unit/Bed#: ICU 02 Encounter: [id number]      Impression/Plan:  1  s/p cardiac arrest 5/21/22  Patient intubated during RR  In ICU on ventilator assistance  Recurrent SIRS possibly reactive to arrest with fever, tachycardia, tachypnea, and increased leukocytosis post arrest  Possible recurrent aspiration event s/p arrest  Fever down trended   Patient off Levophed  -continue supportive measures per critical care team  -cardiology on board  -ventilator management per critical care team     2  SIRS vs Severe Sepsis, evolving on admission and post #1   Evidenced by tachycardia, tachypnea, bandemia and encephalopathy   Suspect possible aspiration in setting of significant retained secretions, acute respiratory failure requiring supplemental oxygenation and with recent NG tube for postop ileus management   MRSA screen negative  WBC fluctuating  5/22/22 Blood cultures have no growth   Patient completed antibiotic course 5/27/22  Patient back on Levophed over the weekend, now off again   -observing closely off further antibiotic  -monitor temperature and hemodynamics  -serial exam  -monitor CBC and BMP   -pressor support weaned per Critical Medicine Service     3  Peritonitis s/p Bowel perforation  s/p 5/11/22 exploratory laparotomy, low anterior resection, protective loop transverse colostomy and segmental small bowel resection secondary to perforation rectosigmoid junction after colonoscopy   OR cultures grew Pseudomonas aeruginosa and Bacteroides fragilis  Now being managed with NGT/NPO status for post op ileus on TPN    Patient completed antibiotic course 5/27/22   -observe off further antibiotic  -VAC/wound care per surgery     4   Acute hypoxic respiratory failure with hypoxia   Suspicious for aspiration pneumonitis over pneumonia     5/24/22 CT C/A/P predominantly UL airspace opacities consistent with CHF with bilateral pleural effusions, distended small bowel loops id  Patient remains intubated s/p #1  22 Procalcitonin level  2 60 >  0 753 < 22 2 97 >  1   Patient is status post bronchoscopy 22 for RUL collapse   Sputum cx pending negative to date  -observe off further antibiotic  -ventilator management per critical care team      5  Aspiration pneumonitis versus pneumonia in setting of #1/3   22 CT C/A/P predominantly UL groundglass and consolidations, bilateral pleural effusions, SB mild dilation unchanged  Patient now intubated s/p #1  22 Procalcitonin level  2 60 >  0 753 < 22 2 97 >  1 22 sputum specimen oral pharyngeal contamination   -observe off antibiotic  -secretion and pulmonary toilet management per critical care team   -monitor respiratory symptoms  -monitor vent requirements     6  MARYELLEN on CKD 3  Creatinine 1 01  On CVVH  -renal dose adjust medications as needed  -volume management per Nephrology  -CVVH management per Nephrology  -monitor creatinine and urinary output     Antibiotics:  Off antibiotic D5     Above impression and plan discussed in detail with patient's RN and critical care team      Subjective:  Patient has no fever, chills, sweats on ventilator in ICU s/p cardiac arrest 22; no nausea, vomiting, diarrhea reported  CVVH started 22  Patient was on Levophed over weekend, now weaned off      Objective:  Vitals:  Temp:  [95 36 °F (35 2 °C)-97 34 °F (36 3 °C)] 95 36 °F (35 2 °C)  HR:  [77-91] 82  Resp:  [20-35] 34  BP: (100-148)/(50-67) 105/51  SpO2:  [95 %-99 %] 96 %  Temp (24hrs), Av 8 °F (36 °C), Min:95 36 °F (35 2 °C), Max:97 34 °F (36 3 °C)  Current: Temperature: (!) 95 36 °F (35 2 °C)    Physical Exam:   General Appearance:  80year-old acute on chronically debilitated, elderly male, sluggish, on ventilator at FiO2 setting of 55%, propped in ICU bed, on pillows, on CVVH, soft upper extremity restraints in place    Throat: Oropharynx moist without lesions  ET tube to vent, orogastric tube feeds in place   Lungs:   Scattered coarse ventilated breath sounds anteriorly and laterally to auscultation bilaterally; thin phlegm secretions in the suction canister   Heart:  RRR; no murmur   Abdomen:   Soft, no no focal tenderness on palpation, midline abdominal wound VAC to wound irrigation system with moderate blood-tinged output in canister, wound edges without erythema, left lower quadrant colostomy with pasty brown stool in bag, positive bowel sounds  Extremities: Generalized puffy edema, venodynes and prevalon boots in place   : Farnsworth with mild clear, yellow urine in bag, no SPT, 3+ soft scrotal edema  Skin: No new rashes or lesions  Right arm PICC, right neck IJ J CVP line in place, CVVH running  Labs, Imaging, & Other studies:   All pertinent labs and imaging studies were personally reviewed  Results from last 7 days   Lab Units 06/01/22  0520 05/31/22  0533 05/30/22  1217 05/30/22  0523   WBC Thousand/uL 18 82* 15 28*  --  16 84*   HEMOGLOBIN g/dL 7 9* 7 4* 6 5* 7 0*   PLATELETS Thousands/uL 116* 116*  --  115*     Results from last 7 days   Lab Units 06/01/22  0520 05/31/22  2310 05/31/22  1758 05/29/22  0753 05/29/22  0600   SODIUM mmol/L 137 139 136   < > 136   POTASSIUM mmol/L 4 1 4 2 4 1   < > 4 1   CHLORIDE mmol/L 105 105 104   < > 103   CO2 mmol/L 27 27 27   < > 26   BUN mg/dL 20 22 21   < > 36*   CREATININE mg/dL 1 01 1 02 0 99   < > 1 44*   EGFR ml/min/1 73sq m 64 63 66   < > 42   CALCIUM mg/dL 8 4 8 7 8 7   < > 9 0   AST U/L  --   --   --   --  15   ALT U/L  --   --   --   --  19   ALK PHOS U/L  --   --   --   --  260*    < > = values in this interval not displayed  Results from last 7 days   Lab Units 05/30/22  1828   SPUTUM CULTURE  Culture results to follow     GRAM STAIN RESULT  Rare Polys  No organisms seen     Results from last 7 days   Lab Units 05/30/22  0523   PROCALCITONIN ng/ml 1 26* 05/31/2022 portable chest x-ray:  Stable dense patchy airspace disease bilaterally and in right upper lobe

## 2022-06-01 NOTE — PROCEDURES
NEPHROLOGY DIALYSIS PROCEDURE NOTE      Patient seen and examined on CVVH, tolerating procedure well  All documentation, labs, medications were reviewed by myself, and the treatment plan was reviewed with nurse and patient  Seen on Dialysis at : 9:05 AM  Dialysis Access: Right IJ non tunneled dialysis catheter  Vitals:   A line blood pressure 100/52, MAP 74  Gtts:  heparin drip, Precedex, Dilaudid drip, remains off pressor support  CVVH:  no issues overnight    Assessment/Plan:    80 y  o  man with PMH of Aostenosis, scleroderma, CAD   Patient underwent EGD and colonoscopy on 05/11 for possible intussusception, complicated with perforation, underwent exploratory laparotomy, complicated with pneumonia, sepsis, CHF, PEA arrest on 05/21 x2   Nephrology is consulted for MARYELLEN   ay for assistance in the management of MARYELLEN     Acute renal failure/acute tubular necrosis  --acute tubular necrosis in setting of hypotension and cardiac arrest  --started CVVH on May 26, remained so since that time  --off Bumex drip  --currently running net -125 mL hour, tolerating procedure well  --nonoliguric  --discussed with critical care  --will allow CVVH to run out, and hold, will monitor for days    --may potentially try this evening if blood pressure acceptable, with IV Albumin     PEA cardiac arrest  --x2 episodes     Ventilatory dependent respiratory failure  --55% FiO2     Blood pressure/volume status  --suspect that he has extravascular volume overloaded but intravascular volume depleted  --off IV Albumin  -- off pressor support  --extravascular volume overloaded  --running net -125 mL/hour     Bowel perforation  --status post emergent ex lap on May 11th with low anterior resection, protective loop transverse colostomy with a flex sigmoidoscopy and small-bowel resection  --currently has an abdominal wound VAC in place  --management as per surgery     Anemia  --transfuse for as per the primary team    Review of Systems:  Unable to obtain ROS    Physical Exam:    General: intubated and sedated  Skin: poor turgor, no rash  CVS: S1S2+ Tachy  Lungs: coarse breath sounds  Abdomen: surgical site noted  Access: no exudate  Extremities: + anasarca  Neuro: sedated  Farnsworth in place with clear, dark urine          Current Facility-Administered Medications:     acetaminophen (TYLENOL) oral suspension 650 mg, 650 mg, Oral, Q6H PRN, ISELA Brothers, 650 mg at 05/29/22 0857    chlorhexidine (PERIDEX) 0 12 % oral rinse 15 mL, 15 mL, Mouth/Throat, Q12H EWELINA, ISELA Brothers, 15 mL at 06/01/22 2532    dexmedeTOMIDine (Precedex) 400 mcg in sodium chloride 0 9% 100 mL, 0 1-1 5 mcg/kg/hr, Intravenous, Titrated, ISELA Green, Last Rate: 3 9 mL/hr at 06/01/22 0701, 0 2 mcg/kg/hr at 06/01/22 0701    gabapentin (NEURONTIN) oral solution 200 mg, 200 mg, Oral, HS, ISELA Ritter, 200 mg at 05/31/22 2112    heparin (porcine) 25,000 units in 0 45% NaCl 250 mL infusion (premix), 400 Units/hr, Intravenous, Continuous, ISELA Mcfarlane, Last Rate: 4 mL/hr at 05/31/22 1250, 400 Units/hr at 05/31/22 1250    HYDROmorphone (DILAUDID) 50 mg in sodium chloride 0 9% 50mL drip, 0 2 mg/hr, Intravenous, Continuous, Batsheva Little PA-C, Last Rate: 0 2 mL/hr at 06/01/22 0701, 0 2 mg/hr at 06/01/22 0701    insulin lispro (HumaLOG) 100 units/mL subcutaneous injection 1-6 Units, 1-6 Units, Subcutaneous, Q6H [address], 1 Units at 06/01/22 0521 **AND** Fingerstick Glucose (POCT), , , Q6H, ISELA Ritter    iohexol (OMNIPAQUE) 240 MG/ML solution 50 mL, 50 mL, Oral, Once in imaging, ISELA Brothers    ipratropium-albuterol (DUO-NEB) 0 5-2 5 mg/3 mL inhalation solution 3 mL, 3 mL, Nebulization, Q6H PRN, ISELA Brothers, 3 mL at 05/28/22 2106    levothyroxine tablet 112 mcg, 112 mcg, Per NG Tube, Early Morning, ISELA Brothers, 112 mcg at 06/01/22 0532    lidocaine (LIDODERM) 5 % patch 2 patch, 2 patch, Topical, Daily, Loletha Castleman, PA-C, 2 patch at 06/01/22 1459    NxStage K 4/Ca 3 dialysis solution (RFP-401) 20,000 mL, 20,000 mL, Dialysis, Continuous, ISELA Ritter, 20,000 mL at 06/01/22 0506    ondansetron (ZOFRAN) injection 4 mg, 4 mg, Intravenous, Q6H PRN, ISELA Nesbitt    oxyCODONE (ROXICODONE) oral solution 5 mg, 5 mg, Per NG Tube, Q4H PRN, 5 mg at 05/29/22 2149 **OR** oxyCODONE (ROXICODONE) oral solution 7 5 mg, 7 5 mg, Per NG Tube, Q4H PRN, ISELA Vigil    pantoprazole (PROTONIX) injection 40 mg, 40 mg, Intravenous, Q24H [address], ISELA Nesbitt, 40 mg at 06/01/22 8626    polyethylene glycol (MIRALAX) packet 17 g, 17 g, Oral, Daily, ISELA Ritter, 17 g at 06/01/22 0856

## 2022-06-01 NOTE — PHYSICIAN ADVISOR
Current patient class: Inpatient  The patient is currently on Hospital Day: 22      The patient was admitted to the hospital at  5:08 PM on 5/11/22 for the following diagnosis:  Bowel perforation ([address] ) [K63 1]  Abdominal pain [R10 9]     CMS OUTLIER STAY REVIEW    After review of the relevant documentation, labs, vital signs and test results, the patient is appropriate for CONTINUED INPATIENT ADMISSION  The patient continues to remain hospitalized receiving acute medical care  The patient has surpassed the expected duration of stay, however given the clinical condition, need for further acute care management, the patient is appropriate to remain in an inpatient status  The patient still being actively managed, and does have unresolved medical issues requiring further hospitalization  This review is conducted at 20 days, to help satisfy the requirements for significant outlier stay review as per CMS  Given the current condition of this patient, the patient satisfies this review was determination for continued inpatient stay  Rationale is as follows: The patient is hospitalized in the intensive care unit and underwent emergent ex lap  He had a difficult postoperative course with ileus and initiation of TPN  He was then transitioned to tube feeds  He developed acute respiratory failure with hypoxia  He developed PEA arrest   The patient then developed acute renal failure and is continued on CVVH and Levophed  The patient is still critically ill and will require continued intensive treatment      The patients vitals on arrival were   ED Triage Vitals   Temperature Pulse Respirations Blood Pressure SpO2   05/11/22 1649 05/11/22 1649 05/11/22 1649 05/11/22 1649 05/11/22 1649   (!) 97 3 °F (36 3 °C) 83 15 144/67 94 %      Temp Source Heart Rate Source Patient Position - Orthostatic VS BP Location FiO2 (%)   05/11/22 1649 05/11/22 1649 05/11/22 1649 05/11/22 1649 05/16/22 1536   Tympanic Monitor Lying Right arm 50      Pain Score       05/11/22 1722       9           Past Medical History:   Diagnosis Date    Aortic valve calcification     Aortic valve stenosis     AR (aortic regurgitation)     Atelectasis     LT BASILAR PASSIVE SEGMENTAL    Autoimmune disease (HCC)     Bilateral pleural effusion     Bilateral senile cataracts     CAD (coronary artery disease)     CAD (coronary artery disease)     Central spinal stenosis     Changes in vascular appearance of retina     Chronic back pain     Colon polyp     Compression fracture of L1 lumbar vertebra (HCC) 06/02/2010    Compression fracture of T10 vertebra (HCC)     Compression fracture of T12 vertebra (HCC)     Compression fracture of T9 vertebra (HCC)     Dextroscoliosis of lumbar spine     Diverticulosis     Drusen     Dry eye syndrome     Former smoker     Gastritis     GERD (gastroesophageal reflux disease)     Giant cell arteritis (HCC)     Hiatal hernia     History of shingles     HTN (hypertension)     Internal carotid artery stenosis, left     Internal carotid artery stenosis, right     Left lower lobe pneumonia     Lesion of upper eyelid     Lordosis of lumbar region     Osteopenia     SEVERE    Osteoporosis     Pleuritic pain     Radiculopathy     B/L LOWER EXTREMITY    Raynaud's disease     Renal cyst     B/L    Scleroderma ([address] )     Tortuous aorta ([address] )      Past Surgical History:   Procedure Laterality Date    COLONOSCOPY      COLONOSCOPY N/A 5/11/2022    Procedure: NON IMAGING INTRAOP COLONOSCOPY;  Surgeon: Anna Chavira MD;  Location: [address] [place] [address];  Service: General    COLONOSCOPY W/ BIOPSIES  04/24/2009    DXA PROCEDURE (HISTORICAL)  07/24/2019    EGD  04/24/2009    w/ bx    EGD AND COLONOSCOPY  05/11/2022    EXPLORATORY LAPAROTOMY W/ BOWEL RESECTION N/A 5/11/2022    Procedure: LAPAROTOMY EXPLORATORY LOW ANTERIOR RESECTION W/LOOP COLOSTOMY;  Surgeon: Anna Chavira MD;  [facility] MAIN OR;  Service: General    IR PICC PLACEMENT DOUBLE LUMEN  5/18/2022    UPPER GASTROINTESTINAL ENDOSCOPY      VERTEBROPLASTY      T9, T10, T12 & L1           Consults have been placed to:   IP CONSULT TO INTERNAL MEDICINE  IP CONSULT TO NUTRITION SERVICES  IP CONSULT TO PULMONOLOGY  IP CONSULT TO INFECTIOUS DISEASES  IP CONSULT TO CARDIOLOGY  IP CONSULT TO NEPHROLOGY    Vitals:    06/01/22 0700 06/01/22 0735 06/01/22 0800 06/01/22 0900   BP: 100/50  148/67 100/52   Pulse: 77  81 81   Resp: (!) 24  (!) 26 (!) 35   Temp: (!) 96 08 °F (35 6 °C)  (!) 96 08 °F (35 6 °C) (!) 95 9 °F (35 5 °C)   TempSrc:   Bladder    SpO2: 96% 97% 96% 96%   Weight:       Height:           Most recent labs:    Recent Labs     05/29/22  1545 05/29/22  2217 06/01/22  0520   WBC  --    < > 18 82*   HGB  --    < > 7 9*   HCT  --    < > 23 6*   PLT  --    < > 116*   K 4 4   < > 4 1   CALCIUM 8 4   < > 8 4   BUN 39*   < > 20   CREATININE 1 65*   < > 1 01   INR 1 28*  --   --     < > = values in this interval not displayed         Scheduled Meds:  Current Facility-Administered Medications   Medication Dose Route Frequency Provider Last Rate    acetaminophen  650 mg Oral Q6H PRN ISELA Henry      chlorhexidine  15 mL Mouth/Throat Q12H [address] ISELA Henry      dexmedetomidine  0 1-1 5 mcg/kg/hr Intravenous Titrated ISELA Andrade 0 2 mcg/kg/hr (06/01/22 0701)    gabapentin  200 mg Oral HS ISELA Glaser      heparin (porcine)  5,000 Units Subcutaneous [facility] Iesha Costa MD      insulin lispro  1-6 Units Subcutaneous Q6H [address] ISELA Ritter      iohexol  50 mL Oral Once in imaging ISELA Henry      ipratropium-albuterol  3 mL Nebulization Q6H PRN ISELA Henry      levothyroxine  112 mcg Per NG Tube Early Morning ISELA Henry      lidocaine  2 patch Topical Daily Ruma Cardona      NxStage K 4/Ca 3  20,000 mL Dialysis Continuous Josephine Ramirez, CRNP      ondansetron  4 mg Intravenous Q6H PRN Roylene Sabot, CRNP      oxyCODONE  5 mg Per NG Tube Q4H PRN Dariusz [place], CRNP      Or    oxyCODONE  7 5 mg Per NG Tube Q4H PRN [place], CRNP      pantoprazole  40 mg Intravenous Q24H [address] Roylene Sabot, CRNP      polyethylene glycol  17 g Oral Daily ISELA Ritter       Continuous Infusions:dexmedetomidine, 0 1-1 5 mcg/kg/hr, Last Rate: 0 2 mcg/kg/hr (06/01/22 0701)  NxStage K 4/Ca 3, 20,000 mL      PRN Meds:   acetaminophen    iohexol    ipratropium-albuterol    ondansetron    oxyCODONE **OR** oxyCODONE    Surgical procedures (if appropriate):  Procedure(s):  LAPAROTOMY EXPLORATORY LOW ANTERIOR RESECTION W/LOOP COLOSTOMY  NON IMAGING INTRAOP COLONOSCOPY

## 2022-06-01 NOTE — PROGRESS NOTES
Progress Note - Critical Care   Amanda Abo 80 y o  male MRN: [id number]  Unit/Bed#: ICU 02 Encounter: [id number]            Acute renal failure (ARF) ([address] )  Assessment & Plan  · Secondary to hypoperfusion mehul cardiac arrest +/- ATN  · Baseline creat 0 8  · Creatinine peaked at 3 07  · Continue CVVH - goal to run negative   · CVVH at 125  · Avoid hypotension; continue levophed for MAP > 65    * Bowel perforation (HCC)  Assessment & Plan  · Outpatient EGD and colonoscopy at [facility] on 5/11 for w/u of chronic anemia, history of colon polyps, intermittent LLQ abdominal pain and possible intermittent intussusception  · EGD unremarkable, colonoscopy technically difficult and required change from adult scope to pediatric scope  · During traversal of the sigmoid colon there was a kink and patient sustained a perforation  Procedure was aborted and patient was transferred to [facility] where immediate surgical consultation was obtained  · Emergent ex- lap on 5/11 > low anterior resection, protective loop transverse colostomy, flex sigmoidoscopy, and segmental small bowel resection  · Difficult post op course with post op ileus requiring NGT decompression and initiation of TPN; not tolerating trickle feeds  · 5/22: CT: Diffuse mild dilation of small bowel segments identified without transition point  · 5/24: CT CAP- Distended small bowel loops with scattered air-fluid levels consistent with early/partial small bowel obstruction with transition at the small bowel anastomosis in the region of the ventral pelvis as above  No free air  Cholelithiasis without cholecystitis  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     Acute respiratory failure with hypoxia ([facility] [address] )  Assessment & Plan  · Patient previously in ICU for hypoxia with a max of 15L midflow and concern for aspiration pneumonitis  · Improved and was able to transfer to general medical floor 5/21  · 5/21: PEA arrest on the floor most likely respiratory driven   · ROSC obtained; transferred to unit and again PEA arrested  · 5/24 CT CAP-CHF with moderate basilar effusions and additional upper lung zone patchy airspace opacities likely reflecting asymmetric pulmonary edema  Infiltrate is not entirely excluded     · Has remained on ventilator, day # 8: AC/PC 20/18/60/8  · Wean Fio2 as able for SPO2>90%  · Continues to tolerate  · Atrovent/xopenex/mucomyst nebs q8h  · Continue pulmonary hygiene/ VAP bundle  · Continue volume removal   · Bronchoscopy done 5/29    Hypotension  Assessment & Plan  · Previously septic shock  · Now likely secondary to sedation requirements  · Wean levophed for MAP >65    Atrial fibrillation (HCC)  Assessment & Plan  · S/p cardiac arrest while on 3 pressors noted to have afib with RVR  · Bolused with amio and placed on infusion  · Converted to sinus rhythm on 5/22   · Amio gtt d/c 5/23  · Has remained in NSR  · No indication for [facility]  · Afib was likely post arrest in setting of triple pressors    Anemia  Assessment & Plan  · Hemoglobin drop post cardiac arrest    · Noted to have gross hematuria but this has since resolved  · 5/21: 1 u PRBC for hgb 6 8  · 5/24: 1 u PRBC  · CT obtained on 5/24 and negative for any overt signs of bleeding  · 5/26: 1 u PRBC  · Continue to monitor   · Transfuse for hgb less than 7      Cardiac arrest [facility])  Assessment & Plan  · Patient was found unresponsive and hypoxic approximately 20 minutes after being seen well with family 5/21   · PEA arrest x 2  · Most likely respiratory driven  · Neurologically intact post arrest     Hyperglycemia  Assessment & Plan  · A1c 5 3%  · Was requiring insulin infusion while on TPN  · Continue SSI    CHF (congestive heart failure) ([facility])  Assessment & Plan  Wt Readings from Last 3 Encounters:   05/31/22 77 6 kg (171 lb 1 2 oz)   05/11/22 62 1 kg (136 lb 14 4 oz)   03/25/22 61 2 kg (135 lb)     · 5/16: Echo-EF 65%, mild TR, mild MR  · 5/23: Repeat Echo post cardiac arrest: EF 60-65%, G1DD, mild AS (BRADY 1 5cm2), mild MR, mild TR  · Monitor I&O, Daily weights   · Limited ECHO-EF 65%, G1DD, mild AS      Severe sepsis ([facility])  Assessment & Plan  · Peritonitis with intra-abdominal/pelvic abscess secondary to colonic perforation    · 5/22 CT chest/ab/pelvis with concern of multifocal PNA, no visualized intraabdominal abscess or anastomotic leak   · Per ID suspect multifocal pneumonitis   · OR culture 5/21 pseudomonas and bacteroides fragilis  · Repeat blood cultures on 5/22 negative   · Completed 14 days of Flagyl on 5/24  · Completed 14 days of cefepime on 5/27  · Observe off antibiotics   · ID following, appreciate recs     Toxic metabolic encephalopathy  Assessment & Plan  · Likely in setting of acute illness/delirium and cardiac arrest  · Delirium precautions; regulate sleep/wake cycle, environmental controls, daily CAM ICU   · Trend neuro exam  · Following commands, nodding appropriately to questions, moves all extremities        HPI/24hr events: No acute events overnight  CVVH continued until filter completed  Physical Exam:     Constitutional:       Appearance: He is ill-appearing and toxic-appearing       Comments: B/L wrist restraints in place   Cardiovascular:      Rate and Rhythm: Normal rate and regular rhythm       Pulses: Normal pulses       Heart sounds: Normal heart sounds     Pulmonary:      Comments: ETT on mechanical ventilation  B/L breath sounds diminished   Abdominal:      General: Bowel sounds are normal       Palpations: Abdomen is soft       Comments: Tube feeds via OGT  Left upper quadrant colostomy   Genitourinary:     Comments: Urinary catheter   Musculoskeletal:    Cervical back: Normal range of motion and neck supple       Comments: Generalized swelling   Skin:     General: Skin is warm and dry  Neurological:      Comments: Sedated but arouses to painful stimuli and follows commands    Psychiatric:      Comments: Sedated         Vitals:    22 0500 22 0532 22 0600 22 0700   BP: 147/63  133/60 100/50   Pulse: 84  87 77   Resp: (!) 32  (!) 27 (!) 24   Temp: (!) 96 8 °F (36 °C)  (!) 96 26 °F (35 7 °C) (!) 96 08 °F (35 6 °C)   TempSrc:       SpO2: 99%  99% 96%   Weight:  78 7 kg (173 lb 8 oz)     Height:         Arterial Line BP: 136/43  Arterial Line MAP (mmHg): 74 mmHg    Temperature:   Temp (24hrs), Av °F (36 1 °C), Min:96 08 °F (35 6 °C), Max:97 34 °F (36 3 °C)    Current: Temperature: (!) 96 08 °F (35 6 °C)    Weights:   IBW (Ideal Body Weight): 59 2 kg    Body mass index is 29 78 kg/m²    Weight (last 2 days)     Date/Time Weight    22 0532 78 7 (173 5)    22 0800 77 6 (171 08)    22 0534 77 6 (171 08)    22 0600 79 5 (175 27)          Hemodynamic Monitoring:  N/A     Non-Invasive/Invasive Ventilation Settings:  Respiratory  Report   Lab Data (Last 4 hours)    None         O2/Vent Data (Last 4 hours)    None              No results found for: PHART, TXL3GRR, PO2ART, ILW1TKE, W6OUCFEU, BEART, SOURCE  SpO2: SpO2: 96 %    Intake and Outputs:  I/O        07 07 07 07 07 07    I V  (mL/kg) 2201 (28 4) 1744 6 (22 2)     Blood 350      NG/ 170     IV Piggyback       Feedings 605 1205     Total Intake(mL/kg) 3261 (42) 3119 6 (39 6)     Urine (mL/kg/hr) 1070 (0 6) 1555 (0 8)     Drains 100 125     Other 3301 4269     Stool 100 200     Total Output 2381 8857     Net -8128 -0769 4                  Nutrition:        Diet Orders   (From admission, onward)             Start     Ordered    22 1351  Diet Enteral/Parenteral; Tube Feeding No Oral Diet; Osmolite 1 0; Continuous; 55  Diet effective now        Comments: Increase by 10 cc every four hours until reach goal   References:    Nutrtion Support Algorithm Enteral vs  Parenteral   Question Answer Comment   Diet Type Enteral/Parenteral    Enteral/Parenteral Tube Feeding No Oral Diet    Tube Feeding Formula: Osmolite 1 0    Bolus/Cyclic/Continuous Continuous    Tube Feeding Goal Rate (mL/hr): 55    RD to adjust diet per protocol? Yes        05/26/22 1350    05/12/22 0906  Room Service  Once        Question:  Type of Service  Answer:  Room Service - Appropriate with Assistance    05/12/22 0905                  Labs:   Results from last 7 days   Lab Units 06/01/22  0520 05/31/22  0533 05/30/22  1217 05/30/22  0523 05/28/22  0602 05/27/22  0546   WBC Thousand/uL 18 82* 15 28*  --  16 84*   < > 22 51*   HEMOGLOBIN g/dL 7 9* 7 4* 6 5* 7 0*   < > 8 8*   HEMATOCRIT % 23 6* 21 8* 19 6* 20 5*   < > 24 9*   PLATELETS Thousands/uL 116* 116*  --  115*   < > 114*   MONO PCT %  --  3*  --   --   --  5    < > = values in this interval not displayed  Results from last 7 days   Lab Units 06/01/22 0520 05/31/22 2310 05/31/22  1758 05/29/22  0753 05/29/22  0600   SODIUM mmol/L 137 139 136   < > 136   POTASSIUM mmol/L 4 1 4 2 4 1   < > 4 1   CHLORIDE mmol/L 105 105 104   < > 103   CO2 mmol/L 27 27 27   < > 26   BUN mg/dL 20 22 21   < > 36*   CREATININE mg/dL 1 01 1 02 0 99   < > 1 44*   CALCIUM mg/dL 8 4 8 7 8 7   < > 9 0   ALK PHOS U/L  --   --   --   --  260*   ALT U/L  --   --   --   --  19   AST U/L  --   --   --   --  15    < > = values in this interval not displayed       Results from last 7 days   Lab Units 06/01/22 0520 05/31/22 2310 05/31/22  1758   MAGNESIUM mg/dL 2 0 1 9 2 0     Results from last 7 days   Lab Units 06/01/22 0520 05/31/22 2310 05/31/22  1758   PHOSPHORUS mg/dL 2 1* 2 3 2 6      Results from last 7 days   Lab Units 05/29/22  1545 05/28/22  0602 05/26/22  2353   INR  1 28*  --  1 58*   PTT seconds 53* 74* 61* No results found for: TROPONINI    Imaging:   XR chest portable ICU   Final Result      1  Stable positioning of the lines and life support devices  2   Stable lung findings with dense patchy airspace disease bilaterally, probable right upper lobe atelectasis and small effusions  Workstation performed: [id number]         XR chest portable   Final Result      Increased size of a moderate left pleural effusion and atelectasis adjacent to the major fissure  Stable bilateral airspace opacities, right upper lobe collapse and small right pleural effusion  Workstation performed: [id number]         XR chest portable ICU   Final Result      Worsening severe bilateral consolidation with small effusions and recurrent right upper lobe collapse  Workstation performed: [id number]         XR chest portable ICU   Final Result      Interval placement of a right IJ catheter with tip projecting over the mid SVC  No pneumothorax  Persistent bilateral pulmonary opacities  Workstation performed: [id number]         CT chest abdomen pelvis wo contrast   Final Result   1  CHF with moderate basilar effusions and additional upper lung zone patchy airspace opacities likely reflecting asymmetric pulmonary edema  Infiltrate is not entirely excluded  2   Distended small bowel loops with scattered air-fluid levels consistent with early/partial small bowel obstruction with transition at the small bowel anastomosis in the region of the ventral pelvis as above  No free air  3   Cholelithiasis without cholecystitis  4   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  Concordant preliminary results were provided by virtual radiologic at 0531 hours on 5/24/2022  Workstation performed: [id number]         XR chest portable ICU   Final Result      Bilateral airspace disease is unchanged  The Keofeed tube has been removed  Workstation performed: [id number]         XR chest portable   Final Result   Addendum 1 of 1   ADDENDUM:      The right IJ line terminates in the SVC  The PICC line terminates at the    junction of the SVC and right atrium  Final         1  Keofed tube terminates at the EG junction and should be advanced for  use  2   Bilateral airspace disease may represent infiltrates or the alveolar phase of heart failure  Bilateral pleural effusions  Workstation performed: [id number]         CT chest abdomen pelvis wo contrast   Final Result      1  Stable appearance of mixed groundglass and consolidative abnormalities in the lungs bilaterally predominantly affecting the upper lobes suspicious for multilobar pneumonia  Superimposed pulmonary edema not excluded  2   Similar to slight increase size of moderate bilateral pleural effusions  3   Diffuse mild dilation of small bowel segments identified without transition point  Workstation performed: [id number]         XR chest portable ICU   Final Result      Right neck catheter in the distal SVC                  Workstation performed: [id number]         XR abdomen 1 view kub   Final Result      Persistent gas distended small bowel loops with normal caliber colon with contrast                Workstation performed: [id number]         XR chest portable ICU   Final Result      Improved pneumomediastinum  Bilateral groundglass infiltrative changes  Workstation performed: [id number]         XR chest portable ICU   Final Result      Pneumomediastinum  Endotracheal tube tip above the level of the chriss by 2 5 cm  Enteric tube tip overlying the epigastric region below the inferior margin of this film  Slight progression of multifocal groundglass pulmonary parenchymal airspace opacities  Trace costophrenic angle effusions, unchanged        This examination was marked "immediate notification" in Epic in order to begin the standard process by which the radiology reading room liaison alerts the referring practitioner  Workstation performed: [id number]         XR abdomen 1 view kub   Final Result      Slight worsening of the prominent dilated small bowel loops  Contrast in the colon  Workstation performed: [id number]         IR PICC line placement double lumen   Final Result   1  Status post placement of a 5-Pashto percutaneously inserted central venous catheter via the right basilic vein with its tip at the cavoatrial junction under ultrasound and fluoroscopic guidance  NG tube placement   Next using fluoroscopic guidance, an NG tube was placed down into the stomach past the narrowing at the GE junction  2 stiff wires were required to advance this  Impression successful NG tube placement with its tip in the stomach  The tube may be used immediately  Workstation performed: [id number]         XR abdomen 1 view kub   Final Result      Radiographic appearance suspicious for early or mild distal small bowel obstruction  Alternatively, this could represent ileus  Enteric contrast administered for the May 17, 2022 examination does not appear to have reached the large bowelAn       enteric catheter tip overlies the expected location of the cavoatrial junction and has not quite reached the stomach  Advancement by approximately 8 to 10 cm recommended  This examination was marked "significant notification" in Epic in order to begin the standard process by which the radiology reading room liaison alerts the referring practitioner  Workstation performed: [id number]         CT chest abdomen pelvis wo contrast   Final Result      1  Worsening patchy mixed groundglass and consolidative change bilaterally compatible with multifocal pneumonia and/or pulmonary edema  2   Increased bilateral pleural effusions        3   Sidehole of enteric tube is visualized near the gastroesophageal junction  Consider tube advancement  4   Mild distention of mid to distal esophagus with enteric contrast material   Correlate for gastroesophageal reflux  5   Multiple dilated small bowel loops without a clear transition point  The finding may reflect ileus, however developing small bowel obstruction cannot be excluded  Follow-up is recommended  6   Moderate amount of air within the urinary bladder, likely related to instrumentation  Correlate with urinalysis to exclude cystitis  I personally discussed this study with Alek Augustine on 5/18/2022 at 1:11 AM                Workstation performed: [id number]         XR chest portable   Final Result      1  Tip of enteric tube is at the expected position of the gastroesophageal junction  Slight advancement is advised  2   Slight increased predominant upper lobe opacities, likely representing pneumonia  Concomitant edema is possible  The now trace bilateral pleural effusions have decreased in size  The study was marked in [facility] for immediate notification  Workstation performed: [id number]         XR abdomen 1 view kub   Final Result      Diffusely distended, dilated small bowel loops with small amount of persistent air extending to the proximal colon  Findings may suggest diffuse ileus or partial obstruction  Workstation performed: [id number]         CTA chest pe study   Final Result      No pulmonary embolus  Bilateral upper lobe groundglass opacities compatible with pneumonia and/or pulmonary edema  Small bilateral pleural effusions  Workstation performed: [id number]         XR chest portable   Final Result      Pulmonary edema and small bilateral pleural effusions  Workstation performed: [id number]         IR other    (Results Pending)         Micro:  Lab Results   Component Value Date    BLOODCX No Growth After 5 Days  05/22/2022    BLOODCX No Growth After 5 Days  05/22/2022    BLOODCX No Growth After 5 Days  05/18/2022    WOUNDCULT 1+ Growth of Pseudomonas aeruginosa (A) 05/11/2022    SPUTUMCULTUR Culture too young- will reincubate 05/30/2022    SPUTUMCULTUR Test not performed  Suggest repeat specimen   05/17/2022       Allergies: No Known Allergies    Medications:   Scheduled Meds:  Current Facility-Administered Medications   Medication Dose Route Frequency Provider Last Rate    acetaminophen  650 mg Oral Q6H PRN ISELA Phillip      chlorhexidine  15 mL Mouth/Throat Q12H [facility] ISELA Phillip      dexmedetomidine  0 1-1 5 mcg/kg/hr Intravenous Titrated ISELA Anderson 0 2 mcg/kg/hr (06/01/22 0701)    gabapentin  200 mg Oral HS ISELA Maxwell      heparin (porcine)  400 Units/hr Intravenous Continuous ISELA Mckeon 400 Units/hr (05/31/22 1250)    HYDROmorphone  0 2 mg/hr Intravenous Continuous Mark Little PA-C 0 2 mg/hr (06/01/22 0701)    insulin lispro  1-6 Units Subcutaneous Q6H [facility] ISELA Ritter      iohexol  50 mL Oral Once in imaging ISELA Phillip      ipratropium-albuterol  3 mL Nebulization Q6H PRN ISELA Phillip      levothyroxine  112 mcg Per NG Tube Early Morning ISELA Phillip      lidocaine  2 patch Topical Daily [place], [place]      NxStage K 4/Ca 3  20,000 mL Dialysis Continuous ISELA Maxwell      ondansetron  4 mg Intravenous Q6H PRN ISELA Phillip      oxyCODONE  5 mg Per NG Tube Q4H PRN ISELA Anderson      Or    oxyCODONE  7 5 mg Per NG Tube Q4H PRN ISELA Anderson      pantoprazole  40 mg Intravenous Q24H [facility] ISELA Phillip      polyethylene glycol  17 g Oral Daily ISELA Maxwell      sodium phosphate  6 mmol Intravenous Once Fernando Ramirez MD 6 mmol (06/01/22 0711)     Continuous Infusions:dexmedetomidine, 0 1-1 5 mcg/kg/hr, Last Rate: 0 2 mcg/kg/hr (06/01/22 0701)  heparin (porcine), 400 Units/hr, Last Rate: 400 Units/hr (05/31/22 1250)  HYDROmorphone, 0 2 mg/hr, Last Rate: 0 2 mg/hr (06/01/22 0701)  NxStage K 4/Ca 3, 20,000 mL      PRN Meds:  acetaminophen, 650 mg, Q6H PRN  iohexol, 50 mL, Once in imaging  ipratropium-albuterol, 3 mL, Q6H PRN  ondansetron, 4 mg, Q6H PRN  oxyCODONE, 5 mg, Q4H PRN   Or  oxyCODONE, 7 5 mg, Q4H PRN        VTE Pharmacologic Prophylaxis: Heparin  VTE Mechanical Prophylaxis: sequential compression device    Invasive lines and devices: Invasive Devices  Report    Peripherally Inserted Central Catheter Line  Duration           PICC Line 25/04/61 Right Basilic 13 days          Arterial Line  Duration           Arterial Line 05/30/22 Radial 1 day          Hemodialysis Catheter  Duration           HD Temporary Double Catheter 5 days          Drain  Duration           Colostomy LLQ 21 days    NG/OG/Enteral Tube Orogastric 16 Fr Center mouth 11 days    Urethral Catheter Double-lumen 16 Fr  11 days          Airway  Duration           ETT  Oral 10 days                   Counseling / Coordination of Care  Total Critical Care time spent 30 minutes excluding procedures, teaching and family updates  Code Status: Level 1 - Full Code     Portions of the record may have been created with voice recognition software  Occasional wrong word or "sound a like" substitutions may have occurred due to the inherent limitations of voice recognition software  Read the chart carefully and recognize, using context, where substitutions have occurred       Art Finn MD

## 2022-06-02 NOTE — PROCEDURES
General Surgery  Fady Hudson 80 y o  male MRN: [id number]  Unit/Bed#: ICU 02 Encounter: [id number]    Wound V A C   Change     Date: 6/2/2022    Time: 11:01 AM      Location of wound: midline surgical incision    Sponges removed:  1 Black Sponges  0 White Sponges    Dimensions of wound: 12 3 cm x 2 6 cm x 2 cm    Description of wound: slowly coming together, less fibrinous slough at base, more pink granulation bleeding tissue along wound length    Sponges placed:  2 Black Sponges  0 White Sponges    VAC settings:  125 mmHg  Continuous      Performed with nursing staff  Pt tolerated procedure well  Continue Monday/Thursday VAC changes         Flakita Marin PA-C  6/2/2022

## 2022-06-02 NOTE — PROGRESS NOTES
NEPHROLOGY PROGRESS NOTE   Mindi Stokes 80 y o  male MRN: [id number]  Unit/Bed#: ICU 02 Encounter: [id number]  Reason for Consult: MARYELLEN      SUMMARY:    80 y  o  man with PMH of Aostenosis, scleroderma, CAD   Patient underwent EGD and colonoscopy on 05/11 for possible intussusception, complicated with perforation, underwent exploratory laparotomy, complicated with pneumonia, sepsis, CHF, PEA arrest on 05/21 x2   Nephrology is consulted for MARYELLEN   ay for assistance in the management of MARYELLEN    ASSESSMENT and PLAN:    Acute renal failure/acute tubular necrosis  --acute tubular necrosis in setting of hypotension and cardiac arrest  --started CVVH on May 26, discontinued yesterday June 1st  --received a dose of IV diuretic last night with no improvement of the urine output  --urine output has dropped, borderline oliguric, urine output could be reflection of the low blood pressure  --discussed with critical care  --now back on pressor support with norepinephrine, along with midodrine started 5 mg t i d   --given the slightly increased FiO2 requirements still examining anasarca will need continue volume removal to help in anticipation of likely trach and PEG  --will plan to restart CVVH, 1 9 L/hr therapy fluid, blood flow rate 250 mL/minutes, run net -25 mL/hr, and then gradually increase the blood pressure remains stable     PEA cardiac arrest  --x2 episodes     Ventilatory dependent respiratory failure  --60% FiO2     Blood pressure/volume status  --suspect that he has extravascular volume overloaded but intravascular volume depleted  --receiving intermittent doses of IV albumin  --started midodrine 5 mg t i d   --back on norepinephrine drip  --extravascular volume overloaded  --plan to start ultrafiltration with CVVH     Bowel perforation  --status post emergent ex lap on May 11th with low anterior resection, protective loop transverse colostomy with a flex sigmoidoscopy and small-bowel resection  --currently has an abdominal wound VAC in place  --management as per surgery     Anemia  --transfuse for as per the primary team  --continues to slowly drift down      SUBJECTIVE / INTERVAL HISTORY:    Overnight blood pressure was running soft and dropped with the addition of Bumex and then persistently dropped and started back on pressor support  Also started on midodrine  OBJECTIVE:  Current Weight: Weight - Scale: 79 1 kg (174 lb 6 1 oz)  Vitals:    06/02/22 0615 06/02/22 0630 06/02/22 0724 06/02/22 0747   BP:       Pulse: 95 89  95   Resp: (!) 34 (!) 29  (!) 34   Temp: 99 7 °F (37 6 °C) 99 7 °F (37 6 °C) 99 4 °F (37 4 °C)    TempSrc:   Temporal    SpO2: 97% 97%  96%   Weight:       Height:           Intake/Output Summary (Last 24 hours) at 6/2/2022 7852  Last data filed at 6/2/2022 0600  Gross per 24 hour   Intake 1809 13 ml   Output 1155 ml   Net 654 13 ml       Review of Systems:    Unable to obtain    Physical Exam  Vitals and nursing note reviewed  Exam conducted with a chaperone present  Constitutional:       General: He is not in acute distress  Appearance: He is ill-appearing  He is not toxic-appearing  Comments: Intubated and sedated   HENT:      Head: Normocephalic and atraumatic  Mouth/Throat:      Mouth: Mucous membranes are moist    Eyes:      General: No scleral icterus  Cardiovascular:      Rate and Rhythm: Normal rate and regular rhythm  Pulmonary:      Breath sounds: Rales present  Comments: Coarse breath sounds bilaterally  Abdominal:      General: There is no distension  Tenderness: There is abdominal tenderness  There is no guarding  Comments: Surgical site noted   Genitourinary:     Comments: Farnsworth catheter in place with yellow urine  Musculoskeletal:         General: Swelling present  Right lower leg: Edema present  Left lower leg: Edema present  Skin:     General: Skin is dry  Coloration: Skin is pale     Neurological:      Comments: Sedated on the ventilator         Medications:    Current Facility-Administered Medications:     acetaminophen (TYLENOL) oral suspension 650 mg, 650 mg, Oral, Q6H PRN, Naoma Lio, CRNP, 650 mg at 06/01/22 2340    chlorhexidine (PERIDEX) 0 12 % oral rinse 15 mL, 15 mL, Mouth/Throat, Q12H [facility], Naoma Lio, CRNP, 15 mL at 06/01/22 2024    dexmedeTOMIDine (Precedex) 400 mcg in sodium chloride 0 9% 100 mL, 0 1-1 5 mcg/kg/hr, Intravenous, Titrated, ISELA Rizzo, Stopped at 06/01/22 2000    gabapentin (NEURONTIN) oral solution 200 mg, 200 mg, Oral, HS, ISELA Ritter, 200 mg at 06/01/22 2155    heparin (porcine) subcutaneous injection 5,000 Units, 5,000 Units, Subcutaneous, Q8H [facility], Rogelio Powell MD, 5,000 Units at 06/02/22 0555    insulin lispro (HumaLOG) 100 units/mL subcutaneous injection 1-6 Units, 1-6 Units, Subcutaneous, Q6H [facility], 1 Units at 06/02/22 0555 **AND** Fingerstick Glucose (POCT), , , Q6H, ISELA Ritter    iohexol (OMNIPAQUE) 240 MG/ML solution 50 mL, 50 mL, Oral, Once in imaging, Naoma Lio, CRNP    ipratropium-albuterol (DUO-NEB) 0 5-2 5 mg/3 mL inhalation solution 3 mL, 3 mL, Nebulization, Q6H PRN, Naoma Lio, CRNP, 3 mL at 05/28/22 2106    levothyroxine tablet 112 mcg, 112 mcg, Per NG Tube, Early Morning, Naoma Lio, CRNP, 112 mcg at 06/02/22 0555    lidocaine (LIDODERM) 5 % patch 2 patch, 2 patch, Topical, Daily, Omega Heath PA-C, 2 patch at 06/01/22 8100    midodrine (PROAMATINE) tablet 5 mg, 5 mg, Oral, TID AC, ISELA Ritter, 5 mg at 06/02/22 0600    norepinephrine (LEVOPHED) 1 mg/mL injection **ADS Override Pull**, , , ,     norepinephrine (LEVOPHED) 4 mg (STANDARD CONCENTRATION) IV in sodium chloride 0 9% 250 mL, 1-30 mcg/min, Intravenous, Titrated, ISELA Ritter, Last Rate: 3 8 mL/hr at 06/02/22 0630, 1 mcg/min at 06/02/22 0630    NxStage K 4/Ca 3 dialysis solution (RFP-401) 20,000 mL, 20,000 mL, Dialysis, Continuous, Myra Anguiano MD    [facility]etron [facility]) injection 4 mg, 4 mg, Intravenous, Q6H PRN, Johanne Curling, CRNP    oxyCODONE (ROXICODONE) oral solution 5 mg, 5 mg, Per NG Tube, Q4H PRN, 5 mg at 06/01/22 1709 **OR** oxyCODONE (ROXICODONE) oral solution 7 5 mg, 7 5 mg, Per NG Tube, Q4H PRN, ISELA Duffy    pantoprazole (PROTONIX) injection 40 mg, 40 mg, Intravenous, Q24H [address], Johanne Curling, CRNP, 40 mg at 06/01/22 3216    polyethylene glycol (MIRALAX) packet 17 g, 17 g, Oral, Daily, ISELA Ritter, 17 g at 06/01/22 4108    Laboratory Results:  Results from last 7 days   Lab Units 06/02/22  0527 06/01/22  1704 06/01/22  0520 05/31/22  2310 05/31/22  1758 05/31/22  1158 05/31/22  0533 05/30/22  2317 05/30/22  1807 05/30/22  1217 05/30/22  0523 05/29/22  0614 05/29/22  0600 05/28/22  1817 05/28/22  1412 05/28/22  1205 05/28/22  0602 05/27/22  1228 05/27/22  0546   WBC Thousand/uL 15 57*  --  18 82*  --   --   --  15 28*  --   --   --  16 84*  --  13 39*  --   --   --  15 61*  --  22 51*   HEMOGLOBIN g/dL 7 3*  --  7 9*  --   --   --  7 4*  --   --  6 5* 7 0*  --  7 3*  --  7 2*  --  7 8*  --  8 8*   HEMATOCRIT % 22 3*  --  23 6*  --   --   --  21 8*  --   --  19 6* 20 5*  --  21 0*  --  20 6*  --  21 9*  --  24 9*   PLATELETS Thousands/uL 147*  --  116*  --   --   --  116*  --   --   --  115*  --  140*  --   --   --  123*  --  114*   POTASSIUM mmol/L 4 5 4 3 4 1 4 2 4 1 4 2 4 2 4 2   < > 4 3 4 5   < > 4 1   < >  --    < > 4 0   < > 3 9   CHLORIDE mmol/L 103 103 105 105 104 103 104 104   < > 105 104   < > 103   < >  --    < > 103   < > 102   CO2 mmol/L 26 28 27 27 27 26 26 24   < > 26 27   < > 26   < >  --    < > 26   < > 20*   BUN mg/dL 29* 21 20 22 21 23 24 25   < > 29* 31*   < > 36*   < >  --    < > 43*   < > 81*   CREATININE mg/dL 1 56* 1 10 1 01 1 02 0 99 1 05 1 13 1 18   < > 1 26 1 36*   < > 1 44*   < >  --    < > 1 47*   < > 2 35*   CALCIUM mg/dL 8 6 8 5 8 4 8 7 8 7 8 7 8 8 8 8 < > 8 5 8 4   < > 9 0   < >  --    < > 8 4   < > 7 9*   MAGNESIUM mg/dL 1 9  --  2 0 1 9 2 0 1 9 2 0 2 1   < > 2 1 1 9   < >  --    < >  --    < > 1 9   < > 2 3   PHOSPHORUS mg/dL 2 7 2 6 2 1* 2 3 2 6 2 4 2 9 2 3   < > 2 3 2 6   < >  --    < >  --    < > 2 5   < > 4 6*    < > = values in this interval not displayed  PLEASE NOTE:  This encounter was completed utilizing the [employer] Direct Speech Voice Recognition Software  Grammatical errors, random word insertions, pronoun errors and incomplete sentences are occasional consequences of the system due to software limitations, ambient noise and hardware issues  These may be missed by proof reading prior to affixing electronic signature  Any questions or concerns about the content, text or information contained within the body of this dictation should be directly addressed to the physician for clarification  Please do not hesitate to call me directly if you have any any questions or concerns

## 2022-06-02 NOTE — PROGRESS NOTES
[address]  Progress Note - Kiersten Hudson 2/15/1931, 80 y o  male MRN: [id number]  Unit/Bed#: ICU 02 Encounter: [id number]  Primary Care Provider: Dior Edward MD   Date and time admitted to hospital: 5/11/2022  4:48 PM    * Bowel perforation [facility])  Assessment & Plan  · Outpatient EGD and colonoscopy at [facility] on 5/11 for w/u of chronic anemia, history of colon polyps, intermittent LLQ abdominal pain and possible intermittent intussusception  · EGD unremarkable, colonoscopy technically difficult and required change from adult scope to pediatric scope  · During traversal of the sigmoid colon there was a kink and patient sustained a perforation  Procedure was aborted and patient was transferred to [facility] where immediate surgical consultation was obtained  · Emergent ex- lap on 5/11 > low anterior resection, protective loop transverse colostomy, flex sigmoidoscopy, and segmental small bowel resection  · Difficult post op course with post op ileus requiring NGT decompression and initiation of TPN; not tolerating trickle feeds  · 5/22: CT: Diffuse mild dilation of small bowel segments identified without transition point  · 5/24: CT CAP- Distended small bowel loops with scattered air-fluid levels consistent with early/partial small bowel obstruction with transition at the small bowel anastomosis in the region of the ventral pelvis as above  No free air  Cholelithiasis without cholecystitis  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     Acute respiratory failure with hypoxia ([facility] [address] )  Assessment & Plan  · Patient previously in ICU for hypoxia with a max of 15L midflow and concern for aspiration pneumonitis  · Transfered to general medical floor 5/21  · 5/21: PEA arrest x2   · 5/24 CT CAP-CHF with moderate basilar effusions and additional upper lung zone patchy airspace opacities likely reflecting asymmetric pulmonary edema  Infiltrate is not entirely excluded  · 5/29 Bronchoscopy for mucous plugging  · Day # 14 on ventilator: AC/PC 20/24/60/10  · Wean Fio2 as able for SPO2>90%  · Continue pulmonary hygiene/ VAP bundle  · Continue volume removal with CVVH  · Ongoing GOC discussions in regards to proceeding with trach/peg    Acute renal failure (ARF) ([facility] )  Assessment & Plan  · Secondary to hypoperfusion mehul cardiac arrest +/- ATN  · Baseline creat 0 8  · Creatinine peaked at 3 07  · CVVH started on 5/26 and stopped on 6/1  · It was restarted on 6/2 after he came anuric and had increasing oxygen requirements  · Continue CVVH with goal negative   · Patient was tolerating -100ml/hr; however had acute decline overnight requiring 2nd vasopressor to be added   Continue even for now  · Avoid hypotension/hypoperfusion  · Continue levophed for MAP > 65    Severe sepsis (HCC)  Assessment & Plan  · Peritonitis with intra-abdominal/pelvic abscess secondary to colonic perforation    · 5/22 CT chest/ab/pelvis with concern of multifocal PNA, no visualized intraabdominal abscess or anastomotic leak   · Per ID suspect multifocal pneumonitis   · OR culture 5/21 pseudomonas and bacteroides fragilis  · Repeat blood cultures on 5/22 negative   · Completed 14 days of Flagyl on 5/24  · Completed 14 days of cefepime on 5/27  · Observe off antibiotics   · ID following, appreciate recs     Cardiac arrest [facility])  Assessment & Plan  · Patient was found unresponsive and hypoxic approximately 20 minutes after being seen well with family 5/21   · PEA arrest x 2  · Most likely respiratory driven  · Neurologically intact post arrest     CHF (congestive heart failure) ([facility] )  Assessment & Plan  Wt Readings from Last 3 Encounters:   05/31/22 77 6 kg (171 lb 1 2 oz)   05/11/22 62 1 kg (136 lb 14 4 oz)   03/25/22 61 2 kg (135 lb)     · 5/16: Echo-EF 65%, mild TR, mild MR  · 5/23: Repeat Echo post cardiac arrest: EF 60-65%, G1DD, mild AS (BRADY 1 5cm2), mild MR, mild TR  · Monitor I&O, Daily weights   · Limited ECHO-EF 65%, G1DD, mild AS      Hyperglycemia  Assessment & Plan  · A1c 5 3%  · Continue SSI    Toxic metabolic encephalopathy  Assessment & Plan  · Likely in setting of acute illness/delirium and cardiac arrest  · Delirium precautions; regulate sleep/wake cycle, environmental controls, daily CAM ICU   · Trend neuro exam  · Following commands, nodding appropriately to questions, moves all extremities    Anemia  Assessment & Plan  · Hemoglobin drop post cardiac arrest    · Noted to have gross hematuria but this has since resolved  · 5/21: 1 u PRBC for hgb 6 8  · 5/24: 1 u PRBC  · CT obtained on 5/24 and negative for any overt signs of bleeding  · 5/26: 1 u PRBC  · 5/30: 1 u PRBC  · Continue to monitor and transfuse for hgb less than 7      Atrial fibrillation (HCC)  Assessment & Plan  · In the setting of cardiac arrest while on 3 pressors noted to have afib with RVR  · Bolused with amio and placed on infusion  · Converted to sinus rhythm on 5/22   · Amio gtt d/c 5/23  · Has remained in NSR  · No indication for [facility]  · Afib was likely post arrest in setting of triple pressors    Hypotension  Assessment & Plan  · Levophed restarted on 6/1  · Midodrine added 5 mg TID  · Continue to monitor and make adjustments accordingly    ----------------------------------------------------------------------------------------  HPI/24hr events: Patient was restarted on CVVH  He originally was tolerating -100ml/hr, then acutely decompensated last night and became acutely hypotensive  A second vasopressor was added  He is currently running even and weaning down on his levophed requirements   He also is becoming more hypoxic requiring titration in his fi02 requirements to 90%,    Patient appropriate for transfer out of the ICU today?: No  Disposition: Continue Critical Care   Code Status: Level 1 - Full Code  ---------------------------------------------------------------------------------------  SUBJECTIVE  TORREY intubated/sedated    Review of Systems  Review of systems was unable to be performed secondary to sedated/intubated  ---------------------------------------------------------------------------------------  OBJECTIVE    Vitals   Vitals:    22 0049 22 0050 22 0100 22 0110   BP:       BP Location:       Pulse: 68 71 85 86   Resp: (!) 34 (!) 38 (!) 33 (!) 39   Temp: 97 52 °F (36 4 °C) 97 52 °F (36 4 °C) (!) 97 34 °F (36 3 °C) (!) 97 34 °F (36 3 °C)   TempSrc:       SpO2: 90% 90% 94% 93%   Weight:       Height:         Temp (24hrs), Av 7 °F (37 1 °C), Min:97 16 °F (36 2 °C), Max:100 4 °F (38 °C)  Current: Temperature: (!) 97 34 °F (36 3 °C)  Arterial Line BP: 136/43  Arterial Line MAP (mmHg): 74 mmHg    Respiratory:  SpO2: SpO2: 93 %, SpO2 Activity: SpO2 Activity: At Rest  Nasal Cannula O2 Flow Rate (L/min): 5 L/min    Invasive/non-invasive ventilation settings   Respiratory  Report   Lab Data (Last 4 hours)    None         O2/Vent Data (Last 4 hours)       2324           Vent Mode AC/PC       Resp Rate (BPM) (BPM) 20       Pressure Control (cmH2O) (cm) 24       Insp Time (sec) (sec) 0 6       FiO2 (%) (%) 60       PEEP (cmH2O) (cmH2O) 10       MV 15 5                   Physical Exam  Vitals reviewed  Constitutional:       Appearance: He is ill-appearing and toxic-appearing  Interventions: He is sedated and intubated  HENT:      Head: Normocephalic  Mouth/Throat:      Mouth: Mucous membranes are moist       Pharynx: Oropharynx is clear  Eyes:      Extraocular Movements: Extraocular movements intact  Conjunctiva/sclera: Conjunctivae normal       Pupils: Pupils are equal, round, and reactive to light  Cardiovascular:      Rate and Rhythm: Normal rate and regular rhythm  Pulmonary:      Effort: Tachypnea present  He is intubated  Breath sounds: Rhonchi present  No decreased breath sounds, wheezing or rales  Comments: Coarse b/l breath sounds  Abdominal:      Palpations: Abdomen is soft  Tenderness: There is abdominal tenderness  Comments: Ostomy with prolapse/stool present  Abdominal wound vac intact   Genitourinary:     Comments: Farnsworth present with yellow urine  Musculoskeletal:      Cervical back: Normal range of motion  Right lower leg: Edema present  Left lower leg: Edema present  Skin:     General: Skin is warm and dry  Neurological:      Comments:  Follows directions  Sedated             Laboratory and Diagnostics:  Results from last 7 days   Lab Units 06/02/22  0527 06/01/22  0520 05/31/22  0533 05/30/22  1217 05/30/22  0523 05/29/22  0600 05/28/22  1412 05/28/22  0602 05/27/22  0546   WBC Thousand/uL 15 57* 18 82* 15 28*  --  16 84* 13 39*  --  15 61* 22 51*   HEMOGLOBIN g/dL 7 3* 7 9* 7 4* 6 5* 7 0* 7 3* 7 2* 7 8* 8 8*   HEMATOCRIT % 22 3* 23 6* 21 8* 19 6* 20 5* 21 0* 20 6* 21 9* 24 9*   PLATELETS Thousands/uL 147* 116* 116*  --  115* 140*  --  123* 114*   BANDS PCT %  --   --  22*  --   --   --   --   --  24*   MONO PCT %  --   --  3*  --   --   --   --   --  5     Results from last 7 days   Lab Units 06/02/22  2204 06/02/22  0527 06/01/22  1704 06/01/22  0520 05/31/22  2310 05/31/22  1758 05/31/22  1158 05/29/22  0753 05/29/22  0600   SODIUM mmol/L 135* 137 136 137 139 136 137   < > 136   POTASSIUM mmol/L 4 6 4 5 4 3 4 1 4 2 4 1 4 2   < > 4 1   CHLORIDE mmol/L 103 103 103 105 105 104 103   < > 103   CO2 mmol/L 25 26 28 27 27 27 26   < > 26   ANION GAP mmol/L 7 8 5 5 7 5 8   < > 7   BUN mg/dL 26* 29* 21 20 22 21 23   < > 36*   CREATININE mg/dL 1 47* 1 56* 1 10 1 01 1 02 0 99 1 05   < > 1 44*   CALCIUM mg/dL 8 1* 8 6 8 5 8 4 8 7 8 7 8 7   < > 9 0   GLUCOSE RANDOM mg/dL 161* 147* 168* 147* 142* 169* 176*   < > 163*   ALT U/L  --  20  --   --   --   --   --   --  19   AST U/L  --  26  --   --   --   --   --   --  15   ALK PHOS U/L  --  445*  --   --   --   --   --   --  260*   ALBUMIN g/dL  --  3 2*  --   --   --   --   --   --  2 9*   TOTAL BILIRUBIN mg/dL  --  1 06*  --   --   --   --   --   --  1 33*    < > = values in this interval not displayed  Results from last 7 days   Lab Units 06/02/22  2204 06/02/22  0527 06/01/22  1704 06/01/22  0520 05/31/22  2310 05/31/22  1758 05/31/22  1158 05/31/22  0533   MAGNESIUM mg/dL 1 7 1 9  --  2 0 1 9 2 0 1 9 2 0   PHOSPHORUS mg/dL 2 7 2 7 2 6 2 1* 2 3 2 6 2 4 2 9      Results from last 7 days   Lab Units 05/29/22  1545 05/28/22  0602   INR  1 28*  --    PTT seconds 53* 74*              ABG:  Results from last 7 days   Lab Units 06/02/22  1022   PH ART  7 436   PCO2 ART mm Hg 35 0*   PO2 ART mm Hg 54 6*   HCO3 ART mmol/L 23 0   BASE EXC ART mmol/L -1 0   ABG SOURCE  Line, Arterial     VBG:  Results from last 7 days   Lab Units 06/02/22  1022   ABG SOURCE  Line, Arterial     Results from last 7 days   Lab Units 05/30/22  0523   PROCALCITONIN ng/ml 1 26*       Micro  Results from last 7 days   Lab Units 05/30/22  1828   SPUTUM CULTURE  1+ Growth of Candida sp  presumptively albicans*   GRAM STAIN RESULT  Rare Polys  No organisms seen       EKG: NSR  Imaging: I have personally reviewed pertinent reports  Intake and Output  I/O       06/01 0701 06/02 0700 06/02 0701 06/03 0700    I V  (mL/kg) 356 5 (4 5) 164 2 (2 1)    NG/GT 90 90    IV Piggyback 600     Feedings 1023 630    Total Intake(mL/kg) 2069 5 (26 2) 884 2 (11 2)    Urine (mL/kg/hr) 460 (0 2) 305 (0 3)    Drains 25 175    Other 1077 715    Stool 75 75    Total Output 1637 1270    Net +432 5 -385 8          Unmeasured Stool Occurrence 2 x           Height and Weights   Height: 5' 4" (162 6 cm)  IBW (Ideal Body Weight): 59 2 kg  Body mass index is 29 93 kg/m²    Weight (last 2 days)     Date/Time Weight    06/02/22 0500 79 1 (174 38)    06/01/22 0532 78 7 (173 5)            Nutrition       Diet Orders   (From admission, onward)             Start     Ordered    06/02/22 1041  Diet Enteral/Parenteral; Tube Feeding No Oral Diet; Osmolite 1 0; Continuous; 55; Prosource Protein Liquid - One Packet Daily; Demond - Two Packets Daily  Diet effective now        Comments: Demond - one packet with breakfast and dinner  Pro source - one packet with lunch   References:    Nutrtion Support Algorithm Enteral vs  Parenteral   Question Answer Comment   Diet Type Enteral/Parenteral    Enteral/Parenteral Tube Feeding No Oral Diet    Tube Feeding Formula: Osmolite 1 0    Bolus/Cyclic/Continuous Continuous    Tube Feeding Goal Rate (mL/hr): 55    Prosource Protein Liquid - No Carb Prosource Protein Liquid - One Packet Daily    Demond [place] Demond - Two Packets Daily    RD to adjust diet per protocol?  Yes        06/02/22 1040    05/12/22 0906  Room Service  Once        Question:  Type of Service  Answer:  Room Service - Appropriate with Assistance    05/12/22 0905                  Active Medications  Scheduled Meds:  Current Facility-Administered Medications   Medication Dose Route Frequency Provider Last Rate    acetaminophen  650 mg Oral Q6H PRN ISELA Nesbitt      chlorhexidine  15 mL Mouth/Throat Q12H [address] ISELA Nesbitt      dexmedetomidine  0 1-1 5 mcg/kg/hr Intravenous Titrated ISELA Vigil 0 2 mcg/kg/hr (06/03/22 0115)    gabapentin  200 mg Oral HS ISELA Mitchell      heparin (porcine)  400 Units/hr Intravenous Continuous ISELA Sánchez 400 Units/hr (06/02/22 1648)    insulin lispro  1-6 Units Subcutaneous Q6H [address] ISELA Ritter      iohexol  50 mL Oral Once in imaging ISELA Nesbitt      ipratropium-albuterol  3 mL Nebulization Q6H PRN ISELA Nesbitt      levothyroxine  112 mcg Per NG Tube Early Morning Prudence Estrada CRNP      lidocaine  2 patch Topical Daily Kira Castellon PA-C      midodrine  5 mg Oral TID AC Josephine Christensene, CRNP      norepinephrine  1-30 mcg/min Intravenous Titrated Josephine Briones, CRNP 20 mcg/min (06/03/22 0109)    NxStage K 4/Ca 3  20,000 mL Dialysis Continuous Trish Painter MD      ondansetron  4 mg Intravenous Q6H PRN Sanchez Sic, CRNP      oxyCODONE  5 mg Per NG Tube Q4H PRN Leretha Ridgel, CRNP      Or    oxyCODONE  7 5 mg Per NG Tube Q4H PRN Leretha Ridgel, CRNP      pantoprazole  40 mg Intravenous Q24H [facility] Sanchez Sic, CRNP      polyethylene glycol  17 g Oral Daily Josephine Christensene, CRNP      vasopressin           vasopressin  0 04 Units/min Intravenous Continuous Josephine Christensene CRNP 0 04 Units/min (06/03/22 0102)     Continuous Infusions:  dexmedetomidine, 0 1-1 5 mcg/kg/hr, Last Rate: 0 2 mcg/kg/hr (06/03/22 0115)  heparin (porcine), 400 Units/hr, Last Rate: 400 Units/hr (06/02/22 1648)  norepinephrine, 1-30 mcg/min, Last Rate: 20 mcg/min (06/03/22 0109)  NxStage K 4/Ca 3, 20,000 mL  vasopressin, 0 04 Units/min, Last Rate: 0 04 Units/min (06/03/22 0102)      PRN Meds:   acetaminophen, 650 mg, Q6H PRN  iohexol, 50 mL, Once in imaging  ipratropium-albuterol, 3 mL, Q6H PRN  ondansetron, 4 mg, Q6H PRN  oxyCODONE, 5 mg, Q4H PRN   Or  oxyCODONE, 7 5 mg, Q4H PRN        Invasive Devices Review  Invasive Devices  Report    Peripherally Inserted Central Catheter Line  Duration           PICC Line 98/85/70 Right Basilic 15 days          Arterial Line  Duration           Arterial Line 05/30/22 Radial 3 days          Hemodialysis Catheter  Duration           HD Temporary Double Catheter 7 days          Drain  Duration           Colostomy LLQ 23 days    NG/OG/Enteral Tube Orogastric 16 Fr Center mouth 13 days    Urethral Catheter Double-lumen 16 Fr  13 days          Airway  Duration           ETT  Oral 12 days                Rationale for remaining devices: medically necessary   ---------------------------------------------------------------------------------------  Advance Directive and Living Will:      Power of :    POLST:    ---------------------------------------------------------------------------------------  Care Time Delivered:   No Critical Care time spent       [facility]      Portions of the record may have been created with voice recognition software  Occasional wrong word or "sound a like" substitutions may have occurred due to the inherent limitations of voice recognition software    Read the chart carefully and recognize, using context, where substitutions have occurred

## 2022-06-02 NOTE — PROGRESS NOTES
Progress Note - Infectious Disease   Fady Hudson 80 y o  male MRN: [id number]  Unit/Bed#: ICU 02 Encounter: [id number]      Impression/Plan:  1  s/p cardiac arrest 5/21/22  Patient intubated during RR  In ICU on ventilator assistance  Recurrent SIRS possibly reactive to arrest with fever, tachycardia, tachypnea, and increased leukocytosis post arrest  Possible recurrent aspiration event s/p arrest  Fever down trended   Patient back on Levophed  -continue supportive measures per critical care team  -cardiology on board  -ventilator management per critical care team     2  SIRS vs Severe Sepsis, evolving on admission and post #1   Evidenced by tachycardia, tachypnea, bandemia and encephalopathy   Suspect possible aspiration in setting of significant retained secretions, acute respiratory failure requiring supplemental oxygenation and with recent NG tube for postop ileus management   MRSA screen negative  WBC fluctuating  5/22/22 Blood cultures have no growth   Patient completed antibiotic course 5/27/22  Patient back on Levophed over the weekend, back on again  -observing closely off further antibiotic  -monitor temperature and hemodynamics  -serial exam  -monitor CBC and BMP   -pressor support per Critical Medicine Service     3  Peritonitis s/p Bowel perforation  s/p 5/11/22 exploratory laparotomy, low anterior resection, protective loop transverse colostomy and segmental small bowel resection secondary to perforation rectosigmoid junction after colonoscopy   OR cultures grew Pseudomonas aeruginosa and Bacteroides fragilis  Now being managed with NGT/NPO status for post op ileus on TPN    Patient completed antibiotic course 5/27/22   -observe off further antibiotic  -VAC/wound care per surgery     4   Acute hypoxic respiratory failure with hypoxia   Suspicious for aspiration pneumonitis over pneumonia     5/24/22 CT C/A/P predominantly UL airspace opacities consistent with CHF with bilateral pleural effusions, distended small bowel loops id  Patient remains intubated s/p #1  22 Procalcitonin level  2 60 >  0 753 < 22 2 97 >  1   Patient is status post bronchoscopy 22 for RUL collapse   Sputum cx 1+ Candida albicans colonization  -observe off further antibiotic  -ventilator management per critical care team      5  Aspiration pneumonitis versus pneumonia in setting of #1/3   22 CT C/A/P predominantly UL groundglass and consolidations, bilateral pleural effusions, SB mild dilation unchanged  Patient now intubated s/p #1  22 Procalcitonin level  2 60 >  0 753 < 22 2 97 >  1 26  22 sputum specimen oral pharyngeal contamination   -observe off antibiotic  -secretion and pulmonary toilet management per critical care team   -monitor respiratory symptoms  -monitor vent requirements     6  MARYELLEN on CKD 3  Creatinine 1  56  CVVH being resumed  -renal dose adjust medications as needed  -volume management per Nephrology  -CVVH management per Nephrology  -monitor creatinine and urinary output     Antibiotics:  Off antibiotic D6     Above impression and plan discussed in detail with patient's RN, Dr Truong Enrique, and critical care team      Subjective:  Patient has no fever, chills, sweats on ventilator in ICU s/p cardiac arrest 22; no nausea, vomiting, diarrhea reported  CVVH started 22 and was discontinued overnight  Patient had decreased urine output and hypotension requiring resuming Levophed  Creatinine 156 today and CVVH planning for resumption today      Objective:  Vitals:  Temp:  [95 18 °F (35 1 °C)-100 58 °F (38 1 °C)] 100 22 °F (37 9 °C)  HR:  [] 101  Resp:  [22-41] 37  BP: ()/(38-68) 109/57  SpO2:  [91 %-100 %] 97 %  Temp (24hrs), Av 4 °F (37 4 °C), Min:95 18 °F (35 1 °C), Max:100 58 °F (38 1 °C)  Current: Temperature: 100 22 °F (37 9 °C)    Physical Exam:   General Appearance:  80-year old acute on chronically debilitated, elderly male, weakly raises eyebrows on exam, on ventilator at FiO2 setting of 60%, propped in ICU bed, arms propped on pillows   Throat: Oropharynx moist without lesions  ET tube to vent, or gastric tube feeds in place   Lungs:   Scattered coarse ventilated breath sounds anteriorly and laterally to auscultation bilaterally; thin phlegm secretions in suction canister   Heart:  RRR; no murmur   Abdomen:   Soft, no focal tenderness on palpation, midline abdominal wound VAC to wound irrigation system with moderate clear output in canister, wound edges without erythema, left lower quadrant colostomy with pasty brown stool in bag, positive bowel sounds  Extremities: Generalized puffy edema, venodynes gian Prevalon boots in place   : Farnsworth with scant clear, yellow urine in bag, no SPT, 3+ soft scrotal edema   Skin: No new rashes or lesions  Right arm PICC and right neck IJ CVP lines in place  Labs, Imaging, & Other studies:   All pertinent labs and imaging studies were personally reviewed  Results from last 7 days   Lab Units 06/02/22  0527 06/01/22  0520 05/31/22  0533   WBC Thousand/uL 15 57* 18 82* 15 28*   HEMOGLOBIN g/dL 7 3* 7 9* 7 4*   PLATELETS Thousands/uL 147* 116* 116*     Results from last 7 days   Lab Units 06/02/22  0527 06/01/22  1704 06/01/22  0520 05/29/22  0753 05/29/22  0600   SODIUM mmol/L 137 136 137   < > 136   POTASSIUM mmol/L 4 5 4 3 4 1   < > 4 1   CHLORIDE mmol/L 103 103 105   < > 103   CO2 mmol/L 26 28 27   < > 26   BUN mg/dL 29* 21 20   < > 36*   CREATININE mg/dL 1 56* 1 10 1 01   < > 1 44*   EGFR ml/min/1 73sq m 38 58 64   < > 42   CALCIUM mg/dL 8 6 8 5 8 4   < > 9 0   AST U/L 26  --   --   --  15   ALT U/L 20  --   --   --  19   ALK PHOS U/L 445*  --   --   --  260*    < > = values in this interval not displayed       Results from last 7 days   Lab Units 05/30/22  1828   SPUTUM CULTURE  1+ Growth of Candida sp  presumptively albicans*   GRAM STAIN RESULT  Rare Polys  No organisms seen     Results from last 7 days   Lab Units 05/30/22  0523   PROCALCITONIN ng/ml 1 26*

## 2022-06-02 NOTE — PROGRESS NOTES
Progress Note - Critical Care   Fady Hudson 80 y o  male MRN: [id number]  Unit/Bed#: ICU 02 Encounter: [id number]      Acute renal failure (ARF) ([facility] [address] )  Assessment & Plan  · Secondary to hypoperfusion mehul cardiac arrest +/- ATN  · Baseline creat 0 8  · Creatinine peaked at 3 07  · CVVH completed 6/1 (started 5/26)  · 6/1- decreased urine production off of CVVH of 20-30 cc q2hrs given bumex 1 mg with no improvement  Albumin 25 g x2 given  Placed on levophed due to continued hypotension  · Avoid hypotension; continue levophed for MAP > 65    * Bowel perforation (HCC)  Assessment & Plan  · Outpatient EGD and colonoscopy at [facility] on 5/11 for w/u of chronic anemia, history of colon polyps, intermittent LLQ abdominal pain and possible intermittent intussusception  · EGD unremarkable, colonoscopy technically difficult and required change from adult scope to pediatric scope  · During traversal of the sigmoid colon there was a kink and patient sustained a perforation  Procedure was aborted and patient was transferred to [facility] where immediate surgical consultation was obtained  · Emergent ex- lap on 5/11 > low anterior resection, protective loop transverse colostomy, flex sigmoidoscopy, and segmental small bowel resection  · Difficult post op course with post op ileus requiring NGT decompression and initiation of TPN; not tolerating trickle feeds  · 5/22: CT: Diffuse mild dilation of small bowel segments identified without transition point  · 5/24: CT CAP- Distended small bowel loops with scattered air-fluid levels consistent with early/partial small bowel obstruction with transition at the small bowel anastomosis in the region of the ventral pelvis as above  No free air  Cholelithiasis without cholecystitis  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     Acute respiratory failure with hypoxia ([address] )  Assessment & Plan  · Patient previously in ICU for hypoxia with a max of 15L midflow and concern for aspiration pneumonitis  · Improved and was able to transfer to general medical floor 5/21  · 5/21: PEA arrest on the floor most likely respiratory driven   · ROSC obtained; transferred to unit and again PEA arrested  · 5/24 CT CAP-CHF with moderate basilar effusions and additional upper lung zone patchy airspace opacities likely reflecting asymmetric pulmonary edema  Infiltrate is not entirely excluded     · Has remained on ventilator, day # 13: AC/PC 24/20/60/10  · Wean Fio2 as able for SPO2>90%  · Continues to tolerate  · Atrovent/xopenex/mucomyst nebs q8h  · Continue pulmonary hygiene/ VAP bundle  · Continue volume removal   · Bronchoscopy done 5/29    Hypotension  Assessment & Plan  · Previously septic shock  · Now likely secondary to sedation requirements  · Wean levophed for MAP >65    Atrial fibrillation (HCC)  Assessment & Plan  · S/p cardiac arrest while on 3 pressors noted to have afib with RVR  · Bolused with amio and placed on infusion  · Converted to sinus rhythm on 5/22   · Amio gtt d/c 5/23  · Has remained in NSR  · No indication for [facility]  · Afib was likely post arrest in setting of triple pressors    Anemia  Assessment & Plan  · Hemoglobin drop post cardiac arrest    · Noted to have gross hematuria but this has since resolved  · 5/21: 1 u PRBC for hgb 6 8  · 5/24: 1 u PRBC  · CT obtained on 5/24 and negative for any overt signs of bleeding  · 5/26: 1 u PRBC  · Continue to monitor   · Transfuse for hgb less than 7      Cardiac arrest [facility])  Assessment & Plan  · Patient was found unresponsive and hypoxic approximately 20 minutes after being seen well with family 5/21   · PEA arrest x 2  · Most likely respiratory driven  · Neurologically intact post arrest     Hyperglycemia  Assessment & Plan  · A1c 5 3%  · Was requiring insulin infusion while on TPN  · Continue SSI    CHF (congestive heart failure) (HCC)  Assessment & Plan  Wt Readings from Last 3 Encounters:   05/31/22 77 6 kg (171 lb 1 2 oz)   05/11/22 62 1 kg (136 lb 14 4 oz)   03/25/22 61 2 kg (135 lb)     · 5/16: Echo-EF 65%, mild TR, mild MR  · 5/23: Repeat Echo post cardiac arrest: EF 60-65%, G1DD, mild AS (BRDAY 1 5cm2), mild MR, mild TR  · Monitor I&O, Daily weights   · Limited ECHO-EF 65%, G1DD, mild AS      Severe sepsis (HCC)  Assessment & Plan  · Peritonitis with intra-abdominal/pelvic abscess secondary to colonic perforation    · 5/22 CT chest/ab/pelvis with concern of multifocal PNA, no visualized intraabdominal abscess or anastomotic leak   · Per ID suspect multifocal pneumonitis   · OR culture 5/21 pseudomonas and bacteroides fragilis  · Repeat blood cultures on 5/22 negative   · Completed 14 days of Flagyl on 5/24  · Completed 14 days of cefepime on 5/27  · Observe off antibiotics   · ID following, appreciate recs     Toxic metabolic encephalopathy  Assessment & Plan  · Likely in setting of acute illness/delirium and cardiac arrest  · Delirium precautions; regulate sleep/wake cycle, environmental controls, daily CAM ICU   · Trend neuro exam  · Following commands, nodding appropriately to questions, moves all extremities          HPI/24hr events:  CVVH stopped yesterday  Patient had decreased urine output overnight  Given 1 mg bumex  Patient had output of 20-40 cc every 2 hours  Noted to have hypotension and given albumin 25 g x2 with no improvement  Levophed started overnight currently at dose of 3  Physical Exam:     Constitutional:       Appearance: He is ill-appearing and toxic-appearing       Comments: B/L wrist restraints in place   Cardiovascular:      Rate and Rhythm: Normal rate and regular rhythm       Pulses: Normal pulses     Heart sounds: Normal heart sounds  Pulmonary:      Comments: ETT on mechanical ventilation  B/L breath sounds diminished   Abdominal:      General: Bowel sounds are normal       Palpations: Abdomen is soft       Comments: Tube feeds via OGT  Left upper quadrant colostomy   Genitourinary:     Comments: Urinary catheter   Musculoskeletal:      Cervical back: Normal range of motion and neck supple       Comments: Generalized swelling   Skin:     General: Skin is warm and dry  Neurological:      Comments: arousable to painful stimuli       Vitals:    22 0545 22 0600 22 0615 22 0630   BP:  118/58     Pulse: 92 94 95 89   Resp: (!) 31 (!) 32 (!) 34 (!) 29   Temp: 99 5 °F (37 5 °C) 99 68 °F (37 6 °C) 99 7 °F (37 6 °C) 99 7 °F (37 6 °C)   TempSrc:       SpO2: 95% 94% 97% 97%   Weight:       Height:         Arterial Line BP: 136/43  Arterial Line MAP (mmHg): 74 mmHg    Temperature:   Temp (24hrs), Av 2 °F (37 3 °C), Min:95 18 °F (35 1 °C), Max:100 58 °F (38 1 °C)    Current: Temperature: 99 7 °F (37 6 °C)    Weights:   IBW (Ideal Body Weight): 59 2 kg    Body mass index is 29 93 kg/m²    Weight (last 2 days)     Date/Time Weight    22 0500 79 1 (174 38)    22 0532 78 7 (173 5)    22 0800 77 6 (171 08)    22 0534 77 6 (171 08)          Hemodynamic Monitoring:  N/A     Non-Invasive/Invasive Ventilation Settings:  Respiratory  Report   Lab Data (Last 4 hours)    None         O2/Vent Data (Last 4 hours)    None              Lab Results   Component Value Date    PHART 7 395 2022    TWM9CGU 43 0 2022    PO2ART 51 2 (LL) 2022    GIV6YWI 25 7 2022    BEART 0 7 2022    SOURCE Line, Arterial 2022     SpO2: SpO2: 97 %    Intake and Outputs:  I/O        07 0700  07 07 07 0700    I V  (mL/kg) 1814 5 (23 1) 356 5 (4 5)     Blood       NG/ 90     IV Piggyback  600     Feedings 1265 1023 Total Intake(mL/kg) 3249 5 (41 3) 2069 5 (26 2)     Urine (mL/kg/hr) 1595 (0 8) 460 (0 2)     Drains 175 25     Other 4432 1077     Stool 200 75     Total Output 6402 1637     Net -3152 5 +432 5            Unmeasured Stool Occurrence  2 x           Nutrition:        Diet Orders   (From admission, onward)             Start     Ordered    05/26/22 1351  Diet Enteral/Parenteral; Tube Feeding No Oral Diet; Osmolite 1 0; Continuous; 55  Diet effective now        Comments: Increase by 10 cc every four hours until reach goal   References:    Nutrtion Support Algorithm Enteral vs  Parenteral   Question Answer Comment   Diet Type Enteral/Parenteral    Enteral/Parenteral Tube Feeding No Oral Diet    Tube Feeding Formula: Osmolite 1 0    Bolus/Cyclic/Continuous Continuous    Tube Feeding Goal Rate (mL/hr): 55    RD to adjust diet per protocol? Yes        05/26/22 1350    05/12/22 0906  Room Service  Once        Question:  Type of Service  Answer:  Room Service - Appropriate with Assistance    05/12/22 0905                  Labs:   Results from last 7 days   Lab Units 06/02/22  0527 06/01/22  0520 05/31/22  0533 05/28/22  0602 05/27/22  0546   WBC Thousand/uL 15 57* 18 82* 15 28*   < > 22 51*   HEMOGLOBIN g/dL 7 3* 7 9* 7 4*   < > 8 8*   HEMATOCRIT % 22 3* 23 6* 21 8*   < > 24 9*   PLATELETS Thousands/uL 147* 116* 116*   < > 114*   MONO PCT %  --   --  3*  --  5    < > = values in this interval not displayed        Results from last 7 days   Lab Units 06/02/22  0527 06/01/22  1704 06/01/22  0520 05/29/22  0753 05/29/22  0600   SODIUM mmol/L 137 136 137   < > 136   POTASSIUM mmol/L 4 5 4 3 4 1   < > 4 1   CHLORIDE mmol/L 103 103 105   < > 103   CO2 mmol/L 26 28 27   < > 26   BUN mg/dL 29* 21 20   < > 36*   CREATININE mg/dL 1 56* 1 10 1 01   < > 1 44*   CALCIUM mg/dL 8 6 8 5 8 4   < > 9 0   ALK PHOS U/L 445*  --   --   --  260*   ALT U/L 20  --   --   --  19   AST U/L 26  --   --   --  15    < > = values in this interval not displayed  Results from last 7 days   Lab Units 06/02/22  0527 06/01/22  0520 05/31/22  2310   MAGNESIUM mg/dL 1 9 2 0 1 9     Results from last 7 days   Lab Units 06/02/22  0527 06/01/22  1704 06/01/22  0520   PHOSPHORUS mg/dL 2 7 2 6 2 1*      Results from last 7 days   Lab Units 05/29/22  1545 05/28/22  0602 05/26/22  2353   INR  1 28*  --  1 58*   PTT seconds 53* 74* 61*         No results found for: TROPONINI    Imaging:   XR chest portable ICU   Final Result      1  Stable positioning of the lines and life support devices  2   Stable lung findings with dense patchy airspace disease bilaterally, probable right upper lobe atelectasis and small effusions  Workstation performed: [id number]         XR chest portable   Final Result      Increased size of a moderate left pleural effusion and atelectasis adjacent to the major fissure  Stable bilateral airspace opacities, right upper lobe collapse and small right pleural effusion  Workstation performed: [id number]         XR chest portable ICU   Final Result      Worsening severe bilateral consolidation with small effusions and recurrent right upper lobe collapse  Workstation performed: [id number]         XR chest portable ICU   Final Result      Interval placement of a right IJ catheter with tip projecting over the mid SVC  No pneumothorax  Persistent bilateral pulmonary opacities  Workstation performed: [id number]         CT chest abdomen pelvis wo contrast   Final Result   1  CHF with moderate basilar effusions and additional upper lung zone patchy airspace opacities likely reflecting asymmetric pulmonary edema  Infiltrate is not entirely excluded  2   Distended small bowel loops with scattered air-fluid levels consistent with early/partial small bowel obstruction with transition at the small bowel anastomosis in the region of the ventral pelvis as above  No free air     3  Cholelithiasis without cholecystitis  4   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  Concordant preliminary results were provided by Taulia at 0531 hours on 5/24/2022  Workstation performed: [id number]         XR chest portable ICU   Final Result      Bilateral airspace disease is unchanged  The Keofeed tube has been removed  Workstation performed: [id number]         XR chest portable   Final Result   Addendum 1 of 1   ADDENDUM:      The right IJ line terminates in the SVC  The PICC line terminates at the    junction of the SVC and right atrium  Final         1  Keofed tube terminates at the EG junction and should be advanced for  use  2   Bilateral airspace disease may represent infiltrates or the alveolar phase of heart failure  Bilateral pleural effusions  Workstation performed: [id number]         CT chest abdomen pelvis wo contrast   Final Result      1  Stable appearance of mixed groundglass and consolidative abnormalities in the lungs bilaterally predominantly affecting the upper lobes suspicious for multilobar pneumonia  Superimposed pulmonary edema not excluded  2   Similar to slight increase size of moderate bilateral pleural effusions  3   Diffuse mild dilation of small bowel segments identified without transition point  Workstation performed: [id number]         XR chest portable ICU   Final Result      Right neck catheter in the distal SVC                  Workstation performed: [id number]         XR abdomen 1 view kub   Final Result      Persistent gas distended small bowel loops with normal caliber colon with contrast                Workstation performed: [id number]         XR chest portable ICU   Final Result      Improved pneumomediastinum  Bilateral groundglass infiltrative changes                    Workstation performed: [id number]         XR chest portable ICU   Final Result Pneumomediastinum  Endotracheal tube tip above the level of the chriss by 2 5 cm  Enteric tube tip overlying the epigastric region below the inferior margin of this film  Slight progression of multifocal groundglass pulmonary parenchymal airspace opacities  Trace costophrenic angle effusions, unchanged  This examination was marked "immediate notification" in Epic in order to begin the standard process by which the radiology reading room liaison alerts the referring practitioner  Workstation performed: [id number]         XR abdomen 1 view kub   Final Result      Slight worsening of the prominent dilated small bowel loops  Contrast in the colon  Workstation performed: [id number]         IR PICC line placement double lumen   Final Result   1  Status post placement of a 5-[place] percutaneously inserted central venous catheter via the right basilic vein with its tip at the cavoatrial junction under ultrasound and fluoroscopic guidance  NG tube placement   Next using fluoroscopic guidance, an NG tube was placed down into the stomach past the narrowing at the GE junction  2 stiff wires were required to advance this  Impression successful NG tube placement with its tip in the stomach  The tube may be used immediately  Workstation performed: [id number]         XR abdomen 1 view kub   Final Result      Radiographic appearance suspicious for early or mild distal small bowel obstruction  Alternatively, this could represent ileus  Enteric contrast administered for the May 17, 2022 examination does not appear to have reached the large bowelAn       enteric catheter tip overlies the expected location of the cavoatrial junction and has not quite reached the stomach  Advancement by approximately 8 to 10 cm recommended        This examination was marked "significant notification" in Epic in order to begin the standard process by which the radiology reading room liaison alerts the referring practitioner  Workstation performed: [id number]         CT chest abdomen pelvis wo contrast   Final Result      1  Worsening patchy mixed groundglass and consolidative change bilaterally compatible with multifocal pneumonia and/or pulmonary edema  2   Increased bilateral pleural effusions  3   Sidehole of enteric tube is visualized near the gastroesophageal junction  Consider tube advancement  4   Mild distention of mid to distal esophagus with enteric contrast material   Correlate for gastroesophageal reflux  5   Multiple dilated small bowel loops without a clear transition point  The finding may reflect ileus, however developing small bowel obstruction cannot be excluded  Follow-up is recommended  6   Moderate amount of air within the urinary bladder, likely related to instrumentation  Correlate with urinalysis to exclude cystitis  I personally discussed this study with Cinthya Sanchez on 5/18/2022 at 1:11 AM                Workstation performed: [id number]         XR chest portable   Final Result      1  Tip of enteric tube is at the expected position of the gastroesophageal junction  Slight advancement is advised  2   Slight increased predominant upper lobe opacities, likely representing pneumonia  Concomitant edema is possible  The now trace bilateral pleural effusions have decreased in size  The study was marked in [facility] for immediate notification  Workstation performed: [id number]         XR abdomen 1 view kub   Final Result      Diffusely distended, dilated small bowel loops with small amount of persistent air extending to the proximal colon  Findings may suggest diffuse ileus or partial obstruction  Workstation performed: [id number]         CTA chest pe study   Final Result      No pulmonary embolus    Bilateral upper lobe groundglass opacities compatible with pneumonia and/or pulmonary edema  Small bilateral pleural effusions  Workstation performed: [id number]         XR chest portable   Final Result      Pulmonary edema and small bilateral pleural effusions  Workstation performed: [id number]         IR other    (Results Pending)             Micro:  Lab Results   Component Value Date    BLOODCX No Growth After 5 Days  05/22/2022    BLOODCX No Growth After 5 Days  05/22/2022    BLOODCX No Growth After 5 Days  05/18/2022    WOUNDCULT 1+ Growth of Pseudomonas aeruginosa (A) 05/11/2022    SPUTUMCULTUR Culture results to follow  05/30/2022    SPUTUMCULTUR Test not performed  Suggest repeat specimen   05/17/2022       Allergies: No Known Allergies    Medications:   Scheduled Meds:  Current Facility-Administered Medications   Medication Dose Route Frequency Provider Last Rate    acetaminophen  650 mg Oral Q6H PRN ISELA Stringer      chlorhexidine  15 mL Mouth/Throat Q12H [address] ISELA Stringer      dexmedetomidine  0 1-1 5 mcg/kg/hr Intravenous Titrated Jeannie Mercury CROLIVER Stopped (06/01/22 2000)    gabapentin  200 mg Oral HS ISELA Giles      heparin (porcine)  5,000 Units Subcutaneous [facility] Felecia Montano MD      insulin lispro  1-6 Units Subcutaneous Q6H [address] ISELA Ritter      iohexol  50 mL Oral Once in imaging ISELA Stringer      ipratropium-albuterol  3 mL Nebulization Q6H PRN ISELA Stringer      levothyroxine  112 mcg Per NG Tube Early Morning ISELA Stringer      lidocaine  2 patch Topical Daily Herrera Drew PA-C      midodrine  5 mg Oral TID AC ISELA Giles      norepinephrine           norepinephrine  1-30 mcg/min Intravenous Titrated ISELA Ritter 1 mcg/min (06/02/22 0630)    ondansetron  4 mg Intravenous Q6H PRN ISELA Stringer      oxyCODONE  5 mg Per NG Tube Q4H PRN Jeannie Mercury, CRNP      Or    oxyCODONE  7 5 mg Per NG Tube Q4H PRN ISELA Nielsen      pantoprazole  40 mg Intravenous Q24H [facility] ISELA Hearn      polyethylene glycol  17 g Oral Daily ISELA Ritter       Continuous Infusions:dexmedetomidine, 0 1-1 5 mcg/kg/hr, Last Rate: Stopped (06/01/22 2000)  norepinephrine, 1-30 mcg/min, Last Rate: 1 mcg/min (06/02/22 0630)      PRN Meds:  acetaminophen, 650 mg, Q6H PRN  iohexol, 50 mL, Once in imaging  ipratropium-albuterol, 3 mL, Q6H PRN  ondansetron, 4 mg, Q6H PRN  oxyCODONE, 5 mg, Q4H PRN   Or  oxyCODONE, 7 5 mg, Q4H PRN        VTE Pharmacologic Prophylaxis: Heparin  VTE Mechanical Prophylaxis: sequential compression device    Invasive lines and devices: Invasive Devices  Report    Peripherally Inserted Central Catheter Line  Duration           PICC Line 63/43/72 Right Basilic 14 days          Arterial Line  Duration           Arterial Line 05/30/22 Radial 2 days          Hemodialysis Catheter  Duration           HD Temporary Double Catheter 6 days          Drain  Duration           Colostomy LLQ 22 days    NG/OG/Enteral Tube Orogastric 16 Fr Center mouth 12 days    Urethral Catheter Double-lumen 16 Fr  12 days          Airway  Duration           ETT  Oral 11 days                   Counseling / Coordination of Care  Total Critical Care time spent 30 minutes excluding procedures, teaching and family updates  Code Status: Level 1 - Full Code     Portions of the record may have been created with voice recognition software  Occasional wrong word or "sound a like" substitutions may have occurred due to the inherent limitations of voice recognition software  Read the chart carefully and recognize, using context, where substitutions have occurred       Savage Taylor MD

## 2022-06-02 NOTE — PHYSICAL THERAPY NOTE
PHYSICAL THERAPY     06/02/22 1737   Note Type   Note Type Cancelled Session   Cancel Reasons Other  (patient with nursing staff, in ICU, will reattempt)   Licensure   [place] License Number  Grace Nguyen PT [id number]

## 2022-06-02 NOTE — PROGRESS NOTES
Pastoral Care Progress Note    2022  Patient: Amanda Munoz : 2/15/1931  Admission Date & Time: 2022 1648  MRN: [id number] [facility]: [id number]                     Chaplaincy Interventions Utilized:   Relationship Building: Cultivated a relationship of care and support  Per a request from patient's granddaughter, I visited the patient  He was minimally responsive but did partially open his eyes and gave a light squeeze to my hand when I offered a prayer for him  I will continue to follow up with the patient and family  I texted the granddaughter that I visited her grandfather    Ritual: Provided prayer   22 1400   Clinical Encounter Type   Visited With Patient   Routine Visit Follow-up   Referral From Family   Referral To    Baptism Encounters   Baptism Needs Prayer

## 2022-06-03 NOTE — WOUND OSTOMY CARE
Progress Note - Wound   Fady Hudson 80 y o  male MRN: [id number]  Unit/Bed#: ICU 02 Encounter: [id number]    Assessment:   This is a one week follow-up visit for this 80 year old male patient admitted on 5/11/22 with bowel perforation and pneumoperitoneum s/p colonoscopy and transverse loop colostomy done on 5/11/22  He has a history of Raynaud's disease, scleroderma, colon polyps, chronic anemia, gall stones and HTN  He remains vented via oral E T  tube with CVVH in process via right I J  catheter  He has a quigley to gravity and left quadrant colostomy draining small amounts of watery stool  The patient remains totally dependent upon nursing staff for all of his care and is repositioned in bed with assist of 2 persons  During the wound care assessment, the patient was placed on his side to assess sacrobuttock area and had a large amount of mucoid discharge from his rectum (possibly several hundred ml's)  Jeff Navarro notified upon rounding  The entire ostomy appliance was changed  The stoma remains pink and measures 5cm in length, 8cm in width and is 4cm above skin level  There are excoriated areas from 1-2:00 and 4-8:00 with one small partial thickness wound at about 7:00  The parastomal site was dusted lightly with stoma powder in these areas alternating with 3M No Sting x 3 (crusting technique)  125 ml soft unformed stool with moderate amounts of mucoid discharge noted  Large (4") stoma ring then one piece cut to fit wafer applied              Skin care Plan:  1-Cleanse sacro-buttocks with soap and water  Apply Allevyn foam  Danyel with T for treatment  Change every two days and PRN  check skin q-shift  2-Turn/reposition q2h with foam wedges/pillows (including the use of micro-turns) or when medically stable for pressure re-distribution on skin  3-Bilateral heel protectors to be worn at all times in bed    4-Moisturize skin daily with skin nourishing cream  5-Ehob cushion in chair when out of bed  Limit sitting for 1-2 hours at a time to prevent excessive pressure to sacral area then offload back in bed for at least 1 hour  6-Apply bilateral heel foam dressings or use Hydraguard to bilateral heels BID and PRN  7-Ostomy supplies to be ordered before patient is discharged  8-Please only use warm water and plain wipes not soap or bath wipes to cleanse parastomal site  The use of bath wipes and soap would prevent the appliance from adhering correctly and may sensitize the patient to possible allergens and skin irritation  If open/excoriated areas remain to parastomal site, use crusting technique: dust open/excoriated areas lightly with stoma powder then blot with alcohol-free skin prep the repeat procedure x 3  Apply stoma ring then stoma appliance       Assessment Findings:     1  The sacral area has blanchable erythema and is intact  Orders for prevention in place  2  Bilateral heels are intact with mottling noted (due to Raynaud's disease)  Orders for prevention in place  3  Left sided ostomy appliance changed and draining moderate amounts of watery stool  Orders in place for ostomy care  Wound 05/13/22 Sacrum (Active)   Wound Image   06/03/22 1046   Wound Description Pink 06/03/22 1200   Pressure Injury Stage Blanchable erythema 06/03/22 1200   Alisa-wound Assessment Intact 06/03/22 1200   Drainage Amount None 06/03/22 1200   Treatments Cleansed 06/03/22 1200   Dressing Foam, Silicon (eg  Allevyn, etc) 06/03/22 1200   Dressing Changed Changed 05/28/22 1200   Dressing Status Clean;Dry; Intact 06/03/22 1200       Wound 05/13/22  Heel Left (Active)   Wound Image   06/03/22 1103   Wound Description Non-blanchable erythema 06/03/22 1200   Pressure Injury Stage Stage 1 06/03/22 1200   Alisa-wound Assessment Intact 06/03/22 1200   Drainage Amount None 05/03/22 1200   Treatments Elevated 06/03/22 1200   Dressing Open to air 06/03/22 1200   Dressing Status Intact (moisture barrier) 05/03/22 1200       Wound 05/13/22  Heel Right (Active)   Wound Image   06/03/22 1101   Wound Description INon-blanchable erythema 06/03/22 1200   Pressure Injury Stage Stage 1 06/03/22 1200   Alisa-wound Assessment Intact 06/03/22 1200   Drainage Amount None 05/03/22 1200   Treatments Cleansed 06/03/22 1200   Dressing Open to air 06/03/22 1200   Dressing Status Intact (moisture barrier) 05/28/22 1200         Discussed assessment findings, and plan of care/recommendations with Petra Mason RN  Wound care will follow along with patient throughout admission, please call or tiger text with questions and concerns    Recommendations written as orders    Ivory Moyer RN, BSN, [employer]

## 2022-06-03 NOTE — RESPIRATORY THERAPY NOTE
Received patient on PC 20/24/+10 90%, tolerating therapy  Performed P&V with bed  Restraints secure

## 2022-06-03 NOTE — PROGRESS NOTES
Progress Note - Infectious Disease   Fady Hudson 80 y o  male MRN: [id number]  Unit/Bed#: ICU 02 Encounter: [id number]      Impression/Plan:  1  s/p cardiac arrest 5/21/22  Patient intubated during RR  In ICU on ventilator assistance  Recurrent SIRS possibly reactive to arrest with fever, tachycardia, tachypnea, and increased leukocytosis post arrest  Possible recurrent aspiration event s/p arrest  Fever down trended   Patient continues to have hypotension episodes that appear to be related to volume status, patient back on Levophed and vasopressin  -continue supportive measures per critical care team  -cardiology on board  -ventilator management per critical care team     2  SIRS vs Severe Sepsis, evolving on admission and post #1   Evidenced by tachycardia, tachypnea, bandemia and encephalopathy   Suspect possible aspiration in setting of significant retained secretions, acute respiratory failure requiring supplemental oxygenation and with recent NG tube for postop ileus management   MRSA screen negative  WBC fluctuating  5/22/22 Blood cultures have no growth   Patient completed antibiotic course 5/27/22   Patient continues to have hypotension episodes that appear to be related to volume status, patient back on Levophed and vasopressin  -observing closely off further antibiotic  -will recheck blood cultures  -if patient continues to decline despite aggressive measures, recommend rechecking CT chest, abdomen, and pelvis and consider empiric Zosyn if infectious concern escalates  -monitor temperature and hemodynamics  -serial exam  -monitor CBC and BMP   -pressor support per Critical Medicine Service     3  Peritonitis s/p Bowel perforation  s/p 5/11/22 exploratory laparotomy, low anterior resection, protective loop transverse colostomy and segmental small bowel resection secondary to perforation rectosigmoid junction after colonoscopy   OR cultures grew Pseudomonas aeruginosa and Bacteroides fragilis   Now being managed with NGT/NPO status for post op ileus on TPN    Patient completed antibiotic course 5/27/22   -observe off further antibiotic  -VAC/wound care per surgery     4   Acute hypoxic respiratory failure with hypoxia   Suspicious for aspiration pneumonitis over pneumonia     5/24/22 CT C/A/P predominantly UL airspace opacities consistent with CHF with bilateral pleural effusions, distended small bowel loops  Patient remains intubated s/p #1  5/16/22 Procalcitonin level  2 60 > 5/21 0 753 < 5/23/22 2 97 > 5/30 1 26  Patient is status post bronchoscopy 5/29/22 for RUL collapse  5/29 Sputum cx 1+ Candida albicans colonization  -observe off further antibiotic  -ventilator management per critical care team      5  Aspiration pneumonitis versus pneumonia in setting of #1/3   5/22/22 CT C/A/P predominantly UL groundglass and consolidations, bilateral pleural effusions, SB mild dilation unchanged  Patient now intubated s/p #1  5/16/22 Procalcitonin level  2 60 > 5/21 0 753 < 5/23/22 2 97 > 5/30 1 26  5/17/22 sputum specimen oral pharyngeal contamination   -observe off antibiotic  -secretion and pulmonary toilet management per critical care team   -monitor respiratory symptoms  -monitor vent requirements     6  MARYELLEN on CKD 3  Creatinine 1 3  CVVH resumed  -renal dose adjust medications as needed  -volume management per Nephrology  -CVVH management per Nephrology  -monitor creatinine and urinary output     Antibiotics:  Off antibiotic D7     Above impression and plan discussed in detail with patient's RN and critical care team   We will see patient again 6/6/22  Please call ID on call physician in meantime if questions      Subjective:  Patient has no fever, chills, sweats on ventilator in ICU s/p cardiac arrest 5/21/22; no nausea, vomiting, diarrhea reported  CVVH started 5/26/22 and was resumed 06/02/2022 with decreased urine output and hypotension requiring resuming Levophed    Patient then had hypotension again last night and is now on double vasopressors and CVVH volume being adjusted closely  Objective:  Vitals:  Temp:  [95 9 °F (35 5 °C)-99 86 °F (37 7 °C)] 95 9 °F (35 5 °C)  HR:  [65-99] 65  Resp:  [28-45] 36  SpO2:  [87 %-99 %] 93 %  Temp (24hrs), Av 4 °F (36 3 °C), Min:95 9 °F (35 5 °C), Max:99 86 °F (37 7 °C)  Current: Temperature: (!) 95 9 °F (35 5 °C)    Physical Exam:   General Appearance:  80year-old acute on chronically debilitated, elderly male, briefly opens eyes on exam, on ventilator at FiO2 of 90%, propped in ICU bed, arms propped on pillows  Throat: Oropharynx moist without lesions  ET tube to vent and orogastric tube feeds in place   Lungs:   Scattered coarse ventilated breath sounds anteriorly and laterally to auscultation bilaterally, scant thin phlegm secretions in the suction canister   Heart:  RRR; no murmur   Abdomen:   Soft, no focal tenderness on palpation, midline abdominal wound VAC to wound irrigation system with moderate clear output in canister, wound edges without erythema, left lower quadrant ostomy with soft brown stool in bag, positive bowel sounds  Extremities: Generalized puffy edema, venodynes and prevalon boots in place   : [place] with clear, yellow urine in bag, no SPT, 3+ soft, scrotal edema    Skin: No new rashes or lesions  Right arm PICC and right neck IJ CVP lines in place, CVVH running         Labs, Imaging, & Other studies:   All pertinent labs and imaging studies were personally reviewed  Results from last 7 days   Lab Units 22  0604 22  0527 22  0520   WBC Thousand/uL 21 58* 15 57* 18 82*   HEMOGLOBIN g/dL 7 3* 7 3* 7 9*   PLATELETS Thousands/uL 133* 147* 116*     Results from last 7 days   Lab Units 22  0604 22  2204 22  0527 22  0753 22  0600   SODIUM mmol/L 136 135* 137   < > 136   POTASSIUM mmol/L 4 9 4 6 4 5   < > 4 1   CHLORIDE mmol/L 104 103 103   < > 103   CO2 mmol/L 25 25 26   < > 26   BUN mg/dL 27* 26* 29* < > 36*   CREATININE mg/dL 1 37* 1 47* 1 56*   < > 1 44*   EGFR ml/min/1 73sq m 44 41 38   < > 42   CALCIUM mg/dL 8 3 8 1* 8 6   < > 9 0   AST U/L  --   --  26  --  15   ALT U/L  --   --  20  --  19   ALK PHOS U/L  --   --  445*  --  260*    < > = values in this interval not displayed       Results from last 7 days   Lab Units 05/30/22  1828   SPUTUM CULTURE  1+ Growth of Candida sp  presumptively albicans*   GRAM STAIN RESULT  Rare Polys  No organisms seen     Results from last 7 days   Lab Units 05/30/22  0523   PROCALCITONIN ng/ml 1 26*                   06/03/2022 chest x-ray:  Multifocal infiltrates, atelectasis right upper lobe

## 2022-06-03 NOTE — QUICK NOTE
Update given to family via telephone by Dr Ayde Osborne  Family is on the way to discuss further goals of care/plan

## 2022-06-03 NOTE — QUICK NOTE
OGT tired to be advanced since CXR showed it in the distal esophagus  Tube unable to be advanced due to her significant resistance met  OG tube was removed and new tube was attempted to be placed  Again, resistance was met and attempt was aborted  Discussed with surgery who stated that they had multiple issues in the past advancing OG/NG tubes in the past with him  They recommended sending the patient to IR for a tube placed under fluoroscopy  Currently the patient is not stable to be transferred to IR  Will continue with goals of care discussion with the family

## 2022-06-03 NOTE — NURSING NOTE
Per Dorothy Olszewski Marga Mew PAC infectious disease, leave quigley catheter in place at this time  Danyelle WEISS aware

## 2022-06-03 NOTE — PROGRESS NOTES
Pastoral Care Progress Note    6/3/2022  Patient: Lizette Panchal : 2/15/1931  Admission Date & Time: 2022 1648  MRN: [id number] CSN: [id number]                     Chaplaincy Interventions Utilized:   Relationship Building: Cultivated a relationship of care and support  Visited with patient's son and daughter-in-law  Family is struggling with the right decision for the patient  They don't want to prematurely give up on the patient, but they do not want him to suffer either  Family is very loving and supportive  I offered a prayer for the patient with the family in the patient's room  Ritual: Provided prayer   22 1500   Clinical Encounter Type   Visited With Family; Patient and family together   Routine Visit Follow-up   Referral From Family   Referral To    Jew Encounters   Jew Needs Prayer

## 2022-06-03 NOTE — PROCEDURES
NEPHROLOGY DIALYSIS PROCEDURE NOTE      Patient seen and examined on CVVH, tolerating procedure well  All documentation, labs, medications were reviewed by myself, and the treatment plan was reviewed with nurse and patient  Seen on CVVH at : 9:05 AM  Dialysis Access: Right IJ non tunneled dialysis catheter  Vitals:   A line blood pressure 134/60   Gtts:  heparin drip, Precedex, Dilaudid drip, norepinephrine drip  CVVH:  no issues overnight, UF was reduced currently at neck -25 mL/hour, 1 9 L/hr therapy fluid, blood flow rate 250 mL/minute    89 y  o  man with PMH of Aostenosis, scleroderma, CAD   Patient underwent EGD and colonoscopy on 05/11 for possible intussusception, complicated with perforation, underwent exploratory laparotomy, complicated with pneumonia, sepsis, CHF, PEA arrest on 05/21 x2   Nephrology is consulted for MARYELLEN   ay for assistance in the management of MARYELLEN     ASSESSMENT and PLAN:     Acute renal failure/acute tubular necrosis  --acute tubular necrosis in setting of hypotension and cardiac arrest  --started CVVH on May 26, discontinued yesterday June 1st, restarted back on June 2nd  --borderline oliguric  --remains on pressor support, with tenuous hemodynamics  --worsening FiO2 requirements  --continue CVVH, 1 9 L/hr therapy fluid, blood flow rate 250 mL/minutes, run net -50mL/hr, and then gradually increase the blood pressure remains stable     PEA cardiac arrest  --x2 episodes     Ventilatory dependent respiratory failure  --90% FiO2  --will likely require trach and PEG  --chest x-ray shows multifocal infiltrates and a large right-sided pleural effusion which will not be amendable to ultrafiltration     Blood pressure/volume status  --suspect that he has extravascular volume overloaded but intravascular volume depleted  --off IV albumin  --continue midodrine 5 mg t i d   --remains on norepinephrine drip  --extravascular volume overloaded  --increase ultrafiltration if blood pressure tolerates     Bowel perforation  --status post emergent ex lap on May 11th with low anterior resection, protective loop transverse colostomy with a flex sigmoidoscopy and small-bowel resection  --currently has an abdominal wound VAC in place  --management as per surgery     Anemia  --transfuse for as per the primary team  --hemoglobin low but stable    Review of Systems:  Unable to obtain review systems as patient is currently intubated    Physical Exam:    General:  Remains intubated and sedated  Skin:  Poor turgor  CVS:  S1-S2 appreciated  Lungs:  Coarse breath sounds bilaterally  Abdomen:  Abdominal edema, surgical site noted  Access:  Temporary dialysis catheter with no exudate  Extremities:  Generalized anasarca all over the body  Neuro:  Sedated on the ventilator          Current Facility-Administered Medications:     acetaminophen (TYLENOL) oral suspension 650 mg, 650 mg, Oral, Q6H PRN, ISELA Lunsford, 650 mg at 06/01/22 2340    chlorhexidine (PERIDEX) 0 12 % oral rinse 15 mL, 15 mL, Mouth/Throat, Q12H [address], ISELA Man, 15 mL at 06/03/22 0800    dexmedeTOMIDine (Precedex) 400 mcg in sodium chloride 0 9% 100 mL, 0 1-1 5 mcg/kg/hr, Intravenous, Titrated, ISELA Eng, Last Rate: 7 8 mL/hr at 06/03/22 0421, 0 4 mcg/kg/hr at 06/03/22 0421    gabapentin (NEURONTIN) oral solution 200 mg, 200 mg, Oral, HS, ISELA Ritter, 200 mg at 06/02/22 2129    heparin (porcine) 25,000 units in 0 45% NaCl 250 mL infusion (premix), 400 Units/hr, Intravenous, Continuous, [employer], CRNP, Last Rate: 4 mL/hr at 06/03/22 0800, 400 Units/hr at 06/03/22 0800    insulin lispro (HumaLOG) 100 units/mL subcutaneous injection 1-6 Units, 1-6 Units, Subcutaneous, Q6H [address], 1 Units at 06/03/22 0029 **AND** Fingerstick Glucose (POCT), , , Q6H, ISELA Ritter    iohexol (OMNIPAQUE) 240 MG/ML solution 50 mL, 50 mL, Oral, Once in imaging, Fadia Lunsford ipratropium-albuterol (DUO-NEB) 0 5-2 5 mg/3 mL inhalation solution 3 mL, 3 mL, Nebulization, Q6H PRN, ISELA Lunsford, 3 mL at 05/28/22 2106    levothyroxine tablet 112 mcg, 112 mcg, Per NG Tube, Early Morning, ISELA Lunsford, 112 mcg at 06/03/22 0609    lidocaine (LIDODERM) 5 % patch 2 patch, 2 patch, Topical, Daily, Bentley Little PA-C, 2 patch at 06/03/22 0800    midodrine (PROAMATINE) tablet 5 mg, 5 mg, Oral, TID AC, ISELA Ritter, 5 mg at 06/03/22 0609    norepinephrine (LEVOPHED) 8 mg (DOUBLE CONCENTRATION) IV in sodium chloride 0 9% 250 mL, 1-30 mcg/min, Intravenous, Titrated, [employer], CRNP    NxStage K 4/Ca 3 dialysis solution (RFP-401) 20,000 mL, 20,000 mL, Dialysis, Continuous, Yady Bustillos MD, 20,000 mL at 06/03/22 0730    ondansetron (ZOFRAN) injection 4 mg, 4 mg, Intravenous, Q6H PRN, ISELA Lunsford    oxyCODONE (ROXICODONE) oral solution 5 mg, 5 mg, Per NG Tube, Q4H PRN, 5 mg at 06/02/22 2253 **OR** oxyCODONE (ROXICODONE) oral solution 7 5 mg, 7 5 mg, Per NG Tube, Q4H PRN, ISELA Eng    pantoprazole (PROTONIX) injection 40 mg, 40 mg, Intravenous, Q24H [address], ISELA Lunsford, 40 mg at 06/03/22 0800    polyethylene glycol (MIRALAX) packet 17 g, 17 g, Oral, Daily, ISELA Ritter, 17 g at 06/02/22 0947    vasopressin (PITRESSIN) 20 Units in sodium chloride 0 9 % 100 mL infusion, 0 04 Units/min, Intravenous, Continuous, ISELA Ritter, Last Rate: 12 mL/hr at 06/03/22 0635, 0 04 Units/min at 06/03/22 7273

## 2022-06-03 NOTE — ESCALATED TEAM TX
Team Meeting Note    Patient name Colby Chaudhari  Location ICU 02/ICU 02 MRN [id number]  : 2/15/1931 Date 6/3/2022       Team Meeting  Meeting Type: Tx Team Meeting  Initial Conference Date: 22    Team Members Present  Team Members Present: Physician, Other (Discipline and Name),   Physician Team Member: Dr Sydnee Cornejo Work Team Member: Ariane Lagos  Other (Discipline and Name): ISELA Gracia    Patient/Family Present  Patient Present: No  Patient's Family Present: Yes  Family Relationship: Child  Child: Son Lennie Romberg; [facility] Team meeting held today to discuss pt's worsening condition and goals of care  Pt's son Yasmeen Fong is also involved and very supportive; however, he was recently dx with COVID and unable to attend  Patient is now on Day #14 of intubation s/p cardiac arrest x2 on   He was restarted on CVVHD / after becoming anuric and having increasing O2 requirements  Last evening, pt's O2 requirements worsened even further and he became more hypotensive requiring the addition of 2nd vasopressor  Chest xray has worsened showing extensive b/l infiltrates  ID continues to monitor pt off ABX after completing 14 day course  Repeat cultures pending  This morning pt's OG tube became dislodged and was unable to be replaced  Surgery recommending NG/OG tube be placed by IR; however, pt is not clinically stable for this transfer  TFs now on hold  Patient is also not clinically stable for trach/PEG placement due to high O2 requirements  Patient's current needs and prognosis were discussed in depth with family  They have been visiting daily and are well informed  Debra Herrera confirmed that they have a copy of pt's advanced directive which stated that he would not want any life sustaining treatment if there was no chance of recovery  Team discussed changing code status to DNR if pt were to go into cardiac arrest again   Bobie Bamberger acknowledged this but did not want to make a final decision without consulting with César Montaño requested to speak with César Beauchamp independently before regrouping with care team to continue discussion

## 2022-06-04 PROBLEM — D64.9 ACUTE ON CHRONIC ANEMIA: Status: ACTIVE | Noted: 2022-01-01

## 2022-06-04 NOTE — ASSESSMENT & PLAN NOTE
· Hemoglobin drop post cardiac arrest    · Noted to have gross hematuria but this has since resolved  · 5/21: 1 u PRBC for hgb 6 8  · 5/24: 1 u PRBC  · CT obtained on 5/24 and negative for any overt signs of bleeding  · 5/26: 1 u PRBC  · 5/30: 1 u PRBC  · Hgb of 6 9 today; will transfuse with 1 uPRBC    · Continue to monitor and transfuse for hgb less than 7

## 2022-06-04 NOTE — ASSESSMENT & PLAN NOTE
· Peritonitis with intra-abdominal/pelvic abscess secondary to colonic perforation    · 5/22 CT chest/ab/pelvis with concern of multifocal PNA, no visualized intraabdominal abscess or anastomotic leak   · Per ID suspect multifocal pneumonitis   · OR culture 5/21 pseudomonas and bacteroides fragilis  · Repeat blood cultures on 5/22 negative   · Completed 14 days of Flagyl on 5/24  · Completed 14 days of cefepime on 5/27  · Observe off antibiotics   · ID following, appreciate recs  Continue zosyn

## 2022-06-04 NOTE — RESPIRATORY THERAPY NOTE
RT Ventilator Management Note  Elan Weaver 80 y o  male MRN: [id number]  Unit/Bed#: ICU 02 Encounter: [id number]      Daily Screen       6/3/2022  1634 6/4/2022  0755          Patient safety screen outcome[de-identified] Passed Failed      Not Ready for Weaning due to[de-identified] -- PEEP > 8cmH2O;FiO2 >60%; Underline problem not resolved      Spont breathing trial % for 30 min: No --              Physical Exam:   Assessment Type: Assess only  General Appearance: Awake, Alert, Eyes open/responds to voice  Respiratory Pattern: Assisted  Chest Assessment: Chest expansion symmetrical  Bilateral Breath Sounds: Diminished, Coarse  Cough: None  Suction: Oral, ET Tube  O2 Device: ventilator  Subjective Data: Wrist restraints secure      Resp Comments: pt on ventilator- restrainrs secure

## 2022-06-04 NOTE — PROGRESS NOTES
Tami 45  Progress Note - [place] 2/15/1931, 80 y o  male MRN: [id number]  Unit/Bed#: ICU 02 Encounter: [id number]  Primary Care Provider: Naya Lopez MD   Date and time admitted to hospital: 5/11/2022  4:48 PM    Hypotension  Assessment & Plan  · Levophed restarted on 6/1  · Midodrine added 5 mg TID  · Continue to monitor and make adjustments accordingly    Atrial fibrillation ([facility] )  Assessment & Plan  · In the setting of cardiac arrest while on 3 pressors noted to have afib with RVR  · Bolused with amio and placed on infusion  · Converted to sinus rhythm on 5/22   · Amio gtt d/c 5/23  · Has remained in NSR  · No indication for [facility]  · Afib was likely post arrest in setting of triple pressors    Anemia  Assessment & Plan  · Hemoglobin drop post cardiac arrest    · Noted to have gross hematuria but this has since resolved  · 5/21: 1 u PRBC for hgb 6 8  · 5/24: 1 u PRBC  · CT obtained on 5/24 and negative for any overt signs of bleeding  · 5/26: 1 u PRBC  · 5/30: 1 u PRBC  · Hgb of 6 9 today; will transfuse with 1 uPRBC  · Continue to monitor and transfuse for hgb less than 7      Acute renal failure (ARF) ([facility] )  Assessment & Plan  · Secondary to hypoperfusion mehul cardiac arrest +/- ATN  · Baseline creat 0 8  · Creatinine peaked at 3 07  · CVVH started on 5/26 and stopped on 6/1  · It was restarted on 6/2 after he came anuric and had increasing oxygen requirements  · Continue CVVH with goal negative   · Patient was tolerating -100ml/hr; however had acute decline overnight requiring 2nd vasopressor to be added   Continue even for now  · Avoid hypotension/hypoperfusion  · Continue levophed for MAP > 65    Cardiac arrest [facility])  Assessment & Plan  · Patient was found unresponsive and hypoxic approximately 20 minutes after being seen well with family 5/21   · PEA arrest x 2  · Most likely respiratory driven  · Neurologically intact post arrest     Hyperglycemia  Assessment & Plan  · A1c 5 3%  · Continue SSI    CHF (congestive heart failure) ([facility])  Assessment & Plan  Wt Readings from Last 3 Encounters:   06/04/22 77 4 kg (170 lb 10 2 oz)   05/11/22 62 1 kg (136 lb 14 4 oz)   03/25/22 61 2 kg (135 lb)     · 5/16: Echo-EF 65%, mild TR, mild MR  · 5/23: Repeat Echo post cardiac arrest: EF 60-65%, G1DD, mild AS (BRADY 1 5cm2), mild MR, mild TR  · Monitor I&O, Daily weights   · Limited ECHO-EF 65%, G1DD, mild AS  · Patient oliguric  Receiving periodic CVVH  Severe sepsis ([facility] )  Assessment & Plan  · Peritonitis with intra-abdominal/pelvic abscess secondary to colonic perforation    · 5/22 CT chest/ab/pelvis with concern of multifocal PNA, no visualized intraabdominal abscess or anastomotic leak   · Per ID suspect multifocal pneumonitis   · OR culture 5/21 pseudomonas and bacteroides fragilis  · Repeat blood cultures on 5/22 negative   · Completed 14 days of Flagyl on 5/24  · Completed 14 days of cefepime on 5/27  · Observe off antibiotics   · ID following, appreciate recs  Continue zosyn  Toxic metabolic encephalopathy  Assessment & Plan  · Likely in setting of acute illness/delirium and cardiac arrest  · Delirium precautions; regulate sleep/wake cycle, environmental controls, daily CAM ICU   · Trend neuro exam  · Following commands, nodding appropriately to questions, moves all extremities    Acute respiratory failure with hypoxia ([address] )  Assessment & Plan  · Patient previously in ICU for hypoxia with a max of 15L midflow and concern for aspiration pneumonitis  · Transfered to general medical floor 5/21  · 5/21: PEA arrest x2   · 5/24 CT CAP-CHF with moderate basilar effusions and additional upper lung zone patchy airspace opacities likely reflecting asymmetric pulmonary edema  Infiltrate is not entirely excluded     · 5/29 Bronchoscopy for mucous plugging  · Day # 15 on ventilator: AC/PC 20/24/90/10  · Wean Fio2 as able for SPO2>90%  · Continue pulmonary hygiene/ VAP bundle  · Continue volume removal with CVVH  · Ongoing GOC discussions in regards to proceeding with trach/peg  · Noted for bilateral pleural effusions; will obtain IR assessment for thoracentesis  * Bowel perforation [facility])  Assessment & Plan  · Outpatient EGD and colonoscopy at [facility] on 5/11 for w/u of chronic anemia, history of colon polyps, intermittent LLQ abdominal pain and possible intermittent intussusception  · EGD unremarkable, colonoscopy technically difficult and required change from adult scope to pediatric scope  · During traversal of the sigmoid colon there was a kink and patient sustained a perforation  Procedure was aborted and patient was transferred to [facility] where immediate surgical consultation was obtained  · Emergent ex- lap on 5/11 > low anterior resection, protective loop transverse colostomy, flex sigmoidoscopy, and segmental small bowel resection  · Difficult post op course with post op ileus requiring NGT decompression and initiation of TPN; not tolerating trickle feeds  · 5/22: CT: Diffuse mild dilation of small bowel segments identified without transition point  · 5/24: CT CAP- Distended small bowel loops with scattered air-fluid levels consistent with early/partial small bowel obstruction with transition at the small bowel anastomosis in the region of the ventral pelvis as above  No free air  Cholelithiasis without cholecystitis  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  · 5/24: Stomal prolapse and small peristomal hernia now at the transverse loop colostomy; continues to be reduced by surgery  · TPN d/c'd on 5/26; tolerating tube feeds at goal  · 5/26: Abdominal wound vac placed bedside: continue with dressing changes Monday/Thursday   · Surgery continues to follow    · Will obtain IR assessment for advancement of NG tube  Ileus (HCC)-resolved as of 5/22/2022  Assessment & Plan  · Developed post-op ileus with abdominal distention, and nausea/vomiting     · NGT placed with difficulty, on imaging terminated in distal esophagus, attempted to be advanced twice without significant drainage despite distended stomach on imaging   ·  CT - Multiple dilated small bowel loops without a clear transition point  The finding may reflect ileus, however developing small bowel obstruction cannot be excluded  Follow-up is recommended  · AM KUB without contrast progressing to colon   · Minimal to no ostomy output - monitor   · NGT replaced by IR on  - continue to suction, without significant output   · PICC line in place for TPN   · Getting frequent IV opioids for abdominal pain can be contributing to ileus, attempt to improve multimodal pain regimen - consider Toradol       ----------------------------------------------------------------------------------------  HPI/24hr events: No major events reported overnight  Patient appropriate for transfer out of the ICU today?: No  Disposition: Continue Critical Care   Code Status: Level 1 - Full Code  ---------------------------------------------------------------------------------------  SUBJECTIVE  Patient encountered today at the bedside  Remains intubated  Responsive to voice  No major acute issues reported overnight      Review of Systems   Unable to perform ROS: Intubated     Review of systems was unable to be performed secondary to sedation, intubation   ---------------------------------------------------------------------------------------  OBJECTIVE    Vitals   Vitals:    22 0945 22 1000 22 1030 06/04/22 1100   BP: (!) 119/43 (!) 131/49     BP Location:       Pulse: 68 69 77 68   Resp: (!) 39 (!) 38 (!) 39 (!) 44   Temp: 97 52 °F (36 4 °C) 97 52 °F (36 4 °C) 97 52 °F (36 4 °C) (!) 97 34 °F (36 3 °C)   TempSrc: Esophageal Esophageal     SpO2: 94% 94% 93% 94%   Weight:       Height:         Temp (24hrs), Av 7 °F (35 9 °C), Min:95 36 °F (35 2 °C), Max:97 52 °F (36 4 °C)  Current: Temperature: (!) 97 34 °F (36 3 °C)  Arterial Line BP: 136/43  Arterial Line MAP (mmHg): 74 mmHg    Respiratory:  SpO2: SpO2: 94 %  Nasal Cannula O2 Flow Rate (L/min): 5 L/min    Invasive/non-invasive ventilation settings   Respiratory  Report   Lab Data (Last 4 hours)    None         O2/Vent Data (Last 4 hours)      06/04 0757           Vent Mode AC/PC       Patient safety screen outcome: Failed       Resp Rate (BPM) (BPM) 20       Pressure Control (cmH2O) (cm) 24       Insp Time (sec) (sec) 0 6       FiO2 (%) (%) 80  Comment: Decreased from 90       PEEP (cmH2O) (cmH2O) 10       MV 12 4                   Physical Exam  Constitutional:       Appearance: He is normal weight  Interventions: He is intubated  HENT:      Head: Normocephalic and atraumatic  Mouth/Throat:      Mouth: Mucous membranes are moist    Eyes:      Conjunctiva/sclera: Conjunctivae normal    Cardiovascular:      Rate and Rhythm: Normal rate and regular rhythm  Pulmonary:      Effort: He is intubated  Breath sounds: Rhonchi present  Abdominal:      General: There is no distension  Tenderness: There is no abdominal tenderness  Musculoskeletal:      Right lower leg: No edema  Left lower leg: No edema  Skin:     General: Skin is warm and dry  Neurological:      Mental Status: He is easily aroused               Laboratory and Diagnostics:  Results from last 7 days   Lab Units 06/04/22  0554 06/03/22  0604 06/02/22  4842 06/01/22  0520 05/31/22  0533 05/30/22  1217 05/30/22  0523 05/29/22  0600   WBC Thousand/uL 14 58* 21 58* 15 57* 18 82* 15 28*  --  16 84* 13 39*   HEMOGLOBIN g/dL 6 9* 7 3* 7 3* 7 9* 7 4* 6 5* 7 0* 7 3*   HEMATOCRIT % 21 0* 22 2* 22 3* 23 6* 21 8* 19 6* 20 5* 21 0*   PLATELETS Thousands/uL 99* 133* 147* 116* 116*  --  115* 140*   BANDS PCT %  --  23*  --   --  22*  --   --   --    MONO PCT %  --  6  --   --  3*  --   --   --      Results from last 7 days   Lab Units 06/04/22  0554 06/03/22  2213 06/03/22  1308 06/03/22  0604 06/02/22 2204 06/02/22  0527 06/01/22  1704 05/29/22  0753 05/29/22  0600   SODIUM mmol/L 138 137 136 136 135* 137 136   < > 136   POTASSIUM mmol/L 5 0 5 0 4 9 4 9 4 6 4 5 4 3   < > 4 1   CHLORIDE mmol/L 104 103 103 104 103 103 103   < > 103   CO2 mmol/L 26 26 26 25 25 26 28   < > 26   ANION GAP mmol/L 8 8 7 7 7 8 5   < > 7   BUN mg/dL 23 22 24 27* 26* 29* 21   < > 36*   CREATININE mg/dL 1 15 1 10 1 10 1 37* 1 47* 1 56* 1 10   < > 1 44*   CALCIUM mg/dL 8 5 8 4 8 3 8 3 8 1* 8 6 8 5   < > 9 0   GLUCOSE RANDOM mg/dL 106 108 130 156* 161* 147* 168*   < > 163*   ALT U/L  --   --   --   --   --  20  --   --  19   AST U/L  --   --   --   --   --  26  --   --  15   ALK PHOS U/L  --   --   --   --   --  445*  --   --  260*   ALBUMIN g/dL  --   --   --   --   --  3 2*  --   --  2 9*   TOTAL BILIRUBIN mg/dL  --   --   --   --   --  1 06*  --   --  1 33*    < > = values in this interval not displayed  Results from last 7 days   Lab Units 06/04/22  0554 06/03/22 2213 06/03/22  1308 06/03/22  0604 06/02/22 2204 06/02/22  0527 06/01/22  1704 06/01/22  0520   MAGNESIUM mg/dL 2 2 1 9 2 0 1 9 1 7 1 9  --  2 0   PHOSPHORUS mg/dL 3 4 3 2 3 1 2 9 2 7 2 7 2 6 2 1*      Results from last 7 days   Lab Units 05/29/22  1545   INR  1 28*   PTT seconds 53*          Results from last 7 days   Lab Units 06/03/22  1021   LACTIC ACID mmol/L 0 8     ABG:  Results from last 7 days   Lab Units 06/03/22  1021   PH ART  7 285*   PCO2 ART mm Hg 52 5*   PO2 ART mm Hg 69 3*   HCO3 ART mmol/L 24 4   BASE EXC ART mmol/L -2 4   ABG SOURCE  Line, Arterial     VBG:  Results from last 7 days   Lab Units 06/03/22  1021   ABG SOURCE  Line, Arterial     Results from last 7 days   Lab Units 05/30/22  0523   PROCALCITONIN ng/ml 1 26*       Micro  Results from last 7 days   Lab Units 06/03/22  1528 05/30/22  1828   BLOOD CULTURE  Received in Microbiology Lab  Culture in Progress  Received in Microbiology Lab  Culture in Progress    --    SPUTUM CULTURE   -- 1+ Growth of Candida sp  presumptively albicans*   GRAM STAIN RESULT   --  Rare Polys  No organisms seen       EKG: none conducted over follow-up interval   Imaging: I have personally reviewed pertinent reports  Intake and Output  I/O       06/02 0701 06/03 0700 06/03 0701 06/04 0700 06/04 0701 06/05 0700    I V  (mL/kg) 900 2 (11 5) 1741 (22 5)     NG/      IV Piggyback       Feedings 1266      Total Intake(mL/kg) 2456 2 (31 2) 1741 (22 5)     Urine (mL/kg/hr) 490 (0 3) 255 (0 1)     Drains 175 200     Other 2245 3350     Stool 275 485     Total Output 1761 4290     Net -104 7 -6576                  Height and Weights   Height: 5' 4" (162 6 cm)  IBW (Ideal Body Weight): 59 2 kg  Body mass index is 29 29 kg/m²  Weight (last 2 days)     Date/Time Weight    06/04/22 0600 77 4 (170 64)    06/03/22 0615 78 6 (173 28)    06/02/22 0500 79 1 (174 38)            Nutrition       Diet Orders   (From admission, onward)             Start     Ordered    06/02/22 1041  Diet Enteral/Parenteral; Tube Feeding No Oral Diet; Osmolite 1 0; Continuous; 55; Prosource Protein Liquid - One Packet Daily; Demond - Two Packets Daily  Diet effective now        Comments: Demond - one packet with breakfast and dinner  Pro source - one packet with lunch   References:    Nutrtion Support Algorithm Enteral vs  Parenteral   Question Answer Comment   Diet Type Enteral/Parenteral    Enteral/Parenteral Tube Feeding No Oral Diet    Tube Feeding Formula: Osmolite 1 0    Bolus/Cyclic/Continuous Continuous    Tube Feeding Goal Rate (mL/hr): 55    Prosource Protein Liquid - No Carb Prosource Protein Liquid - One Packet Daily    Demond Woodward Demond - Two Packets Daily    RD to adjust diet per protocol?  Yes        06/02/22 1040    05/12/22 0906  Room Service  Once        Question:  Type of Service  Answer:  Room Service - Appropriate with Assistance    05/12/22 0905                  Active Medications  Scheduled Meds:  Current Facility-Administered Medications   Medication Dose Route Frequency Provider Last Rate    acetaminophen  650 mg Oral Q6H PRN ISELA Phillip      chlorhexidine  15 mL Mouth/Throat Q12H [facility] ISELA Phillip      dexmedetomidine  0 1-1 5 mcg/kg/hr Intravenous Titrated ISELA Anderson 0 6 mcg/kg/hr (06/04/22 0918)    gabapentin  200 mg Oral HS ISELA Maxwell      heparin (porcine)  400 Units/hr Intravenous Continuous Shawn MACARIO LongNP 400 Units/hr (06/03/22 0800)    HYDROmorphone  0 5 mg Intravenous Q3H PRN Shawn ISELA Long      insulin lispro  1-6 Units Subcutaneous Q6H [facility] ISELA Maxwell      iohexol  50 mL Oral Once in imaging ISELA Phillip      ipratropium-albuterol  3 mL Nebulization Q6H PRN ISELA Phillip      levothyroxine  112 mcg Per NG Tube Early Morning ISELA Phillip      lidocaine  2 patch Topical Daily Cj Knott PA-C      midodrine  5 mg Oral TID AC ISELA Maxwell      norepinephrine  1-30 mcg/min Intravenous Titrated Shawn ISELA Long 1 mcg/min (06/04/22 1045)    NxStage K 4/Ca 3  20,000 mL Dialysis Continuous Carmelo Mahan MD      ondansetron  4 mg Intravenous Q6H PRN ISELA Phillip      oxyCODONE  5 mg Per NG Tube Q4H PRN ISELA Anderson      Or    oxyCODONE  7 5 mg Per NG Tube Q4H PRN ISELA Anderson      pantoprazole  40 mg Intravenous Q24H [facility] ISELA Phillip      piperacillin-tazobactam  3 375 g Intravenous Q6H Nagi Resendez PA-C Stopped (06/04/22 1000)    polyethylene glycol  17 g Oral Daily ISELA Maxwell      vasopressin  0 04 Units/min Intravenous Continuous ISELA Maxwell 0 04 Units/min (06/04/22 0931)     Continuous Infusions:  dexmedetomidine, 0 1-1 5 mcg/kg/hr, Last Rate: 0 6 mcg/kg/hr (06/04/22 0918)  heparin (porcine), 400 Units/hr, Last Rate: 400 Units/hr (06/03/22 0800)  norepinephrine, 1-30 mcg/min, Last Rate: 1 mcg/min (06/04/22 1045)  NxStage K 4/Ca 3, 20,000 mL  vasopressin, 0 04 Units/min, Last Rate: 0 04 Units/min (06/04/22 0931)      PRN Meds:   acetaminophen, 650 mg, Q6H PRN  HYDROmorphone, 0 5 mg, Q3H PRN  iohexol, 50 mL, Once in imaging  ipratropium-albuterol, 3 mL, Q6H PRN  ondansetron, 4 mg, Q6H PRN  oxyCODONE, 5 mg, Q4H PRN   Or  oxyCODONE, 7 5 mg, Q4H PRN        Invasive Devices Review  Invasive Devices  Report    Peripherally Inserted Central Catheter Line  Duration           PICC Line 46/28/83 Right Basilic 16 days          Arterial Line  Duration           Arterial Line 05/30/22 Radial 4 days          Hemodialysis Catheter  Duration           HD Temporary Double Catheter 8 days          Drain  Duration           Colostomy LLQ 24 days    Urethral Catheter Double-lumen 16 Fr  14 days          Airway  Duration           ETT  Oral 13 days                Rationale for remaining devices: ETT tube for mechanical ventialtion, Colostomy s/p large and small bowel resections  HD temp cath for the aforementioned, PICC and A-line for delivery of medications, lab draws blood gas analysis as needed  ---------------------------------------------------------------------------------------  Advance Directive and Living Will:      Power of :    POLST:    ---------------------------------------------------------------------------------------  Care Time Delivered:   Upon my evaluation, this patient had a high probability of imminent or life-threatening deterioration due to acute respiratory failure with hypoxia and hypercapnia requiring mechanical ventilation  Sepsis  , which required my direct attention, intervention, and personal management  I have personally provided 35 minutes (06:45 to 07:20) of critical care time, exclusive of procedures, teaching, family meetings, and any prior time recorded by providers other than myself         Latosha Delacruz MD      Portions of the record may have been created with voice recognition software  Occasional wrong word or "sound a like" substitutions may have occurred due to the inherent limitations of voice recognition software    Read the chart carefully and recognize, using context, where substitutions have occurred

## 2022-06-04 NOTE — ASSESSMENT & PLAN NOTE
Hgb of 6 9 on today's assessment  Previously 7 3  No evidence of GI bleeding in ostomy  Will transfuse 1 unit of PRBC

## 2022-06-04 NOTE — ASSESSMENT & PLAN NOTE
Wt Readings from Last 3 Encounters:   06/04/22 77 4 kg (170 lb 10 2 oz)   05/11/22 62 1 kg (136 lb 14 4 oz)   03/25/22 61 2 kg (135 lb)     · 5/16: Echo-EF 65%, mild TR, mild MR  · 5/23: Repeat Echo post cardiac arrest: EF 60-65%, G1DD, mild AS (BRADY 1 5cm2), mild MR, mild TR  · Monitor I&O, Daily weights   · Limited ECHO-EF 65%, G1DD, mild AS  · Patient oliguric  Receiving periodic CVVH

## 2022-06-04 NOTE — ASSESSMENT & PLAN NOTE
· Patient previously in ICU for hypoxia with a max of 15L midflow and concern for aspiration pneumonitis  · Transfered to general medical floor 5/21  · 5/21: PEA arrest x2   · 5/24 CT CAP-CHF with moderate basilar effusions and additional upper lung zone patchy airspace opacities likely reflecting asymmetric pulmonary edema  Infiltrate is not entirely excluded  · 5/29 Bronchoscopy for mucous plugging  · Day # 15 on ventilator: AC/PC 20/24/90/10  · Wean Fio2 as able for SPO2>90%  · Continue pulmonary hygiene/ VAP bundle  · Continue volume removal with CVVH  · Ongoing GOC discussions in regards to proceeding with trach/peg  · Noted for bilateral pleural effusions; will obtain IR assessment for thoracentesis

## 2022-06-04 NOTE — RESPIRATORY THERAPY NOTE
RT Ventilator Management Note  Lizette Panchal 80 y o  male MRN: [id number]  Unit/Bed#: ICU 02 Encounter: [id number]      Daily Screen       6/3/2022  1634 6/4/2022  0753          Patient safety screen outcome[de-identified] Passed Failed      Not Ready for Weaning due to[de-identified] -- PEEP > 8cmH2O;FiO2 >60%; Underline problem not resolved      Spont breathing trial % for 30 min: No --              Physical Exam:   General Appearance: Sedated  Respiratory Pattern: Assisted, Symmetrical, Tachypneic  Chest Assessment: Chest expansion symmetrical  Cough: None  Suction: Oral, ET Tube  O2 Device: Ventilator  Subjective Data: Wrist restraints secure    Remained on same ventilator settings with the exception of FiO2 which was decreased to 80% from 90% this morning

## 2022-06-04 NOTE — PROGRESS NOTES
Tami 45  Progress Note - Melo Hudson 2/15/1931, 80 y o  male MRN: [id number]  Unit/Bed#: ICU 02 Encounter: [id number]  Primary Care Provider: Krystina Delcid MD   Date and time admitted to hospital: 2022  4:48 PM    No new Assessment & Plan notes have been filed under this hospital service since the last note was generated  Service: Hospitalist      ----------------------------------------------------------------------------------------  HPI/24hr events: No major events reported overnight  Patient appropriate for transfer out of the ICU today?: No  Disposition: Continue Critical Care   Code Status: Level 1 - Full Code  ---------------------------------------------------------------------------------------  SUBJECTIVE  Patient encountered today at the bedside  Remains intubated  Responsive to voice  No major acute issues reported overnight      Review of Systems   Unable to perform ROS: Intubated     Review of systems was unable to be performed secondary to sedation, intubation   ---------------------------------------------------------------------------------------  OBJECTIVE    Vitals   Vitals:    22 0930 22 0941 22 0945 22 1000   BP: (!) 118/43  (!) 119/43 (!) 131/49   BP Location:       Pulse: 70 67 68 69   Resp: (!) 38 (!) 35 (!) 39 (!) 38   Temp: (!) 97 34 °F (36 3 °C) 97 52 °F (36 4 °C) 97 52 °F (36 4 °C) 97 52 °F (36 4 °C)   TempSrc: Esophageal  Esophageal Esophageal   SpO2: 92% 93% 94% 94%   Weight:       Height:         Temp (24hrs), Av 6 °F (35 9 °C), Min:95 36 °F (35 2 °C), Max:97 52 °F (36 4 °C)  Current: Temperature: 97 52 °F (36 4 °C)  Arterial Line BP: 136/43  Arterial Line MAP (mmHg): 74 mmHg    Respiratory:  SpO2: SpO2: 94 %  Nasal Cannula O2 Flow Rate (L/min): 5 L/min    Invasive/non-invasive ventilation settings   Respiratory  Report   Lab Data (Last 4 hours)    None         O2/Vent Data (Last 4 hours)       3680  Vent Mode AC/PC       Patient safety screen outcome: Failed       Resp Rate (BPM) (BPM) 20       Pressure Control (cmH2O) (cm) 24       Insp Time (sec) (sec) 0 6       FiO2 (%) (%) 80  Comment: Decreased from 90       PEEP (cmH2O) (cmH2O) 10       MV 12 4                   Physical Exam  Constitutional:       Appearance: He is normal weight  Interventions: He is intubated  HENT:      Head: Normocephalic and atraumatic  Mouth/Throat:      Mouth: Mucous membranes are moist    Eyes:      Conjunctiva/sclera: Conjunctivae normal    Cardiovascular:      Rate and Rhythm: Normal rate and regular rhythm  Pulmonary:      Effort: He is intubated  Breath sounds: Rhonchi present  Abdominal:      General: There is no distension  Tenderness: There is no abdominal tenderness  Musculoskeletal:      Right lower leg: No edema  Left lower leg: No edema  Skin:     General: Skin is warm and dry  Neurological:      Mental Status: He is easily aroused               Laboratory and Diagnostics:  Results from last 7 days   Lab Units 06/04/22  0554 06/03/22  0604 06/02/22  2223 06/01/22  0520 05/31/22  0533 05/30/22  1217 05/30/22  0523 05/29/22  0600   WBC Thousand/uL 14 58* 21 58* 15 57* 18 82* 15 28*  --  16 84* 13 39*   HEMOGLOBIN g/dL 6 9* 7 3* 7 3* 7 9* 7 4* 6 5* 7 0* 7 3*   HEMATOCRIT % 21 0* 22 2* 22 3* 23 6* 21 8* 19 6* 20 5* 21 0*   PLATELETS Thousands/uL 99* 133* 147* 116* 116*  --  115* 140*   BANDS PCT %  --  23*  --   --  22*  --   --   --    MONO PCT %  --  6  --   --  3*  --   --   --      Results from last 7 days   Lab Units 06/04/22  0554 06/03/22  2213 06/03/22  1308 06/03/22  0604 06/02/22  2204 06/02/22  0527 06/01/22  1704 05/29/22  0753 05/29/22  0600   SODIUM mmol/L 138 137 136 136 135* 137 136   < > 136   POTASSIUM mmol/L 5 0 5 0 4 9 4 9 4 6 4 5 4 3   < > 4 1   CHLORIDE mmol/L 104 103 103 104 103 103 103   < > 103   CO2 mmol/L 26 26 26 25 25 26 28   < > 26   ANION GAP mmol/L 8 8 7 7 7 8 5   < > 7   BUN mg/dL 23 22 24 27* 26* 29* 21   < > 36*   CREATININE mg/dL 1 15 1 10 1 10 1 37* 1 47* 1 56* 1 10   < > 1 44*   CALCIUM mg/dL 8 5 8 4 8 3 8 3 8 1* 8 6 8 5   < > 9 0   GLUCOSE RANDOM mg/dL 106 108 130 156* 161* 147* 168*   < > 163*   ALT U/L  --   --   --   --   --  20  --   --  19   AST U/L  --   --   --   --   --  26  --   --  15   ALK PHOS U/L  --   --   --   --   --  445*  --   --  260*   ALBUMIN g/dL  --   --   --   --   --  3 2*  --   --  2 9*   TOTAL BILIRUBIN mg/dL  --   --   --   --   --  1 06*  --   --  1 33*    < > = values in this interval not displayed  Results from last 7 days   Lab Units 06/04/22  0554 06/03/22  2213 06/03/22  1308 06/03/22  0604 06/02/22  2204 06/02/22  0527 06/01/22  1704 06/01/22  0520   MAGNESIUM mg/dL 2 2 1 9 2 0 1 9 1 7 1 9  --  2 0   PHOSPHORUS mg/dL 3 4 3 2 3 1 2 9 2 7 2 7 2 6 2 1*      Results from last 7 days   Lab Units 05/29/22  1545   INR  1 28*   PTT seconds 53*          Results from last 7 days   Lab Units 06/03/22  1021   LACTIC ACID mmol/L 0 8     ABG:  Results from last 7 days   Lab Units 06/03/22  1021   PH ART  7 285*   PCO2 ART mm Hg 52 5*   PO2 ART mm Hg 69 3*   HCO3 ART mmol/L 24 4   BASE EXC ART mmol/L -2 4   ABG SOURCE  Line, Arterial     VBG:  Results from last 7 days   Lab Units 06/03/22  1021   ABG SOURCE  Line, Arterial     Results from last 7 days   Lab Units 05/30/22  0523   PROCALCITONIN ng/ml 1 26*       Micro  Results from last 7 days   Lab Units 06/03/22  1528 05/30/22  1828   BLOOD CULTURE  Received in Microbiology Lab  Culture in Progress  Received in Microbiology Lab  Culture in Progress  --    SPUTUM CULTURE   --  1+ Growth of Candida sp  presumptively albicans*   GRAM STAIN RESULT   --  Rare Polys  No organisms seen       EKG: none conducted over follow-up interval   Imaging: I have personally reviewed pertinent reports        Intake and Output  I/O       06/02 0701 06/03 0700 06/03 0701 06/04 0700 06/04 0701  06/05 0700    I V  (mL/kg) 900 2 (11 5) 1741 (22 5)     NG/      IV Piggyback       Feedings 1266      Total Intake(mL/kg) 2456 2 (31 2) 1741 (22 5)     Urine (mL/kg/hr) 490 (0 3) 255 (0 1)     Drains 175 200     Other 2245 3350     Stool 275 485     Total Output 8032 4290     Net -449 3 -9596                  Height and Weights   Height: 5' 4" (162 6 cm)  IBW (Ideal Body Weight): 59 2 kg  Body mass index is 29 29 kg/m²  Weight (last 2 days)     Date/Time Weight    06/04/22 0600 77 4 (170 64)    06/03/22 0615 78 6 (173 28)    06/02/22 0500 79 1 (174 38)            Nutrition       Diet Orders   (From admission, onward)             Start     Ordered    06/02/22 1041  Diet Enteral/Parenteral; Tube Feeding No Oral Diet; Osmolite 1 0; Continuous; 55; Prosource Protein Liquid - One Packet Daily; Demond - Two Packets Daily  Diet effective now        Comments: Demond - one packet with breakfast and dinner  Pro source - one packet with lunch   References:    Nutrtion Support Algorithm Enteral vs  Parenteral   Question Answer Comment   Diet Type Enteral/Parenteral    Enteral/Parenteral Tube Feeding No Oral Diet    Tube Feeding Formula: Osmolite 1 0    Bolus/Cyclic/Continuous Continuous    Tube Feeding Goal Rate (mL/hr): 55    Prosource Protein Liquid - No Carb Prosource Protein Liquid - One Packet Daily    Demond [place] Demond - Two Packets Daily    RD to adjust diet per protocol?  Yes        06/02/22 1040    05/12/22 0906  Room Service  Once        Question:  Type of Service  Answer:  Room Service - Appropriate with Assistance    05/12/22 0905                  Active Medications  Scheduled Meds:  Current Facility-Administered Medications   Medication Dose Route Frequency Provider Last Rate    acetaminophen  650 mg Oral Q6H PRN Roylene Sabot, CRNP      chlorhexidine  15 mL Mouth/Throat Q12H [address] Roylene Sabot, CRNP      dexmedetomidine  0 1-1 5 mcg/kg/hr Intravenous Titrated Dariusz Adair, CRNP 0 6 mcg/kg/hr (06/04/22 7943)    gabapentin  200 mg Oral HS Josephine Nikko Smoke, CRNP      heparin (porcine)  400 Units/hr Intravenous Continuous Marshall Stagger, CRNP 400 Units/hr (06/03/22 0800)    HYDROmorphone  0 5 mg Intravenous Q3H PRN Marshall Stagger, CRNP      insulin lispro  1-6 Units Subcutaneous Q6H [address] Josephine Nikko Smoke, CRNP      iohexol  50 mL Oral Once in imaging ISELA Andino      ipratropium-albuterol  3 mL Nebulization Q6H PRN ISELA Andino      levothyroxine  112 mcg Per NG Tube Early Morning ISELA Andino      lidocaine  2 patch Topical Daily Amadou Méndez PA-C      midodrine  5 mg Oral TID AC Josephine Nikko Smoke, CRNP      norepinephrine  1-30 mcg/min Intravenous Titrated Marshall Stagustavoer, CRNP 2 mcg/min (06/04/22 0501)    NxStage K 4/Ca 3  20,000 mL Dialysis Continuous Jorge Jones MD      ondansetron  4 mg Intravenous Q6H PRN ISELA Andino      oxyCODONE  5 mg Per NG Tube Q4H PRN ISELA Gross      Or    oxyCODONE  7 5 mg Per NG Tube Q4H PRN ISELA Gross      pantoprazole  40 mg Intravenous Q24H [address] ISELA Andino      piperacillin-tazobactam  3 375 g Intravenous Q6H Gabriela Ogden PA-C Stopped (06/04/22 1000)    polyethylene glycol  17 g Oral Daily Josephine Nikko Smoke, CRNP      vasopressin  0 04 Units/min Intravenous Continuous Josephine Nikko Smoke, CRNP 0 04 Units/min (06/04/22 0931)     Continuous Infusions:  dexmedetomidine, 0 1-1 5 mcg/kg/hr, Last Rate: 0 6 mcg/kg/hr (06/04/22 0918)  heparin (porcine), 400 Units/hr, Last Rate: 400 Units/hr (06/03/22 0800)  norepinephrine, 1-30 mcg/min, Last Rate: 2 mcg/min (06/04/22 0501)  NxStage K 4/Ca 3, 20,000 mL  vasopressin, 0 04 Units/min, Last Rate: 0 04 Units/min (06/04/22 0931)      PRN Meds:   acetaminophen, 650 mg, Q6H PRN  HYDROmorphone, 0 5 mg, Q3H PRN  iohexol, 50 mL, Once in imaging  ipratropium-albuterol, 3 mL, Q6H PRN  ondansetron, 4 mg, Q6H PRN  oxyCODONE, 5 mg, Q4H PRN   Or  oxyCODONE, 7 5 mg, Q4H PRN        Invasive Devices Review  Invasive Devices  Report    Peripherally Inserted Central Catheter Line  Duration           PICC Line 64/47/03 Right Basilic 16 days          Arterial Line  Duration           Arterial Line 05/30/22 Radial 4 days          Hemodialysis Catheter  Duration           HD Temporary Double Catheter 8 days          Drain  Duration           Colostomy LLQ 24 days    Urethral Catheter Double-lumen 16 Fr  14 days          Airway  Duration           ETT  Oral 13 days                Rationale for remaining devices: ETT tube for mechanical ventialtion, Colostomy s/p large and small bowel resections  HD temp cath for the aforementioned, PICC and A-line for delivery of medications, lab draws blood gas analysis as needed  ---------------------------------------------------------------------------------------  Advance Directive and Living Will:      Power of :    POLST:    ---------------------------------------------------------------------------------------  Care Time Delivered:   Upon my evaluation, this patient had a high probability of imminent or life-threatening deterioration due to acute respiratory failure with hypoxia and hypercapnia requiring mechanical ventilation  Sepsis  , which required my direct attention, intervention, and personal management  I have personally provided 35 minutes (06:45 to 07:20) of critical care time, exclusive of procedures, teaching, family meetings, and any prior time recorded by providers other than myself  Loli Benites MD      Portions of the record may have been created with voice recognition software  Occasional wrong word or "sound a like" substitutions may have occurred due to the inherent limitations of voice recognition software    Read the chart carefully and recognize, using context, where substitutions have occurred

## 2022-06-04 NOTE — PROCEDURES
NEPHROLOGY DIALYSIS PROCEDURE NOTE      Patient seen and examined on CVVH, tolerating procedure well  All documentation, labs, medications were reviewed by myself, and the treatment plan was reviewed with nurse and patient  Seen on CVVH at : 12:01 PM  Dialysis Access: Right IJ non tunneled dialysis catheter  Vitals:   A line blood pressure 134/60   Gtts:  heparin drip, Precedex, Dilaudid drip, norepinephrine drip, vasopressin drip currently on hold, receiving 1 unit of blood  CVVH:  no issues overnight, UF was reduced currently at neck -100 mL/hour, 1 9 L/hr therapy fluid, blood flow rate 250 mL/minute    89 y  o  man with PMH of Aostenosis, scleroderma, CAD   Patient underwent EGD and colonoscopy on 05/11 for possible intussusception, complicated with perforation, underwent exploratory laparotomy, complicated with pneumonia, sepsis, CHF, PEA arrest on 05/21 x2   Nephrology is consulted for MARYELLEN   ay for assistance in the management of MARYELLEN     ASSESSMENT and PLAN:     Acute renal failure/acute tubular necrosis  --acute tubular necrosis in setting of hypotension and cardiac arrest  --started CVVH on May 26, discontinued June 1st, restarted back on June 2nd, and remains so  --oliguric  --now on vasopressin along with norepinephrine  --continue CVVH, 1 9 L/hr therapy fluid, blood flow rate 250 mL/minutes, running net -100 mL/hour     PEA cardiac arrest  --x2 episodes     Ventilatory dependent respiratory failure  --90% FiO2  --will likely require trach and PEG  --chest x-ray shows multifocal infiltrates and a large right-sided pleural effusion which will not be amendable to ultrafiltration     Blood pressure/volume status  --suspect that he has extravascular volume overloaded but intravascular volume depleted  --off IV albumin  --continue midodrine 5 mg t i d   --remains on norepinephrine drip, also started on vasopressin  --received 1 unit blood  --extravascular volume overloaded  --increase ultrafiltration if blood pressure tolerates     Bowel perforation  --status post emergent ex lap on May 11th with low anterior resection, protective loop transverse colostomy with a flex sigmoidoscopy and small-bowel resection  --currently has an abdominal wound VAC in place  --management as per surgery     Anemia  --transfuse for as per the primary team  --transfuse 1 unit blood    Review of Systems:  Unable to obtain review of systems    Physical Exam:    General:  Intubated and sedated but awake on the ventilator  Skin:  Poor skin turgor, no rash  CVS:  S1-S2 appreciated regular rhythm  Lungs:  Coarse breath sounds bilaterally  Abdomen:  Mild tenderness, abdominal site noted surgical site  Access:  Right IJ catheter with no exudate  Extremities:  Generalized anasarca  Neuro:  Awake on the ventilator          Current Facility-Administered Medications:     acetaminophen (TYLENOL) oral suspension 650 mg, 650 mg, Oral, Q6H PRN, ISELA Givens, 650 mg at 06/01/22 2340    chlorhexidine (PERIDEX) 0 12 % oral rinse 15 mL, 15 mL, Mouth/Throat, Q12H [address], ISELA Man, 15 mL at 06/04/22 0844    dexmedeTOMIDine (Precedex) 400 mcg in sodium chloride 0 9% 100 mL, 0 1-1 5 mcg/kg/hr, Intravenous, Titrated, ISELA Valdez, Last Rate: 11 7 mL/hr at 06/04/22 0918, 0 6 mcg/kg/hr at 06/04/22 0918    gabapentin (NEURONTIN) oral solution 200 mg, 200 mg, Oral, HS, ISELA Ritter, 200 mg at 06/02/22 2129    heparin (porcine) 25,000 units in 0 45% NaCl 250 mL infusion (premix), 400 Units/hr, Intravenous, Continuous, ISELA Hood, Last Rate: 4 mL/hr at 06/03/22 0800, 400 Units/hr at 06/03/22 0800    HYDROmorphone (DILAUDID) injection 0 5 mg, 0 5 mg, Intravenous, Q3H PRN, ISELA Hood, 0 5 mg at 06/03/22 1822    insulin lispro (HumaLOG) 100 units/mL subcutaneous injection 1-6 Units, 1-6 Units, Subcutaneous, Q6H [address], 1 Units at 06/03/22 0029 **AND** Fingerstick Glucose (POCT), , , Q6H, Josephine Nelson ISELA Mccloud    iohexol (OMNIPAQUE) 240 MG/ML solution 50 mL, 50 mL, Oral, Once in imaging, Johanne Curling, CRNP    ipratropium-albuterol (DUO-NEB) 0 5-2 5 mg/3 mL inhalation solution 3 mL, 3 mL, Nebulization, Q6H PRN, Johanne Curling, CRNP, 3 mL at 06/04/22 0757    levothyroxine tablet 112 mcg, 112 mcg, Per NG Tube, Early Morning, Johanne Curling, CRNP, 112 mcg at 06/03/22 0609    lidocaine (LIDODERM) 5 % patch 2 patch, 2 patch, Topical, Daily, Ana Strange PA-C, 2 patch at 06/04/22 0844    midodrine (PROAMATINE) tablet 5 mg, 5 mg, Oral, TID AC, ISELA Ritter, 5 mg at 06/03/22 0609    norepinephrine (LEVOPHED) 8 mg (DOUBLE CONCENTRATION) IV in sodium chloride 0 9% 250 mL, 1-30 mcg/min, Intravenous, Titrated, ISELA Moran, Last Rate: 1 9 mL/hr at 06/04/22 1045, 1 mcg/min at 06/04/22 1045    NxStage K 4/Ca 3 dialysis solution (RFP-401) 20,000 mL, 20,000 mL, Dialysis, Continuous, Myra Anguiano MD, 20,000 mL at 06/04/22 0930    ondansetron (ZOFRAN) injection 4 mg, 4 mg, Intravenous, Q6H PRN, Johanne Curling, CRNP    oxyCODONE (ROXICODONE) oral solution 5 mg, 5 mg, Per NG Tube, Q4H PRN, 5 mg at 06/03/22 1804 **OR** oxyCODONE (ROXICODONE) oral solution 7 5 mg, 7 5 mg, Per NG Tube, Q4H PRN, ISELA Duffy    pantoprazole (PROTONIX) injection 40 mg, 40 mg, Intravenous, Q24H [address], Johanne Curling, CRNP, 40 mg at 06/04/22 0837    piperacillin-tazobactam (ZOSYN) 3 375 g in sodium chloride 0 9 % 100 mL IVPB, 3 375 g, Intravenous, Q6H, Rudy Cottrell PA-C, Stopped at 06/04/22 1000    polyethylene glycol (MIRALAX) packet 17 g, 17 g, Oral, Daily, ISELA Ritter, 17 g at 06/02/22 0947    vasopressin (PITRESSIN) 20 Units in sodium chloride 0 9 % 100 mL infusion, 0 04 Units/min, Intravenous, Continuous, ISELA Oneil, Paused at 06/04/22 1125

## 2022-06-04 NOTE — ASSESSMENT & PLAN NOTE
· Outpatient EGD and colonoscopy at [facility] on 5/11 for w/u of chronic anemia, history of colon polyps, intermittent LLQ abdominal pain and possible intermittent intussusception  · EGD unremarkable, colonoscopy technically difficult and required change from adult scope to pediatric scope  · During traversal of the sigmoid colon there was a kink and patient sustained a perforation  Procedure was aborted and patient was transferred to [address] [place] [address] where immediate surgical consultation was obtained  · Emergent ex- lap on 5/11 > low anterior resection, protective loop transverse colostomy, flex sigmoidoscopy, and segmental small bowel resection  · Difficult post op course with post op ileus requiring NGT decompression and initiation of TPN; not tolerating trickle feeds  · 5/22: CT: Diffuse mild dilation of small bowel segments identified without transition point  · 5/24: CT CAP- Distended small bowel loops with scattered air-fluid levels consistent with early/partial small bowel obstruction with transition at the small bowel anastomosis in the region of the ventral pelvis as above  No free air  Cholelithiasis without cholecystitis  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  · 5/24: Stomal prolapse and small peristomal hernia now at the transverse loop colostomy; continues to be reduced by surgery  · TPN d/c'd on 5/26; tolerating tube feeds at goal  · 5/26: Abdominal wound vac placed bedside: continue with dressing changes Monday/Thursday   · Surgery continues to follow    · Will obtain IR assessment for advancement of NG tube

## 2022-06-04 NOTE — ASSESSMENT & PLAN NOTE
· In the setting of cardiac arrest while on 3 pressors noted to have afib with RVR  · Bolused with amio and placed on infusion  · Converted to sinus rhythm on 5/22   · Amio gtt d/c 5/23  · Has remained in NSR  · No indication for [facility]  · Afib was likely post arrest in setting of triple pressors

## 2022-06-05 PROBLEM — I48.91 ATRIAL FIBRILLATION (HCC): Status: RESOLVED | Noted: 2022-01-01 | Resolved: 2022-01-01

## 2022-06-05 NOTE — ASSESSMENT & PLAN NOTE
· In the setting of cardiac arrest while on 3 pressors noted to have afib with RVR  · Bolused with amio and placed on infusion  · Converted to sinus rhythm on 5/22   · Amio gtt d/c 5/23  · Has remained in NSR  · No indication for [facility]  · Afib was likely post arrest in setting of triple pressors

## 2022-06-05 NOTE — ASSESSMENT & PLAN NOTE
· Peritonitis with intra-abdominal/pelvic abscess secondary to colonic perforation    · 5/22 CT chest/ab/pelvis with concern of multifocal PNA, no visualized intraabdominal abscess or anastomotic leak   · Per ID suspect multifocal pneumonitis   · OR culture 5/21 pseudomonas and bacteroides fragilis  · Blood cultures on 5/22 negative   · Completed 14 days of Flagyl on 5/24  · Completed 14 days of cefepime on 5/27  · Restarted Zosyn on 6/3 for increasing WBC and oxygen requirements   · Zosyn day 3   · ID following, appreciate recommendations

## 2022-06-05 NOTE — ASSESSMENT & PLAN NOTE
Wt Readings from Last 3 Encounters:   06/04/22 77 4 kg (170 lb 10 2 oz)   05/11/22 62 1 kg (136 lb 14 4 oz)   03/25/22 61 2 kg (135 lb)     · 5/16: Echo-EF 65%, mild TR, mild MR  · 5/23: Repeat Echo post cardiac arrest: EF 60-65%, G1DD, mild AS (BRADY 1 5cm2), mild MR, mild TR  · Monitor I&O, Daily weights   · Limited ECHO-EF 65%, G1DD, mild AS  · Aggressive volume removal with CRRT, goal -100 mL/hr as tolerated

## 2022-06-05 NOTE — OCCUPATIONAL THERAPY NOTE
OT TREATMENT     06/05/22 1323   Note Type   Note Type Cancelled Session   Cancel Reasons Medical status   Licensure   [place] License Number  Claire Hoyos [facility] [address] 87 OTR/L [id number]

## 2022-06-05 NOTE — PROGRESS NOTES
[address]  Progress Note - Veronica Hudson 2/15/1931, 80 y o  male MRN: [id number]  Unit/Bed#: ICU 02 Encounter: [id number]  Primary Care Provider: Elle Watkins MD   Date and time admitted to hospital: 5/11/2022  4:48 PM    * Bowel perforation [facility])  Assessment & Plan  · Outpatient EGD and colonoscopy at [facility] on 5/11 for w/u of chronic anemia, history of colon polyps, intermittent LLQ abdominal pain and possible intermittent intussusception  · EGD unremarkable, colonoscopy technically difficult and required change from adult scope to pediatric scope, during traversal of the sigmoid colon there was a kink and patient sustained a perforation  · Transferred to [facility] for emergent surgery   · Emergent ex- lap on 5/11 > low anterior resection, protective loop transverse colostomy, flex sigmoidoscopy, and segmental small bowel resection  · Difficult post op course with post op ileus requiring NGT decompression and requirement of short duration of TPN   · 5/22: CT: Diffuse mild dilation of small bowel segments identified without transition point  · 5/24: CT CAP- Distended small bowel loops with scattered air-fluid levels consistent with early/partial small bowel obstruction with transition at the small bowel anastomosis in the region of the ventral pelvis as above  No free air  Cholelithiasis without cholecystitis  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    · Pending IR NG/OG tube placement for enteral access - multiple unsuccessful attempts bedside   · Currently not stable for transport for procedure   · 6/4-/6/5 significantly decreased ostomy output (5 cc total) with complaints of abdominal pain   · Discuss with surgery, consider CT scan abdomen/pelvis     Acute respiratory failure with hypoxia ([address] )  Assessment & Plan  · Patient previously in ICU for hypoxia with a max of 15L midflow and concern for aspiration pneumonitis  · Transfered to general medical floor 5/21  · 5/21: PEA arrest x2, intubation  · 5/24 CT CAP-CHF with moderate basilar effusions and additional upper lung zone patchy airspace opacities likely reflecting asymmetric pulmonary edema  Infiltrate is not entirely excluded  · 5/29 Bronchoscopy for mucous plugging  · Day # 16 on ventilator: AC/PC 20/24/90/10  · Wean Fio2 as able for SPO2>90%  · PF ratio 65 - in severe ARDS  · Continue pulmonary hygiene/ VAP bundle  · Continue volume removal with CVVH  · Unstable and ventilator requirements too high at this time to consider trach/PEG  · Moderate BL effusions on 5/24 CT scan, unable to evaluate on CXR - IR to attempt thoracentesis bedside today   · Noted for bilateral pleural effusions; will obtain IR assessment for thoracentesis      Acute renal failure (ARF) ([facility])  Assessment & Plan  · Secondary to hypoperfusion mehul cardiac arrest +/- ATN  · Baseline creat 0 8  · Creatinine peaked at 3 07  · CVVH started on 5/26 and stopped on 6/1  · Restarted on 6/2 after he was anuric with increasing oxygen requirements  · Continue CVVH with goal negative   · Tolerating - 100 mL/hr   · Decrease if vasopressors continue to rise   · Avoid hypotension/hypoperfusion  · Continue levophed for MAP > 65    Severe sepsis (HCC)  Assessment & Plan  · Peritonitis with intra-abdominal/pelvic abscess secondary to colonic perforation    · 5/22 CT chest/ab/pelvis with concern of multifocal PNA, no visualized intraabdominal abscess or anastomotic leak   · Per ID suspect multifocal pneumonitis   · OR culture 5/21 pseudomonas and bacteroides fragilis  · Blood cultures on 5/22 negative   · Completed 14 days of Flagyl on 5/24  · Completed 14 days of cefepime on 5/27  · Restarted Zosyn on 6/3 for increasing WBC and oxygen requirements   · Zosyn day 3   · ID following, appreciate recommendations     Cardiac arrest [facility])  Assessment & Plan  · Patient was found unresponsive and hypoxic approximately 20 minutes after being seen well with family 5/21   · PEA arrest x 2  · Most likely respiratory driven  · Neurologically intact post arrest   · Continue telemetry monitoring     CHF (congestive heart failure) ([facility] [address] )  Assessment & Plan  Wt Readings from Last 3 Encounters:   06/04/22 77 4 kg (170 lb 10 2 oz)   05/11/22 62 1 kg (136 lb 14 4 oz)   03/25/22 61 2 kg (135 lb)     · 5/16: Echo-EF 65%, mild TR, mild MR  · 5/23: Repeat Echo post cardiac arrest: EF 60-65%, G1DD, mild AS (BRADY 1 5cm2), mild MR, mild TR  · Monitor I&O, Daily weights   · Limited ECHO-EF 65%, G1DD, mild AS  · Aggressive volume removal with CRRT, goal -100 mL/hr as tolerated       Hyperglycemia  Assessment & Plan  · A1c 5 3%  · Continue SSI - minimal requirements     Toxic metabolic encephalopathy  Assessment & Plan  · Likely in setting of acute illness/delirium and cardiac arrest  · Delirium precautions; regulate sleep/wake cycle, environmental controls, daily CAM ICU   · Trend neuro exam  · Following commands, nodding appropriately to questions, moves all extremities  · Precedex 0 8 for sedation with PRN dilaudid for pain     Anemia  Assessment & Plan  · Hemoglobin drop post cardiac arrest    · Noted to have gross hematuria but this has since resolved  · 5/21: 1 u PRBC  · 5/24: 1 u PRBC  · CT obtained on 5/24 and negative for any overt signs of bleeding  · 5/26: 1 u PRBC   · 5/30: 1 u PRBC  · 6/4: 1 U PRBC   · Hgb stable 8 0 this AM   · Continue to monitor and transfuse for hgb less than 7      Atrial fibrillation (HCC)-resolved as of 6/5/2022  Assessment & Plan  · In the setting of cardiac arrest while on 3 pressors noted to have afib with RVR  · Bolused with amio and placed on infusion  · Converted to sinus rhythm on    · Amio gtt d/c   · Has remained in NSR  · No indication for [facility]  · Afib was likely post arrest in setting of triple pressors    Acute on chronic anemia  Assessment & Plan  · As above     Hypotension  Assessment & Plan  · Levophed restarted on , vasopressin restarted on 6/3  · Currently Levo 5, vasopressin off since   · Wean for MAP goal >65  · Midodrine added 5 mg TID on  - unable to receive secondary to no enteral access       ----------------------------------------------------------------------------------------  HPI/24hr events:   · Fio2 weaned to 80% during day yesterday and back up to 90% overnight  · Vasopressin held yesterday, increase in levophed to 5  · 5 cc total stool output from ostomy in past 24 hrs with complaints of abdominal pain  KUB overnight without extensive dilation   · Pending bedside IR thoracentesis today     Patient appropriate for transfer out of the ICU today?: No  Disposition: Continue Critical Care   Code Status: Level 1 - Full Code  ---------------------------------------------------------------------------------------  SUBJECTIVE  Unable to offer but shakes head yes to abdominal pain and grimaces to abdominal palpation       Review of Systems  Review of systems was unable to be performed secondary to intubation/sedation   ---------------------------------------------------------------------------------------  OBJECTIVE    Vitals   Vitals:    22 0330 22 0400 22 0430 22 0600   BP:       Pulse: 66 65 63    Resp: (!) 32 (!) 27 (!) 27    Temp: (!) 96 8 °F (36 °C) (!) 96 62 °F (35 9 °C) (!) 96 98 °F (36 1 °C)    TempSrc:       SpO2: 91% 95% 93%    Weight:    76 3 kg (168 lb 3 4 oz)   Height:         Temp (24hrs), Av 3 °F (36 3 °C), Min:96 44 °F (35 8 °C), Max:97 88 °F (36 6 °C)  Current: Temperature: (!) 96 98 °F (36 1 °C)  Arterial Line BP: 136/43  Arterial Line MAP (mmHg): 74 mmHg    Respiratory:  SpO2: SpO2: 93 %, SpO2 Device: O2 Device: Ventilator    Invasive/non-invasive ventilation settings   Respiratory  Report   Lab Data (Last 4 hours)    None         O2/Vent Data (Last 4 hours)      06/05 0315           Vent Mode AC/PC       Resp Rate (BPM) (BPM) 20       Pressure Control (cmH2O) (cm) 24       Insp Time (sec) (sec) 0 6       FiO2 (%) (%) 90       PEEP (cmH2O) (cmH2O) 10       MV 14 1                   Physical Exam  Vitals reviewed  Constitutional:       Appearance: He is ill-appearing  He is not diaphoretic  Interventions: He is sedated and intubated  Eyes:      Pupils: Pupils are equal, round, and reactive to light  Cardiovascular:      Rate and Rhythm: Normal rate and regular rhythm  Heart sounds: Normal heart sounds  Pulmonary:      Effort: Tachypnea (RR 30-35) present  He is intubated  Breath sounds: Rhonchi (coarse rhonchi throughout) present  Abdominal:      General: There is no distension  Palpations: Abdomen is soft  Tenderness: There is generalized abdominal tenderness  Comments: Herniated ostomy, bag in place with small amount of brown liquid stool (has not been emptied since yesterday)   Musculoskeletal:      Right lower leg: Edema (trace) present  Left lower leg: Edema (trace) present  Skin:     General: Skin is warm and dry     Neurological:      Comments: Opens eyes briefly to voice, follows commands weakly in extremities              Laboratory and Diagnostics:  Results from last 7 days   Lab Units 06/05/22  0555 06/04/22  1420 06/04/22  0554 06/03/22  0604 06/02/22  0527 06/01/22  0520 05/31/22  0533 05/30/22  1217 05/30/22  0523   WBC Thousand/uL 18 30*  --  14 58* 21 58* 15 57* 18 82* 15 28*  --  16 84*   HEMOGLOBIN g/dL 8 0* 8 1* 6 9* 7 3* 7 3* 7 9* 7 4* 6 5* 7 0*   HEMATOCRIT % 24 4*  --  21 0* 22 2* 22 3* 23 6* 21 8* 19 6* 20 5*   PLATELETS Thousands/uL 86*  --  99* 133* 147* 116* 116*  --  115*   NEUTROS PCT % 79*  --   --   --   --   --   --   --   --    BANDS PCT %  --   --   --  23*  --   --  22*  --   --    MONOS PCT % 7  --   --   --   --   --   --   --   --    MONO PCT %  --   --   --  6  --   --  3*  --   --      Results from last 7 days   Lab Units 06/05/22 0555 06/04/22 2203 06/04/22 1420 06/04/22  0554 06/03/22 2213 06/03/22  1308 06/03/22  0604 06/02/22 2204 06/02/22  0527   SODIUM mmol/L 138 137 137 138 137 136 136   < > 137   POTASSIUM mmol/L 4 5 4 6 4 8 5 0 5 0 4 9 4 9   < > 4 5   CHLORIDE mmol/L 105 104 103 104 103 103 104   < > 103   CO2 mmol/L 24 24 25 26 26 26 25   < > 26   ANION GAP mmol/L 9 9 9 8 8 7 7   < > 8   BUN mg/dL 20 20 20 23 22 24 27*   < > 29*   CREATININE mg/dL 1 17 1 12 1 09 1 15 1 10 1 10 1 37*   < > 1 56*   CALCIUM mg/dL 8 3 8 4 8 3 8 5 8 4 8 3 8 3   < > 8 6   GLUCOSE RANDOM mg/dL 79 88 99 106 108 130 156*   < > 147*   ALT U/L  --   --   --   --   --   --   --   --  20   AST U/L  --   --   --   --   --   --   --   --  26   ALK PHOS U/L  --   --   --   --   --   --   --   --  445*   ALBUMIN g/dL  --   --   --   --   --   --   --   --  3 2*   TOTAL BILIRUBIN mg/dL  --   --   --   --   --   --   --   --  1 06*    < > = values in this interval not displayed       Results from last 7 days   Lab Units 06/05/22 0555 06/04/22 2203 06/04/22 1420 06/04/22  0554 06/03/22 2213 06/03/22  1308 06/03/22  0604   MAGNESIUM mg/dL 1 9 2 1 1 9 2 2 1 9 2 0 1 9   PHOSPHORUS mg/dL 2 7 2 7 3 3 3 4 3 2 3 1 2 9      Results from last 7 days   Lab Units 05/29/22  1545   INR  1 28*   PTT seconds 53*          Results from last 7 days   Lab Units 06/03/22  1021   LACTIC ACID mmol/L 0 8     ABG:  Results from last 7 days   Lab Units 06/05/22  0130   PH ART  7 347*   PCO2 ART mm Hg 43 3   PO2 ART mm Hg 58 8*   HCO3 ART mmol/L 23 2   BASE EXC ART mmol/L -2 4   ABG SOURCE  Line, Arterial     VBG:  Results from last 7 days   Lab Units 06/05/22  0130   ABG SOURCE  Line, Arterial     Results from last 7 days   Lab Units 05/30/22  0523   PROCALCITONIN ng/ml 1 26*       Micro  Results from last 7 days   Lab Units 06/03/22  1528 05/30/22  1828   BLOOD CULTURE  No Growth at 24 hrs  No Growth at 24 hrs   --    SPUTUM CULTURE   --  1+ Growth of Candida sp  presumptively albicans*   GRAM STAIN RESULT   --  Rare Polys  No organisms seen       EKG: NSR rate 68 on telemetry   Imaging: I have personally reviewed pertinent reports  and I have personally reviewed pertinent films in PACS    Intake and Output  I/O       06/03 0701 06/04 0700 06/04 0701 06/05 0700    I V  (mL/kg) 1829 (23 6) 1533 6 (19 8)    Blood  403    IV Piggyback  300    Total Intake(mL/kg) 1829 (23 6) 2236 6 (28 9)    Urine (mL/kg/hr) 258 (0 1) 57 (0)    Drains 200 10    Other 3457 3729    Stool 485 5    Total Output 4400 3801    Net -6561 -1270 4                Height and Weights   Height: 5' 4" (162 6 cm)  IBW (Ideal Body Weight): 59 2 kg  Body mass index is 28 87 kg/m²  Weight (last 2 days)     Date/Time Weight    06/05/22 0600 76 3 (168 21)    06/04/22 0600 77 4 (170 64)    06/03/22 0615 78 6 (173 28)            Nutrition       Diet Orders   (From admission, onward)             Start     Ordered    06/02/22 1041  Diet Enteral/Parenteral; Tube Feeding No Oral Diet; Osmolite 1 0; Continuous; 55; Prosource Protein Liquid - One Packet Daily; Demond - Two Packets Daily  Diet effective now        Comments: Demond - one packet with breakfast and dinner  Pro source - one packet with lunch   References:    Nutrtion Support Algorithm Enteral vs  Parenteral   Question Answer Comment   Diet Type Enteral/Parenteral    Enteral/Parenteral Tube Feeding No Oral Diet    Tube Feeding Formula: Osmolite 1 0    Bolus/Cyclic/Continuous Continuous    Tube Feeding Goal Rate (mL/hr): 55    Prosource Protein Liquid - No Carb Prosource Protein Liquid - One Packet Daily    Demond [place] Demond - Two Packets Daily    RD to adjust diet per protocol?  Yes        06/02/22 1040    05/12/22 0906 Room Service  Once        Question:  Type of Service  Answer:  Room Service - Appropriate with Assistance    05/12/22 0905                  Active Medications  Scheduled Meds:  Current Facility-Administered Medications   Medication Dose Route Frequency Provider Last Rate    acetaminophen  650 mg Oral Q6H PRN Johanne Curling, CRNP      chlorhexidine  15 mL Mouth/Throat Q12H [address] Johanne Curling, CRNP      dexmedetomidine  0 1-1 5 mcg/kg/hr Intravenous Titrated ISELA Duffy 0 8 mcg/kg/hr (06/05/22 0634)    gabapentin  200 mg Oral HS ISELA Galvan      heparin (porcine)  400 Units/hr Intravenous Continuous ISELA Moran 400 Units/hr (06/04/22 1906)    HYDROmorphone  0 5 mg Intravenous Q3H PRN ISELA Moran      insulin lispro  1-6 Units Subcutaneous Q6H [address] ISELA Galvan      iohexol  50 mL Oral Once in imaging Johanne Curling, CRNP      ipratropium-albuterol  3 mL Nebulization Q6H PRN Johanne Curling, CRNP      levothyroxine  112 mcg Per NG Tube Early Morning Johanne Curling, CRNP      lidocaine  2 patch Topical Daily [place], [place]      magnesium sulfate  1 g Intravenous Once Ana Pierce DO      midodrine  5 mg Oral TID AC ISELA Galvan      norepinephrine  1-30 mcg/min Intravenous Titrated ISELA Moran 6 mcg/min (06/05/22 0500)    NxStage K 4/Ca 3  20,000 mL Dialysis Continuous Myra Anguiano MD      ondansetron  4 mg Intravenous Q6H PRN Johanne Curling, CRNP      oxyCODONE  5 mg Per NG Tube Q4H PRN ISELA Duffy      Or    oxyCODONE  7 5 mg Per NG Tube Q4H PRN ISELA Duffy      pantoprazole  40 mg Intravenous Q24H [address] Johanne Curling, CRNP      piperacillin-tazobactam  3 375 g Intravenous Q6H Rudy Cottrell PA-C 3 375 g (06/05/22 0416)    polyethylene glycol  17 g Oral Daily Josephine Khai Mustard, CRNP      vasopressin  0 04 Units/min Intravenous Continuous Josephine Saravia, ISELA Stopped (06/04/22 1125)     Continuous Infusions:  dexmedetomidine, 0 1-1 5 mcg/kg/hr, Last Rate: 0 8 mcg/kg/hr (06/05/22 6894)  heparin (porcine), 400 Units/hr, Last Rate: 400 Units/hr (06/04/22 1906)  norepinephrine, 1-30 mcg/min, Last Rate: 6 mcg/min (06/05/22 0500)  NxStage K 4/Ca 3, 20,000 mL  vasopressin, 0 04 Units/min, Last Rate: Stopped (06/04/22 1125)      PRN Meds:   acetaminophen, 650 mg, Q6H PRN  HYDROmorphone, 0 5 mg, Q3H PRN  iohexol, 50 mL, Once in imaging  ipratropium-albuterol, 3 mL, Q6H PRN  ondansetron, 4 mg, Q6H PRN  oxyCODONE, 5 mg, Q4H PRN   Or  oxyCODONE, 7 5 mg, Q4H PRN        Invasive Devices Review  Invasive Devices  Report    Peripherally Inserted Central Catheter Line  Duration           PICC Line 78/86/06 Right Basilic 17 days          Arterial Line  Duration           Arterial Line 05/30/22 Radial 5 days          Hemodialysis Catheter  Duration           HD Temporary Double Catheter 9 days          Drain  Duration           Colostomy LLQ 25 days    Urethral Catheter Double-lumen 16 Fr  15 days          Airway  Duration           ETT  Oral 14 days                Rationale for remaining devices: HD line - currently on CRRT   PICC - medications requiring central line   A line - Continious BP monitoring on pressors   [place] - accurate I/O in critically ill, cleared by ID to remain in place at this time  ---------------------------------------------------------------------------------------  Advance Directive and Living Will:      Power of :    POLST:    ---------------------------------------------------------------------------------------  Care Time Delivered:   Upon my evaluation, this patient had a high probability of imminent or life-threatening deterioration due to shock requiring vasopressors, acute hypoxic respiratory failure, ARDS, acute renal failure , which required my direct attention, intervention, and personal management    I have personally provided 30 minutes (0615 to 8064) of critical care time, exclusive of procedures, teaching, family meetings, and any prior time recorded by providers other than myself  Justin Chowdhury PA-C      Portions of the record may have been created with voice recognition software  Occasional wrong word or "sound a like" substitutions may have occurred due to the inherent limitations of voice recognition software    Read the chart carefully and recognize, using context, where substitutions have occurred

## 2022-06-05 NOTE — PROGRESS NOTES
NEPHROLOGY PROGRESS NOTE   [facility], located within [facility] 80 y o  male MRN: [id number]  Unit/Bed#: ICU 02 Encounter: [id number]  Reason for Consult: ARF      SUMMARY:    80 y  o  man with PMH of Aostenosis, scleroderma, CAD   Patient underwent EGD and colonoscopy on 05/11 for possible intussusception, complicated with perforation, underwent exploratory laparotomy, complicated with pneumonia, sepsis, CHF, PEA arrest on 05/21 x2   Nephrology is consulted for MARYELLEN   ay for assistance in the management of MARYELLEN     ASSESSMENT and PLAN:     Acute renal failure/acute tubular necrosis  --acute tubular necrosis in setting of hypotension and cardiac arrest  --started CVVH on May 26, discontinued June 1st, restarted back on June 2nd, and remains so  --oliguric  --remains on 2 pressors with vasopressin and norepinephrine  --currently off CVVH, had stopped last night and was restarted and then recently went down again this morning  --concern worsening abdominal tenderness and distension, plan to go for CT scan today  --patient also plan for bilateral thoracentesis by Interventional Radiology  --at this time we can hold off on the CVVH, can restart there are any emergent indications overnight    I anticipate will likely need to be restarted next 24-48 hours  --discussed with critical care     PEA cardiac arrest  --x2 episodes     Ventilatory dependent respiratory failure  --90% FiO2  --will likely require trach and PEG  --plan for thoracentesis for the pleural effusion     Blood pressure/volume status  --suspect that he has extravascular volume overloaded but intravascular volume depleted  --off IV albumin  --on midodrine  --remains on 2 pressors  --received 1 unit blood  --extravascular volume overloaded      Bowel perforation  --status post emergent ex lap on May 11th with low anterior resection, protective loop transverse colostomy with a flex sigmoidoscopy and small-bowel resection  --currently has an abdominal wound VAC in place  --management as per surgery  --plan for CT scan today due to worsening pain and abdominal tenderness     Anemia  --transfuse for as per the primary team  --received a unit of blood yesterday, hemoglobin improved      SUBJECTIVE / INTERVAL HISTORY:    Worsening abdominal tenderness no distension  Plan to go for CT scan  IR coming in for bilateral thoracentesis  Patient is having some increased abdominal tenderness at the surgical site  CVVH went down twice in last 24 hours    OBJECTIVE:  Current Weight: Weight - Scale: 76 3 kg (168 lb 3 4 oz)  Vitals:    06/05/22 0800 06/05/22 0830 06/05/22 0832 06/05/22 0900   BP:       Pulse: 86 79 71 69   Resp: (!) 28 16 16 (!) 0   Temp: 97 88 °F (36 6 °C) 98 42 °F (36 9 °C)  98 96 °F (37 2 °C)   TempSrc: Esophageal   Esophageal   SpO2: (!) 88% 91% 92% 95%   Weight:       Height:           Intake/Output Summary (Last 24 hours) at 6/5/2022 0321  Last data filed at 6/5/2022 9713  Gross per 24 hour   Intake 2336 6 ml   Output 4047 ml   Net -1710 4 ml       Review of Systems:    Unable to obtain review systems as patient is intubated    Physical Exam  Vitals and nursing note reviewed  Exam conducted with a chaperone present  Constitutional:       Appearance: He is ill-appearing  Comments: Intubated and sedated   HENT:      Head: Normocephalic and atraumatic  Mouth/Throat:      Mouth: Mucous membranes are moist    Eyes:      General: No scleral icterus  Right eye: No discharge  Left eye: No discharge  Cardiovascular:      Rate and Rhythm: Normal rate and regular rhythm  Pulmonary:      Breath sounds: Rhonchi and rales present  Comments: Coarse breath sounds bilaterally on the ventilator  Abdominal:      General: There is distension  Palpations: There is no mass  Tenderness: There is abdominal tenderness  There is no guarding  Musculoskeletal:         General: Swelling present  Cervical back: Neck supple  Right lower leg: Edema present  Left lower leg: Edema present  Skin:     General: Skin is dry  Coloration: Skin is pale     Neurological:      Comments: Sedated on the ventilator         Medications:    Current Facility-Administered Medications:     acetaminophen (TYLENOL) oral suspension 650 mg, 650 mg, Oral, Q6H PRN, ISELA Henry, 650 mg at 06/01/22 2340    chlorhexidine (PERIDEX) 0 12 % oral rinse 15 mL, 15 mL, Mouth/Throat, Q12H [facility], ISELA Henry, 15 mL at 06/05/22 9038    dexmedeTOMIDine (Precedex) 400 mcg in sodium chloride 0 9% 100 mL, 0 1-1 5 mcg/kg/hr, Intravenous, Titrated, ISELA Andrade, Last Rate: 19 5 mL/hr at 06/05/22 0743, 1 mcg/kg/hr at 06/05/22 0743    gabapentin (NEURONTIN) oral solution 200 mg, 200 mg, Oral, HS, ISELA Ritter, 200 mg at 06/02/22 2129    heparin (porcine) 25,000 units in 0 45% NaCl 250 mL infusion (premix), 400 Units/hr, Intravenous, Continuous, [employer], CRNP, Stopped at 06/05/22 0815    HYDROmorphone (DILAUDID) injection 0 5 mg, 0 5 mg, Intravenous, Q3H PRN, [employer], CRNP, 0 5 mg at 06/04/22 2208    insulin lispro (HumaLOG) 100 units/mL subcutaneous injection 1-6 Units, 1-6 Units, Subcutaneous, Q6H [facility], 1 Units at 06/05/22 0612 **AND** Fingerstick Glucose (POCT), , , Q6H, ISELA Ritter    iohexol (OMNIPAQUE) 240 MG/ML solution 50 mL, 50 mL, Oral, Once in imaging, ISELA Henry    ipratropium-albuterol (DUO-NEB) 0 5-2 5 mg/3 mL inhalation solution 3 mL, 3 mL, Nebulization, Q6H PRN, ISELA Henry, 3 mL at 06/04/22 0757    levothyroxine tablet 112 mcg, 112 mcg, Per NG Tube, Early Morning, Elio Garrett, ISELA, 112 mcg at 06/03/22 0609    lidocaine (LIDODERM) 5 % patch 2 patch, 2 patch, Topical, Daily, Lonell Lanes Rudd, PA-C, 2 patch at 06/05/22 0826    midodrine (PROAMATINE) tablet 5 mg, 5 mg, Oral, TID Josephine CARVALHO CRNP, 5 mg at 06/03/22 0609    norepinephrine (LEVOPHED) 8 mg (DOUBLE CONCENTRATION) IV in sodium chloride 0 9% 250 mL, 1-30 mcg/min, Intravenous, Titrated, ISELA Herrera, Last Rate: 11 3 mL/hr at 06/05/22 0500, 6 mcg/min at 06/05/22 0500    ondansetron (ZOFRAN) injection 4 mg, 4 mg, Intravenous, Q6H PRN, ISELA Villegas    oxyCODONE (ROXICODONE) oral solution 5 mg, 5 mg, Per NG Tube, Q4H PRN, 5 mg at 06/03/22 1804 **OR** oxyCODONE (ROXICODONE) oral solution 7 5 mg, 7 5 mg, Per NG Tube, Q4H PRN, ISELA Chapman    pantoprazole (PROTONIX) injection 40 mg, 40 mg, Intravenous, Q24H [address], ISELA Villegas, 40 mg at 06/05/22 0826    piperacillin-tazobactam (ZOSYN) 3 375 g in sodium chloride 0 9 % 100 mL IVPB, 3 375 g, Intravenous, Q6H, Renee Schofield PA-C, Stopped at 06/05/22 0800    polyethylene glycol (MIRALAX) packet 17 g, 17 g, Oral, Daily, ISELA Ritter, 17 g at 06/02/22 0947    vasopressin (PITRESSIN) 20 Units in sodium chloride 0 9 % 100 mL infusion, 0 04 Units/min, Intravenous, Continuous, ISELA Ritter, Last Rate: 12 mL/hr at 06/05/22 0900, 0 04 Units/min at 06/05/22 0900    Laboratory Results:  Results from last 7 days   Lab Units 06/05/22  0555 06/04/22  2203 06/04/22  1420 06/04/22  0554 06/03/22  2213 06/03/22  1308 06/03/22  0604 06/02/22  2204 06/02/22  0527 06/01/22  1704 06/01/22  0520 05/31/22  1158 05/31/22  0533 05/30/22  1807 05/30/22  1217 05/30/22  0523   WBC Thousand/uL 18 30*  --   --  14 58*  --   --  21 58*  --  15 57*  --  18 82*  --  15 28*  --   --  16 84*   HEMOGLOBIN g/dL 8 0*  --  8 1* 6 9*  --   --  7 3*  --  7 3*  --  7 9*  --  7 4*  --  6 5* 7 0*   HEMATOCRIT % 24 4*  --   --  21 0*  --   --  22 2*  --  22 3*  --  23 6*  --  21 8*  --  19 6* 20 5*   PLATELETS Thousands/uL 86*  --   --  99*  --   --  133*  --  147*  --  116*  --  116*  --   --  115*   POTASSIUM mmol/L 4 5 4 6 4 8 5 0 5 0 4 9 4 9   < > 4 5   < > 4 1   < > 4 2   < > 4 3 4 5   CHLORIDE mmol/L 105 104 103 104 103 103 104   < > 103 < > 105   < > 104   < > 105 104   CO2 mmol/L 24 24 25 26 26 26 25   < > 26   < > 27   < > 26   < > 26 27   BUN mg/dL 20 20 20 23 22 24 27*   < > 29*   < > 20   < > 24   < > 29* 31*   CREATININE mg/dL 1 17 1 12 1 09 1 15 1 10 1 10 1 37*   < > 1 56*   < > 1 01   < > 1 13   < > 1 26 1 36*   CALCIUM mg/dL 8 3 8 4 8 3 8 5 8 4 8 3 8 3   < > 8 6   < > 8 4   < > 8 8   < > 8 5 8 4   MAGNESIUM mg/dL 1 9 2 1 1 9 2 2 1 9 2 0 1 9   < > 1 9  --  2 0   < > 2 0   < > 2 1 1 9   PHOSPHORUS mg/dL 2 7 2 7 3 3 3 4 3 2 3 1 2 9   < > 2 7   < > 2 1*   < > 2 9   < > 2 3 2 6    < > = values in this interval not displayed  PLEASE NOTE:  This encounter was completed utilizing the [employer] Direct Speech Voice Recognition Software  Grammatical errors, random word insertions, pronoun errors and incomplete sentences are occasional consequences of the system due to software limitations, ambient noise and hardware issues  These may be missed by proof reading prior to affixing electronic signature  Any questions or concerns about the content, text or information contained within the body of this dictation should be directly addressed to the physician for clarification  Please do not hesitate to call me directly if you have any any questions or concerns

## 2022-06-05 NOTE — ASSESSMENT & PLAN NOTE
· Levophed restarted on 6/1, vasopressin restarted on 6/3  · Currently Levo 5, vasopressin off since 6/4  · Wean for MAP goal >65  · Midodrine added 5 mg TID on 6/1 - unable to receive secondary to no enteral access

## 2022-06-05 NOTE — PROGRESS NOTES
Progress Note - General Surgery   Fady Hudson 80 y o  male MRN: [id number]  Unit/Bed#: ICU 02 Encounter: [id number]    Assessment:  89 y/o male presenting with recto-sigmoid perforation during colonoscopy  s/p exploratory laparotomy, low anterior resection, transverse loop colostomy on 5/11     · Acute hypoxic respiratory failure, with PEA arrest -- intubated 5/21, oxygen requirements increasing, PEEP 10, FiO2 90%  · CHF  · Anemia -- has needed at least 4 units PRBC transfusions during this admission  · Acute renal failure -- still on CVVH  · Peristomal hernia -- stable, reducible  · Bilateral pleural effusions and ARDS  · Severe sepsis -- back on Zosyn day 3, WBC count 15 > 21 > 14 > 18        Plan:   Patient's worsening condition could possibly be related to his ARDS and moderate pleural effusions, however it is concerning that patient only had 5cc ostomy output over the past 24 hours without any gas in the bag either  Patient is not being enterally fed at this time due to inability to place NG/OG tube at bedside  Bilateral thoracentesis per IR is scheduled for today  CT scan without any oral contrast may have low yield at this point, ideally patient would have enteric tube placed by IR so oral contrast can be given for higher yield imaging  Subjective/Objective     Subjective:  intubated     Objective:       Blood pressure (!) 109/41, pulse 69, temperature 98 96 °F (37 2 °C), resp  rate (!) 0, height 5' 4" (1 626 m), weight 76 3 kg (168 lb 3 4 oz), SpO2 95 %  ,Body mass index is 28 87 kg/m²        Intake/Output Summary (Last 24 hours) at 6/5/2022 0920  Last data filed at 6/5/2022 0800  Gross per 24 hour   Intake 2236 6 ml   Output 4047 ml   Net -1810 4 ml       Invasive Devices  Report    Peripherally Inserted Central Catheter Line  Duration           PICC Line 71/30/76 Right Basilic 17 days          Arterial Line  Duration           Arterial Line 05/30/22 Radial 5 days          Hemodialysis Catheter Duration           HD Temporary Double Catheter 9 days          Drain  Duration           Colostomy LLQ 25 days    Urethral Catheter Double-lumen 16 Fr  15 days          Airway  Duration           ETT  Oral 14 days                Physical Exam: BP (!) 109/41   Pulse 69   Temp 98 96 °F (37 2 °C) (Esophageal)   Resp (!) 0   Ht 5' 4" (1 626 m)   Wt 76 3 kg (168 lb 3 4 oz)   SpO2 95%   BMI 28 87 kg/m²   General appearance: intubated, minimal reactions to painful stimuli, no response to verbal stimuli  Abdomen: soft, minimal distention, difficult to assess patient's reaction to deep palpation, no guarding, stoma is bright pink and healthy, midline wound is on veraflow VAC therapy    Lab, Imaging and other studies:  I have personally reviewed pertinent lab results    , CBC:   Lab Results   Component Value Date    WBC 18 30 (H) 06/05/2022    HGB 8 0 (L) 06/05/2022    HCT 24 4 (L) 06/05/2022    MCV 98 06/05/2022    PLT 86 (L) 06/05/2022    MCH 32 3 06/05/2022    MCHC 32 8 06/05/2022    RDW 17 3 (H) 06/05/2022    MPV 12 7 06/05/2022    NRBC 2 06/05/2022   , CMP:   Lab Results   Component Value Date    SODIUM 138 06/05/2022    K 4 5 06/05/2022     06/05/2022    CO2 24 06/05/2022    BUN 20 06/05/2022    CREATININE 1 17 06/05/2022    CALCIUM 8 3 06/05/2022    EGFR 54 06/05/2022   , Coagulation: No results found for: PT, INR, APTT  VTE Pharmacologic Prophylaxis: Reason for no pharmacologic prophylaxis: anemia  VTE Mechanical Prophylaxis: sequential compression device

## 2022-06-05 NOTE — ASSESSMENT & PLAN NOTE
· Hemoglobin drop post cardiac arrest    · Noted to have gross hematuria but this has since resolved  · 5/21: 1 u PRBC  · 5/24: 1 u PRBC  · CT obtained on 5/24 and negative for any overt signs of bleeding  · 5/26: 1 u PRBC   · 5/30: 1 u PRBC  · 6/4: 1 U PRBC   · Hgb stable 8 0 this AM   · Continue to monitor and transfuse for hgb less than 7

## 2022-06-05 NOTE — ASSESSMENT & PLAN NOTE
· Secondary to hypoperfusion mehul cardiac arrest +/- ATN  · Baseline creat 0 8  · Creatinine peaked at 3 07  · CVVH started on 5/26 and stopped on 6/1  · Restarted on 6/2 after he was anuric with increasing oxygen requirements  · Continue CVVH with goal negative   · Tolerating - 100 mL/hr   · Decrease if vasopressors continue to rise   · Avoid hypotension/hypoperfusion  · Continue levophed for MAP > 65

## 2022-06-05 NOTE — ASSESSMENT & PLAN NOTE
· Outpatient EGD and colonoscopy at [facility] on 5/11 for w/u of chronic anemia, history of colon polyps, intermittent LLQ abdominal pain and possible intermittent intussusception  · EGD unremarkable, colonoscopy technically difficult and required change from adult scope to pediatric scope, during traversal of the sigmoid colon there was a kink and patient sustained a perforation  · Transferred to [facility] for emergent surgery   · Emergent ex- lap on 5/11 > low anterior resection, protective loop transverse colostomy, flex sigmoidoscopy, and segmental small bowel resection  · Difficult post op course with post op ileus requiring NGT decompression and requirement of short duration of TPN   · 5/22: CT: Diffuse mild dilation of small bowel segments identified without transition point  · 5/24: CT CAP- Distended small bowel loops with scattered air-fluid levels consistent with early/partial small bowel obstruction with transition at the small bowel anastomosis in the region of the ventral pelvis as above  No free air  Cholelithiasis without cholecystitis  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    · Pending IR NG/OG tube placement for enteral access - multiple unsuccessful attempts bedside   · Currently not stable for transport for procedure   · 6/4-/6/5 significantly decreased ostomy output (5 cc total) with complaints of abdominal pain   · Discuss with surgery, consider CT scan abdomen/pelvis

## 2022-06-05 NOTE — ASSESSMENT & PLAN NOTE
· Patient previously in ICU for hypoxia with a max of 15L midflow and concern for aspiration pneumonitis  · Transfered to general medical floor 5/21  · 5/21: PEA arrest x2, intubation  · 5/24 CT CAP-CHF with moderate basilar effusions and additional upper lung zone patchy airspace opacities likely reflecting asymmetric pulmonary edema  Infiltrate is not entirely excluded  · 5/29 Bronchoscopy for mucous plugging  · Day # 16 on ventilator: AC/PC 20/24/90/10  · Wean Fio2 as able for SPO2>90%  · PF ratio 65 - in severe ARDS  · Continue pulmonary hygiene/ VAP bundle  · Continue volume removal with CVVH  · Unstable and ventilator requirements too high at this time to consider trach/PEG  · Moderate BL effusions on 5/24 CT scan, unable to evaluate on CXR - IR to attempt thoracentesis bedside today   · Noted for bilateral pleural effusions; will obtain IR assessment for thoracentesis

## 2022-06-05 NOTE — ASSESSMENT & PLAN NOTE
· Patient was found unresponsive and hypoxic approximately 20 minutes after being seen well with family 5/21   · PEA arrest x 2  · Most likely respiratory driven  · Neurologically intact post arrest   · Continue telemetry monitoring

## 2022-06-05 NOTE — ASSESSMENT & PLAN NOTE
· Likely in setting of acute illness/delirium and cardiac arrest  · Delirium precautions; regulate sleep/wake cycle, environmental controls, daily CAM ICU   · Trend neuro exam  · Following commands, nodding appropriately to questions, moves all extremities  · Precedex 0 8 for sedation with PRN dilaudid for pain

## 2022-06-05 NOTE — RESPIRATORY THERAPY NOTE
RT Ventilator Management Note  Emi Cook 80 y o  male MRN: [id number]  Unit/Bed#: ICU 02 Encounter: [id number]      Daily Screen       6/4/2022  0757 6/5/2022  0832          Patient safety screen outcome[de-identified] Failed Failed      Not Ready for Weaning due to[de-identified] PEEP > 8cmH2O;FiO2 >60%; Underline problem not resolved PEEP > 8cmH2O;FiO2 >60%      Spont breathing trial % for 30 min: -- No              Physical Exam:   Assessment Type: Assess only  General Appearance: Awake, Eyes open/responds to voice, Eyes open/responds to stimulus  Respiratory Pattern: Assisted  Chest Assessment: Chest expansion symmetrical  Bilateral Breath Sounds: Coarse  Location Specific: No  Cough: None  Suction: Oral, ET Tube  O2 Device: ventilator  Subjective Data: wrist restraints secure      Resp Comments: pt on ventilator

## 2022-06-06 NOTE — ASSESSMENT & PLAN NOTE
· Patient previously in ICU for hypoxia with a max of 15L midflow and concern for aspiration pneumonitis  · Transfered to general medical floor 5/21  · 5/21: PEA arrest x2, intubation  · 5/24 CT CAP-CHF with moderate basilar effusions and additional upper lung zone patchy airspace opacities likely reflecting asymmetric pulmonary edema  Infiltrate is not entirely excluded     · 5/29 Bronchoscopy for mucous plugging  · Day # 17 on ventilator: AC/PC 20/24/80/10  · Wean Fio2 as able for SPO2>90%  · PF ratio 65 - in severe ARDS  · Continue pulmonary hygiene/ VAP bundle  · Continue volume removal with CVVH  · Unstable and ventilator requirements too high at this time to consider trach/PEG  · Moderate BL effusions on 5/24 CT scan, unable to evaluate on CXR   · IR consulted for possible thoracentesis - but procedure was not attempted per IR due to the thought that the effusions were getting smaller

## 2022-06-06 NOTE — CONSULTS
e-Consult (IPC)  - Interventional Radiology  Madhu Jewell 80 y o  male MRN: [id number]  Unit/Bed#: ICU 02 Encounter: [id number]    Interventional Radiology has been consulted to evaluate Madhu Jewell    We were consulted by Miya Mueller concerning this patient with shortness of breath and need for tube feeding  IP Consult to IR  Consult performed by: Hoda Tamayo MD  Consult ordered by: ISELA Lugo        06/06/22    Assessment/Recommendation:   Patient with recurrent bilateral pleural effusions for bilateral thoracentesis today in IR  Patient also in need of tube feeding and IR placed a previous NG tube due to difficulty placing the tube past the gastro esophageal junction  Instead of placing another OG, IR can place a percutaneous Gastrostomy tube instead of an OG feeding tube since the patient is coming to IR for the bilateral thoracentesis and OG  Instead of placing the OG, we can place a gastrostomy tube  I discussed this with the ICU team and they were agreeable as was the surgical team   I will consent the family and we will do the thoracentesis and Gastrostomy tube later today in IR  Total time spent in review of data, discussion with requesting provider and rendering advice was 30 minutes  Thank you for allowing Interventional Radiology to participate in the care of Fady Hudson  Please don't hesitate to call or TigerText us with any questions       Hoda Tamayo MD

## 2022-06-06 NOTE — ASSESSMENT & PLAN NOTE
· Outpatient EGD and colonoscopy at [facility] on 5/11 for w/u of chronic anemia, history of colon polyps, intermittent LLQ abdominal pain and possible intermittent intussusception  · EGD unremarkable, colonoscopy technically difficult and required change from adult scope to pediatric scope, during traversal of the sigmoid colon there was a kink and patient sustained a perforation  · Transferred to [facility] for emergent surgery   · Emergent ex- lap on 5/11 > low anterior resection, protective loop transverse colostomy, flex sigmoidoscopy, and segmental small bowel resection  · Difficult post op course with post op ileus requiring NGT decompression and requirement of short duration of TPN   · 5/22: CT: Diffuse mild dilation of small bowel segments identified without transition point  · 5/24: CT CAP- Distended small bowel loops with scattered air-fluid levels consistent with early/partial small bowel obstruction with transition at the small bowel anastomosis in the region of the ventral pelvis as above  No free air  Cholelithiasis without cholecystitis  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    · Pending IR NG/OG tube placement for enteral access - multiple unsuccessful attempts bedside   · Currently not stable for transport for procedure   · significantly decreased ostomy output (5 cc total) with complaints of abdominal pain   · Discuss with surgery, consider CT scan abdomen/pelvis

## 2022-06-06 NOTE — ASSESSMENT & PLAN NOTE
Wt Readings from Last 3 Encounters:   06/06/22 77 6 kg (171 lb 1 2 oz)   05/11/22 62 1 kg (136 lb 14 4 oz)   03/25/22 61 2 kg (135 lb)     · 5/16: Echo-EF 65%, mild TR, mild MR  · 5/23: Repeat Echo post cardiac arrest: EF 60-65%, G1DD, mild AS (BRADY 1 5cm2), mild MR, mild TR  · Monitor I&O, Daily weights   · Limited ECHO-EF 65%, G1DD, mild AS  · Aggressive volume removal with CRRT, goal -100 mL/hr as tolerated

## 2022-06-06 NOTE — BRIEF OP NOTE (RAD/CATH)
INTERVENTIONAL RADIOLOGY PROCEDURE NOTE    Date: 6/6/2022    Procedure: IR GASTROSTOMY TUBE PLACEMENT     Preoperative diagnosis:   1  Bowel perforation (HCC)    2  Abdominal pain    3  Hypoxia    4  Pneumonia of both lungs due to infectious organism, unspecified part of lung    5  Severe sepsis ([facility] [address] )    6  Acute respiratory failure with hypoxia (HCC)    7  Hypokalemia    8  Cardiac arrest ([facility] [address] )    9  MARYELLEN (acute kidney injury) ([facility] [address] )       Postoperative diagnosis: Same  Surgeon: Jayjay Berg MD     Assistant: None  No qualified resident was available  Blood loss: minimal    Specimens: none    Findings: Successful fluoroscopically guided placement of a percutaneous gastrostomy tube  Plan:  May use immediately  Complications: None immediate      Anesthesia: Local

## 2022-06-06 NOTE — RESPIRATORY THERAPY NOTE
Patient found on PC vent settings  See flowsheet for exact settings  He is overbreathing the set rate but otherwise sync with the vent  Will monitor  1334  PC setting increased per Dr Alex Pollack  1640  Patient transported to and from IR on transport vent  No complications  VSS

## 2022-06-06 NOTE — PROGRESS NOTES
Pastoral Care Progress Note    2022  Patient: Gurpreet Rowan : 2/15/1931  Admission Date & Time: 2022 1648  MRN: [id number] CSN: [id number]                     Chaplaincy Interventions Utilized:   Relationship Building: Cultivated a relationship of care and support  Offered general emotional support to the patient's son  He told me the story of how the patient came to the [address] [place] [address],[address] from [place] and met his mother  I offered a bedside prayer for the patient as he was sleeping     Ritual: Provided prayer   22 1400   Clinical Encounter Type   Visited With Patient and family together   Routine Visit Follow-up   Amish Encounters   Amish Needs Prayer

## 2022-06-06 NOTE — PHYSICAL THERAPY NOTE
Chronic smoker with history of COPD does not appear to be on home inhalers.  He is on home oxygen but says he only uses it as needed  [facility]  Still having significant wheezing on exam  Methylprednisolone, duo nebs, Symbicort  Tobacco cessation counseling   PHYSICAL THERAPY     06/06/22 1130   Note Type   Note Type Cancelled Session   Cancel Reasons Medical status  (Canceled as per nursing, G-tube placement today)   Licensure   [place] License Number  Lindsey Sang PT [id number]

## 2022-06-06 NOTE — QUICK NOTE
Patient will be transported to IR later this afternoon for B/L thoracentesis  Since the patient has been without an OGT since Friday, it was also discussed with IR to place a new one  After discussion with IR, ICU and surgery attendings, it was agreed upon to place a G-tube  Explained plan/procedure to the son Mariano Lynn in detail who was in agreement

## 2022-06-06 NOTE — ASSESSMENT & PLAN NOTE
Peritonitis with intra-abdominal/pelvic abscess secondary to colonic perforation    · 5/22 CT chest/ab/pelvis with concern of multifocal PNA, no visualized intraabdominal abscess or anastomotic leak   · Per ID suspect multifocal pneumonitis   · OR culture 5/21 pseudomonas and bacteroides fragilis  · Blood cultures on 5/22 negative   · Completed 14 days of Flagyl on 5/24  · Completed 14 days of cefepime on 5/27  · Restarted Zosyn on 6/3 for increasing WBC and oxygen requirements   · Zosyn day 4  · ID following, appreciate recommendations

## 2022-06-06 NOTE — PROGRESS NOTES
Progress Note - Infectious Disease   Fady Hudson 80 y o  male MRN: [id number]  Unit/Bed#: ICU 02 Encounter: [id number]      Impression/Plan:  1  s/p cardiac arrest 5/21/22  Patient intubated during RR  In ICU on ventilator assistance  Recurrent SIRS possibly reactive to arrest with fever, tachycardia, tachypnea, and increased leukocytosis post arrest  Possible recurrent aspiration event s/p arrest  Fever down trended   Patient continues to have hypotension episodes that appear to be related to volume status, patient back on Levophed and vasopressin  -continue supportive measures per critical care team  -cardiology on board  -ventilator management per critical care team     2  SIRS vs Severe Sepsis, evolving on admission, post #1 and with persistent hypotension support needs last week   Evidenced by tachycardia, tachypnea, leukocytosis, hypotension   Suspect possible aspiration and/or volume overload in setting of difficulty adjusting orogastric tube and tube feeds on hold since 06/04/2022 subsequently and bilateral effusions on 06/06/2022 portable chest x-ray   MRSA screen negative  [address]  5/22/22 Blood cultures have no growth   Patient completed 2 week antibiotic course 5/27/22 06/03/2022 blood cultures x2 negative at 48 hours   Patient remains on blood pressure support with on Levophed and vasopressin  On Zosyn day 4  -continue Zosyn  -follow-up repeat blood cultures  -IR consulted for bilateral thoracentesis  -anticipate peg tube to follow  -thereafter, planned for CT chest, abdomen, and pelvis    -monitor temperature and hemodynamics  -serial exam  -monitor CBC and BMP   -pressor support per Critical Medicine Service     3  Peritonitis s/p Bowel perforation  s/p 5/11/22 exploratory laparotomy, low anterior resection, protective loop transverse colostomy and segmental small bowel resection secondary to perforation rectosigmoid junction after colonoscopy   OR cultures grew Pseudomonas aeruginosa and Bacteroides fragilis   Patient completed antibiotic course 5/27/22  Tube feeds on hold with tube adjustment difficulties  -continue Zosyn at present as above  -VAC/wound care per surgery  -anticipate peg tube to follow  -thereafter, planned for CT chest, abdomen, and pelvis      4   Acute hypoxic respiratory failure with hypoxia   Suspicious for aspiration pneumonitis over pneumonia     5/24/22 CT C/A/P predominantly UL airspace opacities consistent with CHF with bilateral pleural effusions, distended small bowel loops  Patient remains intubated s/p #1  5/16/22 Procalcitonin level  2 60 > 5/21 0 753 < 5/23/22 2 97 > 5/30 1 26  Patient is status post bronchoscopy 5/29/22 for RUL collapse  5/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  -observe off further antibiotic  -ventilator management per critical care team      5  Aspiration pneumonitis versus pneumonia in setting of #1/3   5/22/22 CT C/A/P predominantly UL groundglass and consolidations, bilateral pleural effusions, SB mild dilation unchanged  Patient now intubated s/p #1  5/16/22 Procalcitonin level  2 60 > 5/21 0 753 < 5/23/22 2 97 > 5/30 1 26  5/17/22 sputum specimen oral pharyngeal contamination  06/06/2022 portable chest x-ray with bilateral pleural effusions  -continue Zosyn as above  -for IR thoracentesis x2 today  -secretion and pulmonary toilet management per critical care team   -monitor respiratory symptoms  -monitor vent requirements     6  MARYELLEN on CKD 3  Creatinine 1 5   CVVH on hold today  -renal dose adjust medications as needed  -volume management per Nephrology  -CVVH management per Nephrology  -monitor creatinine and urinary output     Antibiotics:  Zosyn D4     Above impression and plan discussed in detail with patient's RN and critical care team      Subjective:  Patient had temp to 101 3 F 6/5/22, no chills, sweats reported on ventilator in ICU s/p cardiac arrest 5/21/22; no nausea, vomiting, diarrhea reported  CVVH started 22, on hold today  Remains on blood pressure support with Levophed and vasopressin  Objective:  Vitals:  Temp:  [95 9 °F (35 5 °C)-101 3 °F (38 5 °C)] 97 7 °F (36 5 °C)  HR:  [59-92] 63  Resp:  [10-40] 24  SpO2:  [91 %-99 %] 98 %  Temp (24hrs), Av 8 °F (37 1 °C), Min:95 9 °F (35 5 °C), Max:101 3 °F (38 5 °C)  Current: Temperature: 97 7 °F (36 5 °C)    Physical Exam:   General Appearance:  80year-old acute on chronically debilitated, elderly male, sedated on ventilator at FiO2 of 85%, propped in ICU bed, arms propped on pillows  Throat: Oropharynx moist without lesions  ET tube to vent, OG tube feeds on hold since Friday  Lungs:   Fairly clear ventilated breath sounds to auscultation bilaterally; scant blood-tinged secretions and suction tubing, canister empty at present   Heart:  RRR; no murmur   Abdomen:   Soft, no focal tenderness on palpation, midline abdominal wound VAC to wound irrigation system with moderate serous output in canister, wound edges without erythema, left lower quadrant ostomy with soft brown stool in bag, soft, positive bowel sounds  Extremities: Generalized puffy edema, venodynes and Prevalon boots in place   : [place] with clear, yellow urine in bag, no SPT, 3+ soft, scrotal edema   Skin: No new rashes or lesions  Right arm PICC and right neck IJ CVP lines in place    CVVH on hold at present       Labs, Imaging, & Other studies:   All pertinent labs and imaging studies were personally reviewed  Results from last 7 days   Lab Units 22  0749 22  0555 22  1420 22  0554   WBC Thousand/uL 17 92* 18 30*  --  14 58*   HEMOGLOBIN g/dL 7 8* 8 0* 8 1* 6 9*   PLATELETS Thousands/uL 49* 86*  --  99*     Results from last 7 days   Lab Units 22  0749 22  0010 22  0555 22  2204 22  0527   SODIUM mmol/L 138 139 138   < > 137   POTASSIUM mmol/L 4 8 4 7 4 5   < > 4 5   CHLORIDE mmol/L 103 105 105   < > 103   CO2 mmol/L 23 23 24   < > 26   BUN mg/dL 26* 28* 20   < > 29*   CREATININE mg/dL 1 51* 1 54* 1 17   < > 1 56*   EGFR ml/min/1 73sq m 39 38 54   < > 38   CALCIUM mg/dL 8 2* 8 1* 8 3   < > 8 6   AST U/L  --   --   --   --  26   ALT U/L  --   --   --   --  20   ALK PHOS U/L  --   --   --   --  445*    < > = values in this interval not displayed  Results from last 7 days   Lab Units 06/03/22  1528 05/30/22  1828   BLOOD CULTURE  No Growth at 48 hrs  No Growth at 48 hrs    --    SPUTUM CULTURE   --  1+ Growth of Candida sp  presumptively albicans*   GRAM STAIN RESULT   --  Rare Polys  No organisms seen

## 2022-06-06 NOTE — ASSESSMENT & PLAN NOTE
· Levophed restarted on 6/1, vasopressin restarted on 6/3  · Wean for MAP goal >65  · Midodrine added 5 mg TID on 6/1 - unable to receive secondary to no enteral access

## 2022-06-06 NOTE — CASE MANAGEMENT
Case Management Progress Note    Patient name Gurpreet Rowan  Location ICU 02/ICU 02 MRN [id number]  : 2/15/1931 Date 2022       LOS (days): 26  Geometric Mean LOS (GMLOS) (days): 6 80  Days to GMLOS:-19 2        OBJECTIVE:     Current admission status: Inpatient  Preferred Pharmacy:   [id number]- [place], [address] [facility]  [address] [address]  Phone: [phone] Fax: [phone]    [id number]- [place], [address] [place]  [address] [facility],  [address]  [place] [address]  Phone: [phone] Fax: [phone]    Primary Care Provider: Prosper Tello MD    Primary Insurance: MEDICARE  [facility]    PROGRESS NOTE:    Patient continues in ICU for treatment  Yesterday, patient's family agreed to change pt to Level 2-DNAR  Pt continues to require (2) low dose vasopressor supports  CVVHD has been discontinued  Will monitor and possibly start IHD tomorrow  ID has restarted IVABX on 6/3 due to increasing WBC and O2 requirements  IR completed b/l thoracentesis and G-tube placement today  Per Attending, unable to move forward with trach placement as pt still requiring PEEP of 10 and high FIO2 %

## 2022-06-06 NOTE — PROGRESS NOTES
Tami 45  Progress Note - Ronal Hudson 2/15/1931, 80 y o  male MRN: [id number]  Unit/Bed#: ICU 02 Encounter: [id number]  Primary Care Provider: Iris Metzger MD   Date and time admitted to hospital: 5/11/2022  4:48 PM    * Bowel perforation [facility])  Assessment & Plan  · Outpatient EGD and colonoscopy at [facility] on 5/11 for w/u of chronic anemia, history of colon polyps, intermittent LLQ abdominal pain and possible intermittent intussusception  · EGD unremarkable, colonoscopy technically difficult and required change from adult scope to pediatric scope, during traversal of the sigmoid colon there was a kink and patient sustained a perforation  · Transferred to hospitals for emergent surgery   · Emergent ex- lap on 5/11 > low anterior resection, protective loop transverse colostomy, flex sigmoidoscopy, and segmental small bowel resection  · Difficult post op course with post op ileus requiring NGT decompression and requirement of short duration of TPN   · 5/22: CT: Diffuse mild dilation of small bowel segments identified without transition point  · 5/24: CT CAP- Distended small bowel loops with scattered air-fluid levels consistent with early/partial small bowel obstruction with transition at the small bowel anastomosis in the region of the ventral pelvis as above  No free air  Cholelithiasis without cholecystitis  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    · Pending IR NG/OG tube placement for enteral access - multiple unsuccessful attempts bedside   · Currently not stable for transport for procedure   · significantly decreased ostomy output (5 cc total) with complaints of abdominal pain   · Discuss with surgery, consider CT scan abdomen/pelvis     Acute respiratory failure with hypoxia ([address] )  Assessment & Plan  · Patient previously in ICU for hypoxia with a max of 15L midflow and concern for aspiration pneumonitis  · Transfered to general medical floor 5/21  · 5/21: PEA arrest x2, intubation  · 5/24 CT CAP-CHF with moderate basilar effusions and additional upper lung zone patchy airspace opacities likely reflecting asymmetric pulmonary edema  Infiltrate is not entirely excluded     · 5/29 Bronchoscopy for mucous plugging  · Day # 17 on ventilator: AC/PC 20/24/80/10  · Wean Fio2 as able for SPO2>90%  · PF ratio 65 - in severe ARDS  · Continue pulmonary hygiene/ VAP bundle  · Continue volume removal with CVVH  · Unstable and ventilator requirements too high at this time to consider trach/PEG  · Moderate BL effusions on 5/24 CT scan, unable to evaluate on CXR   · IR consulted for possible thoracentesis - but procedure was not attempted per IR due to the thought that the effusions were getting smaller     Acute renal failure (ARF) ([facility] [address] )  Assessment & Plan  · Secondary to hypoperfusion mehul cardiac arrest +/- ATN  · Baseline creat 0 8  · Creatinine peaked at 3 07  · CVVH started on 5/26 and stopped on 6/1  · Restarted on 6/2 after he was anuric with increasing oxygen requirements  · Continue CVVH with goal negative   · Tolerating fluid removal   · Decrease if vasopressors continue to rise   · Avoid hypotension/hypoperfusion  · Continue levophed for MAP > 65    Severe sepsis ([address] )  Assessment & Plan  Peritonitis with intra-abdominal/pelvic abscess secondary to colonic perforation    · 5/22 CT chest/ab/pelvis with concern of multifocal PNA, no visualized intraabdominal abscess or anastomotic leak   · Per ID suspect multifocal pneumonitis   · OR culture 5/21 pseudomonas and bacteroides fragilis  · Blood cultures on 5/22 negative   · Completed 14 days of Flagyl on 5/24  · Completed 14 days of cefepime on 5/27  · Restarted Zosyn on 6/3 for increasing WBC and oxygen requirements   · Zosyn day 4  · ID following, appreciate recommendations     Cardiac arrest [facility])  Assessment & Plan  · Patient was found unresponsive and hypoxic approximately 20 minutes after being seen well with family 5/21   · PEA arrest x 2  · Most likely respiratory driven  · Neurologically intact post arrest   · Continue telemetry monitoring     CHF (congestive heart failure) ([facility] [address] )  Assessment & Plan  Wt Readings from Last 3 Encounters:   06/06/22 77 6 kg (171 lb 1 2 oz)   05/11/22 62 1 kg (136 lb 14 4 oz)   03/25/22 61 2 kg (135 lb)     · 5/16: Echo-EF 65%, mild TR, mild MR  · 5/23: Repeat Echo post cardiac arrest: EF 60-65%, G1DD, mild AS (BRADY 1 5cm2), mild MR, mild TR  · Monitor I&O, Daily weights   · Limited ECHO-EF 65%, G1DD, mild AS  · Aggressive volume removal with CRRT, goal -100 mL/hr as tolerated       Hyperglycemia  Assessment & Plan  · A1c 5 3%  · Continue SSI - minimal requirements     Toxic metabolic encephalopathy  Assessment & Plan  · Likely in setting of acute illness/delirium and cardiac arrest  · Delirium precautions; regulate sleep/wake cycle, environmental controls, daily CAM ICU   · Trend neuro exam  · Following commands, nodding appropriately to questions, moves all extremities      Anemia  Assessment & Plan  · Hemoglobin drop post cardiac arrest    · Noted to have gross hematuria but this has since resolved  · 5/21: 1 u PRBC  · 5/24: 1 u PRBC  · CT obtained on 5/24 and negative for any overt signs of bleeding  · 5/26: 1 u PRBC   · 5/30: 1 u PRBC  · 6/4: 1 U PRBC   · Hgb stable 8 0 this AM   · Continue to monitor and transfuse for hgb less than 7      Acute on chronic anemia  Assessment & Plan  · As above     Hypotension  Assessment & Plan  · Levophed restarted on 6/1, vasopressin restarted on 6/3  · Wean for MAP goal >65  · Midodrine added 5 mg TID on 6/1 - unable to receive secondary to no enteral access ----------------------------------------------------------------------------------------  HPI/24hr events: Patient remains on mechanical ventilation on levophed and vasopressin infusions  He continues on CVVH with negative 50 ml fluid removal   He does not have a OGT due to multiple unsuccessful attempts  His ostomy is producing minimal to no stool  Patient appropriate for transfer out of the ICU today?: No  Disposition: Continue Critical Care   Code Status: Level 2 - DNAR: but accepts endotracheal intubation  ---------------------------------------------------------------------------------------  SUBJECTIVE  Intubated     Review of Systems   Unable to perform ROS: Intubated     Review of systems was unable to be performed secondary to intubation   ---------------------------------------------------------------------------------------  OBJECTIVE    Vitals   Vitals:    22 0600 22 0615 22 0630 22 0645   BP:       Pulse: 62 67 64 59   Resp: (!) 25 (!) 10 (!) 33 (!) 32   Temp:       TempSrc:       SpO2: 96% 95% 97% 97%   Weight:       Height:         Temp (24hrs), Av °F (37 2 °C), Min:95 9 °F (35 5 °C), Max:101 3 °F (38 5 °C)  Current: Temperature: (!) 96 9 °F (36 1 °C)  Arterial Line BP: 136/43  Arterial Line MAP (mmHg): 74 mmHg    Respiratory:  SpO2: SpO2: 97 %  Nasal Cannula O2 Flow Rate (L/min): 5 L/min    Invasive/non-invasive ventilation settings   Respiratory  Report   Lab Data (Last 4 hours)    None         O2/Vent Data (Last 4 hours)    None                Physical Exam  Vitals and nursing note reviewed  Constitutional:       Appearance: He is ill-appearing and toxic-appearing  Comments: B/L wrist restraints    HENT:      Head: Normocephalic and atraumatic        Right Ear: Tympanic membrane, ear canal and external ear normal       Left Ear: Tympanic membrane, ear canal and external ear normal       Nose: Nose normal       Mouth/Throat:      Mouth: Mucous membranes are dry  Pharynx: Oropharynx is clear  Eyes:      Extraocular Movements: Extraocular movements intact  Conjunctiva/sclera: Conjunctivae normal       Comments: Bilateral pupils equal, and reactive but sluggish    Cardiovascular:      Rate and Rhythm: Regular rhythm  Bradycardia present  Pulses: Normal pulses  Heart sounds: Normal heart sounds  Pulmonary:      Comments: ETT on mechanical ventilation   B/L breath sounds coarse   Abdominal:      Comments: Bowel sounds diminished   Ostomy    Genitourinary:     Comments: Urinary catheter   Musculoskeletal:      Cervical back: Normal range of motion and neck supple  Comments: Generalized trace edema   Skin:     Capillary Refill: Capillary refill takes less than 2 seconds  Neurological:      Comments:  Withdraws to painful stimuli  Does not follow commands    Psychiatric:      Comments: Non verbal - intubated              Laboratory and Diagnostics:  Results from last 7 days   Lab Units 06/05/22  0555 06/04/22  1420 06/04/22  0554 06/03/22  0604 06/02/22  0527 06/01/22  0520 05/31/22  0533 05/30/22  1217   WBC Thousand/uL 18 30*  --  14 58* 21 58* 15 57* 18 82* 15 28*  --    HEMOGLOBIN g/dL 8 0* 8 1* 6 9* 7 3* 7 3* 7 9* 7 4* 6 5*   HEMATOCRIT % 24 4*  --  21 0* 22 2* 22 3* 23 6* 21 8* 19 6*   PLATELETS Thousands/uL 86*  --  99* 133* 147* 116* 116*  --    NEUTROS PCT % 79*  --   --   --   --   --   --   --    BANDS PCT %  --   --   --  23*  --   --  22*  --    MONOS PCT % 7  --   --   --   --   --   --   --    MONO PCT %  --   --   --  6  --   --  3*  --      Results from last 7 days   Lab Units 06/06/22  0010 06/05/22  0555 06/04/22 2203 06/04/22  1420 06/04/22  0554 06/03/22  2213 06/03/22  1308 06/02/22  2204 06/02/22  0527   SODIUM mmol/L 139 138 137 137 138 137 136   < > 137   POTASSIUM mmol/L 4 7 4 5 4 6 4 8 5 0 5 0 4 9   < > 4 5   CHLORIDE mmol/L 105 105 104 103 104 103 103   < > 103   CO2 mmol/L 23 24 24 25 26 26 26   < > 26 ANION GAP mmol/L 11 9 9 9 8 8 7   < > 8   BUN mg/dL 28* 20 20 20 23 22 24   < > 29*   CREATININE mg/dL 1 54* 1 17 1 12 1 09 1 15 1 10 1 10   < > 1 56*   CALCIUM mg/dL 8 1* 8 3 8 4 8 3 8 5 8 4 8 3   < > 8 6   GLUCOSE RANDOM mg/dL 109 79 88 99 106 108 130   < > 147*   ALT U/L  --   --   --   --   --   --   --   --  20   AST U/L  --   --   --   --   --   --   --   --  26   ALK PHOS U/L  --   --   --   --   --   --   --   --  445*   ALBUMIN g/dL  --   --   --   --   --   --   --   --  3 2*   TOTAL BILIRUBIN mg/dL  --   --   --   --   --   --   --   --  1 06*    < > = values in this interval not displayed  Results from last 7 days   Lab Units 06/06/22  0010 06/05/22  0555 06/04/22  2203 06/04/22  1420 06/04/22  0554 06/03/22  2213 06/03/22  1308   MAGNESIUM mg/dL 2 2 1 9 2 1 1 9 2 2 1 9 2 0   PHOSPHORUS mg/dL 3 1 2 7 2 7 3 3 3 4 3 2 3 1               Results from last 7 days   Lab Units 06/03/22  1021   LACTIC ACID mmol/L 0 8     ABG:  Results from last 7 days   Lab Units 06/05/22  0130   PH ART  7 347*   PCO2 ART mm Hg 43 3   PO2 ART mm Hg 58 8*   HCO3 ART mmol/L 23 2   BASE EXC ART mmol/L -2 4   ABG SOURCE  Line, Arterial     VBG:  Results from last 7 days   Lab Units 06/05/22  0130   ABG SOURCE  Line, Arterial           Micro  Results from last 7 days   Lab Units 06/03/22  1528 05/30/22  1828   BLOOD CULTURE  No Growth at 48 hrs  No Growth at 48 hrs  --    SPUTUM CULTURE   --  1+ Growth of Candida sp  presumptively albicans*   GRAM STAIN RESULT   --  Rare Polys  No organisms seen         Imaging: I have personally reviewed pertinent reports  EGD    Result Date: 5/11/2022  Impression: 1  Mild erythema in the antrum 2  Normal esophagus, normal GE junction and normal duodenum RECOMMENDATION: Await pathology results Follow up with me in clinic Anti-reflux diet   Ghada Sharma MD     Colonoscopy    Result Date: 5/11/2022  Impression: 1   Sharp kink in the sigmoid colon with stricture, scope was unable to pass  Ultra thin scope was use, small sigmoid polyp removed  2  Possible perforation in sigmoid colon RECOMMENDATION: Stat CT scan abdomen and pelvis Surgical consult, discuss case with Dr Harpal Cleaning MD     CT abdomen pelvis wo contrast    Result Date: 5/11/2022  Impression: Moderate pneumoperitoneum in keeping with bowel perforation  I personally discussed this study with DAVID Meléndezhouse on 5/11/2022 at 3:42 PM  Workstation performed: [id number]     XR chest portable    Result Date: 5/15/2022  Impression: Pulmonary edema and small bilateral pleural effusions  Workstation performed: [id number]     XR abdomen 1 view kub    Result Date: 5/16/2022  Impression: Diffusely distended, dilated small bowel loops with small amount of persistent air extending to the proximal colon  Findings may suggest diffuse ileus or partial obstruction  Workstation performed: [id number]     CTA chest pe study    Result Date: 5/15/2022  Impression: No pulmonary embolus  Bilateral upper lobe groundglass opacities compatible with pneumonia and/or pulmonary edema  Small bilateral pleural effusions  Workstation performed: [id number]     Intake and Output  I/O       06/04 0701  06/05 0700 06/05 0701  06/06 0700 06/06 0701  06/07 0700    I V  (mL/kg) 1608 6 (21 1) 1338 4 (17 2)     Blood 403      IV Piggyback 300 300     Total Intake(mL/kg) 2311 6 (30 3) 1638 4 (21 1)     Urine (mL/kg/hr) 57 (0) 92 (0)     Drains 60 100     Other 4104 1332     Stool 5 30     Total Output 4226 1554     Net -1914 4 +84 4                  Height and Weights   Height: 5' 4" (162 6 cm)  IBW (Ideal Body Weight): 59 2 kg  Body mass index is 29 37 kg/m²    Weight (last 2 days)     Date/Time Weight    06/06/22 0541 77 6 (171 08)    06/05/22 0600 76 3 (168 21)    06/04/22 0600 77 4 (170 64)            Nutrition       Diet Orders   (From admission, onward)             Start     Ordered    06/02/22 1041  Diet Enteral/Parenteral; Tube Feeding No Oral Diet; Osmolite 1 0; Continuous; 55; Prosource Protein Liquid - One Packet Daily; Demond - Two Packets Daily  Diet effective now        Comments: Demond - one packet with breakfast and dinner  Pro source - one packet with lunch   References:    Nutrtion Support Algorithm Enteral vs  Parenteral   Question Answer Comment   Diet Type Enteral/Parenteral    Enteral/Parenteral Tube Feeding No Oral Diet    Tube Feeding Formula: Osmolite 1 0    Bolus/Cyclic/Continuous Continuous    Tube Feeding Goal Rate (mL/hr): 55    Prosource Protein Liquid - No Carb Prosource Protein Liquid - One Packet Daily    Demond Barry Demond - Two Packets Daily    RD to adjust diet per protocol?  Yes        06/02/22 1040    05/12/22 0906  Room Service  Once        Question:  Type of Service  Answer:  Room Service - Appropriate with Assistance    05/12/22 0905                  Active Medications  Scheduled Meds:  Current Facility-Administered Medications   Medication Dose Route Frequency Provider Last Rate    acetaminophen  650 mg Oral Q6H PRN ISELA Stevenson      chlorhexidine  15 mL Mouth/Throat Q12H [facility] ISELA Stevenson      dexmedetomidine  0 1-1 5 mcg/kg/hr Intravenous Titrated Unk Gamma, CRNP 1 mcg/kg/hr (06/06/22 0410)    gabapentin  200 mg Oral HS ISELA Layton      heparin (porcine)  400 Units/hr Intravenous Continuous [employer], CRNP 400 Units/hr (06/05/22 1930)    HYDROmorphone  0 5 mg Intravenous Q3H PRN [employer], MACARIONP      insulin lispro  1-6 Units Subcutaneous Q6H [facility] ISELA Layton      iohexol  50 mL Oral Once in imaging ISELA Stevenson      ipratropium-albuterol  3 mL Nebulization Q6H PRN ISELA Stevenson      levothyroxine  112 mcg Per NG Tube Early Morning ISELA Stevenson      lidocaine  2 patch Topical Daily Karen Curtis PA-C      midodrine  5 mg Oral TID AC ISELA Layton      norepinephrine  1-30 mcg/min Intravenous Titrated ISELA Scott 5 mcg/min (06/06/22 0410)   Luba Kelley NxStage K 4/Ca 3  20,000 mL Dialysis Continuous Rodrick Boast, MD      ondansetron  4 mg Intravenous Q6H PRN ISELA Schneider      oxyCODONE  5 mg Per NG Tube Q4H PRN ISELA Gomez      Or    oxyCODONE  7 5 mg Per NG Tube Q4H PRN ISELA Gomez      pantoprazole  40 mg Intravenous Q24H [address] ISELA Schneider      piperacillin-tazobactam  3 375 g Intravenous Q6H Rochester Primas, PA-C 3 375 g (06/06/22 0410)    polyethylene glycol  17 g Oral Daily Josephine Dossie Axe, CRNP      vasopressin  0 04 Units/min Intravenous Continuous Josephine Dossie Axe, CRNP 0 04 Units/min (06/06/22 0228)     Continuous Infusions:  dexmedetomidine, 0 1-1 5 mcg/kg/hr, Last Rate: 1 mcg/kg/hr (06/06/22 0410)  heparin (porcine), 400 Units/hr, Last Rate: 400 Units/hr (06/05/22 1930)  norepinephrine, 1-30 mcg/min, Last Rate: 5 mcg/min (06/06/22 0410)  NxStage K 4/Ca 3, 20,000 mL  vasopressin, 0 04 Units/min, Last Rate: 0 04 Units/min (06/06/22 0228)      PRN Meds:   acetaminophen, 650 mg, Q6H PRN  HYDROmorphone, 0 5 mg, Q3H PRN  iohexol, 50 mL, Once in imaging  ipratropium-albuterol, 3 mL, Q6H PRN  ondansetron, 4 mg, Q6H PRN  oxyCODONE, 5 mg, Q4H PRN   Or  oxyCODONE, 7 5 mg, Q4H PRN        Invasive Devices Review  Invasive Devices  Report    Peripherally Inserted Central Catheter Line  Duration           PICC Line 19/81/61 Right Basilic 18 days          Arterial Line  Duration           Arterial Line 05/30/22 Radial 6 days          Hemodialysis Catheter  Duration           HD Temporary Double Catheter 10 days          Drain  Duration           Colostomy LLQ 26 days    Urethral Catheter Double-lumen 16 Fr  16 days    NG/OG/Enteral Tube Orogastric 14 Fr <1 day          Airway  Duration           ETT  Oral 15 days                ---------------------------------------------------------------------------------------  Care Time Delivered:   No Critical Care time spent       [employer], CRNP      Portions of the record may have been created with voice recognition software  Occasional wrong word or "sound a like" substitutions may have occurred due to the inherent limitations of voice recognition software    Read the chart carefully and recognize, using context, where substitutions have occurred

## 2022-06-06 NOTE — PROGRESS NOTES
Anto[facility] PROGRESS NOTE   Khari Best 80 y o  male MRN: [id number]  Unit/Bed#: ICU 02 Encounter: [id number]  Reason for Consult: MARYELLEN    ASSESSMENT and PLAN:  80year old with HTN, aortic stenosis, GERD, CAD who underwent EGD and C-scope on 7/05/33 which was complicated by perforation requiring emergent ex-lap  Since then, course has been complicated by PEA arrest on 5/21/22, VDRF, pneumonia, sepsis  Renal consulted for MARYELLEN    1  Acute kidney injury:  · Baseline creatinine is 0 7-0 9  · Etiology is ATN in the setting of hypotension cardiac arrest  · Peak creatinine 3 07 on May 26, 2022  · Started CRRT on May 26, 2022  · Currently remains oliguric without evidence of renal recovery  · Noted to have difficulty in maintaining CRRT circuit patency  · Will hold off on CRRT today since electrolytes are stable  · Will consider intermittent HD tomorrow with vasopressor support  2  Ventilator dependent respiratory failure:  · Currently FiO2 of 90% with PEEP of 10  · Defer to Critical Care  3  Hypotension:  · Continue norepinephrine and vasopressin for BP support  · Currently on midodrine 5 mg 3 times per day - consider increase to 10 mg     4  Sepsis:  · Currently on Zosyn  · ID following  5  Congestive heart failure:  · Appears compensated now  · S/p fluid removal with CVVHD  6  Anemia  7  PEA cardiac arrest on 5/21/22  8  Pneumoperitoneum s/p ex-lap with LAR on 5/11/22    DISPOSITION:  · Hold off on CVVHD today since electrolytes are stable  · Will plan for intermittent HD tomorrow with BP support  The above plan was discussed with crit care  SUBJECTIVE / 24H INTERVAL HISTORY:  Remains on the vent - FiO2 90% with PEEP of 10  On vasopressin and levophed during my exam    UO 12 cc overnight and 92 for 24 hours  CRRT circuit went down this AM due to clotting - filter lasted only 12 hours despite pre filter heparin       OBJECTIVE:  Current Weight: Weight - Scale: 77 6 kg (171 lb 1 2 oz)  Vitals:    06/06/22 0630 06/06/22 0645 06/06/22 0727 06/06/22 0800   BP:       Pulse: 64 59  64   Resp: (!) 33 (!) 32  (!) 31   Temp:    (!) 96 9 °F (36 1 °C)   TempSrc:    Temporal   SpO2: 97% 97% 95% 97%   Weight:       Height:           Intake/Output Summary (Last 24 hours) at 6/6/2022 0833  Last data filed at 6/6/2022 0700  Gross per 24 hour   Intake 1590 38 ml   Output 1556 ml   Net 34 38 ml     General: critically ill appearing on the mechanical ventilator, comfortable  Skin: warm, dry, senile turgor  Eyes: closed  ENT: ETT in place  Neck: supple  Chest/Lungs: equal chest expansion, coarse breath sounds  CVS: distinct heart sounds, normal rate, regular rhythm, no rub  Abdomen: soft  Extremities: (+) LE edema  : (+) quigley catheter  (+) scrotal swelling  Neuro: sedated on the mechanical ventilator  Psych: could not evaluate       Medications:    Current Facility-Administered Medications:     acetaminophen (TYLENOL) oral suspension 650 mg, 650 mg, Oral, Q6H PRN, MACARIO GormanNP, 650 mg at 06/05/22 1512    chlorhexidine (PERIDEX) 0 12 % oral rinse 15 mL, 15 mL, Mouth/Throat, Q12H [address], MACARIO GormanNP, 15 mL at 06/06/22 0825    dexmedeTOMIDine (Precedex) 400 mcg in sodium chloride 0 9% 100 mL, 0 1-1 5 mcg/kg/hr, Intravenous, Titrated, ISELA Garcia, Last Rate: 15 6 mL/hr at 06/06/22 0832, 0 8 mcg/kg/hr at 06/06/22 0832    gabapentin (NEURONTIN) oral solution 200 mg, 200 mg, Oral, HS, ISELA Ritter, 200 mg at 06/02/22 2129    heparin (porcine) 25,000 units in 0 45% NaCl 250 mL infusion (premix), 400 Units/hr, Intravenous, Continuous, Deneise MaxcyISELA, Stopped at 06/06/22 1759    HYDROmorphone (DILAUDID) injection 0 5 mg, 0 5 mg, Intravenous, Q3H PRN, Deneise Maxcy, CRNP, 0 5 mg at 06/05/22 1047    insulin lispro (HumaLOG) 100 units/mL subcutaneous injection 1-6 Units, 1-6 Units, Subcutaneous, Q6H [address], 1 Units at 06/05/22 0612 **AND** Fingerstick Glucose (POCT), , , Q6H, ISELA Rtiter    iohexol (OMNIPAQUE) 240 MG/ML solution 50 mL, 50 mL, Oral, Once in imaging, MACARIO StoddardNP    ipratropium-albuterol (DUO-NEB) 0 5-2 5 mg/3 mL inhalation solution 3 mL, 3 mL, Nebulization, Q6H PRN, Alvaro Cannono, CRNP, 3 mL at 06/04/22 0757    levothyroxine tablet 112 mcg, 112 mcg, Per NG Tube, Early Morning, Alvaro Kate, CRNP, 112 mcg at 06/03/22 0609    lidocaine (LIDODERM) 5 % patch 2 patch, 2 patch, Topical, Daily, Awais Little PA-C, 2 patch at 06/06/22 0825    midodrine (PROAMATINE) tablet 5 mg, 5 mg, Oral, TID AC, ISELA Ritter, 5 mg at 06/03/22 0609    norepinephrine (LEVOPHED) 8 mg (DOUBLE CONCENTRATION) IV in sodium chloride 0 9% 250 mL, 1-30 mcg/min, Intravenous, Titrated, ISELA Herrera, Last Rate: 7 5 mL/hr at 06/06/22 0751, 4 mcg/min at 06/06/22 0751    NxStage K 4/Ca 3 dialysis solution (RFP-401) 20,000 mL, 20,000 mL, Dialysis, Continuous, Libby Shelley MD, Stopped at 06/06/22 0742    ondansetron (ZOFRAN) injection 4 mg, 4 mg, Intravenous, Q6H PRN, ISELA Stoddard    oxyCODONE (ROXICODONE) oral solution 5 mg, 5 mg, Per NG Tube, Q4H PRN, 5 mg at 06/03/22 1804 **OR** oxyCODONE (ROXICODONE) oral solution 7 5 mg, 7 5 mg, Per NG Tube, Q4H PRN, ISELA Dickson    pantoprazole (PROTONIX) injection 40 mg, 40 mg, Intravenous, Q24H [address], Alvaroaugustine Love, CRNP, 40 mg at 06/06/22 0825    piperacillin-tazobactam (ZOSYN) 3 375 g in sodium chloride 0 9 % 100 mL IVPB, 3 375 g, Intravenous, Q6H, Tyler Guzman PA-C, Last Rate: 200 mL/hr at 06/06/22 0410, 3 375 g at 06/06/22 0410    polyethylene glycol (MIRALAX) packet 17 g, 17 g, Oral, Daily, ISELA Rittre, 17 g at 06/02/22 0947    vasopressin (PITRESSIN) 20 Units in sodium chloride 0 9 % 100 mL infusion, 0 04 Units/min, Intravenous, Continuous, ISELA Ritter, Last Rate: 12 mL/hr at 06/06/22 0228, 0 04 Units/min at 06/06/22 0228    Laboratory Results:  Results from last 7 days   Lab Units 06/06/22  0749 06/06/22  0010 06/05/22  0555 06/04/22  2203 06/04/22  1420 06/04/22  0554 06/03/22  2213 06/03/22  1308 06/03/22  0604 06/02/22  2204 06/02/22  0527 06/01/22  1704 06/01/22  0520 05/31/22  1158 05/31/22  0533   WBC Thousand/uL 17 92*  --  18 30*  --   --  14 58*  --   --  21 58*  --  15 57*  --  18 82*  --  15 28*   HEMOGLOBIN g/dL 7 8*  --  8 0*  --  8 1* 6 9*  --   --  7 3*  --  7 3*  --  7 9*  --  7 4*   HEMATOCRIT % 24 2*  --  24 4*  --   --  21 0*  --   --  22 2*  --  22 3*  --  23 6*  --  21 8*   PLATELETS Thousands/uL 49*  --  86*  --   --  99*  --   --  133*  --  147*  --  116*  --  116*   POTASSIUM mmol/L 4 8 4 7 4 5 4 6 4 8 5 0 5 0   < > 4 9   < > 4 5   < > 4 1   < > 4 2   CHLORIDE mmol/L 103 105 105 104 103 104 103   < > 104   < > 103   < > 105   < > 104   CO2 mmol/L 23 23 24 24 25 26 26   < > 25   < > 26   < > 27   < > 26   BUN mg/dL 26* 28* 20 20 20 23 22   < > 27*   < > 29*   < > 20   < > 24   CREATININE mg/dL 1 51* 1 54* 1 17 1 12 1 09 1 15 1 10   < > 1 37*   < > 1 56*   < > 1 01   < > 1 13   CALCIUM mg/dL 8 2* 8 1* 8 3 8 4 8 3 8 5 8 4   < > 8 3   < > 8 6   < > 8 4   < > 8 8   MAGNESIUM mg/dL 2 1 2 2 1 9 2 1 1 9 2 2 1 9   < > 1 9   < > 1 9  --  2 0   < > 2 0   PHOSPHORUS mg/dL 3 3 3 1 2 7 2 7 3 3 3 4 3 2   < > 2 9   < > 2 7   < > 2 1*   < > 2 9    < > = values in this interval not displayed

## 2022-06-06 NOTE — PROCEDURES
ANIRUDH GLASS  Change Note    Date: 6/6/22    Location of wound:  Midline abdomen, open surgical wound from exploratory laparotomy    Dimensions of wound: 12 cm x 4 cm x 0 75 cm    Description of wound:  Wound is clean with fascia intact, multiple circular areas filled with granulation tissue  Healthy bleeding edges, wound slowly norman, and borders are pink and slightly rolled without any undermining or tunneling    Responding well to continual a VAC dressing set up             Sponges removed:  2 Grey Sponges    Sponges placed:  2 gray Sponges    VAC settings:  125 mmHg  Continuous     With auto setting set for varus flow normal saline instillation every 10 minutes    Juan Miguel PA-C

## 2022-06-06 NOTE — SEDATION DOCUMENTATION
Pt tolerated G tube placement  See ICU flowsheet for procedural vitals  Pt transferred to [facility] ICU room with RT and ICU nurse, Alek Fonseca  G tube ok to use  Report given

## 2022-06-07 NOTE — ASSESSMENT & PLAN NOTE
· Secondary to hypoperfusion mehul cardiac arrest +/- ATN  · Baseline creat 0 8  · Creatinine peaked at 3 07  · CVVH started on 5/26 and stopped on 6/1  · Restarted on 6/2 after he was anuric with increasing oxygen requirements  · Continue CVVH with goal negative   · Tolerating fluid removal   · Decrease if vasopressors continue to rise   · Avoid hypotension/hypoperfusion  · Continue levophed for MAP > 65

## 2022-06-07 NOTE — ASSESSMENT & PLAN NOTE
Wt Readings from Last 3 Encounters:   06/07/22 78 3 kg (172 lb 9 9 oz)   05/11/22 62 1 kg (136 lb 14 4 oz)   03/25/22 61 2 kg (135 lb)     · 5/16: Echo-EF 65%, mild TR, mild MR  · 5/23: Repeat Echo post cardiac arrest: EF 60-65%, G1DD, mild AS (BRADY 1 5cm2), mild MR, mild TR  · Monitor I&O, Daily weights   · Limited ECHO-EF 65%, G1DD, mild AS  · Aggressive volume removal with CRRT, goal -100 mL/hr as tolerated

## 2022-06-07 NOTE — PROGRESS NOTES
Antoinette [facility] PROGRESS NOTE   [facility] 80 [facility]  male MRN: [id number]  Unit/Bed#: ICU 02 Encounter: [id number]  Reason for Consult: MARYELLEN    ASSESSMENT and PLAN:  80year old with HTN, aortic stenosis, GERD, CAD who underwent EGD and C-scope on 2/27/15 which was complicated by perforation requiring emergent ex-lap  Since then, course has been complicated by PEA arrest on 5/21/22, VDRF, pneumonia, sepsis  Renal consulted for MARYELLEN    1  Acute kidney injury:  · Baseline creatinine is 0 7-0 9  · Etiology is ATN in the setting of hypotension cardiac arrest  · Peak creatinine 3 07 on May 26, 2022  · Started CRRT on May 26, 2022  CRRT stopped on 6/6/22 due to frequent clotting  · No evidence of renal recovery  · Continue dialytic support - iHD today  2  Ventilator dependent respiratory failure:  · Currently FiO2 of 70% with PEEP of 10  · Defer to Critical Care  · Fluid removal is limited by hypotension  3  Hypotension:  · Continue norepinephrine and vasopressin for BP support  · Continue Midodrine 10 mg TID  4  Sepsis:  · Currently on Zosyn  · ID following  5  Congestive heart failure:  · S/p fluid removal with CVVHD  · Anticipating need for more fluid removal but this is limited by low BP  6  Anemia, thrombocytopenia:  · Platelets better today  7  PEA cardiac arrest on 5/21/22  8  Pneumoperitoneum s/p ex-lap with LAR on 5/11/22    DISPOSITION:  · Plan for intermittent HD today with vasopressor support  · Will plan for 1 0 to 1 5 kg UF on HD  UF of 1 5 kg will keep him even based on his total intake  However, I am worried that we may not be able to achieve this UF with intermittent HD  We will see how today's treatment goes  The above plan was discussed with Dr Jason Martinez  SUBJECTIVE / 24H INTERVAL HISTORY:  Had G-tube placed yesterday by IR  Oxygen requirements remain high - FiO2 70% with PEEP of 10    He remains on vasopressin and Levophed 11 mcg  No significant UO      OBJECTIVE:  Current Weight: Weight - Scale: 78 3 kg (172 lb 9 9 oz)  Vitals:    06/07/22 0800 06/07/22 0810 06/07/22 0820 06/07/22 0840   BP: 122/51  (!) 98/40    Pulse: 86 96 97 90   Resp: (!) 32 (!) 32 (!) 33 (!) 30   Temp: 98 96 °F (37 2 °C) 98 96 °F (37 2 °C) 99 14 °F (37 3 °C) 99 14 °F (37 3 °C)   TempSrc:       SpO2: 91% 93% 93% 94%   Weight:       Height:           Intake/Output Summary (Last 24 hours) at 6/7/2022 0849  Last data filed at 6/7/2022 8954  Gross per 24 hour   Intake 2575 56 ml   Output 300 ml   Net 2275 56 ml     General: critically ill appearing on the mechanical ventilator, comfortable  Skin: warm, dry, good turgor  Eyes: open  ENT: ETT in place  Neck: supple  Chest/Lungs: equal chest expansion, coarse breath sounds  CVS: distinct heart sounds, normal rate, regular rhythm, no rub  Abdomen: soft, (+) ostomy, (+) G tube, (+) wound vac  Extremities: (+) LE edema  : (+) quigley catheter  Neuro: awake on the mechanical ventilator  Psych: could not evaluate       Medications:    Current Facility-Administered Medications:     acetaminophen (TYLENOL) oral suspension 650 mg, 650 mg, Oral, Q6H PRN, Jose Nails, CRNP, 650 mg at 06/05/22 1512    chlorhexidine (PERIDEX) 0 12 % oral rinse 15 mL, 15 mL, Mouth/Throat, Q12H [address], Jose Nails, CRNP, 15 mL at 06/06/22 2058    dexmedeTOMIDine (Precedex) 400 mcg in sodium chloride 0 9% 100 mL, 0 1-1 5 mcg/kg/hr, Intravenous, Titrated, ISELA De La Rosa, Last Rate: 19 5 mL/hr at 06/07/22 0842, 1 mcg/kg/hr at 06/07/22 0842    gabapentin (NEURONTIN) oral solution 200 mg, 200 mg, Oral, HS, ISELA Ritter, 200 mg at 06/06/22 2155    HYDROmorphone (DILAUDID) injection 0 5 mg, 0 5 mg, Intravenous, Q3H PRN, ISELA Stevens, 0 5 mg at 06/07/22 0113    insulin lispro (HumaLOG) 100 units/mL subcutaneous injection 1-6 Units, 1-6 Units, Subcutaneous, Q6H [address], 1 Units at 06/05/22 0612 **AND** Fingerstick Glucose (POCT), , , Q6H, ISELA Ritter    iohexol (OMNIPAQUE) 240 MG/ML solution 50 mL, 50 mL, Oral, Once in imaging, Becky Bumpers, CRNP    ipratropium-albuterol (DUO-NEB) 0 5-2 5 mg/3 mL inhalation solution 3 mL, 3 mL, Nebulization, Q6H PRN, Becky Bumpers, CRNP, 3 mL at 06/04/22 0757    levothyroxine tablet 112 mcg, 112 mcg, Per NG Tube, Early Morning, Becky Bumpers, CRNP, 112 mcg at 06/07/22 0617    lidocaine (LIDODERM) 5 % patch 2 patch, 2 patch, Topical, Daily, Khris Renee PA-C, 2 patch at 06/06/22 0825    midodrine (PROAMATINE) tablet 10 mg, 10 mg, Oral, TID AC, Kourtney Odell, MACARIONP, 10 mg at 06/07/22 0617    norepinephrine (LEVOPHED) 8 mg (DOUBLE CONCENTRATION) IV in sodium chloride 0 9% 250 mL, 1-30 mcg/min, Intravenous, Titrated, [employer], MACARIONP, Last Rate: 24 4 mL/hr at 06/07/22 0840, 13 mcg/min at 06/07/22 0840    ondansetron (ZOFRAN) injection 4 mg, 4 mg, Intravenous, Q6H PRN, Becky Bumpers, CRNP    oxyCODONE (ROXICODONE) oral solution 5 mg, 5 mg, Per NG Tube, Q4H PRN, 5 mg at 06/03/22 1804 **OR** oxyCODONE (ROXICODONE) oral solution 7 5 mg, 7 5 mg, Per NG Tube, Q4H PRN, ISELA Shaw    pantoprazole (PROTONIX) injection 40 mg, 40 mg, Intravenous, Q24H [facility], Becky Bumpers, CRNP, 40 mg at 06/06/22 0825    piperacillin-tazobactam (ZOSYN) 3 375 g in sodium chloride 0 9 % 100 mL IVPB, 3 375 g, Intravenous, Q6H, Arley Puckett PA-C, Stopped at 06/07/22 0432    polyethylene glycol (MIRALAX) packet 17 g, 17 g, Oral, Daily, ISELA Ritter, 17 g at 06/02/22 0947    vasopressin (PITRESSIN) 20 Units in sodium chloride 0 9 % 100 mL infusion, 0 04 Units/min, Intravenous, Continuous, ISELA Ritter, Last Rate: 12 mL/hr at 06/07/22 0700, 0 04 Units/min at 06/07/22 0700    Laboratory Results:  Results from last 7 days   Lab Units 06/07/22  0558 06/06/22  0749 06/06/22  0010 06/05/22  0555 06/04/22  2203 06/04/22  1420 06/04/22  0554 06/03/22  1308 06/03/22  0604 06/02/22  2204 06/02/22  0527 06/01/22  1704 06/01/22  0520   WBC Thousand/uL 16 74* 17 92*  --  18 30*  --   --  14 58*  --  21 58*  --  15 57*  --  18 82*   HEMOGLOBIN g/dL 7 3* 7 8*  --  8 0*  --  8 1* 6 9*  --  7 3*  --  7 3*  --  7 9*   HEMATOCRIT % 23 4* 24 2*  --  24 4*  --   --  21 0*  --  22 2*  --  22 3*  --  23 6*   PLATELETS Thousands/uL 87* 49*  --  86*  --   --  99*  --  133*  --  147*  --  116*   POTASSIUM mmol/L 5 1 4 8 4 7 4 5 4 6 4 8 5 0   < > 4 9   < > 4 5   < > 4 1   CHLORIDE mmol/L 104 103 105 105 104 103 104   < > 104   < > 103   < > 105   CO2 mmol/L 21 23 23 24 24 25 26   < > 25   < > 26   < > 27   BUN mg/dL 41* 26* 28* 20 20 20 23   < > 27*   < > 29*   < > 20   CREATININE mg/dL 2 11* 1 51* 1 54* 1 17 1 12 1 09 1 15   < > 1 37*   < > 1 56*   < > 1 01   CALCIUM mg/dL 8 0* 8 2* 8 1* 8 3 8 4 8 3 8 5   < > 8 3   < > 8 6   < > 8 4   MAGNESIUM mg/dL 2 2 2 1 2 2 1 9 2 1 1 9 2 2   < > 1 9   < > 1 9  --  2 0   PHOSPHORUS mg/dL 5 2* 3 3 3 1 2 7 2 7 3 3 3 4   < > 2 9   < > 2 7   < > 2 1*    < > = values in this interval not displayed

## 2022-06-07 NOTE — ASSESSMENT & PLAN NOTE
Peritonitis with intra-abdominal/pelvic abscess secondary to colonic perforation    · 5/22 CT chest/ab/pelvis with concern of multifocal PNA, no visualized intraabdominal abscess or anastomotic leak   · Per ID suspect multifocal pneumonitis   · OR culture 5/21 pseudomonas and bacteroides fragilis  · Blood cultures on 5/22 negative   · Completed 14 days of Flagyl on 5/24  · Completed 14 days of cefepime on 5/27  · Restarted Zosyn on 6/3 for increasing WBC and oxygen requirements   · Zosyn day 5  · ID following, appreciate recommendations

## 2022-06-07 NOTE — ASSESSMENT & PLAN NOTE
· Outpatient EGD and colonoscopy at [facility] on 5/11 for w/u of chronic anemia, history of colon polyps, intermittent LLQ abdominal pain and possible intermittent intussusception  · EGD unremarkable, colonoscopy technically difficult and required change from adult scope to pediatric scope, during traversal of the sigmoid colon there was a kink and patient sustained a perforation  · [facility]   · Emergent ex- lap on 5/11 > low anterior resection, protective loop transverse colostomy, flex sigmoidoscopy, and segmental small bowel resection  · Difficult post op course with post op ileus requiring NGT decompression and requirement of short duration of TPN   · 5/22: CT: Diffuse mild dilation of small bowel segments identified without transition point  · 5/24: CT CAP- Distended small bowel loops with scattered air-fluid levels consistent with early/partial small bowel obstruction with transition at the small bowel anastomosis in the region of the ventral pelvis as above  No free air  Cholelithiasis without cholecystitis  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

## 2022-06-07 NOTE — CONSULTS
Recommend Vital 1 5 at goal rate of 50 mL/hour for a total volume of 1200 mL  This will provide 1800 kcals, 81 grams protein and 917 mL free water  The Phosphorus will be 1 5 grams/day  Recommend d/cing the Prosource but keeping the Demond BID in 8 oz of water for wound healing  Do not recommend additional flushes as patient is still fluid overloaded and the [employer] Honor[employer] will provide added water

## 2022-06-07 NOTE — PLAN OF CARE
Post-Dialysis RN Treatment Note    Blood Pressure:  Pre 138/43 mm/Hg  Post 125/56 mmHg   EDW  TBD kg    Weight:  Pre 78 3 kg   Post None Reported kg   Mode of weight measurement: Bed Scale   Volume Removed  600 ml    Treatment duration 210 minutes    NS given  Yes, 100 ml    Treatment shortened? No   Medications given during Rx None Reported   Estimated Kt/V  None Reported   Access type: Temporary HD catheter   Access Issues: Yes, describe: lines needed to be reversed     Report called to primary nurse   Yes    Pt on HD treatment for 3 5 hours with a UF goal of 1 L as tolerated   Pt on a 2k 2 5 lupillo bath for a potassium of 5 1 on 6/7/22  Problem: METABOLIC, FLUID AND ELECTROLYTES - ADULT  Goal: Electrolytes maintained within normal limits  Description: INTERVENTIONS:  - Monitor labs and assess patient for signs and symptoms of electrolyte imbalances  - Administer electrolyte replacement as ordered  - Monitor response to electrolyte replacements, including repeat lab results as appropriate  - Instruct patient on fluid and nutrition as appropriate  Outcome: Progressing  Goal: Fluid balance maintained  Description: INTERVENTIONS:  - Monitor labs   - Monitor I/O and WT  - Instruct patient on fluid and nutrition as appropriate  - Assess for signs & symptoms of volume excess or deficit  Outcome: Progressing

## 2022-06-07 NOTE — PROGRESS NOTES
Progress Note - General Surgery   Fady Hudson 80 y o  male MRN: [id number]  Unit/Bed#: ICU 02 Encounter: [id number]    Assessment:  79 y/o male presenting with recto-sigmoid perforation during colonoscopy  s/p exploratory laparotomy, low anterior resection, transverse loop colostomy on 5/11     · Acute hypoxic respiratory failure, with PEA arrest -- intubated 5/21, oxygen requirements increased, PEEP 10, FiO2 70%, tachypnea  · CHF  · Anemia -- persistently anemic with hemoglobin of 7 3 currently, has needed at least 4 units PRBC transfusions during this admission  · Acute renal failure -- still on CVVH, creatinine increased to 2 11, BUN 41,  · Peristomal hernia -- stable, reducible  · Bilateral pleural effusions and ARDS - worsening pulmonary parenchymal airspace opacities with bilateral pleural effusions, persistently experiencing leukocytosis which could represent mechanical stress versus pulmonary infectious state versus possibly related to right lower quadrant fluid collection  · Severe sepsis -- back on Zosyn day 3, WBC count 15 > 21 > 14 > 18  · Possibility of intra-abdominal abscess and right lower quadrant of pelvis - CT scan is not the ideal image due to IV contrast limitations and MARYELLEN, patient does appear to have area that could represent loculated walled-off fluid from the remainder of abdominopelvic ascites      Plan:  1-8)   - reviewed CT scan  - continue pulmonary management and mechanical ventilation as well as intubation per critical care team and respiratory therapy  - we will discuss with surgeon possibility of IR consult for aspiration of right lower quadrant abdominal fluid possible drain insertion  - antibiotics per critical care team  - prolonged discussion regarding management for future  - change wound VAC yesterday everything was appropriately healing, continue wound VAC until Thursday  - repeat CBC, BMP, Mag and phos within labs  - we ordered TPN  - IR placed G-tube which is ideal for enteral feeds for future use  - continue to monitor I/Os  - CVVH per nephrology and critical care team  - I/Os  - continuing to follow as patient may require tracheostomy tube placement, if patient's respiratory status improves can consider Tracheostomy tube  Subjective/Objective   Chief Complaint:  Intubated    Subjective:  Patient was seen examined at bedside  Patient is currently intubated  Patient is fatigued and unresponsive  Objective:       Blood pressure 119/52, pulse 91, temperature 98 24 °F (36 8 °C), resp  rate (!) 24, height 5' 4" (1 626 m), weight 78 3 kg (172 lb 9 9 oz), SpO2 (!) 82 %  ,Body mass index is 29 63 kg/m²        Intake/Output Summary (Last 24 hours) at 6/7/2022 1101  Last data filed at 6/7/2022 8578  Gross per 24 hour   Intake 2663 28 ml   Output 290 ml   Net 2373 28 ml       Invasive Devices  Report    Peripherally Inserted Central Catheter Line  Duration           PICC Line 66/21/45 Right Basilic 19 days          Arterial Line  Duration           Arterial Line 05/30/22 Radial 7 days          Hemodialysis Catheter  Duration           HD Temporary Double Catheter 11 days          Drain  Duration           Colostomy LLQ 27 days    Urethral Catheter Double-lumen 16 Fr  17 days    Gastrostomy/Enterostomy Percutaneous Interventional Gastrostomy 16 Fr  LUQ <1 day          Airway  Duration           ETT  Oral 16 days                Physical Exam: BP (!) 111/47   Pulse 84   Temp 98 24 °F (36 8 °C)   Resp (!) 24   Ht 5' 4" (1 626 m)   Wt 78 3 kg (172 lb 9 9 oz)   SpO2 (!) 82%   BMI 29 63 kg/m²   General appearance: Intubated  Head: Normocephalic, without obvious abnormality, atraumatic  Lungs: Harsh mechanical ventilation sounds, rhonchorous activity  Heart: Difficult to auscultate overlying the adventitious pulmonary sounds; however, does sound to have systolic ejection murmur possibly aortic stenosis harsh sounding S1  Abdomen: Nontender, soft, active bowel sounds, soft and reducible stomal prolapse with small peristomal hernia  Skin: Skin color, texture, turgor normal  No rashes or lesions or Stoma is pink and healthy appearing, midline wound has wound VAC in place    Lab, Imaging and other studies:  I have personally reviewed pertinent lab results    , CBC:   Lab Results   Component Value Date    WBC 16 74 (H) 06/07/2022    HGB 7 3 (L) 06/07/2022    HCT 23 4 (L) 06/07/2022     (H) 06/07/2022    PLT 87 (L) 06/07/2022    MCH 32 9 06/07/2022    MCHC 31 2 (L) 06/07/2022    RDW 18 1 (H) 06/07/2022    MPV 12 1 06/07/2022   , CMP:   Lab Results   Component Value Date    SODIUM 140 06/07/2022    K 5 1 06/07/2022     06/07/2022    CO2 21 06/07/2022    BUN 41 (H) 06/07/2022    CREATININE 2 11 (H) 06/07/2022    CALCIUM 8 0 (L) 06/07/2022    EGFR 26 06/07/2022     VTE Pharmacologic Prophylaxis: per CC team  VTE Mechanical Prophylaxis: sequential compression device

## 2022-06-07 NOTE — TELEMEDICINE
On-Call Telephone Note    Contacted by Rojelio Dancer Toshi Tsurumaki 80 y o  male MRN: [id number]  Unit/Bed#: ICU 02 Encounter: [id number]    Per provider report, patient with PMH GERD, anemia, HTN presented with bowel perforation s/p EGD and C-scope on 5/11/2022 s/p emergent exploratory laporotomy  Complicated by PEA arrest and 5/21/2022, VDRF, PNA, sepsis and respiratory failure/ARDS  Had CT CAP pelvis completed as part of ARDS workup and noted with acute fracture through L1 since prior imaging on 5/11 but additionally seen on imaging from 5/22  Available past medical history,social history, surgical history, medication list, drug allergies and review of systems were reviewed  BP (!) 145/44   Pulse 72   Temp 98 96 °F (37 2 °C)   Resp (!) 25   Ht 5' 4" (1 626 m)   Wt 78 3 kg (172 lb 9 9 oz)   SpO2 94%   BMI 29 63 kg/m²      Clinical exam per provider report, intubated  Withdraws to painful stimuli  Does not respond to verbal command  Imaging personally reviewed  Noted on CT CAP on 6/6 with subacute fracture through L1 with disruption of ALL and displacement of superior and inferior endplate fragments by 2 cm  Does not involve pedicles or posterior elements  Assessment and Plan  1  Continue to monitor neuro exam closely  2  Overall prognosis remains extremely poor  3  If patient stabilizes medically, can consider treatment with bracing  Will defer currently as he is too unstable to mobilize  4  Do not anticipate neurosurgical intervention in this patient who is critically ill and medically unstable  5  Neurosurgery will sign off  Please call with any questions or concerns  All questions answered  Provider is in agreement with the course of action  A total of 20 minutes was spent discussing the presentation, physical exam and formulating a management plan with the provider

## 2022-06-07 NOTE — ASSESSMENT & PLAN NOTE
· Hemoglobin drop post cardiac arrest    · Noted to have gross hematuria but this has since resolved  · 5/21: 1 u PRBC  · 5/24: 1 u PRBC  · CT obtained on 5/24 and negative for any overt signs of bleeding  · 5/26: 1 u PRBC   · 5/30: 1 u PRBC  · 6/4: 1 U PRBC   · Continue to monitor and transfuse for hgb less than 7

## 2022-06-07 NOTE — PROGRESS NOTES
[address]  Progress Note - Anna Hudson 2/15/1931, 80 y o  male MRN: [id number]  Unit/Bed#: ICU 02 Encounter: [id number]  Primary Care Provider: Diane Almendarez MD   Date and time admitted to hospital: 5/11/2022  4:48 PM    * Bowel perforation [facility])  Assessment & Plan  · Outpatient EGD and colonoscopy at [facility] on 5/11 for w/u of chronic anemia, history of colon polyps, intermittent LLQ abdominal pain and possible intermittent intussusception  · EGD unremarkable, colonoscopy technically difficult and required change from adult scope to pediatric scope, during traversal of the sigmoid colon there was a kink and patient sustained a perforation  · Transferred to [facility] for emergent surgery   · Emergent ex- lap on 5/11 > low anterior resection, protective loop transverse colostomy, flex sigmoidoscopy, and segmental small bowel resection  · Difficult post op course with post op ileus requiring NGT decompression and requirement of short duration of TPN   · 5/22: CT: Diffuse mild dilation of small bowel segments identified without transition point  · 5/24: CT CAP- Distended small bowel loops with scattered air-fluid levels consistent with early/partial small bowel obstruction with transition at the small bowel anastomosis in the region of the ventral pelvis as above  No free air  Cholelithiasis without cholecystitis  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     Acute respiratory failure with hypoxia ([facility] [address] )  Assessment & Plan  · Patient previously in ICU for hypoxia with a max of 15L midflow and concern for aspiration pneumonitis  · Transfered to general medical floor 5/21  · 5/21: PEA arrest x2, intubation  · 5/24 CT CAP-CHF with moderate basilar effusions and additional upper lung zone patchy airspace opacities likely reflecting asymmetric pulmonary edema  Infiltrate is not entirely excluded     · 5/29 Bronchoscopy for mucous plugging  · Day # 17 on ventilator: AC/PC 20/24/80/10  · Wean Fio2 as able for SPO2>90%  · PF ratio 65 - in severe ARDS  · Continue pulmonary hygiene/ VAP bundle  · Continue volume removal with CVVH  · Unstable and ventilator requirements too high at this time to consider trach/PEG  · Moderate BL effusions on 5/24 CT scan, unable to evaluate on CXR   · IR consulted for possible thoracentesis - but procedure was not attempted per IR due to the thought that the effusions were getting smaller     Severe sepsis [facility])  Assessment & Plan  Peritonitis with intra-abdominal/pelvic abscess secondary to colonic perforation    · 5/22 CT chest/ab/pelvis with concern of multifocal PNA, no visualized intraabdominal abscess or anastomotic leak   · Per ID suspect multifocal pneumonitis   · OR culture 5/21 pseudomonas and bacteroides fragilis  · Blood cultures on 5/22 negative   · Completed 14 days of Flagyl on 5/24  · Completed 14 days of cefepime on 5/27  · Restarted Zosyn on 6/3 for increasing WBC and oxygen requirements   · Zosyn day 5  · ID following, appreciate recommendations     Cardiac arrest [facility])  Assessment & Plan  · Patient was found unresponsive and hypoxic approximately 20 minutes after being seen well with family 5/21   · PEA arrest x 2  · Most likely respiratory driven  · Neurologically intact post arrest   · Continue telemetry monitoring     Acute on chronic anemia  Assessment & Plan  · As above     Hypotension  Assessment & Plan  · Levophed restarted on 6/1, vasopressin restarted on 6/3  · Wean for MAP goal >65  · Midodrine added 5 mg TID on 6/1 - unable to receive secondary to no enteral access       Anemia  Assessment & Plan  · Hemoglobin drop post cardiac arrest    · Noted to have gross hematuria but this has since resolved  · 5/21: 1 u PRBC  · 5/24: 1 u PRBC  · CT obtained on 5/24 and negative for any overt signs of bleeding  · 5/26: 1 u PRBC   · 5/30: 1 u PRBC  · 6/4: 1 U PRBC   · Continue to monitor and transfuse for hgb less than 7      Acute renal failure (ARF) ([facility])  Assessment & Plan  · Secondary to hypoperfusion mehul cardiac arrest +/- ATN  · Baseline creat 0 8  · Creatinine peaked at 3 07  · CVVH started on 5/26 and stopped on 6/1  · Restarted on 6/2 after he was anuric with increasing oxygen requirements  · Continue CVVH with goal negative   · Tolerating fluid removal   · Decrease if vasopressors continue to rise   · Avoid hypotension/hypoperfusion  · Continue levophed for MAP > 65    Hyperglycemia  Assessment & Plan  · A1c 5 3%  · Continue SSI - minimal requirements     CHF (congestive heart failure) ([facility] [address] )  Assessment & Plan  Wt Readings from Last 3 Encounters:   06/07/22 78 3 kg (172 lb 9 9 oz)   05/11/22 62 1 kg (136 lb 14 4 oz)   03/25/22 61 2 kg (135 lb)     · 5/16: Echo-EF 65%, mild TR, mild MR  · 5/23: Repeat Echo post cardiac arrest: EF 60-65%, G1DD, mild AS (BRADY 1 5cm2), mild MR, mild TR  · Monitor I&O, Daily weights   · Limited ECHO-EF 65%, G1DD, mild AS  · Aggressive volume removal with CRRT, goal -100 mL/hr as tolerated       Toxic metabolic encephalopathy  Assessment & Plan  · Likely in setting of acute illness/delirium and cardiac arrest  · Delirium precautions; regulate sleep/wake cycle, environmental controls, daily CAM ICU   · Trend neuro exam  · Following commands, nodding appropriately to questions, moves all extremities      ----------------------------------------------------------------------------------------  HPI/24hr events: Pt remains on mechanical ventilation on levophed and vasopressors  He did not have b/l thoracentesis since effusion was minimal  Pt had gastrostomy tube placed by IR for feeding  Minimal stool output from colostomy tube  Patient appropriate for transfer out of the ICU today?: No  Disposition: Continue Critical Care   Code Status: Level 2 - DNAR: but accepts endotracheal intubation  ---------------------------------------------------------------------------------------  SUBJECTIVE    Pt is currently intubated  Review of Systems  Review of systems was reviewed and negative unless stated above in HPI/24-hour events   ---------------------------------------------------------------------------------------  OBJECTIVE    Vitals   Vitals:    22 0615 22 0620 22 0630 22 0645   BP:       Pulse: 84 90 89 82   Resp: (!) 31 (!) 33 (!) 32 (!) 30   Temp: 98 78 °F (37 1 °C) 98 78 °F (37 1 °C) 98 78 °F (37 1 °C) 98 78 °F (37 1 °C)   TempSrc:       SpO2: (!) 89% (!) 87% 93% 95%   Weight:       Height:         Temp (24hrs), Av 1 °F (36 7 °C), Min:96 9 °F (36 1 °C), Max:98 78 °F (37 1 °C)  Current: Temperature: 98 78 °F (37 1 °C)  Arterial Line BP: 136/43  Arterial Line MAP (mmHg): 74 mmHg    Respiratory:  SpO2: SpO2: 95 %  Nasal Cannula O2 Flow Rate (L/min): 5 L/min    Invasive/non-invasive ventilation settings   Respiratory  Report   Lab Data (Last 4 hours)    None         O2/Vent Data (Last 4 hours)       0334           Vent Mode AC/PC       Resp Rate (BPM) (BPM) 24       Pressure Control (cmH2O) (cm) 24       Insp Time (sec) (sec) 0 6       FiO2 (%) (%) 65       PEEP (cmH2O) (cmH2O) 1       MV 8 8                   Physical Exam  Vitals and nursing note reviewed  Constitutional:       Appearance: He is ill-appearing and toxic-appearing  Interventions: He is intubated  HENT:      Head: Normocephalic and atraumatic        Mouth/Throat:      Mouth: Mucous membranes are moist    Eyes: Pupils: Pupils are equal, round, and reactive to light  Cardiovascular:      Rate and Rhythm: Regular rhythm  Pulmonary:      Effort: He is intubated  Comments: B/l course breathsounds  Abdominal:      Palpations: Abdomen is soft  Comments: Ostomy bag present, gastrostomy tube present, no    Musculoskeletal:      Comments: Generalized edema    Skin:     General: Skin is warm  Neurological:      Mental Status: He is lethargic  Comments:  Withdrawals to painful stimuli, does not respond to verbal commands,    Psychiatric:         Mood and Affect: Mood normal              Laboratory and Diagnostics:  Results from last 7 days   Lab Units 06/07/22  0558 06/06/22  0749 06/05/22  0555 06/04/22  1420 06/04/22  0554 06/03/22  0604 06/02/22  0527 06/01/22  0520   WBC Thousand/uL 16 74* 17 92* 18 30*  --  14 58* 21 58* 15 57* 18 82*   HEMOGLOBIN g/dL 7 3* 7 8* 8 0* 8 1* 6 9* 7 3* 7 3* 7 9*   HEMATOCRIT % 23 4* 24 2* 24 4*  --  21 0* 22 2* 22 3* 23 6*   PLATELETS Thousands/uL 87* 49* 86*  --  99* 133* 147* 116*   NEUTROS PCT %  --   --  79*  --   --   --   --   --    BANDS PCT %  --  11*  --   --   --  23*  --   --    MONOS PCT %  --   --  7  --   --   --   --   --    MONO PCT %  --  8  --   --   --  6  --   --      Results from last 7 days   Lab Units 06/07/22  0558 06/06/22  0749 06/06/22  0010 06/05/22  0555 06/04/22  2203 06/04/22  1420 06/04/22  0554 06/02/22  2204 06/02/22  0527   SODIUM mmol/L 140 138 139 138 137 137 138   < > 137   POTASSIUM mmol/L 5 1 4 8 4 7 4 5 4 6 4 8 5 0   < > 4 5   CHLORIDE mmol/L 104 103 105 105 104 103 104   < > 103   CO2 mmol/L 21 23 23 24 24 25 26   < > 26   ANION GAP mmol/L 15* 12 11 9 9 9 8   < > 8   BUN mg/dL 41* 26* 28* 20 20 20 23   < > 29*   CREATININE mg/dL 2 11* 1 51* 1 54* 1 17 1 12 1 09 1 15   < > 1 56*   CALCIUM mg/dL 8 0* 8 2* 8 1* 8 3 8 4 8 3 8 5   < > 8 6   GLUCOSE RANDOM mg/dL 97 111 109 79 88 99 106   < > 147*   ALT U/L  --   --   --   --   --   --   -- --  20   AST U/L  --   --   --   --   --   --   --   --  26   ALK PHOS U/L  --   --   --   --   --   --   --   --  445*   ALBUMIN g/dL  --   --   --   --   --   --   --   --  3 2*   TOTAL BILIRUBIN mg/dL  --   --   --   --   --   --   --   --  1 06*    < > = values in this interval not displayed  Results from last 7 days   Lab Units 06/07/22  0558 06/06/22  0749 06/06/22  0010 06/05/22  0555 06/04/22  2203 06/04/22  1420 06/04/22  0554   MAGNESIUM mg/dL 2 2 2 1 2 2 1 9 2 1 1 9 2 2   PHOSPHORUS mg/dL 5 2* 3 3 3 1 2 7 2 7 3 3 3 4      Results from last 7 days   Lab Units 06/06/22  1126   INR  1 52*          Results from last 7 days   Lab Units 06/03/22  1021   LACTIC ACID mmol/L 0 8     ABG:  Results from last 7 days   Lab Units 06/06/22  1017   PH ART  7 238*   PCO2 ART mm Hg 49 0*   PO2 ART mm Hg 106 4   HCO3 ART mmol/L 20 4*   BASE EXC ART mmol/L -6 7   ABG SOURCE  Line, Arterial     VBG:  Results from last 7 days   Lab Units 06/06/22  1017   ABG SOURCE  Line, Arterial           Micro  Results from last 7 days   Lab Units 06/03/22  1528   BLOOD CULTURE  No Growth at 72 hrs  No Growth at 72 hrs  EKG:  reviewed  Imaging: I have personally reviewed pertinent reports  Intake and Output  I/O       06/05 0701 06/06 0700 06/06 0701 06/07 0700    I V  (mL/kg) 1342 4 (17 3) 1269 9 (16 2)    NG/GT  990    IV Piggyback 300 400    Total Intake(mL/kg) 1642 4 (21 2) 2659 9 (34)    Urine (mL/kg/hr) 92 (0) 50 (0)    Drains 100     Other 1455     Stool 30 250    Total Output 1677 300    Net -34 6 +2359 9          Unmeasured Stool Occurrence  1 x          Height and Weights   Height: 5' 4" (162 6 cm)  IBW (Ideal Body Weight): 59 2 kg  Body mass index is 29 63 kg/m²    Weight (last 2 days)     Date/Time Weight    06/07/22 0600 78 3 (172 62)    06/06/22 0541 77 6 (171 08)    06/05/22 0600 76 3 (168 21)            Nutrition       Diet Orders   (From admission, onward)             Start     Ordered    06/02/22 0674 Diet Enteral/Parenteral; Tube Feeding No Oral Diet; Osmolite 1 0; Continuous; 55; Prosource Protein Liquid - One Packet Daily; Demond - Two Packets Daily  Diet effective now        Comments: Demond - one packet with breakfast and dinner  Pro source - one packet with lunch   References:    Nutrtion Support Algorithm Enteral vs  Parenteral   Question Answer Comment   Diet Type Enteral/Parenteral    Enteral/Parenteral Tube Feeding No Oral Diet    Tube Feeding Formula: Osmolite 1 0    Bolus/Cyclic/Continuous Continuous    Tube Feeding Goal Rate (mL/hr): 55    Prosource Protein Liquid - No Carb Prosource Protein Liquid - One Packet Daily    Demond Payne Demond - Two Packets Daily    RD to adjust diet per protocol?  Yes        06/02/22 1040    05/12/22 0906  Room Service  Once        Question:  Type of Service  Answer:  Room Service - Appropriate with Assistance    05/12/22 0905                  Active Medications  Scheduled Meds:  Current Facility-Administered Medications   Medication Dose Route Frequency Provider Last Rate    acetaminophen  650 mg Oral Q6H PRN ISELA Andino      chlorhexidine  15 mL Mouth/Throat Q12H [facility] ISELA Andino      dexmedetomidine  0 1-1 5 mcg/kg/hr Intravenous Titrated ISELA Gross 1 mcg/kg/hr (06/07/22 0227)    gabapentin  200 mg Oral HS ISELA Luz      HYDROmorphone  0 5 mg Intravenous Q3H PRN ISELA Christopher      insulin lispro  1-6 Units Subcutaneous Q6H [facility] Josephine Nikko Koo, ISELA      iohexol  50 mL Oral Once in imaging ISELA Andino      ipratropium-albuterol  3 mL Nebulization Q6H PRN ISELA Andino      levothyroxine  112 mcg Per NG Tube Early Morning ISELA Andino      lidocaine  2 patch Topical Daily Amadou Méndez PA-C      midodrine  10 mg Oral TID AC Bran Watkins, [address]      norepinephrine  1-30 mcg/min Intravenous Titrated ISELA Christopher 9 mcg/min (06/07/22 0874)    ondansetron  4 mg Intravenous Q6H PRN ISELA Ayon      oxyCODONE  5 mg Per NG Tube Q4H PRN Gaylan Meter, CRNP      Or    oxyCODONE  7 5 mg Per NG Tube Q4H PRN Gaylan Meter, CRNP      pantoprazole  40 mg Intravenous Q24H [facility] ISELA Ayon      piperacillin-tazobactam  3 375 g Intravenous Q6H Shayla Espana PA-C Stopped (06/07/22 0432)    polyethylene glycol  17 g Oral Daily Josephine Donnelly, MACARIONP      vasopressin  0 04 Units/min Intravenous Continuous Josephinevinh Donnelly, CRNP 0 04 Units/min (06/07/22 0645)     Continuous Infusions:  dexmedetomidine, 0 1-1 5 mcg/kg/hr, Last Rate: 1 mcg/kg/hr (06/07/22 0227)  norepinephrine, 1-30 mcg/min, Last Rate: 9 mcg/min (06/07/22 0634)  vasopressin, 0 04 Units/min, Last Rate: 0 04 Units/min (06/07/22 0645)      PRN Meds:   acetaminophen, 650 mg, Q6H PRN  HYDROmorphone, 0 5 mg, Q3H PRN  iohexol, 50 mL, Once in imaging  ipratropium-albuterol, 3 mL, Q6H PRN  ondansetron, 4 mg, Q6H PRN  oxyCODONE, 5 mg, Q4H PRN   Or  oxyCODONE, 7 5 mg, Q4H PRN        Invasive Devices Review  Invasive Devices  Report    Peripherally Inserted Central Catheter Line  Duration           PICC Line 48/92/89 Right Basilic 19 days          Arterial Line  Duration           Arterial Line 05/30/22 Radial 7 days          Hemodialysis Catheter  Duration           HD Temporary Double Catheter 11 days          Drain  Duration           Colostomy LLQ 27 days    Urethral Catheter Double-lumen 16 Fr  17 days    Gastrostomy/Enterostomy Percutaneous Interventional Gastrostomy 16 Fr  LUQ <1 day          Airway  Duration           ETT  Oral 16 days                Rationale for remaining devices: Respiratory failure  ---------------------------------------------------------------------------------------  Advance Directive and Living Will:      Power of :    POLST:    ---------------------------------------------------------------------------------------  Care Time Delivered:   Upon my evaluation, this patient had a high probability of imminent or life-threatening deterioration due to respiratory failure, which required my direct attention, intervention, and personal management  I have personally provided 30 minutes (6 30 to 7 00) of critical care time, exclusive of procedures, teaching, family meetings, and any prior time recorded by providers other than myself  iGl Chung DO      Portions of the record may have been created with voice recognition software  Occasional wrong word or "sound a like" substitutions may have occurred due to the inherent limitations of voice recognition software    Read the chart carefully and recognize, using context, where substitutions have occurred

## 2022-06-07 NOTE — PROGRESS NOTES
Progress Note - Infectious Disease   Fady Hudson 80 y o  male MRN: [id number]  Unit/Bed#: ICU 02 Encounter: [id number]      Impression/Plan:  1  s/p cardiac arrest 5/21/22  Patient intubated during RR  In ICU on ventilator assistance  Recurrent SIRS possibly reactive to arrest with fever, tachycardia, tachypnea, and increased leukocytosis post arrest  Possible recurrent aspiration event s/p arrest  Fever down trended   Patient continues to have hypotension episodes that appear to be related to volume status, patient back on Levophed and vasopressin  -continue supportive measures per critical care team  -cardiology on board  -ventilator management per critical care team     2  SIRS, evolving on admission, post #1 and with persistent hypotension support with dialysis   Evidenced by tachycardia, tachypnea, leukocytosis, hypotension   Suspect possible aspiration and/or volume overload   MRSA screen negative  [address]  5/22/22 Blood cultures have no growth   Patient completed 2 week antibiotic course 5/27/22 06/03/2022 blood cultures x2 negative at 72 hours   Patient remains on blood pressure support with on Levophed and vasopressin  On Zosyn day 5  -continues Zosyn at present  -follow-up repeat blood cultures  -monitor temperature and hemodynamics  -serial exam  -monitor CBC and BMP   -pressor support per Critical Medicine Service     3  Peritonitis s/p Bowel perforation  s/p 5/11/22 exploratory laparotomy, low anterior resection, protective loop transverse colostomy and segmental small bowel resection secondary to perforation rectosigmoid junction after colonoscopy   OR cultures grew Pseudomonas aeruginosa and Bacteroides fragilis   Patient completed antibiotic course 5/27/22    IR PEG tube placed 06/06/2022 06/07/2022 CT chest abdomen pelvis:  Right kush pelvis indeterminant fluid collection slightly larger in size from CT scan from 05/24/2022   -continue Zosyn at present as above  -VAC/wound care per surgery  -follow up Peg function    4   Acute hypoxic respiratory failure with hypoxia   Suspicious for aspiration pneumonitis over pneumonia     6/6/22 CT C/A/P Bilateral pleural effusions, worsening pulmonary parenchymal airspace opacities consistent with developing fibrosis  Patient remains intubated s/p #1  5/16/22 Procalcitonin level  2 60 > 5/21 0 753 < 5/23/22 2 97 > 5/30 1 26  Patient is status post bronchoscopy 5/29/22 for RUL collapse  5/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  -observe off further antibiotic  -ventilator management per critical care team      5  Aspiration pneumonitis versus pneumonia in setting of #1/3   5/22/22 CT C/A/P predominantly UL groundglass and consolidations, bilateral pleural effusions, SB mild dilation unchanged  Patient now intubated s/p #1  5/16/22 Procalcitonin level  2 60 > 5/21 0 753 < 5/23/22 2 97 > 5/30 1 26  5/17/22 sputum specimen oral pharyngeal contamination  06/06/2022 portable chest x-ray with bilateral pleural effusions  -continue Zosyn as above  -secretion and pulmonary toilet management per critical care team   -monitor respiratory symptoms  -monitor vent requirements     6  MARYELLEN on CKD 3  Creatinine 2 1 CVVH on hold today, on intermittent HD  -renal dose adjust medications as needed  -volume management per Nephrology  -iHD management per Nephrology  -monitor creatinine and urinary output     Antibiotics:  Zosyn D5     Above impression and plan discussed in detail with patient's RN and critical care team      Subjective:  Patient had temp to 101 3 F 6/5/22, no chills, sweats reported on ventilator in ICU s/p cardiac arrest 5/21/22; no nausea, vomiting, diarrhea reported  CVVH started 5/26/22    On intermittent HD today with attempts to take half a L off rather than the original planned L as patient's blood pressure remains low despite maxed out blood pressure support with Levophed and vasopressin      Objective:  Vitals:  Temp:  [97 °F (36 1 °C)-99 14 °F (37 3 °C)] 97 88 °F (36 6 °C)  HR:  [] 77  Resp:  [11-34] 19  BP: ()/(37-59) 123/58  SpO2:  [82 %-100 %] 100 %  Temp (24hrs), Av 5 °F (36 9 °C), Min:97 °F (36 1 °C), Max:99 14 °F (37 3 °C)  Current: Temperature: 97 88 °F (36 6 °C)    Physical Exam:   General Appearance:  80year-old acute on chronically debilitated, elderly male, propped resting on ventilator at FiO2 of 90%     Throat: Oropharynx moist without lesions  Endotracheal tube in place   Lungs:   Fairly clear ventilated breath sounds to auscultation bilaterally; scant blood-tinged secretions in suction tubing   Heart:  RRR; no murmur   Abdomen:   Soft, no focal tenderness, protuberant, positive bowel sounds, midline abdominal wound VAC in place with moderate serous output in canister, wound edges without erythema, left lower quadrant ostomy with loose stool in bag, peg tube fluids infusing   Extremities: Generalized puffy edema, venodynes and Prevalon boots in place   : [place] with scant clear, yellow urine in bag, no SPT, 4+ soft scrotal edema   Skin: No new rashes or lesions  Right arm PICC and right neck IJ CVP lines in place  Right neck IJ dialyzing at present         Labs, Imaging, & Other studies:   All pertinent labs and imaging studies were personally reviewed  Results from last 7 days   Lab Units 22  0558 22  0749 22  0555   WBC Thousand/uL 16 74* 17 92* 18 30*   HEMOGLOBIN g/dL 7 3* 7 8* 8 0*   PLATELETS Thousands/uL 87* 49* 86*     Results from last 7 days   Lab Units 22  0558 22  0749 22  0010 22  2204 22  0527   SODIUM mmol/L 140 138 139   < > 137   POTASSIUM mmol/L 5 1 4 8 4 7   < > 4 5   CHLORIDE mmol/L 104 103 105   < > 103   CO2 mmol/L 21 23 23   < > 26   BUN mg/dL 41* 26* 28*   < > 29*   CREATININE mg/dL 2 11* 1 51* 1 54*   < > 1 56*   EGFR ml/min/1 73sq m 26 39 38   < > 38   CALCIUM mg/dL 8 0* 8 2* 8 1*   < > 8 6   AST U/L  --   --   --   --  26   ALT U/L  --   --   -- --  20   ALK PHOS U/L  --   --   --   --  445*    < > = values in this interval not displayed  Results from last 7 days   Lab Units 06/03/22  1528   BLOOD CULTURE  No Growth at 72 hrs  No Growth at 72 hrs  Imaging reviewed personally  06/07/2022 CT chest abdomen pelvis:  Bilateral pleural effusions, worsening pulmonary parenchymal airspace opacities consistent with developing fibrosis  Right kush pelvis fluid collection slightly larger in size from CT scan from 05/24/2022    Subacute fracture of the L1 vertebral body noted

## 2022-06-08 PROBLEM — I48.0 PAROXYSMAL ATRIAL FIBRILLATION (HCC): Status: ACTIVE | Noted: 2022-01-01

## 2022-06-08 PROBLEM — D69.6 THROMBOCYTOPENIA (HCC): Status: ACTIVE | Noted: 2022-01-01

## 2022-06-08 NOTE — ASSESSMENT & PLAN NOTE
· In the setting of cardiac arrest while on 3 pressors noted to have afib with RVR  · Bolused with amio and placed on infusion     · Converted to sinus rhythm on 5/22   · Amio gtt d/c 5/23 but restarted on 6/7 due to RVR >100   · Pt now converted to  NSR

## 2022-06-08 NOTE — ASSESSMENT & PLAN NOTE
· Outpatient EGD and colonoscopy at [facility] on 5/11 for w/u of chronic anemia, history of colon polyps, intermittent LLQ abdominal pain and possible intermittent intussusception  · EGD unremarkable, colonoscopy technically difficult and required change from adult scope to pediatric scope, during traversal of the sigmoid colon there was a kink and patient sustained a perforation  · [facility]   · Emergent ex- lap on 5/11 > low anterior resection, protective loop transverse colostomy, flex sigmoidoscopy, and segmental small bowel resection  · Difficult post op course with post op ileus requiring NGT decompression and requirement of short duration of TPN   · 5/22: CT: Diffuse mild dilation of small bowel segments identified without transition point  · 5/24: CT CAP- Distended small bowel loops with scattered air-fluid levels consistent with early/partial small bowel obstruction with transition at the small bowel anastomosis in the region of the ventral pelvis as above  No free air  Cholelithiasis without cholecystitis  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    · 6/6: Gastrostomy tube inserted for nutrition by IR   · significantly decreased ostomy output (5 cc total) with complaints of abdominal pain   ·   CT Chest Abdomen Pelvis : Worsening pulmonary parenchymal airspace opacities, specifically without increasing density in the appearance of some developing fibrosis    Bilateral pleural effusions are similar in size but appears slightly more loculated than on most recent prior examination    Indeterminant fluid collection in the right hemipelvis which appears to be loculated and possibly "walled off" from the remainder of abdominopelvic ascites  This could represent an infected abscess, but is not significantly changed in size from May 24,   · 2022

## 2022-06-08 NOTE — ASSESSMENT & PLAN NOTE
· Secondary to hypoperfusion mehul cardiac arrest +/- ATN  · Baseline creat 0 8  · Creatinine peaked at 3 07  · CVVH started on 5/26 and stopped on 6/1  · Restarted on 6/2 after he was anuric with increasing oxygen requirements  · CRRT discontinued   iHD attempted however pt could not tolerate   · Avoid hypotension/hypoperfusion  · Continue levophed for MAP > 65

## 2022-06-08 NOTE — ASSESSMENT & PLAN NOTE
· Outpatient EGD and colonoscopy at [facility] on 5/11 for w/u of chronic anemia, history of colon polyps, intermittent LLQ abdominal pain and possible intermittent intussusception  · EGD unremarkable, colonoscopy technically difficult and required change from adult scope to pediatric scope, during traversal of the sigmoid colon there was a kink and patient sustained a perforation  · Transferred to [facility] for emergent [facility]   · Emergent ex- lap on 5/11 > low anterior resection, protective loop transverse colostomy, flex sigmoidoscopy, and segmental small bowel resection  · Difficult post op course with post op ileus requiring NGT decompression and requirement of short duration of TPN   · 5/22: CT: Diffuse mild dilation of small bowel segments identified without transition point  · 5/24: CT CAP- Distended small bowel loops with scattered air-fluid levels consistent with early/partial small bowel obstruction with transition at the small bowel anastomosis in the region of the ventral pelvis as above  No free air  Cholelithiasis without cholecystitis  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    · 6/6: Gastrostomy tube inserted for nutrition by IR   · significantly decreased ostomy output (5 cc total) with complaints of abdominal pain   ·   CT Chest Abdomen Pelvis : Worsening pulmonary parenchymal airspace opacities, specifically without increasing density in the appearance of some developing fibrosis    Bilateral pleural effusions are similar in size but appears slightly more loculated than on most recent prior examination    Indeterminant fluid collection in the right hemipelvis which appears to be loculated and possibly "walled off" from the remainder of abdominopelvic ascites  This could represent an infected abscess, but is not significantly changed in size from May 24,   · 2022

## 2022-06-08 NOTE — ASSESSMENT & PLAN NOTE
· Likely in setting of acute illness/delirium and cardiac arrest  · Delirium precautions; regulate sleep/wake cycle, environmental controls, daily CAM ICU   · Trend neuro exam  · Following commands, nodding appropriately to questions

## 2022-06-08 NOTE — ASSESSMENT & PLAN NOTE
· Secondary to hypoperfusion mehul cardiac arrest +/- ATN  · Baseline creat 0 8  · Creatinine peaked at 3 07  · CVVH started on 5/26 and stopped on 6/1  · Restarted on 6/2 after he was anuric with increasing oxygen requirements  · CRRT initially  discontinued  iHD attempted however pt could not tolerate   CRRT restarted on 6/9/22   · Avoid hypotension/hypoperfusion  · Continue levophed for MAP > 65

## 2022-06-08 NOTE — PROGRESS NOTES
Tami 45  Progress Note - Loreli Dose Becca 2/15/1931, 80 y o  male MRN: [id number]  Unit/Bed#: ICU 02 Encounter: [id number]  Primary Care Provider: Vinicius Colon MD   Date and time admitted to hospital: 5/11/2022  4:48 PM    Acute respiratory failure with hypoxia [facility])  Assessment & Plan  · Patient previously in ICU for hypoxia with a max of 15L midflow and concern for aspiration pneumonitis  · Transfered to general medical floor 5/21  · 5/21: PEA arrest x2, intubation  · 5/24 CT CAP-CHF with moderate basilar effusions and additional upper lung zone patchy airspace opacities likely reflecting asymmetric pulmonary edema  Infiltrate is not entirely excluded  · 5/29 Bronchoscopy for mucous plugging  · Day # 18 on ventilator: AC/PC 20/24/80/10  · Wean Fio2 as able for SPO2>90%  · PF ratio 65 - in severe ARDS  · Continue pulmonary hygiene/ VAP bundle  · Continue volume removal with CVVH  · Unstable and ventilator requirements too high at this time to consider trach/PEG  · Moderate BL effusions on 5/24 CT scan, unable to evaluate on CXR   · IR consulted for possible thoracentesis - but procedure was not attempted per IR due to the thought that the effusions were getting smaller     * Bowel perforation [facility])  Assessment & Plan  · Outpatient EGD and colonoscopy at [facility] on 5/11 for w/u of chronic anemia, history of colon polyps, intermittent LLQ abdominal pain and possible intermittent intussusception     · EGD unremarkable, colonoscopy technically difficult and required change from adult scope to pediatric scope, during traversal of the sigmoid colon there was a kink and patient sustained a perforation  · Transferred to \[facility]\"" for emergent surgery   · Emergent ex- lap on 5/11 > low anterior resection, protective loop transverse colostomy, flex sigmoidoscopy, and segmental small bowel resection  · Difficult post op course with post op ileus requiring NGT decompression and requirement of short duration of TPN   · 5/22: CT: Diffuse mild dilation of small bowel segments identified without transition point  · 5/24: CT CAP- Distended small bowel loops with scattered air-fluid levels consistent with early/partial small bowel obstruction with transition at the small bowel anastomosis in the region of the ventral pelvis as above  No free air  Cholelithiasis without cholecystitis  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  · 5/24: Stomal prolapse and small peristomal hernia now at the transverse loop colostomy; continues to be reduced by surgery  · TPN d/c'd on 5/26; tolerating tube feeds at goal  · 5/26: Abdominal wound vac placed bedside: continue with dressing changes Monday/Thursday   · Surgery continues to follow    · 6/6: Gastrostomy tube inserted for nutrition by IR   · significantly decreased ostomy output (5 cc total) with complaints of abdominal pain   ·   CT Chest Abdomen Pelvis : Worsening pulmonary parenchymal airspace opacities, specifically without increasing density in the appearance of some developing fibrosis    Bilateral pleural effusions are similar in size but appears slightly more loculated than on most recent prior examination    Indeterminant fluid collection in the right hemipelvis which appears to be loculated and possibly "walled off" from the remainder of abdominopelvic ascites  This could represent an infected abscess, but is not significantly changed in size from May 24,   · 2022      Paroxysmal atrial fibrillation (HCC)  Assessment & Plan  · In the setting of cardiac arrest while on 3 pressors noted to have afib with RVR  · Bolused with amio and placed on infusion  · Converted to sinus rhythm on 5/22   · Amio gtt d/c 5/23 but restarted on 6/7 due to RVR >100   · Pt now converted to  NSR    Severe sepsis [facility])  Assessment & Plan  Peritonitis with intra-abdominal/pelvic abscess secondary to colonic perforation    · 5/22 CT chest/ab/pelvis with concern of multifocal PNA, no visualized intraabdominal abscess or anastomotic leak   · Per ID suspect multifocal pneumonitis   · OR culture 5/21 pseudomonas and bacteroides fragilis  · Blood cultures on 5/22 negative   · Completed 14 days of Flagyl on 5/24  · Completed 14 days of cefepime on 5/27  · Restarted Zosyn on 6/3 for increasing WBC and oxygen requirements   · Zosyn day 6  · ID following, appreciate recommendations   · Fluid collection noted in hemipelvis on CT  IR consulted for drainage  Cardiac arrest [facility])  Assessment & Plan  · Patient was found unresponsive and hypoxic approximately 20 minutes after being seen well with family 5/21   · PEA arrest x 2  · Most likely respiratory driven  · Neurologically intact post arrest   · Continue telemetry monitoring     Thrombocytopenia ([facility] [address] )  Assessment & Plan  Pt has had low platelets with values ranging from 40s-80s     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 collected and pending       Acute on chronic anemia  Assessment & Plan  · As above     Hypotension  Assessment & Plan  · Levophed restarted on 6/1, vasopressin restarted on 6/3  · Wean for MAP goal >65  · Midodrine added 5 mg TID on 6/1 - unable to receive secondary to no enteral access   · Phenylephrine infusion will be added if MAP cannot be maintained with levophed and vasopressin       Anemia  Assessment & Plan  · Hemoglobin drop post cardiac arrest    · Noted to have gross hematuria but this has since resolved  · 5/21: 1 u PRBC  · 5/24: 1 u PRBC  · CT obtained on 5/24 and negative for any overt signs of bleeding  · 5/26: 1 u PRBC   · 5/30: 1 u PRBC  · 6/4: 1 U PRBC   · Continue to monitor and transfuse for hgb less than 7      Acute renal failure (ARF) ([facility] [address] )  Assessment & Plan  · Secondary to hypoperfusion mehul cardiac arrest +/- ATN  · Baseline creat 0 8  · Creatinine peaked at 3 07  · CVVH started on 5/26 and stopped on 6/1  · Restarted on 6/2 after he was anuric with increasing oxygen requirements  · CRRT discontinued   iHD attempted however pt could not tolerate   · Avoid hypotension/hypoperfusion  · Continue levophed for MAP > 65    Hyperglycemia  Assessment & Plan  · A1c 5 3%  · Continue SSI - minimal requirements     CHF (congestive heart failure) ([facility])  Assessment & Plan  Wt Readings from Last 3 Encounters:   06/08/22 78 3 kg (172 lb 9 9 oz)   05/11/22 62 1 kg (136 lb 14 4 oz)   03/25/22 61 2 kg (135 lb)     · 5/16: Echo-EF 65%, mild TR, mild MR  · 5/23: Repeat Echo post cardiac arrest: EF 60-65%, G1DD, mild AS (BRADY 1 5cm2), mild MR, mild TR  · Monitor I&O, Daily weights   · Limited ECHO-EF 65%, G1DD, mild AS  · Aggressive volume removal with CRRT, goal -100 mL/hr as tolerated       Toxic metabolic encephalopathy  Assessment & Plan  · Likely in setting of acute illness/delirium and cardiac arrest  · Delirium precautions; regulate sleep/wake cycle, environmental controls, daily CAM ICU   · Trend neuro exam  · Following commands, nodding appropriately to questions      ----------------------------------------------------------------------------------------  HPI/24hr events: Intermittent dialysis attempted with only 500mL removed  Pt's norepinephrine requirement escalated to 30mcg/min however it was decreased to 22 overnight  Pt was in atrial fibrillation with RVR yesterday however he converted back to sinus rhythm over night after amiodarone was started  Oxygenation requirements decreased to FiO2 of 65% with PEEP of 10       Patient appropriate for transfer out of the ICU today?: No  Disposition: Continue Critical Care   Code Status: Level 2 - DNAR: but accepts endotracheal intubation  ---------------------------------------------------------------------------------------  SUBJECTIVE  Pt is intubated    Review of Systems  Review of systems was reviewed and negative unless stated above in HPI/24-hour events   ---------------------------------------------------------------------------------------  OBJECTIVE    Vitals   Vitals: 22 0600 22 0615 22 0630 22 0645   BP:       Pulse: 82 86 85 74   Resp: (!) 31 (!) 33 (!) 25 (!) 29   Temp: 99 32 °F (37 4 °C) 99 32 °F (37 4 °C) 99 32 °F (37 4 °C) 99 32 °F (37 4 °C)   TempSrc:       SpO2: 95% 96% 100% 97%   Weight: 78 3 kg (172 lb 9 9 oz)      Height:         Temp (24hrs), Av °F (37 2 °C), Min:96 98 °F (36 1 °C), Max:100 22 °F (37 9 °C)  Current: Temperature: 99 32 °F (37 4 °C)  Arterial Line BP: 136/43  Arterial Line MAP (mmHg): 74 mmHg    Respiratory:  SpO2: SpO2: 97 %  Nasal Cannula O2 Flow Rate (L/min): 5 L/min    Invasive/non-invasive ventilation settings   Respiratory  Report   Lab Data (Last 4 hours)    None         O2/Vent Data (Last 4 hours)    None                Physical Exam  Constitutional:       Appearance: He is ill-appearing and toxic-appearing  Interventions: He is intubated  HENT:      Head: Normocephalic and atraumatic  Cardiovascular:      Rate and Rhythm: Normal rate and regular rhythm  Pulses: Normal pulses  Heart sounds: Normal heart sounds  Pulmonary:      Effort: He is intubated        Comments: Course breath sounds b/l   Musculoskeletal:      Comments: Anasarca    Skin:     Comments: Cool lower extremities   Neurological:      Comments: Opens eyes to voice, pupils reactive but sluggish, responds to painful stimuli             Laboratory and Diagnostics:  Results from last 7 days   Lab Units 22  0607 22  0558 22  0749 22  0555 22  1420 22  0554 22  0604 22  0527   WBC Thousand/uL 15 34* 16 74* 17 92* 18 30*  --  14 58* 21 58* 15 57*   HEMOGLOBIN g/dL 7 1* 7 3* 7 8* 8 0* 8 1* 6 9* 7 3* 7 3*   HEMATOCRIT % 22 9* 23 4* 24 2* 24 4*  --  21 0* 22 2* 22 3*   PLATELETS Thousands/uL 123* 87* 49* 86*  --  99* 133* 147*   NEUTROS PCT %  --   --   --  79*  --   --   --   --    BANDS PCT % 21*  --  11*  --   --   --  23*  --    MONOS PCT %  --   --   --  7  --   --   --   --    MONO PCT % 7 --  8  --   --   --  6  --      Results from last 7 days   Lab Units 06/08/22  0607 06/07/22  1444 06/07/22  0558 06/06/22  0749 06/06/22  0010 06/05/22  0555 06/04/22 2203 06/02/22 2204 06/02/22  0527   SODIUM mmol/L 135* 138 140 138 139 138 137   < > 137   POTASSIUM mmol/L 5 0 3 6 5 1 4 8 4 7 4 5 4 6   < > 4 5   CHLORIDE mmol/L 99* 97* 104 103 105 105 104   < > 103   CO2 mmol/L 22 25 21 23 23 24 24   < > 26   ANION GAP mmol/L 14* 16* 15* 12 11 9 9   < > 8   BUN mg/dL 28* 20 41* 26* 28* 20 20   < > 29*   CREATININE mg/dL 1 99* 1 33* 2 11* 1 51* 1 54* 1 17 1 12   < > 1 56*   CALCIUM mg/dL 7 6* 7 6* 8 0* 8 2* 8 1* 8 3 8 4   < > 8 6   GLUCOSE RANDOM mg/dL 116 87 97 111 109 79 88   < > 147*   ALT U/L  --   --   --   --   --   --   --   --  20   AST U/L  --   --   --   --   --   --   --   --  26   ALK PHOS U/L  --   --   --   --   --   --   --   --  445*   ALBUMIN g/dL  --   --   --   --   --   --   --   --  3 2*   TOTAL BILIRUBIN mg/dL  --   --   --   --   --   --   --   --  1 06*    < > = values in this interval not displayed  Results from last 7 days   Lab Units 06/08/22  0607 06/07/22  1444 06/07/22  0558 06/06/22  0749 06/06/22  0010 06/05/22  0555 06/04/22 2203 06/04/22  1420 06/04/22  0554   MAGNESIUM mg/dL 2 4 1 9 2 2 2 1 2 2 1 9 2 1 1 9 2 2   PHOSPHORUS mg/dL  --   --  5 2* 3 3 3 1 2 7 2 7 3 3 3 4      Results from last 7 days   Lab Units 06/06/22  1126   INR  1 52*          Results from last 7 days   Lab Units 06/03/22  1021   LACTIC ACID mmol/L 0 8     ABG:  Results from last 7 days   Lab Units 06/07/22  0746   PH ART  7 201*   PCO2 ART mm Hg 48 7*   PO2 ART mm Hg 70 4*   HCO3 ART mmol/L 18 6*   BASE EXC ART mmol/L -8 9   ABG SOURCE  Line, Arterial     VBG:  Results from last 7 days   Lab Units 06/07/22  0746   ABG SOURCE  Line, Arterial           Micro  Results from last 7 days   Lab Units 06/03/22  1528   BLOOD CULTURE  No Growth After 4 Days  No Growth After 4 Days  EKG: reviewed  Imaging:  I have personally reviewed pertinent reports  Intake and Output  I/O       06/06 0701 06/07 0700 06/07 0701 06/08 0700    I V  (mL/kg) 1269 9 (16 2) 500 (6 4)    Other  100    NG/     IV Piggyback 400 550    Total Intake(mL/kg) 2659 9 (34) 1150 (14 7)    Urine (mL/kg/hr) 50 (0) 100 (0 1)    Drains  300    Other  1100    Stool 250     Total Output 300 1500    Net +2359 9 -350          Unmeasured Stool Occurrence 1 x           Height and Weights   Height: 5' 4" (162 6 cm)  IBW (Ideal Body Weight): 59 2 kg  Body mass index is 29 63 kg/m²  Weight (last 2 days)     Date/Time Weight    06/08/22 0600 78 3 (172 62)    06/07/22 0600 78 3 (172 62)    06/06/22 0541 77 6 (171 08)            Nutrition       Diet Orders   (From admission, onward)             Start     Ordered    06/07/22 1625  Diet NPO  Diet effective now        Comments: Demond - one packet with breakfast and dinner  Pro source - one packet with lunch   References:    Nutrtion Support Algorithm Enteral vs  Parenteral   Question Answer Comment   Diet Type NPO    RD to adjust diet per protocol?  Yes        06/07/22 1624    05/12/22 0906  Room Service  Once        Question:  Type of Service  Answer:  Room Service - Appropriate with Assistance    05/12/22 0905                  Active Medications  Scheduled Meds:  Current Facility-Administered Medications   Medication Dose Route Frequency Provider Last Rate    acetaminophen  650 mg Oral Q6H PRN ISELA Stevenson      amiodarone  0 5 mg/min Intravenous Continuous Mayo Cranker Rudd, PA-C 0 5 mg/min (06/07/22 6156)    chlorhexidine  15 mL Mouth/Throat Q12H [facility] ISELA Stevenson      dexmedetomidine  0 1-1 5 mcg/kg/hr Intravenous Titrated Unk Gamma, CRNP 1 mcg/kg/hr (06/08/22 0523)    gabapentin  200 mg Oral HS ISELA Layton      HYDROmorphone  0 5 mg Intravenous Q3H PRN [employer], CRNP      insulin lispro  1-6 Units Subcutaneous Q6H [facility] ISELA Layton  iohexol  50 mL Oral Once in imaging Charly Alstrom, CRNP      ipratropium-albuterol  3 mL Nebulization Q6H PRN Charly Alstrom, CRNP      levothyroxine  112 mcg Per NG Tube Early Morning Charly Alstrom, CRNP      lidocaine  2 patch Topical Daily [place], [place]      midodrine  10 mg Oral TID AC Santiago Carry, [address]      norepinephrine  1-30 mcg/min Intravenous Titrated Gonzales Anguiano Lily Fend, [address] 22 mcg/min (06/08/22 0348)    ondansetron  4 mg Intravenous Q6H PRN Charly Alstrom, CRNP      oxyCODONE  5 mg Per NG Tube Q4H PRN Michelle Ro, CRNP      Or    oxyCODONE  7 5 mg Per NG Tube Q4H PRN Michelle Ro, CRNP      pantoprazole  40 mg Intravenous Q24H [address] Charly Alstrom, CRNP      piperacillin-tazobactam  3 375 g Intravenous Q6H Darius Mayo PA-C Stopped (06/08/22 0437)    polyethylene glycol  17 g Oral Daily Josephine Onita Cassis, CRNP      vasopressin  0 04 Units/min Intravenous Continuous Josephine Onita Cassis, CRNP 0 04 Units/min (06/07/22 2355)     Continuous Infusions:  amiodarone, 0 5 mg/min, Last Rate: 0 5 mg/min (06/07/22 2317)  dexmedetomidine, 0 1-1 5 mcg/kg/hr, Last Rate: 1 mcg/kg/hr (06/08/22 0523)  norepinephrine, 1-30 mcg/min, Last Rate: 22 mcg/min (06/08/22 0348)  vasopressin, 0 04 Units/min, Last Rate: 0 04 Units/min (06/07/22 2355)      PRN Meds:   acetaminophen, 650 mg, Q6H PRN  HYDROmorphone, 0 5 mg, Q3H PRN  iohexol, 50 mL, Once in imaging  ipratropium-albuterol, 3 mL, Q6H PRN  ondansetron, 4 mg, Q6H PRN  oxyCODONE, 5 mg, Q4H PRN   Or  oxyCODONE, 7 5 mg, Q4H PRN        Invasive Devices Review  Invasive Devices  Report    Peripherally Inserted Central Catheter Line  Duration           PICC Line 81/08/48 Right Basilic 20 days          Arterial Line  Duration           Arterial Line 05/30/22 Radial 8 days          Hemodialysis Catheter  Duration           HD Temporary Double Catheter 12 days          Drain  Duration           Colostomy LLQ 28 days Gastrostomy/Enterostomy Percutaneous Interventional Gastrostomy 16 Fr  LUQ 1 day          Airway  Duration           ETT  Oral 17 days                Rationale for remaining devices: respiratory failure secondary to ARDS  ---------------------------------------------------------------------------------------  Advance Directive and Living Will:      Power of :    POLST:    ---------------------------------------------------------------------------------------  Care Time Delivered:   Upon my evaluation, this patient had a high probability of imminent or life-threatening deterioration due to respiratory failure with inability to be extubated due to O2 requirements , which required my direct attention, intervention, and personal management  I have personally provided 30 minutes (630  to 700) of critical care time, exclusive of procedures, teaching, family meetings, and any prior time recorded by providers other than myself  Tiago Jackson,       Portions of the record may have been created with voice recognition software  Occasional wrong word or "sound a like" substitutions may have occurred due to the inherent limitations of voice recognition software    Read the chart carefully and recognize, using context, where substitutions have occurred

## 2022-06-08 NOTE — ASSESSMENT & PLAN NOTE
Peritonitis with intra-abdominal/pelvic abscess secondary to colonic perforation    · 5/22 CT chest/ab/pelvis with concern of multifocal PNA, no visualized intraabdominal abscess or anastomotic leak   · Per ID suspect multifocal pneumonitis   · OR culture 5/21 pseudomonas and bacteroides fragilis  · Blood cultures on 5/22 negative   · Completed 14 days of Flagyl on 5/24  · Completed 14 days of cefepime on 5/27  · Restarted Zosyn on 6/3 for increasing WBC and oxygen requirements   · Zosyn day 7  · ID following, appreciate recommendations   · Fluid collection noted in hemipelvis on CT  FAYE tube placedon 6/8/22 and 30cc of pus drained   Cultures pending   · WBC continues to improve on current Abx

## 2022-06-08 NOTE — CONSULTS
e-Consult (IPC)  - Interventional Radiology  Mohini Coley 80 y o  male MRN: [id number]  Unit/Bed#: ICU 02 Encounter: [id number]    Interventional Radiology has been consulted to evaluate Mohini Coley    We were consulted by Amy Ring PA-C concerning this patient with sepsis and a pelvic fluid collection  IP Consult to IR  Consult performed by: Homer Blevins MD  Consult ordered by: Claudia Villa PA-C        06/08/22    Assessment/Recommendation:   CT scan shows a posterior pelvic fluid collection that could be infected but difficult to say  IR was consulted for possible drainage of this collection  The patient would need to be placed prone on the CT table to perform this or at least left side up  Due to the patient's instability this is not felt to be safe after discussion with the ICU and surgical teams  For now, they wish to hold off on any pelvic drainage procedure knowing that this collection requires difficult patient positioning  They will reconsult IR at a later date  For now, we will attempt bilateral thoracentesis which was not performed the other day after the gastrostomy tube due to stability reasons  Total time spent in review of data, discussion with requesting provider and rendering advice was 30 minutes  Thank you for allowing Interventional Radiology to participate in the care of Fady Hudson  Please don't hesitate to call or TigerText us with any questions       Homer Blevins MD

## 2022-06-08 NOTE — ASSESSMENT & PLAN NOTE
Wt Readings from Last 3 Encounters:   06/08/22 78 3 kg (172 lb 9 9 oz)   05/11/22 62 1 kg (136 lb 14 4 oz)   03/25/22 61 2 kg (135 lb)     · 5/16: Echo-EF 65%, mild TR, mild MR  · 5/23: Repeat Echo post cardiac arrest: EF 60-65%, G1DD, mild AS (BRADY 1 5cm2), mild MR, mild TR  · Monitor I&O, Daily weights   · Limited ECHO-EF 65%, G1DD, mild AS  · Aggressive volume removal with CRRT, goal -100 mL/hr as tolerated Additional Anesthesia Volume In Cc: 6

## 2022-06-08 NOTE — BRIEF OP NOTE (RAD/CATH)
INTERVENTIONAL RADIOLOGY PROCEDURE NOTE    Date: 6/8/2022    Procedure: IR DRAINAGE TUBE PLACEMENT     Preoperative diagnosis:   1  Toxic metabolic encephalopathy    2  Bowel perforation ([facility])    3  Abdominal pain    4  Hypoxia    5  Pneumonia of both lungs due to infectious organism, unspecified part of lung    6  Severe sepsis ([address] )    7  Acute respiratory failure with hypoxia (HCC)    8  Hypokalemia    9  Cardiac arrest ([address] )    10  MARYELLEN (acute kidney injury) ([address] )    11  L1 vertebral fracture ([facility])    12  Pelvic fluid collection       Postoperative diagnosis: Same  Surgeon: Uzma Gallego MD     Assistant: None  No qualified resident was available  Blood loss: minimal    Specimens: 30 cc of pus    Findings: Successful CT guided right transgluteal drainage tube placement into a lower pelvic abscess  Plan:  Tube to FAYE bulb suction, flush q8 hours and return in 2 weeks for a tube check  Complications: None immediate      Anesthesia: local

## 2022-06-08 NOTE — PHYSICAL THERAPY NOTE
PHYSICAL THERAPY     06/08/22 1531   Note Type   Note Type Cancelled Session   Cancel Reasons Medical status   Licensure   [place] License Number  [facility] [id number]

## 2022-06-08 NOTE — ASSESSMENT & PLAN NOTE
Wt Readings from Last 3 Encounters:   06/09/22 82 1 kg (181 lb)   05/11/22 62 1 kg (136 lb 14 4 oz)   03/25/22 61 2 kg (135 lb)     · 5/16: Echo-EF 65%, mild TR, mild MR  · 5/23: Repeat Echo post cardiac arrest: EF 60-65%, G1DD, mild AS (BRADY 1 5cm2), mild MR, mild TR  · Monitor I&O, Daily weights   · Limited ECHO-EF 65%, G1DD, mild AS  · Aggressive volume removal with CRRT, goal -100 mL/hr as tolerated

## 2022-06-08 NOTE — ASSESSMENT & PLAN NOTE
· Patient previously in ICU for hypoxia with a max of 15L midflow and concern for aspiration pneumonitis  · Transfered to general medical floor 5/21  · 5/21: PEA arrest x2, intubation  · 5/24 CT CAP-CHF with moderate basilar effusions and additional upper lung zone patchy airspace opacities likely reflecting asymmetric pulmonary edema  Infiltrate is not entirely excluded     · 5/29 Bronchoscopy for mucous plugging  · Day # 18 on ventilator: AC/PC 20/24/80/10  · Wean Fio2 as able for SPO2>90%  · PF ratio 65 - in severe ARDS  · Continue pulmonary hygiene/ VAP bundle  · Continue volume removal with CVVH  · Unstable and ventilator requirements too high at this time to consider trach/PEG  · Moderate BL effusions on 5/24 CT scan, unable to evaluate on CXR   · IR consulted for possible thoracentesis - but procedure was not attempted per IR due to the thought that the effusions were getting smaller

## 2022-06-08 NOTE — CASE MANAGEMENT
Case Management Progress Note    Patient name Amanda Munoz  Location ICU 02/ICU 02 MRN [id number]  : 2/15/1931 Date 2022       LOS (days): 28  Geometric Mean LOS (GMLOS) (days): 6 80  Days to GMLOS:-21 2        OBJECTIVE:     Current admission status: Inpatient  Preferred Pharmacy:   [id number] [address] [address]  Phone: [phone] Fax: [phone]    [id number] [place], [address],   [address]  [place] [address]  Phone: [phone] Fax: [phone]    Primary Care Provider: Krystina Delcid MD    Primary Insurance: MEDICARE  Secondary Insurance: AARP    PROGRESS NOTE:    SW continues to follow  Patient found to have intra-abdominal/pelvic abscess  Drain was placed by IR today  Will continue IVABX  Per Attending, will plan for thoracentesis tomorrow  Pt's FIO2 setting has improved to 60%  PEEP still is 10  Pt restarted on CVVHD today  SW discussed readdressing goals of care with family as a follow-up to last weeks Care Team Meeting  Will monitor pt for improvement in the next two days and will plan for another meeting on Friday  Attending aware that both brothers should be present (or on the phone) as Abril Galdamez historically does not want to make any decisions without first discussing with Anthony Anderson

## 2022-06-08 NOTE — PROGRESS NOTES
[facility] PROGRESS NOTE   Kota Mathur 80 y o  male MRN: [id number]  Unit/Bed#: ICU 02 Encounter: [id number]  Reason for Consult: MARYELLEN    ASSESSMENT and PLAN:  80year old with HTN, aortic stenosis, GERD, CAD who underwent EGD and C-scope on 9/90/91 which was complicated by perforation requiring emergent ex-lap  Since then, course has been complicated by PEA arrest on 5/21/22, VDRF, pneumonia, sepsis  Renal consulted for MARYELLEN    1  Acute kidney injury:  · Baseline creatinine is 0 7-0 9  · Etiology is ATN in the setting of hypotension cardiac arrest  · Peak creatinine 3 07 on May 26, 2022  · HD dependent since 5/26/22  CVVHD from 5/26/22 to 6/6/22  · Did not tolerate iHD yesterday  · Plan to restart CVVHD today  2  Ventilator dependent respiratory failure:  · Currently FiO2 of 60% with PEEP of 10  · Defer to Critical Care  · Fluid removal is limited by hypotension  3  Hypotension:  · Continue norepinephrine and vasopressin for BP support  · Continue Midodrine 10 mg TID  4  Sepsis:  · Currently on Zosyn  · ID following  5  Congestive heart failure:  · S/p fluid removal with CVVHD  · Anticipating need for more fluid removal but this is limited by low BP  6  Anemia, thrombocytopenia:  · Platelets up to 437      7  PEA cardiac arrest on 5/21/22  8  Pneumoperitoneum s/p ex-lap with LAR on 5/11/22    DISPOSITION:  · Restart CVVHD  · Run even on CVVHD  The above plan was discussed with Dr Smith Inch  SUBJECTIVE / 24H INTERVAL HISTORY:  Had intermittent HD yesterday - did not do too well  BP dropped with minimal fluid removal and was run even the rest of the way  Remains on vasopressin  Levophed dose is up to 22 mcg/kg/min  FiO2 is 60% with PEEP of 10  In the end - only 0 5 kg fluid removed       OBJECTIVE:  Current Weight: Weight - Scale: 78 3 kg (172 lb 9 9 oz)  Vitals:    06/08/22 0615 06/08/22 0630 06/08/22 0645 06/08/22 0801   BP:       Pulse: 86 85 74 Resp: (!) 33 (!) 25 (!) 29    Temp: 99 32 °F (37 4 °C) 99 32 °F (37 4 °C) 99 32 °F (37 4 °C)    TempSrc:       SpO2: 96% 100% 97% 96%   Weight:       Height:           Intake/Output Summary (Last 24 hours) at 2022 0830  Last data filed at 2022 0600  Gross per 24 hour   Intake 3492 19 ml   Output 1500 ml   Net  19 ml     General: critically ill appearing on the mechanical ventilator, comfortable  Skin: warm, dry, good turgor  Eyes: closed  ENT: ETT in place  Neck: supple  Chest/Lungs: equal chest expansion, coarse breath sounds  CVS: distinct heart sounds, normal rate, regular rhythm, no rub  Abdomen: soft, (+) ostomy, (+) G tube, (+) wound vac  Extremities: (+) anasarca  : no quigley catheter  Neuro: sedated on the mechanical ventilator  Psych: could not evaluate       Medications:    Current Facility-Administered Medications:     acetaminophen (TYLENOL) oral suspension 650 mg, 650 mg, Oral, Q6H PRN, ISELA Warren, 650 mg at 22 1512    [] amiodarone (CORDARONE) 900 mg in dextrose 5 % 500 mL infusion, 1 mg/min, Intravenous, Continuous, Stopped at 22 **FOLLOWED BY** amiodarone (CORDARONE) 900 mg in dextrose 5 % 500 mL infusion, 0 5 mg/min, Intravenous, Continuous, Belita Viviane Little PA-C, Last Rate: 16 7 mL/hr at 22, 0 5 mg/min at 22    chlorhexidine (PERIDEX) 0 12 % oral rinse 15 mL, 15 mL, Mouth/Throat, Q12H [address], ISELA Warren, 15 mL at 22    dexmedeTOMIDine (Precedex) 400 mcg in sodium chloride 0 9% 100 mL, 0 1-1 5 mcg/kg/hr, Intravenous, Titrated, ISELA Huang, Last Rate: 19 5 mL/hr at 22, 1 mcg/kg/hr at 22    gabapentin (NEURONTIN) oral solution 200 mg, 200 mg, Oral, HS, ISELA Ritter, 200 mg at 228    HYDROmorphone (DILAUDID) injection 0 5 mg, 0 5 mg, Intravenous, Q3H PRN, ISELA Donnelly, 0 5 mg at 22 0113    insulin lispro (HumaLOG) 100 units/mL subcutaneous injection 1-6 Units, 1-6 Units, Subcutaneous, Q6H [address], 1 Units at 06/05/22 0612 **AND** Fingerstick Glucose (POCT), , , Q6H, ISELA Ritter    iohexol (OMNIPAQUE) 240 MG/ML solution 50 mL, 50 mL, Oral, Once in imaging, Kaylee Low, CRNP    ipratropium-albuterol (DUO-NEB) 0 5-2 5 mg/3 mL inhalation solution 3 mL, 3 mL, Nebulization, Q6H PRN, Kaylee Low, CRNP, 3 mL at 06/04/22 0757    levothyroxine tablet 112 mcg, 112 mcg, Per NG Tube, Early Morning, Kaylee Low, CRNP, 112 mcg at 06/08/22 0526    lidocaine (LIDODERM) 5 % patch 2 patch, 2 patch, Topical, Daily, Shakira Little PA-C, 2 patch at 06/08/22 0827    midodrine (PROAMATINE) tablet 10 mg, 10 mg, Oral, TID AC, [employer], CRNP, 10 mg at 06/08/22 5164    norepinephrine (LEVOPHED) 8 mg (DOUBLE CONCENTRATION) IV in sodium chloride 0 9% 250 mL, 1-30 mcg/min, Intravenous, Titrated, [employer], CRNP, Last Rate: 41 3 mL/hr at 06/08/22 0348, 22 mcg/min at 06/08/22 0348    ondansetron (ZOFRAN) injection 4 mg, 4 mg, Intravenous, Q6H PRN, Kaylee Low, CRNP    oxyCODONE (ROXICODONE) oral solution 5 mg, 5 mg, Per NG Tube, Q4H PRN, 5 mg at 06/03/22 1804 **OR** oxyCODONE (ROXICODONE) oral solution 7 5 mg, 7 5 mg, Per NG Tube, Q4H PRN, Patricia Radish, CRNP    pantoprazole (PROTONIX) injection 40 mg, 40 mg, Intravenous, Q24H [address], Kaylee Low, CRNP, 40 mg at 06/08/22 0827    piperacillin-tazobactam (ZOSYN) 3 375 g in sodium chloride 0 9 % 100 mL IVPB, 3 375 g, Intravenous, Q6H, Adolfo Gonzalez PA-C, Stopped at 06/08/22 0437    polyethylene glycol (MIRALAX) packet 17 g, 17 g, Oral, Daily, ISELA Ritter, 17 g at 06/08/22 0827    vasopressin (PITRESSIN) 20 Units in sodium chloride 0 9 % 100 mL infusion, 0 04 Units/min, Intravenous, Continuous, ISELA Ritter, Last Rate: 12 mL/hr at 06/07/22 2355, 0 04 Units/min at 06/07/22 2355    Laboratory Results:  Results from last 7 days Lab Units 06/08/22  0607 06/07/22  1444 06/07/22  0558 06/06/22  0749 06/06/22  0010 06/05/22  0555 06/04/22  2203 06/04/22  1420 06/04/22  0554 06/03/22  1308 06/03/22  0604 06/02/22  2204 06/02/22  0527   WBC Thousand/uL 15 34*  --  16 74* 17 92*  --  18 30*  --   --  14 58*  --  21 58*  --  15 57*   HEMOGLOBIN g/dL 7 1*  --  7 3* 7 8*  --  8 0*  --  8 1* 6 9*  --  7 3*  --  7 3*   HEMATOCRIT % 22 9*  --  23 4* 24 2*  --  24 4*  --   --  21 0*  --  22 2*  --  22 3*   PLATELETS Thousands/uL 123*  --  87* 49*  --  86*  --   --  99*  --  133*  --  147*   POTASSIUM mmol/L 5 0 3 6 5 1 4 8 4 7 4 5 4 6 4 8 5 0   < > 4 9   < > 4 5   CHLORIDE mmol/L 99* 97* 104 103 105 105 104 103 104   < > 104   < > 103   CO2 mmol/L 22 25 21 23 23 24 24 25 26   < > 25   < > 26   BUN mg/dL 28* 20 41* 26* 28* 20 20 20 23   < > 27*   < > 29*   CREATININE mg/dL 1 99* 1 33* 2 11* 1 51* 1 54* 1 17 1 12 1 09 1 15   < > 1 37*   < > 1 56*   CALCIUM mg/dL 7 6* 7 6* 8 0* 8 2* 8 1* 8 3 8 4 8 3 8 5   < > 8 3   < > 8 6   MAGNESIUM mg/dL 2 4 1 9 2 2 2 1 2 2 1 9 2 1 1 9 2 2   < > 1 9   < > 1 9   PHOSPHORUS mg/dL  --   --  5 2* 3 3 3 1 2 7 2 7 3 3 3 4   < > 2 9   < > 2 7    < > = values in this interval not displayed

## 2022-06-08 NOTE — ASSESSMENT & PLAN NOTE
Peritonitis with intra-abdominal/pelvic abscess secondary to colonic perforation    · 5/22 CT chest/ab/pelvis with concern of multifocal PNA, no visualized intraabdominal abscess or anastomotic leak   · Per ID suspect multifocal pneumonitis   · OR culture 5/21 pseudomonas and bacteroides fragilis  · Blood cultures on 5/22 negative   · Completed 14 days of Flagyl on 5/24  · Completed 14 days of cefepime on 5/27  · Restarted Zosyn on 6/3 for increasing WBC and oxygen requirements   · Zosyn day 6  · ID following, appreciate recommendations   · Fluid collection noted in hemipelvis on CT  IR consulted for drainage

## 2022-06-08 NOTE — ASSESSMENT & PLAN NOTE
· Patient previously in ICU for hypoxia with a max of 15L midflow and concern for aspiration pneumonitis  · Transfered to general medical floor 5/21  · 5/21: PEA arrest x2, intubation  · 5/24 CT CAP-CHF with moderate basilar effusions and additional upper lung zone patchy airspace opacities likely reflecting asymmetric pulmonary edema  Infiltrate is not entirely excluded     · 5/29 Bronchoscopy for mucous plugging  · Day # 19 on ventilator: AC/PC 24/24/65/10  · Wean Fio2 as able for SPO2>90%  · PF ratio 65 - in severe ARDS  · precedex discontinued due to bradycardia   · Continue pulmonary hygiene/ VAP bundle  · Continue volume removal with CVVH  · Unstable and ventilator requirements too high at this time to consider trach/PEG  · Moderate BL effusions on 5/24 CT scan, unable to evaluate on CXR   · IR consulted for possible thoracentesis - but procedure was not attempted per IR due to the thought that the effusions were getting smaller

## 2022-06-08 NOTE — PROGRESS NOTES
Progress Note - Infectious Disease   Fady Hudson 80 y o  male MRN: [id number]  Unit/Bed#: ICU 02 Encounter: [id number]      Impression/Plan:  1  s/p cardiac arrest 5/21/22  Patient intubated during RR  In ICU on ventilator assistance  Recurrent SIRS possibly reactive to arrest with fever, tachycardia, tachypnea, and increased leukocytosis post arrest  Possible recurrent aspiration event s/p arrest  Fever down trended   Patient continues to have hypotension episodes that appear to be related to volume status, patient back on Levophed and vasopressin  -continue supportive measures per critical care team  -cardiology on board  -ventilator management per critical care team     2  SIRS, evolving on admission, post #1 and with persistent hypotension support with dialysis   Evidenced by tachycardia, tachypnea, leukocytosis, hypotension   Suspect possible aspiration and/or volume overload   MRSA screen negative   WBC fluctuating  5/22/22 Blood cultures have no growth   Patient completed 2 week antibiotic course 5/27/22 06/03/2022 blood cultures x2 negative at 72 hours   Patient remains on blood pressure support with on Levophed and vasopressin   On Zosyn day 6  -continues Zosyn at present  -follow-up repeat blood cultures  -monitor temperature and hemodynamics  -serial exam  -monitor CBC and BMP   -pressor support per Critical Medicine Service     3  Peritonitis s/p Bowel perforation  s/p 5/11/22 exploratory laparotomy, low anterior resection, protective loop transverse colostomy and segmental small bowel resection secondary to perforation rectosigmoid junction after colonoscopy   OR cultures grew Pseudomonas aeruginosa and Bacteroides fragilis   Patient completed antibiotic course 5/27/22   IR PEG tube placed 06/06/2022 06/07/2022 CT chest abdomen pelvis:  Right kush pelvis indeterminant fluid collection slightly larger in size from CT scan from 05/24/2022   -continue Zosyn at present as above  -VAC/wound care per surgery  -follow up Peg function  -IR consulted for kush pelvic fluid collection drainage attempt  Patient's clinical status too tenuous to place patient in prone position required for IR drain placement      4   Acute hypoxic respiratory failure with hypoxia   Suspicious for aspiration pneumonitis over pneumonia     6/6/22 CT C/A/P Bilateral pleural effusions, worsening pulmonary parenchymal airspace opacities consistent with developing fibrosis  Patient remains intubated s/p #1  5/16/22 Procalcitonin level  2 60 > 5/21 0 753 < 5/23/22 2 97 > 5/30 1 26  Patient is status post bronchoscopy 5/29/22 for RUL collapse  5/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  -observe off further antibiotic  -ventilator management per critical care team      5  Aspiration pneumonitis versus pneumonia in setting of #1/3   5/22/22 CT C/A/P predominantly UL groundglass and consolidations, bilateral pleural effusions, SB mild dilation unchanged  Patient now intubated s/p #1  5/16/22 Procalcitonin level  2 60 > 5/21 0 753 < 5/23/22 2 97 > 5/30 1 26  5/17/22 sputum specimen oral pharyngeal contamination    06/06/2022 portable chest x-ray with bilateral pleural effusions  -continue Zosyn as above  -secretion and pulmonary toilet management per critical care team   -monitor respiratory symptoms  -monitor vent requirements     6  MARYELLEN on CKD 3   Creatinine 2 1 CVVH on hold today, on intermittent HD  -renal dose adjust medications as needed  -volume management per Nephrology  -iHD management per Nephrology  -monitor creatinine and urinary output     Antibiotics:  Zosyn D6     Above impression and plan discussed in detail with patient's RN, Dr Mauro Morales team   Prognosis guarded     Subjective:  Patient had temp to 101 3 F 6/5/22, no chills, sweats reported on ventilator in ICU s/p cardiac arrest 5/21/22; no nausea, vomiting, diarrhea reported  CVVH started 5/26/22 and restarted 6/8/22 after short intermittent HD attempt due to BP instability  patient's blood pressure remains requiring dual blood pressure support with Levophed and vasopressin  Objective:  Vitals:  Temp:  [97 16 °F (36 2 °C)-100 22 °F (37 9 °C)] 97 16 °F (36 2 °C)  HR:  [] 83  Resp:  [9-37] 24  BP: ()/(39-62) 113/53  SpO2:  [94 %-100 %] 95 %  Temp (24hrs), Av 2 °F (37 3 °C), Min:97 16 °F (36 2 °C), Max:100 22 °F (37 9 °C)  Current: Temperature: (!) 97 16 °F (36 2 °C)    Physical Exam:   General Appearance:  80year-old acute on chronically debilitated elderly male, propped resting comfortably in ICU bed on ventilator at FiO2 of 60%, on Precedex    Throat: Oropharynx moist without lesions  Endotracheal tube in place   Lungs:   Fairly clear ventilated breath sounds to auscultation bilaterally; scant blood-tinged secretions in suction tubing   Heart:  RRR; + murmur   Abdomen:   Soft, no focal tenderness, protuberant, positive bowel sounds, midline abdominal wound VAC in place with moderate serous output in canister, wound edges without erythema or purulence, left lower quadrant ostomy with loose stool in bag, peg tube capped     Extremities: Generalized puffy edema, venodynes, and Prevalon boots place   : [place] out, no SPT, 4+ soft scrotal edema   Skin: No new rashes or lesions  Right arm PICC and right neck IJ CVP lines in place  CVVH running via CVP line         Labs, Imaging, & Other studies:   All pertinent labs and imaging studies were personally reviewed  Results from last 7 days   Lab Units 22  0607 22  0558 22  0749   WBC Thousand/uL 15 34* 16 74* 17 92*   HEMOGLOBIN g/dL 7 1* 7 3* 7 8*   PLATELETS Thousands/uL 123* 87* 49*     Results from last 7 days   Lab Units 22  1053 22  0607 22  1444 22  0558 22  2204 22  0527   SODIUM mmol/L  --  135* 138 140   < > 137   POTASSIUM mmol/L  --  5 0 3 6 5 1   < > 4 5   CHLORIDE mmol/L  --  99* 97* 104   < > 103   CO2 mmol/L  --  22 25 21   < > 26 BUN mg/dL  --  28* 20 41*   < > 29*   CREATININE mg/dL  --  1 99* 1 33* 2 11*   < > 1 56*   EGFR ml/min/1 73sq m  --  28 46 26   < > 38   CALCIUM mg/dL  --  7 6* 7 6* 8 0*   < > 8 6   AST U/L 29  --   --   --   --  26   ALT U/L 14  --   --   --   --  20   ALK PHOS U/L 191*  --   --   --   --  445*    < > = values in this interval not displayed  Results from last 7 days   Lab Units 06/03/22  1528   BLOOD CULTURE  No Growth After 4 Days  No Growth After 4 Days

## 2022-06-08 NOTE — SEDATION DOCUMENTATION
Pt tolerated right pelvic abscess tube drainage  35ml brown fluids removed  Specimen collected and sent to lab  Pt being transferred to to ICU IP bed accompanied by respiratory and ICU nurse  Report given to recievning nurse

## 2022-06-08 NOTE — ASSESSMENT & PLAN NOTE
· Levophed restarted on 6/1, vasopressin restarted on 6/3  · Wean for MAP goal >65  · Midodrine added 5 mg TID on 6/1 - unable to receive secondary to no enteral access   · Phenylephrine infusion will be added if MAP cannot be maintained with levophed and vasopressin

## 2022-06-09 NOTE — PROGRESS NOTES
Progress Note - Infectious Disease   Fady Hudson 80 y o  male MRN: [id number]  Unit/Bed#: ICU 02 Encounter: [id number]      Impression/Plan:  1  s/p cardiac arrest 5/21/22  Patient intubated during RR  In ICU on ventilator assistance  Recurrent SIRS possibly reactive to arrest with fever, tachycardia, tachypnea, and increased leukocytosis post arrest  Possible recurrent aspiration event s/p arrest  Fever down trended   Patient continued to have hypotension episodes that appear to be related to volume status, patient off vasopressors at moment  -continue supportive measures per critical care team  -cardiology on board  -ventilator management per critical care team     2  SIRS, evolving on admission, post #1 and with persistent hypotension support with dialysis   Evidenced by tachycardia, tachypnea, leukocytosis, hypotension   Suspect possible aspiration and/or volume overload   MRSA screen negative  WBC fluctuating  5/22/22 Blood cultures have no growth   Patient completed 2 week antibiotic course 5/27/22 06/03/2022 blood cultures x2 negative at 5 days  Patient remains off blood pressure support as of 06/09/2022 at present  On Zosyn day 7  -continues Zosyn at present  -add vancomycin as below  -monitor temperature and hemodynamics  -serial exam  -monitor CBC and BMP   -pressor support per Critical Medicine Service     3  Peritonitis s/p Bowel perforation  s/p 5/11/22 exploratory laparotomy, low anterior resection, protective loop transverse colostomy and segmental small bowel resection secondary to perforation rectosigmoid junction after colonoscopy   OR cultures grew Pseudomonas aeruginosa and Bacteroides fragilis   Patient completed antibiotic course 5/27/22   IR PEG tube placed 06/06/2022 06/07/2022 CT chest abdomen pelvis:  Right kush pelvis indeterminant fluid collection slightly larger in size from CT scan from 05/24/2022 06/08/2022 Patient status post IR right pelvic abscess drainage tube placement  35 mL of brown fluid removed growing Staph aureus  -continue Zosyn at present as above  -add vancomycin pending Staph aureus sensitivities  -consult pharmacy for vancomycin trough dosing management  -VAC/wound care per surgery  -follow up Peg function  -IR drainage tube management     4   Acute hypoxic respiratory failure with hypoxia   Suspicious for aspiration pneumonitis over pneumonia     6/6/22 CT C/A/P Bilateral pleural effusions, worsening pulmonary parenchymal airspace opacities consistent with developing fibrosis  Patient remains intubated s/p #1  5/16/22 Procalcitonin level  2 60 > 5/21 0 753 < 5/23/22 2 97 > 5/30 1 26  Patient is status post bronchoscopy 5/29/22 for RUL collapse  5/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  -ventilator management per critical care team      5  Aspiration pneumonitis versus pneumonia in setting of #1/3   5/22/22 CT C/A/P predominantly UL groundglass and consolidations, bilateral pleural effusions, SB mild dilation unchanged  Patient now intubated s/p #1  5/16/22 Procalcitonin level  2 60 > 5/21 0 753 < 5/23/22 2 97 > 5/30 1 26  5/17/22 sputum specimen oral pharyngeal contamination    06/06/2022 portable chest x-ray with bilateral pleural effusions  -continue antibiotics for as above  -secretion and pulmonary toilet management per critical care team   -monitor respiratory symptoms  -monitor vent requirements     6  MARYELLEN on CKD 3   Creatinine 2 1 CVVH on   -renal dose adjust medications as needed  -volume management per Nephrology  -CVVH management per Nephrology  -monitor creatinine and urinary output     Antibiotics:  Zosyn D7/Vancomycin D1     Above impression and plan discussed in detail with patient's RN and critical care team      Subjective:  Patient no fever, chills, sweats reported on ventilator in ICU s/p cardiac arrest 5/21/22; no nausea, vomiting, diarrhea reported  CVVH started 5/26/22 and restarted 6/8/22 after short intermittent HD attempt due to BP instability  patient's blood pressure improved at present and off vasopressors  Patient status post IR right pelvic abscess drainage tube placement  35 mL of brown fluid removed initially and now tube left in place  Objective:  Vitals:  Temp:  [95 °F (35 °C)-98 5 °F (36 9 °C)] 98 5 °F (36 9 °C)  HR:  [55-80] 77  Resp:  [10-36] 34  BP: (128-147)/(35-54) 128/35  SpO2:  [90 %-100 %] 95 %  Temp (24hrs), Av 8 °F (36 °C), Min:95 °F (35 °C), Max:98 5 °F (36 9 °C)  Current: Temperature: 98 5 °F (36 9 °C)    Physical Exam:   General Appearance:  80year-old acute on chronically debilitated elderly male, propped resting in ICU on ventilator at FiO2 of 65%, eyes open, off Precedex    Throat: Oropharynx moist without lesions  Endotracheal tube in place   Lungs:   Scattered coarse ventilated breath sounds to auscultation bilaterally; scant tan thin secretions and suction canister   Heart:  RRR; + murmur   Abdomen:   Soft, no focal tenderness, protuberant, positive bowel sounds, midline abdominal wound VAC in place with moderate serous output in canister, wound edges without erythema or purulence, left lower quadrant ostomy with loose stool in bag, peg tube capped, right sided pelvic drain in place with blood-tinged slightly cloudy output in FAYE bulb   Extremities: Generalized puffy edema, venodynes, and Prevalon boots in place   : 4+ soft scrotal edema, no SPT   Skin: No new rashes or lesions  Right arm PICC and right neck IJ CVP lines in place    CVVH running via CVP line       Labs, Imaging, & Other studies:   All pertinent labs and imaging studies were personally reviewed  Results from last 7 days   Lab Units 22  1157 22  0533 22  0607   WBC Thousand/uL 12 13* 10 91* 15 34*   HEMOGLOBIN g/dL 8 2* 6 6* 7 1*   PLATELETS Thousands/uL 121* 124* 123*     Results from last 7 days   Lab Units 22  1157 22  0533 22  0002 22  1053   SODIUM mmol/L 138 135* 134*  --    POTASSIUM mmol/L 4 0 3 6 4 1  --    CHLORIDE mmol/L 104 100 99*  --    CO2 mmol/L 23 22 23  --    BUN mg/dL 21 25 27*  --    CREATININE mg/dL 1 33* 1 47* 1 64*  --    EGFR ml/min/1 73sq m 46 41 36  --    CALCIUM mg/dL 7 8* 7 7* 7 5*  --    AST U/L  --  55*  --  29   ALT U/L  --  27  --  14   ALK PHOS U/L  --  156*  --  191*     Results from last 7 days   Lab Units 06/08/22  1555 06/03/22  1528   BLOOD CULTURE   --  No Growth After 5 Days  No Growth After 5 Days     GRAM STAIN RESULT  2+ Gram positive cocci in pairs, chains and clusters*  No polys seen*  --    BODY FLUID CULTURE, STERILE  2+ Growth of Staphylococcus aureus*  --      Results from last 7 days   Lab Units 06/09/22  0533   PROCALCITONIN ng/ml 3 92*

## 2022-06-09 NOTE — WOUND OSTOMY CARE
Progress Note - Wound   Fady Hudson 80 y o  male MRN: [id number]  Unit/Bed#: ICU 02 Encounter: [id number]      Assessment:   This is a one week follow-up visit for this 80 year old male patient admitted on 5/11/22 with bowel perforation and pneumoperitoneum s/p colonoscopy and transverse loop colostomy done on 5/11/22  He has a history of Raynaud's disease, scleroderma, colon polyps, chronic anemia, gall stones and HTN  He remains vented via oral E T  tube with CVVH in process via right I J  catheter  The patient remains totally dependent upon nursing staff for all of his care and is repositioned in bed with assist of 2 persons  The patient had just been repositioned by Terry MCKINLEY and stated that sacrobuttock area is intact with no open areas noted  Also the patient had some mucoid discharge from his rectum earlier today but not as much as during last week's visit - Dr Reagan Montenegro to be notifed as per Terry MCKINLEY  A FAYE drain was inserted yesterday in IR due to lower pelvic abscess  As per Terry MCKINLEY the drainage appears similar to the mucoid drainage noted from the patient's rectum  The entire ostomy appliance was changed  The stoma remains pink and measures 5cm in length, 8cm in width and is 4cm above skin level  The excoriated areas from 1-2:00 have resolved and the excoriated areas from 4-8:00 have improved and small partial thickness wound at 7:00 has epithelialized  The parastomal site was dusted lightly with stoma powder in these areas alternating with 3M No Sting x 3 (crusting technique)  2" stoma ring then one piece cut to fit wafer applied  His left quadrant colostomy drained 75 ml of dark greenish/blackish liquid  The patient has bilateral heel   foam dressings in place and heels are intact without erythema  LLQ double loop transverse colostomy      Skin care Plan:  1-Cleanse sacro-buttocks with soap and water  Apply Allevyn foam  Danyel with T for treatment  Change every two days and PRN   check skin q-shift  2-Turn/reposition q2h with foam wedges/pillows (including the use of micro-turns) or when medically stable for pressure re-distribution on skin  3-Bilateral heel protectors to be worn at all times in bed  4-Moisturize skin daily with skin nourishing cream  5-Ehob cushion in chair when out of bed  Limit sitting for 1-2 hours at a time to prevent excessive pressure to sacral area then offload back in bed for at least 1 hour  6-Apply bilateral heel foam dressings or use Hydraguard to bilateral heels BID and PRN  7-Ostomy supplies to be ordered before patient is discharged  8-Please only use warm water and plain wipes not soap or bath wipes to cleanse parastomal site  The use of bath wipes and soap would prevent the appliance from adhering correctly and may sensitize the patient to possible allergens and skin irritation  If open/excoriated areas remain to parastomal site, use crusting technique: dust open/excoriated areas lightly with stoma powder then blot with alcohol-free skin prep then repeat procedure x 3  Apply stoma ring then stoma appliance       Assessment Findings:     1  Per primary RN, the sacral area is intact  Orders for prevention in place  2  Bilateral heels are intact  Orders for prevention in place  3  Left sided ostomy appliance changed and parastomal excoriation improving  Orders in place for ostomy care        Discussed assessment findings, and plan of care/recommendations with Gary Brown RN      Wound care will follow along with patient throughout admission, please call or tiger text with questions and concerns      Recommendations written as orders    Shaniqua Ryan RN, BSN, [facility]

## 2022-06-09 NOTE — PROGRESS NOTES
Vancomycin Assessment    Ellen Cavazos is a 80 y o  male who is currently receiving vancomycin 1250 mg IV q12h (based on 15 mg/kg actual body weight) for MRSA suspected, intra-abdominal infection  Relevant clinical data and objective history reviewed:  Creatinine   Date Value Ref Range Status   06/09/2022 1 33 (H) 0 60 - 1 30 mg/dL Final     Comment:     Standardized to IDMS reference method   06/09/2022 1 47 (H) 0 60 - 1 30 mg/dL Final     Comment:     Standardized to IDMS reference method   06/09/2022 1 64 (H) 0 60 - 1 30 mg/dL Final     Comment:     Standardized to IDMS reference method     BP (!) 128/35   Pulse 77   Temp 98 5 °F (36 9 °C) (Temporal)   Resp (!) 34   Ht 5' 4" (1 626 m)   Wt 82 1 kg (181 lb)   SpO2 95%   BMI 31 07 kg/m²   I/O last 3 completed shifts: In: 2590 9 [I V :2290 9; NG/GT:100; IV Piggyback:200]  Out: 1151 [Drains:45; Other:831; Stool:275]  Lab Results   Component Value Date/Time    BUN 21 06/09/2022 11:57 AM    WBC 12 13 (H) 06/09/2022 11:57 AM    HGB 8 2 (L) 06/09/2022 11:57 AM    HCT 25 9 (L) 06/09/2022 11:57 AM     (H) 06/09/2022 11:57 AM     (L) 06/09/2022 11:57 AM     Temp Readings from Last 3 Encounters:   06/09/22 98 5 °F (36 9 °C) (Temporal)   05/11/22 (!) 97 1 °F (36 2 °C) (Temporal)   01/14/22 97 9 °F (36 6 °C) (Temporal)     Vancomycin Days of Therapy: 1    Assessment/Plan  The patient is currently on vancomycin utilizing scheduled dosing based on adjusted body weight (due to obesity)  Baseline risks associated with therapy include: pre-existing renal impairment, concomitant nephrotoxic medications, and advanced age  The patient is currently receiving 1250 mg IV q12h (based on 15 mg/kg actual body weight) and after clinical evaluation will be changed to 1250 mg IV q24h (based on 20mg/kg adjusted body weight)  Pharmacy will also follow closely for s/sx of nephrotoxicity, infusion reactions, and appropriateness of therapy    BMP and CBC will be ordered per protocol  Plan for trough as patient approaches steady state, prior to the 4th  dose at approximately 15:15 on 6-12-22  Due to infection severity, will target a trough of 15-20 (appropriate for most indications)   Pharmacy will continue to follow the patients culture results and clinical progress daily      Bob Fairbanks, Pharmacist

## 2022-06-09 NOTE — PROGRESS NOTES
[facility] PROGRESS NOTE   Angelique Rosenberg 80 y o  male MRN: [id number]  Unit/Bed#: ICU 02 Encounter: [id number]  Reason for Consult: MARYELLEN    ASSESSMENT and PLAN:  80year old with HTN, aortic stenosis, GERD, CAD who underwent EGD and C-scope on 7/40/02 which was complicated by perforation requiring emergent ex-lap  Since then, course has been complicated by PEA arrest on 5/21/22, VDRF, pneumonia, sepsis  Renal consulted for MARYELLEN    1  Acute kidney injury:  · Baseline creatinine is 0 7-0 9  · Etiology is ATN in the setting of hypotension cardiac arrest  · Peak creatinine 3 07 on May 26, 2022  · HD dependent since 5/26/22  CVVHD from 5/26/22 to 6/6/22  Had 1 iHD on 6/7/22  · Restarted CVVHD 6/8/22  · Continue CVVHD at current settings but change to 4K bath and run -50 cc    2  Ventilator dependent respiratory failure:  · Currently FiO2 of 65% with PEEP of 10  · Defer to Critical Care  · Maintain negative fluid balance - make -50 cc/hr  3  Hypotension:  · Continue norepinephrine and vasopressin for BP support  · Continue Midodrine 10 mg TID  4  Sepsis:  · Currently on Zosyn  · ID following  5  Congestive heart failure:  · Run -50 cc/hr on CVVHD  6  Anemia, thrombocytopenia:  · Platelets up to 225      7  PEA cardiac arrest on 5/21/22  8  Pneumoperitoneum s/p ex-lap with LAR on 5/11/22    DISPOSITION:  · Continue CVVHD  · Change to 4K bath  · Run -50 cc/hr  The above plan was discussed with crit care  SUBJECTIVE / 24H INTERVAL HISTORY:  Had drainage tube placed yesterday  Tolerated CVVHD yesterday and started pulling -25 cc/hr overnight at 1 am    FiO2 65%  Pressor requirement is down - levophed is 1 mcg now  CVVHD PROCEDURE NOTE  The patient was seen and examined on CVVHD  Qb 300 Qd 2000  Running -25 cc/hr on 2K bath       OBJECTIVE:  Current Weight: Weight - Scale: 82 1 kg (181 lb)  Vitals:    06/09/22 0815 06/09/22 0830 06/09/22 0832 06/09/22 0835   BP: BP Location:       Pulse: 73 74 75 78   Resp: 20 (!) 25 (!) 24 (!) 23   Temp: 98 4 °F (36 9 °C) 98 3 °F (36 8 °C) 98 3 °F (36 8 °C) 98 5 °F (36 9 °C)   TempSrc: Temporal Temporal  Temporal   SpO2: 91% 92% 94% 93%   Weight:       Height:           Intake/Output Summary (Last 24 hours) at 6/9/2022 0844  Last data filed at 6/9/2022 0700  Gross per 24 hour   Intake 938 ml   Output 1151 ml   Net -213 ml     General: critically ill appearing on the mechanical ventilator, comfortable  Skin: warm, dry, good turgor  Eyes: open  ENT: ETT in place  Neck: supple  Chest/Lungs: equal chest expansion, coarse breath sounds  CVS: distinct heart sounds, normal rate, regular rhythm, no rub  Abdomen: soft, (+) ostomy, (+) G tube  Extremities: (+) anasarca  : no quigley catheter  Neuro: awake, alert on the mechanical ventilator  Psych: could not evaluate       Medications:    Current Facility-Administered Medications:     acetaminophen (TYLENOL) oral suspension 650 mg, 650 mg, Oral, Q6H PRN, Ching Teaguea, CRNP, 650 mg at 06/05/22 1512    argatroban infusion (premix), 0 1-3 mcg/kg/min, Intravenous, Titrated, Isidoro Fito, DO, Last Rate: 0 2 mL/hr at 06/09/22 0404, 0 05 mcg/kg/min at 06/09/22 0404    chlorhexidine (PERIDEX) 0 12 % oral rinse 15 mL, 15 mL, Mouth/Throat, Q12H [address], Ching Teaguea, CRNP, 15 mL at 06/08/22 2104    gabapentin (NEURONTIN) oral solution 200 mg, 200 mg, Oral, HS, Josephine Castillo CRNP, 200 mg at 06/08/22 2104    HYDROmorphone (DILAUDID) injection 0 5 mg, 0 5 mg, Intravenous, Q3H PRN, Benedict Bennett, MACARIONP, 0 5 mg at 06/07/22 0113    insulin lispro (HumaLOG) 100 units/mL subcutaneous injection 1-6 Units, 1-6 Units, Subcutaneous, Q6H [address], 1 Units at 06/05/22 0612 **AND** Fingerstick Glucose (POCT), , , Q6H, ISELA Ritter    iohexol (OMNIPAQUE) 240 MG/ML solution 50 mL, 50 mL, Oral, Once in imaging, ISELA Lunsford    ipratropium-albuterol (DUO-NEB) 0 5-2 5 mg/3 mL inhalation solution 3 mL, 3 mL, Nebulization, Q6H PRN, Roylene Sabot, CRNP, 3 mL at 06/04/22 0757    levothyroxine tablet 112 mcg, 112 mcg, Per NG Tube, Early Morning, Roylene Sabot, CRNP, 112 mcg at 06/09/22 0611    lidocaine (LIDODERM) 5 % patch 2 patch, 2 patch, Topical, Daily, CARLTON Licona-MARIA LUISA, 2 patch at 06/08/22 0827    midodrine (PROAMATINE) tablet 10 mg, 10 mg, Oral, TID AC, Baker Clowdy, CRNP, 10 mg at 06/09/22 4725    norepinephrine (LEVOPHED) 8 mg (DOUBLE CONCENTRATION) IV in sodium chloride 0 9% 250 mL, 1-30 mcg/min, Intravenous, Titrated, [employer], CRNP, Last Rate: 1 9 mL/hr at 06/09/22 0746, 1 mcg/min at 06/09/22 0746    NxStage K 4/Ca 3 dialysis solution (RFP-401) 20,000 mL, 20,000 mL, Dialysis, Continuous, Derrick Khan MD    ondansetron [facility]) injection 4 mg, 4 mg, Intravenous, Q6H PRN, Roylene Sabot, CRNP    oxyCODONE (ROXICODONE) oral solution 5 mg, 5 mg, Per NG Tube, Q4H PRN, 5 mg at 06/03/22 1804 **OR** oxyCODONE (ROXICODONE) oral solution 7 5 mg, 7 5 mg, Per NG Tube, Q4H PRN, ISELA Horton    pantoprazole (PROTONIX) injection 40 mg, 40 mg, Intravenous, Q24H [address], Roylene Sabot, CRNP, 40 mg at 06/08/22 0827    piperacillin-tazobactam (ZOSYN) 3 375 g in sodium chloride 0 9 % 100 mL IVPB, 3 375 g, Intravenous, Q6H, Christina Logan PA-C, Last Rate: 200 mL/hr at 06/09/22 0408, 3 375 g at 06/09/22 0408    polyethylene glycol (MIRALAX) packet 17 g, 17 g, Oral, Daily, ISELA Ritter, 17 g at 06/08/22 0827    potassium chloride 40 mEq IVPB (premix), 40 mEq, Intravenous, Once, Matt Gomez MD, 40 mEq at 06/09/22 0647    Laboratory Results:  Results from last 7 days   Lab Units 06/09/22  0533 06/09/22  0002 06/08/22  0607 06/07/22  1444 06/07/22  0558 06/06/22  0749 06/06/22  0010 06/05/22  0555 06/04/22  2203 06/04/22  1420 06/04/22  0554 06/03/22  1308 06/03/22  0604   WBC Thousand/uL 10 91*  --  15 34*  --  16 74* 17 92*  --  18 30*  -- --  14 58*  --  21 58*   HEMOGLOBIN g/dL 6 6*  --  7 1*  --  7 3* 7 8*  --  8 0*  --  8 1* 6 9*  --  7 3*   HEMATOCRIT % 20 5*  --  22 9*  --  23 4* 24 2*  --  24 4*  --   --  21 0*  --  22 2*   PLATELETS Thousands/uL 124*  --  123*  --  87* 49*  --  86*  --   --  99*  --  133*   POTASSIUM mmol/L 3 6 4 1 5 0 3 6 5 1 4 8 4 7 4 5 4 6 4 8 5 0   < > 4 9   CHLORIDE mmol/L 100 99* 99* 97* 104 103 105 105 104 103 104   < > 104   CO2 mmol/L 22 23 22 25 21 23 23 24 24 25 26   < > 25   BUN mg/dL 25 27* 28* 20 41* 26* 28* 20 20 20 23   < > 27*   CREATININE mg/dL 1 47* 1 64* 1 99* 1 33* 2 11* 1 51* 1 54* 1 17 1 12 1 09 1 15   < > 1 37*   CALCIUM mg/dL 7 7* 7 5* 7 6* 7 6* 8 0* 8 2* 8 1* 8 3 8 4 8 3 8 5   < > 8 3   MAGNESIUM mg/dL 2 0 2 1 2 4 1 9 2 2 2 1 2 2 1 9 2 1 1 9 2 2   < > 1 9   PHOSPHORUS mg/dL 3 2 3 6  --   --  5 2* 3 3 3 1 2 7 2 7 3 3 3 4   < > 2 9    < > = values in this interval not displayed

## 2022-06-09 NOTE — QUICK NOTE
ANIRUDH GLASS  Change Note    Date:06/09/2022    Location of wound: Midline abdominal incision  Dimensions of wound:12cm x 4cm x 0 50    Description of wound: Decrease slough located centrally  Multiple  Circular areas of granulation  Wound is slowly norman  Sponges removed:  2 gray Sponges      Sponges placed:  2 gray Sponges      VAC settings:  125 mmHg  Continuous     Saline instillation  Instill 6 mL for 10 minutes every 2 hours       Reinier Pate PA-C

## 2022-06-09 NOTE — PROGRESS NOTES
Tami 45  Progress Note - Jesus Hudson 2/15/1931, 80 y o  male MRN: [id number]  Unit/Bed#: ICU 02 Encounter: [id number]  Primary Care Provider: Tony Wheeler MD   Date and time admitted to hospital: 5/11/2022  4:48 PM    Acute respiratory failure with hypoxia [facility])  Assessment & Plan  · Patient previously in ICU for hypoxia with a max of 15L midflow and concern for aspiration pneumonitis  · Transfered to general medical floor 5/21  · 5/21: PEA arrest x2, intubation  · 5/24 CT CAP-CHF with moderate basilar effusions and additional upper lung zone patchy airspace opacities likely reflecting asymmetric pulmonary edema  Infiltrate is not entirely excluded  · 5/29 Bronchoscopy for mucous plugging  · Day # 19 on ventilator: AC/PC 24/24/65/10  · Wean Fio2 as able for SPO2>90%  · PF ratio 65 - in severe ARDS  · Continue pulmonary hygiene/ VAP bundle  · Continue volume removal with CVVH  · Unstable and ventilator requirements too high at this time to consider trach/PEG  · Moderate BL effusions on 5/24 CT scan, unable to evaluate on CXR   · IR consulted for possible thoracentesis - but procedure was not attempted per IR due to the thought that the effusions were getting smaller     * Bowel perforation [facility])  Assessment & Plan  · Outpatient EGD and colonoscopy at [facility] on 5/11 for w/u of chronic anemia, history of colon polyps, intermittent LLQ abdominal pain and possible intermittent intussusception     · EGD unremarkable, colonoscopy technically difficult and required change from adult scope to pediatric scope, during traversal of the sigmoid colon there was a kink and patient sustained a perforation  · Transferred to [facility] for emergent surgery   · Emergent ex- lap on 5/11 > low anterior resection, protective loop transverse colostomy, flex sigmoidoscopy, and segmental small bowel resection  · Difficult post op course with post op ileus requiring NGT decompression and requirement of short duration of TPN   · 5/22: CT: Diffuse mild dilation of small bowel segments identified without transition point  · 5/24: CT CAP- Distended small bowel loops with scattered air-fluid levels consistent with early/partial small bowel obstruction with transition at the small bowel anastomosis in the region of the ventral pelvis as above  No free air  Cholelithiasis without cholecystitis  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  · 5/24: Stomal prolapse and small peristomal hernia now at the transverse loop colostomy; continues to be reduced by surgery  · TPN d/c'd on 5/26; tolerating tube feeds at goal  · 5/26: Abdominal wound vac placed bedside: continue with dressing changes Monday/Thursday   · Surgery continues to follow    · 6/6: Gastrostomy tube inserted for nutrition by IR   · significantly decreased ostomy output (5 cc total) with complaints of abdominal pain   ·   CT Chest Abdomen Pelvis : Worsening pulmonary parenchymal airspace opacities, specifically without increasing density in the appearance of some developing fibrosis    Bilateral pleural effusions are similar in size but appears slightly more loculated than on most recent prior examination    Indeterminant fluid collection in the right hemipelvis which appears to be loculated and possibly "walled off" from the remainder of abdominopelvic ascites  This could represent an infected abscess, but is not significantly changed in size from May 24,   · 2022  Paroxysmal atrial fibrillation (HCC)  Assessment & Plan  · In the setting of cardiac arrest while on 3 pressors noted to have afib with RVR  · Bolused with amio and placed on infusion     · Converted to sinus rhythm on 5/22   · Amio gtt d/c 5/23 but restarted on 6/7 due to RVR >100   · Pt now converted to  NSR    Severe sepsis [facility])  Assessment & Plan  Peritonitis with intra-abdominal/pelvic abscess secondary to colonic perforation    · 5/22 CT chest/ab/pelvis with concern of multifocal PNA, no visualized intraabdominal abscess or anastomotic leak   · Per ID suspect multifocal pneumonitis   · OR culture 5/21 pseudomonas and bacteroides fragilis  · Blood cultures on 5/22 negative   · Completed 14 days of Flagyl on 5/24  · Completed 14 days of cefepime on 5/27  · Restarted Zosyn on 6/3 for increasing WBC and oxygen requirements   · Zosyn day 7  · ID following, appreciate recommendations   · Fluid collection noted in hemipelvis on CT  FAYE tube placedon 6/8/22 and 30cc of pus drained   Cultures pending   · WBC continues to improve on current Abx     Cardiac arrest [facility])  Assessment & Plan  · Patient was found unresponsive and hypoxic approximately 20 minutes after being seen well with family 5/21   · PEA arrest x 2  · Most likely respiratory driven  · Neurologically intact post arrest   · Continue telemetry monitoring     Thrombocytopenia ([address] )  Assessment & Plan  Pt has had low platelets with values ranging from 40s-80s     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-  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   JAYLIN pending       Acute on chronic anemia  Assessment & Plan  · As above     Hypotension  Assessment & Plan  · Levophed restarted on 6/1, vasopressin restarted on 6/3  · Wean for MAP goal >65  · Midodrine added 5 mg TID on 6/1 - unable to receive secondary to no enteral access   · Phenylephrine infusion will be added if MAP cannot be maintained with levophed and vasopressin       Anemia  Assessment & Plan  · Hemoglobin drop post cardiac arrest    · Noted to have gross hematuria but this has since resolved  · 5/21: 1 u PRBC  · 5/24: 1 u PRBC  · CT obtained on 5/24 and negative for any overt signs of bleeding  · 5/26: 1 u PRBC   · 5/30: 1 u PRBC  · 6/4: 1 U PRBC   · Continue to monitor and transfuse for hgb less than 7      Acute renal failure (ARF) (HCC)  Assessment & Plan  · Secondary to hypoperfusion mehul cardiac arrest +/- ATN  · Baseline creat 0 8  · Creatinine peaked at 3 07  · CVVH started on 5/26 and stopped on 6/1  · Restarted on 6/2 after he was anuric with increasing oxygen requirements  · CRRT initially  discontinued  iHD attempted however pt could not tolerate  CRRT restarted on 6/9/22   · Avoid hypotension/hypoperfusion  · Continue levophed for MAP > 65    Hyperglycemia  Assessment & Plan  · A1c 5 3%  · Continue SSI - minimal requirements     CHF (congestive heart failure) ([facility])  Assessment & Plan  Wt Readings from Last 3 Encounters:   06/09/22 82 1 kg (181 lb)   05/11/22 62 1 kg (136 lb 14 4 oz)   03/25/22 61 2 kg (135 lb)     · 5/16: Echo-EF 65%, mild TR, mild MR  · 5/23: Repeat Echo post cardiac arrest: EF 60-65%, G1DD, mild AS (BRADY 1 5cm2), mild MR, mild TR  · Monitor I&O, Daily weights   · Limited ECHO-EF 65%, G1DD, mild AS  · Aggressive volume removal with CRRT, goal -100 mL/hr as tolerated       Toxic metabolic encephalopathy  Assessment & Plan  · Likely in setting of acute illness/delirium and cardiac arrest  · Delirium precautions; regulate sleep/wake cycle, environmental controls, daily CAM ICU   · Trend neuro exam  · Following commands, nodding appropriately to questions      ----------------------------------------------------------------------------------------  HPI/24hr events: Pt was found to have hemipelvis collection on CT abdomen pelvis  IR was able to place FAYE drain and 30cc of pus were removed and sent for cultures  Overnight, pt's oxygen requirements were stable  BP also remained stable so norepinephrine was weaned from 22mcg/min to 4mcg/min  Pt because bradycardic and hypothermic so precedex was discontinued        Patient appropriate for transfer out of the ICU today?: No  Disposition: Continue Critical Care   Code Status: Level 2 - DNAR: but accepts endotracheal intubation  ---------------------------------------------------------------------------------------  SUBJECTIVE  Pt is intubated    Review of Systems  Review of systems was reviewed and negative unless stated above in HPI/24-hour events ---------------------------------------------------------------------------------------  OBJECTIVE    Vitals   Vitals:    22 0430 22 0600 22 0700 22 0738   BP:       BP Location:       Pulse: 59  59    Resp: (!) 28  12    Temp:   98 °F (36 7 °C)    TempSrc:   Temporal    SpO2: 96%  93% 93%   Weight:  82 1 kg (181 lb)     Height:         Temp (24hrs), Av 9 °F (36 1 °C), Min:95 °F (35 °C), Max:99 5 °F (37 5 °C)  Current: Temperature: 98 °F (36 7 °C)  Arterial Line BP: 136/43  Arterial Line MAP (mmHg): 74 mmHg    Respiratory:  SpO2: SpO2: 93 %  Nasal Cannula O2 Flow Rate (L/min): 5 L/min    Invasive/non-invasive ventilation settings   Respiratory  Report   Lab Data (Last 4 hours)       0603            pH, Arterial       7 315             pCO2, Arterial       45 0             pO2, Arterial       61 3             HCO3, Arterial       22 4             Base Excess, Arterial       -3 5                  O2/Vent Data           Most Recent         Vent Mode   AC/PC      Patient safety screen outcome:   Failed      Resp Rate (BPM) (BPM)   24      Pressure Control (cmH2O) (cm)   24      Insp Time (sec) (sec)   0 7      FiO2 (%) (%)   65      PEEP (cmH2O) (cmH2O)   10      MV   12 8                  Physical Exam  Vitals and nursing note reviewed  Constitutional:       Appearance: He is ill-appearing  Interventions: He is intubated  Cardiovascular:      Pulses: Normal pulses  Heart sounds: Normal heart sounds  Pulmonary:      Effort: He is intubated  Breath sounds: No wheezing or rhonchi  Abdominal:      General: Bowel sounds are normal       Palpations: Abdomen is soft  Comments: Colostomy present, G tube present    Musculoskeletal:      Comments: anasarca   Skin:     General: Skin is warm and dry  Neurological:      Mental Status: He is alert        Comments: Pt is awake, responds to painful stimuli and follows commands              Laboratory and Diagnostics:  Results from last 7 days   Lab Units 06/09/22  0533 06/08/22  9849 06/07/22  0558 06/06/22  0749 06/05/22  0555 06/04/22  1420 06/04/22  0554 06/03/22  0604   WBC Thousand/uL 10 91* 15 34* 16 74* 17 92* 18 30*  --  14 58* 21 58*   HEMOGLOBIN g/dL 6 6* 7 1* 7 3* 7 8* 8 0* 8 1* 6 9* 7 3*   HEMATOCRIT % 20 5* 22 9* 23 4* 24 2* 24 4*  --  21 0* 22 2*   PLATELETS Thousands/uL 124* 123* 87* 49* 86*  --  99* 133*   NEUTROS PCT %  --   --   --   --  79*  --   --   --    BANDS PCT %  --  21*  --  11*  --   --   --  23*   MONOS PCT %  --   --   --   --  7  --   --   --    MONO PCT %  --  7  --  8  --   --   --  6     Results from last 7 days   Lab Units 06/09/22  0533 06/09/22  0002 06/08/22  1053 06/08/22  0607 06/07/22  1444 06/07/22  0558 06/06/22  0749 06/06/22  0010   SODIUM mmol/L 135* 134*  --  135* 138 140 138 139   POTASSIUM mmol/L 3 6 4 1  --  5 0 3 6 5 1 4 8 4 7   CHLORIDE mmol/L 100 99*  --  99* 97* 104 103 105   CO2 mmol/L 22 23  --  22 25 21 23 23   ANION GAP mmol/L 13 12  --  14* 16* 15* 12 11   BUN mg/dL 25 27*  --  28* 20 41* 26* 28*   CREATININE mg/dL 1 47* 1 64*  --  1 99* 1 33* 2 11* 1 51* 1 54*   CALCIUM mg/dL 7 7* 7 5*  --  7 6* 7 6* 8 0* 8 2* 8 1*   GLUCOSE RANDOM mg/dL 135 152*  --  116 87 97 111 109   ALT U/L 27  --  14  --   --   --   --   --    AST U/L 55*  --  29  --   --   --   --   --    ALK PHOS U/L 156*  --  191*  --   --   --   --   --    ALBUMIN g/dL 2 0*  --  2 2*  --   --   --   --   --    TOTAL BILIRUBIN mg/dL 1 77*  --  1 83*  --   --   --   --   --      Results from last 7 days   Lab Units 06/09/22  0533 06/09/22  0002 06/08/22  0607 06/07/22  1444 06/07/22  0558 06/06/22  0749 06/06/22  0010 06/05/22  0555 06/04/22  2203   MAGNESIUM mg/dL 2 0 2 1 2 4 1 9 2 2 2 1 2 2 1 9 2 1   PHOSPHORUS mg/dL 3 2 3 6  --   --  5 2* 3 3 3 1 2 7 2 7      Results from last 7 days   Lab Units 06/09/22  0213 06/08/22  2124 06/08/22  1300 06/08/22  1053 06/06/22  1126   INR   --   --   --  1 87* 1 52*   PTT seconds 102* 93* 48* 201*  --           Results from last 7 days   Lab Units 06/03/22  1021   LACTIC ACID mmol/L 0 8     ABG:  Results from last 7 days   Lab Units 06/09/22  0603   PH ART  7 315*   PCO2 ART mm Hg 45 0*   PO2 ART mm Hg 61 3*   HCO3 ART mmol/L 22 4   BASE EXC ART mmol/L -3 5   ABG SOURCE  Line, Arterial     VBG:  Results from last 7 days   Lab Units 06/09/22  0603   ABG SOURCE  Line, Arterial     Results from last 7 days   Lab Units 06/09/22  0533   PROCALCITONIN ng/ml 3 92*       Micro  Results from last 7 days   Lab Units 06/08/22  1555 06/03/22  1528   BLOOD CULTURE   --  No Growth After 5 Days  No Growth After 5 Days  GRAM STAIN RESULT  2+ Gram positive cocci in pairs, chains and clusters*  No polys seen*  --        EKG: reviewed   Imaging: I have personally reviewed pertinent reports  Intake and Output  I/O       06/07 0701 06/08 0700 06/08 0701 06/09 0700    I V  (mL/kg) 2842 2 (36 3) 859 (10 5)    Other 100     NG/GT  100    IV Piggyback 550     Total Intake(mL/kg) 3492 2 (44 6) 959 (11 7)    Urine (mL/kg/hr) 100 (0 1) 0 (0)    Drains 300 35    Other 1100 775    Stool  275    Total Output 1500 1085    Net +1992 2 -126                Height and Weights   Height: 5' 4" (162 6 cm)  IBW (Ideal Body Weight): 59 2 kg  Body mass index is 31 07 kg/m²  Weight (last 2 days)     Date/Time Weight    06/09/22 0600 82 1 (181)    06/08/22 0600 78 3 (172 62)    06/07/22 0600 78 3 (172 62)            Nutrition       Diet Orders   (From admission, onward)             Start     Ordered    06/07/22 1625  Diet NPO  Diet effective now        Comments: Demond - one packet with breakfast and dinner  Pro source - one packet with lunch   References:    Nutrtion Support Algorithm Enteral vs  Parenteral   Question Answer Comment   Diet Type NPO    RD to adjust diet per protocol?  Yes        06/07/22 1624    05/12/22 0906  Room Service  Once        Question:  Type of Service  Answer:  Room Service - Appropriate with Assistance    05/12/22 0905                  Active Medications  Scheduled Meds:  Current Facility-Administered Medications   Medication Dose Route Frequency Provider Last Rate    acetaminophen  650 mg Oral Q6H PRN ISELA Phillip      argatroban  0 1-3 mcg/kg/min Intravenous Titrated Isidoro Cho, DO 0 05 mcg/kg/min (06/09/22 0404)    chlorhexidine  15 mL Mouth/Throat Q12H [facility] ISELA Phillip      gabapentin  200 mg Oral HS ISELA Maxwell      HYDROmorphone  0 5 mg Intravenous Q3H PRN ISELA Mckeon      insulin lispro  1-6 Units Subcutaneous Q6H [facility] ISELA Maxwell      iohexol  50 mL Oral Once in imaging ISELA Phillip      ipratropium-albuterol  3 mL Nebulization Q6H PRN ISELA Phillip      levothyroxine  112 mcg Per NG Tube Early Morning ISELA Phillip      lidocaine  2 patch Topical Daily [place], [place]      midodrine  10 mg Oral TID AC [address]      norepinephrine  1-30 mcg/min Intravenous Titrated ISELA Mckeon 1 mcg/min (06/09/22 0746)   Edwar León NxStage K 2/Ca 3  20,000 mL Dialysis Continuous Prakash Chamorro MD      ondansetron  4 mg Intravenous Q6H PRN ISELA Phillip      oxyCODONE  5 mg Per NG Tube Q4H PRN ISELA Anderson      Or    oxyCODONE  7 5 mg Per NG Tube Q4H PRN ISELA Anderson      pantoprazole  40 mg Intravenous Q24H [facility] ISELA Phillip      piperacillin-tazobactam  3 375 g Intravenous Q6H Nagi Resendez PA-C 3 375 g (06/09/22 0408)    polyethylene glycol  17 g Oral Daily ISELA Ritter      potassium chloride  40 mEq Intravenous Once Jai Mast MD       Continuous Infusions:  argatroban, 0 1-3 mcg/kg/min, Last Rate: 0 05 mcg/kg/min (06/09/22 0404)  norepinephrine, 1-30 mcg/min, Last Rate: 1 mcg/min (06/09/22 0746)  NxStage K 2/Ca 3, 20,000 mL      PRN Meds:   acetaminophen, 650 mg, Q6H PRN  HYDROmorphone, 0 5 mg, Q3H PRN  iohexol, 50 mL, Once in imaging  ipratropium-albuterol, 3 mL, Q6H PRN  ondansetron, 4 mg, Q6H PRN  oxyCODONE, 5 mg, Q4H PRN   Or  oxyCODONE, 7 5 mg, Q4H PRN        Invasive Devices Review  Invasive Devices  Report    Peripherally Inserted Central Catheter Line  Duration           PICC Line 67/79/29 Right Basilic 21 days          Arterial Line  Duration           Arterial Line 05/30/22 Radial 9 days          Hemodialysis Catheter  Duration           HD Temporary Double Catheter 13 days          Drain  Duration           Colostomy LLQ 29 days    Gastrostomy/Enterostomy Percutaneous Interventional Gastrostomy 16 Fr  LUQ 2 days    Abscess Drain Buttock <1 day          Airway  Duration           ETT  Oral 18 days                Rationale for remaining devices: respiratory failure  ---------------------------------------------------------------------------------------  Advance Directive and Living Will:      Power of :    POLST:    ---------------------------------------------------------------------------------------  Care Time Delivered:   Upon my evaluation, this patient had a high probability of imminent or life-threatening deterioration due to respiratory failure with hypoxia , which required my direct attention, intervention, and personal management  I have personally provided 30 minutes (0700 to 0730) of critical care time, exclusive of procedures, teaching, family meetings, and any prior time recorded by providers other than myself  Dick Fiore DO      Portions of the record may have been created with voice recognition software  Occasional wrong word or "sound a like" substitutions may have occurred due to the inherent limitations of voice recognition software    Read the chart carefully and recognize, using context, where substitutions have occurred

## 2022-06-10 PROBLEM — D64.9 ACUTE ON CHRONIC ANEMIA: Status: RESOLVED | Noted: 2022-01-01 | Resolved: 2022-01-01

## 2022-06-10 NOTE — ESCALATED TEAM TX
Team Meeting Note    Patient name Lizette Panchal  Location ICU 02/ICU 02 MRN [id number]  : 2/15/1931 Date 6/10/2022       Team Meeting  Meeting Type: Tx Team Meeting  Initial Conference Date: 06/10/22    Team Members Present  Team Members Present: Physician, , Other (Discipline and Name)  Physician Team Member: Dr Dennis Richter Work Team Member: Vivian Alves  Other (Discipline and Name): ISELA Miller    Patient/Family Present  Patient Present: No  Patient's Family Present: Yes  Family Relationship: Child  Child: Son Cullen Perez, Adriana Pascual (via phone); DESEAN [facility]     Follow-up Care Team Meeting held today to review patient's progress since last meeting on 6/3, review goals of care, and address next steps  Patient remains on the ventilator, receiving CVVHD, and on (2) vasopressors  Yesterday, pt appeared to have "rallied" and had a better day  Respiratory requirements had decreased (PEEP 8, FIO2 70%)  It was hopeful after intra-abdominal abscess was drained and pt was restarted on IVABX that he was starting to improve  Pt has worsened today  L sided Thoracentesis was completed this morning  Vasopressor requirements have now increased  Respiratory requirements back to PEEP 10 FIO2 100%  Family noting that pt also appears to be neurologically less responsive/interactive even off sedation  Dr Enrique Griffin reviewed that pt would have to have trach and be stable off IV vasopressors and CVVHD before being able to leave the hospital  Per surgery, pt would need to stable on PEEP 6 FIO2 60% for a few days before they would feel comfortable placing a trach  Family reviewed their clinical understanding of pt's condition  Cullen Perez appears to appreciate data from analytical tools to gain a better understanding of pt's prognosis projectory and making decisions  Team attempted to translate information to him in this manner   Team stressed the importance of looking at the overall picture of patient's needs and likelihood of being institutionalized for the rest of his life even if able to leave the hospital  Team also stressed the importance of making decisions based on pt's wishes from previous family discussions and his advanced directive  Family noted that they have been struggling with the fact pt has seemed to "rally" multiple times before  Team reviewed possible courses of action including continuing on current course of aggressive treatment or transitioning to comfort care  Family asked appropriate questions steps of withdrawing care  Son Yana Guevara will be able to visit bedside tomorrow  He has been unable to come in due to Riley Packer will be available to meet with both sons again tomorrow so that they can speak amongst themselves tonight

## 2022-06-10 NOTE — OCCUPATIONAL THERAPY NOTE
Occupational Therapy Cancel Note       06/10/22 1145   Note Type   Note Type Cancelled Session   Cancel Reasons Other  (Procedure at bedside)   Licensure   [place] License Number  Willow Sanchez OTR/SHELL [id number]

## 2022-06-10 NOTE — SEDATION DOCUMENTATION
Left thoracentesis completed  800 ml clear yellow fluid removed  Bandaid to site  ICU RN present throughout  Specimen sent to lab

## 2022-06-10 NOTE — PROGRESS NOTES
[facility] PROGRESS NOTE   Rene Semen 80 y o  male MRN: [id number]  Unit/Bed#: ICU 02 Encounter: [id number]  Reason for Consult: MARYELLEN    ASSESSMENT and PLAN:  80year old with HTN, aortic stenosis, GERD, CAD who underwent EGD and C-scope on 4/01/48 which was complicated by perforation requiring emergent ex-lap  Since then, course has been complicated by PEA arrest on 5/21/22, VDRF, pneumonia, sepsis  Renal consulted for MARYELLEN    1  Acute kidney injury:  · Baseline creatinine is 0 7-0 9  · Etiology is ATN in the setting of hypotension and cardiac arrest  · Peak creatinine 3 07 on May 26, 2022  · HD dependent since 5/26/22  CVVHD from 5/26/22 to 6/6/22  Had 1 iHD on 6/7/22  Restarted CVVHD 6/8/22  · Continue CVVHD at current settings  2  Ventilator dependent respiratory failure:  · Currently FiO2 of 70% with PEEP of 8  · Defer to Critical Care  · CXR with vascular congestion  However, UF is limited by low BP  · Continue -50 cc/hr for the next 4-6 hours and consider increase to -75 cc/hr if BP is stable  3  Hypotension:  · Continue norepinephrine for BP support  · Continue Midodrine 10 mg TID  4  Sepsis:  · Currently on Zosyn  · ID following  5  Congestive heart failure:  · Run -50 cc/hr on CVVHD and increase if hemodynamically stable in the next 4-6 hours  6  Anemia, thrombocytopenia:  · Platelets lower today  7  PEA cardiac arrest on 5/21/22  8  Pneumoperitoneum s/p ex-lap with LAR on 5/11/22    DISPOSITION:  · Continue CVVHD  · Run -50 cc/hr on CVVHD  · Consider increase to -75 cc/hr if hemodynamically stable in the next 4-6 hours  The above plan was discussed with crit care  SUBJECTIVE / 24H INTERVAL HISTORY:  He was pulling -50 cc/hr yesterday  This AM, had an episode of hypotension and hypoxia  UF stopped and given albumin  Levophed restarted with improvement in BP  Now on FiO2 of 70% with PEEP of 8  BP stable with 1 mcg of levophed now     Now on feeding via G tube  UF restarted at -50 cc/hr just now  Off vasopressin  CVVHD PROCEDURE NOTE  Seen and examined on CVVHD  Qb 300 Qd 2000  Running -50 cc/hr on 4K bath  OBJECTIVE:  Current Weight: Weight - Scale: 81 9 kg (180 lb 8 9 oz)  Vitals:    06/10/22 0530 06/10/22 0600 06/10/22 0700 06/10/22 0800   BP:  (!) 78/42 115/55 95/50   BP Location:    Left arm   Pulse: 81 81 80 80   Resp: (!) 41 (!) 40 (!) 32 (!) 36   Temp:    (!) 97 °F (36 1 °C)   TempSrc:    Temporal   SpO2: (!) 85% (!) 87% 91% 90%   Weight:  81 9 kg (180 lb 8 9 oz)     Height:           Intake/Output Summary (Last 24 hours) at 6/10/2022 9220  Last data filed at 6/10/2022 0800  Gross per 24 hour   Intake 3116 ml   Output 3972 ml   Net -856 ml     General: critically ill appearing on the mechanical ventilator, comfortable  Skin: warm, dry, good turgor  Eyes: closed  ENT: ETT in place  Neck: supple  Chest/Lungs: equal chest expansion, coarse breath sounds  CVS: distinct heart sounds, normal rate, regular rhythm, no rub  Abdomen: soft, (+) ostomy, (+) G tube  Extremities: (+) anasarca  : no quigley catheter  Neuro: sedated on the mechanical ventilator  Psych: could not evaluate       Medications:    Current Facility-Administered Medications:     acetaminophen (TYLENOL) oral suspension 650 mg, 650 mg, Oral, Q6H PRN, ISELA Phillip, 650 mg at 06/05/22 1512    calcium gluconate 1 g in sodium chloride 0 9% 50 mL (premix), 1 g, Intravenous, Once, Jessica Espinoza DO, Last Rate: 100 mL/hr at 06/10/22 0819, 1 g at 06/10/22 0819    chlorhexidine (PERIDEX) 0 12 % oral rinse 15 mL, 15 mL, Mouth/Throat, Q12H [address], ISELA Phillip, 15 mL at 06/10/22 0825    gabapentin (NEURONTIN) oral solution 200 mg, 200 mg, Oral, HS, Josephine Castillo, MACARIONP, 200 mg at 06/09/22 2133    heparin (porcine) 25,000 units in 0 45% NaCl 250 mL infusion (premix), 3-30 Units/kg/hr (Order-Specific), Intravenous, Titrated, Lorena Munoz, DO, Last Rate: 9 6 mL/hr at 06/10/22 0700, 12 Units/kg/hr at 06/10/22 0700    heparin (porcine) injection 3,200 Units, 3,200 Units, Intravenous, Q1H PRN, Marjo Olp, DO    heparin (porcine) injection 6,400 Units, 6,400 Units, Intravenous, Q1H PRN, Marjo Olp, DO    HYDROmorphone (DILAUDID) injection 0 5 mg, 0 5 mg, Intravenous, Q3H PRN, [employer], MACARIONP, 0 5 mg at 06/07/22 0113    insulin lispro (HumaLOG) 100 units/mL subcutaneous injection 1-6 Units, 1-6 Units, Subcutaneous, Q6H [address], 1 Units at 06/10/22 0612 **AND** Fingerstick Glucose (POCT), , , Q6H, ISELA Ritter    iohexol (OMNIPAQUE) 240 MG/ML solution 50 mL, 50 mL, Oral, Once in imaging, ISELA Henry    ipratropium-albuterol (DUO-NEB) 0 5-2 5 mg/3 mL inhalation solution 3 mL, 3 mL, Nebulization, Q6H PRN, ISELA Henry, 3 mL at 06/04/22 0757    levothyroxine tablet 112 mcg, 112 mcg, Per NG Tube, Early Morning, ISELA Henry, 112 mcg at 06/10/22 0612    lidocaine (LIDODERM) 5 % patch 2 patch, 2 patch, Topical, Daily, Lonell Lanes Rudd, PA-C, 2 patch at 06/09/22 0944    magnesium sulfate IVPB (premix) SOLN 1 g, 1 g, Intravenous, Once, Jessica Espinoza,     midodrine (PROAMATINE) tablet 10 mg, 10 mg, Oral, TID AC, Kourtney Odell, CRNP, 10 mg at 06/10/22 1928    norepinephrine (LEVOPHED) 8 mg (DOUBLE CONCENTRATION) IV in sodium chloride 0 9% 250 mL, 1-30 mcg/min, Intravenous, Titrated, MACARIO LawrenceNP, Last Rate: 1 9 mL/hr at 06/10/22 0700, 1 mcg/min at 06/10/22 0700    NxStage K 4/Ca 3 dialysis solution (RFP-401) 20,000 mL, 20,000 mL, Dialysis, Continuous, Emory Xiao MD, 20,000 mL at 06/10/22 0824    ondansetron (ZOFRAN) injection 4 mg, 4 mg, Intravenous, Q6H PRN, ISELA Henry    oxyCODONE (ROXICODONE) oral solution 5 mg, 5 mg, Per NG Tube, Q4H PRN, 5 mg at 06/03/22 1804 **OR** oxyCODONE (ROXICODONE) oral solution 7 5 mg, 7 5 mg, Per NG Tube, Q4H PRN, Stephanie Mendiola, CRNP    pantoprazole (PROTONIX) injection 40 mg, 40 mg, Intravenous, Q24H [facility], Eva Khan, ISELA, 40 mg at 06/10/22 0825    piperacillin-tazobactam (ZOSYN) 3 375 g in sodium chloride 0 9 % 100 mL IVPB, 3 375 g, Intravenous, Q6H, Arturo Bobby PA-C, Stopped at 06/10/22 0441    polyethylene glycol (MIRALAX) packet 17 g, 17 g, Oral, Daily, Josephine OmarISELA Dixon, 17 g at 06/10/22 0825    sodium phosphate 9 mmol in sodium chloride 0 9 % 100 mL Infusion, 9 mmol, Intravenous, Once, Jessica Espinoza DO, Last Rate: 50 mL/hr at 06/10/22 0819, 9 mmol at 06/10/22 0819    vancomycin (VANCOCIN) 1,250 mg in sodium chloride 0 9 % 250 mL IVPB, 20 mg/kg (Adjusted), Intravenous, Q24H, Arturo Bobby PA-C, Last Rate: 166 7 mL/hr at 06/09/22 1609, 1,250 mg at 06/09/22 1609    Laboratory Results:  Results from last 7 days   Lab Units 06/10/22  0603 06/10/22  0601 06/09/22  2338 06/09/22  1743 06/09/22  1157 06/09/22  0533 06/09/22  0002 06/08/22  0607 06/07/22  1444 06/07/22  0558 06/06/22  0749 06/06/22  0010 06/05/22  0555   WBC Thousand/uL  --  11 06*  --   --  12 13* 10 91*  --  15 34*  --  16 74* 17 92*  --  18 30*   HEMOGLOBIN g/dL  --  7 7*  --   --  8 2* 6 6*  --  7 1*  --  7 3* 7 8*  --  8 0*   HEMATOCRIT %  --  23 7*  --   --  25 9* 20 5*  --  22 9*  --  23 4* 24 2*  --  24 4*   PLATELETS Thousands/uL  --  85*  --   --  121* 124*  --  123*  --  87* 49*  --  86*   POTASSIUM mmol/L 4 0  --  4 4 3 9 4 0 3 6 4 1 5 0   < > 5 1 4 8   < > 4 5   CHLORIDE mmol/L 108  --  106 106 104 100 99* 99*   < > 104 103   < > 105   CO2 mmol/L 25  --  24 25 23 22 23 22   < > 21 23   < > 24   BUN mg/dL 20  --  19 18 21 25 27* 28*   < > 41* 26*   < > 20   CREATININE mg/dL 1 03  --  1 08 1 13 1 33* 1 47* 1 64* 1 99*   < > 2 11* 1 51*   < > 1 17   CALCIUM mg/dL 8 0*  --  8 0* 7 8* 7 8* 7 7* 7 5* 7 6*   < > 8 0* 8 2*   < > 8 3   MAGNESIUM mg/dL 1 9  --  2 0 2 0 1 8 2 0 2 1 2 4   < > 2 2 2 1   < > 1 9   PHOSPHORUS mg/dL 1 7*  --  2 0* 2 4 2  6 3 2 3 6  --   --  5 2* 3 3   < > 2 7    < > = values in this interval not displayed

## 2022-06-10 NOTE — PROGRESS NOTES
Vancomycin IV Pharmacy-to-Dose Consultation    Mohini Coley is a 80 y o  male who is currently receiving Vancomycin IV with management by the Pharmacy Consult service  Assessment/Plan:  The patient was reviewed  Renal function is stable and no signs or symptoms of nephrotoxicity and/or infusion reactions were documented in the chart  Based on todays assessment, continue current vancomycin (day # 2) dosing of 1250 mg IV q24h, with a plan for trough to be drawn at 14:15 on 6/12/2022  We will continue to follow the patients culture results and clinical progress daily      Bob Fairbanks, Pharmacist

## 2022-06-10 NOTE — ASSESSMENT & PLAN NOTE
· Patient was found unresponsive and hypoxic approximately 20 minutes after being seen well with family 5/21   · PEA arrest x 2  · Most likely hypoxia driven  · Neurologically intact post arrest   · Continue telemetry monitoring

## 2022-06-10 NOTE — PROGRESS NOTES
Progress Note - Infectious Disease   Fady Hudson 80 y o  male MRN: [id number]  Unit/Bed#: ICU 02 Encounter: [id number]      Impression/Plan:  1  s/p cardiac arrest 5/21/22  Patient intubated during RR  In ICU on ventilator assistance  Recurrent SIRS possibly reactive to arrest with fever, tachycardia, tachypnea, and increased leukocytosis post arrest  Possible recurrent aspiration event s/p arrest  Fever down trended   Patient continued to have hypotension episodes that appear to be related to volume status, patient off vasopressors at moment  -continue supportive measures per critical care team  -cardiology on board  -ventilator management per critical care team     2  SIRS, evolving on admission, post #1 and with persistent hypotension support with dialysis   Evidenced by tachycardia, tachypnea, leukocytosis, hypotension   Suspect possible aspiration and/or volume overload   MRSA screen negative  WBC fluctuating  5/22/22 Blood cultures have no growth   Patient completed 2 week antibiotic course 5/27/22 06/03/2022 blood cultures x2 negative at 5 days  Patient remains off blood pressure support as of 06/09/2022 at present    -discontinues Zosyn   -continue vancomycin as below  -monitor temperature and hemodynamics  -serial exam  -monitor CBC and BMP   -pressor support per Critical Medicine Service     3  Peritonitis s/p Bowel perforation  s/p 5/11/22 exploratory laparotomy, low anterior resection, protective loop transverse colostomy and segmental small bowel resection secondary to perforation rectosigmoid junction after colonoscopy   OR cultures grew Pseudomonas aeruginosa and Bacteroides fragilis   Patient completed antibiotic course 5/27/22   IR PEG tube placed 06/06/2022 06/07/2022 CT chest abdomen pelvis:  Right kush pelvis indeterminant fluid collection slightly larger in size from CT scan from 05/24/2022 06/08/2022 Patient status post IR right pelvic abscess drainage tube placement    35 mL of Jany Cheema fluid removed growing  MRSA  -discontinue Zosyn   -continue vancomycin  -plan to treat 10 day course  -pharmacy on consult for vancomycin trough dosing management  -VAC/wound care per surgery  -follow up Peg function  -IR drainage tube management     4   Acute hypoxic respiratory failure with hypoxia   Suspicious for aspiration pneumonitis over pneumonia     6/6/22 CT C/A/P Bilateral pleural effusions, worsening pulmonary parenchymal airspace opacities consistent with developing fibrosis  Patient remains intubated s/p #1  5/16/22 Procalcitonin level  2 60 > 5/21 0 753 < 5/23/22 2 97 > 5/30 1 26  Patient is status post bronchoscopy 5/29/22 for RUL collapse  5/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  -ventilator management per critical care team      5  Aspiration pneumonitis versus pneumonia in setting of #1/3   5/22/22 CT C/A/P predominantly UL groundglass and consolidations, bilateral pleural effusions, SB mild dilation unchanged  Patient now intubated s/p #1  5/16/22 Procalcitonin level  2 60 > 5/21 0 753 < 5/23/22 2 97 > 5/30 1 26  5/17/22 sputum specimen oral pharyngeal contamination    06/06/2022 portable chest x-ray with bilateral pleural effusions  -continue antibiotic for as above  -secretion and pulmonary toilet management per critical care team   -monitor respiratory symptoms  -monitor vent requirements     6  MARYELLEN on CKD 3  Creatinine 2 1 CVVH on   -renal dose adjust medications as needed  -volume management per Nephrology  -CVVH management per Nephrology  -monitor creatinine and urinary output     Antibiotics:  Zosyn D8/Vancomycin D2     Above impression and plan discussed in detail with patient's RN and critical care team   We will see patient again 6/13/22 if here   Please call ID on call physician in meantime if questions      Subjective:  Patient no fever, chills, sweats reported on ventilator in ICU s/p cardiac arrest 5/21/22; no nausea, vomiting, diarrhea reported  CVVH started 5/26/22 and restarted 22 after short intermittent HD attempt due to BP instability  patient's blood pressure improved at present and off vasopressors  Patient status post IR right pelvic abscess drainage tube placement  35 mL of brown fluid removed initially and now tube left in place  Objective:  Vitals:  Temp:  [95 2 °F (35 1 °C)-98 5 °F (36 9 °C)] 97 7 °F (36 5 °C)  HR:  [69-89] 89  Resp:  [1-41] 38  BP: ()/(42-57) 88/42  SpO2:  [85 %-100 %] 87 %  Temp (24hrs), Av 1 °F (36 2 °C), Min:95 2 °F (35 1 °C), Max:98 5 °F (36 9 °C)  Current: Temperature: 97 7 °F (36 5 °C)    Physical Exam:   General Appearance:  80year-old acute on chronically debilitated elderly male, propped resting in ICU on ventilator at FiO2 of 70%, eyes closed, off Precedex   Throat: Oropharynx moist without lesions  ET tube to vent   Lungs:   Scattered coarse ventilated breath sounds to auscultation bilaterally, scant thin secretions in suction canister   Heart:  RRR   Abdomen:   Soft, no focal tenderness, protuberant, positive bowel sounds, midline abdominal wound VAC in place with moderate serous output in canister, wound edges without erythema purulence, left lower quadrant ostomy with soft loose stool in bag, peg tube capped, right-sided pelvic drain in place with cloudy output in FAYE bulb   Extremities: Generalized puffy edema, venodynes, and Prevalon boots in place   : No [place], 4+ soft scrotal edema, no SPT   Skin: No new rashes or lesions  Right arm PICC and right neck IJ CVP lines in place  CVVH running         Labs, Imaging, & Other studies:   All pertinent labs and imaging studies were personally reviewed  Results from last 7 days   Lab Units 06/10/22  0601 22  1157 22  0533   WBC Thousand/uL 11 06* 12 13* 10 91*   HEMOGLOBIN g/dL 7 7* 8 2* 6 6*   PLATELETS Thousands/uL 85* 121* 124*     Results from last 7 days   Lab Units 06/10/22  1153 06/10/22  0603 22  2338 22  1157 22  0533 22  0002 06/08/22  1053   SODIUM mmol/L 140 140 139   < > 135*   < >  --    POTASSIUM mmol/L 4 1 4 0 4 4   < > 3 6   < >  --    CHLORIDE mmol/L 106 108 106   < > 100   < >  --    CO2 mmol/L 27 25 24   < > 22   < >  --    BUN mg/dL 21 20 19   < > 25   < >  --    CREATININE mg/dL 0 98 1 03 1 08   < > 1 47*   < >  --    EGFR ml/min/1 73sq m 67 63 59   < > 41   < >  --    CALCIUM mg/dL 8 3 8 0* 8 0*   < > 7 7*   < >  --    AST U/L  --   --   --   --  55*  --  29   ALT U/L  --   --   --   --  27  --  14   ALK PHOS U/L  --   --   --   --  156*  --  191*    < > = values in this interval not displayed  Results from last 7 days   Lab Units 06/08/22  1555 06/03/22  1528   BLOOD CULTURE   --  No Growth After 5 Days  No Growth After 5 Days     GRAM STAIN RESULT  2+ Gram positive cocci in pairs, chains and clusters*  No polys seen*  --    BODY FLUID CULTURE, STERILE  2+ Growth of Methicillin Resistant Staphylococcus aureus*  --      Results from last 7 days   Lab Units 06/09/22  0533   PROCALCITONIN ng/ml 3 92*

## 2022-06-10 NOTE — QUICK NOTE
Update given to son Mariano Lynn regarding plan of care for today  All questions were answered  He stated he was curious as to what the criteria would be for surgery to trach her  Will discuss w surgery and let him know

## 2022-06-10 NOTE — ASSESSMENT & PLAN NOTE
Peritonitis with intra-abdominal/pelvic abscess secondary to colonic perforation    · 5/22 CT chest/ab/pelvis with concern of multifocal PNA, no visualized intraabdominal abscess or anastomotic leak   · Per ID suspect multifocal pneumonitis   · OR culture 5/21 pseudomonas and bacteroides fragilis  · Blood cultures on 5/22 negative   · Completed 14 days of Flagyl on 5/24  · Completed 14 days of cefepime on 5/27  · Restarted Zosyn on 6/3 for increasing WBC and oxygen requirements   · Zosyn day 8  · Vanco started 6/9 for abscess culture +staph aureas  · Vanco day 2  · ID following, appreciate recommendations   · Fluid collection noted in hemipelvis on CT  FAYE tube placedon 6/8/22 and 30cc of pus drained     · WBC continues to improve on current Abx   · procal 3 9, continue to trend   · Hypothermia requiring chester hugger, continue to trend temps

## 2022-06-10 NOTE — ASSESSMENT & PLAN NOTE
· In the setting of cardiac arrest while on 3 pressors noted to have afib with RVR  · Bolused with amio and placed on infusion     · Converted to sinus rhythm on 5/22   · Amio gtt d/c 5/23 but restarted on 6/7 due to RVR >100   · Pt now converted to  NSR, Amio gtt d/c 6/9  · Heparin gtt for [facility]

## 2022-06-10 NOTE — ASSESSMENT & PLAN NOTE
· Patient previously in ICU for hypoxia with a max of 15L midflow and concern for aspiration pneumonitis  · Transfered to general medical floor 5/21  · 5/21: PEA arrest x2, intubation  · 5/24 CT CAP-CHF with moderate basilar effusions and additional upper lung zone patchy airspace opacities likely reflecting asymmetric pulmonary edema  Infiltrate is not entirely excluded     · 5/29 Bronchoscopy for mucous plugging  · Day # 21 on ventilator: AC/PC 24/24/70%/8  · Wean Fio2/PEEP as able for SPO2>90%  · Continue pulmonary hygiene/ VAP bundle  · Chlorhexidine, PPI, HOB greater than 30 degrees   · Continue volume removal with CVVH  · D/w surgery timing of trach/PEG  · Possible IR thoracentesis today for b/l pleural effusions

## 2022-06-10 NOTE — PROGRESS NOTES
Patient now with SBP 70s, 250mL 5% albumin ordered and Levo ordered to restart  Had period of hypoxia to 87%, now improved to 90s  Will get stat CXR

## 2022-06-10 NOTE — ASSESSMENT & PLAN NOTE
· Outpatient EGD and colonoscopy at [facility] on 5/11 for w/u of chronic anemia, history of colon polyps, intermittent LLQ abdominal pain and possible intermittent intussusception  · EGD unremarkable, colonoscopy technically difficult and required change from adult scope to pediatric scope, during traversal of the sigmoid colon there was a kink and patient sustained a perforation  · Transferred to [facility] for emergent surgery   · Emergent ex- lap on 5/11 > low anterior resection, protective loop transverse colostomy, flex sigmoidoscopy, and segmental small bowel resection  · Difficult post op course with post op ileus requiring NGT decompression and requirement of short duration of TPN   · 5/22: CT: Diffuse mild dilation of small bowel segments identified without transition point  · 5/24: CT CAP- Distended small bowel loops with scattered air-fluid levels consistent with early/partial small bowel obstruction with transition at the small bowel anastomosis in the region of the ventral pelvis as above  No free air  Cholelithiasis without cholecystitis  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  · 5/24: Stomal prolapse and small peristomal hernia now at the transverse loop colostomy; continues to be reduced by surgery  · TPN d/c'd on 5/26; tolerating tube feeds at goal  · 5/26: Abdominal wound vac placed bedside: continue with dressing changes Monday/Thursday   · Surgery continues to follow    · 6/6: Gastrostomy tube inserted for nutrition by IR   · 6/6: CT Chest Abdomen Pelvis 6/6 : Indeterminant fluid collection in the right hemipelvis which appears to be loculated and possibly "walled off" from the remainder of abdominopelvic ascites   could represent an infected abscess, but is not significantly changed in size from May 24  · 6/8: IR abscess drainage and drain placement-30mL of pus drained +Staph aureas  · Clinically improving since abscess drained, has remained off pressors  · 25mL out of drain x 24 hours

## 2022-06-10 NOTE — ASSESSMENT & PLAN NOTE
· HIT JAYLIN negative  · Argatroban d/c 6/9 and switch back to heparin gtt  · Platelets have remained stable in 120s  · Thrombocytopenia likely in setting of bone marrow suppression from sepsis and CRRT  · Continue to trend platelet count daily

## 2022-06-10 NOTE — PROGRESS NOTES
Pastoral Care Progress Note    6/10/2022  Patient: Kota Mathur : 2/15/1931  Admission Date & Time: 2022 1648  MRN: [id number] CSN: [id number]                     Chaplaincy Interventions Utilized:   Empowerment: Encouraged self-care  Explored with patient's son the cumulative stress upon him as he drives from [place] everyday to be with his father  I encouraged the son to take care of himself, and he said he is trying to do so  He said his father has an eclectic glenis, and the son said the same for himself  He feels all religions speak similarly to the primacy of love  I offered a prayer for healing for the patient with the son at the patient's bedside       06/10/22 1500   Clinical Encounter Type   Visited With Patient and family together   Routine Visit Follow-up   Jewish Encounters   Jewish Needs Prayer

## 2022-06-10 NOTE — BRIEF OP NOTE (RAD/CATH)
INTERVENTIONAL RADIOLOGY PROCEDURE NOTE    Date: 6/10/2022    Procedure: Left thoracentesis    Preoperative diagnosis:   1  Toxic metabolic encephalopathy    2  Bowel perforation (HCC)    3  Abdominal pain    4  Hypoxia    5  Pneumonia of both lungs due to infectious organism, unspecified part of lung    6  Severe sepsis ([address] )    7  Acute respiratory failure with hypoxia (HCC)    8  Hypokalemia    9  Cardiac arrest ([address] )    10  MARYELLEN (acute kidney injury) ([address] )    11  L1 vertebral fracture ([facility])    12  Pelvic fluid collection         Postoperative diagnosis: Same  Surgeon: Renny Mcdowell MD     Assistant: None  No qualified resident was available  Blood loss: None    Specimens: Left pleural fliud     Findings: Left pleural effusion, thoracentesis performed  Complications: None immediate      Anesthesia: local

## 2022-06-10 NOTE — PROGRESS NOTES
Tami 45  Progress Note - Loreli Dose Becca 2/15/1931, 80 y o  male MRN: [id number]  Unit/Bed#: ICU 02 Encounter: [id number]  Primary Care Provider: Vinicius Colon MD   Date and time admitted to hospital: 5/11/2022  4:48 PM    * Bowel perforation [facility])  Assessment & Plan  · Outpatient EGD and colonoscopy at [facility] on 5/11 for w/u of chronic anemia, history of colon polyps, intermittent LLQ abdominal pain and possible intermittent intussusception  · EGD unremarkable, colonoscopy technically difficult and required change from adult scope to pediatric scope, during traversal of the sigmoid colon there was a kink and patient sustained a perforation  · Transferred to hospitals for emergent surgery   · Emergent ex- lap on 5/11 > low anterior resection, protective loop transverse colostomy, flex sigmoidoscopy, and segmental small bowel resection  · Difficult post op course with post op ileus requiring NGT decompression and requirement of short duration of TPN   · 5/22: CT: Diffuse mild dilation of small bowel segments identified without transition point  · 5/24: CT CAP- Distended small bowel loops with scattered air-fluid levels consistent with early/partial small bowel obstruction with transition at the small bowel anastomosis in the region of the ventral pelvis as above  No free air  Cholelithiasis without cholecystitis  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    · 6/6: Gastrostomy tube inserted for nutrition by IR   · 6/6: CT Chest Abdomen Pelvis 6/6 : Indeterminant fluid collection in the right hemipelvis which appears to be loculated and possibly "walled off" from the remainder of abdominopelvic ascites  could represent an infected abscess, but is not significantly changed in size from May 24  · 6/8: IR abscess drainage and drain placement-30mL of pus drained +Connor stewart  · Clinically improving since abscess drained, has remained off pressors  · 25mL out of drain x 24 hours    Acute respiratory failure with hypoxia ([facility] [address] )  Assessment & Plan  · Patient previously in ICU for hypoxia with a max of 15L midflow and concern for aspiration pneumonitis  · Transfered to general medical floor 5/21  · 5/21: PEA arrest x2, intubation  · 5/24 CT CAP-CHF with moderate basilar effusions and additional upper lung zone patchy airspace opacities likely reflecting asymmetric pulmonary edema  Infiltrate is not entirely excluded  · 5/29 Bronchoscopy for mucous plugging  · Day # 21 on ventilator: AC/PC 24/24/70%/8  · Wean Fio2/PEEP as able for SPO2>90%  · Continue pulmonary hygiene/ VAP bundle  · Chlorhexidine, PPI, HOB greater than 30 degrees   · Continue volume removal with CVVH  · D/w surgery timing of trach/PEG  · Possible IR thoracentesis today for b/l pleural effusions     Acute renal failure (ARF) ([facility] [address] )  Assessment & Plan  · Secondary to hypoperfusion mehul cardiac arrest +/- ATN  · Baseline creat 0 8  · Creatinine peaked at 3 07  · CVVH started on 5/26 and stopped on 6/1  · Restarted on 6/2 after he was anuric with increasing oxygen requirements  · CRRT initially discontinued  iHD attempted however pt could not tolerate d/t hypotension/escalating vasopressor requirement   CRRT restarted on 6/9/22   · Avoid hypotension/hypoperfusion  · Continue CRRT, was running -50 throughout the day yesterday but had hypotensive period over night and has since been running even   · Overall fluid balance -782/24 hours    Severe sepsis [facility])  Assessment & Plan  Peritonitis with intra-abdominal/pelvic abscess secondary to colonic perforation    · 5/22 CT chest/ab/pelvis with concern of multifocal PNA, no visualized intraabdominal abscess or anastomotic leak   · Per ID suspect multifocal pneumonitis   · OR culture 5/21 pseudomonas and bacteroides fragilis  · Blood cultures on 5/22 negative   · Completed 14 days of Flagyl on 5/24  · Completed 14 days of cefepime on 5/27  · Restarted Zosyn on 6/3 for increasing WBC and oxygen requirements   · Zosyn day 8  · Vanco started 6/9 for abscess culture +staph aureas  · Vanco day 2  · ID following, appreciate recommendations   · Fluid collection noted in hemipelvis on CT  AFYE tube placedon 6/8/22 and 30cc of pus drained     · WBC continues to improve on current Abx   · procal 3 9, continue to trend   · Hypothermia requiring chester hugger, continue to trend temps    Hypotension  Assessment & Plan  · Levophed restarted on 6/1, vasopressin restarted on 6/3  · Weaned to off 6/9  · Continue midodrine 10mg TID  '  · Period of hypotension overnight, responsive to 250 albumin and CRRTswitched to running even       Cardiac arrest [facility])  Assessment & Plan  · Patient was found unresponsive and hypoxic approximately 20 minutes after being seen well with family 5/21   · PEA arrest x 2  · Most likely hypoxia driven  · Neurologically intact post arrest   · Continue telemetry monitoring     CHF (congestive heart failure) ([facility] [address] )  Assessment & Plan  Wt Readings from Last 3 Encounters:   06/09/22 82 1 kg (181 lb)   05/11/22 62 1 kg (136 lb 14 4 oz)   03/25/22 61 2 kg (135 lb)     · 5/16: Echo-EF 65%, mild TR, mild MR  · 5/23: Repeat Echo post cardiac arrest: EF 60-65%, G1DD, mild AS (BRADY 1 5cm2), mild MR, mild TR  · Monitor I&O, Daily weights   · Limited ECHO-EF 65%, G1DD, mild AS  · Volume removal with CRRT as above   · Fluid balance -782/24 hours      Hyperglycemia  Assessment & Plan  · A1c 5 3%  · Hyperglycemia likely stress induced  · Continue SSI - minimal requirements     Toxic metabolic encephalopathy  Assessment & Plan  · Likely in setting of acute illness/delirium and cardiac arrest  · Delirium precautions; regulate sleep/wake cycle, environmental controls, daily CAM ICU   · Trend neuro exam  · Following commands, nodding appropriately to questions      Anemia  Assessment & Plan  · Hemoglobin drop post cardiac arrest    · Noted to have gross hematuria but this has since resolved  · 5/21: 1 u PRBC  · 5/24: 1 u PRBC  · CT obtained on 5/24 and negative for any overt signs of bleeding  · 5/26: 1 u PRBC   · 5/30: 1 u PRBC  · 6/4: 1 U PRBC   · 6/9: 1 U PRBC   · Continue to monitor and transfuse for hgb less than 7      Paroxysmal atrial fibrillation (HCC)  Assessment & Plan  · In the setting of cardiac arrest while on 3 pressors noted to have afib with RVR  · Bolused with amio and placed on infusion  · Converted to sinus rhythm on 5/22   · Amio gtt d/c 5/23 but restarted on 6/7 due to RVR >100   · Pt now converted to  NSR, Amio gtt d/c 6/9  · Heparin gtt for [facility]    Thrombocytopenia (HCC)  Assessment & Plan  · HIT JAYLIN negative  · Argatroban d/c 6/9 and switch back to heparin gtt  · Platelets have remained stable in 120s  · Thrombocytopenia likely in setting of bone marrow suppression from sepsis and CRRT  · Continue to trend platelet count daily       ----------------------------------------------------------------------------------------  HPI/24hr events:  Levo weaned to off yesterday  CRRT running -50 yesterday, had hypotensive period at 0100 given 250 albumin and started running even with improvement  Abscess culture + jennifer stewart, started on vanco by ID   Tolerating TF at goal      Patient appropriate for transfer out of the ICU today?: No  Disposition: Continue Critical Care   Code Status: Level 2 - DNAR: but accepts endotracheal intubation  ---------------------------------------------------------------------------------------  SUBJECTIVE    Review of Systems   Unable to perform ROS: Intubated     Review of systems was unable to be performed secondary to intubation/encephalopathy  ---------------------------------------------------------------------------------------  OBJECTIVE    Vitals   Vitals:    06/10/22 0200 06/10/22 0300 06/10/22 0314 06/10/22 0400   BP:       BP Location:       Pulse: 80 78  82   Resp: (!) 1 (!) 28  (!) 32   Temp:       TempSrc:       SpO2: 98% 96% 96% 96%   Weight:       Height:         Temp (24hrs), Av 9 °F (36 6 °C), Min:95 2 °F (35 1 °C), Max:98 5 °F (36 9 °C)  Current: Temperature: (!) 95 2 °F (35 1 °C)  Arterial Line BP: 133/36  Arterial Line MAP (mmHg): 74 mmHg    Respiratory:  SpO2: SpO2: 96 %, SpO2 Activity: SpO2 Activity: At Rest, SpO2 Device: O2 Device: Ventilator  Nasal Cannula O2 Flow Rate (L/min): 5 L/min    Invasive/non-invasive ventilation settings   Respiratory  Report   Lab Data (Last 4 hours)    None         O2/Vent Data (Last 4 hours)      06/10 0314           Vent Mode AC/PC       Resp Rate (BPM) (BPM) 20       Pressure Control (cmH2O) (cm) 24       Insp Time (sec) (sec) 0 7       FiO2 (%) (%) 80       PEEP (cmH2O) (cmH2O) 8       MV 12 7                   Physical Exam  Constitutional:       Appearance: He is ill-appearing  Interventions: He is intubated  HENT:      Head: Normocephalic and atraumatic  Eyes:      General: Lids are normal       Extraocular Movements: Extraocular movements intact  Conjunctiva/sclera: Conjunctivae normal    Neck:      Trachea: Trachea normal    Cardiovascular:      Rate and Rhythm: Normal rate and regular rhythm  Pulses: Normal pulses  Radial pulses are 2+ on the right side and 2+ on the left side  Dorsalis pedis pulses are 2+ on the right side and 2+ on the left side  Heart sounds: Normal heart sounds, S1 normal and S2 normal       Comments: Anasarca   Pulmonary:      Effort: Pulmonary effort is normal  He is intubated  Comments: Course scattered rhonchi   Abdominal:      General: Bowel sounds are decreased        Palpations: Abdomen is soft       Comments: Midline abdominal incision with wound vac in place-scant drainage  Ostomy with large amount of dark brown/green drainage  R abdominal abscess drain with small amount of serousang/milky drainage   Genitourinary:     Comments: Scrotal edema  Musculoskeletal:      Cervical back: Neck supple  Right lower leg: 3+ Edema present  Left lower leg: 3+ Edema present  Comments: Weakly moves all four extremities   Skin:     General: Skin is warm and dry  Capillary Refill: Capillary refill takes less than 2 seconds  Neurological:      General: No focal deficit present  Mental Status: He is lethargic  GCS: GCS eye subscore is 4  GCS verbal subscore is 1  GCS motor subscore is 6               Laboratory and Diagnostics:  Results from last 7 days   Lab Units 06/09/22  1157 06/09/22  0533 06/08/22  3955 06/07/22  0558 06/06/22  0749 06/05/22  0555 06/04/22  1420 06/04/22  0554 06/03/22  0604   WBC Thousand/uL 12 13* 10 91* 15 34* 16 74* 17 92* 18 30*  --  14 58* 21 58*   HEMOGLOBIN g/dL 8 2* 6 6* 7 1* 7 3* 7 8* 8 0* 8 1* 6 9* 7 3*   HEMATOCRIT % 25 9* 20 5* 22 9* 23 4* 24 2* 24 4*  --  21 0* 22 2*   PLATELETS Thousands/uL 121* 124* 123* 87* 49* 86*  --  99* 133*   NEUTROS PCT %  --   --   --   --   --  79*  --   --   --    BANDS PCT %  --   --  21*  --  11*  --   --   --  23*   MONOS PCT %  --   --   --   --   --  7  --   --   --    MONO PCT %  --   --  7  --  8  --   --   --  6     Results from last 7 days   Lab Units 06/09/22  2338 06/09/22  1743 06/09/22  1157 06/09/22  0533 06/09/22  0002 06/08/22  1053 06/08/22  0607 06/07/22  1444   SODIUM mmol/L 139 140 138 135* 134*  --  135* 138   POTASSIUM mmol/L 4 4 3 9 4 0 3 6 4 1  --  5 0 3 6   CHLORIDE mmol/L 106 106 104 100 99*  --  99* 97*   CO2 mmol/L 24 25 23 22 23  --  22 25   ANION GAP mmol/L 9 9 11 13 12  --  14* 16*   BUN mg/dL 19 18 21 25 27*  --  28* 20   CREATININE mg/dL 1 08 1 13 1 33* 1 47* 1 64*  --  1 99* 1 33*   CALCIUM mg/dL 8 0* 7 8* 7 8* 7 7* 7 5*  --  7 6* 7 6*   GLUCOSE RANDOM mg/dL 139 105 107 135 152*  --  116 87   ALT U/L  --   --   --  27  --  14  --   --    AST U/L  --   --   --  55*  --  29  --   --    ALK PHOS U/L  --   --   --  156*  --  191*  --   --    ALBUMIN g/dL  --   --   --  2 0*  --  2 2*  --   --    TOTAL BILIRUBIN mg/dL  --   --   --  1 77*  --  1 83*  --   --      Results from last 7 days   Lab Units 06/09/22  2338 06/09/22  1743 06/09/22  1157 06/09/22  0533 06/09/22  0002 06/08/22  0607 06/07/22  1444 06/07/22  0558 06/06/22  0749   MAGNESIUM mg/dL 2 0 2 0 1 8 2 0 2 1 2 4 1 9 2 2 2 1   PHOSPHORUS mg/dL 2 0* 2 4 2 6 3 2 3 6  --   --  5 2* 3 3      Results from last 7 days   Lab Units 06/10/22  0136 06/09/22  1743 06/09/22  1157 06/09/22  1101 06/09/22  0737 06/09/22  0213 06/08/22  2124 06/08/22  1300 06/08/22  1053 06/06/22  1126   INR   --   --  2 12*  --   --   --   --   --  1 87* 1 52*   PTT seconds >210* >210* 105* 85* 93* 102* 93*   < > 201*  --     < > = values in this interval not displayed  Results from last 7 days   Lab Units 06/03/22  1021   LACTIC ACID mmol/L 0 8     ABG:  Results from last 7 days   Lab Units 06/09/22  0603   PH ART  7 315*   PCO2 ART mm Hg 45 0*   PO2 ART mm Hg 61 3*   HCO3 ART mmol/L 22 4   BASE EXC ART mmol/L -3 5   ABG SOURCE  Line, Arterial     VBG:  Results from last 7 days   Lab Units 06/09/22  0603   ABG SOURCE  Line, Arterial     Results from last 7 days   Lab Units 06/09/22  0533   PROCALCITONIN ng/ml 3 92*       Micro  Results from last 7 days   Lab Units 06/08/22  1555 06/03/22  1528   BLOOD CULTURE   --  No Growth After 5 Days  No Growth After 5 Days     GRAM STAIN RESULT  2+ Gram positive cocci in pairs, chains and clusters*  No polys seen*  --    BODY FLUID CULTURE, STERILE  2+ Growth of Staphylococcus aureus*  --          Intake and Output  I/O       06/08 0701 06/09 0700 06/09 0701  06/10 0700    I V  (mL/kg) 938 (11 4) 1517 (18 5)    Blood  562 NG/ 120    Feedings  558    Total Intake(mL/kg) 1038 (12 6) 2757 (33 6)    Urine (mL/kg/hr) 0 (0) 0 (0)    Drains 45 25    Other 831 3249    Stool 275 275    Total Output 1151 6839    Net -113 -79                Height and Weights   Height: 5' 4" (162 6 cm)  IBW (Ideal Body Weight): 59 2 kg  Body mass index is 31 07 kg/m²  Weight (last 2 days)     Date/Time Weight    06/09/22 0600 82 1 (181)    06/08/22 0600 78 3 (172 62)            Nutrition       Diet Orders   (From admission, onward)             Start     Ordered    06/09/22 1137  Diet Enteral/Parenteral; Tube Feeding No Oral Diet; Vital AF 1 2; Continuous; 50; Demond - Two Packets Daily  Diet effective now        References:    Nutrtion Support Algorithm Enteral vs  Parenteral   Question Answer Comment   Diet Type Enteral/Parenteral    Enteral/Parenteral Tube Feeding No Oral Diet    Tube Feeding Formula: Vital AF 1 2    Bolus/Cyclic/Continuous Continuous    Tube Feeding Goal Rate (mL/hr): 50    Demond Orange Demond - Two Packets Daily    RD to adjust diet per protocol?  Yes        06/09/22 1138    05/12/22 0906  Room Service  Once        Question:  Type of Service  Answer:  Room Service - Appropriate with Assistance    05/12/22 0905                  Active Medications  Scheduled Meds:  Current Facility-Administered Medications   Medication Dose Route Frequency Provider Last Rate    acetaminophen  650 mg Oral Q6H PRN Meggan Joyce, CRNP      chlorhexidine  15 mL Mouth/Throat Q12H [address] Meggan Joyce, CRNP      gabapentin  200 mg Oral HS Josephine Haywood Halo, CRNP      heparin (porcine)  3-30 Units/kg/hr (Order-Specific) Intravenous Titrated Levonne Spear, DO 12 Units/kg/hr (06/10/22 0326)    heparin (porcine)  3,200 Units Intravenous Q1H PRN Levonne Spear, DO      heparin (porcine)  6,400 Units Intravenous Q1H PRN Levonne Spear, DO      HYDROmorphone  0 5 mg Intravenous Q3H PRN Toshia Chavez, CRNP      insulin lispro  1-6 Units Subcutaneous Q6H [address] ISELA Jeff      iohexol  50 mL Oral Once in imaging ISELA Gorman      ipratropium-albuterol  3 mL Nebulization Q6H PRN ISELA Gorman      levothyroxine  112 mcg Per NG Tube Early Morning ISELA Gorman      lidocaine  2 patch Topical Daily [place], [place]      midodrine  10 mg Oral TID AC Deneise Maxcy, [address]      NxStage K 4/Ca 3  20,000 mL Dialysis Continuous Angely Pavon MD      ondansetron  4 mg Intravenous Q6H PRN ISELA Gorman      oxyCODONE  5 mg Per NG Tube Q4H PRN ISELA Garcia      Or    oxyCODONE  7 5 mg Per NG Tube Q4H PRN ISELA Garcia      pantoprazole  40 mg Intravenous Q24H [address] ISELA Gorman      piperacillin-tazobactam  3 375 g Intravenous Q6H Elridge Sprout, PA-C 3 375 g (06/10/22 0411)    polyethylene glycol  17 g Oral Daily ISELA Jeff      vancomycin  20 mg/kg (Adjusted) Intravenous Q24H Elridge Sprout, PA-C 1,250 mg (06/09/22 1609)     Continuous Infusions:  heparin (porcine), 3-30 Units/kg/hr (Order-Specific), Last Rate: 12 Units/kg/hr (06/10/22 0326)  NxStage K 4/Ca 3, 20,000 mL      PRN Meds:   acetaminophen, 650 mg, Q6H PRN  heparin (porcine), 3,200 Units, Q1H PRN  heparin (porcine), 6,400 Units, Q1H PRN  HYDROmorphone, 0 5 mg, Q3H PRN  iohexol, 50 mL, Once in imaging  ipratropium-albuterol, 3 mL, Q6H PRN  ondansetron, 4 mg, Q6H PRN  oxyCODONE, 5 mg, Q4H PRN   Or  oxyCODONE, 7 5 mg, Q4H PRN        Invasive Devices Review  Invasive Devices  Report    Peripherally Inserted Central Catheter Line  Duration           PICC Line 36/32/56 Right Basilic 22 days          Arterial Line  Duration           Arterial Line 05/30/22 Radial 10 days          Hemodialysis Catheter  Duration           HD Temporary Double Catheter 14 days          Drain  Duration           Colostomy LLQ 30 days    Gastrostomy/Enterostomy Percutaneous Interventional Gastrostomy 16 Fr  LUQ 3 days    Abscess Drain Manny 1 day          Airway  Duration           ETT  Oral 19 days                Rationale for remaining devices: PICC for IV access, king for hemodynamic monitoring, HD cath for treatment therapy   ---------------------------------------------------------------------------------------  Advance Directive and Living Will:      Power of :    POLST:    ---------------------------------------------------------------------------------------  Care Time Delivered:   No Critical Care time spent       ISELA Nuñez      Portions of the record may have been created with voice recognition software  Occasional wrong word or "sound a like" substitutions may have occurred due to the inherent limitations of voice recognition software    Read the chart carefully and recognize, using context, where substitutions have occurred

## 2022-06-10 NOTE — ASSESSMENT & PLAN NOTE
· Secondary to hypoperfusion mehul cardiac arrest +/- ATN  · Baseline creat 0 8  · Creatinine peaked at 3 07  · CVVH started on 5/26 and stopped on 6/1  · Restarted on 6/2 after he was anuric with increasing oxygen requirements  · CRRT initially discontinued  iHD attempted however pt could not tolerate d/t hypotension/escalating vasopressor requirement   CRRT restarted on 6/9/22   · Avoid hypotension/hypoperfusion  · Continue CRRT, was running -50 throughout the day yesterday but had hypotensive period over night and has since been running even   · Overall fluid balance -782/24 hours

## 2022-06-10 NOTE — ASSESSMENT & PLAN NOTE
· Levophed restarted on 6/1, vasopressin restarted on 6/3  · Weaned to off 6/9  · Continue midodrine 10mg TID  '  · Period of hypotension overnight, responsive to 250 albumin and CRRTswitched to running even

## 2022-06-10 NOTE — ASSESSMENT & PLAN NOTE
Wt Readings from Last 3 Encounters:   06/09/22 82 1 kg (181 lb)   05/11/22 62 1 kg (136 lb 14 4 oz)   03/25/22 61 2 kg (135 lb)     · 5/16: Echo-EF 65%, mild TR, mild MR  · 5/23: Repeat Echo post cardiac arrest: EF 60-65%, G1DD, mild AS (BRADY 1 5cm2), mild MR, mild TR  · Monitor I&O, Daily weights   · Limited ECHO-EF 65%, G1DD, mild AS  · Volume removal with CRRT as above   · Fluid balance -782/24 hours

## 2022-06-11 PROBLEM — I46.9 CARDIAC ARREST (HCC): Status: RESOLVED | Noted: 2022-01-01 | Resolved: 2022-01-01

## 2022-06-11 NOTE — PROGRESS NOTES
Vancomycin IV Pharmacy-to-Dose Consultation    Debby Tamayo is a 80 y o  male who is currently receiving Vancomycin IV with management by the Pharmacy Consult service  Assessment/Plan:  The patient was reviewed  Renal function is stable and no signs or symptoms of nephrotoxicity and/or infusion reactions were documented in the chart  Based on todays assessment, continue current vancomycin (day # 3) dosing of 1250 mg IV q24h, with a plan for trough to be drawn at 1415 on 6/12/22  We will continue to follow the patients culture results and clinical progress daily      Jessie Coronado, Pharmacist

## 2022-06-11 NOTE — ASSESSMENT & PLAN NOTE
· Secondary to hypoperfusion mehul cardiac arrest +/- ATN  · Baseline creat 0 8  · Creatinine peaked at 3 07  · CVVH started on 5/26, attempts at [facility] unsuccessful secondary to rapidly escalating pressor requirements  · Avoid hypotension/hypoperfusion  · Continue CRRT, running even given escalating pressor requirements yesterday - continue to reassess and start to pull -25 if tolerated

## 2022-06-11 NOTE — ASSESSMENT & PLAN NOTE
Peritonitis with intra-abdominal/pelvic abscess secondary to colonic perforation    · 5/22 CT chest/ab/pelvis with concern of multifocal PNA, no visualized intraabdominal abscess or anastomotic leak   · Per ID suspect multifocal pneumonitis   · OR culture 5/21 pseudomonas and bacteroides fragilis  · Blood cultures on 5/22 negative   · Completed 14 days of Flagyl on 5/24  · Completed 14 days of cefepime on 5/27  · Restarted Zosyn on 6/3 for increasing WBC and oxygen requirements - discontinued 6/10  · Vanco started 6/9 for intra-abdominal abscess culture +MRSA  · Vanco day 3  · ID following, appreciate recommendations   · IR drain in place, continue to monitor output   · WBC continues to improve on current Abx   · procal 3 9, continue to trend   · Hypothermia requiring chester hugger, continue to trend temps

## 2022-06-11 NOTE — ASSESSMENT & PLAN NOTE
· Patient previously in ICU for hypoxia with a max of 15L midflow and concern for aspiration pneumonitis  · Transfered to general medical floor 5/21  · 5/21: PEA arrest x2, intubation  · 5/24 CT CAP-CHF with moderate basilar effusions and additional upper lung zone patchy airspace opacities likely reflecting asymmetric pulmonary edema  Infiltrate is not entirely excluded     · 5/29 Bronchoscopy for mucous plugging  · Day # 22 on ventilator: AC/PC 24/24/80%/10  · Wean Fio2/PEEP as able for SPO2>90%  · Continue pulmonary hygiene/ VAP bundle  · Chlorhexidine, PPI, HOB greater than 30 degrees   · Continue volume removal with CVVH  · 6/10 IR L thora for 800 cc  · Fluid studies/culture in process   · Discussed with surgery - trach/PEG when stable on 60%/6 PEEP for multiple days

## 2022-06-11 NOTE — PROGRESS NOTES
[facility] PROGRESS NOTE   Madhu Jewell 80 y o  male MRN: [id number]  Unit/Bed#: ICU 02 Encounter: [id number]  Reason for Consult: MARYELLEN    ASSESSMENT and PLAN:  80year old with HTN, aortic stenosis, GERD, CAD who underwent EGD and C-scope on 6/82/57 which was complicated by perforation requiring emergent ex-lap  Since then, course has been complicated by PEA arrest on 5/21/22, VDRF, pneumonia, sepsis  Renal consulted for MARYELLEN    1  Acute kidney injury:  · Baseline creatinine is 0 7-0 9  · Etiology is ATN in the setting of hypotension and cardiac arrest  · Peak creatinine 3 07 on May 26, 2022  · HD dependent since 5/26/22  CVVHD from 5/26/22 to 6/6/22  Had 1 iHD on 6/7/22  Restarted CVVHD 6/8/22  · Continue CVVHD at this time  · No evidence of renal recovery  2  Ventilator dependent respiratory failure:  · Currently FiO2 of 80% with PEEP of 10  · Defer to Critical Care  · CXR with vascular congestion  However, UF is limited by low BP  · Currently to be started on -25 cc/hr  3  Hypotension:  · Continue vasopressin for BP support  · Continue Midodrine 10 mg TID  4  Sepsis:  · Defer to ID and crit care  5  Congestive heart failure:  · UF is limited by low BP  · Levophed just turned off this AM and plan to start -25 cc/hr this AM      6  Anemia, thrombocytopenia:  · Hgb and Platelets lower today  · Down to 6 8 and 44 respectively  7  PEA cardiac arrest on 5/21/22  8  Pneumoperitoneum s/p ex-lap with LAR on 5/11/22    DISPOSITION:  · Clinically worse today  · Poor overall prognosis  · Continue CVVHD  · Gentle UF in light of issues with recurrent hypotension  · Restarting -25 cc/hr this AM      The above plan was discussed with crit care  SUBJECTIVE / 24H INTERVAL HISTORY:  Went for thoracentesis yesterday  Had issues with hypotension yesterday and UF was lowered to even  Required levophed and was as high as 20 mcg yesterday     Levophed turned off today at 5:20 am    Now on vasopressin  Oxygen reqs are worse - now up to 80%  CVVHD PROCEDURE NOTE  The patient was seen and examined on CVVHD  Qb 300 Qd 2000   4K running even  OBJECTIVE:  Current Weight: Weight - Scale: 82 9 kg (182 lb 12 2 oz)  Vitals:    06/11/22 0400 06/11/22 0430 06/11/22 0530 06/11/22 0600   BP: 127/61   127/58   Pulse: 76 68 75 75   Resp: (!) 29 (!) 31 (!) 31 (!) 31   Temp: (!) 97 16 °F (36 2 °C) (!) 96 98 °F (36 1 °C) (!) 97 16 °F (36 2 °C) (!) 97 16 °F (36 2 °C)   TempSrc:       SpO2: 98% 98% 98% 98%   Weight:    82 9 kg (182 lb 12 2 oz)   Height:           Intake/Output Summary (Last 24 hours) at 6/11/2022 0704  Last data filed at 6/11/2022 0600  Gross per 24 hour   Intake 3156 ml   Output 3605 ml   Net -449 ml     General: critically ill appearing on the mechanical ventilator, comfortable  Skin: warm, dry, good turgor  Eyes: closed  ENT: ETT in place  Neck: supple  Chest/Lungs: equal chest expansion, coarse breath sounds  CVS: distinct heart sounds, normal rate, regular rhythm, no rub  Abdomen: soft, (+) G tube, (+) ostomy  Extremities: (+) anasarca  : no quigley catheter  Neuro: sedated on the mechanical ventilator  Psych: could not evaluate        Medications:    Current Facility-Administered Medications:     acetaminophen (TYLENOL) oral suspension 650 mg, 650 mg, Oral, Q6H PRN, ISELA Carranza, 650 mg at 06/05/22 1512    calcium gluconate 1 g in sodium chloride 0 9% 50 mL (premix), 1 g, Intravenous, Once, Rodolfo Atkinson MD, Last Rate: 100 mL/hr at 06/11/22 0643, 1 g at 06/11/22 0643    chlorhexidine (PERIDEX) 0 12 % oral rinse 15 mL, 15 mL, Mouth/Throat, Q12H [address], ISELA Carranza, 15 mL at 06/10/22 2107    gabapentin (NEURONTIN) oral solution 200 mg, 200 mg, Oral, HS, Josephine Castillo, CRNP, 200 mg at 06/10/22 2107    heparin (porcine) 25,000 units in 0 45% NaCl 250 mL infusion (premix), 3-30 Units/kg/hr (Order-Specific), Intravenous, Titrated, Minus Dmitri, DO, Last Rate: 2 4 mL/hr at 06/11/22 0431, 3 Units/kg/hr at 06/11/22 0431    heparin (porcine) injection 3,200 Units, 3,200 Units, Intravenous, Q1H PRN, Minus Dmitri, DO    heparin (porcine) injection 6,400 Units, 6,400 Units, Intravenous, Q1H PRN, Minus Dmitri, DO    HYDROmorphone (DILAUDID) injection 0 5 mg, 0 5 mg, Intravenous, Q3H PRN, [employer], CRNP, 0 5 mg at 06/07/22 0113    insulin lispro (HumaLOG) 100 units/mL subcutaneous injection 1-6 Units, 1-6 Units, Subcutaneous, Q6H [facility], 2 Units at 06/11/22 0543 **AND** Fingerstick Glucose (POCT), , , Q6H, ISELA Ritter    iohexol (OMNIPAQUE) 240 MG/ML solution 50 mL, 50 mL, Oral, Once in imaging, Flor Bruce, CRNP    ipratropium-albuterol (DUO-NEB) 0 5-2 5 mg/3 mL inhalation solution 3 mL, 3 mL, Nebulization, Q6H PRN, Flor Shines, CRNP, 3 mL at 06/04/22 0757    levothyroxine tablet 112 mcg, 112 mcg, Per NG Tube, Early Morning, Flor Shines, CRNP, 112 mcg at 06/11/22 0543    lidocaine (LIDODERM) 5 % patch 2 patch, 2 patch, Topical, Daily, Mayo Cranker Rudd, PA-C, 2 patch at 06/10/22 0905    magnesium sulfate IVPB (premix) SOLN 1 g, 1 g, Intravenous, Once, Pilo Ashby MD, 1 g at 06/11/22 0643    midodrine (PROAMATINE) tablet 10 mg, 10 mg, Oral, TID AC, Kourtney Odell, CRNP, 10 mg at 06/11/22 0543    norepinephrine (LEVOPHED) 8 mg (DOUBLE CONCENTRATION) IV in sodium chloride 0 9% 250 mL, 1-30 mcg/min, Intravenous, Titrated, MACARIO LawrenceNP, Stopped at 06/11/22 0524    NxStage K 4/Ca 3 dialysis solution (RFP-401) 20,000 mL, 20,000 mL, Dialysis, Continuous, Esau Thurston PA-C, 20,000 mL at 06/11/22 0157    ondansetron (ZOFRAN) injection 4 mg, 4 mg, Intravenous, Q6H PRN, ISELA Stevenson    oxyCODONE (ROXICODONE) oral solution 5 mg, 5 mg, Per NG Tube, Q4H PRN, 5 mg at 06/03/22 1804 **OR** oxyCODONE (ROXICODONE) oral solution 7 5 mg, 7 5 mg, Per NG Tube, Q4H PRN, Auther Butts Sonal Hamilton, ISELA    pantoprazole (PROTONIX) injection 40 mg, 40 mg, Intravenous, Q24H [address], Flor Bruce, MACARIONP, 40 mg at 06/10/22 0825    polyethylene glycol (MIRALAX) packet 17 g, 17 g, Oral, Daily, Josephine FabianISELA Dixon, 17 g at 06/10/22 0825    sodium phosphate 9 mmol in sodium chloride 0 9 % 100 mL Infusion, 9 mmol, Intravenous, Once, Pilo Ashby MD, Last Rate: 50 mL/hr at 06/11/22 0649, 9 mmol at 06/11/22 0649    vancomycin (VANCOCIN) 1,250 mg in sodium chloride 0 9 % 250 mL IVPB, 20 mg/kg (Adjusted), Intravenous, Q24H, Felecia Gonzalez PA-C, Stopped at 06/10/22 1800    vasopressin (PITRESSIN) 20 Units in sodium chloride 0 9 % 100 mL infusion, 0 04 Units/min, Intravenous, Continuous, [facility], [facility], Last Rate: 12 mL/hr at 06/11/22 0137, 0 04 Units/min at 06/11/22 0137    Laboratory Results:  Results from last 7 days   Lab Units 06/11/22  0542 06/10/22  2328 06/10/22  1825 06/10/22  1153 06/10/22  0603 06/10/22  0601 06/09/22  2338 06/09/22  1743 06/09/22  1157 06/09/22  0533 06/09/22  0002 06/08/22  0607 06/07/22  1444 06/07/22  0558 06/06/22  0749   WBC Thousand/uL 16 88*  --   --   --   --  11 06*  --   --  12 13* 10 91*  --  15 34*  --  16 74* 17 92*   HEMOGLOBIN g/dL 6 8*  --   --   --   --  7 7*  --   --  8 2* 6 6*  --  7 1*  --  7 3* 7 8*   HEMATOCRIT % 20 7*  --   --   --   --  23 7*  --   --  25 9* 20 5*  --  22 9*  --  23 4* 24 2*   PLATELETS Thousands/uL 44*  --   --   --   --  85*  --   --  121* 124*  --  123*  --  87* 49*   POTASSIUM mmol/L 4 2 4 4 3 8 4 1 4 0  --  4 4 3 9 4 0 3 6   < > 5 0   < > 5 1 4 8   CHLORIDE mmol/L 107 107 104 106 108  --  106 106 104 100   < > 99*   < > 104 103   CO2 mmol/L 26 24 25 27 25  --  24 25 23 22   < > 22   < > 21 23   BUN mg/dL 17 19 23 21 20  --  19 18 21 25   < > 28*   < > 41* 26*   CREATININE mg/dL 0 90 0 92 0 95 0 98 1 03  --  1 08 1 13 1 33* 1 47*   < > 1 99*   < > 2 11* 1 51*   CALCIUM mg/dL 8 2* 8 1* 7 8* 8 3 8 0*  --  8 0* 7 8* 7  8* 7 7*   < > 7 6*   < > 8 0* 8 2*   MAGNESIUM mg/dL 1 8 2 0 1 7 2 1 1 9  --  2 0 2 0 1 8 2 0   < > 2 4   < > 2 2 2 1   PHOSPHORUS mg/dL 1 8* 1 9* 2 0* 1 5* 1 7*  --  2 0* 2 4 2 6 3 2   < >  --   --  5 2* 3 3    < > = values in this interval not displayed

## 2022-06-11 NOTE — ASSESSMENT & PLAN NOTE
· Outpatient EGD and colonoscopy at [facility] on 5/11 for w/u of chronic anemia, history of colon polyps, intermittent LLQ abdominal pain and possible intermittent intussusception  · EGD unremarkable, colonoscopy technically difficult and required change from adult scope to pediatric scope, during traversal of the sigmoid colon there was a kink and patient sustained a perforation  · Transferred to [facility] for emergent surgery   · Emergent ex- lap on 5/11 > low anterior resection, protective loop transverse colostomy, flex sigmoidoscopy, and segmental small bowel resection  · Difficult post op course with post op ileus requiring NGT decompression and requirement of short duration of TPN   · 5/22: CT: Diffuse mild dilation of small bowel segments identified without transition point  · 5/24: CT CAP- Distended small bowel loops with scattered air-fluid levels consistent with early/partial small bowel obstruction with transition at the small bowel anastomosis in the region of the ventral pelvis as above  No free air  Cholelithiasis without cholecystitis  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  · 5/24: Stomal prolapse and small peristomal hernia now at the transverse loop colostomy; continues to be reduced by surgery  · TPN d/c'd on 5/26; tolerating tube feeds at goal  · 5/26: Abdominal wound vac placed bedside: continue with dressing changes Monday/Thursday   · Surgery continues to follow    · 6/6: Gastrostomy tube inserted for nutrition by IR   · 6/6: CT Chest Abdomen Pelvis 6/6 : Indeterminant fluid collection in the right hemipelvis which appears to be loculated and possibly "walled off" from the remainder of abdominopelvic ascites   could represent an infected abscess, but is not significantly changed in size from May 24  · 6/8: IR abscess drainage and drain placement-30mL of pus drained +MRSA  · 15mL out of drain x 24 hours

## 2022-06-11 NOTE — ASSESSMENT & PLAN NOTE
· In the setting of cardiac arrest while on 3 pressors noted to have afib with RVR  · Bolused with amio and placed on infusion     · Converted to sinus rhythm on 5/22   · Amio gtt d/c 5/23 but restarted on 6/7 due to RVR >100   · Pt now converted to  NSR, Amio gtt d/c 6/9  · Heparin gtt for [facility]

## 2022-06-11 NOTE — ASSESSMENT & PLAN NOTE
· Developed post-op ileus with abdominal distention, and nausea/vomiting  · NGT placed with difficulty, on imaging terminated in distal esophagus, attempted to be advanced twice without significant drainage despite distended stomach on imaging   · 5/17 CT - Multiple dilated small bowel loops without a clear transition point  The finding may reflect ileus, however developing small bowel obstruction cannot be excluded  Follow-up is recommended    · AM KUB without contrast progressing to colon   · Minimal to no ostomy output - monitor   · NGT replaced by IR on 5/18 - continue to suction, without significant output   · PICC line in place for TPN   · Getting frequent IV opioids for abdominal pain can be contributing to ileus, attempt to improve multimodal pain regimen - consider Toradol No

## 2022-06-11 NOTE — ASSESSMENT & PLAN NOTE
· Continue midodrine 10mg TID    · Levophed increased from 2 to 10 yesterday, vasopressin added and running even on CRRT   · Levo now weaned to 6 + vaso 0 04

## 2022-06-11 NOTE — ASSESSMENT & PLAN NOTE
· Likely in setting of acute illness/delirium and cardiac arrest  · Delirium precautions; regulate sleep/wake cycle, environmental controls, daily CAM ICU   · Trend neuro exam  · Off all sedation with waxing/waning mental status exam and alertness   · No focal deficits

## 2022-06-11 NOTE — ASSESSMENT & PLAN NOTE
Wt Readings from Last 3 Encounters:   06/10/22 81 9 kg (180 lb 8 9 oz)   05/11/22 62 1 kg (136 lb 14 4 oz)   03/25/22 61 2 kg (135 lb)     · 5/16: Echo-EF 65%, mild TR, mild MR  · 5/23: Repeat Echo post cardiac arrest: EF 60-65%, G1DD, mild AS (RBADY 1 5cm2), mild MR, mild TR  · Monitor I&O, Daily weights   · Limited ECHO-EF 65%, G1DD, mild AS  · Volume removal with CRRT as above

## 2022-06-11 NOTE — QUICK NOTE
Clinical update was provided to patients sons Luis Enrique [place] by Dr Glendy Montenegro  All questions were answered at this time

## 2022-06-11 NOTE — PROGRESS NOTES
Tami 45  Progress Note - Anna Hudson 2/15/1931, 80 y o  male MRN: [id number]  Unit/Bed#: ICU 02 Encounter: [id number]  Primary Care Provider: Diane Almendarez MD   Date and time admitted to hospital: 5/11/2022  4:48 PM    * Bowel perforation [facility])  Assessment & Plan  · Outpatient EGD and colonoscopy at [facility] on 5/11 for w/u of chronic anemia, history of colon polyps, intermittent LLQ abdominal pain and possible intermittent intussusception  · EGD unremarkable, colonoscopy technically difficult and required change from adult scope to pediatric scope, during traversal of the sigmoid colon there was a kink and patient sustained a perforation  · Transferred to [facility] for emergent surgery   · Emergent ex- lap on 5/11 > low anterior resection, protective loop transverse colostomy, flex sigmoidoscopy, and segmental small bowel resection  · Difficult post op course with post op ileus requiring NGT decompression and requirement of short duration of TPN   · 5/22: CT: Diffuse mild dilation of small bowel segments identified without transition point  · 5/24: CT CAP- Distended small bowel loops with scattered air-fluid levels consistent with early/partial small bowel obstruction with transition at the small bowel anastomosis in the region of the ventral pelvis as above  No free air  Cholelithiasis without cholecystitis  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    · 6/6: Gastrostomy tube inserted for nutrition by IR   · 6/6: CT Chest Abdomen Pelvis 6/6 : Indeterminant fluid collection in the right hemipelvis which appears to be loculated and possibly "walled off" from the remainder of abdominopelvic ascites  could represent an infected abscess, but is not significantly changed in size from May 24  · 6/8: IR abscess drainage and drain placement-30mL of pus drained +MRSA  · 15mL out of drain x 24 hours    Acute respiratory failure with hypoxia ([address] )  Assessment & Plan  · Patient previously in ICU for hypoxia with a max of 15L midflow and concern for aspiration pneumonitis  · Transfered to general medical floor 5/21  · 5/21: PEA arrest x2, intubation  · 5/24 CT CAP-CHF with moderate basilar effusions and additional upper lung zone patchy airspace opacities likely reflecting asymmetric pulmonary edema  Infiltrate is not entirely excluded  · 5/29 Bronchoscopy for mucous plugging  · Day # 22 on ventilator: AC/PC 24/24/80%/10  · Wean Fio2/PEEP as able for SPO2>90%  · Continue pulmonary hygiene/ VAP bundle  · Chlorhexidine, PPI, HOB greater than 30 degrees   · Continue volume removal with CVVH  · 6/10 IR L thora for 800 cc  · Fluid studies/culture in process   · Discussed with surgery - trach/PEG when stable on 60%/6 PEEP for multiple days    Ileus (HCC)-resolved as of 5/22/2022  Assessment & Plan  · Developed post-op ileus with abdominal distention, and nausea/vomiting  · NGT placed with difficulty, on imaging terminated in distal esophagus, attempted to be advanced twice without significant drainage despite distended stomach on imaging   · 5/17 CT - Multiple dilated small bowel loops without a clear transition point  The finding may reflect ileus, however developing small bowel obstruction cannot be excluded  Follow-up is recommended    · AM KUB without contrast progressing to colon   · Minimal to no ostomy output - monitor   · NGT replaced by IR on 5/18 - continue to suction, without significant output   · PICC line in place for TPN   · Getting frequent IV opioids for abdominal pain can be contributing to ileus, attempt to improve multimodal pain regimen - consider Toradol Acute renal failure (ARF) (HCC)  Assessment & Plan  · Secondary to hypoperfusion mehul cardiac arrest +/- ATN  · Baseline creat 0 8  · Creatinine peaked at 3 07  · CVVH started on 5/26, attempts at [facility] unsuccessful secondary to rapidly escalating pressor requirements  · Avoid hypotension/hypoperfusion  · Continue CRRT, running even given escalating pressor requirements yesterday - continue to reassess and start to pull -25 if tolerated    Severe sepsis [facility])  Assessment & Plan  Peritonitis with intra-abdominal/pelvic abscess secondary to colonic perforation    · 5/22 CT chest/ab/pelvis with concern of multifocal PNA, no visualized intraabdominal abscess or anastomotic leak   · Per ID suspect multifocal pneumonitis   · OR culture 5/21 pseudomonas and bacteroides fragilis  · Blood cultures on 5/22 negative   · Completed 14 days of Flagyl on 5/24  · Completed 14 days of cefepime on 5/27  · Restarted Zosyn on 6/3 for increasing WBC and oxygen requirements - discontinued 6/10  · Vanco started 6/9 for intra-abdominal abscess culture +MRSA  · Vanco day 3  · ID following, appreciate recommendations   · IR drain in place, continue to monitor output   · WBC continues to improve on current Abx   · procal 3 9, continue to trend   · Hypothermia requiring chester hugger, continue to trend temps    CHF (congestive heart failure) ([address] )  Assessment & Plan  Wt Readings from Last 3 Encounters:   06/10/22 81 9 kg (180 lb 8 9 oz)   05/11/22 62 1 kg (136 lb 14 4 oz)   03/25/22 61 2 kg (135 lb)     · 5/16: Echo-EF 65%, mild TR, mild MR  · 5/23: Repeat Echo post cardiac arrest: EF 60-65%, G1DD, mild AS (BRADY 1 5cm2), mild MR, mild TR  · Monitor I&O, Daily weights   · Limited ECHO-EF 65%, G1DD, mild AS  · Volume removal with CRRT as above     Hyperglycemia  Assessment & Plan  · A1c 5 3%  · Hyperglycemia likely stress induced  · Continue SSI - minimal requirements     Toxic metabolic encephalopathy  Assessment & Plan  · Likely in setting of acute illness/delirium and cardiac arrest  · Delirium precautions; regulate sleep/wake cycle, environmental controls, daily CAM ICU   · Trend neuro exam  · Off all sedation with waxing/waning mental status exam and alertness   · No focal deficits       Hypotension  Assessment & Plan  · Continue midodrine 10mg TID    · Levophed increased from 2 to 10 yesterday, vasopressin added and running even on CRRT   · Levo now weaned to 6 + vaso 0 04       Anemia  Assessment & Plan  · Hemoglobin drop post cardiac arrest    · Noted to have gross hematuria but this has since resolved  · 5/21: 1 u PRBC  · 5/24: 1 u PRBC  · CT obtained on 5/24 and negative for any overt signs of bleeding  · 5/26: 1 u PRBC   · 5/30: 1 u PRBC  · 6/4: 1 U PRBC   · 6/9: 1 U PRBC   · Continue to monitor and transfuse for hgb less than 7      Paroxysmal atrial fibrillation (HCC)  Assessment & Plan  · In the setting of cardiac arrest while on 3 pressors noted to have afib with RVR  · Bolused with amio and placed on infusion     · Converted to sinus rhythm on 5/22   · Amio gtt d/c 5/23 but restarted on 6/7 due to RVR >100   · Pt now converted to  NSR, Amio gtt d/c 6/9  · Heparin gtt for [facility]    Acute on chronic anemia-resolved as of 6/10/2022  Assessment & Plan  · As above     Cardiac arrest (HCC)-resolved as of 6/11/2022  Assessment & Plan  · Patient was found unresponsive and hypoxic approximately 20 minutes after being seen well with family 5/21   · PEA arrest x 2  · Most likely hypoxia driven  · Neurologically intact post arrest   · Continue telemetry monitoring     Thrombocytopenia ([facility] [address] )  Assessment & Plan  · HIT JAYLIN negative  · Argatroban d/c 6/9 and switch back to heparin gtt  · Platelets have remained stable in 120s  · Thrombocytopenia likely in setting of bone marrow suppression from sepsis and CRRT  · Continue to trend platelet count daily       ----------------------------------------------------------------------------------------  HPI/24hr events:     Levophed requirements increased to 10 from 2 yesterday during day, received multiple bolus doses of albumin and started running even on CRRT  Vasopressin added  Remained running even throughout the night, levophed down to 2 + vasopressin 0 04  Patient appropriate for transfer out of the ICU today?: No  Disposition: Continue Critical Care   Code Status: Level 2 - DNAR: but accepts endotracheal intubation  ---------------------------------------------------------------------------------------  SUBJECTIVE  Unable to offer     Review of Systems   Unable to perform ROS: Intubated     Review of systems was unable to be performed secondary to encephalopathy  ---------------------------------------------------------------------------------------  OBJECTIVE    Vitals   Vitals:    22 0303 22 0330 22 0400 22 0430   BP:   127/61    BP Location:       Pulse:  80 76 68   Resp:  (!) 35 (!) 29 (!) 31   Temp:  (!) 97 34 °F (36 3 °C) (!) 97 16 °F (36 2 °C) (!) 96 98 °F (36 1 °C)   TempSrc:       SpO2: 96% 98% 98% 98%   Weight:       Height:         Temp (24hrs), Av 4 °F (36 3 °C), Min:96 62 °F (35 9 °C), Max:98 06 °F (36 7 °C)  Current: Temperature: (!) 96 98 °F (36 1 °C)  Arterial Line BP: 133/36  Arterial Line MAP (mmHg): 74 mmHg    Respiratory:  SpO2: SpO2: 98 %, SpO2 Device: O2 Device: Ventilator  Nasal Cannula O2 Flow Rate (L/min): 5 L/min    Invasive/non-invasive ventilation settings   Respiratory  Report   Lab Data (Last 4 hours)    None         O2/Vent Data (Last 4 hours)       0303           Vent Mode AC/PC       Resp Rate (BPM) (BPM) 20       Pressure Control (cmH2O) (cm) 24       Insp Time (sec) (sec) 0 7       FiO2 (%) (%) 80       PEEP (cmH2O) (cmH2O) 10       MV 13                   Physical Exam  Constitutional:       Appearance: He is ill-appearing  He is not toxic-appearing or diaphoretic  Interventions: He is sedated and intubated     Eyes:      Pupils: Pupils are equal, round, and reactive to light  Neck:      Comments: R IJ HD cath   Cardiovascular:      Rate and Rhythm: Normal rate and regular rhythm  Heart sounds: Normal heart sounds  Pulmonary:      Effort: Tachypnea (35) present  He is intubated  Breath sounds: Rhonchi present  Abdominal:      General: There is no distension  Palpations: Abdomen is soft  Comments: Ostomy with brown liquid stool   Abdominal drain with small amount of milky drainage    Musculoskeletal:      Right lower leg: Edema present  Left lower leg: Edema present  Skin:     General: Skin is warm and dry     Neurological:      Comments: No no sedation   Minimally responsive, intermittent withdraw to pain              Laboratory and Diagnostics:  Results from last 7 days   Lab Units 06/10/22  0601 06/09/22  1157 06/09/22  0533 06/08/22  0607 06/07/22  0558 06/06/22  0749 06/05/22  0555   WBC Thousand/uL 11 06* 12 13* 10 91* 15 34* 16 74* 17 92* 18 30*   HEMOGLOBIN g/dL 7 7* 8 2* 6 6* 7 1* 7 3* 7 8* 8 0*   HEMATOCRIT % 23 7* 25 9* 20 5* 22 9* 23 4* 24 2* 24 4*   PLATELETS Thousands/uL 85* 121* 124* 123* 87* 49* 86*   NEUTROS PCT %  --   --   --   --   --   --  79*   BANDS PCT % 18*  --   --  21*  --  11*  --    MONOS PCT %  --   --   --   --   --   --  7   MONO PCT % 2*  --   --  7  --  8  --      Results from last 7 days   Lab Units 06/10/22  2328 06/10/22  1825 06/10/22  1153 06/10/22  0603 06/09/22  2338 06/09/22  1743 06/09/22  1157 06/09/22  0533 06/09/22  0002 06/08/22  1053   SODIUM mmol/L 140 138 140 140 139 140 138 135*   < >  --    POTASSIUM mmol/L 4 4 3 8 4 1 4 0 4 4 3 9 4 0 3 6   < >  --    CHLORIDE mmol/L 107 104 106 108 106 106 104 100   < >  --    CO2 mmol/L 24 25 27 25 24 25 23 22   < >  --    ANION GAP mmol/L 9 9 7 7 9 9 11 13   < >  --    BUN mg/dL 19 23 21 20 19 18 21 25   < >  --    CREATININE mg/dL 0 92 0 95 0 98 1 03 1 08 1 13 1 33* 1 47*   < >  --    CALCIUM mg/dL 8 1* 7 8* 8 3 8 0* 8 0* 7  8* 7 8* 7 7*   < >  --    GLUCOSE RANDOM mg/dL 171* 149* 161* 149* 139 105 107 135   < >  --    ALT U/L  --   --   --   --   --   --   --  27  --  14   AST U/L  --   --   --   --   --   --   --  55*  --  29   ALK PHOS U/L  --   --   --   --   --   --   --  156*  --  191*   ALBUMIN g/dL  --   --   --   --   --   --   --  2 0*  --  2 2*   TOTAL BILIRUBIN mg/dL  --   --   --   --   --   --   --  1 77*  --  1 83*    < > = values in this interval not displayed  Results from last 7 days   Lab Units 06/10/22  2328 06/10/22  1825 06/10/22  1153 06/10/22  0603 06/09/22  2338 06/09/22  1743 06/09/22  1157   MAGNESIUM mg/dL 2 0 1 7 2 1 1 9 2 0 2 0 1 8   PHOSPHORUS mg/dL 1 9* 2 0* 1 5* 1 7* 2 0* 2 4 2 6      Results from last 7 days   Lab Units 06/11/22  0250 06/10/22  1917 06/10/22  0927 06/10/22  0136 06/09/22  1743 06/09/22  1157 06/09/22  1101 06/08/22  1300 06/08/22  1053 06/06/22  1126   INR   --   --   --   --   --  2 12*  --   --  1 87* 1 52*   PTT seconds 112* 143* 210* >210* >210* 105* 85*   < > 201*  --     < > = values in this interval not displayed  ABG:  Results from last 7 days   Lab Units 06/09/22  0603   PH ART  7 315*   PCO2 ART mm Hg 45 0*   PO2 ART mm Hg 61 3*   HCO3 ART mmol/L 22 4   BASE EXC ART mmol/L -3 5   ABG SOURCE  Line, Arterial     VBG:  Results from last 7 days   Lab Units 06/09/22  0603   ABG SOURCE  Line, Arterial     Results from last 7 days   Lab Units 06/09/22  0533   PROCALCITONIN ng/ml 3 92*       Micro  Results from last 7 days   Lab Units 06/08/22  1555   GRAM STAIN RESULT  2+ Gram positive cocci in pairs, chains and clusters*  No polys seen*   BODY FLUID CULTURE, STERILE  2+ Growth of Methicillin Resistant Staphylococcus aureus*       EKG: NSR rate 77  Imaging: I have personally reviewed pertinent reports        Intake and Output  I/O       06/09 0701  06/10 0700 06/10 0701  06/11 0700    I V  (mL/kg) 1756 (21 4) 1390 (17)    Blood 562     NG/ 100    Feedings 674 431    Total Intake(mL/kg) 3112 (38) 1921 (23 5)    Urine (mL/kg/hr) 0 (0) 0 (0)    Drains 25 15    Other 3513 2037    Stool 525 250    Total Output 4063 2307    Net -338 -482                Height and Weights   Height: 5' 4" (162 6 cm)  IBW (Ideal Body Weight): 59 2 kg  Body mass index is 30 99 kg/m²  Weight (last 2 days)     Date/Time Weight    06/10/22 0600 81 9 (180 56)    06/09/22 0600 82 1 (181)            Nutrition       Diet Orders   (From admission, onward)             Start     Ordered    06/10/22 1518  Diet Enteral/Parenteral; Tube Feeding No Oral Diet; Vital 1 5; Continuous; 50; Demond - Two Packets Daily  Diet effective now        References:    Nutrtion Support Algorithm Enteral vs  Parenteral   Question Answer Comment   Diet Type Enteral/Parenteral    Enteral/Parenteral Tube Feeding No Oral Diet    Tube Feeding Formula: Vital 1 5    Bolus/Cyclic/Continuous Continuous    Tube Feeding Goal Rate (mL/hr): 50    Demond Orange Demond - Two Packets Daily    RD to adjust diet per protocol?  Yes        06/10/22 1517    05/12/22 0906  Room Service  Once        Question:  Type of Service  Answer:  Room Service - Appropriate with Assistance    05/12/22 0905                  Active Medications  Scheduled Meds:  Current Facility-Administered Medications   Medication Dose Route Frequency Provider Last Rate    acetaminophen  650 mg Oral Q6H PRN ISELA Villegas      chlorhexidine  15 mL Mouth/Throat Q12H [address] ISELA Villegas      gabapentin  200 mg Oral HS ISELA Elliott      heparin (porcine)  3-30 Units/kg/hr (Order-Specific) Intravenous Titrated Paralee Myriam, DO Stopped (06/11/22 0331)    heparin (porcine)  3,200 Units Intravenous Q1H PRN Paralee Myriam, DO      heparin (porcine)  6,400 Units Intravenous Q1H PRN Paralee Myriam, DO      HYDROmorphone  0 5 mg Intravenous Q3H PRN ISELA Galaviz      insulin lispro  1-6 Units Subcutaneous Q6H [address] Josephine Kayce Genny, [address] iohexol  50 mL Oral Once in imaging Charly Carranzatrom, CRNP      ipratropium-albuterol  3 mL Nebulization Q6H PRN Charly Carranzatrom, CRNP      levothyroxine  112 mcg Per NG Tube Early Morning Charly Navaom, CRNP      lidocaine  2 patch Topical Daily [place], [place]      midodrine  10 mg Oral TID AC Santiago Carry, [address]      norepinephrine  1-30 mcg/min Intravenous Titrated Cherri Mathewo V, CRNP 2 mcg/min (06/11/22 0405)    NxStage K 4/Ca 3  20,000 mL Dialysis Continuous Jessa Xie PA-C      ondansetron  4 mg Intravenous Q6H PRN Charly Carranzatrom, CRNP      oxyCODONE  5 mg Per NG Tube Q4H PRN Michelle Ro, CRNP      Or    oxyCODONE  7 5 mg Per NG Tube Q4H PRN Michelle Ro, CRNP      pantoprazole  40 mg Intravenous Q24H [address] Charly Carranzatrom, CRNP      polyethylene glycol  17 g Oral Daily ISELA Arceo      vancomycin  20 mg/kg (Adjusted) Intravenous Q24H Darius Mayo PA-C Stopped (06/10/22 1800)    vasopressin (PITRESSIN) in 0 9 % sodium chloride 100 mL  0 04 Units/min Intravenous Continuous Santiago Carry, CRNP 0 04 Units/min (06/11/22 0137)     Continuous Infusions:  heparin (porcine), 3-30 Units/kg/hr (Order-Specific), Last Rate: Stopped (06/11/22 0331)  norepinephrine, 1-30 mcg/min, Last Rate: 2 mcg/min (06/11/22 0405)  NxStage K 4/Ca 3, 20,000 mL  vasopressin (PITRESSIN) in 0 9 % sodium chloride 100 mL, 0 04 Units/min, Last Rate: 0 04 Units/min (06/11/22 0137)      PRN Meds:   acetaminophen, 650 mg, Q6H PRN  heparin (porcine), 3,200 Units, Q1H PRN  heparin (porcine), 6,400 Units, Q1H PRN  HYDROmorphone, 0 5 mg, Q3H PRN  iohexol, 50 mL, Once in imaging  ipratropium-albuterol, 3 mL, Q6H PRN  ondansetron, 4 mg, Q6H PRN  oxyCODONE, 5 mg, Q4H PRN   Or  oxyCODONE, 7 5 mg, Q4H PRN        Invasive Devices Review  Invasive Devices  Report    Peripherally Inserted Central Catheter Line  Duration           PICC Line 25/04/52 Right Basilic 23 days          Arterial Line Duration           Arterial Line 05/30/22 Radial 11 days          Hemodialysis Catheter  Duration           HD Temporary Double Catheter 15 days          Drain  Duration           Colostomy LLQ 31 days    Gastrostomy/Enterostomy Percutaneous Interventional Gastrostomy 16 Fr  LUQ 4 days    Abscess Drain Buttock 2 days          Airway  Duration           ETT  Oral 20 days                Rationale for remaining devices: PICC for IV access, medications requiring central access  A line for HD monitoring   HD cath for active HD  ---------------------------------------------------------------------------------------  Advance Directive and Living Will:      Power of :    POLST:    ---------------------------------------------------------------------------------------  Care Time Delivered:   No Critical Care time spent       Bisi Stubbs PA-C      Portions of the record may have been created with voice recognition software  Occasional wrong word or "sound a like" substitutions may have occurred due to the inherent limitations of voice recognition software    Read the chart carefully and recognize, using context, where substitutions have occurred

## 2022-06-11 NOTE — ASSESSMENT & PLAN NOTE
· Hemoglobin drop post cardiac arrest    · Noted to have gross hematuria but this has since resolved  · 5/21: 1 u PRBC  · 5/24: 1 u PRBC  · CT obtained on 5/24 and negative for any overt signs of bleeding  · 5/26: 1 u PRBC   · 5/30: 1 u PRBC  · 6/4: 1 U PRBC   · 6/9: 1 U PRBC   · Continue to monitor and transfuse for hgb less than 7

## 2022-06-12 NOTE — NURSING NOTE
Per Dr Larios Cleverly, drop blood rate to 250 from 300 and therapy fluid rate to 1 5 from 2000, keep -50, which initiated the change at this time  Discussed adding scheduled phos supplementation or add phos  to tube feed

## 2022-06-12 NOTE — ASSESSMENT & PLAN NOTE
Peritonitis with intra-abdominal/pelvic abscess secondary to colonic perforation    · 5/22 CT chest/ab/pelvis with concern of multifocal PNA, no visualized intraabdominal abscess or anastomotic leak   · Per ID suspect multifocal pneumonitis   · OR culture 5/21 pseudomonas and bacteroides fragilis  · Blood cultures on 5/22 negative   · Completed 14 days of Flagyl on 5/24  · Completed 14 days of cefepime on 5/27  · Restarted Zosyn on 6/3 for increasing WBC and oxygen requirements - discontinued 6/10  · Vanco started 6/9 for intra-abdominal abscess culture +MRSA  · Vanco day 4 - plan for 10 day course from date of drainage (6/19)  · ID following, appreciate recommendations   · IR drain in place, continue to monitor output   · WBC continues to improve on current Abx   · procal 3 9, continue to trend   · Hypothermia requiring chester hugger, continue to trend temps

## 2022-06-12 NOTE — PROGRESS NOTES
[id number]  Progress Note - [place] 2/15/1931, 80 y o  male MRN: [id number]  Unit/Bed#: ICU 02 Encounter: [id number]  Primary Care Provider: Naya Lopez MD   Date and time admitted to hospital: 5/11/2022  4:48 PM    * Bowel perforation [facility])  Assessment & Plan  · Outpatient EGD and colonoscopy at [facility] on 5/11 for w/u of chronic anemia, history of colon polyps, intermittent LLQ abdominal pain and possible intermittent intussusception  · EGD unremarkable, colonoscopy technically difficult and required change from adult scope to pediatric scope, during traversal of the sigmoid colon there was a kink and patient sustained a perforation  · Transferred to [facility] for emergent surgery   · Emergent ex- lap on 5/11 > low anterior resection, protective loop transverse colostomy, flex sigmoidoscopy, and segmental small bowel resection  · Difficult post op course with post op ileus requiring NGT decompression and requirement of short duration of TPN   · 5/22: CT: Diffuse mild dilation of small bowel segments identified without transition point  · 5/24: CT CAP- Distended small bowel loops with scattered air-fluid levels consistent with early/partial small bowel obstruction with transition at the small bowel anastomosis in the region of the ventral pelvis as above  No free air  Cholelithiasis without cholecystitis  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    · 6/6: Gastrostomy tube inserted for nutrition by IR   · 6/6: CT Chest Abdomen Pelvis 6/6 : Indeterminant fluid collection in the right hemipelvis which appears to be loculated and possibly "walled off" from the remainder of abdominopelvic ascites  could represent an infected abscess, but is not significantly changed in size from May 24  · 6/8: IR abscess drainage and drain placement-30mL of pus drained +MRSA  · Continue to monitor output character and quantity    Acute respiratory failure with hypoxia ([address] )  Assessment & Plan  · Patient previously in ICU for hypoxia with a max of 15L midflow and concern for aspiration pneumonitis  · Transfered to general medical floor 5/21  · 5/21: PEA arrest x2, intubation  · 5/24 CT CAP-CHF with moderate basilar effusions and additional upper lung zone patchy airspace opacities likely reflecting asymmetric pulmonary edema  Infiltrate is not entirely excluded  · 5/29 Bronchoscopy for mucous plugging  · Day # 22 on ventilator: AC/PC 24/24/65%/10  · Wean Fio2/PEEP as able for SPO2>90%  · Continue pulmonary hygiene/ VAP bundle  · Chlorhexidine, PPI, HOB greater than 30 degrees   · Continue volume removal with CVVH as tolerated   · 6/10 IR L thora for 800 cc  · Fluid studies/culture in process   · Discussed with surgery - trach/PEG when stable on 60%/6 PEEP for multiple days    Ileus (HCC)-resolved as of 5/22/2022  Assessment & Plan  · Developed post-op ileus with abdominal distention, and nausea/vomiting  · NGT placed with difficulty, on imaging terminated in distal esophagus, attempted to be advanced twice without significant drainage despite distended stomach on imaging   · 5/17 CT - Multiple dilated small bowel loops without a clear transition point  The finding may reflect ileus, however developing small bowel obstruction cannot be excluded  Follow-up is recommended    · AM KUB without contrast progressing to colon   · Minimal to no ostomy output - monitor   · NGT replaced by IR on 5/18 - continue to suction, without significant output   · PICC line in place for TPN   · Getting frequent IV opioids for abdominal pain can be contributing to ileus, attempt to improve multimodal pain regimen - consider Toradol     Acute renal failure (ARF) ([facility])  Assessment & Plan  · Secondary to hypoperfusion mehul cardiac arrest +/- ATN  · Baseline creat 0 8  · Creatinine peaked at 3 07  · CVVH started on 5/26, attempts at [facility] unsuccessful secondary to rapidly escalating pressor requirements  · Avoid hypotension/hypoperfusion  · Continue CRRT, -50 cc/hr     Severe sepsis [facility])  Assessment & Plan  Peritonitis with intra-abdominal/pelvic abscess secondary to colonic perforation    · 5/22 CT chest/ab/pelvis with concern of multifocal PNA, no visualized intraabdominal abscess or anastomotic leak   · Per ID suspect multifocal pneumonitis   · OR culture 5/21 pseudomonas and bacteroides fragilis  · Blood cultures on 5/22 negative   · Completed 14 days of Flagyl on 5/24  · Completed 14 days of cefepime on 5/27  · Restarted Zosyn on 6/3 for increasing WBC and oxygen requirements - discontinued 6/10  · Vanco started 6/9 for intra-abdominal abscess culture +MRSA  · Vanco day 4 - plan for 10 day course from date of drainage (6/19)  · ID following, appreciate recommendations   · IR drain in place, continue to monitor output   · WBC continues to improve on current Abx   · procal 3 9, continue to trend   · Hypothermia requiring chester hugger, continue to trend temps    CHF (congestive heart failure) ([facility] [address] )  Assessment & Plan  Wt Readings from Last 3 Encounters:   06/11/22 82 9 kg (182 lb 12 2 oz)   05/11/22 62 1 kg (136 lb 14 4 oz)   03/25/22 61 2 kg (135 lb)     · 5/16: Echo-EF 65%, mild TR, mild MR  · 5/23: Repeat Echo post cardiac arrest: EF 60-65%, G1DD, mild AS (BRADY 1 5cm2), mild MR, mild TR  · Monitor I&O, Daily weights   · Limited ECHO-EF 65%, G1DD, mild AS  · Volume removal with CRRT as above     Hyperglycemia  Assessment & Plan  · A1c 5 3%  · Hyperglycemia likely stress induced  · Continue SSI - minimal requirements     Toxic metabolic encephalopathy  Assessment & Plan  · Likely in setting of acute illness/delirium and cardiac arrest  · Delirium precautions; regulate sleep/wake cycle, environmental controls, daily CAM ICU   · Trend neuro exam  · Off all sedation with waxing/waning mental status exam and alertness   · No focal deficits       Hypotension  Assessment & Plan  · Continue midodrine 10mg TID    · Levophed on hold since yesterday AM   · Vasopressin on hold over night       Anemia  Assessment & Plan  · Hemoglobin drop post cardiac arrest    · Noted to have gross hematuria but this has since resolved  · 5/21: 1 u PRBC  · 5/24: 1 u PRBC  · CT obtained on 5/24 and negative for any overt signs of bleeding  · 5/26: 1 u PRBC   · 5/30: 1 u PRBC  · 6/4: 1 U PRBC   · 6/9: 1 U PRBC   · 6/11: 1 U PRBC   · Continue to monitor and transfuse for hgb less than 7      Paroxysmal atrial fibrillation (HCC)  Assessment & Plan  · In the setting of cardiac arrest while on 3 pressors noted to have afib with RVR  · Bolused with amio and placed on infusion     · Converted to sinus rhythm on 5/22   · Amio gtt d/c 5/23 but restarted on 6/7 due to RVR >100   · Pt now converted to  NSR, Amio gtt d/c 6/9  · Holding systemic AC with worsening thrombocytopenia     Acute on chronic anemia-resolved as of 6/10/2022  Assessment & Plan  · As above     Cardiac arrest (HCC)-resolved as of 6/11/2022  Assessment & Plan  · Patient was found unresponsive and hypoxic approximately 20 minutes after being seen well with family 5/21   · PEA arrest x 2  · Most likely hypoxia driven  · Neurologically intact post arrest   · Continue telemetry monitoring     Thrombocytopenia ([facility] [address] )  Assessment & Plan  · HIT JAYLIN negative  · Argatroban d/c 6/9 and switch back to heparin gtt  · Platelets continuing to downtrend   · Thrombocytopenia likely in setting of bone marrow suppression from sepsis and CRRT  · Continue to trend platelet count daily       ----------------------------------------------------------------------------------------  HPI/24hr events: Received 1 U PRBC yesterday for hgb 6 8  Levophed off since yesterday AM, vasopressin placed on hold overnight  Continues to tolerated -50 cc/hr on CRRT  Fio2 weaned down to 65%  Patient appropriate for transfer out of the ICU today?: No  Disposition: Continue Critical Care   Code Status: Level 2 - DNAR: but accepts endotracheal intubation  ---------------------------------------------------------------------------------------  SUBJECTIVE  Unable to offer    Review of Systems   Unable to perform ROS: Intubated     Review of systems was unable to be performed secondary to encephalopathy   ---------------------------------------------------------------------------------------  OBJECTIVE    Vitals   Vitals:    22 0000 22 0005 22 0100 22 0200   BP: 129/59   136/64   BP Location:       Pulse: 77  71 70   Resp: (!) 24  (!) 23 18   Temp: 98 24 °F (36 8 °C)  98 24 °F (36 8 °C) 98 24 °F (36 8 °C)   TempSrc:       SpO2: 99% 98% 100% 100%   Weight:       Height:         Temp (24hrs), Av 2 °F (36 2 °C), Min:94 28 °F (34 6 °C), Max:98 24 °F (36 8 °C)  Current: Temperature: 98 24 °F (36 8 °C)  Arterial Line BP: 133/36  Arterial Line MAP (mmHg): 74 mmHg    Respiratory:  SpO2: SpO2: 100 %, SpO2 Device: O2 Device: Ventilator    Invasive/non-invasive ventilation settings   Respiratory  Report   Lab Data (Last 4 hours)    None         O2/Vent Data (Last 4 hours)       0005           Vent Mode AC/PC       Resp Rate (BPM) (BPM) 20       Pressure Control (cmH2O) (cm) 24       Insp Time (sec) (sec) 0 7       FiO2 (%) (%) 70       PEEP (cmH2O) (cmH2O) 10       MV 11 7                   Physical Exam  Constitutional:       Appearance: He is ill-appearing  Interventions: He is intubated  Eyes:      Pupils: Pupils are equal, round, and reactive to light  Cardiovascular:      Rate and Rhythm: Normal rate and regular rhythm  Heart sounds: Normal heart sounds  Pulmonary:      Effort: Tachypnea present   He is intubated  Breath sounds: Rhonchi (bilateral) present  Abdominal:      General: There is distension  Palpations: Abdomen is soft  Comments: Midline incision with vac in place   Ostomy pink with liquid brown stool   Pelvic drain with small amount of milky drainage    Musculoskeletal:      Right lower le+ Edema present  Left lower le+ Edema present  Skin:     General: Skin is warm and dry  Comments: Anasarca    Neurological:      Mental Status: He is lethargic        Comments: Opens eyes intermittently to voice and pain   Not following commands              Laboratory and Diagnostics:  Results from last 7 days   Lab Units 22  0542 06/10/22  0601 22  1157 22  0533 22  0607 22  0558 22  0749 22  0555   WBC Thousand/uL 16 88* 11 06* 12 13* 10 91* 15 34* 16 74* 17 92* 18 30*   HEMOGLOBIN g/dL 6 8* 7 7* 8 2* 6 6* 7 1* 7 3* 7 8* 8 0*   HEMATOCRIT % 20 7* 23 7* 25 9* 20 5* 22 9* 23 4* 24 2* 24 4*   PLATELETS Thousands/uL 44* 85* 121* 124* 123* 87* 49* 86*   NEUTROS PCT %  --   --   --   --   --   --   --  79*   BANDS PCT % 36* 18*  --   --  21*  --  11*  --    MONOS PCT %  --   --   --   --   --   --   --  7   MONO PCT % 2* 2*  --   --  7  --  8  --      Results from last 7 days   Lab Units 22  2355 22  1744 22  1154 22  0542 06/10/22  2328 06/10/22  1825 06/10/22  1153 22  1157 22  0533 22  0002 22  1053   SODIUM mmol/L 136 139 140 141 140 138 140   < > 135*   < >  --    POTASSIUM mmol/L 4 1 4 1 4 2 4 2 4 4 3 8 4 1   < > 3 6   < >  --    CHLORIDE mmol/L 104 106 106 107 107 104 106   < > 100   < >  --    CO2 mmol/L 28 28 25 26 24 25 27   < > 22   < >  --    ANION GAP mmol/L 4 5 9 8 9 9 7   < > 13   < >  --    BUN mg/dL 14 14 14 17 19 23 21   < > 25   < >  --    CREATININE mg/dL 0 78 0 80 0 87 0 90 0 92 0 95 0 98   < > 1 47*   < >  --    CALCIUM mg/dL 8 1* 8 4 8 2* 8 2* 8 1* 7 8* 8 3   < > 7 7*   < > --    GLUCOSE RANDOM mg/dL 161* 180* 198* 180* 171* 149* 161*   < > 135   < >  --    ALT U/L  --   --   --   --   --   --   --   --  27  --  14   AST U/L  --   --   --   --   --   --   --   --  55*  --  29   ALK PHOS U/L  --   --   --   --   --   --   --   --  156*  --  191*   ALBUMIN g/dL  --   --   --   --   --   --   --   --  2 0*  --  2 2*   TOTAL BILIRUBIN mg/dL  --   --   --   --   --   --   --   --  1 77*  --  1 83*    < > = values in this interval not displayed  Results from last 7 days   Lab Units 06/11/22  2355 06/11/22  1744 06/11/22  1154 06/11/22  0542 06/10/22  2328 06/10/22  1825 06/10/22  1153   MAGNESIUM mg/dL 1 9 1 9 2 0 1 8 2 0 1 7 2 1   PHOSPHORUS mg/dL 1 9* 1 8* 1 7* 1 8* 1 9* 2 0* 1 5*      Results from last 7 days   Lab Units 06/11/22  0250 06/10/22  1917 06/10/22  0927 06/10/22  0136 06/09/22  1743 06/09/22  1157 06/09/22  1101 06/08/22  1300 06/08/22  1053 06/06/22  1126   INR   --   --   --   --   --  2 12*  --   --  1 87* 1 52*   PTT seconds 112* 143* 210* >210* >210* 105* 85*   < > 201*  --     < > = values in this interval not displayed  ABG:  Results from last 7 days   Lab Units 06/09/22  0603   PH ART  7 315*   PCO2 ART mm Hg 45 0*   PO2 ART mm Hg 61 3*   HCO3 ART mmol/L 22 4   BASE EXC ART mmol/L -3 5   ABG SOURCE  Line, Arterial     VBG:  Results from last 7 days   Lab Units 06/09/22  0603   ABG SOURCE  Line, Arterial     Results from last 7 days   Lab Units 06/09/22  0533   PROCALCITONIN ng/ml 3 92*       Micro  Results from last 7 days   Lab Units 06/10/22  1214 06/08/22  1555   GRAM STAIN RESULT  No Polys or Bacteria seen 2+ Gram positive cocci in pairs, chains and clusters*  No polys seen*   BODY FLUID CULTURE, STERILE  No growth 2+ Growth of Methicillin Resistant Staphylococcus aureus*       EKG: NSR rate 70 on telemetry   Imaging: I have personally reviewed pertinent reports        Intake and Output  I/O       06/10 0701  06/11 0700 06/11 0701 06/12 0700 I V  (mL/kg) 2196 (26 5) 1473 (17 8)    Blood  350    NG/ 180    Feedings 925 823    Total Intake(mL/kg) 3291 (39 7) 2826 (34 1)    Urine (mL/kg/hr) 0 (0) 0 (0)    Drains 45 175    Other 3415 2700    Stool 350 275    Total Output 3810 3150    Net -519 -324          Unmeasured Urine Occurrence 0 x           Height and Weights   Height: 5' 4" (162 6 cm)  IBW (Ideal Body Weight): 59 2 kg  Body mass index is 31 37 kg/m²  Weight (last 2 days)     Date/Time Weight    06/11/22 0600 82 9 (182 76)    06/10/22 0600 81 9 (180 56)            Nutrition       Diet Orders   (From admission, onward)             Start     Ordered    06/10/22 1518  Diet Enteral/Parenteral; Tube Feeding No Oral Diet; Vital 1 5; Continuous; 50; Demond - Two Packets Daily  Diet effective now        References:    Nutrtion Support Algorithm Enteral vs  Parenteral   Question Answer Comment   Diet Type Enteral/Parenteral    Enteral/Parenteral Tube Feeding No Oral Diet    Tube Feeding Formula: Vital 1 5    Bolus/Cyclic/Continuous Continuous    Tube Feeding Goal Rate (mL/hr): 50    Demond Orange Demond - Two Packets Daily    RD to adjust diet per protocol?  Yes        06/10/22 1517    05/12/22 0906  Room Service  Once        Question:  Type of Service  Answer:  Room Service - Appropriate with Assistance    05/12/22 0905                  Active Medications  Scheduled Meds:  Current Facility-Administered Medications   Medication Dose Route Frequency Provider Last Rate    acetaminophen  650 mg Oral Q6H PRN Cloretta Self, CRNP      chlorhexidine  15 mL Mouth/Throat Q12H [facility] Cloretta Self, CRNP      gabapentin  200 mg Oral HS Josephine Norman, ISELA      heparin (porcine)  400 Units/hr Intravenous Continuous Josephine Roque Killer, CRNP 400 Units/hr (06/11/22 2000)    HYDROmorphone  0 5 mg Intravenous Q3H PRN [employer], ISELA      insulin lispro  1-6 Units Subcutaneous Q6H [facility] ISELA Ritter      iohexol  50 mL Oral Once in imaging Sanchez Sic, CRNP      ipratropium-albuterol  3 mL Nebulization Q6H PRN Sanchez Sic, CRNP      levothyroxine  112 mcg Per NG Tube Early Morning Sanchez Sic, CRNP      lidocaine  2 patch Topical Daily [place], [place]      midodrine  10 mg Oral TID AC Anthony Sr, Eduardo Urena      norepinephrine  1-30 mcg/min Intravenous Titrated Meridee ISELA Trinh Stopped (06/11/22 0524)   Amy Crystal NxStage K 4/Ca 3  20,000 mL Dialysis Continuous Bianca Degroot PA-C      ondansetron  4 mg Intravenous Q6H PRN Sanchez Sic, CRNP      oxyCODONE  5 mg Per NG Tube Q4H PRN Leretha Ridgel, CROLIVER      Or    oxyCODONE  7 5 mg Per NG Tube Q4H PRN Leretha Ridgel, CRNP      pantoprazole  40 mg Intravenous Q24H [facility] Sanchez Sic, CRNP      polyethylene glycol  17 g Oral Daily ISELA Villeda      vancomycin  20 mg/kg (Adjusted) Intravenous Q24H Hanna Payne PA-C Stopped (06/11/22 1700)    vasopressin (PITRESSIN) in 0 9 % sodium chloride 100 mL  0 04 Units/min Intravenous Continuous ISELA Ca 0 04 Units/min (06/11/22 2026)     Continuous Infusions:  heparin (porcine), 400 Units/hr, Last Rate: 400 Units/hr (06/11/22 2000)  norepinephrine, 1-30 mcg/min, Last Rate: Stopped (06/11/22 0524)  NxStage K 4/Ca 3, 20,000 mL  vasopressin (PITRESSIN) in 0 9 % sodium chloride 100 mL, 0 04 Units/min, Last Rate: 0 04 Units/min (06/11/22 2026)      PRN Meds:   acetaminophen, 650 mg, Q6H PRN  HYDROmorphone, 0 5 mg, Q3H PRN  iohexol, 50 mL, Once in imaging  ipratropium-albuterol, 3 mL, Q6H PRN  ondansetron, 4 mg, Q6H PRN  oxyCODONE, 5 mg, Q4H PRN   Or  oxyCODONE, 7 5 mg, Q4H PRN        Invasive Devices Review  Invasive Devices  Report    Peripherally Inserted Central Catheter Line  Duration           PICC Line 56/91/62 Right Basilic 24 days          Arterial Line  Duration           Arterial Line 05/30/22 Radial 12 days          Hemodialysis Catheter  Duration           HD Temporary Double Catheter 16 days          Drain  Duration           Colostomy LLQ 32 days    Gastrostomy/Enterostomy Percutaneous Interventional Gastrostomy 16 Fr  LUQ 5 days    Abscess Drain Buttock 3 days          Airway  Duration           ETT  Oral 21 days                Rationale for remaining devices: PICC for IV access, medications requiring central access  A line for HD monitoring   HD cath for active HD  ---------------------------------------------------------------------------------------  Advance Directive and Living Will:      Power of :    POLST:    ---------------------------------------------------------------------------------------  Care Time Delivered:   No Critical Care time spent       Rui Panchal PA-C      Portions of the record may have been created with voice recognition software  Occasional wrong word or "sound a like" substitutions may have occurred due to the inherent limitations of voice recognition software    Read the chart carefully and recognize, using context, where substitutions have occurred

## 2022-06-12 NOTE — ASSESSMENT & PLAN NOTE
· Patient previously in ICU for hypoxia with a max of 15L midflow and concern for aspiration pneumonitis  · Transfered to general medical floor 5/21  · 5/21: PEA arrest x2, intubation  · 5/24 CT CAP-CHF with moderate basilar effusions and additional upper lung zone patchy airspace opacities likely reflecting asymmetric pulmonary edema  Infiltrate is not entirely excluded     · 5/29 Bronchoscopy for mucous plugging  · Day # 22 on ventilator: AC/PC 24/24/65%/10  · Wean Fio2/PEEP as able for SPO2>90%  · Continue pulmonary hygiene/ VAP bundle  · Chlorhexidine, PPI, HOB greater than 30 degrees   · Continue volume removal with CVVH as tolerated   · 6/10 IR L thora for 800 cc  · Fluid studies/culture in process   · Discussed with surgery - trach/PEG when stable on 60%/6 PEEP for multiple days

## 2022-06-12 NOTE — ASSESSMENT & PLAN NOTE
· In the setting of cardiac arrest while on 3 pressors noted to have afib with RVR  · Bolused with amio and placed on infusion     · Converted to sinus rhythm on 5/22   · Amio gtt d/c 5/23 but restarted on 6/7 due to RVR >100   · Pt now converted to  NSR, Amio gtt d/c 6/9  · Holding systemic AC with worsening thrombocytopenia

## 2022-06-12 NOTE — PROGRESS NOTES
[facility] 50 PROGRESS NOTE   Kathrine Knapp 80 y o  male MRN: [id number]  Unit/Bed#: ICU 02 Encounter: [id number]  Reason for Consult: MARYELLEN    ASSESSMENT and PLAN:  80year old with HTN, aortic stenosis, GERD, CAD who underwent EGD and C-scope on 1/45/54 which was complicated by perforation requiring emergent ex-lap  Since then, course has been complicated by PEA arrest on 5/21/22, VDRF, pneumonia, sepsis  Renal consulted for MARYELLEN    1  Acute kidney injury:  · Baseline creatinine is 0 7-0 9  · Etiology is ATN in the setting of hypotension and cardiac arrest  · Peak creatinine 3 07 on May 26, 2022  · HD dependent since 5/26/22  CVVHD from 5/26/22 to 6/6/22  Had 1 iHD on 6/7/22  Restarted CVVHD 6/8/22  · Continue HD support would CVVHD to allow for more UF  · He appears well dialyzed and is getting frequent electrolyte replacements  · Decrease Qb to 250 cc and Qd to 1500 cc    2  Ventilator dependent respiratory failure:  · Currently FiO2 of 65% with PEEP of 10  · Defer to Critical Care  · UF is limited by low BP  · Now tolerating -50 cc/hr  3  Hypotension:  · Stable today  · Continue Midodrine 10 mg TID  4  Electrolytes:  · Hypomagnesemia:  Continue Mag replacement as needed  · Hypophosphatemia:  Add phosnack via G tube  5  Sepsis:  · Defer to ID and crit care  6  Congestive heart failure:  · UF is limited by low BP  · Now on -50 cc/hr    7  Anemia, thrombocytopenia:  · Hgb improved after PRBC  · Platelets low but stable  8  PEA cardiac arrest on 5/21/22  9  Pneumoperitoneum s/p ex-lap with LAR on 5/11/22    DISPOSITION:  · Continue CVVHD  · Decrease Qb to 250 cc/hr  · Decrease Qd to 1 5L/hr  · Add phosnak via G tube every 8 hours  · May increase UF but will defer to crit care  The above plan was discussed with critical care  SUBJECTIVE / 24H INTERVAL HISTORY:  Tolerated - 50 cc/hr since yesterday  He remains off vasopressors     FiO2 is better at 65% with PEEP of 10  Mg and phos are being replaced regularly  CVVHD PROCEDURE NOTE  The patient was seen and examined on CVVHD  Qb 300 Qd 2 0L  4K running -50 cc/hr  OBJECTIVE:  Current Weight: Weight - Scale: 81 8 kg (180 lb 5 4 oz)  Vitals:    06/12/22 0700 06/12/22 0720 06/12/22 0726 06/12/22 0730   BP:       Pulse: 73 76     Resp: (!) 23 (!) 24  (!) 30   Temp: 98 06 °F (36 7 °C) 98 06 °F (36 7 °C)     TempSrc:       SpO2: 99% 98% 99%    Weight:       Height:           Intake/Output Summary (Last 24 hours) at 6/12/2022 0748  Last data filed at 6/12/2022 0700  Gross per 24 hour   Intake 3221 ml   Output 3943 ml   Net -722 ml     General: critically ill appearing on the mechanical ventilator, comfortable  Skin: warm, dry, good turgor  Eyes: closed  ENT: ETT in place  Neck: supple  Chest/Lungs: equal chest expansion, coarse breath sounds  CVS: distinct heart sounds, normal rate, regular rhythm, no rub  Abdomen: soft, (+) G tube, (+) ostomy  Extremities: (+) anasarca  : no quigley catheter  Neuro: sedated on the mechanical ventilator  Psych: could not evaluate       Medications:    Current Facility-Administered Medications:     acetaminophen (TYLENOL) oral suspension 650 mg, 650 mg, Oral, Q6H PRN, Alvaro Kate, CRNP, 650 mg at 06/05/22 1512    chlorhexidine (PERIDEX) 0 12 % oral rinse 15 mL, 15 mL, Mouth/Throat, Q12H [address], Alvaro Kate, CRNP, 15 mL at 06/11/22 2119    gabapentin (NEURONTIN) oral solution 200 mg, 200 mg, Oral, HS, MACARIO RitterNP, 200 mg at 06/11/22 2119    heparin (porcine) 25,000 units in 0 45% NaCl 250 mL infusion (premix), 400 Units/hr, Intravenous, Continuous, ISELA Ritter, Last Rate: 4 mL/hr at 06/11/22 2000, 400 Units/hr at 06/11/22 2000    HYDROmorphone (DILAUDID) injection 0 5 mg, 0 5 mg, Intravenous, Q3H PRN, ISELA Lugo, 0 5 mg at 06/07/22 0113    insulin lispro (HumaLOG) 100 units/mL subcutaneous injection 1-6 Units, 1-6 Units, Subcutaneous, Q6H [facility], 1 Units at 06/12/22 0559 **AND** Fingerstick Glucose (POCT), , , Q6H, ISELA Ritter    iohexol (OMNIPAQUE) 240 MG/ML solution 50 mL, 50 mL, Oral, Once in imaging, ISELA Nesbitt    ipratropium-albuterol (DUO-NEB) 0 5-2 5 mg/3 mL inhalation solution 3 mL, 3 mL, Nebulization, Q6H PRN, ISELA Nesbitt, 3 mL at 06/04/22 0757    levothyroxine tablet 112 mcg, 112 mcg, Per NG Tube, Early Morning, ISELA Nesbitt, 112 mcg at 06/12/22 0554    lidocaine (LIDODERM) 5 % patch 2 patch, 2 patch, Topical, Daily, Galileo Little PA-C, 2 patch at 06/11/22 0825    midodrine (PROAMATINE) tablet 10 mg, 10 mg, Oral, TID AC, Kourtney Odell, ISELA, 10 mg at 06/12/22 0554    norepinephrine (LEVOPHED) 8 mg (DOUBLE CONCENTRATION) IV in sodium chloride 0 9% 250 mL, 1-30 mcg/min, Intravenous, Titrated, ISELA Lawrence, Stopped at 06/11/22 0524    NxStage K 4/Ca 3 dialysis solution (RFP-401) 20,000 mL, 20,000 mL, Dialysis, Continuous, Justin Chowdhury PA-C, 20,000 mL at 06/12/22 0422    ondansetron (ZOFRAN) injection 4 mg, 4 mg, Intravenous, Q6H PRN, ISELA Nesbitt    oxyCODONE (ROXICODONE) oral solution 5 mg, 5 mg, Per NG Tube, Q4H PRN, 5 mg at 06/12/22 0125 **OR** oxyCODONE (ROXICODONE) oral solution 7 5 mg, 7 5 mg, Per NG Tube, Q4H PRN, ISELA Vigil    pantoprazole (PROTONIX) injection 40 mg, 40 mg, Intravenous, Q24H [facility], ISELA Nesbitt, 40 mg at 06/11/22 0825    polyethylene glycol (MIRALAX) packet 17 g, 17 g, Oral, Daily, ISELA Ritter, 17 g at 06/11/22 0825    sodium phosphate 6 mmol in sodium chloride 0 9 % 100 mL Infusion, 6 mmol, Intravenous, Once, Jessica Espinoza DO, Last Rate: 50 mL/hr at 06/12/22 0718, 6 mmol at 06/12/22 0718    vancomycin (VANCOCIN) 1,250 mg in sodium chloride 0 9 % 250 mL IVPB, 20 mg/kg (Adjusted), Intravenous, Q24H, Alvaro Nunes PA-C, Stopped at 06/11/22 1700    vasopressin (PITRESSIN) 20 Units in sodium chloride 0 9 % 100 mL infusion, 0 04 Units/min, Intravenous, Continuous, [employer], CRNP, Last Rate: 12 mL/hr at 06/12/22 0720, 0 04 Units/min at 06/12/22 0720    Laboratory Results:  Results from last 7 days   Lab Units 06/12/22  0559 06/11/22  2355 06/11/22  1744 06/11/22  1154 06/11/22  0542 06/10/22  2328 06/10/22  1825 06/10/22  0603 06/10/22  0601 06/09/22  1743 06/09/22  1157 06/09/22  0533 06/09/22  0002 06/08/22  0607 06/07/22  1444 06/07/22  0558   WBC Thousand/uL 17 28*  --   --   --  16 88*  --   --   --  11 06*  --  12 13* 10 91*  --  15 34*  --  16 74*   HEMOGLOBIN g/dL 8 0*  --   --   --  6 8*  --   --   --  7 7*  --  8 2* 6 6*  --  7 1*  --  7 3*   HEMATOCRIT % 24 7*  --   --   --  20 7*  --   --   --  23 7*  --  25 9* 20 5*  --  22 9*  --  23 4*   PLATELETS Thousands/uL 46*  --   --   --  44*  --   --   --  85*  --  121* 124*  --  123*  --  87*   POTASSIUM mmol/L 4 1 4 1 4 1 4 2 4 2 4 4 3 8   < >  --    < > 4 0 3 6   < > 5 0   < > 5 1   CHLORIDE mmol/L 106 104 106 106 107 107 104   < >  --    < > 104 100   < > 99*   < > 104   CO2 mmol/L 27 28 28 25 26 24 25   < >  --    < > 23 22   < > 22   < > 21   BUN mg/dL 13 14 14 14 17 19 23   < >  --    < > 21 25   < > 28*   < > 41*   CREATININE mg/dL 0 80 0 78 0 80 0 87 0 90 0 92 0 95   < >  --    < > 1 33* 1 47*   < > 1 99*   < > 2 11*   CALCIUM mg/dL 8 0* 8 1* 8 4 8 2* 8 2* 8 1* 7 8*   < >  --    < > 7 8* 7 7*   < > 7 6*   < > 8 0*   MAGNESIUM mg/dL 2 0 1 9 1 9 2 0 1 8 2 0 1 7   < >  --    < > 1 8 2 0   < > 2 4   < > 2 2   PHOSPHORUS mg/dL 2 0* 1 9* 1 8* 1 7* 1 8* 1 9* 2 0*   < >  --    < > 2 6 3 2   < >  --   --  5 2*    < > = values in this interval not displayed

## 2022-06-12 NOTE — ASSESSMENT & PLAN NOTE
· Outpatient EGD and colonoscopy at [facility] on 5/11 for w/u of chronic anemia, history of colon polyps, intermittent LLQ abdominal pain and possible intermittent intussusception  · EGD unremarkable, colonoscopy technically difficult and required change from adult scope to pediatric scope, during traversal of the sigmoid colon there was a kink and patient sustained a perforation  · Transferred to [facility] for emergent surgery   · Emergent ex- lap on 5/11 > low anterior resection, protective loop transverse colostomy, flex sigmoidoscopy, and segmental small bowel resection  · Difficult post op course with post op ileus requiring NGT decompression and requirement of short duration of TPN   · 5/22: CT: Diffuse mild dilation of small bowel segments identified without transition point  · 5/24: CT CAP- Distended small bowel loops with scattered air-fluid levels consistent with early/partial small bowel obstruction with transition at the small bowel anastomosis in the region of the ventral pelvis as above  No free air  Cholelithiasis without cholecystitis  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  · 5/24: Stomal prolapse and small peristomal hernia now at the transverse loop colostomy; continues to be reduced by surgery  · TPN d/c'd on 5/26; tolerating tube feeds at goal  · 5/26: Abdominal wound vac placed bedside: continue with dressing changes Monday/Thursday   · Surgery continues to follow    · 6/6: Gastrostomy tube inserted for nutrition by IR   · 6/6: CT Chest Abdomen Pelvis 6/6 : Indeterminant fluid collection in the right hemipelvis which appears to be loculated and possibly "walled off" from the remainder of abdominopelvic ascites   could represent an infected abscess, but is not significantly changed in size from May 24  · 6/8: IR abscess drainage and drain placement-30mL of pus drained +MRSA  · Continue to monitor output character and quantity

## 2022-06-12 NOTE — ASSESSMENT & PLAN NOTE
· HIT JAYLIN negative  · Argatroban d/c 6/9 and switch back to heparin gtt  · Platelets continuing to downtrend   · Thrombocytopenia likely in setting of bone marrow suppression from sepsis and CRRT  · Continue to trend platelet count daily

## 2022-06-12 NOTE — ASSESSMENT & PLAN NOTE
· Continue midodrine 10mg TID    · Levophed on hold since yesterday AM   · Vasopressin on hold over night

## 2022-06-12 NOTE — ASSESSMENT & PLAN NOTE
· Secondary to hypoperfusion mehul cardiac arrest +/- ATN  · Baseline creat 0 8  · Creatinine peaked at 3 07  · CVVH started on 5/26, attempts at [facility] unsuccessful secondary to rapidly escalating pressor requirements  · Avoid hypotension/hypoperfusion  · Continue CRRT, -50 cc/hr

## 2022-06-12 NOTE — ASSESSMENT & PLAN NOTE
· Hemoglobin drop post cardiac arrest    · Noted to have gross hematuria but this has since resolved  · 5/21: 1 u PRBC  · 5/24: 1 u PRBC  · CT obtained on 5/24 and negative for any overt signs of bleeding  · 5/26: 1 u PRBC   · 5/30: 1 u PRBC  · 6/4: 1 U PRBC   · 6/9: 1 U PRBC   · 6/11: 1 U PRBC   · Continue to monitor and transfuse for hgb less than 7

## 2022-06-12 NOTE — ASSESSMENT & PLAN NOTE
Wt Readings from Last 3 Encounters:   06/11/22 82 9 kg (182 lb 12 2 oz)   05/11/22 62 1 kg (136 lb 14 4 oz)   03/25/22 61 2 kg (135 lb)     · 5/16: Echo-EF 65%, mild TR, mild MR  · 5/23: Repeat Echo post cardiac arrest: EF 60-65%, G1DD, mild AS (BRADY 1 5cm2), mild MR, mild TR  · Monitor I&O, Daily weights   · Limited ECHO-EF 65%, G1DD, mild AS  · Volume removal with CRRT as above

## 2022-06-12 NOTE — PROGRESS NOTES
Vancomycin Assessment    Jessica Ayoub is a 80 y o  male who is currently receiving vancomycin 1250 mg IV q12h (based on 15 mg/kg actual body weight) for MRSA suspected, intra-abdominal infection     Relevant clinical data and objective history reviewed:  Creatinine   Date Value Ref Range Status   06/12/2022 0 83 0 60 - 1 30 mg/dL Final     Comment:     Standardized to IDMS reference method   06/12/2022 0 80 0 60 - 1 30 mg/dL Final     Comment:     Standardized to IDMS reference method   06/11/2022 0 78 0 60 - 1 30 mg/dL Final     Comment:     Standardized to IDMS reference method     /55   Pulse 81   Temp 98 24 °F (36 8 °C) (Esophageal)   Resp (!) 31   Ht 5' 4" (1 626 m)   Wt 81 8 kg (180 lb 5 4 oz)   SpO2 93%   BMI 30 95 kg/m²   I/O last 3 completed shifts: In: 9808 [I V :2844; Blood:350; NG/GT:250; Feedings:1700]  Out: 6680 [AUHBES:368; TTQDT:1191; Stool:475]  Lab Results   Component Value Date/Time    BUN 17 06/12/2022 12:01 PM    WBC 17 28 (H) 06/12/2022 05:59 AM    HGB 8 0 (L) 06/12/2022 05:59 AM    HCT 24 7 (L) 06/12/2022 05:59 AM    MCV 98 06/12/2022 05:59 AM    PLT 46 (LL) 06/12/2022 05:59 AM     Temp Readings from Last 3 Encounters:   06/12/22 98 24 °F (36 8 °C) (Esophageal)   05/11/22 (!) 97 1 °F (36 2 °C) (Temporal)   01/14/22 97 9 °F (36 6 °C) (Temporal)     Vancomycin Days of Therapy: 4  Assessment/Plan  The patient is currently on vancomycin utilizing scheduled dosing  Baseline risks associated with therapy include: advanced age  The patient is receiving 1250mg IVPB Q 24 hours with the most recent vancomycin level being not at steady-state and sub-therapeutic based on a goal of 15-20 (appropriate for most indications) ; therefore, after clinical evaluation will be changed to 750mg IVPB Q 12 hours  Pharmacy will continue to follow closely for s/sx of nephrotoxicity, infusion reactions, and appropriateness of therapy  BMP and CBC will be ordered per protocol    Plan for trough as patient approaches steady state, prior to the 5th  dose at approximately 1530 on 6/14/22  Pharmacy will continue to follow the patients culture results and clinical progress daily      Mihaela Weaver, Pharmacist

## 2022-06-13 PROBLEM — R65.21 SEPTIC SHOCK (HCC): Status: ACTIVE | Noted: 2022-01-01

## 2022-06-13 NOTE — ASSESSMENT & PLAN NOTE
· Outpatient EGD and colonoscopy at [facility] on 5/11 for w/u of chronic anemia, history of colon polyps, intermittent LLQ abdominal pain and possible intermittent intussusception  · EGD unremarkable, colonoscopy technically difficult and required change from adult scope to pediatric scope, during traversal of the sigmoid colon there was a kink and patient sustained a perforation  · Transferred to \[facility]\"" for emergent surgery   · Emergent ex- lap on 5/11 > low anterior resection, protective loop transverse colostomy, flex sigmoidoscopy, and segmental small bowel resection  · Difficult post op course with post op ileus requiring NGT decompression and requirement of short duration of TPN   · 5/22: CT: Diffuse mild dilation of small bowel segments identified without transition point  · 5/24: CT CAP- Distended small bowel loops with scattered air-fluid levels consistent with early/partial small bowel obstruction with transition at the small bowel anastomosis in the region of the ventral pelvis as above  No free air  Cholelithiasis without cholecystitis  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  · 5/24: Stomal prolapse and small peristomal hernia now at the transverse loop colostomy; continues to be reduced by surgery  · TPN d/c'd on 5/26; tolerating tube feeds at goal  · 5/26: Abdominal wound vac placed bedside: continue with dressing changes Monday/Thursday   · Surgery continues to follow    · 6/6: Gastrostomy tube inserted for nutrition by IR   · 6/6: CT Chest Abdomen Pelvis 6/6 : Indeterminant fluid collection in the right hemipelvis which appears to be loculated and possibly "walled off" from the remainder of abdominopelvic ascites   could represent an infected abscess, but is not significantly changed in size from May 24  · 6/8: IR abscess drainage and drain placement-30mL of pus drained +MRSA  · Continue to monitor output character and quantity

## 2022-06-13 NOTE — RESPIRATORY THERAPY NOTE
Pt recd on a pb 840 Vent settings as per flow sheet all alarms on and functional vent working properly

## 2022-06-13 NOTE — ASSESSMENT & PLAN NOTE
Wt Readings from Last 3 Encounters:   06/12/22 81 8 kg (180 lb 5 4 oz)   05/11/22 62 1 kg (136 lb 14 4 oz)   03/25/22 61 2 kg (135 lb)     · 5/16: Echo-EF 65%, mild TR, mild MR  · 5/23: Repeat Echo post cardiac arrest: EF 60-65%, G1DD, mild AS (BRADY 1 5cm2), mild MR, mild TR  · Monitor I&O, Daily weights   · Limited ECHO-EF 65%, G1DD, mild AS  · Volume removal with CRRT as above

## 2022-06-13 NOTE — PROGRESS NOTES
Vancomycin IV Pharmacy-to-Dose Consultation    Edyta Miranda is a 80 y o  male who is currently receiving Vancomycin IV with management by the Pharmacy Consult service  Assessment/Plan:  The patient was reviewed  Renal function is stable and no signs or symptoms of nephrotoxicity and/or infusion reactions were documented in the chart  Based on todays assessment, continue current vancomycin (day # 5) dosing of 750 mg Q 12 H  , with a plan for trough to be drawn at 1530 on 6/14/2022   We will continue to follow the patients culture results and clinical progress daily      Tristan Kim, Pharmacist

## 2022-06-13 NOTE — PROGRESS NOTES
Progress Note - Infectious Disease   Fady Hudson 80 y o  male MRN: [id number]  Unit/Bed#: ICU 02 Encounter: [id number]      Impression/Plan:  1  s/p cardiac arrest 5/21/22  Patient intubated during RR  In ICU on ventilator assistance  Recurrent SIRS possibly reactive to arrest with fever, tachycardia, tachypnea, and increased leukocytosis post arrest  Possible recurrent aspiration event s/p arrest  Fever down trended   Patient continued to have hypotension episodes that appear to be related to volume status, patient back on vasopressor at moment  -continue supportive measures per critical care team  -cardiology on board  -ventilator management per critical care team     2  SIRS, evolving on admission, post #1 and with persistent hypotension support with dialysis   Evidenced by tachycardia, tachypnea, leukocytosis, hypotension   Suspect possible aspiration and/or volume overload   MRSA screen negative  WBC fluctuating  5/22/22 Blood cultures have no growth   Patient completed 2 week antibiotic course 5/27/22 06/03/2022 blood cultures x2 negative at 5 days  Patient on vasopressor   -continue vancomycin as below  -monitor temperature and hemodynamics  -serial exam  -monitor CBC and BMP   -pressor support per Critical Medicine Service     3  Peritonitis s/p Bowel perforation  s/p 5/11/22 exploratory laparotomy, low anterior resection, protective loop transverse colostomy and segmental small bowel resection secondary to perforation rectosigmoid junction after colonoscopy   OR cultures grew Pseudomonas aeruginosa and Bacteroides fragilis   Patient completed antibiotic course 5/27/22   IR PEG tube placed 06/06/2022 06/07/2022 CT chest abdomen pelvis:  Right kush pelvis indeterminant fluid collection slightly larger in size from CT scan from 05/24/2022 06/08/2022 Patient status post IR right pelvic abscess drainage tube placement  35 mL of brown fluid removed growing MRSA   6/12/22 vancomycin level 11 8  -continue vancomycin  -plan to treat 10 days total through 6/19/22  -pharmacy on consult for vancomycin trough dosing management  -VAC/wound care per surgery  -follow up Peg function  -IR drainage tube management     4   Acute hypoxic respiratory failure with hypoxia   Suspicious for aspiration pneumonitis over pneumonia     6/6/22 CT C/A/P Bilateral pleural effusions, worsening pulmonary parenchymal airspace opacities consistent with developing fibrosis  Patient remains intubated s/p #1  5/16/22 Procalcitonin level  2 60 > 5/21 0 753 < 5/23/22 2 97 > 5/30 1 26  Patient is status post bronchoscopy 5/29/22 for RUL collapse  5/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  Status post thoracentesis 06/10/2022  Pleural effusion no growth  -ventilator management per critical care team      5  Aspiration pneumonitis versus pneumonia in setting of #1/3   5/22/22 CT C/A/P predominantly UL groundglass and consolidations, bilateral pleural effusions, SB mild dilation unchanged  Patient now intubated s/p #1  5/16/22 Procalcitonin level  2 60 > 5/21 0 753 < 5/23/22 2 97 > 5/30 1 26  5/17/22 sputum specimen oral pharyngeal contamination    06/06/2022 portable chest x-ray with bilateral pleural effusions  Status post thoracentesis 06/10/2022  Pleural effusion no growth  -continue antibiotics for as above  -secretion and pulmonary toilet management per critical care team   -monitor respiratory symptoms  -monitor vent requirements     6  MARYELLEN on CKD 3   Creatinine 2 1 CVVH on   -renal dose adjust medications as needed  -volume management per Nephrology  -CVVH management per Nephrology  -monitor creatinine and urinary output     Antibiotics:  Vancomycin D4     Above impression and plan discussed in detail with patient's RN and critical care team      Subjective:  Patient no fever, chills, sweats reported on ventilator in ICU s/p cardiac arrest 5/21/22; no nausea, vomiting, diarrhea reported  CVVH started 5/26/22 and restarted 22 after short intermittent HD attempt due to BP instability  patient's blood pressure  vasopressor support again  Patient status post IR right pelvic abscess drainage tube placement  35 mL of brown fluid removed initially and now tube left in place to drain  Objective:  Vitals:  Temp:  [95 72 °F (35 4 °C)-99 68 °F (37 6 °C)] 96 26 °F (35 7 °C)  HR:  [66-85] 70  Resp:  [8-38] 31  BP: ()/(45-67) 126/59  SpO2:  [83 %-100 %] 97 %  Temp (24hrs), Av °F (36 1 °C), Min:95 72 °F (35 4 °C), Max:99 68 °F (37 6 °C)  Current: Temperature: (!) 96 26 °F (35 7 °C)    Physical Exam:   General Appearance:  80year-old acute on chronically debilitated elderly propped resting ICU on ventilator  Throat: Oropharynx moist without lesions  ET tube to vent   Lungs:   Ventilated breath sounds somewhat coarse, scant secretions    Heart:  RRR   Abdomen:   Soft, no focal tenderness, protuberant, wound VAC appliance being changed, granular wound base without erythema or purulence of edges, left lower quadrant ostomy with brown soft stool in bag, peg tube capped, positive bowel sounds, right-sided pelvic drain in place with clearish mucoid drainage in FAYE bulb   Extremities: Generalized puffy edema, venodynes, Prevalon boots in place   : [place] out, 4+ soft scrotal edema, no SPT   Skin: No new rashes or lesions    Right arm PICC and right neck IJ CVP lines in place, CVVH running via CVP line       Labs, Imaging, & Other studies:   All pertinent labs and imaging studies were personally reviewed  Results from last 7 days   Lab Units 22  0628 22  0559 22  0542   WBC Thousand/uL 19 48* 17 28* 16 88*   HEMOGLOBIN g/dL 8 4* 8 0* 6 8*   PLATELETS Thousands/uL 33* 46* 44*     Results from last 7 days   Lab Units 22  0628 22  0007 22  1757 22  1157 22  0533 22  0002 06/08/22  1053   SODIUM mmol/L 137 137 138   < > 135*   < >  --    POTASSIUM mmol/L 4 8 4 5 4 4   < > 3 6 < >  --    CHLORIDE mmol/L 104 104 105   < > 100   < >  --    CO2 mmol/L 26 27 27   < > 22   < >  --    BUN mg/dL 18 19 19   < > 25   < >  --    CREATININE mg/dL 0 90 0 88 0 86   < > 1 47*   < >  --    EGFR ml/min/1 73sq m 74 75 75   < > 41   < >  --    CALCIUM mg/dL 8 1* 8 0* 7 9*   < > 7 7*   < >  --    AST U/L  --   --   --   --  55*  --  29   ALT U/L  --   --   --   --  27  --  14   ALK PHOS U/L  --   --   --   --  156*  --  191*    < > = values in this interval not displayed       Results from last 7 days   Lab Units 06/10/22  1214 06/08/22  1555   GRAM STAIN RESULT  No Polys or Bacteria seen 2+ Gram positive cocci in pairs, chains and clusters*  No polys seen*   BODY FLUID CULTURE, STERILE  No growth 2+ Growth of Methicillin Resistant Staphylococcus aureus*     Results from last 7 days   Lab Units 06/12/22  0559 06/09/22  0533   PROCALCITONIN ng/ml 1 88* 3 92*

## 2022-06-13 NOTE — ASSESSMENT & PLAN NOTE
· Likely in setting of acute illness/delirium and cardiac arrest  · Delirium precautions; regulate sleep/wake cycle, environmental controls, daily CAM ICU   · Trend neuro exam  · Off all continuous sedation with waxing/waning mental status exam and alertness   · 6/12 trying to avoid all potentially mind alerting medications - gabapentin, opioids   · Given 25 mg seroquel overnight for persistent restlessness without improvement   · No focal deficits

## 2022-06-13 NOTE — ASSESSMENT & PLAN NOTE
Peritonitis with intra-abdominal/pelvic abscess secondary to colonic perforation    · 5/22 CT chest/ab/pelvis with concern of multifocal PNA, no visualized intraabdominal abscess or anastomotic leak   · Per ID suspect multifocal pneumonitis   · OR culture 5/21 pseudomonas and bacteroides fragilis  · Blood cultures on 5/22 negative   · Completed 14 days of Flagyl on 5/24  · Completed 14 days of cefepime on 5/27  · Restarted Zosyn on 6/3 for increasing WBC and oxygen requirements - discontinued 6/10  · Vanco started 6/9 for intra-abdominal abscess culture +MRSA  · Vanco day 5 - plan for 10 day course from date of drainage (6/19)  · ID following, appreciate recommendations   · IR drain in place, continue to monitor output   · WBC continues to improve on current Abx   · procal 1 88, continue to trend   · Hypothermia requiring chester hugger, continue to trend temps

## 2022-06-13 NOTE — PROGRESS NOTES
NEPHROLOGY PROGRESS NOTE   Kathrine Knapp 80 y o  male MRN: [id number]  Unit/Bed#: ICU 02 Encounter: [id number]    ASSESSMENT & PLAN:  80year old with HTN, aortic stenosis, GERD, CAD who underwent EGD and C-scope on 6/23/87 which was complicated by perforation requiring emergent ex-lap  Since then, course has been complicated by PEA arrest on 5/21/22, VDRF, pneumonia, sepsis  Renal consulted for MARYELLEN  Acute kidney injury   -Baseline creatinine:0 7-0 9mg/dl  -Admission creatinine:0 78 mg/dl  - Work up:   · UA with microscopy:4-10 rbc/hpf and wbc  · Imaging:  CT chest abdomen pelvis from 05/06 with no hydronephrosis  Finding of cortical cyst right kidney 2 cm size  -Etiology:  Acute kidney injury likely due to ATN in the setting of hypotension and cardiac arrest   Peak creatinine 3 07 on May 26  [facility] Course:  HD dependent since 05/26  Did not tolerate intermittent HD on 06/07 and so restarted on CVVHD on 06/08 and since then has been on CVVHD  -Plan:   · Patient was seen and examined on CVVHD today, single visit  Chest x-ray was reviewed personally in PACS by me suggestive of fluid overload with finding of bilateral pulmonary congestion and clinically also has fluid overload  Patient is still on Levophed as well as vasopressin but in the setting of fluid overload planning for more ultrafiltration and so increase ultrafiltration to 75 mL /hr net negative  Continue 4 K bath, blood flow 250 mL per minute and dialysis flow 1500 mL/hour  · Monitor electrolytes and replace as needed  · Avoid nephrotoxins and dose all medications per CVVHD dosing  · Avoid hypotension  BP/hypotension  -still on 2 pressors-Levophed as well as vasopressin, continue vasopressor per ICU team and adjust dose as needed   -continue midodrine    Fluid overload  -increase ultrafiltration to 75 mL/hour net negative on CVVHD    If blood pressure allows and tolerated, may need to increase further  -echocardiogram from 05/31 showed ejection fraction 65% with grade 1 diastolic dysfunction    Hypophosphatemia on oral phos NaK through G-tube  Currently at normal range, continue to monitor    Anemia, thrombocytopenia  -monitor hemoglobin per ICU team and replace as needed if hemoglobin drops below 7  -status post PRBC this hospital admission  Hemoglobin currently stable at 8 4 g per dL    Ventilator dependent respiratory failure currently on FiO2 of 70%  Management per ICU    PEA cardiac arrest on 05/21/2022    Pneumoperitoneum status colon perforation status post colonoscopy , now post exploratory laparotomy with low anterior resection on 05/11/2022  Patient developed peritonitis/sepsis/intra-abdominal/pelvic abscess and was treated with antibiotics and currently on vancomycin  Also status post IR abscess drainage and drain placement on 06/08  Discussed with primary team advanced practitioner    SUBJECTIVE:  No new complaints  No chest pain or SOB  Still on vasopressors, intubated, FiO2 of 70%  Was seen and examined on CVVHD    OBJECTIVE:  Current Weight: Weight - Scale: 81 9 kg (180 lb 8 9 oz)  Vitals:    06/13/22 0720   BP:    Pulse: 72   Resp: (!) 34   Temp:    SpO2: 95%       Intake/Output Summary (Last 24 hours) at 6/13/2022 0802  Last data filed at 6/13/2022 0700  Gross per 24 hour   Intake 2738 ml   Output 3868 ml   Net -1130 ml       Physical Exam  General:  Ill looking, intubated   Head: normocephalic, atraumatic  Eyes: Conjunctivae pink,  Sclera anicteric  ENT: Intubated  On ventilator at FiO2 of 70%  Neck: supple   Chest:  Bilateral basal crackles  CVS: S1 & S2 present, normal rate, regular rhythm, no murmur  Abdomen: soft, non-tender, non-distended, Bowel sounds normoactive  Colostomy present, G-tube present  Extremities:  1+ pitting edema both lower extremity  Neuro: Sedated, intubated  Skin: no rash, warm and dry  Psych: sedated  Unable to assess  Medications:    Current Facility-Administered Medications:     acetaminophen (TYLENOL) oral suspension 650 mg, 650 mg, Oral, Q6H PRN, ISELA Akins, 650 mg at 06/05/22 1512    chlorhexidine (PERIDEX) 0 12 % oral rinse 15 mL, 15 mL, Mouth/Throat, Q12H [facility], Charly MACARIO MacNP, 15 mL at 06/12/22 2100    heparin (porcine) 25,000 units in 0 45% NaCl 250 mL infusion (premix), 400 Units/hr, Intravenous, Continuous, ISELA Ritter, Last Rate: 4 mL/hr at 06/12/22 1600, 400 Units/hr at 06/12/22 1600    HYDROmorphone (DILAUDID) injection 0 5 mg, 0 5 mg, Intravenous, Q3H PRN, [employer], CRNP, 0 5 mg at 06/07/22 0113    insulin lispro (HumaLOG) 100 units/mL subcutaneous injection 1-6 Units, 1-6 Units, Subcutaneous, Q6H [facility], 2 Units at 06/13/22 0617 **AND** Fingerstick Glucose (POCT), , , Q6H, ISELA Ritter    iohexol (OMNIPAQUE) 240 MG/ML solution 50 mL, 50 mL, Oral, Once in imaging, ISELA Akins    ipratropium-albuterol (DUO-NEB) 0 5-2 5 mg/3 mL inhalation solution 3 mL, 3 mL, Nebulization, Q6H PRN, ISELA Akins, 3 mL at 06/04/22 0757    levothyroxine tablet 112 mcg, 112 mcg, Per NG Tube, Early Morning, ISELA Akins, 112 mcg at 06/13/22 0617    midodrine (PROAMATINE) tablet 10 mg, 10 mg, Oral, TID AC, [employer], CRNP, 10 mg at 06/13/22 0617    norepinephrine (LEVOPHED) 4 mg (STANDARD CONCENTRATION) IV in sodium chloride 0 9% 250 mL, 1-30 mcg/min, Intravenous, Titrated, Jessa Xie PA-C, Last Rate: 11 3 mL/hr at 06/13/22 0633, 3 mcg/min at 06/13/22 7704    NxStage K 4/Ca 3 dialysis solution (RFP-401) 20,000 mL, 20,000 mL, Dialysis, Continuous, Jessa Xie PA-C, 20,000 mL at 06/13/22 0507    ondansetron (ZOFRAN) injection 4 mg, 4 mg, Intravenous, Q6H PRN, ISELA Akins    oxyCODONE (ROXICODONE) oral solution 5 mg, 5 mg, Per NG Tube, Q4H PRN, 5 mg at 06/12/22 0125 **OR** oxyCODONE (ROXICODONE) oral solution 7 5 mg, 7 5 mg, Per NG Tube, Q4H PRN, ISELA Alcantar    pantoprazole (PROTONIX) injection 40 mg, 40 mg, Intravenous, Q24H [address], Brodie Galarza, CRNP, 40 mg at 06/12/22 0837    polyethylene glycol (MIRALAX) packet 17 g, 17 g, Oral, Daily, Josephien FabianMACARIO DixonNP, 17 g at 06/12/22 0837    potassium-sodium phosphates (PHOS-NAK) packet 1 packet, 1 packet, Per G Tube, Q8H, Jennifer Rodriguez MD, 1 packet at 06/12/22 2312    vancomycin (VANCOCIN) IVPB (premix in dextrose) 750 mg 150 mL, 750 mg, Intravenous, Q12H, Kacey Yancey PA-C, Last Rate: 150 mL/hr at 06/13/22 0411, 750 mg at 06/13/22 0411    vasopressin (PITRESSIN) 20 Units in sodium chloride 0 9 % 100 mL infusion, 0 04 Units/min, Intravenous, Continuous, Victor Hugo Araujo PA-C, Last Rate: 12 mL/hr at 06/13/22 0542, 0 04 Units/min at 06/13/22 0542    Invasive Devices:        Lab Results:   Results from last 7 days   Lab Units 06/13/22  0628 06/13/22  0007 06/12/22  1757 06/12/22  1201 06/12/22  0559 06/11/22  1154 06/11/22  0542 06/09/22  1157 06/09/22  0533 06/09/22  0002 06/08/22  1053   WBC Thousand/uL 19 48*  --   --   --  17 28*  --  16 88*   < > 10 91*  --   --    HEMOGLOBIN g/dL 8 4*  --   --   --  8 0*  --  6 8*   < > 6 6*  --   --    HEMATOCRIT % 26 8*  --   --   --  24 7*  --  20 7*   < > 20 5*  --   --    PLATELETS Thousands/uL 33*  --   --   --  46*  --  44*   < > 124*  --   --    POTASSIUM mmol/L 4 8 4 5 4 4   < > 4 1   < > 4 2   < > 3 6   < >  --    CHLORIDE mmol/L 104 104 105   < > 106   < > 107   < > 100   < >  --    CO2 mmol/L 26 27 27   < > 27   < > 26   < > 22   < >  --    BUN mg/dL 18 19 19   < > 13   < > 17   < > 25   < >  --    CREATININE mg/dL 0 90 0 88 0 86   < > 0 80   < > 0 90   < > 1 47*   < >  --    CALCIUM mg/dL 8 1* 8 0* 7 9*   < > 8 0*   < > 8 2*   < > 7 7*   < >  --    MAGNESIUM mg/dL 2 2 1 9 1 8   < > 2 0   < > 1 8   < > 2 0   < >  --    PHOSPHORUS mg/dL 3 1 2 8 2 4   < > 2 0*   < > 1 8*   < > 3 2   < >  --    ALK PHOS U/L  --   --   --   -- --   --   --   --  156*  --  191*   ALT U/L  --   --   --   --   --   --   --   --  27  --  14   AST U/L  --   --   --   --   --   --   --   --  55*  --  29    < > = values in this interval not displayed  Previous work up:         Portions of the record may have been created with voice recognition software  Occasional wrong word or "sound a like" substitutions may have occurred due to the inherent limitations of voice recognition software  Read the chart carefully and recognize, using context, where substitutions have occurred  If you have any questions, please contact the dictating provider

## 2022-06-13 NOTE — PROGRESS NOTES
Tami 45  Progress Note - Shirley Hudson 2/15/1931, 80 y o  male MRN: [id number]  Unit/Bed#: ICU 02 Encounter: [id number]  Primary Care Provider: Tomas Cervantes MD   Date and time admitted to hospital: 5/11/2022  4:48 PM    * Bowel perforation [facility])  Assessment & Plan  · Outpatient EGD and colonoscopy at [facility] on 5/11 for w/u of chronic anemia, history of colon polyps, intermittent LLQ abdominal pain and possible intermittent intussusception  · EGD unremarkable, colonoscopy technically difficult and required change from adult scope to pediatric scope, during traversal of the sigmoid colon there was a kink and patient sustained a perforation  · Transferred to [facility] for emergent surgery   · Emergent ex- lap on 5/11 > low anterior resection, protective loop transverse colostomy, flex sigmoidoscopy, and segmental small bowel resection  · Difficult post op course with post op ileus requiring NGT decompression and requirement of short duration of TPN   · 5/22: CT: Diffuse mild dilation of small bowel segments identified without transition point  · 5/24: CT CAP- Distended small bowel loops with scattered air-fluid levels consistent with early/partial small bowel obstruction with transition at the small bowel anastomosis in the region of the ventral pelvis as above  No free air  Cholelithiasis without cholecystitis  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    · 6/6: Gastrostomy tube inserted for nutrition by IR   · 6/6: CT Chest Abdomen Pelvis 6/6 : Indeterminant fluid collection in the right hemipelvis which appears to be loculated and possibly "walled off" from the remainder of abdominopelvic ascites  could represent an infected abscess, but is not significantly changed in size from May 24  · 6/8: IR abscess drainage and drain placement-30mL of pus drained +MRSA  · Continue to monitor output character and quantity    Acute respiratory failure with hypoxia ([address] )  Assessment & Plan  · Patient previously in ICU for hypoxia with a max of 15L midflow and concern for aspiration pneumonitis  · Transfered to general medical floor 5/21  · 5/21: PEA arrest x2, intubation  · 5/24 CT CAP-CHF with moderate basilar effusions and additional upper lung zone patchy airspace opacities likely reflecting asymmetric pulmonary edema  Infiltrate is not entirely excluded  · 5/29 Bronchoscopy for mucous plugging  · Day # 23 on ventilator: AC/PC 24/24/70%/8  · Wean Fio2/PEEP as able for SPO2>90%  · Continue pulmonary hygiene/ VAP bundle  · Chlorhexidine, PPI, HOB greater than 30 degrees   · Continue volume removal with CVVH as tolerated   · 6/10 IR L thora for 800 cc  · Fluid studies/culture in process   · Discussed with surgery - trach/PEG when stable on 60%/6 PEEP for multiple days    Ileus (HCC)-resolved as of 5/22/2022  Assessment & Plan  · Developed post-op ileus with abdominal distention, and nausea/vomiting  · NGT placed with difficulty, on imaging terminated in distal esophagus, attempted to be advanced twice without significant drainage despite distended stomach on imaging   · 5/17 CT - Multiple dilated small bowel loops without a clear transition point  The finding may reflect ileus, however developing small bowel obstruction cannot be excluded  Follow-up is recommended    · AM KUB without contrast progressing to colon   · Minimal to no ostomy output - monitor   · NGT replaced by IR on 5/18 - continue to suction, without significant output   · PICC line in place for TPN   · Getting frequent IV opioids for abdominal pain can be contributing to ileus, attempt to improve multimodal pain regimen - consider Toradol     Acute renal failure (ARF) ([facility])  Assessment & Plan  · Secondary to hypoperfusion mehul cardiac arrest +/- ATN  · Baseline creat 0 8  · Creatinine peaked at 3 07  · CVVH started on 5/26, attempts at [facility] unsuccessful secondary to rapidly escalating pressor requirements  · Avoid hypotension/hypoperfusion  · Continue CRRT, -50 cc/hr     Severe sepsis [facility])  Assessment & Plan  Peritonitis with intra-abdominal/pelvic abscess secondary to colonic perforation    · 5/22 CT chest/ab/pelvis with concern of multifocal PNA, no visualized intraabdominal abscess or anastomotic leak   · Per ID suspect multifocal pneumonitis   · OR culture 5/21 pseudomonas and bacteroides fragilis  · Blood cultures on 5/22 negative   · Completed 14 days of Flagyl on 5/24  · Completed 14 days of cefepime on 5/27  · Restarted Zosyn on 6/3 for increasing WBC and oxygen requirements - discontinued 6/10  · Vanco started 6/9 for intra-abdominal abscess culture +MRSA  · Vanco day 5 - plan for 10 day course from date of drainage (6/19)  · ID following, appreciate recommendations   · IR drain in place, continue to monitor output   · WBC continues to improve on current Abx   · procal 1 88, continue to trend   · Hypothermia requiring chester hugger, continue to trend temps    CHF (congestive heart failure) ([facility] [address] )  Assessment & Plan  Wt Readings from Last 3 Encounters:   06/12/22 81 8 kg (180 lb 5 4 oz)   05/11/22 62 1 kg (136 lb 14 4 oz)   03/25/22 61 2 kg (135 lb)     · 5/16: Echo-EF 65%, mild TR, mild MR  · 5/23: Repeat Echo post cardiac arrest: EF 60-65%, G1DD, mild AS (BRADY 1 5cm2), mild MR, mild TR  · Monitor I&O, Daily weights   · Limited ECHO-EF 65%, G1DD, mild AS  · Volume removal with CRRT as above     Hyperglycemia  Assessment & Plan  · A1c 5 3%  · Hyperglycemia likely stress induced  · Continue SSI - minimal requirements     Toxic metabolic encephalopathy  Assessment & Plan  · Likely in setting of acute illness/delirium and cardiac arrest  · Delirium precautions; regulate sleep/wake cycle, environmental controls, daily CAM ICU   · Trend neuro exam  · Off all continuous sedation with waxing/waning mental status exam and alertness   · 6/12 trying to avoid all potentially mind alerting medications - gabapentin, opioids   · Given 25 mg seroquel overnight for persistent restlessness without improvement   · No focal deficits       Hypotension  Assessment & Plan  · Continue midodrine 10mg TID    · Started back on levophed and vasopressin overnight   · Wean levophed for MAP 65       Anemia  Assessment & Plan  · Hemoglobin drop post cardiac arrest    · Noted to have gross hematuria but this has since resolved  · 5/21: 1 u PRBC  · 5/24: 1 u PRBC  · CT obtained on 5/24 and negative for any overt signs of bleeding  · 5/26: 1 u PRBC   · 5/30: 1 u PRBC  · 6/4: 1 U PRBC   · 6/9: 1 U PRBC   · 6/11: 1 U PRBC   · Continue to monitor and transfuse for hgb less than 7      Paroxysmal atrial fibrillation (HCC)  Assessment & Plan  · In the setting of cardiac arrest while on 3 pressors noted to have afib with RVR  · Bolused with amio and placed on infusion     · Converted to sinus rhythm on 5/22   · Amio gtt d/c 5/23 but restarted on 6/7 due to RVR >100   · Pt now converted to  NSR, Amio gtt d/c 6/9  · Holding systemic AC with worsening thrombocytopenia     Acute on chronic anemia-resolved as of 6/10/2022  Assessment & Plan  · As above     Cardiac arrest (HCC)-resolved as of 6/11/2022  Assessment & Plan  · Patient was found unresponsive and hypoxic approximately 20 minutes after being seen well with family 5/21   · PEA arrest x 2  · Most likely hypoxia driven  · Neurologically intact post arrest   · Continue telemetry monitoring     Thrombocytopenia ([facility] [address] )  Assessment & Plan  · HIT JAYLIN negative  · Argatroban d/c 6/9 and switch back to heparin gtt  · Platelets continuing to downtrend   · Thrombocytopenia likely in setting of bone marrow suppression from sepsis and CRRT  · Continue to trend platelet count daily       ----------------------------------------------------------------------------------------  HPI/24hr events: Vasopressin on/off during day yesterday, trialed levophed overnight to have medication to wean  Levophed max of 6, vasopressin added back  Currently on levo 6 and vaso 0 04  Pulling -50 on CRRT  Fio2/peep weaned to 55% and 8 yesterday, fio2 increased back yp to 70% overnight  Pending CXR  Avoiding all mind alerting medications including opioids and gabapentin to try and optimize neurologic exam  Did not follow commands but was persistently restless overnight, received 25 mg Seroquel without improvement       Patient appropriate for transfer out of the ICU today?: No  Disposition: Continue Critical Care   Code Status: Level 2 - DNAR: but accepts endotracheal intubation  ---------------------------------------------------------------------------------------  SUBJECTIVE  Unable to offer     Review of Systems   Unable to perform ROS: Intubated     Review of systems was unable to be performed secondary to encephalopathy   ---------------------------------------------------------------------------------------  OBJECTIVE    Vitals   Vitals:    22 0430 22 0440 22 0445 22 0500   BP:       BP Location:       Pulse: 71  70 72   Resp: (!) 30  (!) 31 (!) 31   Temp: (!) 96 08 °F (35 6 °C)  (!) 96 08 °F (35 6 °C) (!) 96 26 °F (35 7 °C)   TempSrc:       SpO2: 90% 93% 92% 90%   Weight:       Height:         Temp (24hrs), Av 4 °F (36 3 °C), Min:96 08 °F (35 6 °C), Max:99 68 °F (37 6 °C)  Current: Temperature: (!) 96 26 °F (35 7 °C)  Arterial Line BP: 133/36  Arterial Line MAP (mmHg): 74 mmHg    Respiratory:  SpO2: SpO2: 90 %, SpO2 Device: O2 Device: Ventilator  Invasive/non-invasive ventilation settings   Respiratory  Report   Lab Data (Last 4 hours)    None         O2/Vent Data (Last 4 hours)       0258           Vent Mode AC/PC Resp Rate (BPM) (BPM) 20       Pressure Control (cmH2O) (cm) 24       Insp Time (sec) (sec) 0 7       FiO2 (%) (%) 55       PEEP (cmH2O) (cmH2O) 8       MV 12                   Physical Exam  Constitutional:       Appearance: He is ill-appearing  He is not toxic-appearing or diaphoretic  Interventions: He is intubated and restrained  Eyes:      Pupils: Pupils are equal, round, and reactive to light  Neck:      Comments: R HD cath in place   Cardiovascular:      Rate and Rhythm: Normal rate and regular rhythm  Heart sounds: Normal heart sounds  Pulmonary:      Effort: Tachypnea (RR 34) present  He is intubated  Breath sounds: Rhonchi (bilaterally) present  Abdominal:      General: There is distension (mild, soft to palpation)  Palpations: Abdomen is soft  Comments: Midline wound vac in place   Ostomy pink with brown liquid stool   Pelvic drain with minimal white/milky drainage    Musculoskeletal:      Right lower le+ Edema present  Left lower le+ Edema present  Skin:     General: Skin is warm and dry        Comments: Anasarca    Neurological:      Comments: Persistent mouthing ETT, turning head, non-purposeful arm movements   Does not follow commands              Laboratory and Diagnostics:  Results from last 7 days   Lab Units 22  0559 22  0542 06/10/22  0601 22  1157 22  0533 22  0607 22  0558 22  0749   WBC Thousand/uL 17 28* 16 88* 11 06* 12 13* 10 91* 15 34* 16 74* 17 92*   HEMOGLOBIN g/dL 8 0* 6 8* 7 7* 8 2* 6 6* 7 1* 7 3* 7 8*   HEMATOCRIT % 24 7* 20 7* 23 7* 25 9* 20 5* 22 9* 23 4* 24 2*   PLATELETS Thousands/uL 46* 44* 85* 121* 124* 123* 87* 49*   BANDS PCT %  --  36* 18*  --   --  21*  --  11*   MONO PCT %  --  2* 2*  --   --  7  --  8     Results from last 7 days   Lab Units 22  0007 22  1757 22  1201 22  0559 22  2355 22  1744 22  1154 22  1157 22  0533 06/09/22  0002 06/08/22  1053   SODIUM mmol/L 137 138 138 139 136 139 140   < > 135*   < >  --    POTASSIUM mmol/L 4 5 4 4 4 3 4 1 4 1 4 1 4 2   < > 3 6   < >  --    CHLORIDE mmol/L 104 105 105 106 104 106 106   < > 100   < >  --    CO2 mmol/L 27 27 27 27 28 28 25   < > 22   < >  --    ANION GAP mmol/L 6 6 6 6 4 5 9   < > 13   < >  --    BUN mg/dL 19 19 17 13 14 14 14   < > 25   < >  --    CREATININE mg/dL 0 88 0 86 0 83 0 80 0 78 0 80 0 87   < > 1 47*   < >  --    CALCIUM mg/dL 8 0* 7 9* 8 3 8 0* 8 1* 8 4 8 2*   < > 7 7*   < >  --    GLUCOSE RANDOM mg/dL 150* 157* 170* 162* 161* 180* 198*   < > 135   < >  --    ALT U/L  --   --   --   --   --   --   --   --  27  --  14   AST U/L  --   --   --   --   --   --   --   --  55*  --  29   ALK PHOS U/L  --   --   --   --   --   --   --   --  156*  --  191*   ALBUMIN g/dL  --   --   --   --   --   --   --   --  2 0*  --  2 2*   TOTAL BILIRUBIN mg/dL  --   --   --   --   --   --   --   --  1 77*  --  1 83*    < > = values in this interval not displayed  Results from last 7 days   Lab Units 06/13/22  0007 06/12/22  1757 06/12/22  1201 06/12/22  0559 06/11/22  2355 06/11/22  1744 06/11/22  1154   MAGNESIUM mg/dL 1 9 1 8 2 0 2 0 1 9 1 9 2 0   PHOSPHORUS mg/dL 2 8 2 4 2 3 2 0* 1 9* 1 8* 1 7*      Results from last 7 days   Lab Units 06/11/22  0250 06/10/22  1917 06/10/22  0927 06/10/22  0136 06/09/22  1743 06/09/22  1157 06/09/22  1101 06/08/22  1300 06/08/22  1053 06/06/22  1126   INR   --   --   --   --   --  2 12*  --   --  1 87* 1 52*   PTT seconds 112* 143* 210* >210* >210* 105* 85*   < > 201*  --     < > = values in this interval not displayed                ABG:  Results from last 7 days   Lab Units 06/09/22  0603   PH ART  7 315*   PCO2 ART mm Hg 45 0*   PO2 ART mm Hg 61 3*   HCO3 ART mmol/L 22 4   BASE EXC ART mmol/L -3 5   ABG SOURCE  Line, Arterial     VBG:  Results from last 7 days   Lab Units 06/09/22  0603   ABG SOURCE  Line, Arterial     Results from last 7 days   Lab Units 06/12/22  0559 06/09/22  0533   PROCALCITONIN ng/ml 1 88* 3 92*       Micro  Results from last 7 days   Lab Units 06/10/22  1214 06/08/22  1555   GRAM STAIN RESULT  No Polys or Bacteria seen 2+ Gram positive cocci in pairs, chains and clusters*  No polys seen*   BODY FLUID CULTURE, STERILE  No growth 2+ Growth of Methicillin Resistant Staphylococcus aureus*       EKG: NSR rate 73 on telemetry   Imaging: I have personally reviewed pertinent reports  and I have personally reviewed pertinent films in PACS    Intake and Output  I/O       06/11 0701 06/12 0700 06/12 0701 06/13 0700    I V  (mL/kg) 1631 (19 9) 1330 (16 3)    Blood 350     NG/ 330    Feedings 1111 996    Total Intake(mL/kg) 3272 (40) 2656 (32 5)    Urine (mL/kg/hr) 0 (0) 0 (0)    Emesis/NG output  0    Drains 195 85    Other 3473 2950    Stool 275 375    Total Output 3943 3417    Net -534 -080                Height and Weights   Height: 5' 4" (162 6 cm)  IBW (Ideal Body Weight): 59 2 kg  Body mass index is 30 95 kg/m²  Weight (last 2 days)     Date/Time Weight    06/12/22 0600 81 8 (180 34)    06/11/22 0600 82 9 (182 76)            Nutrition       Diet Orders   (From admission, onward)             Start     Ordered    06/10/22 1518  Diet Enteral/Parenteral; Tube Feeding No Oral Diet; Vital 1 5; Continuous; 50; Demond - Two Packets Daily  Diet effective now        References:    Nutrtion Support Algorithm Enteral vs  Parenteral   Question Answer Comment   Diet Type Enteral/Parenteral    Enteral/Parenteral Tube Feeding No Oral Diet    Tube Feeding Formula: Vital 1 5    Bolus/Cyclic/Continuous Continuous    Tube Feeding Goal Rate (mL/hr): 50    Demond Orange Demond - Two Packets Daily    RD to adjust diet per protocol?  Yes        06/10/22 1517    05/12/22 0906  Room Service  Once        Question:  Type of Service  Answer:  Room Service - Appropriate with Assistance    05/12/22 0905                  Active Medications  Scheduled Meds:  Current Facility-Administered Medications   Medication Dose Route Frequency Provider Last Rate    acetaminophen  650 mg Oral Q6H PRN ISELA Carranza      chlorhexidine  15 mL Mouth/Throat Q12H [facility] ISELA Carranza      heparin (porcine)  400 Units/hr Intravenous Continuous ISELA Mccord 400 Units/hr (06/12/22 1600)    HYDROmorphone  0 5 mg Intravenous Q3H PRN [employer], CRNP      insulin lispro  1-6 Units Subcutaneous Q6H [facility] ISELA Mccord      iohexol  50 mL Oral Once in imaging ISELA Carranza      ipratropium-albuterol  3 mL Nebulization Q6H PRN ISELA Carranza      levothyroxine  112 mcg Per NG Tube Early Morning ISELA Carranza      midodrine  10 mg Oral TID [address]      norepinephrine  1-30 mcg/min Intravenous Titrated Jailyn Fuentes PA-C 6 mcg/min (06/13/22 0312)   Carissa Mahajan NxStage K 4/Ca 3  20,000 mL Dialysis Continuous Jailyn Fuentes PA-C      ondansetron  4 mg Intravenous Q6H PRN ISELA Carranza      oxyCODONE  5 mg Per NG Tube Q4H PRN ISELA Easley      Or    oxyCODONE  7 5 mg Per NG Tube Q4H PRN ISELA Easley      pantoprazole  40 mg Intravenous Q24H [facility] ISELA Carranza      polyethylene glycol  17 g Oral Daily ISELA Mccord      potassium-sodium phosphates  1 packet Per G Tube Q8H Errol Jung MD      vancomycin  750 mg Intravenous Q12H Sylvia Pena PA-C 750 mg (06/13/22 0411)    vasopressin (PITRESSIN) in 0 9 % sodium chloride 100 mL  0 03 Units/min Intravenous Continuous ISELA Ritter 0 03 Units/min (06/13/22 0048)     Continuous Infusions:  heparin (porcine), 400 Units/hr, Last Rate: 400 Units/hr (06/12/22 1600)  norepinephrine, 1-30 mcg/min, Last Rate: 6 mcg/min (06/13/22 6314)  NxStage K 4/Ca 3, 20,000 mL  vasopressin (PITRESSIN) in 0 9 % sodium chloride 100 mL, 0 03 Units/min, Last Rate: 0 03 Units/min (06/13/22 0048)      PRN Meds: acetaminophen, 650 mg, Q6H PRN  HYDROmorphone, 0 5 mg, Q3H PRN  iohexol, 50 mL, Once in imaging  ipratropium-albuterol, 3 mL, Q6H PRN  ondansetron, 4 mg, Q6H PRN  oxyCODONE, 5 mg, Q4H PRN   Or  oxyCODONE, 7 5 mg, Q4H PRN        Invasive Devices Review  Invasive Devices  Report    Peripherally Inserted Central Catheter Line  Duration           PICC Line 07/60/26 Right Basilic 25 days          Arterial Line  Duration           Arterial Line 05/30/22 Radial 13 days          Hemodialysis Catheter  Duration           HD Temporary Double Catheter 17 days          Drain  Duration           Colostomy LLQ 33 days    Gastrostomy/Enterostomy Percutaneous Interventional Gastrostomy 16 Fr  LUQ 6 days    Abscess Drain Buttock 4 days          Airway  Duration           ETT  Oral 22 days                Rationale for remaining devices: PICC for IV access, medications requiring central access  A line for HD monitoring   HD cath for active HD  ---------------------------------------------------------------------------------------  Advance Directive and Living Will:      Power of :    POLST:    ---------------------------------------------------------------------------------------  Care Time Delivered:   No Critical Care time spent       Prasad Ayala PA-C      Portions of the record may have been created with voice recognition software  Occasional wrong word or "sound a like" substitutions may have occurred due to the inherent limitations of voice recognition software    Read the chart carefully and recognize, using context, where substitutions have occurred

## 2022-06-13 NOTE — PROGRESS NOTES
Pastoral Care Progress Note    2022  Patient: Jaylin Unger : 2/15/1931  Admission Date & Time: 2022 1648  MRN: [id number] [facility]: [id number]                     Chaplaincy Interventions Utilized:   Relationship Building: Cultivated a relationship of care and support  Visited with patient's two sons at the patient's bedside  Both sons are grappling with how long they should continue with treatment and life support  They want to give their father every chance to survive but are coming to terms with the reality that he probably cannot survive his medical issues  They also do not want their father to suffer  I shared with the sons that life on earth is not ultimate, so it is okay, when and if they feel ready, to release their father to God  I offered a prayer for the patient at his bedside and also for discernment for the family regarding whether or not to continue treatment     Ritual: Provided prayer   22 1500   Clinical Encounter Type   Visited With Patient and family together   Routine Visit Follow-up   Muslim Encounters   Muslim Needs Prayer

## 2022-06-13 NOTE — QUICK NOTE
ANIRUDH GLASS  Change Note    Date: 06/13/2022    Location of wound: Abdominal incision          Dimensions of wound: 11 cm x 4 cm x 0 5 cm    Description of wound: good circular areas of granulation tissue    Sponges removed:  2 Gray Sponges      Sponges placed:  2 Gray Sponges      VAC settings:  125 mmHg  Continuous    Saline instillation  Instill 6mL of saline every 2 hours for 10 minutes       Summer Keenan PA-C

## 2022-06-13 NOTE — ASSESSMENT & PLAN NOTE
· Continue midodrine 10mg TID    · Started back on levophed and vasopressin overnight   · Wean levophed for MAP 65

## 2022-06-13 NOTE — PROGRESS NOTES
Patient examined seen by Dr Ana Moore MD along with surgical team  80 years male  23rd day on vent secondary to ventilator dependent respiratory failure pneumonia ARDS pleural effusion  Sepsis    S/p the exploratory laparotomy for perforated sigmoid colon resection and fecal diversion with loop colostomy  With abdominal wall wound VAC for open wound  Patient is closed the an incision is healing with granulation tissue by secondary intention    He is status post a G-tube by IR   intra-abdominal collection status post IR drainage     Consult is for possible tracheostomy tube placement  Patient is the on the peep of 8 FiO2 of 70%  Still on 2 pressors Levophed as well as vasopressin    undergoing the CVVHD  Thrombocytopenia    Care were discussed with critical care team  Patient was evaluated by Dr Ramon banks MD last week  For possible tracheostomy in view of a high PEEP requirement and the high FiO2 requirement patient was unstable for any surgical interventions/tracheostomy    Patient is still critically sick for tracheostomy    Care was discussed with patient's son Mr Danyel Prescott all questions answered to his satisfaction    Patient's was explained and made aware that the tracheostomy is very high risk for him at present with present vent setting    Patient's son will visit with his brother today to discuss further care with  critical care team

## 2022-06-13 NOTE — ASSESSMENT & PLAN NOTE
· Secondary to hypoperfusion mehul cardiac arrest +/- ATN  · Baseline creat 0 8  · Creatinine peaked at 3 07  · CVVH started on 5/26, attempts at [facility] unsuccessful secondary to rapidly escalating pressor requirements  · Avoid hypotension/hypoperfusion  · Continue CRRT, -50 cc/hr

## 2022-06-13 NOTE — ASSESSMENT & PLAN NOTE
· Patient previously in ICU for hypoxia with a max of 15L midflow and concern for aspiration pneumonitis  · Transfered to general medical floor 5/21  · 5/21: PEA arrest x2, intubation  · 5/24 CT CAP-CHF with moderate basilar effusions and additional upper lung zone patchy airspace opacities likely reflecting asymmetric pulmonary edema  Infiltrate is not entirely excluded     · 5/29 Bronchoscopy for mucous plugging  · Day # 23 on ventilator: AC/PC 24/24/70%/8  · Wean Fio2/PEEP as able for SPO2>90%  · Continue pulmonary hygiene/ VAP bundle  · Chlorhexidine, PPI, HOB greater than 30 degrees   · Continue volume removal with CVVH as tolerated   · 6/10 IR L thora for 800 cc  · Fluid studies/culture in process   · Discussed with surgery - trach/PEG when stable on 60%/6 PEEP for multiple days

## 2022-06-13 NOTE — CASE MANAGEMENT
Case Management Discharge Planning Note    Patient name Justyn Apodaca  Location ICU 02/ICU 02 MRN [id number]  : 2/15/1931 Date 2022       Current Admission Date: 2022  Current Admission Diagnosis:Bowel perforation [facility])   Patient Active Problem List    Diagnosis Date Noted    Thrombocytopenia ([facility] ) 2022    Hypotension 2022    Paroxysmal atrial fibrillation ([facility] ) 2022    Acute renal failure (ARF) ([facility] ) 2022    Anemia 2022    Severe sepsis ([facility] ) 2022    CHF (congestive heart failure) ([facility] ) 2022    Hyperglycemia 2022    Acute respiratory failure with hypoxia ([facility] ) 05/15/2022    Toxic metabolic encephalopathy     Bowel perforation ([facility] ) 2022    Hypertension 2022    Degenerative disc disease at L5-S1 level 2022    Spinal stenosis 2022    Frail elderly 2022      LOS (days): 33  Geometric Mean LOS (GMLOS) (days): 6 80  Days to GMLOS:-26 2     OBJECTIVE:  Risk of Unplanned Readmission Score: 27 57      Current admission status: Inpatient   Preferred Pharmacy:   [place], [address] [address]  Phone: [phone] Fax: [phone]    [facility]/pharmacy #[id number] [place], [address] [place] [address],   [address]    [place] [address]  Phone: [phone] Fax: [phone]    Primary Care Provider: Eva Cifuentes MD    Primary Insurance: MEDICARE  Secondary Insurance: AARP    DISCHARGE DETAILS:    Discharge planning discussed with[de-identified] Sons Danyel/Javy and DESEAN Sosa CM contacted family/caregiver?: Yes  Were Treatment Team discharge recommendations reviewed with patient/caregiver?: Yes  Did patient/caregiver verbalize understanding of patient care needs?: Yes  Were patient/caregiver advised of the risks associated with not following Treatment Team discharge recommendations?: Yes    Contacts  Patient Contacts: Shen Geller (son)  Relationship to Patient[de-identified] Family  Contact Method: In Person  Reason/Outcome: Continuity of Care, Discharge Planning, Emergency Contact    SW spoke with son Tram Kraus in the hallway today to offer empathy and support  Caitlynleisa Kraus states that the family is still having a hard time making a decision  Janet Huynh is now off of quarantine and able to visit pt in person  He visited on Saturday as planned; however, pt was having a better day in terms of IV pressor and FIO2 requirement  Family wanted to continue aggressive tx  Requirements increased again on Sunday  Surgery spoke with Tram Kraus again today to reiterate that pt is too critically sick for trach placement at this time  SW and ICU team will continue goals of care discussions with family

## 2022-06-14 NOTE — RESPIRATORY THERAPY NOTE
Patient received on full vent support this am  Tolerating settings and patient is sync with the vent  Currently on PC 26/24/+6/65%  Will continue to monitor

## 2022-06-14 NOTE — PROGRESS NOTES
[address]  Progress Note - Kendra Lombard [place] 2/15/1931, 80 y o  male MRN: [id number]  Unit/Bed#: ICU 02 Encounter: [id number]  Primary Care Provider: Jones Case MD   Date and time admitted to hospital: 5/11/2022  4:48 PM    * Bowel perforation [facility])  Assessment & Plan  · Outpatient EGD and colonoscopy at [facility] on 5/11 for w/u of chronic anemia, history of colon polyps, intermittent LLQ abdominal pain and possible intermittent intussusception  · EGD unremarkable, colonoscopy technically difficult and required change from adult scope to pediatric scope, during traversal of the sigmoid colon there was a kink and patient sustained a perforation  · Transferred to [facility] for emergent surgery   · Emergent ex- lap on 5/11 > low anterior resection, protective loop transverse colostomy, flex sigmoidoscopy, and segmental small bowel resection  · Difficult post op course with post op ileus requiring NGT decompression and requirement of short duration of TPN   · 5/22: CT: Diffuse mild dilation of small bowel segments identified without transition point  · 5/24: CT CAP- Distended small bowel loops with scattered air-fluid levels consistent with early/partial small bowel obstruction with transition at the small bowel anastomosis in the region of the ventral pelvis as above  No free air  Cholelithiasis without cholecystitis  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    · 6/6: Gastrostomy tube inserted for nutrition by IR   · 6/6: CT Chest Abdomen Pelvis: Indeterminant fluid collection in the right hemipelvis which appears to be loculated and possibly "walled off" from the remainder of abdominopelvic ascites  could represent an infected abscess, but is not significantly changed in size from May 24  · 6/8: IR abscess drainage and drain placement-30mL of pus drained +MRSA  · Continue to monitor output character and quantity    Acute respiratory failure with hypoxia ([facility] )  Assessment & Plan  · Patient previously in ICU for hypoxia with a max of 15L midflow and concern for aspiration pneumonitis  · Transfered to general medical floor 5/21  · 5/21: PEA arrest x2, intubation  · 5/24 CT CAP-CHF with moderate basilar effusions and additional upper lung zone patchy airspace opacities likely reflecting asymmetric pulmonary edema  Infiltrate is not entirely excluded  · 5/29 Bronchoscopy for mucous plugging  · Day # 24 on ventilator: AC/PC 26/24/70%/6  · Wean Fio2/PEEP as able for SPO2>90%  · Continue pulmonary hygiene/ VAP bundle  · Chlorhexidine, PPI, HOB greater than 30 degrees   · Continue volume removal with CVVH as tolerated   · 6/10 IR L thora for 800 cc  · transudate by lights criteria  · No bacteria growth on culture and grain stain, fungal culture negative   · Surgery deemed unsafe for trach at this time given multi organ system failure/high vasopressor requirement, and tenuous respiratory status  · Would consider ETT exchange today given prolonged intubation  · Respiratory acidosis overnight, improved with aggressive suctioning/lavage of multiple mucus plugs at end of ETT    Acute renal failure (ARF) ([facility])  Assessment & Plan  · Secondary to hypoperfusion mehul cardiac arrest +/- ATN  · Baseline creat 0 8  · Creatinine peaked at 3 07  · CVVH started on 5/26, attempts at [facility] unsuccessful secondary to rapidly escalating pressor requirements  · Avoid hypotension/hypoperfusion  · Continue CRRT, -25 cc/hr, did run -75 yesterday, however patient hypotensive overnight   Increase fluid volume removal as tolerated by hemodynamics     Septic shock ([facility] [facility] )  Assessment & Plan  Peritonitis with intra-abdominal/pelvic abscess secondary to colonic perforation    · 5/22 CT chest/ab/pelvis with concern of multifocal PNA, no visualized intraabdominal abscess or anastomotic leak   · Per ID suspect multifocal pneumonitis   · OR culture 5/21 pseudomonas and bacteroides fragilis  · Blood cultures on 5/22 negative   · Completed 14 days of Flagyl on 5/24  · Completed 14 days of cefepime on 5/27  · Restarted Zosyn on 6/3 for increasing WBC and oxygen requirements - discontinued 6/10  · Vanco started 6/9 for intra-abdominal abscess culture +MRSA  · Vanco day 6 - plan for 10 day course from date of drainage (6/19)  · ID following, appreciate recommendations   · IR drain in place, continue to monitor output   · WBC up-trending at 20  · procal 1 88, continue to trend   · Hypothermia requiring chester hugger, continue to trend temps    CHF (congestive heart failure) ([facility] )  Assessment & Plan  Wt Readings from Last 3 Encounters:   06/13/22 81 9 kg (180 lb 8 9 oz)   05/11/22 62 1 kg (136 lb 14 4 oz)   03/25/22 61 2 kg (135 lb)     · 5/16: Echo-EF 65%, mild TR, mild MR  · 5/23: Repeat Echo post cardiac arrest: EF 60-65%, G1DD, mild AS (BRADY 1 5cm2), mild MR, mild TR  · Monitor I&O, Daily weights   · Limited ECHO-EF 65%, G1DD, mild AS  · Volume removal with CRRT as above     Hyperglycemia  Assessment & Plan  · A1c 5 3%  · Hyperglycemia likely stress induced  · Continue SSI - minimal requirements     Toxic metabolic encephalopathy  Assessment & Plan  · Likely in setting of acute illness/delirium and cardiac arrest  · Delirium precautions; regulate sleep/wake cycle, environmental controls, daily CAM ICU   · Trend neuro exam, no improvement would check CT head today  · Low dose propofol started overnight to aid with vent synchrony, daily sedation break to assess neuro exam       Hypotension  Assessment & Plan  · Secondary to septic shock   · Continue midodrine 10mg TID    · Levo 3mcg from max 20mcg overnight, vaso 0 04 units  · Wean vasopressors for MAP 65       Anemia  Assessment & Plan  · Hemoglobin drop post cardiac arrest    · Noted to have gross hematuria but this has since resolved  · 5/21: 1 u PRBC  · 5/24: 1 u PRBC  · CT obtained on 5/24 and negative for any overt signs of bleeding  · 5/26: 1 u PRBC   · 5/30: 1 u PRBC  · 6/4: 1 U PRBC   · 6/9: 1 U PRBC   · 6/11: 1 U PRBC   · Continue to monitor and transfuse for hgb less than 7      Paroxysmal atrial fibrillation (HCC)  Assessment & Plan  · In the setting of cardiac arrest while on 3 pressors noted to have afib with RVR  · Bolused with amio and placed on infusion  · Converted to sinus rhythm on 5/22   · Amio gtt d/c 5/23 but restarted on 6/7 due to RVR >100   · Pt now converted to  NSR, Amio gtt d/c 6/9  · Holding systemic AC with worsening thrombocytopenia     Thrombocytopenia (HCC)  Assessment & Plan  · HIT JAYLIN negative  · Argatroban d/c 6/9 and switch back to heparin gtt  · Thrombocytopenia likely in setting of bone marrow suppression from sepsis and CRRT  · Platelet count down to 25,000 overnight, helmet cells in hemolysis smear, INR normal, fibrinogen pending, pre-filter heparin stopped  · Concern for DIC  · Repeat platelet count pending this morning, transfuse for less than 20,000 or with signs of active bleeding    ----------------------------------------------------------------------------------------  HPI/24hr events: Patient with hypotension at 1800 with SBP as low as 60  Levo increased to max of 20 and vaso continued at 0 04 units  Patient given 500mL 5% albumin  CRRT run positive x 1 hour and then run even thereafter  Levo requirements now decreasing, currently at [address] [place] [address]  ABG with pH 7 13/pCO2 73 4, increased PC from 24 to 26 with slightly improved vT (high 200s/low 300s with minute ventilation 9), however repeat ABG 7 12/86  1  CXR with diffuse b/l airspace disease and loculated right effusion, similar to AM CXR   Patient lavaged and suctioned for multiple large mucus plugs by RT  vT improved to 380 and minute vent 13  Repeat ABG improved at 7 25/61  Patient started on low dose propofol to aid with vent synchrony     Patient appropriate for transfer out of the ICU today?: No  Disposition: Continue Critical Care   Code Status: Level 2 - DNAR: but accepts endotracheal intubation  ---------------------------------------------------------------------------------------  SUBJECTIVE    Review of Systems   Unable to perform ROS: Patient unresponsive     Review of systems was unable to be performed secondary to encephalopathy  ---------------------------------------------------------------------------------------  OBJECTIVE    Vitals   Vitals:    22 0259 22 0300 22 0400 22 0405   BP:  (!) 90/45  135/58   Pulse:  73 76 76   Resp:  (!) 27 (!) 32 (!) 26   Temp:  (!) 97 34 °F (36 3 °C) (!) 96 62 °F (35 9 °C) (!) 96 62 °F (35 9 °C)   TempSrc:       SpO2: 95% 94% 91% 91%   Weight:       Height:         Temp (24hrs), Av 7 °F (35 9 °C), Min:95 72 °F (35 4 °C), Max:98 06 °F (36 7 °C)  Current: Temperature: (!) 96 62 °F (35 9 °C)  Arterial Line BP: 133/36  Arterial Line MAP (mmHg): 74 mmHg    Respiratory:  SpO2: SpO2: 91 %, SpO2 Activity: SpO2 Activity: At Rest, SpO2 Device: O2 Device: Ventilator  Nasal Cannula O2 Flow Rate (L/min): 5 L/min    Invasive/non-invasive ventilation settings   Respiratory  Report   Lab Data (Last 4 hours)    None         O2/Vent Data (Last 4 hours)       0301           Vent Mode AC/PC       Resp Rate (BPM) (BPM) 26       Pressure Control (cmH2O) (cm) 24       Insp Time (sec) (sec) 0 5       FiO2 (%) (%) 70       PEEP (cmH2O) (cmH2O) 6       MV 7 89                   Physical Exam  Constitutional:       Appearance: He is ill-appearing  Interventions: He is intubated and restrained  HENT:      Head: Normocephalic and atraumatic  Eyes:      General: Lids are normal       Extraocular Movements: Extraocular movements intact  Conjunctiva/sclera: Conjunctivae normal       Comments: Sclera edema    Neck:      Trachea: Trachea normal    Cardiovascular:      Rate and Rhythm: Normal rate and regular rhythm  Pulses: Normal pulses  Radial pulses are 2+ on the right side and 2+ on the left side  Dorsalis pedis pulses are 2+ on the right side and 2+ on the left side  Heart sounds: Normal heart sounds, S1 normal and S2 normal       Comments: Anasarca   Pulmonary:      Effort: Tachypnea present  He is intubated  Comments: Course scattered rhonchi bilaterally   Abdominal:      General: Bowel sounds are decreased  Palpations: Abdomen is soft  Comments: Midline abdominal incision with wound vac, small amount of serousang drainage, right abdominal abscess drainage with moderate amount of white mucoid drainage, G tube site intact   Genitourinary:     Comments: Scrotal edema  Musculoskeletal:      Cervical back: Neck supple  Right lower le+ Edema present  Left lower le+ Edema present  Comments: No extremity movement noted   Skin:     General: Skin is warm and dry  Capillary Refill: Capillary refill takes less than 2 seconds  Comments: Weeping    Neurological:      GCS: GCS eye subscore is 1  GCS verbal subscore is 1  GCS motor subscore is 1               Laboratory and Diagnostics:  Results from last 7 days   Lab Units 22  1906 22  5783 22  0559 22  0542 06/10/22  0601 22  1157 22  0533 22  0607   WBC Thousand/uL 20 67* 19 48* 17 28* 16 88* 11 06* 12 13* 10 91* 15 34*   HEMOGLOBIN g/dL 8 0* 8 4* 8 0* 6 8* 7 7* 8 2* 6 6* 7 1*   HEMATOCRIT % 25 4* 26 8* 24 7* 20 7* 23 7* 25 9* 20 5* 22 9*   PLATELETS Thousands/uL 25* 33* 46* 44* 85* 121* 124* 123*   NEUTROS PCT %  --  62  --   --   --   --   --   --    BANDS PCT %  --   --   --  36* 18*  --   --  21*   MONOS PCT %  --  5  --   --   --   --   --   --    MONO PCT %  --   --   --  2* 2*  --   -- 7     Results from last 7 days   Lab Units 06/13/22 2359 06/13/22 1809 06/13/22  1253 06/13/22  0628 06/13/22  0007 06/12/22  1757 06/12/22  1201 06/09/22  1157 06/09/22  0533 06/09/22  0002 06/08/22  1053   SODIUM mmol/L 137 136 137 137 137 138 138   < > 135*   < >  --    POTASSIUM mmol/L 5 1 5 0 4 8 4 8 4 5 4 4 4 3   < > 3 6   < >  --    CHLORIDE mmol/L 105 103 104 104 104 105 105   < > 100   < >  --    CO2 mmol/L 27 29 28 26 27 27 27   < > 22   < >  --    ANION GAP mmol/L 5 4 5 7 6 6 6   < > 13   < >  --    BUN mg/dL 16 17 18 18 19 19 17   < > 25   < >  --    CREATININE mg/dL 0 85 0 88 0 88 0 90 0 88 0 86 0 83   < > 1 47*   < >  --    CALCIUM mg/dL 8 1* 8 0* 8 0* 8 1* 8 0* 7 9* 8 3   < > 7 7*   < >  --    GLUCOSE RANDOM mg/dL 125 194* 155* 185* 150* 157* 170*   < > 135   < >  --    ALT U/L  --   --   --   --   --   --   --   --  27  --  14   AST U/L  --   --   --   --   --   --   --   --  55*  --  29   ALK PHOS U/L  --   --   --   --   --   --   --   --  156*  --  191*   ALBUMIN g/dL  --   --   --   --   --   --   --   --  2 0*  --  2 2*   TOTAL BILIRUBIN mg/dL  --   --   --   --   --   --   --   --  1 77*  --  1 83*    < > = values in this interval not displayed  Results from last 7 days   Lab Units 06/13/22 2359 06/13/22 1809 06/13/22 1253 06/13/22 0628 06/13/22  0007 06/12/22 1757 06/12/22  1201   MAGNESIUM mg/dL 2 3 1 9 2 0 2 2 1 9 1 8 2 0   PHOSPHORUS mg/dL 3 4 3 4 3 1 3 1 2 8 2 4 2 3      Results from last 7 days   Lab Units 06/13/22  2054 06/11/22  0250 06/10/22  1917 06/10/22  0927 06/10/22  0136 06/09/22  1743 06/09/22  1157 06/09/22  1101 06/08/22  1300 06/08/22  1053   INR  1 28*  --   --   --   --   --  2 12*  --   --  1 87*   PTT seconds  --  112* 143* 210* >210* >210* 105* 85*   < > 201*    < > = values in this interval not displayed            Results from last 7 days   Lab Units 06/13/22  1906   LACTIC ACID mmol/L 0 8     ABG:  Results from last 7 days   Lab Units 06/13/22  2359   PH ART  7 243*   PCO2 ART mm Hg 62 5*   PO2 ART mm Hg 76 2   HCO3 ART mmol/L 26 4   BASE EXC ART mmol/L -1 4   ABG SOURCE  Line, Arterial     VBG:  Results from last 7 days   Lab Units 06/13/22  2359   ABG SOURCE  Line, Arterial     Results from last 7 days   Lab Units 06/12/22  0559 06/09/22  0533   PROCALCITONIN ng/ml 1 88* 3 92*       Micro  Results from last 7 days   Lab Units 06/10/22  1214 06/08/22  1555   GRAM STAIN RESULT  No Polys or Bacteria seen 2+ Gram positive cocci in pairs, chains and clusters*  No polys seen*   BODY FLUID CULTURE, STERILE  No growth 2+ Growth of Methicillin Resistant Staphylococcus aureus*       Intake and Output  I/O       06/12 0701 06/13 0700 06/13 0701 06/14 0700    I V  (mL/kg) 1435 (17 5) 1149 (14)    NG/     Feedings 1089 661    Total Intake(mL/kg) 2904 (35 5) 1810 (22 1)    Urine (mL/kg/hr) 0 (0) 0 (0)    Emesis/NG output 0 0    Drains 135 65    Other 3396 1740    Stool 375 500    Total Output 3906 2305    Net -1002 -574                Height and Weights   Height: 5' 4" (162 6 cm)  IBW (Ideal Body Weight): 59 2 kg  Body mass index is 30 99 kg/m²  Weight (last 2 days)     Date/Time Weight    06/13/22 0600 81 9 (180 56)    06/12/22 0600 81 8 (180 34)            Nutrition       Diet Orders   (From admission, onward)             Start     Ordered    06/10/22 1518  Diet Enteral/Parenteral; Tube Feeding No Oral Diet; Vital 1 5; Continuous; 50; Demond - Two Packets Daily  Diet effective now        References:    Nutrtion Support Algorithm Enteral vs  Parenteral   Question Answer Comment   Diet Type Enteral/Parenteral    Enteral/Parenteral Tube Feeding No Oral Diet    Tube Feeding Formula: Vital 1 5    Bolus/Cyclic/Continuous Continuous    Tube Feeding Goal Rate (mL/hr): 50    Demond Orange Demond - Two Packets Daily    RD to adjust diet per protocol?  Yes        06/10/22 1517    05/12/22 0906  Room Service  Once        Question:  Type of Service  Answer:  Room Service - Appropriate with Assistance    05/12/22 0905                  Active Medications  Scheduled Meds:  Current Facility-Administered Medications   Medication Dose Route Frequency Provider Last Rate    acetaminophen  650 mg Oral Q6H PRN [facility], CRNP      chlorhexidine  15 mL Mouth/Throat Q12H [facility], CRNP      HYDROmorphone  0 5 mg Intravenous Q3H PRN ISELA Ca      insulin lispro  1-6 Units Subcutaneous Q6H [facility] ISELA [place]      iohexol  50 mL Oral Once in imaging [facility], CROLIVER      ipratropium-albuterol  3 mL Nebulization Q6H Cherri Spironello V, MACARIONP      levothyroxine  112 mcg Per NG Tube Early Morning [facility], CRNP      midodrine  10 mg Oral TID AC [address]      norepinephrine  1-30 mcg/min Intravenous Titrated Cherri Spironello V, CRNP 3 mcg/min (06/13/22 2318)    NxStage K 4/Ca 3  20,000 mL Dialysis Continuous Bianca Degroot PA-C      ondansetron  4 mg Intravenous Q6H PRN [place], CRNP      oxyCODONE  5 mg Per NG Tube Q4H PRN Leretha Ridgel, CRNP      Or    oxyCODONE  7 5 mg Per NG Tube Q4H PRN Leretha Ridgel, CRNP      pantoprazole  40 mg Intravenous Q24H [facility], CRNP      polyethylene glycol  17 g Oral Daily ISELA [place]      potassium-sodium phosphates  1 packet Per G Tube Q8H Arlette Andrade MD      propofol  5-50 mcg/kg/min Intravenous Titrated Cherri Spironello V, CRNP 5 mcg/kg/min (06/13/22 2202)    vancomycin  750 mg Intravenous Q12H Hanna Payne PA-C Stopped (06/14/22 0501)    vasopressin (PITRESSIN) in 0 9 % sodium chloride 100 mL  0 04 Units/min Intravenous Continuous Bianca Degroot PA-C 0 04 Units/min (06/14/22 0312)     Continuous Infusions:  norepinephrine, 1-30 mcg/min, Last Rate: 3 mcg/min (06/13/22 2318)  NxStage K 4/Ca 3, 20,000 mL  propofol, 5-50 mcg/kg/min, Last Rate: 5 mcg/kg/min (06/13/22 1661)  vasopressin (PITRESSIN) in 0 9 % sodium chloride 100 mL, 0 04 Units/min, Last Rate: 0 04 Units/min (06/14/22 0312)      PRN Meds:   acetaminophen, 650 mg, Q6H PRN  HYDROmorphone, 0 5 mg, Q3H PRN  iohexol, 50 mL, Once in imaging  ondansetron, 4 mg, Q6H PRN  oxyCODONE, 5 mg, Q4H PRN   Or  oxyCODONE, 7 5 mg, Q4H PRN        Invasive Devices Review  Invasive Devices  Report    Peripherally Inserted Central Catheter Line  Duration           PICC Line 85/12/26 Right Basilic 26 days          Arterial Line  Duration           Arterial Line 05/30/22 Radial 14 days          Hemodialysis Catheter  Duration           HD Temporary Double Catheter 18 days          Drain  Duration           Colostomy LLQ 34 days    Gastrostomy/Enterostomy Percutaneous Interventional Gastrostomy 16 Fr  LUQ 7 days    Abscess Drain Buttock 5 days          Airway  Duration           ETT  Oral 23 days                Rationale for remaining devices: HD cath for treatment therapy  ---------------------------------------------------------------------------------------  Advance Directive and Living Will:      Power of :    POLST:    ---------------------------------------------------------------------------------------  Care Time Delivered:   No Critical Care time spent       ISELA Bae      Portions of the record may have been created with voice recognition software  Occasional wrong word or "sound a like" substitutions may have occurred due to the inherent limitations of voice recognition software    Read the chart carefully and recognize, using context, where substitutions have occurred

## 2022-06-14 NOTE — ASSESSMENT & PLAN NOTE
Wt Readings from Last 3 Encounters:   06/13/22 81 9 kg (180 lb 8 9 oz)   05/11/22 62 1 kg (136 lb 14 4 oz)   03/25/22 61 2 kg (135 lb)     · 5/16: Echo-EF 65%, mild TR, mild MR  · 5/23: Repeat Echo post cardiac arrest: EF 60-65%, G1DD, mild AS (BRADY 1 5cm2), mild MR, mild TR  · Monitor I&O, Daily weights   · Limited ECHO-EF 65%, G1DD, mild AS  · Volume removal with CRRT as above

## 2022-06-14 NOTE — ASSESSMENT & PLAN NOTE
· HIT JAYLIN negative  · Argatroban d/c 6/9 and switch back to heparin gtt  · Thrombocytopenia likely in setting of bone marrow suppression from sepsis and CRRT  · Platelet count down to 25,000 overnight, helmet cells in hemolysis smear, INR normal, fibrinogen pending, pre-filter heparin stopped  · Concern for DIC  · Repeat platelet count pending this morning, transfuse for less than 20,000 or with signs of active bleeding

## 2022-06-14 NOTE — PROGRESS NOTES
Progress Note - Infectious Disease   Fady Hudson 80 y o  male MRN: [id number]  Unit/Bed#: ICU 02 Encounter: [id number]      Impression/Plan:  1  s/p cardiac arrest 5/21/22  Patient intubated during RR  In ICU on ventilator assistance  Recurrent SIRS possibly reactive to arrest with fever, tachycardia, tachypnea, and increased leukocytosis post arrest  Possible recurrent aspiration event s/p arrest  Fever down trended   Patient continued to have hypotension episodes that appear to be related to volume status, patient back on vasopressor at moment  -continue supportive measures per critical care team  -ventilator management per critical care team     2  SIRS, evolving on admission, post #1 and with persistent hypotension support with dialysis   Evidenced by tachycardia, tachypnea, leukocytosis, hypotension   Suspect possible aspiration and/or volume overload   MRSA screen negative  WBC fluctuating  5/22/22 Blood cultures have no growth   Patient completed 2 week antibiotic course 5/27/22 06/03/2022 blood cultures x2 negative at 5 days  Patient on dual vasopressors   -continue vancomycin as below  -monitor temperature and hemodynamics  -serial exam  -monitor CBC and BMP   -pressor support per Critical Medicine Service     3  Peritonitis s/p Bowel perforation  s/p 5/11/22 exploratory laparotomy, low anterior resection, protective loop transverse colostomy and segmental small bowel resection secondary to perforation rectosigmoid junction after colonoscopy   OR cultures grew Pseudomonas aeruginosa and Bacteroides fragilis   Patient completed antibiotic course 5/27/22   IR PEG tube placed 06/06/2022 06/07/2022 CT chest abdomen pelvis:  Right kush pelvis indeterminant fluid collection slightly larger in size from CT scan from 05/24/2022 06/08/2022 Patient status post IR right pelvic abscess drainage tube placement   35 mL of brown fluid removed growing MRSA   6/12/22 vancomycin level 11 8  -continue vancomycin  -plan to treat 10 days total through 6/19/22  -pharmacy on consult for vancomycin trough dosing management  -VAC/wound care per surgery  -follow up Peg function  -IR drainage tube management     4   Acute hypoxic respiratory failure with hypoxia   Suspicious for aspiration pneumonitis over pneumonia     6/6/22 CT C/A/P Bilateral pleural effusions, worsening pulmonary parenchymal airspace opacities consistent with developing fibrosis  Patient remains intubated s/p #1  5/16/22 Procalcitonin level  2 60 > 5/21 0 753 < 5/23/22 2 97 > 5/30 1 26  Patient is status post bronchoscopy 5/29/22 for RUL collapse  5/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  Status post thoracentesis 06/10/2022  Pleural effusion no growth  -ventilator management per critical care team      5  Aspiration pneumonitis versus pneumonia in setting of #1/3   5/22/22 CT C/A/P predominantly UL groundglass and consolidations, bilateral pleural effusions, SB mild dilation unchanged  Patient now intubated s/p #1  5/16/22 Procalcitonin level  2 60 > 5/21 0 753 < 5/23/22 2 97 > 5/30 1 26  5/17/22 sputum specimen oral pharyngeal contamination    06/06/2022 portable chest x-ray with bilateral pleural effusions  Status post thoracentesis 06/10/2022  Pleural effusion no growth  -continue antibiotics for as above  -secretion and pulmonary toilet management per critical care team   -monitor respiratory symptoms  -monitor vent requirements     6  MARYELLEN on CKD 3   Creatinine 2 1 CVVH on   -renal dose adjust medications as needed  -volume management per Nephrology  -CVVH management per Nephrology  -monitor creatinine and urinary output     Antibiotics:  Vancomycin D5     Above impression and plan discussed in detail with patient's RN and critical care team      Subjective:  Patient no fever, chills, sweats reported on ventilator in ICU s/p cardiac arrest 5/21/22; no nausea, vomiting, diarrhea reported  CVVH started 5/26/22 and restarted 6/8/22 after short intermittent HD attempt due to BP instability  patient's blood pressure  vasopressor support again  Patient status post IR right pelvic abscess drainage tube placement   35 mL of brown fluid removed initially and now tube left in place to drain  Objective:  Vitals:  Temp:  [95 72 °F (35 4 °C)-98 06 °F (36 7 °C)] 96 98 °F (36 1 °C)  HR:  [70-94] 75  Resp:  [17-41] 29  BP: ()/(45-97) 123/60  SpO2:  [90 %-100 %] 97 %  Temp (24hrs), Av 1 °F (36 2 °C), Min:95 72 °F (35 4 °C), Max:98 06 °F (36 7 °C)  Current: Temperature: (!) 96 98 °F (36 1 °C)    Physical Exam:   General Appearance:  80year old acute on chronically debilitated elderly male, propped resting in ICU bed on ventilator, under warming blanket on dual vasopressors   Throat: Oropharynx moist without lesions  ET tube to ventilator   Lungs:   Ventilated breath sounds fairly clear to auscultation bilaterally; scant secretions   Heart:  RRR   Abdomen:   Soft, no focal tenderness, protuberant, would vac appliance to irrigation canister in place, no erythema of wound edges, LLQ ostomy intact, peg tube good position, positive bowel sounds, Right side FAYE in place  Extremities: Generalized puffy edema, venodynes, prevalon boots in place   : Farnsworth out, 4+ scrotal edema, no SPT   Skin: No new rashes or lesions  Right arm PICC and R IJ CVP lines intact, CVVH running vis CVP line         Labs, Imaging, & Other studies:   All pertinent labs and imaging studies were personally reviewed  Results from last 7 days   Lab Units 22  0604 22  1906 22  0628   WBC Thousand/uL 17 23* 20 67* 19 48*   HEMOGLOBIN g/dL 7 7* 8 0* 8 4*   PLATELETS Thousands/uL 26* 25* 33*     Results from last 7 days   Lab Units 22  1155 22  0604 22  2359 22  1157 22  0533 22  0002 22  1053   SODIUM mmol/L 136 137 137   < > 135*   < >  --    POTASSIUM mmol/L 5 2 5 1 5 1   < > 3 6   < >  --    CHLORIDE mmol/L 104 104 105   < > 100   < >  --    CO2 mmol/L 28 27 27   < > 22   < >  --    BUN mg/dL 14 17 16   < > 25   < >  --    CREATININE mg/dL 0 91 0 89 0 85   < > 1 47*   < >  --    EGFR ml/min/1 73sq m 73 74 76   < > 41   < >  --    CALCIUM mg/dL 8 2* 8 1* 8 1*   < > 7 7*   < >  --    AST U/L  --   --   --   --  55*  --  29   ALT U/L  --   --   --   --  27  --  14   ALK PHOS U/L  --   --   --   --  156*  --  191*    < > = values in this interval not displayed       Results from last 7 days   Lab Units 06/10/22  1214 06/08/22  1555   GRAM STAIN RESULT  No Polys or Bacteria seen 2+ Gram positive cocci in pairs, chains and clusters*  No polys seen*   BODY FLUID CULTURE, STERILE  No growth 2+ Growth of Methicillin Resistant Staphylococcus aureus*     Results from last 7 days   Lab Units 06/14/22  0604 06/12/22  0559 06/09/22  0533   PROCALCITONIN ng/ml 0 92* 1 88* 3 92*

## 2022-06-14 NOTE — ASSESSMENT & PLAN NOTE
Peritonitis with intra-abdominal/pelvic abscess secondary to colonic perforation    · 5/22 CT chest/ab/pelvis with concern of multifocal PNA, no visualized intraabdominal abscess or anastomotic leak   · Per ID suspect multifocal pneumonitis   · OR culture 5/21 pseudomonas and bacteroides fragilis  · Blood cultures on 5/22 negative   · Completed 14 days of Flagyl on 5/24  · Completed 14 days of cefepime on 5/27  · Restarted Zosyn on 6/3 for increasing WBC and oxygen requirements - discontinued 6/10  · Vanco started 6/9 for intra-abdominal abscess culture +MRSA  · Vanco day 6 - plan for 10 day course from date of drainage (6/19)  · ID following, appreciate recommendations   · IR drain in place, continue to monitor output   · WBC up-trending at 20  · procal 1 88, continue to trend   · Hypothermia requiring chester hugger, continue to trend temps

## 2022-06-14 NOTE — ASSESSMENT & PLAN NOTE
· Likely in setting of acute illness/delirium and cardiac arrest  · Delirium precautions; regulate sleep/wake cycle, environmental controls, daily CAM ICU   · Trend neuro exam, no improvement would check CT head today  · Low dose propofol started overnight to aid with vent synchrony, daily sedation break to assess neuro exam

## 2022-06-14 NOTE — PROGRESS NOTES
Patient with hypotension at 1800 with SBP as low as 60  Levo increased to max of 20 and vaso continued at 0 04 units  Patient given 500mL 5% albumin  CRRT run positive x 1 hour and then run even thereafter  Levo requirements now decreasing, currently at 13mcg  ABG with pH 7 13/pCO2 73 4, increased PC from 24 to 26 with slightly improved vT (high 200s/low 300s with minute ventilation 9), however repeat ABG 7 12/86  1  Plateau pressure 22  CXR with diffuse b/l airspace disease and loculated right effusion, similar to AM CXR  Patient lavaged and suctioned for multiple large mucus plugs by RT  vT improved to 380 and minute vent 13  Will repeat ABG at 0000  Patient's son  Dustin Samuels was at bedside at the start of the evening and he's was updated on patient's increased vasopressor requirements and respiratory acidosis       Critical care time: 30 minutes

## 2022-06-14 NOTE — ASSESSMENT & PLAN NOTE
· Outpatient EGD and colonoscopy at [facility] on 5/11 for w/u of chronic anemia, history of colon polyps, intermittent LLQ abdominal pain and possible intermittent intussusception  · EGD unremarkable, colonoscopy technically difficult and required change from adult scope to pediatric scope, during traversal of the sigmoid colon there was a kink and patient sustained a perforation  · Transferred to [facility] for emergent surgery   · Emergent ex- lap on 5/11 > low anterior resection, protective loop transverse colostomy, flex sigmoidoscopy, and segmental small bowel resection  · Difficult post op course with post op ileus requiring NGT decompression and requirement of short duration of TPN   · 5/22: CT: Diffuse mild dilation of small bowel segments identified without transition point  · 5/24: CT CAP- Distended small bowel loops with scattered air-fluid levels consistent with early/partial small bowel obstruction with transition at the small bowel anastomosis in the region of the ventral pelvis as above  No free air  Cholelithiasis without cholecystitis  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  · 5/24: Stomal prolapse and small peristomal hernia now at the transverse loop colostomy; continues to be reduced by surgery  · TPN d/c'd on 5/26; tolerating tube feeds at goal  · 5/26: Abdominal wound vac placed bedside: continue with dressing changes Monday/Thursday   · Surgery continues to follow    · 6/6: Gastrostomy tube inserted for nutrition by IR   · 6/6: CT Chest Abdomen Pelvis: Indeterminant fluid collection in the right hemipelvis which appears to be loculated and possibly "walled off" from the remainder of abdominopelvic ascites   could represent an infected abscess, but is not significantly changed in size from May 24  · 6/8: IR abscess drainage and drain placement-30mL of pus drained +MRSA  · Continue to monitor output character and quantity

## 2022-06-14 NOTE — CASE MANAGEMENT
Case Management Progress Note    Patient name Eliane Gonzalez  Location ICU 02/ICU 02 MRN [id number]  : 2/15/1931 Date 2022       LOS (days): 34  Geometric Mean LOS (GMLOS) (days): 6 80  Days to GMLOS:-27 1        OBJECTIVE:        Current admission status: Inpatient  Preferred Pharmacy:   [id number] [address] [facility]  [address]  [address]  Phone: [phone] Fax: [phone]    [id number] [place], [address],   [address]  [facility] [address]  Phone: [phone] Fax: [phone]    Primary Care Provider: Kee Paul MD    Primary Insurance: MEDICARE  Secondary Insurance: AARP    PROGRESS NOTE:  Weekly Care Management Length of Stay Review     Current LOS: 34    Most Recent Labs:     Lab Results   Component Value Date/Time    WBC 17 23 (H) 2022 06:04 AM    HGB 7 7 (L) 2022 06:04 AM    HCT 25 3 (L) 2022 06:04 AM    PLT 26 (LL) 2022 06:04 AM    BANDSPCT 10 (H) 2022 06:04 AM    SODIUM 136 2022 11:55 AM    K 5 2 2022 11:55 AM     2022 11:55 AM    CO2 28 2022 11:55 AM    BUN 14 2022 11:55 AM    CREATININE 0 91 2022 11:55 AM    GLUC 143 (H) 2022 11:55 AM    INR 1 28 (H) 2022 08:54 PM       Most Recent Vitals:   Vitals:    22 1305   BP:    Pulse: 70   Resp: (!) 24   Temp:    SpO2: 97%        Identified Barriers to DC/Discharge Goals/ CM Interventions: Care team meeting 6/10  Plan for possible ethics meeting to discuss [address]  Family has not bene able to make a decision to withdrawl care       Intended Discharge Disposition: TBD    Expected Discharge Date: TBD

## 2022-06-14 NOTE — ASSESSMENT & PLAN NOTE
· Secondary to hypoperfusion mehul cardiac arrest +/- ATN  · Baseline creat 0 8  · Creatinine peaked at 3 07  · CVVH started on 5/26, attempts at [facility] unsuccessful secondary to rapidly escalating pressor requirements  · Avoid hypotension/hypoperfusion  · Continue CRRT, -25 cc/hr, did run -75 yesterday, however patient hypotensive overnight   Increase fluid volume removal as tolerated by hemodynamics

## 2022-06-14 NOTE — OCCUPATIONAL THERAPY NOTE
Occupational Therapy Cancel Note       06/14/22 1142   Note Type   Note Type Cancelled Session   Cancel Reasons Medical status  (Cancelled by RN)   Plan   OT Frequency 1-3B/YT   Licensure   [address] [place] [address] License Number  Vel Baron OTR/L [id number]

## 2022-06-14 NOTE — PROGRESS NOTES
Vancomycin Assessment    Fady Ayoub is a 80 y o  male who is currently receiving vancomycin 1250mg IV q24h for MRSA suspected     Relevant clinical data and objective history reviewed:  Creatinine   Date Value Ref Range Status   06/14/2022 0 91 0 60 - 1 30 mg/dL Final     Comment:     Standardized to IDMS reference method   06/14/2022 0 89 0 60 - 1 30 mg/dL Final     Comment:     Standardized to IDMS reference method   06/13/2022 0 85 0 60 - 1 30 mg/dL Final     Comment:     Standardized to IDMS reference method     BP (!) 109/49   Pulse 69   Temp (!) 97 16 °F (36 2 °C)   Resp (!) 26   Ht 5' 4" (1 626 m)   Wt 80 4 kg (177 lb 4 oz)   SpO2 94%   BMI 30 42 kg/m²   I/O last 3 completed shifts: In: 200 [I V :2516; NG/GT:180; Feedings:1377]  Out: 5563 [Drains:235; AHKNW:4877; Stool:725]  Lab Results   Component Value Date/Time    BUN 14 06/14/2022 11:55 AM    WBC 17 23 (H) 06/14/2022 06:04 AM    HGB 7 7 (L) 06/14/2022 06:04 AM    HCT 25 3 (L) 06/14/2022 06:04 AM     (H) 06/14/2022 06:04 AM    PLT 26 (LL) 06/14/2022 06:04 AM     Temp Readings from Last 3 Encounters:   06/14/22 (!) 97 16 °F (36 2 °C)   05/11/22 (!) 97 1 °F (36 2 °C) (Temporal)   01/14/22 97 9 °F (36 6 °C) (Temporal)     Vancomycin Days of Therapy: 6    Assessment/Plan  The patient is currently on vancomycin utilizing scheduled dosing  Baseline risks associated with therapy include: pre-existing renal impairment, concomitant nephrotoxic medications, and advanced age  The patient is receiving 750mg IV q12h with the most recent vancomycin level being not at steady-state and supratherapeutic based on a goal of 15-20 (appropriate for most indications) ; therefore, after clinical evaluation will be changed to 1250 mg IV q24h (based on 20mg/kg adjusted body weight)   Pharmacy will continue to follow closely for s/sx of nephrotoxicity, infusion reactions, and appropriateness of therapy  BMP and CBC will be ordered per protocol    Plan for trough as patient approaches steady state, prior to the 4th  dose at approximately 2000 on 6/18/22 Pharmacy will continue to follow the patients culture results and clinical progress daily      Laura Stokes, Pharmacist

## 2022-06-14 NOTE — ESCALATED TEAM TX
Team Meeting Note    Patient name Elan Weaver  Location ICU 02/ICU 02 MRN [id number]  : 2/15/1931 Date 2022       Team Meeting  Meeting Type: Tx Team Meeting  Initial Conference Date: 22    Team Members Present  Team Members Present: Physician, , Other (Discipline and Name), Nurse  Physician Team Member: Dr Klaudia Estrada Team Member: Joceline Rodríguez RN  Social Work Team Member: DEVONTE Khan  Other (Discipline and Name): ISELA Rico    Patient/Family Present  Patient Present: No  Patient's Family Present: Yes  Family Relationship: Child  Child: Carlos Alberto Fuentes and Ulisses Jean        Follow-up meeting held with family today  Patient's condition continues to worsen  Sons in agreement to not escalate care any further and to transition to comfort care  They will discuss with their family on arranging final visits and notify unit staff of family's wishes  Family stated that pt was not conventionally [employer] but was spiritual  They would be interested in a visit from [facility]  BONI reached out to DRISS  He will be back at the campus tomorrow afternoon

## 2022-06-14 NOTE — PROGRESS NOTES
NEPHROLOGY PROGRESS NOTE   Jaylin Unger 80 y o  male MRN: [id number]  Unit/Bed#: ICU 02 Encounter: [id number]    ASSESSMENT & PLAN:  80year old with HTN, aortic stenosis, GERD, CAD who underwent EGD and C-scope on 3/44/56 which was complicated by perforation requiring emergent ex-lap  Since then, course has been complicated by PEA arrest on 5/21/22, VDRF, pneumonia, sepsis  Renal consulted for MARYELLEN  Acute kidney injury   -Baseline creatinine:0 7-0 9mg/dl  -Admission creatinine:0 78 mg/dl  - Work up:   · UA with microscopy:4-10 rbc/hpf and wbc  · Imaging:  CT chest abdomen pelvis from 05/06 with no hydronephrosis  Finding of cortical cyst right kidney 2 cm size  -Etiology:  Acute kidney injury likely due to ATN in the setting of hypotension and cardiac arrest   Peak creatinine 3 07 on May 26  [facility] Course:  HD dependent since 05/26  Did not tolerate intermittent HD on 06/07 and so restarted on CVVHD on 06/08 and since then has been on CVVHD  -Plan:   · Patient seen and examined on CVVHD today  Single visit  Currently running at 50 mL/hour net negative and still on vasopressors  Last night had higher vasopressor requirement and was hypotensive and for couple hours was running even and then gradually increased 50 mL/hour today a m     If tolerated would recommend increasing to 75 mL/hour net negative for more ultrafiltration as clinically fluid overloaded  Recommend continuing vasopressors per ICU team   · May consider iv albumin if BP drops again despite vasopressors as serum albumin was only around 2 0     · Continue 4 K bath, blood flow 250 mL per minute and dialysis flow 1500 mL/hour  · Monitor electrolytes and replace as needed  · Avoid nephrotoxins and dose all medications per CVVHD dosing  · Avoid hypotension                                              BP/hypotension  -still on 2 pressors-Levophed as well as vasopressin, continue vasopressor per ICU team and adjust dose as needed   -continue midodrine    Fluid overload  -continue ultrafiltration to 75 mL/hour net negative on CVVHD  If blood pressure allows and tolerated, may need to increase further  -echocardiogram from 05/31 showed ejection fraction 65% with grade 1 diastolic dysfunction    Hypophosphatemia on oral phos NaK through G-tube  Currently at normal range, continue to monitor  Phosphorus wnl    Anemia, thrombocytopenia  -monitor hemoglobin per ICU team and replace as needed if hemoglobin drops below 7  -status post PRBC this hospital admission  Hemoglobin currently stable at 8 4 g per dL  Platelet count low around 25-33    Ventilator dependent respiratory failure currently on FiO2 of 70%  Management per ICU    PEA cardiac arrest on 05/21/2022    Pneumoperitoneum status colon perforation status post colonoscopy , now post exploratory laparotomy with low anterior resection on 05/11/2022  Patient developed peritonitis/sepsis/intra-abdominal/pelvic abscess and was treated with antibiotics and currently on vancomycin  Also status post IR abscess drainage and drain placement on 06/08  Discussed with the RN and the AP  SUBJECTIVE:  No new complaints  No chest pain or SOB  Still on vasopressors, intubated, FiO2 of 70%  Was seen and examined on CVVHD    OBJECTIVE:  Current Weight: Weight - Scale: 80 4 kg (177 lb 4 oz)  Vitals:    06/14/22 0745   BP: 98/50   Pulse: 71   Resp: (!) 29   Temp: (!) 97 16 °F (36 2 °C)   SpO2: 95%       Intake/Output Summary (Last 24 hours) at 6/14/2022 8635  Last data filed at 6/14/2022 0800  Gross per 24 hour   Intake 2547 ml   Output 3424 ml   Net -877 ml       Physical Exam   General:  Ill looking, intubated   Head: normocephalic, atraumatic  Eyes: Conjunctivae pink,  Sclera anicteric  ENT: Intubated  Neck: supple   Chest:  Bilateral basal fine crackles  CVS: S1 & S2 present, normal rate, regular rhythm, no murmur  Abdomen: soft, non-tender, non-distended, Bowel sounds normoactive    Colostomy present  Extremities:  1+ pitting edema both lower and upper extremity  Neuro: Sedated, intubated  FiO2 of 65%  Skin: no rash, warm and dry  Psych: sedated  Unable to assess           Medications:    Current Facility-Administered Medications:     acetaminophen (TYLENOL) oral suspension 650 mg, 650 mg, Oral, Q6H PRN, ISELA Warren, 650 mg at 06/05/22 1512    chlorhexidine (PERIDEX) 0 12 % oral rinse 15 mL, 15 mL, Mouth/Throat, Q12H [address], ISELA Warren, 15 mL at 06/13/22 2051    HYDROmorphone (DILAUDID) injection 0 5 mg, 0 5 mg, Intravenous, Q3H PRN, [employer], ISELA, 0 5 mg at 06/07/22 0113    insulin lispro (HumaLOG) 100 units/mL subcutaneous injection 1-6 Units, 1-6 Units, Subcutaneous, Q6H [address], 1 Units at 06/14/22 0613 **AND** Fingerstick Glucose (POCT), , , Q6H, ISELA Ritter    iohexol (OMNIPAQUE) 240 MG/ML solution 50 mL, 50 mL, Oral, Once in imaging, ISELA Warren    ipratropium-albuterol (DUO-NEB) 0 5-2 5 mg/3 mL inhalation solution 3 mL, 3 mL, Nebulization, Q6H, Cherri Spironello V, CRNP, 3 mL at 06/14/22 0727    levothyroxine tablet 112 mcg, 112 mcg, Per NG Tube, Early Morning, ISELA Warren, 112 mcg at 06/14/22 5954    midodrine (PROAMATINE) tablet 10 mg, 10 mg, Oral, TID AC, Kourtney Odell, CRNP, 10 mg at 06/14/22 8322    norepinephrine (LEVOPHED) 8 mg (DOUBLE CONCENTRATION) IV in sodium chloride 0 9% 250 mL, 1-30 mcg/min, Intravenous, Titrated, Cherri Spironello V, CRNP, Last Rate: 5 6 mL/hr at 06/13/22 2318, 3 mcg/min at 06/13/22 2318    NxStage K 4/Ca 3 dialysis solution (RFP-401) 20,000 mL, 20,000 mL, Dialysis, Continuous, Odilon Vazquez PA-C, 20,000 mL at 06/14/22 0557    ondansetron (ZOFRAN) injection 4 mg, 4 mg, Intravenous, Q6H PRN, ISELA Warren    oxyCODONE (ROXICODONE) oral solution 5 mg, 5 mg, Per NG Tube, Q4H PRN, 5 mg at 06/12/22 0125 **OR** oxyCODONE (ROXICODONE) oral solution 7 5 mg, 7 5 mg, Per NG Tube, Q4H PRN, Scooby Campbell, ISELA    pantoprazole (PROTONIX) injection 40 mg, 40 mg, Intravenous, Q24H [address], ISELA Mason, 40 mg at 06/13/22 0843    polyethylene glycol (MIRALAX) packet 17 g, 17 g, Oral, Daily, Josephine FabianISELA Dixon, 17 g at 06/13/22 0843    potassium-sodium phosphates (PHOS-NAK) packet 1 packet, 1 packet, Per G Tube, Q8H, Artie Meza MD, 1 packet at 06/14/22 0019    propofol (DIPRIVAN) 1000 mg in 100 mL infusion (premix), 5-50 mcg/kg/min, Intravenous, Titrated, ISELA Lawrence, Last Rate: 7 4 mL/hr at 06/13/22 2212, 15 mcg/kg/min at 06/13/22 2212    vancomycin (VANCOCIN) IVPB (premix in dextrose) 750 mg 150 mL, 750 mg, Intravenous, Q12H, Steve Garcia PA-C, Stopped at 06/14/22 0501    vasopressin (PITRESSIN) 20 Units in sodium chloride 0 9 % 100 mL infusion, 0 04 Units/min, Intravenous, Continuous, Rui Panchal PA-C, Last Rate: 12 mL/hr at 06/14/22 0312, 0 04 Units/min at 06/14/22 0312    Invasive Devices:        Lab Results:   Results from last 7 days   Lab Units 06/14/22  0604 06/13/22  2359 06/13/22  1906 06/13/22  1809 06/13/22  1253 06/13/22  0628 06/12/22  1201 06/12/22  0559 06/09/22  1157 06/09/22  0533 06/09/22  0002 06/08/22  1053   WBC Thousand/uL  --   --  20 67*  --   --  19 48*  --  17 28*   < > 10 91*  --   --    HEMOGLOBIN g/dL  --   --  8 0*  --   --  8 4*  --  8 0*   < > 6 6*  --   --    HEMATOCRIT %  --   --  25 4*  --   --  26 8*  --  24 7*   < > 20 5*  --   --    PLATELETS Thousands/uL  --   --  25*  --   --  33*  --  46*   < > 124*  --   --    POTASSIUM mmol/L 5 1 5 1  --  5 0   < > 4 8   < > 4 1   < > 3 6   < >  --    CHLORIDE mmol/L 104 105  --  103   < > 104   < > 106   < > 100   < >  --    CO2 mmol/L 27 27  --  29   < > 26   < > 27   < > 22   < >  --    BUN mg/dL 17 16  --  17   < > 18   < > 13   < > 25   < >  --    CREATININE mg/dL 0 89 0 85  --  0 88   < > 0 90   < > 0 80   < > 1 47*   < >  --    CALCIUM mg/dL 8 1* 8 1*  --  8 0*   < > 8 1*   < > 8 0*   < > 7 7*   < >  --    MAGNESIUM mg/dL 2 0 2 3  --  1 9   < > 2 2   < > 2 0   < > 2 0   < >  --    PHOSPHORUS mg/dL 3 3 3 4  --  3 4   < > 3 1   < > 2 0*   < > 3 2   < >  --    ALK PHOS U/L  --   --   --   --   --   --   --   --   --  156*  --  191*   ALT U/L  --   --   --   --   --   --   --   --   --  27  --  14   AST U/L  --   --   --   --   --   --   --   --   --  55*  --  29    < > = values in this interval not displayed  Previous work up:   Chest x-ray from 06/13 showed unchanged bilateral extensive drip and primary disease  Bilateral partially loculated pleural effusion      Portions of the record may have been created with voice recognition software  Occasional wrong word or "sound a like" substitutions may have occurred due to the inherent limitations of voice recognition software  Read the chart carefully and recognize, using context, where substitutions have occurred  If you have any questions, please contact the dictating provider

## 2022-06-14 NOTE — ASSESSMENT & PLAN NOTE
· Patient previously in ICU for hypoxia with a max of 15L midflow and concern for aspiration pneumonitis  · Transfered to general medical floor 5/21  · 5/21: PEA arrest x2, intubation  · 5/24 CT CAP-CHF with moderate basilar effusions and additional upper lung zone patchy airspace opacities likely reflecting asymmetric pulmonary edema  Infiltrate is not entirely excluded     · 5/29 Bronchoscopy for mucous plugging  · Day # 24 on ventilator: AC/PC 26/24/70%/6  · Wean Fio2/PEEP as able for SPO2>90%  · Continue pulmonary hygiene/ VAP bundle  · Chlorhexidine, PPI, HOB greater than 30 degrees   · Continue volume removal with CVVH as tolerated   · 6/10 IR L thora for 800 cc  · transudate by lights criteria  · No bacteria growth on culture and grain stain, fungal culture negative   · Surgery deemed unsafe for trach at this time given multi organ system failure/high vasopressor requirement, and tenuous respiratory status  · Would consider ETT exchange today given prolonged intubation  · Respiratory acidosis overnight, improved with aggressive suctioning/lavage of multiple mucus plugs at end of ETT

## 2022-06-14 NOTE — PHYSICAL THERAPY NOTE
PT cancellation   06/14/22 1110   PT Last Visit   PT Visit Date 06/14/22   Note Type   Note Type Cancelled Session   Cancel Reasons   (per RN, pt is not appropriate for PT today  Will follow )   Licensure   [place] License Number  Rivka Larsen PT  [id number]

## 2022-06-14 NOTE — ASSESSMENT & PLAN NOTE
· Secondary to septic shock   · Continue midodrine 10mg TID    · Levo 3mcg from max 20mcg overnight, vaso 0 04 units  · Wean vasopressors for MAP 65

## 2022-06-15 NOTE — ASSESSMENT & PLAN NOTE
· Likely in setting of acute illness/delirium, end stage multi organ system failure  · Delirium precautions; regulate sleep/wake cycle, environmental controls, daily CAM ICU   · Trend neuro exam

## 2022-06-15 NOTE — PROGRESS NOTES
Progress Note - Infectious Disease   Fady Hudson 80 y o  male MRN: [id number]  Unit/Bed#: ICU 02 Encounter: [id number]      Impression/Plan:  1  s/p cardiac arrest 5/21/22  Patient intubated during RR  In ICU on ventilator assistance  Recurrent SIRS possibly reactive to arrest with fever, tachycardia, tachypnea, and increased leukocytosis post arrest  Possible recurrent aspiration event s/p arrest  Fever down trended   Patient continued to have hypotension episodes that appear to be related to volume status, patient back on vasopressor at moment  -continue supportive measures per critical care team  -ventilator management per critical care team     2  SIRS, evolving on admission, post #1 and with persistent hypotension support with dialysis   Evidenced by tachycardia, tachypnea, leukocytosis, hypotension   Suspect possible aspiration and/or volume overload   MRSA screen negative  [address]  5/22/22 Blood cultures have no growth   Patient completed 2 week antibiotic course 5/27/22 06/03/2022 blood cultures x2 negative at 5 days  Patient on dual vasopressors  PLTs 37K  -continue vancomycin as below  -monitor temperature and hemodynamics  -serial exam  -monitor CBC and BMP   -pressor support per Critical Medicine Service     3  Peritonitis s/p Bowel perforation  s/p 5/11/22 exploratory laparotomy, low anterior resection, protective loop transverse colostomy and segmental small bowel resection secondary to perforation rectosigmoid junction after colonoscopy   OR cultures grew Pseudomonas aeruginosa and Bacteroides fragilis   Patient completed antibiotic course 5/27/22   IR PEG tube placed 06/06/2022 06/07/2022 CT chest abdomen pelvis:  Right kush pelvis indeterminant fluid collection slightly larger in size from CT scan from 05/24/2022 06/08/2022 Patient status post IR right pelvic abscess drainage tube placement   35 mL of brown fluid removed growing MRSA   6/14/22 vancomycin level 25  -continue vancomycin  -plan to treat 10 days total through 6/19/22  -pharmacy on consult for vancomycin trough dosing management  -VAC/wound care per surgery  -follow up Peg function  -IR drainage tube management     4   Acute hypoxic respiratory failure with hypoxia   Suspicious for aspiration pneumonitis over pneumonia     6/6/22 CT C/A/P Bilateral pleural effusions, worsening pulmonary parenchymal airspace opacities consistent with developing fibrosis  Patient remains intubated s/p #1  5/16/22 Procalcitonin level  2 60 > 5/21 0 753 < 5/23/22 2 97 > 5/30 1 26  Patient is status post bronchoscopy 5/29/22 for RUL collapse  5/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  Status post thoracentesis 06/10/2022  Pleural effusion no growth  -ventilator management per critical care team      5  Aspiration pneumonitis versus pneumonia in setting of #1/3   5/22/22 CT C/A/P predominantly UL groundglass and consolidations, bilateral pleural effusions, SB mild dilation unchanged  Patient now intubated s/p #1  5/16/22 Procalcitonin level  2 60 > 5/21 0 753 < 5/23/22 2 97 > 5/30 1 26  5/17/22 sputum specimen oral pharyngeal contamination    06/06/2022 portable chest x-ray with bilateral pleural effusions  Status post thoracentesis 06/10/2022  Pleural effusion no growth  -continue antibiotics for as above  -secretion and pulmonary toilet management per critical care team   -monitor respiratory symptoms  -monitor vent requirements     6  MARYELLEN on CKD 3  Creatinine 2 1 CVVH on hold at present  -renal dose adjust medications as needed  -volume management per Nephrology  -CVVH management per Nephrology  -monitor creatinine  -no urinary output     Antibiotics:  Vancomycin D6     Above impression and plan discussed in detail with patient's RN and critical care team      Subjective:  Patient has no fever spike, chills, sweats reported on ventilator   In ICU s/p cardiac arrest 5/21/22; no nausea, vomiting, diarrhea reported  CVVH started 22 and restarted 22 after short intermittent HD attempt due to BP instability  patient's dual blood pressure vasopressor support plus midodrine  Off CVVH trial since 22 again  Patient status post IR right pelvic abscess drainage tube placement   35 mL of brown fluid removed initially and now tube left in place to drain  PLTs 37 K  Some pink tinged secretions and bleeding gums per RN  No other active bleeding noted  Objective:  Vitals:  Temp:  [96 98 °F (36 1 °C)-99 9 °F (37 7 °C)] 98 42 °F (36 9 °C)  HR:  [67-98] 83  Resp:  [22-47] 39  BP: ()/(44-68) 104/56  SpO2:  [85 %-100 %] 97 %  Temp (24hrs), Av 4 °F (36 9 °C), Min:96 98 °F (36 1 °C), Max:99 9 °F (37 7 °C)  Current: Temperature: 98 42 °F (36 9 °C)    Physical Exam:   General Appearance:  80year-old acute on chronically debilitated, propped resting in ICU bed on ventilator, not easily arousable, on dual vasopressors, off CVVH currently since 2022    Throat: Oropharynx moist without lesions  ET tube to ventilator   Lungs: Tachypneic, ventilated breath sounds fairly clear to auscultation bilaterally; scant pink tinged secretions being suctioned, Vent setting at 40% FIO2   Heart:  RRR   Abdomen:   Soft, no focal tenderness, protuberant, wound VAC appliance irrigation canister in place, no erythema wound edges, left lower quadrant ostomy intact with soft brown stool in bag, peg tube in good position, right side FAYE drain with creamy output in tubing, positive soft bowel sounds  Extremities: Generalized puffy edema, venodynes, Prevalon boots in place   : [place] out, 4+ scrotal edema, no SPT   Skin: No new rashes or lesions    Right arm PICC and right IJ CVP lines intact       Labs, Imaging, & Other studies:   All pertinent labs and imaging studies were personally reviewed  Results from last 7 days   Lab Units 06/15/22  0520 22  0604 22  1906   WBC Thousand/uL 15 08* 17 23* 20 67*   HEMOGLOBIN g/dL 7 8* 7 7* 8 0* PLATELETS Thousands/uL 37* 26* 25*     Results from last 7 days   Lab Units 06/15/22  0520 06/14/22  1753 06/14/22  1155 06/09/22  1157 06/09/22  0533   SODIUM mmol/L 136 137 136   < > 135*   POTASSIUM mmol/L 5 6* 5 6* 5 2   < > 3 6   CHLORIDE mmol/L 104 103 104   < > 100   CO2 mmol/L 28 29 28   < > 22   BUN mg/dL 21 16 14   < > 25   CREATININE mg/dL 1 29 0 86 0 91   < > 1 47*   EGFR ml/min/1 73sq m 48 75 73   < > 41   CALCIUM mg/dL 8 1* 8 0* 8 2*   < > 7 7*   AST U/L  --   --   --   --  55*   ALT U/L  --   --   --   --  27   ALK PHOS U/L  --   --   --   --  156*    < > = values in this interval not displayed       Results from last 7 days   Lab Units 06/10/22  1214 06/08/22  1555   GRAM STAIN RESULT  No Polys or Bacteria seen 2+ Gram positive cocci in pairs, chains and clusters*  No polys seen*   BODY FLUID CULTURE, STERILE  No growth 2+ Growth of Methicillin Resistant Staphylococcus aureus*     Results from last 7 days   Lab Units 06/14/22  0604 06/12/22  0559 06/09/22  0533   PROCALCITONIN ng/ml 0 92* 1 88* 3 92*

## 2022-06-15 NOTE — ASSESSMENT & PLAN NOTE
· Hemoglobin drop post cardiac arrest    · Noted to have gross hematuria but this has since resolved  · 5/21: 1 u PRBC  · 5/24: 1 u PRBC  · CT obtained on 5/24 and negative for any overt signs of bleeding  · 5/26: 1 u PRBC   · 5/30: 1 u PRBC  · 6/4: 1 U PRBC   · 6/9: 1 U PRBC   · 6/11: 1 U PRBC

## 2022-06-15 NOTE — ASSESSMENT & PLAN NOTE
Wt Readings from Last 3 Encounters:   06/14/22 80 4 kg (177 lb 4 oz)   05/11/22 62 1 kg (136 lb 14 4 oz)   03/25/22 61 2 kg (135 lb)     · 5/16: Echo-EF 65%, mild TR, mild MR  · 5/23: Repeat Echo post cardiac arrest: EF 60-65%, G1DD, mild AS (BRADY 1 5cm2), mild MR, mild TR  · Monitor I&O, Daily weights   · Limited ECHO-EF 65%, G1DD, mild AS

## 2022-06-15 NOTE — PROGRESS NOTES
Vancomycin IV Pharmacy-to-Dose Consultation    Rene Rodriguez is a 80 y o  male who is currently receiving Vancomycin IV with management by the Pharmacy Consult service  Assessment/Plan:  The patient was reviewed  Renal function is stable and no signs or symptoms of nephrotoxicity and/or infusion reactions were documented in the chart  Based on todays assessment, continue current vancomycin (day # 7) dosing of 1250 mg iv Q 24 H, with a plan for trough to be drawn at 2000 on 6/18/22   We will continue to follow the patients culture results and clinical progress daily      Qiana Ni, Pharmacist

## 2022-06-15 NOTE — PHYSICAL THERAPY NOTE
06/15/22 0845   Note Type   Note Type Cancelled Session   Cancel Reasons Medical status;per Critical Care Practitioner Yanna 64 conversations have been ongoing with pt's family and it was agreed to no escalation of care at this time  Skilled PT/OT services not appropriate at this time due to pt's medical status and Critical Care Practitioner will dc PT/OT orders  Pending pt's progress/medical status, PT/OT can be re-ordered as appropriate in the future     Licensure   [place] License Number  [id number] [place] [address] [id number]

## 2022-06-15 NOTE — ASSESSMENT & PLAN NOTE
· Outpatient EGD and colonoscopy at [facility] on 5/11 for w/u of chronic anemia, history of colon polyps, intermittent LLQ abdominal pain and possible intermittent intussusception  · EGD unremarkable, colonoscopy technically difficult and required change from adult scope to pediatric scope, during traversal of the sigmoid colon there was a kink and patient sustained a perforation  · Transferred to [facility] for emergent surgery   · Emergent ex- lap on 5/11 > low anterior resection, protective loop transverse colostomy, flex sigmoidoscopy, and segmental small bowel resection  · Difficult post op course with post op ileus requiring NGT decompression and requirement of short duration of TPN   · 5/22: CT: Diffuse mild dilation of small bowel segments identified without transition point  · 5/24: CT CAP- Distended small bowel loops with scattered air-fluid levels consistent with early/partial small bowel obstruction with transition at the small bowel anastomosis in the region of the ventral pelvis as above  No free air  Cholelithiasis without cholecystitis  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  · 5/24: Stomal prolapse and small peristomal hernia now at the transverse loop colostomy; continues to be reduced by surgery  · TPN d/c'd on 5/26; tolerating tube feeds at goal  · 5/26: Abdominal wound vac placed bedside: continue with dressing changes Monday/Thursday   · Surgery continues to follow    · 6/6: Gastrostomy tube inserted for nutrition by IR   · 6/6: CT Chest Abdomen Pelvis: Indeterminant fluid collection in the right hemipelvis which appears to be loculated and possibly "walled off" from the remainder of abdominopelvic ascites   could represent an infected abscess, but is not significantly changed in size from May 24  · 6/8: IR abscess drainage and drain placement-30mL of pus drained +MRSA  · Continue to monitor output character and quantity

## 2022-06-15 NOTE — ASSESSMENT & PLAN NOTE
· Secondary to septic shock   · Continue midodrine 10mg TID    · Levo 2mcg, vaso 0 04 units  · No escalation of vasopressors per family discussions yesterday

## 2022-06-15 NOTE — RESPIRATORY THERAPY NOTE
Received patient on AC/PC 26/24/+6 70% tolerating therapy  Performed breathing treatment and P&V therapy

## 2022-06-15 NOTE — ASSESSMENT & PLAN NOTE
· Patient previously in ICU for hypoxia with a max of 15L midflow and concern for aspiration pneumonitis  · Transfered to general medical floor 5/21  · 5/21: PEA arrest x2, intubation  · 5/24 CT CAP-CHF with moderate basilar effusions and additional upper lung zone patchy airspace opacities likely reflecting asymmetric pulmonary edema  Infiltrate is not entirely excluded     · 5/29 Bronchoscopy for mucous plugging  · Day # 25 on ventilator: AC/PC 26/24/70%/6  · Wean Fio2/PEEP as able for SPO2>90%  · Continue pulmonary hygiene/ VAP bundle  · Chlorhexidine, PPI, HOB greater than 30 degrees   · Continue volume removal with CVVH as tolerated   · 6/10 IR L thora for 800 cc  · transudate by lights criteria  · No bacteria growth on culture and grain stain, fungal culture negative   · Surgery deemed unsafe for trach at this time given multi organ system failure/high vasopressor requirement, and tenuous respiratory status

## 2022-06-15 NOTE — ASSESSMENT & PLAN NOTE
Peritonitis with intra-abdominal/pelvic abscess secondary to colonic perforation    · 5/22 CT chest/ab/pelvis with concern of multifocal PNA, no visualized intraabdominal abscess or anastomotic leak   · Per ID suspect multifocal pneumonitis   · OR culture 5/21 pseudomonas and bacteroides fragilis  · Blood cultures on 5/22 negative   · Completed 14 days of Flagyl on 5/24  · Completed 14 days of cefepime on 5/27  · Restarted Zosyn on 6/3 for increasing WBC and oxygen requirements - discontinued 6/10  · Vanco started 6/9 for intra-abdominal abscess culture +MRSA  · Vanco day 7 - plan for 10 day course from date of drainage (6/19)  · ID following, appreciate recommendations   · IR drain in place, continue to monitor output   · WBC decreased to 17  · procal 0 9 from 1 88  · Hypothermia requiring chester hugger, continue to trend temps

## 2022-06-15 NOTE — PROGRESS NOTES
Pastoral Care Progress Note    6/15/2022  Patient: Angelique Rosenberg : 2/15/1931  Admission Date & Time: 2022 1648  MRN: [id number] [facility]: [id number]                     Chaplaincy Interventions Utilized:   Empowerment: Reframed experience of patient/family  Family continues to struggle with decision to move their father to comfort care  They are struggling with the "what if" they make the wrong choice  I affirmed the family in carefully considering what is best for their father and that if they do remove care they can do so with a good conscious  I let family know that I am not here tomorrow (Thursday) but will be her Friday after about 10:30 if they want me to be present and offer a prayer       Ritual: Provided prayer   06/15/22 1500   Clinical Encounter Type   Visited With Patient and family together   Routine Visit Follow-up   Pentecostalism Encounters   P[employer]ism Needs Prayer

## 2022-06-15 NOTE — ASSESSMENT & PLAN NOTE
· Secondary to hypoperfusion mehul cardiac arrest +/- ATN  · Baseline creat 0 8  · Creatinine peaked at 3 07  · CVVH started on 5/26, attempts at [facility] unsuccessful secondary to rapidly escalating pressor requirements  · Avoid hypotension/hypoperfusion  · CRRT d/c last night after filter clotted at 1830  · No further dialysis per family discussions yesterday

## 2022-06-15 NOTE — ASSESSMENT & PLAN NOTE
· HIT JAYLIN negative  · Argatroban d/c 6/9 and switch back to heparin gtt  · Thrombocytopenia likely in setting of bone marrow suppression from sepsis and CRRT  · Platelet count down to 25,000, helmet cells in hemolysis smear, INR normal, fibrinogen 146  · Concern for DIC  · Pre filter heparin held yesterday AM

## 2022-06-15 NOTE — PROGRESS NOTES
NEPHROLOGY PROGRESS NOTE   Kota Mathur 80 y o  male MRN: [id number]  Unit/Bed#: ICU 02 Encounter: [id number]    ASSESSMENT & PLAN:  80year old with HTN, aortic stenosis, GERD, CAD who underwent EGD and C-scope on 2/58/88 which was complicated by perforation requiring emergent ex-lap  Since then, course has been complicated by PEA arrest on 5/21/22, VDRF, pneumonia, sepsis  Renal consulted for MARYELLEN  Acute kidney injury   -Baseline creatinine:0 7-0 9mg/dl  -Admission creatinine:0 78 mg/dl  - Work up:   · UA with microscopy:4-10 rbc/hpf and wbc  · Imaging:  CT chest abdomen pelvis from 05/06 with no hydronephrosis  Finding of cortical cyst right kidney 2 cm size  -Etiology:  Acute kidney injury likely due to ATN in the setting of hypotension and cardiac arrest   Peak creatinine 3 07 on May 26  [facility] [place] Course:  HD dependent since 05/26  Did not tolerate intermittent HD on 06/07 and so restarted on CVVHD on 06/08 and was stopped  On 6/14    -Plan:   · off CVVHD since 6/14  Plan to continue to hold dialysis for now- discussed with son who agreed to hold off on it for now but was not able to say if to restart incase potassium starts trending up despite medical treatment  Continue to hold CVVHD for now  · Patient's son Yovany Lundberg will be in the hospital later on today and will discuss with ICU team  · Avoid nephrotoxins and dose all medications per egfr  · Avoid hypotension  BP/hypotension  -still on 2 pressors-Levophed as well as vasopressin, continue vasopressor per ICU team and adjust dose as needed   -continue midodrine    Fluid overload  -echocardiogram from 05/31 showed ejection fraction 65% with grade 1 diastolic dysfunction  -was undergoing UF on CVVH but currently off CVVHD  Not making much urine  Continue to monitor volume status    Hyperkalemia, potassium level 5 6, trending up with holding CVVHD    Received medical treatment with insulin and dextrose as well as calcium gluconate  Recommend rechecking BMP later today to monitor potassium level  Ordered BMP for 3:00 p m  Hyperphosphatemia due to acute kidney injury, phosphorus level trending up with holding CVVHD, continue to monitor    Anemia, thrombocytopenia  -monitor hemoglobin per ICU team and replace as needed if hemoglobin drops below 7  -status post PRBC this hospital admission  Hemoglobin currently trending down to 7 8 gram/deciliter, platelet count 37  Drop in platelet count in the setting of CVVHD and sepsis  Ventilator dependent respiratory failure currently on FiO2 of 60%  Management per ICU    PEA cardiac arrest on 05/21/2022    Pneumoperitoneum status colon perforation status post colonoscopy , now post exploratory laparotomy with low anterior resection on 05/11/2022  Patient developed peritonitis/sepsis/intra-abdominal/pelvic abscess and was treated with antibiotics and currently on vancomycin  Also status post IR abscess drainage and drain placement on 06/08  Discussed with ICU advanced practitioner  Discussed with patient's son over the phone    SUBJECTIVE:  Still on Levophed at low dose at 1 mics per minute   Also on vasopressin  Was taken off CVVHD yesterday evening after the filter clotted  As per primary team, family has decided for no escalation of care but still not comfort care  OBJECTIVE:  Current Weight: Weight - Scale: 80 1 kg (176 lb 9 4 oz)  Vitals:    06/15/22 0800   BP: 111/55   Pulse: 88   Resp: (!) 41   Temp: 98 24 °F (36 8 °C)   SpO2: 100%       Intake/Output Summary (Last 24 hours) at 6/15/2022 0911  Last data filed at 6/15/2022 0600  Gross per 24 hour   Intake 729 5 ml   Output 1341 ml   Net -611 5 ml       Physical Exam   General:  Ill looking, intubated FiO2 of 60%  Head: normocephalic, atraumatic  Eyes: Conjunctivae pink,  Sclera anicteric  ENT: Intubated  Neck: supple   Chest: Clear to Auscultation both lungs,  no crackles or wheezing    CVS: S1 & S2 present, normal rate, regular rhythm, no murmur  Abdomen: soft, non-tender, non-distended, Bowel sounds normoactive  Colostomy present, G-tube present  Extremities:  1+ pitting edema both lower and upper extremity  Neuro: Sedated, intubated  Skin: no rash, warm and dry  Psych: sedated  Unable to assess        Medications:    Current Facility-Administered Medications:     acetaminophen (TYLENOL) oral suspension 650 mg, 650 mg, Oral, Q6H PRN, ISELA Nesbitt, 650 mg at 06/05/22 1512    chlorhexidine (PERIDEX) 0 12 % oral rinse 15 mL, 15 mL, Mouth/Throat, Q12H [address], ISELA Nesbitt, 15 mL at 06/15/22 0805    HYDROmorphone (DILAUDID) injection 0 5 mg, 0 5 mg, Intravenous, Q3H PRN, ISELA Sánchez, 0 5 mg at 06/07/22 0113    insulin lispro (HumaLOG) 100 units/mL subcutaneous injection 1-6 Units, 1-6 Units, Subcutaneous, Q6H [address], 1 Units at 06/14/22 0613 **AND** Fingerstick Glucose (POCT), , , Q6H, ISELA Ritter    iohexol (OMNIPAQUE) 240 MG/ML solution 50 mL, 50 mL, Oral, Once in imaging, ISELA Nesbitt    ipratropium-albuterol (DUO-NEB) 0 5-2 5 mg/3 mL inhalation solution 3 mL, 3 mL, Nebulization, Q6H, ISELA Lawrence, 3 mL at 06/15/22 0754    levothyroxine tablet 112 mcg, 112 mcg, Per NG Tube, Early Morning, ISELA Nesbitt, 112 mcg at 06/15/22 0522    midodrine (PROAMATINE) tablet 10 mg, 10 mg, Oral, TID AC, ISELA Sánchez, 10 mg at 06/15/22 0522    norepinephrine (LEVOPHED) 8 mg (DOUBLE CONCENTRATION) IV in sodium chloride 0 9% 250 mL, 1-30 mcg/min, Intravenous, Titrated, ISELA Lawrence, Last Rate: 1 9 mL/hr at 06/15/22 0606, 1 mcg/min at 06/15/22 0606    ondansetron (ZOFRAN) injection 4 mg, 4 mg, Intravenous, Q6H PRN, ISELA Nesbitt    oxyCODONE (ROXICODONE) oral solution 5 mg, 5 mg, Per NG Tube, Q4H PRN, 5 mg at 06/12/22 0125 **OR** oxyCODONE (ROXICODONE) oral solution 7 5 mg, 7 5 mg, Per NG Tube, Q4H PRN, Fabiana Quick, CRNP    pantoprazole (PROTONIX) injection 40 mg, 40 mg, Intravenous, Q24H [address], Micaela Maldonado, CRNP, 40 mg at 06/15/22 0805    polyethylene glycol (MIRALAX) packet 17 g, 17 g, Oral, Daily, Josephine Castillo, CRNP, 17 g at 06/15/22 0805    vancomycin (VANCOCIN) 1,250 mg in sodium chloride 0 9 % 250 mL IVPB, 15 mg/kg, Intravenous, Q24H, Ana Chaney PA-C    vasopressin (PITRESSIN) 20 Units in sodium chloride 0 9 % 100 mL infusion, 0 04 Units/min, Intravenous, Continuous, Lalita Hall PA-C, Last Rate: 12 mL/hr at 06/15/22 0606, 0 04 Units/min at 06/15/22 0606    Invasive Devices:        Lab Results:   Results from last 7 days   Lab Units 06/15/22  0520 06/14/22  1753 06/14/22  1155 06/14/22  0604 06/13/22  2359 06/13/22  1906 06/09/22  1157 06/09/22  0533 06/09/22  0002 06/08/22  1053   WBC Thousand/uL 15 08*  --   --  17 23*  --  20 67*   < > 10 91*  --   --    HEMOGLOBIN g/dL 7 8*  --   --  7 7*  --  8 0*   < > 6 6*  --   --    HEMATOCRIT % 25 4*  --   --  25 3*  --  25 4*   < > 20 5*  --   --    PLATELETS Thousands/uL 37*  --   --  26*  --  25*   < > 124*  --   --    POTASSIUM mmol/L 5 6* 5 6* 5 2 5 1   < >  --    < > 3 6   < >  --    CHLORIDE mmol/L 104 103 104 104   < >  --    < > 100   < >  --    CO2 mmol/L 28 29 28 27   < >  --    < > 22   < >  --    BUN mg/dL 21 16 14 17   < >  --    < > 25   < >  --    CREATININE mg/dL 1 29 0 86 0 91 0 89   < >  --    < > 1 47*   < >  --    CALCIUM mg/dL 8 1* 8 0* 8 2* 8 1*   < >  --    < > 7 7*   < >  --    MAGNESIUM mg/dL 2 0 2 0 2 1 2 0   < >  --    < > 2 0   < >  --    PHOSPHORUS mg/dL 4 5* 3 8 3 4 3 3   < >  --    < > 3 2   < >  --    ALK PHOS U/L  --   --   --   --   --   --   --  156*  --  191*   ALT U/L  --   --   --   --   --   --   --  27  --  14   AST U/L  --   --   --   --   --   --   --  55*  --  29    < > = values in this interval not displayed         Previous work up:   Chest x-ray from 06/13 showed unchanged bilateral extensive drip and primary disease  Bilateral partially loculated pleural effusion      Portions of the record may have been created with voice recognition software  Occasional wrong word or "sound a like" substitutions may have occurred due to the inherent limitations of voice recognition software  Read the chart carefully and recognize, using context, where substitutions have occurred  If you have any questions, please contact the dictating provider

## 2022-06-16 NOTE — ASSESSMENT & PLAN NOTE
· Secondary to septic shock   · Continue midodrine 10mg TID    · Levo 1mcg, vaso 0 04 units  · No escalation of vasopressors per family discussions yesterday

## 2022-06-16 NOTE — PROGRESS NOTES
Tami 45  Progress Note - [facility] Becca 2/15/1931, 80 y o  male MRN: [id number]  Unit/Bed#: ICU 02 Encounter: [id number]  Primary Care Provider: Savage Novoa MD   Date and time admitted to hospital: 5/11/2022  4:48 PM    * Bowel perforation [facility])  Assessment & Plan  · Outpatient EGD and colonoscopy at [facility] on 5/11 for w/u of chronic anemia, history of colon polyps, intermittent LLQ abdominal pain and possible intermittent intussusception  · EGD unremarkable, colonoscopy technically difficult and required change from adult scope to pediatric scope, during traversal of the sigmoid colon there was a kink and patient sustained a perforation  · Transferred to [facility] for emergent surgery   · Emergent ex- lap on 5/11 > low anterior resection, protective loop transverse colostomy, flex sigmoidoscopy, and segmental small bowel resection  · Difficult post op course with post op ileus requiring NGT decompression and requirement of short duration of TPN   · 5/22: CT: Diffuse mild dilation of small bowel segments identified without transition point  · 5/24: CT CAP- Distended small bowel loops with scattered air-fluid levels consistent with early/partial small bowel obstruction with transition at the small bowel anastomosis in the region of the ventral pelvis as above  No free air  Cholelithiasis without cholecystitis  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    · 6/6: Gastrostomy tube inserted for nutrition by IR   · 6/6: CT Chest Abdomen Pelvis: Indeterminant fluid collection in the right hemipelvis which appears to be loculated and possibly "walled off" from the remainder of abdominopelvic ascites  could represent an infected abscess, but is not significantly changed in size from May 24  · 6/8: IR abscess drainage and drain placement-30mL of pus drained +MRSA  · Continue to monitor output character and quantity    Acute respiratory failure with hypoxia ([facility] [address] )  Assessment & Plan  · Patient previously in ICU for hypoxia with a max of 15L midflow and concern for aspiration pneumonitis  · Transfered to general medical floor 5/21  · 5/21: PEA arrest x2, intubation  · 5/24 CT CAP-CHF with moderate basilar effusions and additional upper lung zone patchy airspace opacities likely reflecting asymmetric pulmonary edema  Infiltrate is not entirely excluded     · 5/29 Bronchoscopy for mucous plugging  · Day # 26 on ventilator: AC/PC 26/24/70%/6  · Wean Fio2/PEEP as able for SPO2>90%  · Continue pulmonary hygiene/ VAP bundle  · Chlorhexidine, PPI, HOB greater than 30 degrees   · Continue volume removal with CVVH as tolerated   · 6/10 IR L thora for 800 cc  · transudate by lights criteria  · No bacteria growth on culture and grain stain, fungal culture negative   · Surgery deemed unsafe for trach at this time given multi organ system failure/high vasopressor requirement, and tenuous respiratory status    Acute renal failure (ARF) ([facility])  Assessment & Plan  · Secondary to hypoperfusion mehul cardiac arrest +/- ATN  · Baseline creat 0 8  · Creatinine peaked at 3 07  · CVVH started on 5/26, attempts at [facility] unsuccessful secondary to rapidly escalating pressor requirements  · Avoid hypotension/hypoperfusion  · CRRT d/c after filter clotted  · No further dialysis per family discussions yesterday    Septic shock ([facility] [address] )  Assessment & Plan  Peritonitis with intra-abdominal/pelvic abscess secondary to colonic perforation    · 5/22 CT chest/ab/pelvis with concern of multifocal PNA, no visualized intraabdominal abscess or anastomotic leak   · Per ID suspect multifocal pneumonitis   · OR culture 5/21 pseudomonas and bacteroides fragilis  · Blood cultures on 5/22 negative   · Completed 14 days of Flagyl on 5/24  · Completed 14 days of cefepime on 5/27  · Restarted Zosyn on 6/3 for increasing WBC and oxygen requirements - discontinued 6/10  · Vanco started 6/9 for intra-abdominal abscess culture +MRSA  · Vanco day 8 - plan for 10 day course from date of drainage (6/19)  · ID following, appreciate recommendations   · IR drain in place, continue to monitor output   · Hypothermia requiring chester hugger, continue to trend temps    CHF (congestive heart failure) ([facility] [address] )  Assessment & Plan  Wt Readings from Last 3 Encounters:   06/16/22 80 2 kg (176 lb 12 9 oz)   05/11/22 62 1 kg (136 lb 14 4 oz)   03/25/22 61 2 kg (135 lb)     · 5/16: Echo-EF 65%, mild TR, mild MR  · 5/23: Repeat Echo post cardiac arrest: EF 60-65%, G1DD, mild AS (BRADY 1 5cm2), mild MR, mild TR  · Monitor I&O, Daily weights   · Limited ECHO-EF 65%, G1DD, mild AS    Hyperglycemia  Assessment & Plan  · A1c 5 3%  · Hyperglycemia likely stress induced  · Continue SSI - minimal requirements     Toxic metabolic encephalopathy  Assessment & Plan  · Likely in setting of acute illness/delirium, end stage multi organ system failure  · Delirium precautions; regulate sleep/wake cycle, environmental controls, daily CAM ICU   · Trend neuro exam        Hypotension  Assessment & Plan  · Secondary to septic shock   · Continue midodrine 10mg TID    · Levo 1mcg, vaso 0 04 units  · No escalation of vasopressors per family discussions yesterday      Anemia  Assessment & Plan  · Hemoglobin drop post cardiac arrest    · Noted to have gross hematuria but this has since resolved  · 5/21: 1 u PRBC  · 5/24: 1 u PRBC  · CT obtained on 5/24 and negative for any overt signs of bleeding  · 5/26: 1 u PRBC   · 5/30: 1 u PRBC  · 6/4: 1 U PRBC   · 6/9: 1 U PRBC   · 6/11: 1 U PRBC       Paroxysmal atrial fibrillation (HCC)  Assessment & Plan  · In the setting of cardiac arrest while on 3 pressors noted to have afib with RVR  · Bolused with amio and placed on infusion  · Converted to sinus rhythm on    · Amio gtt d/c  but restarted on  due to RVR >100   · Pt now converted to  NSR, Amio gtt d/c   · Holding systemic AC with worsening thrombocytopenia     Thrombocytopenia (HCC)  Assessment & Plan  · HIT JAYLIN negative  · Argatroban d/c  and switch back to heparin gtt  · Thrombocytopenia likely in setting of bone marrow suppression from sepsis and CRRT  · Platelet count down to 25,000, helmet cells in hemolysis smear, INR normal, fibrinogen 146  · Concern for DIC  · Pre filter heparin held  Patient now off of CRRT      ----------------------------------------------------------------------------------------  HPI/24hr events: no acute events overnight    Patient appropriate for transfer out of the ICU today?: No  Disposition: Continue Critical Care   Code Status: Level 2 - DNAR: but accepts endotracheal intubation  ---------------------------------------------------------------------------------------  SUBJECTIVE  No acute events overnight  Remains on low dose pressor      Review of Systems  Review of systems was unable to be performed secondary to intubated  ---------------------------------------------------------------------------------------  OBJECTIVE    Vitals   Vitals:    06/15/22 2328 22 0000 22 0237 22 0600   BP:  125/63     BP Location:       Pulse:  79     Resp:  (!) 34     Temp:  (!) 97 34 °F (36 3 °C)     TempSrc:       SpO2: 97% 97% 97%    Weight:    80 2 kg (176 lb 12 9 oz)   Height:         Temp (24hrs), Av 3 °F (36 8 °C), Min:97 34 °F (36 3 °C), Max:98 78 °F (37 1 °C)  Current: Temperature: (!) 97 34 °F (36 3 °C)  Arterial Line BP: 133/36  Arterial Line MAP (mmHg): 74 mmHg    Respiratory:  SpO2: SpO2: 92 %, SpO2 Activity: SpO2 Activity: At Rest, SpO2 Device: O2 Device: Ventilator, Capnography: Capnography: No  Nasal Cannula O2 Flow Rate (L/min): 5 L/min    Invasive/non-invasive ventilation settings   Respiratory  Report   Lab Data (Last 4 hours)    None         O2/Vent Data (Last 4 hours)    None                Physical Exam  Vitals and nursing note reviewed  Constitutional:       General: He is not in acute distress  Appearance: He is ill-appearing  HENT:      Head: Normocephalic and atraumatic  Mouth/Throat:      Mouth: Mucous membranes are moist    Eyes:      Pupils: Pupils are equal, round, and reactive to light  Cardiovascular:      Rate and Rhythm: Normal rate and regular rhythm  Pulses: Normal pulses  Heart sounds: No murmur heard  Pulmonary:      Effort: Pulmonary effort is normal  No respiratory distress  Breath sounds: Rhonchi present  Abdominal:      General: Abdomen is flat  There is no distension  Comments: Midline incision  Drain with serosanguinous fluid  Scrotal edema   Musculoskeletal:         General: Swelling present  Skin:     General: Skin is warm               Laboratory and Diagnostics:  Results from last 7 days   Lab Units 06/15/22  0520 06/14/22  0604 06/13/22  1906 06/13/22  0628 06/12/22  0559 06/11/22  0542 06/10/22  0601   WBC Thousand/uL 15 08* 17 23* 20 67* 19 48* 17 28* 16 88* 11 06*   HEMOGLOBIN g/dL 7 8* 7 7* 8 0* 8 4* 8 0* 6 8* 7 7*   HEMATOCRIT % 25 4* 25 3* 25 4* 26 8* 24 7* 20 7* 23 7*   PLATELETS Thousands/uL 37* 26* 25* 33* 46* 44* 85*   NEUTROS PCT %  --   --   --  62  --   --   --    BANDS PCT % 12* 10*  --   --   --  36* 18*   MONOS PCT %  --   --   --  5  --   --   --    MONO PCT % 10 3*  --   --   --  2* 2*     Results from last 7 days   Lab Units 06/15/22  1526 06/15/22  0520 06/14/22  1753 06/14/22  1155 06/14/22  0604 06/13/22  2359 06/13/22  1809   SODIUM mmol/L 136 136 137 136 137 137 136   POTASSIUM mmol/L 5 7* 5 6* 5 6* 5 2 5 1 5 1 5 0   CHLORIDE mmol/L 103 104 103 104 104 105 103   CO2 mmol/L 25 28 29 28 27 27 29   ANION GAP mmol/L 8 4 5 4 6 5 4   BUN mg/dL 27* 21 16 14 17 16 17   CREATININE mg/dL 1 57* 1 29 0 86 0 91 0 89 0 85 0 88   CALCIUM mg/dL 8 1* 8 1* 8 0* 8 2* 8 1* 8 1* 8 0*   GLUCOSE RANDOM mg/dL 88 103 125 143* 141* 125 194*     Results from last 7 days   Lab Units 06/15/22  0520 06/14/22  1753 06/14/22  1155 06/14/22  0604 06/13/22  2359 06/13/22  1809 06/13/22  1253   MAGNESIUM mg/dL 2 0 2 0 2 1 2 0 2 3 1 9 2 0   PHOSPHORUS mg/dL 4 5* 3 8 3 4 3 3 3 4 3 4 3 1      Results from last 7 days   Lab Units 06/13/22  2054 06/11/22  0250 06/10/22  1917 06/10/22  0927 06/10/22  0136 06/09/22  1743 06/09/22  1157 06/09/22  1101   INR  1 28*  --   --   --   --   --  2 12*  --    PTT seconds  --  112* 143* 210* >210* >210* 105* 85*          Results from last 7 days   Lab Units 06/13/22  1906   LACTIC ACID mmol/L 0 8     ABG:  Results from last 7 days   Lab Units 06/14/22  1508   PH ART  7 248*   PCO2 ART mm Hg 54 4*   PO2 ART mm Hg 126 1   HCO3 ART mmol/L 23 2   BASE EXC ART mmol/L -4 1   ABG SOURCE  Line, Arterial     VBG:  Results from last 7 days   Lab Units 06/14/22  1508   ABG SOURCE  Line, Arterial     Results from last 7 days   Lab Units 06/14/22  0604 06/12/22  0559   PROCALCITONIN ng/ml 0 92* 1 88*       Micro  Results from last 7 days   Lab Units 06/10/22  1214   GRAM STAIN RESULT  No Polys or Bacteria seen   BODY FLUID CULTURE, STERILE  No growth       EKG:   Imaging: I have personally reviewed pertinent reports  Intake and Output  I/O       06/14 0701  06/15 0700 06/15 0701 06/16 0700    I V  (mL/kg) 631 5 (7 9)     NG/     IV Piggyback 120     Total Intake(mL/kg) 971 5 (12 1)     Urine (mL/kg/hr) 0 (0)     Emesis/NG output 0     Drains 75     Other 1487     Stool 200 100    Total Output 1762 100    Net -790 5 -100                Height and Weights   Height: 5' 4" (162 6 cm)  IBW (Ideal Body Weight): 59 2 kg  Body mass index is 30 35 kg/m²    Weight (last 2 days)     Date/Time Weight    06/16/22 0600 80 2 (176 81)    06/15/22 0500 80 1 (176 59)    06/14/22 0600 80 4 (177 25)            Nutrition       Diet Orders   (From admission, onward)             Start     Ordered    06/10/22 1518  Diet Enteral/Parenteral; Tube Feeding No Oral Diet; Vital 1 5; Continuous; 50; Demond - Two Packets Daily  Diet effective now        References:    Nutrtion Support Algorithm Enteral vs  Parenteral   Question Answer Comment   Diet Type Enteral/Parenteral    Enteral/Parenteral Tube Feeding No Oral Diet    Tube Feeding Formula: Vital 1 5    Bolus/Cyclic/Continuous Continuous    Tube Feeding Goal Rate (mL/hr): 50    Demond Orange Demond - Two Packets Daily    RD to adjust diet per protocol?  Yes        06/10/22 1517    05/12/22 0906  Room Service  Once        Question:  Type of Service  Answer:  Room Service - Appropriate with Assistance    05/12/22 0905                  Active Medications  Scheduled Meds:  Current Facility-Administered Medications   Medication Dose Route Frequency Provider Last Rate    acetaminophen  650 mg Oral Q6H PRN ISELA Ayon      chlorhexidine  15 mL Mouth/Throat Q12H [address] ISELA Ayon      HYDROmorphone  0 5 mg Intravenous Q3H PRN ISELA Martins      insulin lispro  1-6 Units Subcutaneous Q6H [address] ISELA Pruitt      iohexol  50 mL Oral Once in imaging ISELA Ayon      ipratropium-albuterol  3 mL Nebulization Q6H ISELA Lawrence      levothyroxine  112 mcg Per NG Tube Early Morning ISELA Ayon      midodrine  10 mg Oral TID [place], [place]      norepinephrine  1-30 mcg/min Intravenous Titrated ISELA Lawrence 1 mcg/min (06/15/22 0606)    ondansetron  4 mg Intravenous Q6H PRN ISELA Ayon      oxyCODONE  5 mg Per NG Tube Q4H PRN Gaylan MeterISELA      Or    oxyCODONE  7 5 mg Per NG Tube Q4H PRN Gaylan Meter, ISELA      pantoprazole  40 mg Intravenous Q24H [address] ISELA Ayon      polyethylene glycol  17 g Oral Daily ISELA Pruitt  vasopressin (PITRESSIN) in 0 9 % sodium chloride 100 mL  0 04 Units/min Intravenous Continuous Kylee Uriostegui PA-C 0 04 Units/min (06/16/22 0118)     Continuous Infusions:  norepinephrine, 1-30 mcg/min, Last Rate: 1 mcg/min (06/15/22 0606)  vasopressin (PITRESSIN) in 0 9 % sodium chloride 100 mL, 0 04 Units/min, Last Rate: 0 04 Units/min (06/16/22 0118)      PRN Meds:   acetaminophen, 650 mg, Q6H PRN  HYDROmorphone, 0 5 mg, Q3H PRN  iohexol, 50 mL, Once in imaging  ondansetron, 4 mg, Q6H PRN  oxyCODONE, 5 mg, Q4H PRN   Or  oxyCODONE, 7 5 mg, Q4H PRN        Invasive Devices Review  Invasive Devices  Report    Peripherally Inserted Central Catheter Line  Duration           PICC Line 08/00/96 Right Basilic 28 days          Arterial Line  Duration           Arterial Line 05/30/22 Radial 16 days          Hemodialysis Catheter  Duration           HD Temporary Double Catheter 20 days          Drain  Duration           Colostomy LLQ 36 days    Gastrostomy/Enterostomy Percutaneous Interventional Gastrostomy 16 Fr  LUQ 9 days    Abscess Drain Buttock 7 days          Airway  Duration           ETT  Oral 25 days                Rationale for remaining devices:   ---------------------------------------------------------------------------------------  Advance Directive and Living Will:      Power of :    POLST:    ---------------------------------------------------------------------------------------  Care Time Delivered:   No Critical Care time spent       Lorenza Soria PA-C      Portions of the record may have been created with voice recognition software  Occasional wrong word or "sound a like" substitutions may have occurred due to the inherent limitations of voice recognition software    Read the chart carefully and recognize, using context, where substitutions have occurred

## 2022-06-16 NOTE — PROGRESS NOTES
Progress Note - Infectious Disease   Fady Hudson 80 y o  male MRN: [id number]  Unit/Bed#: ICU 02 Encounter: [id number]      Impression/Plan:  1  s/p cardiac arrest 5/21/22  Patient intubated during RR  In ICU on ventilator assistance  Recurrent SIRS possibly reactive to arrest with fever, tachycardia, tachypnea, and increased leukocytosis post arrest  Possible recurrent aspiration event s/p arrest  Fever down trended   Patient continued to have hypotension episodes that appear to be related to volume status, patient back on vasopressor at moment  -continue supportive measures per critical care team  -ventilator management per critical care team     2  SIRS, evolving on admission, post #1 and with persistent hypotension support with dialysis   Evidenced by tachycardia, tachypnea, leukocytosis, hypotension   Suspect possible aspiration and/or volume overload   MRSA screen negative  [address]  5/22/22 Blood cultures have no growth   Patient completed 2 week antibiotic course 5/27/22 06/03/2022 blood cultures x2 negative at 5 days  Patient on dual vasopressors   PLTs 77K  -vancomycin, CVVH, additional labs and any escalation in care or any escalation in vasopressor therapy on hold at present pending further goals of care determination with family   -monitor temperature and hemodynamics  -serial exam  -pressor support per Critical Medicine Service     3  Peritonitis s/p Bowel perforation  s/p 5/11/22 exploratory laparotomy, low anterior resection, protective loop transverse colostomy and segmental small bowel resection secondary to perforation rectosigmoid junction after colonoscopy   OR cultures grew Pseudomonas aeruginosa and Bacteroides fragilis   Patient completed antibiotic course 5/27/22   IR PEG tube placed 06/06/2022 06/07/2022 CT chest abdomen pelvis:  Right kush pelvis indeterminant fluid collection slightly larger in size from CT scan from 05/24/2022 06/08/2022 Patient status post IR right pelvic abscess drainage tube placement   35 mL of brown fluid removed growing MRSA  6/14/22 vancomycin level 25  -vancomycin discontinued as above for now  -follow-up level/goals of care  -VAC/wound care per surgery  -follow up Peg function  -IR drainage tube management     4   Acute hypoxic respiratory failure with hypoxia   Suspicious for aspiration pneumonitis over pneumonia     6/6/22 CT C/A/P Bilateral pleural effusions, worsening pulmonary parenchymal airspace opacities consistent with developing fibrosis  Patient remains intubated s/p #1  5/16/22 Procalcitonin level  2 60 > 5/21 0 753 < 5/23/22 2 97 > 5/30 1 26  Patient is status post bronchoscopy 5/29/22 for RUL collapse  5/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  Status post thoracentesis 06/10/2022  Pleural effusion no growth  -ventilator management per critical care team      5  Aspiration pneumonitis versus pneumonia in setting of #1/3   5/22/22 CT C/A/P predominantly UL groundglass and consolidations, bilateral pleural effusions, SB mild dilation unchanged  Patient now intubated s/p #1  5/16/22 Procalcitonin level  2 60 > 5/21 0 753 < 5/23/22 2 97 > 5/30 1 26  5/17/22 sputum specimen oral pharyngeal contamination    06/06/2022 portable chest x-ray with bilateral pleural effusions  Status post thoracentesis 06/10/2022  Pleural effusion no growth  -monitoring off additional antibiotics at present  -secretion and pulmonary toilet management per critical care team   -monitor respiratory symptoms  -monitor vent requirements     6  MARYELLEN on CKD 3  Creatinine 2 1 CVVH on hold at present  -renal dose adjust medications as needed  -volume management per Nephrology  -CVVH management per Nephrology  -monitor creatinine  -no urinary output     Antibiotics:  Vancomycin discontinued D1     Above impression and plan discussed in detail with patient's RN and critical care team   Infectious disease consultation service will sign off for now    [facility]  Thank you!     Subjective:  Patient has no fever spike, chills, sweats reported on ventilator  In ICU s/p cardiac arrest 22; no nausea, vomiting, diarrhea reported  CVVH started 22 and restarted 22 after short intermittent HD attempt due to BP instability  patient's dual blood pressure vasopressor support plus midodrine  Off CVVH trial since 22 again  Family deciding on goals of care  Patient status post IR right pelvic abscess drainage tube placement   35 mL of brown fluid removed initially and now tube left in place to drain  PLTs up to77 K  No active bleeding noted per RN     Objective:  Vitals:  Temp:  [96 62 °F (35 9 °C)-98 78 °F (37 1 °C)] 97 34 °F (36 3 °C)  HR:  [75-85] 75  Resp:  [16-42] 16  BP: (104-160)/(55-69) 115/57  SpO2:  [89 %-99 %] 97 %  Temp (24hrs), Av °F (36 7 °C), Min:96 62 °F (35 9 °C), Max:98 78 °F (37 1 °C)  Current: Temperature: (!) 97 34 °F (36 3 °C)    Physical Exam:   General Appearance:  80year-old acute on chronically debilitated male, propped resting in ICU bed on ventilator, raises eyebrows slightly, not easily arousable to exam, on dual vasopressor therapy, off CVVH since 2022    Throat: Oropharynx moist without lesions  ET tube to ventilator   Lungs:   Scattered coarse ventilated breath sounds to auscultation bilaterally; some blood-tinged frothy secretions in canister, ventilator setting at 60% FiO2   Heart:  RRR   Abdomen:   Soft, no focal tenderness to palpation, protuberant, wound VAC appliance irrigation canister in place with serosanguineous output in canister, no erythema wound edges, left lower quadrant ostomy intact with loose soft brown stool in bag, peg tube in good position, right side FAYE drain with creamy output in tubing, positive soft bowel sounds  Extremities: Generalized pitting, puffy edema, venodynes, Prevalon boots in place   : [place] out, 4+ soft scrotal edema, no SPT   Skin: No new rashes or lesions  Right arm PICC and right IJ CVP lines intact       Labs, Imaging, & Other studies:   All pertinent labs and imaging studies were personally reviewed  Results from last 7 days   Lab Units 06/16/22  0613 06/15/22  0520 06/14/22  0604   WBC Thousand/uL 11 87* 15 08* 17 23*   HEMOGLOBIN g/dL 7 8* 7 8* 7 7*   PLATELETS Thousands/uL 77* 37* 26*     Results from last 7 days   Lab Units 06/16/22  0613 06/15/22  1526 06/15/22  0520   SODIUM mmol/L 135* 136 136   POTASSIUM mmol/L 6 1* 5 7* 5 6*   CHLORIDE mmol/L 103 103 104   CO2 mmol/L 24 25 28   BUN mg/dL 33* 27* 21   CREATININE mg/dL 2 10* 1 57* 1 29   EGFR ml/min/1 73sq m 26 37 48   CALCIUM mg/dL 8 0* 8 1* 8 1*     Results from last 7 days   Lab Units 06/10/22  1214   GRAM STAIN RESULT  No Polys or Bacteria seen   BODY FLUID CULTURE, STERILE  No growth     Results from last 7 days   Lab Units 06/14/22  0604 06/12/22  0559   PROCALCITONIN ng/ml 0 92* 1 88*

## 2022-06-16 NOTE — PROGRESS NOTES
NEPHROLOGY PROGRESS NOTE   Jetty Libman 80 y o  male MRN: [id number]  Unit/Bed#: ICU 02 Encounter: [id number]    ASSESSMENT & PLAN:  80year old with HTN, aortic stenosis, GERD, CAD who underwent EGD and C-scope on 9/25/28 which was complicated by perforation requiring emergent ex-lap  Since then, course has been complicated by PEA arrest on 5/21/22, VDRF, pneumonia, sepsis  Renal consulted for MARYELLEN  Acute kidney injury   -Baseline creatinine:0 7-0 9mg/dl  -Admission creatinine:0 78 mg/dl  - Work up:   · UA with microscopy:4-10 rbc/hpf and wbc  · Imaging:  CT chest abdomen pelvis from 05/06 with no hydronephrosis  Finding of cortical cyst right kidney 2 cm size  -Etiology:  Acute kidney injury likely due to ATN in the setting of hypotension and cardiac arrest   Peak creatinine 3 07 on May 26  [facility] Course:  HD dependent since 05/26  Did not tolerate intermittent HD on 06/07 and so restarted on CVVHD on 06/08 and was stopped  On 6/14    -Plan:   · off CVVHD since 6/14  Continue to hold CVVHD for now  Discussed with patient's son David Colunga over the phone and he mentions he does not want patient to go back on dialysis treatment and does not want any escalation of care  Continue medical management of hyperkalemia as last potassium level was 6 1  I did mention that there was no point with frequent lab monitoring if plan for no dialysis treatment  He was going to talk with his brother and let us know  · Patient's son David Colunga will be in the hospital later on today and will discuss with ICU team again today  They has been ongoing discussion regarding comfort care transition  · Avoid nephrotoxins and dose all medications per egfr  · Avoid hypotension                                              BP/hypotension  -still on 2 pressors-Levophed as well as vasopressin, continue vasopressor per ICU team and adjust dose as needed   -continue midodrine    Fluid overload  -echocardiogram from 05/31 showed ejection fraction 65% with grade 1 diastolic dysfunction  -was undergoing UF on CVVH but currently off CVVHD  Not making much urine  Continue to monitor volume status  Hyperkalemia, potassium level 6 1, trending up with holding CVVHD  Received medical treatment with insulin and dextrose as well as calcium gluconate again today  Patient's son does not want patient to go back on CVVHD and does not want any escalation of care  Recommend discussion regarding comfort care    Hyperphosphatemia due to acute kidney injury, phosphorus level trending up with holding CVVHD, continue to monitor    Anemia, thrombocytopenia  -monitor hemoglobin per ICU team and replace as needed if hemoglobin drops below 7  -status post PRBC this hospital admission  Hemoglobin currently trending down to 7 8 gram/deciliter, platelet count improved to 77     -Drop in platelet count in the setting of CVVHD and sepsis, now improving  Ventilator dependent respiratory failure currently on FiO2 of 60%  Management per ICU    PEA cardiac arrest on 05/21/2022    Pneumoperitoneum status colon perforation status post colonoscopy , now post exploratory laparotomy with low anterior resection on 05/11/2022  Patient developed peritonitis/sepsis/intra-abdominal/pelvic abscess and was treated with antibiotics  Also status post IR abscess drainage and drain placement on 06/08  Discussed with ICU advanced practitioner as well as with patient's son on the phone    SUBJECTIVE:  Still on Levophed and vasopressin, intubated and FiO2 of 60%    Plan for no escalation of care    OBJECTIVE:  Current Weight: Weight - Scale: 80 2 kg (176 lb 12 9 oz)  Vitals:    06/16/22 0732   BP:    Pulse:    Resp:    Temp:    SpO2: 93%   /57   Pulse 80   Temp 97 7 °F (36 5 °C)   Resp (!) 30   Ht 5' 4" (1 626 m)   Wt 80 2 kg (176 lb 12 9 oz)   SpO2 93%   BMI 30 35 kg/m²       Intake/Output Summary (Last 24 hours) at 6/16/2022 0858  Last data filed at 6/16/2022 0601  Gross per 24 hour   Intake 454 02 ml   Output 295 ml   Net 159 02 ml       Physical Exam   General:  Ill looking, intubated   Head: normocephalic, atraumatic  Eyes: Conjunctivae pink,  Sclera anicteric  ENT: Intubated  Neck: supple   Chest: Clear to Auscultation both lungs,  no crackles or wheezing  CVS: S1 & S2 present, normal rate, regular rhythm, no murmur  Abdomen: soft, non-tender, non-distended, Bowel sounds normoactive  Colostomy present  Extremities: 1 +  edema of  legs  Neuro: Sedated, intubated  fio2 60 %   Skin: no rash, warm and dry  Psych: sedated  Unable to assess      Medications:    Current Facility-Administered Medications:     acetaminophen (TYLENOL) oral suspension 650 mg, 650 mg, Oral, Q6H PRN, ISELA Stevenson, 650 mg at 06/05/22 1512    calcium gluconate 1 g in sodium chloride 0 9% 50 mL (premix), 1 g, Intravenous, Once, [employer] ZULAY Rae, Last Rate: 100 mL/hr at 06/16/22 0851, 1 g at 06/16/22 0851    chlorhexidine (PERIDEX) 0 12 % oral rinse 15 mL, 15 mL, Mouth/Throat, Q12H [facility], ISELA Stevenson, 15 mL at 06/15/22 2130    HYDROmorphone (DILAUDID) injection 0 5 mg, 0 5 mg, Intravenous, Q3H PRN, [employer], CRNP, 0 5 mg at 06/16/22 6893    insulin lispro (HumaLOG) 100 units/mL subcutaneous injection 1-6 Units, 1-6 Units, Subcutaneous, Q6H [facility], 1 Units at 06/14/22 0613 **AND** Fingerstick Glucose (POCT), , , Q6H, ISELA Ritter    iohexol (OMNIPAQUE) 240 MG/ML solution 50 mL, 50 mL, Oral, Once in imaging, ISELA Stevenson    ipratropium-albuterol (DUO-NEB) 0 5-2 5 mg/3 mL inhalation solution 3 mL, 3 mL, Nebulization, Q6H, ISELA Lawrence, 3 mL at 06/16/22 0728    levothyroxine tablet 112 mcg, 112 mcg, Per NG Tube, Early Morning, Flor Bruce, CRNP, 112 mcg at 06/16/22 9824    midodrine (PROAMATINE) tablet 10 mg, 10 mg, Oral, TID AC, [employer], CRNP, 10 mg at 06/16/22 7742    norepinephrine (LEVOPHED) 8 mg (DOUBLE CONCENTRATION) IV in sodium chloride 0 9% 250 mL, 1-30 mcg/min, Intravenous, Titrated, ISELA Lawrence, Last Rate: 1 9 mL/hr at 06/15/22 0606, 1 mcg/min at 06/15/22 0606    ondansetron (ZOFRAN) injection 4 mg, 4 mg, Intravenous, Q6H PRN, ISELA Gorman    oxyCODONE (ROXICODONE) oral solution 5 mg, 5 mg, Per NG Tube, Q4H PRN, 5 mg at 06/12/22 0125 **OR** oxyCODONE (ROXICODONE) oral solution 7 5 mg, 7 5 mg, Per NG Tube, Q4H PRN, ISELA Garcia    pantoprazole (PROTONIX) injection 40 mg, 40 mg, Intravenous, Q24H [address], ISELA Gorman, 40 mg at 06/16/22 0850    polyethylene glycol (MIRALAX) packet 17 g, 17 g, Oral, Daily, ISELA Ritter, 17 g at 06/16/22 0851    vasopressin (PITRESSIN) 20 Units in sodium chloride 0 9 % 100 mL infusion, 0 04 Units/min, Intravenous, Continuous, Neo Conteh PA-C, Last Rate: 12 mL/hr at 06/16/22 0118, 0 04 Units/min at 06/16/22 0118    Invasive Devices:        Lab Results:   Results from last 7 days   Lab Units 06/16/22  0613 06/15/22  1526 06/15/22  0520 06/14/22  1753 06/14/22  1155 06/14/22  0604   WBC Thousand/uL 11 87*  --  15 08*  --   --  17 23*   HEMOGLOBIN g/dL 7 8*  --  7 8*  --   --  7 7*   HEMATOCRIT % 25 9*  --  25 4*  --   --  25 3*   PLATELETS Thousands/uL 77*  --  37*  --   --  26*   POTASSIUM mmol/L 6 1* 5 7* 5 6* 5 6* 5 2 5 1   CHLORIDE mmol/L 103 103 104 103 104 104   CO2 mmol/L 24 25 28 29 28 27   BUN mg/dL 33* 27* 21 16 14 17   CREATININE mg/dL 2 10* 1 57* 1 29 0 86 0 91 0 89   CALCIUM mg/dL 8 0* 8 1* 8 1* 8 0* 8 2* 8 1*   MAGNESIUM mg/dL 2 2  --  2 0 2 0 2 1 2 0   PHOSPHORUS mg/dL  --   --  4 5* 3 8 3 4 3 3       Previous work up:         Portions of the record may have been created with voice recognition software  Occasional wrong word or "sound a like" substitutions may have occurred due to the inherent limitations of voice recognition software   Read the chart carefully and recognize, using context, where substitutions have occurred  If you have any questions, please contact the dictating provider

## 2022-06-16 NOTE — ASSESSMENT & PLAN NOTE
Wt Readings from Last 3 Encounters:   06/16/22 80 2 kg (176 lb 12 9 oz)   05/11/22 62 1 kg (136 lb 14 4 oz)   03/25/22 61 2 kg (135 lb)     · 5/16: Echo-EF 65%, mild TR, mild MR  · 5/23: Repeat Echo post cardiac arrest: EF 60-65%, G1DD, mild AS (BRADY 1 5cm2), mild MR, mild TR  · Monitor I&O, Daily weights   · Limited ECHO-EF 65%, G1DD, mild AS

## 2022-06-16 NOTE — ASSESSMENT & PLAN NOTE
· Patient previously in ICU for hypoxia with a max of 15L midflow and concern for aspiration pneumonitis  · Transfered to general medical floor 5/21  · 5/21: PEA arrest x2, intubation  · 5/24 CT CAP-CHF with moderate basilar effusions and additional upper lung zone patchy airspace opacities likely reflecting asymmetric pulmonary edema  Infiltrate is not entirely excluded     · 5/29 Bronchoscopy for mucous plugging  · Day # 26 on ventilator: AC/PC 26/24/70%/6  · Wean Fio2/PEEP as able for SPO2>90%  · Continue pulmonary hygiene/ VAP bundle  · Chlorhexidine, PPI, HOB greater than 30 degrees   · Continue volume removal with CVVH as tolerated   · 6/10 IR L thora for 800 cc  · transudate by lights criteria  · No bacteria growth on culture and grain stain, fungal culture negative   · Surgery deemed unsafe for trach at this time given multi organ system failure/high vasopressor requirement, and tenuous respiratory status

## 2022-06-16 NOTE — ASSESSMENT & PLAN NOTE
· Outpatient EGD and colonoscopy at [facility] on 5/11 for w/u of chronic anemia, history of colon polyps, intermittent LLQ abdominal pain and possible intermittent intussusception  · EGD unremarkable, colonoscopy technically difficult and required change from adult scope to pediatric scope, during traversal of the sigmoid colon there was a kink and patient sustained a perforation  · Transferred to [facility] for emergent surgery   · Emergent ex- lap on 5/11 > low anterior resection, protective loop transverse colostomy, flex sigmoidoscopy, and segmental small bowel resection  · Difficult post op course with post op ileus requiring NGT decompression and requirement of short duration of TPN   · 5/22: CT: Diffuse mild dilation of small bowel segments identified without transition point  · 5/24: CT CAP- Distended small bowel loops with scattered air-fluid levels consistent with early/partial small bowel obstruction with transition at the small bowel anastomosis in the region of the ventral pelvis as above  No free air  Cholelithiasis without cholecystitis  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  · 5/24: Stomal prolapse and small peristomal hernia now at the transverse loop colostomy; continues to be reduced by surgery  · TPN d/c'd on 5/26; tolerating tube feeds at goal  · 5/26: Abdominal wound vac placed bedside: continue with dressing changes Monday/Thursday   · Surgery continues to follow    · 6/6: Gastrostomy tube inserted for nutrition by IR   · 6/6: CT Chest Abdomen Pelvis: Indeterminant fluid collection in the right hemipelvis which appears to be loculated and possibly "walled off" from the remainder of abdominopelvic ascites   could represent an infected abscess, but is not significantly changed in size from May 24  · 6/8: IR abscess drainage and drain placement-30mL of pus drained +MRSA  · Continue to monitor output character and quantity

## 2022-06-16 NOTE — ASSESSMENT & PLAN NOTE
Peritonitis with intra-abdominal/pelvic abscess secondary to colonic perforation    · 5/22 CT chest/ab/pelvis with concern of multifocal PNA, no visualized intraabdominal abscess or anastomotic leak   · Per ID suspect multifocal pneumonitis   · OR culture 5/21 pseudomonas and bacteroides fragilis  · Blood cultures on 5/22 negative   · Completed 14 days of Flagyl on 5/24  · Completed 14 days of cefepime on 5/27  · Restarted Zosyn on 6/3 for increasing WBC and oxygen requirements - discontinued 6/10  · Vanco started 6/9 for intra-abdominal abscess culture +MRSA  · Vanco day 8 - plan for 10 day course from date of drainage (6/19)  · ID following, appreciate recommendations   · IR drain in place, continue to monitor output   · Hypothermia requiring chester hugger, continue to trend temps

## 2022-06-16 NOTE — ASSESSMENT & PLAN NOTE
· Secondary to hypoperfusion mehul cardiac arrest +/- ATN  · Baseline creat 0 8  · Creatinine peaked at 3 07  · CVVH started on 5/26, attempts at [facility] unsuccessful secondary to rapidly escalating pressor requirements  · Avoid hypotension/hypoperfusion  · CRRT d/c after filter clotted  · No further dialysis per family discussions yesterday

## 2022-06-16 NOTE — ASSESSMENT & PLAN NOTE
· HIT JAYLIN negative  · Argatroban d/c 6/9 and switch back to heparin gtt  · Thrombocytopenia likely in setting of bone marrow suppression from sepsis and CRRT  · Platelet count down to 25,000, helmet cells in hemolysis smear, INR normal, fibrinogen 146  · Concern for DIC  · Pre filter heparin held   Patient now off of CRRT

## 2022-06-17 NOTE — ASSESSMENT & PLAN NOTE
· Secondary to hypoperfusion mehul cardiac arrest +/- ATN  · Baseline creat 0 8  · Creatinine peaked at 3 07  · CVVH started on 5/26, attempts at [facility] unsuccessful secondary to rapidly escalating pressor requirements  · Avoid hypotension/hypoperfusion  · CRRT d/c after filter clotted  · No further dialysis per family discussions yesterday

## 2022-06-17 NOTE — DEATH NOTE
INPATIENT DEATH NOTE  Woodrow Ortiz 80 y o  male MRN: [id number]  Unit/Bed#: ICU 02 Encounter: [id number]    Date, Time and Cause of Death    Date of Death: 22  Time of Death:  4:10 PM  Preliminary Cause of Death: Septic shock ([facility] [address] )  Entered by: Oz Little[CR1 1]     Attribution     CR1  [address] [place], [place]  16:13           Patient's Information  Pronounced by: Dr Linda Strickland  Did the patient's death occur in the ED?: No  Did the patient's death occur in the OR?: No  Did the patient's death occur less than 10 days post-op?: No  Did the patient's death occur within 24 hours of admission?: No  Was code status DNR at the time of death?: Yes    PHYSICAL EXAM:  Unresponsive to noxious stimuli, Spontaneous respirations absent, Breath sounds absent, Carotid pulse absent, Heart sounds absent, Pupillary light reflex absent and Corneal blink reflex absent    Medical Examiner notification criteria:  NONE APPLICABLE   Medical Examiner's office notified?:  No, does not meet ME notification criteria   Medical Examiner accepted case?:  No  Name of Medical Examiner:     Family Notification  Was the family notified?: Yes  Date Notified: 22  Time Notified: 7760  Notified by: At bedside  Name of Family Notified of Death: sons at bedside   Relationship to Patient: Son  Family Notification Route:  At bedside  Was the family told to contact a  home?: Yes    Autopsy Options:  Post-mortem examination declined by next of kin    Primary Service Attending Physician notified?:  yes - Attending:  Dr Linda Strickland    Physician/Resident responsible for completing Discharge Summary:  Leila Gutiérrez

## 2022-06-17 NOTE — ASSESSMENT & PLAN NOTE
· HIT JAYLIN negative  · Argatroban d/c 6/9 and switch back to heparin gtt  · Thrombocytopenia likely in setting of bone marrow suppression from sepsis and CRRT  · Platelet count down to 25,000, helmet cells in hemolysis smear, INR normal, fibrinogen 146  · Concern for DIC  · Pre filter heparin held   Patient now off of CRRT  · No longer checking labs

## 2022-06-17 NOTE — PLAN OF CARE
Problem: RESPIRATORY - ADULT  Goal: Achieves optimal ventilation and oxygenation  Description: INTERVENTIONS:  - Assess for changes in respiratory status  - Assess for changes in mentation and behavior  - Position to facilitate oxygenation and minimize respiratory effort  - Oxygen administered by appropriate delivery if ordered  - Assess the need for suctioning and aspirate as needed  - Respiratory Therapy support as indicated  Outcome: Progressing

## 2022-06-17 NOTE — DISCHARGE SUMMARY
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  He had an EGD which was unremarkable  Patient has history of chronic anemia, history of colon polyps, intermittent left lower quadrant abdominal pain and a possible diagnosis of intermittent intussusception  During the colonoscopy difficulty was encountered by the gastroenterologist, the adult scope was changed to a pediatric scope and during traversal of the sigmoid colon there was a kink and the peritoneal cavity was visualized  A colon polyp had been removed prior to this  Procedure was then terminated  CT scan of the abdomen was performed which showed pneumoperitoneum  Emergency surgery consultation was obtained  Upon examination patient had obvious signs of peritonitis "    Procedures Performed:   Orders Placed This Encounter   Procedures    Central Line    Intubation    Temporary HD Catheter       Summary of Hospital Course:  Patient was admitted and underwent exploratory laparotomy, low anterior section, protective loop transverse colostomy and segmental small bowel resection secondary to perforation of the rectosigmoid junction after colonoscopy  Patient's stay was complicated by hypoxic respiratory failure secondary to presumed aspiration pneumonia  Patient's oxygenation was improving, however experienced cardiac arrest x2  on 5/21  ROSC was obtained at that time  Patient unfortunately continued to deteriorate and experienced a prolonged hospital course    He was treated for multifocal pneumonia and had fluctuating oxygenation but was never able to successfully wean from the ventilator or become stable enough for tracheostomy  He was also treated for peritonitis secondary to an intra-abdominal/pelvic abscess  He experienced renal failure and required dialysis  Despite all these efforts, he was never able to successfully transition to intermittent dialysis and required continuous dialysis secondary to ongoing vasopressor support  Ultimately, patient's hemodynamics continued to deteriorate and ongoing goals of care conversations were had with family  On , family decided to transition to comfort measures  Patient  peacefully with family at bedside at 16:10  Significant Findings, Care, Treatment and Services Provided:     Complications:     Disposition:      Final Diagnosis: Septic shock    Medical Problems             Resolved Problems  Date Reviewed: 2022          Resolved    Rheumatic aortic stenosis 2022     Resolved by  General Gip, CRNP    Hypokalemia 2022     Resolved by  General Gip, CRNP    Ileus ([facility] [address] ) 2022     Resolved by  Kira Castellon PA-C    Cardiac arrest [facility]) 2022     Resolved by  Bianca Degroot PA-C    Acute on chronic anemia 6/10/2022     Resolved by  General Gip, CRNP                Condition at Time of Death: critically ill    Date, Time and Cause of Death    Date of Death: 22  Time of Death:  4:10 PM  Preliminary Cause of Death: Septic shock ([facility] [address] )  Entered by: Nazia Little[CR1 1]     Attribution     CR1  [address] [place], [place]  16:13          Death Note:    INPATIENT DEATH NOTE  Fady Hudson 80 y o  male MRN: [id number]  Unit/Bed#: ICU 02 Encounter: [id number]    Date, Time and Cause of Death    Date of Death: 22  Time of Death:  4:10 PM  Preliminary Cause of Death: Septic shock ([facility] [address] )  Entered by: Nazia Little[CR1 1]     Attribution     CR1  [address] [place], [place]  16:13           Patient's Information  Pronounced by: Dr Jackie Pierre  Did the patient's death occur in the ED?: No  Did the patient's death occur in the OR?: No  Did the patient's death occur less than 10 days post-op?: No  Did the patient's death occur within 24 hours of admission?: No  Was code status DNR at the time of death?: Yes    PHYSICAL EXAM:  Unresponsive to noxious stimuli, Spontaneous respirations absent, Breath sounds absent, Carotid pulse absent, Heart sounds absent, Pupillary light reflex absent and Corneal blink reflex absent    Medical Examiner notification criteria:  NONE APPLICABLE   Medical Examiner's office notified?:  No, does not meet ME notification criteria   Medical Examiner accepted case?:  No  Name of Medical Examiner:     Family Notification  Was the family notified?: Yes  Date Notified: 22  Time Notified: 232  Notified by: At bedside  Name of Family Notified of Death: sons at bedside   Relationship to Patient: Son  Family Notification Route:  At bedside  Was the family told to contact a  home?: Yes    Autopsy Options:  Post-mortem examination declined by next of kin    Primary Service Attending Physician notified?:  yes - Attending:  Dr Jackie Pierre    Physician/Resident responsible for completing Discharge Summary:  Pamela Music

## 2022-06-17 NOTE — ASSESSMENT & PLAN NOTE
Peritonitis with intra-abdominal/pelvic abscess secondary to colonic perforation    · 5/22 CT chest/ab/pelvis with concern of multifocal PNA, no visualized intraabdominal abscess or anastomotic leak   · Per ID suspect multifocal pneumonitis   · OR culture 5/21 pseudomonas and bacteroides fragilis  · Blood cultures on 5/22 negative   · Completed 14 days of Flagyl on 5/24  · Completed 14 days of cefepime on 5/27  · Restarted Zosyn on 6/3 for increasing WBC and oxygen requirements - discontinued 6/10  · Vanco started 6/9 for intra-abdominal abscess culture +MRSA  · Antibiotics now discontinued after ongoing goals of care conversations with family

## 2022-06-17 NOTE — ASSESSMENT & PLAN NOTE
Wt Readings from Last 3 Encounters:   06/17/22 80 2 kg (176 lb 12 9 oz)   05/11/22 62 1 kg (136 lb 14 4 oz)   03/25/22 61 2 kg (135 lb)     · 5/16: Echo-EF 65%, mild TR, mild MR  · 5/23: Repeat Echo post cardiac arrest: EF 60-65%, G1DD, mild AS (BRADY 1 5cm2), mild MR, mild TR  · Monitor I&O, Daily weights   · Limited ECHO-EF 65%, G1DD, mild AS

## 2022-06-17 NOTE — PROGRESS NOTES
Accompanied the patient and his family as the patient was actively dying  Patient's grandchildren joined a prayer service via TEAMS as we commended the patient to God's loving arms  06/17/22 1500   Clinical Encounter Type   Visited With Patient and family together   Crisis Visit Patient actively dying   Restoration Encounters   Restoration Needs Prayer; Osmin Garcia 20 Text

## 2022-06-17 NOTE — ACP (ADVANCE CARE PLANNING)
Critical Care Advanced Care Planning Note  Eliane Gonzalez 80 y o  male MRN: [id number]  Unit/Bed#: ICU 02 Encounter: [id number]    Eliane Gonzalez is a 80 y o  male requiring critical care evaluation and advanced care planning  The patient has chronic comorbidities, including but not limited to HTN, CAD, aortic stenosis, GERD which is now further complicated by the following acute conditions: septic shock, renal failure  Due to the severity of the patient's acute condition and/or the extent of chronic conditions, additional conversations pertaining to advanced care planning were required  Today's discussion, which was held in a face-to-face meeting, included Javy Fall), and it was established that all stake holders understood the rationale for the advanced care planning  The patient was unable to participate in the discussion due to intubation  Summary of Discussion:  Discussed goals of care  Ongoing conversations have taken place over last several days  Given overall progression of illness and multisystem organ failure, family understands poor prognosis  At this time, family is ready to proceed with comfort measures  Family intends on visiting and facetiming with other family, and then is agreeable to compassionate extubation and comfort measures  Total time spent, (15) minutes (1315 to 1330)        CODE STATUS: COMFORT CARE - Level 4  POA:    POLST:        SIGNATURE: Toan Earl PA-C  DATE: June 17, 2022  TIME: 1:59 PM

## 2022-06-17 NOTE — PROGRESS NOTES
NEPHROLOGY PROGRESS NOTE   Madhu Jewell 80 y o  male MRN: [id number]  Unit/Bed#: ICU 02 Encounter: [id number]    ASSESSMENT & PLAN:  80year old with HTN, aortic stenosis, GERD, CAD who underwent EGD and C-scope on 9/40/28 which was complicated by perforation requiring emergent ex-lap  Since then, course has been complicated by PEA arrest on 5/21/22, VDRF, pneumonia, sepsis  Renal consulted for MARYELLEN  Acute kidney injury   -Baseline creatinine:0 7-0 9mg/dl  -Admission creatinine:0 78 mg/dl  - Work up:   · UA with microscopy:4-10 rbc/hpf and wbc  · Imaging:  CT chest abdomen pelvis from 05/06 with no hydronephrosis  Finding of cortical cyst right kidney 2 cm size  -Etiology:  Acute kidney injury likely due to ATN in the setting of hypotension and cardiac arrest   Peak creatinine 3 07 on May 26  [facility] Course:  HD dependent since 05/26  Did not tolerate intermittent HD on 06/07 and so restarted on CVVHD on 06/08 and was stopped  On 6/14    -Plan:   · off CVVHD since 6/14  Continue to hold CVVHD for now  · As per my discussion with patient's son as well as per family meeting on 06/16 with ICU team, plan to continue to hold dialysis and no escalation of care and no blood work  Hopefully transition to comfort care today  · Continue vasopressors per ICU team for now  · Avoid nephrotoxins and dose all medications per egfr  · Avoid hypotension  BP/hypotension  -still on 2 pressors-Levophed as well as vasopressin, continue vasopressor per ICU team and adjust dose as needed   -continue midodrine    Fluid overload  -echocardiogram from 05/31 showed ejection fraction 65% with grade 1 diastolic dysfunction  -was undergoing UF on CVVH but currently off CVVHD  Not making much urine  Continue to monitor volume status  Hyperkalemia, potassium level 6 1, trending up with holding CVVHD    Received medical treatment with insulin and dextrose as well as calcium gluconate again on 06/16, no repeat labs as per family decision     Patient's son does not want patient to go back on CVVHD and does not want any escalation of care  Plan for possible transition to comfort care and extubation today    Hyperphosphatemia due to acute kidney injury, phosphorus level trending up with holding CVVHD, continue to monitor    Anemia, thrombocytopenia  -monitor hemoglobin per ICU team and replace as needed if hemoglobin drops below 7  -status post PRBC this hospital admission  Hemoglobin currently trending down to 7 8 gram/deciliter, platelet count improved to 77     -Drop in platelet count in the setting of CVVHD and sepsis, now improving  Ventilator dependent respiratory failure currently on FiO2 of 60%  Management per ICU    PEA cardiac arrest on 05/21/2022    Pneumoperitoneum status colon perforation status post colonoscopy , now post exploratory laparotomy with low anterior resection on 05/11/2022  Patient developed peritonitis/sepsis/intra-abdominal/pelvic abscess and was treated with antibiotics  Also status post IR abscess drainage and drain placement on 06/08  Discussed with ICU advanced practitioner    SUBJECTIVE:  Still on vasopressors and intubated  No labs today as per family meeting     OBJECTIVE:  Current Weight: Weight - Scale: 80 2 kg (176 lb 12 9 oz)  Vitals:    06/17/22 0800   BP:    Pulse: 79   Resp: (!) 30   Temp: 98 3 °F (36 8 °C)   SpO2: 95%   /57   Pulse 79   Temp 98 3 °F (36 8 °C) (Temporal)   Resp (!) 30   Ht 5' 4" (1 626 m)   Wt 80 2 kg (176 lb 12 9 oz)   SpO2 95%   BMI 30 35 kg/m²       Intake/Output Summary (Last 24 hours) at 6/17/2022 0903  Last data filed at 6/17/2022 0601  Gross per 24 hour   Intake 513 6 ml   Output 110 ml   Net 403 6 ml       Physical Exam   General:  Ill looking, intubated  fio2 60 %  Head: normocephalic, atraumatic  Eyes: Conjunctivae pink,  Sclera anicteric  ENT: Intubated    Neck: supple   Chest: Clear to Auscultation both lungs, no crackles or wheezing  CVS: S1 & S2 present, normal rate, regular rhythm, no murmur  Abdomen: soft, non-tender, non-distended, Bowel sounds normoactive  Extremities: 1 + edema of  legs  Neuro: Sedated, intubated  Skin: no rash, warm and dry  Psych: sedated  Unable to assess          Medications:    Current Facility-Administered Medications:     acetaminophen (TYLENOL) oral suspension 650 mg, 650 mg, Oral, Q6H PRN, ISELA Givens, 650 mg at 06/05/22 1512    chlorhexidine (PERIDEX) 0 12 % oral rinse 15 mL, 15 mL, Mouth/Throat, Q12H [address], ISELA Givens, 15 mL at 06/17/22 0749    HYDROmorphone (DILAUDID) injection 0 5 mg, 0 5 mg, Intravenous, Q2H PRN, ISELA Valdez, 0 5 mg at 06/17/22 0742    insulin lispro (HumaLOG) 100 units/mL subcutaneous injection 1-6 Units, 1-6 Units, Subcutaneous, Q6H [address], 1 Units at 06/14/22 0613 **AND** Fingerstick Glucose (POCT), , , Q6H, ISELA Ritter    iohexol (OMNIPAQUE) 240 MG/ML solution 50 mL, 50 mL, Oral, Once in imaging, IESLA Givens    ipratropium-albuterol (DUO-NEB) 0 5-2 5 mg/3 mL inhalation solution 3 mL, 3 mL, Nebulization, Q6H, Cherri Gonzales V CRNP, 3 mL at 06/17/22 0735    levothyroxine tablet 112 mcg, 112 mcg, Per NG Tube, Early Morning, ISELA Givens, 112 mcg at 06/17/22 0508    midodrine (PROAMATINE) tablet 10 mg, 10 mg, Oral, TID ACKourtney CRNP, 10 mg at 06/17/22 0441    norepinephrine (LEVOPHED) 8 mg (DOUBLE CONCENTRATION) IV in sodium chloride 0 9% 250 mL, 1-30 mcg/min, Intravenous, Titrated, ISELA Lawrence, Last Rate: 1 9 mL/hr at 06/17/22 0508, 1 mcg/min at 06/17/22 0508    ondansetron (ZOFRAN) injection 4 mg, 4 mg, Intravenous, Q6H PRN, ISELA Givens    oxyCODONE (ROXICODONE) oral solution 5 mg, 5 mg, Per NG Tube, Q4H PRN, 5 mg at 06/12/22 0125 **OR** oxyCODONE (ROXICODONE) oral solution 7 5 mg, 7 5 mg, Per NG Tube, Q4H PRN, ISELA Valdez   pantoprazole (PROTONIX) injection 40 mg, 40 mg, Intravenous, Q24H [facility], Becky Bumpers, CRNP, 40 mg at 06/17/22 0809    polyethylene glycol (MIRALAX) packet 17 g, 17 g, Oral, Daily, Josephine Castillo, CRNP, 17 g at 06/16/22 0851    vasopressin (PITRESSIN) 20 Units in sodium chloride 0 9 % 100 mL infusion, 0 04 Units/min, Intravenous, Continuous, Simon Oropeza PA-C, Last Rate: 12 mL/hr at 06/17/22 0508, 0 04 Units/min at 06/17/22 0508    Invasive Devices:        Lab Results:   Results from last 7 days   Lab Units 06/16/22  0613 06/15/22  1526 06/15/22  0520 06/14/22  1753 06/14/22  1155 06/14/22  0604   WBC Thousand/uL 11 87*  --  15 08*  --   --  17 23*   HEMOGLOBIN g/dL 7 8*  --  7 8*  --   --  7 7*   HEMATOCRIT % 25 9*  --  25 4*  --   --  25 3*   PLATELETS Thousands/uL 77*  --  37*  --   --  26*   POTASSIUM mmol/L 6 1* 5 7* 5 6* 5 6* 5 2 5 1   CHLORIDE mmol/L 103 103 104 103 104 104   CO2 mmol/L 24 25 28 29 28 27   BUN mg/dL 33* 27* 21 16 14 17   CREATININE mg/dL 2 10* 1 57* 1 29 0 86 0 91 0 89   CALCIUM mg/dL 8 0* 8 1* 8 1* 8 0* 8 2* 8 1*   MAGNESIUM mg/dL 2 2  --  2 0 2 0 2 1 2 0   PHOSPHORUS mg/dL  --   --  4 5* 3 8 3 4 3 3       Previous work up:         Portions of the record may have been created with voice recognition software  Occasional wrong word or "sound a like" substitutions may have occurred due to the inherent limitations of voice recognition software  Read the chart carefully and recognize, using context, where substitutions have occurred  If you have any questions, please contact the dictating provider

## 2022-06-17 NOTE — ASSESSMENT & PLAN NOTE
·  Outpatient EGD and colonoscopy at [facility] on 5/11 for w/u of chronic anemia, history of colon polyps, intermittent LLQ abdominal pain and possible intermittent intussusception  · EGD unremarkable, colonoscopy technically difficult and required change from adult scope to pediatric scope, during traversal of the sigmoid colon there was a kink and patient sustained a perforation  · Transferred to [facility] for emergent surgery   · Emergent ex- lap on 5/11 > low anterior resection, protective loop transverse colostomy, flex sigmoidoscopy, and segmental small bowel resection  · Difficult post op course with post op ileus requiring NGT decompression and requirement of short duration of TPN   · 5/22: CT: Diffuse mild dilation of small bowel segments identified without transition point  · 5/24: CT CAP- Distended small bowel loops with scattered air-fluid levels consistent with early/partial small bowel obstruction with transition at the small bowel anastomosis in the region of the ventral pelvis as above  No free air  Cholelithiasis without cholecystitis  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  · 5/24: Stomal prolapse and small peristomal hernia now at the transverse loop colostomy; continues to be reduced by surgery  · TPN d/c'd on 5/26; tolerating tube feeds at goal  · 5/26: Abdominal wound vac placed bedside: continue with dressing changes Monday/Thursday   · Surgery continues to follow    · 6/6: Gastrostomy tube inserted for nutrition by IR   · 6/6: CT Chest Abdomen Pelvis: Indeterminant fluid collection in the right hemipelvis which appears to be loculated and possibly "walled off" from the remainder of abdominopelvic ascites   could represent an infected abscess, but is not significantly changed in size from May 24  · 6/8: IR abscess drainage and drain placement-30mL of pus drained +MRSA  · Continue to monitor output character and quantity

## 2022-06-17 NOTE — PROGRESS NOTES
Tami 45  Progress Note - Andreea Jordan Hudson 2/15/1931, 80 y o  male MRN: [id number]  Unit/Bed#: ICU 02 Encounter: [id number]  Primary Care Provider: Washington Victoria MD   Date and time admitted to hospital: 5/11/2022  4:48 PM    * Bowel perforation [facility])  Assessment & Plan  ·  Outpatient EGD and colonoscopy at [facility] on 5/11 for w/u of chronic anemia, history of colon polyps, intermittent LLQ abdominal pain and possible intermittent intussusception  · EGD unremarkable, colonoscopy technically difficult and required change from adult scope to pediatric scope, during traversal of the sigmoid colon there was a kink and patient sustained a perforation  · Transferred to [facility] for emergent surgery   · Emergent ex- lap on 5/11 > low anterior resection, protective loop transverse colostomy, flex sigmoidoscopy, and segmental small bowel resection  · Difficult post op course with post op ileus requiring NGT decompression and requirement of short duration of TPN   · 5/22: CT: Diffuse mild dilation of small bowel segments identified without transition point  · 5/24: CT CAP- Distended small bowel loops with scattered air-fluid levels consistent with early/partial small bowel obstruction with transition at the small bowel anastomosis in the region of the ventral pelvis as above  No free air  Cholelithiasis without cholecystitis  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    · 6/6: Gastrostomy tube inserted for nutrition by IR   · 6/6: CT Chest Abdomen Pelvis: Indeterminant fluid collection in the right hemipelvis which appears to be loculated and possibly "walled off" from the remainder of abdominopelvic ascites  could represent an infected abscess, but is not significantly changed in size from May 24  · 6/8: IR abscess drainage and drain placement-30mL of pus drained +MRSA  · Continue to monitor output character and quantity    Acute respiratory failure with hypoxia ([address] )  Assessment & Plan  · Patient previously in ICU for hypoxia with a max of 15L midflow and concern for aspiration pneumonitis  · Transfered to general medical floor 5/21  · 5/21: PEA arrest x2, intubation  · 5/24 CT CAP-CHF with moderate basilar effusions and additional upper lung zone patchy airspace opacities likely reflecting asymmetric pulmonary edema  Infiltrate is not entirely excluded     · 5/29 Bronchoscopy for mucous plugging  · Day # 26 on ventilator: AC/PC 26/24/70%/6  · Wean Fio2/PEEP as able for SPO2>90%  · Continue pulmonary hygiene/ VAP bundle  · Chlorhexidine, PPI, HOB greater than 30 degrees   · Continue volume removal with CVVH as tolerated   · 6/10 IR L thora for 800 cc  · transudate by lights criteria  · No bacteria growth on culture and grain stain, fungal culture negative     Acute renal failure (ARF) (HCC)  Assessment & Plan  · Secondary to hypoperfusion mehul cardiac arrest +/- ATN  · Baseline creat 0 8  · Creatinine peaked at 3 07  · CVVH started on 5/26, attempts at [facility] unsuccessful secondary to rapidly escalating pressor requirements  · Avoid hypotension/hypoperfusion  · CRRT d/c after filter clotted  · No further dialysis per family discussions yesterday    Septic shock [facility])  Assessment & Plan  Peritonitis with intra-abdominal/pelvic abscess secondary to colonic perforation    · 5/22 CT chest/ab/pelvis with concern of multifocal PNA, no visualized intraabdominal abscess or anastomotic leak   · Per ID suspect multifocal pneumonitis   · OR culture 5/21 pseudomonas and bacteroides fragilis  · Blood cultures on 5/22 negative   · Completed 14 days of Flagyl on 5/24  · Completed 14 days of cefepime on 5/27  · Restarted Zosyn on 6/3 for increasing WBC and oxygen requirements - discontinued 6/10  · Vanco started 6/9 for intra-abdominal abscess culture +MRSA  · Antibiotics now discontinued after ongoing goals of care conversations with family    CHF (congestive heart failure) ([facility] [address] )  Assessment & Plan  Wt Readings from Last 3 Encounters:   06/17/22 80 2 kg (176 lb 12 9 oz)   05/11/22 62 1 kg (136 lb 14 4 oz)   03/25/22 61 2 kg (135 lb)     · 5/16: Echo-EF 65%, mild TR, mild MR  · 5/23: Repeat Echo post cardiac arrest: EF 60-65%, G1DD, mild AS (BRADY 1 5cm2), mild MR, mild TR  · Monitor I&O, Daily weights   · Limited ECHO-EF 65%, G1DD, mild AS    Hyperglycemia  Assessment & Plan  · A1c 5 3%  · Hyperglycemia likely stress induced  · Continue SSI - minimal requirements     Toxic metabolic encephalopathy  Assessment & Plan  · Likely in setting of acute illness/delirium, end stage multi organ system failure  · Delirium precautions; regulate sleep/wake cycle, environmental controls, daily CAM ICU   · Trend neuro exam        Hypotension  Assessment & Plan  · Secondary to septic shock   · Continue midodrine 10mg TID    · Levo 1mcg, vaso 0 04 units  · No escalation of vasopressors per family discussions yesterday      Anemia  Assessment & Plan  · Hemoglobin drop post cardiac arrest    · Noted to have gross hematuria but this has since resolved  · 5/21: 1 u PRBC  · 5/24: 1 u PRBC  · CT obtained on 5/24 and negative for any overt signs of bleeding  · 5/26: 1 u PRBC   · 5/30: 1 u PRBC  · 6/4: 1 U PRBC   · 6/9: 1 U PRBC   · 6/11: 1 U PRBC   · No longer checking labs      Paroxysmal atrial fibrillation (HCC)  Assessment & Plan  · In the setting of cardiac arrest while on 3 pressors noted to have afib with RVR  · Bolused with amio and placed on infusion     · Converted to sinus rhythm on 5/22   · Amio gtt d/c 5/23 but restarted on 6/7 due to RVR >100   · Pt now converted to  NSR, Amio gtt d/c 6/9  · Holding systemic AC with worsening thrombocytopenia     Thrombocytopenia (HCC)  Assessment & Plan  · HIT JAYLIN negative  · Argatroban d/c  and switch back to heparin gtt  · Thrombocytopenia likely in setting of bone marrow suppression from sepsis and CRRT  · Platelet count down to 25,000, helmet cells in hemolysis smear, INR normal, fibrinogen 146  · Concern for DIC  · Pre filter heparin held  Patient now off of CRRT  · No longer checking labs        Plan for  to visit today and eventual comfort care     ----------------------------------------------------------------------------------------  HPI/24hr events: no acute events overnight    Patient appropriate for transfer out of the ICU today?: No  Disposition: Continue Critical Care   Code Status: Level 2 - DNAR: but accepts endotracheal intubation  ---------------------------------------------------------------------------------------  SUBJECTIVE  Does not follow commands      Review of Systems  Review of systems was unable to be performed secondary to intubated  ---------------------------------------------------------------------------------------  OBJECTIVE    Vitals   Vitals:    22 0400 22 0500 22 0503 22 0510   BP:       Pulse: 80 83     Resp: (!) 32 (!) 30     Temp: 97 8 °F (36 6 °C)      TempSrc:       SpO2: 99% 92% 91%    Weight:    80 2 kg (176 lb 12 9 oz)   Height:         Temp (24hrs), Av 6 °F (36 4 °C), Min:96 26 °F (35 7 °C), Max:98 24 °F (36 8 °C)  Current: Temperature: 97 8 °F (36 6 °C)  Arterial Line BP: 133/36  Arterial Line MAP (mmHg): 74 mmHg    Respiratory:  SpO2: SpO2: 91 %, SpO2 Activity: SpO2 Activity: At Rest  Nasal Cannula O2 Flow Rate (L/min): 5 L/min    Invasive/non-invasive ventilation settings   Respiratory  Report   Lab Data (Last 4 hours)    None         O2/Vent Data (Last 4 hours)       0503           Vent Mode AC/PC       Resp Rate (BPM) (BPM) 26       Pressure Control (cmH2O) (cm) 24       Insp Time (sec) (sec) 0 5 FiO2 (%) (%) 60       PEEP (cmH2O) (cmH2O) 6       MV 10 4                   Physical Exam  Vitals and nursing note reviewed  Constitutional:       General: He is not in acute distress  Appearance: He is ill-appearing and toxic-appearing  HENT:      Head: Normocephalic and atraumatic  Mouth/Throat:      Mouth: Mucous membranes are moist    Eyes:      Pupils: Pupils are equal, round, and reactive to light  Cardiovascular:      Rate and Rhythm: Normal rate and regular rhythm  Pulses: Normal pulses  Heart sounds: No murmur heard  Pulmonary:      Effort: No respiratory distress  Breath sounds: Rhonchi present  Abdominal:      General: Abdomen is flat  Palpations: Abdomen is soft  Comments: Drain with serosanguinous/mucoid drainage  Wound vac in place  G tube in place  Neurological:      GCS: GCS eye subscore is 2  GCS verbal subscore is 1  GCS motor subscore is 3  Comments: Does not follow commands  Moves spontaneously               Laboratory and Diagnostics:  Results from last 7 days   Lab Units 06/16/22  0613 06/15/22  0520 06/14/22  0604 06/13/22  1906 06/13/22  0628 06/12/22  0559 06/11/22  0542   WBC Thousand/uL 11 87* 15 08* 17 23* 20 67* 19 48* 17 28* 16 88*   HEMOGLOBIN g/dL 7 8* 7 8* 7 7* 8 0* 8 4* 8 0* 6 8*   HEMATOCRIT % 25 9* 25 4* 25 3* 25 4* 26 8* 24 7* 20 7*   PLATELETS Thousands/uL 77* 37* 26* 25* 33* 46* 44*   NEUTROS PCT %  --   --   --   --  62  --   --    BANDS PCT %  --  12* 10*  --   --   --  36*   MONOS PCT %  --   --   --   --  5  --   --    MONO PCT %  --  10 3*  --   --   --  2*     Results from last 7 days   Lab Units 06/16/22  0613 06/15/22  1526 06/15/22  0520 06/14/22  1753 06/14/22  1155 06/14/22  0604 06/13/22  2359   SODIUM mmol/L 135* 136 136 137 136 137 137   POTASSIUM mmol/L 6 1* 5 7* 5 6* 5 6* 5 2 5 1 5 1   CHLORIDE mmol/L 103 103 104 103 104 104 105   CO2 mmol/L 24 25 28 29 28 27 27   ANION GAP mmol/L 8 8 4 5 4 6 5   BUN mg/dL 33* 27* 21 16 14 17 16   CREATININE mg/dL 2 10* 1 57* 1 29 0 86 0 91 0 89 0 85   CALCIUM mg/dL 8 0* 8 1* 8 1* 8 0* 8 2* 8 1* 8 1*   GLUCOSE RANDOM mg/dL 84 88 103 125 143* 141* 125     Results from last 7 days   Lab Units 06/16/22  0613 06/15/22  0520 06/14/22  1753 06/14/22  1155 06/14/22  0604 06/13/22  2359 06/13/22  1809 06/13/22  1253   MAGNESIUM mg/dL 2 2 2 0 2 0 2 1 2 0 2 3 1 9 2 0   PHOSPHORUS mg/dL  --  4 5* 3 8 3 4 3 3 3 4 3 4 3 1      Results from last 7 days   Lab Units 06/13/22 2054 06/11/22  0250 06/10/22  1917 06/10/22  0927   INR  1 28*  --   --   --    PTT seconds  --  112* 143* 210*          Results from last 7 days   Lab Units 06/13/22  1906   LACTIC ACID mmol/L 0 8     ABG:  Results from last 7 days   Lab Units 06/14/22  1508   PH ART  7 248*   PCO2 ART mm Hg 54 4*   PO2 ART mm Hg 126 1   HCO3 ART mmol/L 23 2   BASE EXC ART mmol/L -4 1   ABG SOURCE  Line, Arterial     VBG:  Results from last 7 days   Lab Units 06/14/22  1508   ABG SOURCE  Line, Arterial     Results from last 7 days   Lab Units 06/14/22  0604 06/12/22  0559   PROCALCITONIN ng/ml 0 92* 1 88*       Micro  Results from last 7 days   Lab Units 06/10/22  1214   GRAM STAIN RESULT  No Polys or Bacteria seen   BODY FLUID CULTURE, STERILE  No growth       EKG:   Imaging:     Intake and Output  I/O       06/15 0701  06/16 0700 06/16 0701  06/17 0700    I V  (mL/kg) 454 (5 7) 353 6 (4 4)    NG/GT  60    IV Piggyback  100    Total Intake(mL/kg) 454 (5 7) 513 6 (6 4)    Urine (mL/kg/hr) 0 (0)     Emesis/NG output  0    Drains 120 110    Stool 175     Total Output 295 110    Net +159 +403 6                Height and Weights   Height: 5' 4" (162 6 cm)  IBW (Ideal Body Weight): 59 2 kg  Body mass index is 30 35 kg/m²    Weight (last 2 days)     Date/Time Weight    06/17/22 0510 80 2 (176 81)    06/16/22 0600 80 2 (176 81)    06/15/22 0500 80 1 (176 59)            Nutrition       Diet Orders   (From admission, onward)             Start     Ordered 06/10/22 1518  Diet Enteral/Parenteral; Tube Feeding No Oral Diet; Vital 1 5; Continuous; 50; Demond - Two Packets Daily  Diet effective now        References:    Nutrtion Support Algorithm Enteral vs  Parenteral   Question Answer Comment   Diet Type Enteral/Parenteral    Enteral/Parenteral Tube Feeding No Oral Diet    Tube Feeding Formula: Vital 1 5    Bolus/Cyclic/Continuous Continuous    Tube Feeding Goal Rate (mL/hr): 50    Demond Orange Demond - Two Packets Daily    RD to adjust diet per protocol?  Yes        06/10/22 1517    05/12/22 0906  Room Service  Once        Question:  Type of Service  Answer:  Room Service - Appropriate with Assistance    05/12/22 0905                  Active Medications  Scheduled Meds:  Current Facility-Administered Medications   Medication Dose Route Frequency Provider Last Rate    acetaminophen  650 mg Oral Q6H PRN ISELA Hearn      chlorhexidine  15 mL Mouth/Throat Q12H [facility] ISELA Hearn      HYDROmorphone  0 5 mg Intravenous Q2H PRN ISELA Nielsen      insulin lispro  1-6 Units Subcutaneous Q6H [facility] ISELA Julio      iohexol  50 mL Oral Once in imaging ISELA Hearn      ipratropium-albuterol  3 mL Nebulization Q6H ISELA Lawrence      levothyroxine  112 mcg Per NG Tube [facility] Lisa      midodrine  10 mg Oral TID AC [address]      norepinephrine  1-30 mcg/min Intravenous Titrated Cherri SpiroMACARIO GrimesNP 1 mcg/min (06/17/22 0038)    ondansetron  4 mg Intravenous Q6H PRN ISELA Hearn      oxyCODONE  5 mg Per NG Tube Q4H PRN ISELA Nielsen      Or    oxyCODONE  7 5 mg Per NG Tube Q4H PRN ISELA Nielsen      pantoprazole  40 mg Intravenous Q24H [facility] ISELA Hearn      polyethylene glycol  17 g Oral Daily ISELA Julio      vasopressin (PITRESSIN) in 0 9 % sodium chloride 100 mL  0 04 Units/min Intravenous Continuous Altamese Blunt ZULAY Vazquez 0 04 Units/min (06/17/22 0508)     Continuous Infusions:  norepinephrine, 1-30 mcg/min, Last Rate: 1 mcg/min (06/17/22 0508)  vasopressin (PITRESSIN) in 0 9 % sodium chloride 100 mL, 0 04 Units/min, Last Rate: 0 04 Units/min (06/17/22 0508)      PRN Meds:   acetaminophen, 650 mg, Q6H PRN  HYDROmorphone, 0 5 mg, Q2H PRN  iohexol, 50 mL, Once in imaging  ondansetron, 4 mg, Q6H PRN  oxyCODONE, 5 mg, Q4H PRN   Or  oxyCODONE, 7 5 mg, Q4H PRN        Invasive Devices Review  Invasive Devices  Report    Peripherally Inserted Central Catheter Line  Duration           PICC Line 50/68/39 Right Basilic 29 days          Arterial Line  Duration           Arterial Line 05/30/22 Radial 17 days          Hemodialysis Catheter  Duration           HD Temporary Double Catheter 21 days          Drain  Duration           Colostomy LLQ 37 days    Gastrostomy/Enterostomy Percutaneous Interventional Gastrostomy 16 Fr  LUQ 10 days    Abscess Drain Buttock 8 days          Airway  Duration           ETT  Oral 26 days                Rationale for remaining devices: critically ill  Plan for comfort measures today   ---------------------------------------------------------------------------------------  Advance Directive and Living Will:      Power of :    POLST:    ---------------------------------------------------------------------------------------  Care Time Delivered:   No Critical Care time spent       Easton Carreon PA-C      Portions of the record may have been created with voice recognition software  Occasional wrong word or "sound a like" substitutions may have occurred due to the inherent limitations of voice recognition software    Read the chart carefully and recognize, using context, where substitutions have occurred

## 2022-06-17 NOTE — ASSESSMENT & PLAN NOTE
· Hemoglobin drop post cardiac arrest    · Noted to have gross hematuria but this has since resolved  · 5/21: 1 u PRBC  · 5/24: 1 u PRBC  · CT obtained on 5/24 and negative for any overt signs of bleeding  · 5/26: 1 u PRBC   · 5/30: 1 u PRBC  · 6/4: 1 U PRBC   · 6/9: 1 U PRBC   · 6/11: 1 U PRBC   · No longer checking labs

## 2022-06-17 NOTE — WOUND OSTOMY CARE
This would have been a weekly follow up visit for this 80year old male patient admitted on 5/11/22 with bowel perforation  As per Kalia Figueredo RN, the patient is transitioning and his family will proceed with comfort measures  The patient was not seen today by this wound care RN

## 2022-06-17 NOTE — ASSESSMENT & PLAN NOTE
· Patient previously in ICU for hypoxia with a max of 15L midflow and concern for aspiration pneumonitis  · Transfered to general medical floor 5/21  · 5/21: PEA arrest x2, intubation  · 5/24 CT CAP-CHF with moderate basilar effusions and additional upper lung zone patchy airspace opacities likely reflecting asymmetric pulmonary edema  Infiltrate is not entirely excluded     · 5/29 Bronchoscopy for mucous plugging  · Day # 26 on ventilator: AC/PC 26/24/70%/6  · Wean Fio2/PEEP as able for SPO2>90%  · Continue pulmonary hygiene/ VAP bundle  · Chlorhexidine, PPI, HOB greater than 30 degrees   · Continue volume removal with CVVH as tolerated   · 6/10 IR L thora for 800 cc  · transudate by lights criteria  · No bacteria growth on culture and grain stain, fungal culture negative

## 2022-07-11 LAB — FUNGUS SPEC CULT: NORMAL

## 2022-08-16 ENCOUNTER — DOCUMENTATION (OUTPATIENT)
Dept: SURGERY | Facility: CLINIC | Age: 87
End: 2022-08-16

## 2022-08-16 NOTE — PROGRESS NOTES
Surgical site infection superficial wound and deep intra-abdominal due to perforation of the rectosigmoid during colonoscopy

## 2023-06-18 NOTE — QUICK NOTE
Update given to son Eamon Ash and daughter in law Debra Herrera  All questions answered  <-- Click to add NO pertinent Family History

## 2024-01-06 NOTE — QUICK NOTE
Daughter in law Deedee updated via phone  Concerned about elevated WBC she saw on MyChart  Discussed overall clinical improvement and downtrending procal with continued abx therapy  Will continue to trend WBC  Also discussed patient's moist cough with need for aggressive pulmonary toilet to prevent further aspiration  She also expressed concerns regarding patient's elevated glucose, explained this is secondary to TPN and that patient had normal hgb A1c  Will be changing TPN today and increasing insulin sliding scale  All questions have been answered to her satisfaction  Yes

## (undated) DEVICE — INTENDED FOR TISSUE SEPARATION, AND OTHER PROCEDURES THAT REQUIRE A SHARP SURGICAL BLADE TO PUNCTURE OR CUT.: Brand: BARD-PARKER ® CARBON RIB-BACK BLADES

## (undated) DEVICE — ANTIBACTERIAL UNDYED BRAIDED (POLYGLACTIN 910), SYNTHETIC ABSORBABLE SUTURE: Brand: COATED VICRYL

## (undated) DEVICE — PLUMEPEN PRO 10FT

## (undated) DEVICE — LABEL STERILE (RSC) (2-SENSOR CAINE 2-LIDOCAINE 2-HEPARIN)

## (undated) DEVICE — ECHELON CONTOUR W/ BLUE RELOAD: Brand: ECHELON

## (undated) DEVICE — PROXIMATE RELOADABLE LINEAR CUTTER WITH SAFETY LOCK-OUT.  55MM LINEAR CUTTER.: Brand: PROXIMATE

## (undated) DEVICE — SUT PDS II 2-0 SH 27 IN Z317H

## (undated) DEVICE — SCD SEQUENTIAL COMPRESSION COMFORT SLEEVE MEDIUM KNEE LENGTH: Brand: KENDALL SCD

## (undated) DEVICE — PURSESTRING DEVICE

## (undated) DEVICE — TIBURON LAPAROTOMY DRAPE: Brand: CONVERTORS

## (undated) DEVICE — PROXIMATE SKIN STAPLERS (35 WIDE) CONTAINS 35 STAINLESS STEEL STAPLES (FIXED HEAD): Brand: PROXIMATE

## (undated) DEVICE — SUT SILK 3-0 SH 30 IN K832H

## (undated) DEVICE — PACK GENERAL LF

## (undated) DEVICE — ELECTRODE BLADE MOD  E-Z CLEAN 6.5IN -0014M

## (undated) DEVICE — SUT VICRYL 0 18 IN J906G

## (undated) DEVICE — SUT SILK 0 SH 30 IN K834H

## (undated) DEVICE — SUT CHROMIC 3-0 SH 27 IN G122H

## (undated) DEVICE — ENSEAL 20 CM SHAFT, LARGE JAW: Brand: ENSEAL X1

## (undated) DEVICE — SUT PDS II 1 CTX 36 IN Z371T

## (undated) DEVICE — SUT ETHILON 3-0 FSL 30 IN 1671H

## (undated) DEVICE — WET SKIN PREP TRAY: Brand: MEDLINE INDUSTRIES, INC.

## (undated) DEVICE — SPONGE LAP 18 X 18 IN

## (undated) DEVICE — MEDI-VAC YANKAUER SUCTION HANDLE: Brand: CARDINAL HEALTH

## (undated) DEVICE — GLOVE INDICATOR PI UNDERGLOVE SZ 7 BLUE

## (undated) DEVICE — ECHELON CIRCULAR POWERED STAPLER: Brand: ECHELON CIRCULAR

## (undated) DEVICE — SPONGE GAUZE 4 X 8 12 PLY STRL LF

## (undated) DEVICE — PROXIMATE PLUS MD MULTI-DIRECTIONAL RELEASE SKIN STAPLERS CONTAINS 35 STAINLESS STEEL STAPLES APPROXIMATE CLOSED DIMENSIONS: 6.9MM X 3.9MM WIDE: Brand: PROXIMATE

## (undated) DEVICE — ASTOUND IMPERVIOUS SURGICAL GOWN: Brand: CONVERTORS

## (undated) DEVICE — PROXIMATE LINEAR CUTTER RELOAD (STNADARD) , BLUE, 55MM: Brand: PROXIMATE

## (undated) DEVICE — CHLORAPREP HI-LITE 26ML ORANGE

## (undated) DEVICE — POOLE SUCTION HANDLE: Brand: CARDINAL HEALTH

## (undated) DEVICE — PROXIMATE RELOADABLE LINEAR STAPLER, 60MM: Brand: PROXIMATE

## (undated) DEVICE — GLOVE SRG BIOGEL 7

## (undated) DEVICE — BASIC DOUBLE BASIN 2-LF: Brand: MEDLINE INDUSTRIES, INC.